# Patient Record
Sex: MALE | Race: WHITE | NOT HISPANIC OR LATINO | Employment: FULL TIME | URBAN - METROPOLITAN AREA
[De-identification: names, ages, dates, MRNs, and addresses within clinical notes are randomized per-mention and may not be internally consistent; named-entity substitution may affect disease eponyms.]

---

## 2017-02-16 ENCOUNTER — TELEPHONE (OUTPATIENT)
Dept: FAMILY MEDICINE | Facility: CLINIC | Age: 67
End: 2017-02-16

## 2017-02-16 NOTE — TELEPHONE ENCOUNTER
----- Message from Jannette Matos sent at 2/16/2017  8:25 AM CST -----  Contact: Pt   Pt would like the nurse to give him a call back in regards to getting a urgent appointment regarding his medication .. Contact number [phone]..

## 2017-02-21 ENCOUNTER — DOCUMENTATION ONLY (OUTPATIENT)
Dept: FAMILY MEDICINE | Facility: CLINIC | Age: 67
End: 2017-02-21

## 2017-02-22 ENCOUNTER — OFFICE VISIT (OUTPATIENT)
Dept: FAMILY MEDICINE | Facility: CLINIC | Age: 67
End: 2017-02-22
Payer: MEDICARE

## 2017-02-22 VITALS
BODY MASS INDEX: 37.11 KG/M2 | TEMPERATURE: 98 F | SYSTOLIC BLOOD PRESSURE: 109 MMHG | DIASTOLIC BLOOD PRESSURE: 67 MMHG | HEART RATE: 67 BPM | HEIGHT: 70 IN | WEIGHT: 259.25 LBS

## 2017-02-22 DIAGNOSIS — E11.65 TYPE 2 DIABETES MELLITUS WITH HYPERGLYCEMIA, WITHOUT LONG-TERM CURRENT USE OF INSULIN: ICD-10-CM

## 2017-02-22 DIAGNOSIS — E11.59 HYPERTENSION ASSOCIATED WITH DIABETES: ICD-10-CM

## 2017-02-22 DIAGNOSIS — N20.0 RECURRENT KIDNEY STONES: Primary | ICD-10-CM

## 2017-02-22 DIAGNOSIS — I15.2 HYPERTENSION ASSOCIATED WITH DIABETES: ICD-10-CM

## 2017-02-22 DIAGNOSIS — Z86.010 HISTORY OF COLON POLYPS: ICD-10-CM

## 2017-02-22 DIAGNOSIS — E66.9 OBESITY (BMI 35.0-39.9 WITHOUT COMORBIDITY): ICD-10-CM

## 2017-02-22 PROCEDURE — 99214 OFFICE O/P EST MOD 30 MIN: CPT | Mod: S$PBB,,, | Performed by: NURSE PRACTITIONER

## 2017-02-22 PROCEDURE — 99215 OFFICE O/P EST HI 40 MIN: CPT | Mod: PBBFAC,PO | Performed by: NURSE PRACTITIONER

## 2017-02-22 PROCEDURE — 99999 PR PBB SHADOW E&M-EST. PATIENT-LVL V: CPT | Mod: PBBFAC,,, | Performed by: NURSE PRACTITIONER

## 2017-02-22 RX ORDER — AMOXICILLIN 500 MG
2 CAPSULE ORAL 3 TIMES DAILY
COMMUNITY
End: 2023-04-24 | Stop reason: SDUPTHER

## 2017-02-22 RX ORDER — CHOLECALCIFEROL (VITAMIN D3) 25 MCG
185 TABLET ORAL DAILY
COMMUNITY
End: 2023-02-10

## 2017-02-22 RX ORDER — ALLOPURINOL 300 MG/1
300 TABLET ORAL DAILY
Qty: 30 TABLET | Refills: 11 | Status: SHIPPED | OUTPATIENT
Start: 2017-02-22 | End: 2018-01-26 | Stop reason: SDUPTHER

## 2017-02-22 NOTE — MR AVS SNAPSHOT
[facility]  [address] [place] [address]  Phone: [phone]  Fax: [phone]                  Carlos Tenorio   2017 8:40 AM   Office Visit    Description:  Male : 1950   Provider:  GERRY Mccurdy   Department:  [facility]           Reason for Visit     Annual Exam           Diagnoses this Visit        Comments    Recurrent kidney stones    -  Primary     History of colon polyps                To Do List           Future Appointments        Provider Department Dept Phone    3/3/2017 8:00 AM MD Tim [facility] - Gastroenterology [phone]    3/20/2017 9:00 AM MD Rene Kingll [facility] - Urology [phone]    2017 1:00 PM Roney Bradshaw MD [facility] [phone]      Goals (5 Years of Data)     None      Follow-Up and Disposition     Return in about 6 months (around 2017).       These Medications        Disp Refills Start End    allopurinol (ZYLOPRIM) 300 MG tablet 30 tablet 11 2017     Take 1 tablet (300 mg total) by mouth once daily. - Oral    Pharmacy: [id number]  [place], [place] - [address]  AT [facility] & [place] [address] Ph #: [phone]         [facility] On Call     [facility] On Call Nurse Care Line -  Assistance  Registered nurses in the [facility] On Call Center provide clinical advisement, health education, appointment booking, and other advisory services.  Call for this free service at [phone].             Medications           Message regarding Medications     Verify the changes and/or additions to your medication regime listed below are the same as discussed with your clinician today.  If any of these changes or additions are incorrect, please notify your healthcare provider.        CHANGE how you are taking these medications     Start Taking Instead of    allopurinol (ZYLOPRIM) 300 MG tablet allopurinol (ZYLOPRIM) 300 MG tablet    Dosage:  Take  "1 tablet (300 mg total) by mouth once daily. Dosage:  TAKE 1 TABLET BY MOUTH EVERY DAY    Reason for Change:  Reorder       STOP taking these medications     aspirin (BUFFERIN) 325 MG Tab Take 325 mg by mouth once daily.    bromfenac (XIBROM) 0.09 % ophthalmic solution     BENZOYL PEROXIDE (BENZEFOAM TOP) Apply topically.           Verify that the below list of medications is an accurate representation of the medications you are currently taking.  If none reported, the list may be blank. If incorrect, please contact your healthcare provider. Carry this list with you in case of emergency.           Current Medications     ACETAMINOPHEN (TYLENOL ARTHRITIS ORAL) Take by mouth.    allopurinol (ZYLOPRIM) 300 MG tablet Take 1 tablet (300 mg total) by mouth once daily.    aspirin (ECOTRIN) 81 MG EC tablet Take 81 mg by mouth once daily.    atorvastatin (LIPITOR) 20 MG tablet TAKE 1 TABLET BY MOUTH EVERY DAY    fish oil-omega-3 fatty acids 300-1,000 mg capsule Take 2 g by mouth 3 (three) times daily.    furosemide (LASIX) 40 MG tablet TAKE 1 TABLET BY MOUTH DAILY AS NEEDED    hydrochlorothiazide (HYDRODIURIL) 25 MG tablet TAKE 1 TABLET BY MOUTH EVERY DAY    losartan (COZAAR) 50 MG tablet TAKE 1 TABLET(50 MG) BY MOUTH EVERY DAY    metformin (GLUCOPHAGE-XR) 500 MG 24 hr tablet TAKE 1 TABLET(500 MG) BY MOUTH EVERY DAY    vitamin D 1000 units Tab Take 185 mg by mouth once daily.           Clinical Reference Information           Your Vitals Were     BP Pulse Temp    109/67 (BP Location: Right arm, Patient Position: Sitting, BP Method: Automatic) 67 98.2 °F (36.8 °C) (Oral)    Height Weight BMI    5' 10" (1.778 m) 117.6 kg (259 lb 4.2 oz) 37.2 kg/m2      Blood Pressure          Most Recent Value    BP  109/67      Allergies as of 2/22/2017     No Known Allergies      Immunizations Administered on Date of Encounter - 2/22/2017     None      Orders Placed During Today's Visit      Normal Orders This Visit    Ambulatory referral to " Gastroenterology     Ambulatory referral to Urology       Instructions      Weight Management: Getting Started  Healthy bodies come in all shapes and sizes. Not all bodies are made to be thin. For some people, a healthy weight is higher than the average weight listed on weight charts. Your healthcare provider can help you decide on a healthy weight for you.    Reasons to lose weight  Losing weight can help with some health problems, such as high blood pressure, heart disease, diabetes, sleep apnea, and arthritis. You may also feel more energy.  Set your long-term goal  Your goal doesn't even have to be a specific weight. You may decide on a fitness goal (such as being able to walk 10 miles a week), or a health goal (such as lowering your blood pressure). Choose a goal that is measurable and reasonable, so you know when you've reached it. A goal of reaching a BMI of less than 25 is not always reasonable (or possible).   Make an action plan  Habits dont change overnight. Setting your goals too high can leave you feeling discouraged if you cant reach them. Be realistic. Choose one or two small changes you can make now. Set an action plan for how you are going to make these changes. When you can stick to this plan, keep making a few more small changes. Taking small steps will help you stay on the path to success.  Track your progress  Write down your goals. Then, keep a daily record of your progress. Write down what you eat and how active you are. This record lets you look back on how much youve done. It may also help when youre feeling frustrated. Reward yourself for success. Even if you dont reach every goal, give yourself credit for what you do get done.  Get support  Encouragement from others can help make losing weight easier. Ask your family members and friends for support. They may even want to join you. Also look to your healthcare provider, registered dietitian, and  for help. Your  local hospital can give you more information about nutrition, exercise, and weight loss.  Date Last Reviewed: 1/31/2016 © 2000-2016 [employer]. [address] [place] [address], [place] [address]. All rights reserved. This information is not intended as a substitute for professional medical care. Always follow your healthcare professional's instructions.        Walking for Fitness  Fitness walking has something for everyone, even people who are already fit. Walking is one of the safest ways to condition your body aerobically. It can boost energy, help you lose weight, and reduce stress.    Physical benefits  · Walking strengthens your heart and lungs, and tones your muscles.  · When walking, your feet land with less impact than in other sports. This reduces chances of muscle, bone, and joint injury.  · Regular walking improves your cholesterol levels and lowers your risk of heart disease. And it helps you control your blood sugar if you have diabetes.  · Walking is a weight-bearing activity, which helps maintain bone density. This can help prevent osteoporosis.  Personal rewards  · Taking walks can help you relax and manage stress. And fitness walking may make you feel better about yourself.  · Walking can help you sleep better at night and make you less likely to be depressed.  · Regular walking may help maintain your memory as you get older.  · Walking is a great way to spend extra time with friends and family members. Be sure to invite your dog along!  Q&A about fitness walking  Q: Will walking keep me fit?  A: Yes. Regular walking at the right pace gives you all the benefits of other aerobic activities, such as jogging and swimming.  Q: Will walking help me lose weight and keep it off?  A: Yes. Per mile, walking can burn as many calories as jogging. Your health care provider can help work walking into your weight-loss plan.  Q: Is walking safe for my health?  A: Yes. Walking is safe if you have high blood  pressure, diabetes, heart disease, or other conditions. Talk to your health care provider before you start.  Date Last Reviewed: 5/9/2015  © 6402-4320 [employer], [employer]. [address] [place] [address], [place] [address]. All rights reserved. This information is not intended as a substitute for professional medical care. Always follow your healthcare professional's instructions.             Language Assistance Services     ATTENTION: Language assistance services are available, free of charge. Please call [phone].      ATENCIÓN: Si rivka julian, tiene a paul disposición servicios gratuitos de asistencia lingüística. Llame al [phone].     CHÚ Ý: N?u b?n nói Ti?ng Vi?t, có các d?ch v? h? tr? ngôn ng? mi?n phí dành cho b?n. G?i s? [phone].         [facility] complies with applicable Federal civil rights laws and does not discriminate on the basis of race, color, national origin, age, disability, or sex.

## 2017-02-22 NOTE — PATIENT INSTRUCTIONS
Weight Management: Getting Started  Healthy bodies come in all shapes and sizes. Not all bodies are made to be thin. For some people, a healthy weight is higher than the average weight listed on weight charts. Your healthcare provider can help you decide on a healthy weight for you.    Reasons to lose weight  Losing weight can help with some health problems, such as high blood pressure, heart disease, diabetes, sleep apnea, and arthritis. You may also feel more energy.  Set your long-term goal  Your goal doesn't even have to be a specific weight. You may decide on a fitness goal (such as being able to walk 10 miles a week), or a health goal (such as lowering your blood pressure). Choose a goal that is measurable and reasonable, so you know when you've reached it. A goal of reaching a BMI of less than 25 is not always reasonable (or possible).   Make an action plan  Habits dont change overnight. Setting your goals too high can leave you feeling discouraged if you cant reach them. Be realistic. Choose one or two small changes you can make now. Set an action plan for how you are going to make these changes. When you can stick to this plan, keep making a few more small changes. Taking small steps will help you stay on the path to success.  Track your progress  Write down your goals. Then, keep a daily record of your progress. Write down what you eat and how active you are. This record lets you look back on how much youve done. It may also help when youre feeling frustrated. Reward yourself for success. Even if you dont reach every goal, give yourself credit for what you do get done.  Get support  Encouragement from others can help make losing weight easier. Ask your family members and friends for support. They may even want to join you. Also look to your healthcare provider, registered dietitian, and  for help. Your local hospital can give you more information about nutrition, exercise, and  weight loss.  Date Last Reviewed: 1/31/2016 © 2000-2016 [employer]. [address] [place] [address], [place] [address]. All rights reserved. This information is not intended as a substitute for professional medical care. Always follow your healthcare professional's instructions.        Walking for Fitness  Fitness walking has something for everyone, even people who are already fit. Walking is one of the safest ways to condition your body aerobically. It can boost energy, help you lose weight, and reduce stress.    Physical benefits  · Walking strengthens your heart and lungs, and tones your muscles.  · When walking, your feet land with less impact than in other sports. This reduces chances of muscle, bone, and joint injury.  · Regular walking improves your cholesterol levels and lowers your risk of heart disease. And it helps you control your blood sugar if you have diabetes.  · Walking is a weight-bearing activity, which helps maintain bone density. This can help prevent osteoporosis.  Personal rewards  · Taking walks can help you relax and manage stress. And fitness walking may make you feel better about yourself.  · Walking can help you sleep better at night and make you less likely to be depressed.  · Regular walking may help maintain your memory as you get older.  · Walking is a great way to spend extra time with friends and family members. Be sure to invite your dog along!  Q&A about fitness walking  Q: Will walking keep me fit?  A: Yes. Regular walking at the right pace gives you all the benefits of other aerobic activities, such as jogging and swimming.  Q: Will walking help me lose weight and keep it off?  A: Yes. Per mile, walking can burn as many calories as jogging. Your health care provider can help work walking into your weight-loss plan.  Q: Is walking safe for my health?  A: Yes. Walking is safe if you have high blood pressure, diabetes, heart disease, or other conditions. Talk to your health  care provider before you start.  Date Last Reviewed: 5/9/2015  © 6041-4400 [employer], [employer]. [address] [place] [address], [place] [address]. All rights reserved. This information is not intended as a substitute for professional medical care. Always follow your healthcare professional's instructions.

## 2017-02-22 NOTE — PROGRESS NOTES
Subjective:       Patient ID: Carlos Tenorio is a 67 y.o. male.    Chief Complaint: Annual Exam    HPI Comments: Mr. Tenorio presents to the clinic today for refill of allopurinol.  He takes allopurinol for kidney stone prevention.  He has had kidney stones and lithotripsies multiple times.  He was a patient of Dr. Fu and needs a new Urologist.  He has not had any kidney stones since starting the allopurinol three years ago.  [facility].  Triglycerides are elevated at 568 and he was prescribed fish oil supplements.  Results will be scanned into Epic.  He has no new complaints.  He is diabetic and states blood sugars have been good.  He did not bring glucose log.  Hemoglobin A1c  6.4.  [facility].    Review of Systems   Constitutional: Negative for activity change and unexpected weight change.   HENT: Positive for rhinorrhea. Negative for hearing loss and trouble swallowing.    Eyes: Negative for discharge and visual disturbance.   Respiratory: Negative for chest tightness and wheezing.    Cardiovascular: Negative for chest pain and palpitations.   Gastrointestinal: Negative for blood in stool, constipation, diarrhea and vomiting.   Endocrine: Negative for polydipsia and polyuria.   Genitourinary: Negative for difficulty urinating, hematuria and urgency.   Musculoskeletal: Positive for arthralgias and joint swelling.   Neurological: Negative for weakness and headaches.   Psychiatric/Behavioral: Negative for confusion and dysphoric mood.       Objective:      Physical Exam   Constitutional: He is oriented to person, place, and time. He appears well-developed and well-nourished. No distress.   HENT:   Head: Normocephalic and atraumatic.   Right Ear: External ear normal.   Left Ear: External ear normal.   Mouth/Throat: Oropharynx is clear and moist. No oropharyngeal exudate.   Eyes: Pupils are equal, round, and reactive to light. Right eye  exhibits no discharge. Left eye exhibits no discharge.   Neck: Neck supple. No thyromegaly present.   Cardiovascular: Normal rate and regular rhythm.  Exam reveals no gallop and no friction rub.    No murmur heard.  Pulmonary/Chest: Effort normal and breath sounds normal. No respiratory distress. He has no wheezes. He has no rales.   Abdominal: Soft. He exhibits no distension. There is no tenderness.   Lymphadenopathy:     He has no cervical adenopathy.   Neurological: He is alert and oriented to person, place, and time. Coordination normal.   Skin: Skin is warm and dry.   Psychiatric: He has a normal mood and affect. His behavior is normal. Thought content normal.   Vitals reviewed.          Current Outpatient Prescriptions:     ACETAMINOPHEN (TYLENOL ARTHRITIS ORAL), Take by mouth., Disp: , Rfl:     allopurinol (ZYLOPRIM) 300 MG tablet, Take 1 tablet (300 mg total) by mouth once daily., Disp: 30 tablet, Rfl: 11    aspirin (ECOTRIN) 81 MG EC tablet, Take 81 mg by mouth once daily., Disp: , Rfl:     atorvastatin (LIPITOR) 20 MG tablet, TAKE 1 TABLET BY MOUTH EVERY DAY, Disp: 90 tablet, Rfl: 4    fish oil-omega-3 fatty acids 300-1,000 mg capsule, Take 2 g by mouth 3 (three) times daily., Disp: , Rfl:     furosemide (LASIX) 40 MG tablet, TAKE 1 TABLET BY MOUTH DAILY AS NEEDED, Disp: 90 tablet, Rfl: 11    hydrochlorothiazide (HYDRODIURIL) 25 MG tablet, TAKE 1 TABLET BY MOUTH EVERY DAY, Disp: 90 tablet, Rfl: 4    losartan (COZAAR) 50 MG tablet, TAKE 1 TABLET(50 MG) BY MOUTH EVERY DAY, Disp: 90 tablet, Rfl: 11    metformin (GLUCOPHAGE-XR) 500 MG 24 hr tablet, TAKE 1 TABLET(500 MG) BY MOUTH EVERY DAY, Disp: 90 tablet, Rfl: 0    vitamin D 1000 units Tab, Take 185 mg by mouth once daily., Disp: , Rfl:     [DISCONTINUED] testosterone cypionate (DEPOTESTOTERONE CYPIONATE) 200 mg/mL injection, INJECT 1 ML 'S INTO THE MUSCLE EVERY 14 DAYS AS DIRECTED, Disp: 10 mL, Rfl: 0  No current facility-administered medications  for this visit.     Facility-Administered Medications Ordered in Other Visits:     triamcinolone acetonide injection 40 mg, 40 mg, Intra-articular, , Mason Macedo MD, 40 mg at 03/13/15 0929    triamcinolone acetonide injection 40 mg, 40 mg, Intra-articular, , Mason Macedo MD, 40 mg at 03/13/15 0929  Assessment:       1. Recurrent kidney stones    2. History of colon polyps    3. Type 2 diabetes mellitus with hyperglycemia, without long-term current use of insulin    4. Obesity (BMI 35.0-39.9 without comorbidity)    5. Hypertension associated with diabetes        Plan:      Carlos was seen today for annual exam.    Diagnoses and all orders for this visit:    Recurrent kidney stones  Stable on current medication.  -     allopurinol (ZYLOPRIM) 300 MG tablet; Take 1 tablet (300 mg total) by mouth once daily.  -     Ambulatory referral to Urology    History of colon polyps  -     Ambulatory referral to Gastroenterology    Type 2 diabetes mellitus with hyperglycemia, without long-term current use of insulin  Stable on current medication.    Obesity (BMI 35.0-39.9 without comorbidity)    Hypertension associated with diabetes  Patient readiness: acceptance and barriers:readiness    During the course of the visit the patient was educated and counseled about the following:     Diabetes:  Discussed general issues about diabetes pathophysiology and management.  Encouraged aerobic exercise.  Discussed foot care.  Reminded to get yearly retinal exam.  Hypertension:   Medication: no change.  Obesity:   General weight loss/lifestyle modification strategies discussed (elicit support from others; identify saboteurs; non-food rewards, etc).    Goals: Diabetes: Maintain Hemoglobin A1C below 7, Hypertension: Reduce Blood Pressure and Obesity: Reduce calorie intake and BMI    Did patient meet goals/outcomes: Yes    The following self management tools provided: declined    Patient Instructions (the written plan) was given  to the patient/family.     Time spent with patient: 30 minutes

## 2017-03-02 RX ORDER — METFORMIN HYDROCHLORIDE 500 MG/1
TABLET, EXTENDED RELEASE ORAL
Qty: 90 TABLET | Refills: 3 | Status: SHIPPED | OUTPATIENT
Start: 2017-03-02 | End: 2018-01-26 | Stop reason: SDUPTHER

## 2017-03-03 ENCOUNTER — INITIAL CONSULT (OUTPATIENT)
Dept: GASTROENTEROLOGY | Facility: CLINIC | Age: 67
End: 2017-03-03
Payer: MEDICARE

## 2017-03-03 VITALS
SYSTOLIC BLOOD PRESSURE: 133 MMHG | WEIGHT: 259.06 LBS | DIASTOLIC BLOOD PRESSURE: 81 MMHG | HEART RATE: 58 BPM | RESPIRATION RATE: 20 BRPM | HEIGHT: 71 IN | BODY MASS INDEX: 36.27 KG/M2

## 2017-03-03 DIAGNOSIS — K63.5 POLYP OF COLON, UNSPECIFIED PART OF COLON, UNSPECIFIED TYPE: Primary | ICD-10-CM

## 2017-03-03 DIAGNOSIS — Z86.010 HX OF COLONIC POLYPS: Primary | ICD-10-CM

## 2017-03-03 PROCEDURE — 99213 OFFICE O/P EST LOW 20 MIN: CPT | Mod: PBBFAC,PO | Performed by: INTERNAL MEDICINE

## 2017-03-03 PROCEDURE — 99499 UNLISTED E&M SERVICE: CPT | Mod: S$PBB,,, | Performed by: INTERNAL MEDICINE

## 2017-03-03 PROCEDURE — 99999 PR PBB SHADOW E&M-EST. PATIENT-LVL III: CPT | Mod: PBBFAC,,, | Performed by: INTERNAL MEDICINE

## 2017-03-03 NOTE — LETTER
March 3, 2017      [facility]  [address]  [place] [address]           [facility] [facility]  [address]  [place] [address]  Phone: [phone]          Patient: Carlos Tenorio   MR Number: [id number]   YOB: 1950   Date of Visit: 3/3/2017       Dear [facility]:    Thank you for referring Carlos Tenorio to me for evaluation. Attached you will find relevant portions of my assessment and plan of care.    If you have questions, please do not hesitate to call me. I look forward to following Carlos Tenorio along with you.    Sincerely,    Dmitry Sampson MD    Enclosure  CC:  No Recipients    If you would like to receive this communication electronically, please contact [email or web address] or [phone] to request more information on [employer] Link access.    For providers and/or their staff who would like to refer a patient to [facility], please contact us through our one-stop-shop provider referral line, [facility] [place], at [phone].    If you feel you have received this communication in error or would no longer like to receive these types of communications, please e-mail [email or web address]

## 2017-03-03 NOTE — PROGRESS NOTES
"[facility] Note    CC: colon polyps    HPI 67 y.o. male presents for surveillance colonoscopy due to a personal history of colon polyps.  He had 7 colon polyps removed on his first colonoscopy.    Last colonoscopy was in 2008.    Past Medical History:   Diagnosis Date    Arthritis     knees, back    CAD (coronary artery disease) 1995    no chest pain since CABG, sees Dr Larry Black    Diabetes mellitus     borderline    Hypertension     Kidney stones     uric acid stones    VASYL (obstructive sleep apnea) 2007    is compliant with CPAP    Primary gonadal failure          Review of Systems  General ROS: negative for - chills, fever or weight loss    Physical Examination  /81 (BP Location: Right arm, Patient Position: Sitting, BP Method: Automatic)  Pulse (!) 58  Resp 20  Ht 5' 11" (1.803 m)  Wt 117.5 kg (259 lb 0.7 oz)  BMI 36.13 kg/m2  General appearance: alert, cooperative, no distress  HENT: Normocephalic, atraumatic, neck symmetrical, no nasal discharge   Abdomen: soft, non tender, non distended BS present  Extremities: extremities symmetric; no clubbing, cyanosis, or edema    Assessment:   67 y.o. male presents for surveillance colonoscopy due to a personal history of colon polyps..    Plan:  Schedule surveillance colonoscopy.    Dmitry Sampson MD  [facility]  [address] [facility], [address]  [place] [address]  Office: [phone]  Fax: [phone]      "

## 2017-03-20 ENCOUNTER — OFFICE VISIT (OUTPATIENT)
Dept: UROLOGY | Facility: CLINIC | Age: 67
End: 2017-03-20
Payer: MEDICARE

## 2017-03-20 VITALS
BODY MASS INDEX: 35.14 KG/M2 | DIASTOLIC BLOOD PRESSURE: 74 MMHG | TEMPERATURE: 98 F | HEART RATE: 59 BPM | WEIGHT: 251 LBS | HEIGHT: 71 IN | SYSTOLIC BLOOD PRESSURE: 121 MMHG

## 2017-03-20 DIAGNOSIS — N50.3 EPIDIDYMAL CYST: ICD-10-CM

## 2017-03-20 DIAGNOSIS — Z87.442 HISTORY OF KIDNEY STONES: Primary | ICD-10-CM

## 2017-03-20 DIAGNOSIS — N52.03 COMBINED ARTERIAL INSUFFICIENCY AND CORPORO-VENOUS OCCLUSIVE ERECTILE DYSFUNCTION: ICD-10-CM

## 2017-03-20 DIAGNOSIS — Z12.5 SCREENING FOR PROSTATE CANCER: ICD-10-CM

## 2017-03-20 LAB
BILIRUB SERPL-MCNC: ABNORMAL MG/DL
BLOOD URINE, POC: ABNORMAL
COLOR, POC UA: ABNORMAL
GLUCOSE UR QL STRIP: ABNORMAL
KETONES UR QL STRIP: ABNORMAL
LEUKOCYTE ESTERASE URINE, POC: ABNORMAL
NITRITE, POC UA: ABNORMAL
PH, POC UA: 5
PROTEIN, POC: ABNORMAL
SPECIFIC GRAVITY, POC UA: 1.02
UROBILINOGEN, POC UA: ABNORMAL

## 2017-03-20 PROCEDURE — 99213 OFFICE O/P EST LOW 20 MIN: CPT | Mod: PBBFAC,PO | Performed by: UROLOGY

## 2017-03-20 PROCEDURE — 81002 URINALYSIS NONAUTO W/O SCOPE: CPT | Mod: PBBFAC,PO | Performed by: UROLOGY

## 2017-03-20 PROCEDURE — 99999 PR PBB SHADOW E&M-EST. PATIENT-LVL III: CPT | Mod: PBBFAC,,, | Performed by: UROLOGY

## 2017-03-20 PROCEDURE — 99215 OFFICE O/P EST HI 40 MIN: CPT | Mod: S$PBB,,, | Performed by: UROLOGY

## 2017-03-20 NOTE — MR AVS SNAPSHOT
[facility]  [address]  [facility] [address]  Phone: [phone]                  Carlos Tenorio   3/20/2017 9:00 AM   Office Visit    Description:  Male : 1950   Provider:  Yovany Heart MD   Department:  [facility]           Reason for Visit     Establish Care     Nephrolithiasis           Diagnoses this Visit        Comments    History of kidney stones    -  Primary     Epididymal cyst     right    Combined arterial insufficiency and corporo-venous occlusive erectile dysfunction         Screening for prostate cancer                To Do List           Future Appointments        Provider Department Dept Phone    2017 9:00 AM LAB, [facility] [phone]    2017 9:15 AM [facility] XRFL1 [facility] [phone]    2017 9:30 AM [facility] US2 [facility] [phone]    2017 9:00 AM Yovany Heart MD [facility] [phone]    2017 1:00 PM Roney Bradshaw MD [facility] [phone]      Your Future Surgeries/Procedures     2017   Surgery with Dmitry Sampson MD   [facility] ([facility])    [address]  [facility] [address] [phone]              Goals (5 Years of Data)     None      Follow-Up and Disposition     Return in about 6 weeks (around 2017).      [facility]     [facility] - 24/7 Assistance  Registered nurses in the [facility] provide clinical advisement, health education, appointment booking, and other advisory services.  Call for this free service at [phone].             Medications           Message regarding Medications     Verify the changes and/or additions to your medication regime listed below are the same as discussed with your clinician today.  If any of these changes or additions are incorrect, please notify your healthcare  "provider.             Verify that the below list of medications is an accurate representation of the medications you are currently taking.  If none reported, the list may be blank. If incorrect, please contact your healthcare provider. Carry this list with you in case of emergency.           Current Medications     ACETAMINOPHEN (TYLENOL ARTHRITIS ORAL) Take by mouth.    allopurinol (ZYLOPRIM) 300 MG tablet Take 1 tablet (300 mg total) by mouth once daily.    aspirin (ECOTRIN) 81 MG EC tablet Take 81 mg by mouth once daily.    atorvastatin (LIPITOR) 20 MG tablet TAKE 1 TABLET BY MOUTH EVERY DAY    fish oil-omega-3 fatty acids 300-1,000 mg capsule Take 2 g by mouth 3 (three) times daily.    furosemide (LASIX) 40 MG tablet TAKE 1 TABLET BY MOUTH DAILY AS NEEDED    hydrochlorothiazide (HYDRODIURIL) 25 MG tablet TAKE 1 TABLET BY MOUTH EVERY DAY    losartan (COZAAR) 50 MG tablet TAKE 1 TABLET(50 MG) BY MOUTH EVERY DAY    metformin (GLUCOPHAGE-XR) 500 MG 24 hr tablet TAKE 1 TABLET(500 MG) BY MOUTH EVERY DAY    vitamin D 1000 units Tab Take 185 mg by mouth once daily.           Clinical Reference Information           Your Vitals Were     BP Pulse Temp Height Weight BMI    121/74 59 98 °F (36.7 °C) (Oral) 5' 11" (1.803 m) 113.9 kg (251 lb) 35.01 kg/m2      Blood Pressure          Most Recent Value    BP  121/74      Allergies as of 3/20/2017     No Known Allergies      Immunizations Administered on Date of Encounter - 3/20/2017     None      Orders Placed During Today's Visit      Normal Orders This Visit    POCT URINE DIPSTICK WITHOUT MICROSCOPE     Future Labs/Procedures Expected by Expires    PSA, Screening  3/20/2017 5/19/2018    US Scrotum And Testicles  3/20/2017 3/20/2018    X-Ray Abdomen AP 1 View  3/20/2017 3/20/2018         3/20/2017 10:09 AM - Sheela Ledbetter MA      Component Results     Component    Color    Yellow/Clear    Spec Grav    1.020    pH, UA    5    WBC, UA    Trace    Nitrite    N    Protein    " N    Glucose, UA    N    Ketones, UA    N    Urobilinogen    N    Bilirubin    N    Blood, UA    N            Language Assistance Services     ATTENTION: Language assistance services are available, free of charge. Please call [phone].      ATENCIÓN: Si rivka julian, tiene a paul disposición servicios gratuitos de asistencia lingüística. Llame al [phone].     CHÚ Ý: N?u b?n nói Ti?ng Vi?t, có các d?ch v? h? tr? ngôn ng? mi?n phí dành cho b?n. G?i s? [phone].         [facility] complies with applicable Federal civil rights laws and does not discriminate on the basis of race, color, national origin, age, disability, or sex.

## 2017-03-20 NOTE — PROGRESS NOTES
[facility]:    Carlos Tenorio is a 67 y.o. male Referred by Aura Lunsford NP for evaluation of recurrent kidney stones and right spermatocele.    He is a prior patient of Dr Fu, whom he last saw in Aug 2015 for evaluation of R spermatocele. He did have R ESWL of 1.2cm stone with Dr Fu Oct 2014 and stone fragments passed demonstrated 95% uric acid, 5% CaOx mono. KUB after 10/29/14 noted clearance of right renal calculus, but concern for subtle left renal calcification. This R stone had been followed on imaging since 2012 at which time he was seeing urology PA who notes prior R and L ESWLs in or before 2004, and that he was told he had both CaOx stones and uric acids in the past. By report a 24 hour urine in Oct 2012 had high oxalate and low urine pH. He recently saw LENNOX Lunsford on 2/22/17 asking for refill of his allopurinol which he did restart years ago, noting he had not had kidney stones since restarting it. He presents today to establish care since Dr Fernandez [facility].     He presents today to establish care, and notes he has not had any issues with stones or stone passage since his last ESWL.  Review of prior imaging does note clearance of his right renal stone burden after his last ESWL, however there is concern for subtle contralateral calcification in the left KUB October 29, 2014.  He denies hematuria, dysuria, stone passage.  He does however note that his right scrotal cyst has been enlarging. No pain or discomfort.  Also has ED. Does have spontaneous nocturnal erections. Was on testosterone replacement at some point 5 years ago with prior pcp.  Has a good libido. Feels like normal energy - was outdoors yesterday working outside and hanging [employer] and playing with grandchildren.  Had tried viagra about 4 years ago without positive effect, but reports being told later he is not using it properly in regards to empty stomach, timing, etc.  Can achieve erection but has trouble maintaining  erections.  Wife is undergoing multiple surgeries at this time and therefore erections are not an immediate concern  Notes he also has bad knees and thinks his leg pain may distract him during intercourse as well.  + CAD history with prior CABG      Past Medical History:   Diagnosis Date    Arthritis     knees, back    CAD (coronary artery disease) 1995    no chest pain since CABG, sees Dr Larry Black    Diabetes mellitus     borderline    Hypertension     Kidney stones     uric acid stones    VASYL (obstructive sleep apnea) 2007    is compliant with CPAP    Primary gonadal failure        Past Surgical History:   Procedure Laterality Date    BACK SURGERY  2010    L5-6 fusion, with pins,bone graft    COLONOSCOPY  2008    repeat 2013    CORONARY ARTERY BYPASS GRAFT  1995, 2007    cabgx3, cabgx5    EXTRACORPOREAL SHOCK WAVE LITHOTRIPSY      LITHOTRIPSY         Family History   Problem Relation Age of Onset    Heart disease Mother     Hypertension Mother     Diabetes Mother     Heart disease Father      premature    Hypertension Father     Diabetes Sister     Urolithiasis Sister     Heart disease Paternal Uncle     Cancer Neg Hx     Prostate cancer Neg Hx     Kidney cancer Neg Hx        Social History     Social History    Marital status:      Spouse name: N/A    Number of children: N/A    Years of education: N/A     Occupational History    Not on file.     Social History Main Topics    Smoking status: Former Smoker     Quit date: 2/7/1978    Smokeless tobacco: Never Used    Alcohol use Yes      Comment: not daily    Drug use: No    Sexual activity: Not on file     Other Topics Concern    Not on file     Social History Narrative       Review of patient's allergies indicates:  No Known Allergies    Medications Reviewed: see MAR    ROS:    Respiratory: negative for cough, shortness of breath, wheezing, dyspnea.  Cardiovascular: + for high blood pressure, negative for chest pain,  varicose veins, ankle swelling, palpitations, syncope.  GI: negative for abdominal pain, heartburn, indigestion, nausea, vomiting, constipation, diarrhea, blood in stool.   Urology: as noted above in HPI, negative for hematuria, dysuria, retention  Endocrinology: negative for cold intolerance, excessive thirst, not feeling tired/sluggish, no heat intolerance.   Hematology/Lymph: negative for easy bleeding, easy bruising, swollen glands.  Musculoskeletal: negative for back pain, joint pain, joint swelling, neck pain.  Allergy-Immunology: negative for seasonal allergies, negative for HIV risk factors or unusual infections.   Skin: negative for boils, breast lumps, hives, itching, rash.   Neurology: negative for, dizziness, headache, tingling/numbness, tremors.   Psych: satisfied with life; negative for, anxiety, depression, suicidal thoughts.     PHYSICAL EXAM:    Vitals:    03/20/17 0925   BP: 121/74   Pulse: (!) 59   Temp: 98 °F (36.7 °C)     Body mass index is 35.01 kg/(m^2).    General: Alert, cooperative, no distress, appears stated age  Head: Normocephalic, without obvious abnormality, atraumatic  Neck: no masses, no thyromegaly, no lymphadenopathy  Eyes: PERRL, conjunctiva/corneas clear  Lungs: Respirations unlabored, normal effort, no accessory muscle use  CV: Warm and well perfused extremities  Abdomen: Soft, non-tender, no CVA tenderness, no hepatosplenomegaly, no hernia  Penis: phallus normal, circumcised, well cared for, no plaques or lesions.   Scrotum: no cysts, no lesions, no rash, no hydrocele.   Epididymes: normal, nontender, approximately 4 cm right epididymal cyst NTTP.   Testes: normal, both descended, no masses.   Urethra: palpably normal with orthotopic meatus of normal size    TAHIRA: normal sphincter tone, no masses, no hemmorrhoids   PROSTATE: 20g, no nodules, non-tender, symmetrical.   Extremities: Extremities normal, atraumatic, no cyanosis or edema  Skin: Normal color, texture, and turgor, no  rashes or lesions  Psych: Appropriate, well oriented, normal affect, normal mood  Neuro: Non-focal    Lab Results   Component Value Date    PSA 2.9 01/17/2014    PSA 2.85 07/16/2013    PSA 1.87 11/28/2012       LABS:    No results found for this or any previous visit (from the past 336 hour(s)).      Assessment/Diagnosis:    1. History of kidney stones  POCT URINE DIPSTICK WITHOUT MICROSCOPE    X-Ray Abdomen AP 1 View   2. Epididymal cyst  US Scrotum And Testicles    right   3. Combined arterial insufficiency and corporo-venous occlusive erectile dysfunction  Testosterone Panel   4. Screening for prostate cancer  PSA, Screening       Plans:   Currently he is stable from the standpoint of his kidney stones.  There was concern of possible calcification in the left kidney on most recent imaging in 2014, though he is asymptomatic and has had no issues with stone passage since then.  His urine dip in clinic today is completely negative.  We did however discussed prior to follow-up, 1 flat plate KUB as a screening measure for any  calcification given his history.  He is on allopurinol and will continue to take this daily given his history of uric acid stones.    In regards to his right epididymal cyst versus spermatocele, this is a clinical diagnosis and no prior imaging has been performed.  On physical exam my findings are consistent with a large epididymal cyst versus spermatocele, and we'll have the patient get a scrotal ultrasound prior to returning to clinic.  We did have a long discussion about risks and benefits of spermatocelectomy/removal of epididymal cyst.  Despite the subjective growth in size over time, he is otherwise asymptomatic from it without any testicular scrotal complaints, and denies any pain or discomfort.  We not only discussed that postoperative recovery and scarring in the area could induce pain he did not have before, we also discussed risks including not limited to testicular atrophy and loss  of testicle, compromised blood flow to the testicle, recurrence.  I did not advise surgery at this time given his minimal complaints.    In regards to his erectile dysfunction, given his history of coronary artery disease, discussed it is most likely a vascular origin.  He does have regular cardiology follow-up given his history.  With normal libido, good energy, an active lifestyle he does not exhibit signs of hypogonadism, though has low total testosterone on record as recently as 2015.  As he is due for a screening PSA, I will order a testosterone panel to be drawn at the time of his screening PSA, which I have advised should be an early morning lab draw before 9 AM for the testosterone component.  We briefly discussed the treatment modality options for treating erectile dysfunction including oral PDE 5 inhibitors, urethral suppositories such as Muse, intracavernosal injection therapy, vacuum erection device, and if they will penile prosthesis.  At this time he does not desire to pursue any of the above treatment options, though will consider them for future discussion.    I advised he can return in approximately 4-6 weeks to review the results of imaging and labs.  [facility]  He does not believe he had one recently though notes it is possible.  If he is up-to-date on his PSA screening, will discontinue that order     45 minutes spent in encounter, over half in [facility]

## 2017-03-22 ENCOUNTER — PATIENT MESSAGE (OUTPATIENT)
Dept: UROLOGY | Facility: CLINIC | Age: 67
End: 2017-03-22

## 2017-03-23 ENCOUNTER — TELEPHONE (OUTPATIENT)
Dept: UROLOGY | Facility: CLINIC | Age: 67
End: 2017-03-23

## 2017-03-23 NOTE — TELEPHONE ENCOUNTER
----- Message from Yovany Heart MD sent at 3/21/2017  8:48 PM CDT -----  Please request all ps[facility]

## 2017-03-23 NOTE — TELEPHONE ENCOUNTER
Spoke with Pt. Asked pt which [facility] and he said the one in [place]. Pt stated he printed out his records, [facility] and will [facility] them off tomorrow morning.

## 2017-04-04 ENCOUNTER — ANESTHESIA (OUTPATIENT)
Dept: ENDOSCOPY | Facility: HOSPITAL | Age: 67
End: 2017-04-04
Payer: MEDICARE

## 2017-04-04 ENCOUNTER — ANESTHESIA EVENT (OUTPATIENT)
Dept: ENDOSCOPY | Facility: HOSPITAL | Age: 67
End: 2017-04-04
Payer: MEDICARE

## 2017-04-04 ENCOUNTER — HOSPITAL ENCOUNTER (OUTPATIENT)
Facility: HOSPITAL | Age: 67
Discharge: HOME | End: 2017-04-04
Attending: INTERNAL MEDICINE | Admitting: INTERNAL MEDICINE
Payer: MEDICARE

## 2017-04-04 VITALS — RESPIRATION RATE: 7 BRPM

## 2017-04-04 DIAGNOSIS — Z86.010 HISTORY OF COLON POLYPS: ICD-10-CM

## 2017-04-04 PROBLEM — Z86.0100 HISTORY OF COLON POLYPS: Status: ACTIVE | Noted: 2017-04-04

## 2017-04-04 PROCEDURE — D9220A PRA ANESTHESIA: Mod: ANES,,, | Performed by: ANESTHESIOLOGY

## 2017-04-04 PROCEDURE — G0105 COLORECTAL SCRN; HI RISK IND: HCPCS | Performed by: INTERNAL MEDICINE

## 2017-04-04 PROCEDURE — 37000008 HC ANESTHESIA 1ST 15 MINUTES: Performed by: INTERNAL MEDICINE

## 2017-04-04 PROCEDURE — 25000003 PHARM REV CODE 250: Performed by: NURSE ANESTHETIST, CERTIFIED REGISTERED

## 2017-04-04 PROCEDURE — 63600175 PHARM REV CODE 636 W HCPCS

## 2017-04-04 PROCEDURE — D9220A PRA ANESTHESIA: Mod: CRNA,,, | Performed by: NURSE ANESTHETIST, CERTIFIED REGISTERED

## 2017-04-04 PROCEDURE — G0105 COLORECTAL SCRN; HI RISK IND: HCPCS | Mod: ,,, | Performed by: INTERNAL MEDICINE

## 2017-04-04 PROCEDURE — 25000003 PHARM REV CODE 250: Performed by: INTERNAL MEDICINE

## 2017-04-04 PROCEDURE — 37000009 HC ANESTHESIA EA ADD 15 MINS: Performed by: INTERNAL MEDICINE

## 2017-04-04 RX ORDER — LIDOCAINE HCL/PF 100 MG/5ML
SYRINGE (ML) INTRAVENOUS
Status: DISCONTINUED | OUTPATIENT
Start: 2017-04-04 | End: 2017-04-04

## 2017-04-04 RX ORDER — DEXTROMETHORPHAN/PSEUDOEPHED 2.5-7.5/.8
DROPS ORAL
Status: DISCONTINUED
Start: 2017-04-04 | End: 2017-04-04 | Stop reason: HOSPADM

## 2017-04-04 RX ORDER — SODIUM CHLORIDE 9 MG/ML
INJECTION, SOLUTION INTRAVENOUS CONTINUOUS
Status: DISCONTINUED | OUTPATIENT
Start: 2017-04-04 | End: 2017-04-04 | Stop reason: HOSPADM

## 2017-04-04 RX ORDER — PROPOFOL 10 MG/ML
INJECTION, EMULSION INTRAVENOUS
Status: COMPLETED
Start: 2017-04-04 | End: 2017-04-04

## 2017-04-04 RX ORDER — LIDOCAINE HYDROCHLORIDE 20 MG/ML
INJECTION, SOLUTION EPIDURAL; INFILTRATION; INTRACAUDAL; PERINEURAL
Status: DISCONTINUED
Start: 2017-04-04 | End: 2017-04-04 | Stop reason: HOSPADM

## 2017-04-04 RX ADMIN — LIDOCAINE HYDROCHLORIDE 75 MG: 20 INJECTION, SOLUTION INTRAVENOUS at 08:04

## 2017-04-04 RX ADMIN — SODIUM CHLORIDE 1000 ML: 0.9 INJECTION, SOLUTION INTRAVENOUS at 08:04

## 2017-04-04 RX ADMIN — PROPOFOL 30 MG: 10 INJECTION, EMULSION INTRAVENOUS at 08:04

## 2017-04-04 RX ADMIN — PROPOFOL 100 MG: 10 INJECTION, EMULSION INTRAVENOUS at 08:04

## 2017-04-04 NOTE — TRANSFER OF CARE
"Anesthesia Transfer of Care Note    Patient: Carlos Tenorio    Procedure(s) Performed: Procedure(s) (LRB):  COLONOSCOPY (N/A)    Patient location: PACU    Anesthesia Type: general    Transport from OR: Transported from OR on room air with adequate spontaneous ventilation    Post pain: adequate analgesia    Post assessment: no apparent anesthetic complications    Post vital signs: stable    Level of consciousness: awake    Nausea/Vomiting: no nausea/vomiting    Complications: none          Last vitals:   Visit Vitals    BP (!) 172/81    Pulse (!) 52    Temp 36.5 °C (97.7 °F)    Resp (!) 21    Ht 5' 11" (1.803 m)    Wt 115.7 kg (255 lb)    SpO2 99%    BMI 35.57 kg/m2     "

## 2017-04-04 NOTE — IP AVS SNAPSHOT
[address] [place] [address]  Phone: [phone]           Patient Discharge Instructions   Our goal is to set you up for success. This packet includes information on your condition, medications, and your home care.  It will help you care for yourself to prevent having to return to the hospital.     Please ask your nurse if you have any questions.      There are many details to remember when preparing to leave the hospital. Here is what you will need to do:    1. Take your medicine. If you are prescribed medications, review your Medication List on the following pages. You may have new medications to  at the pharmacy and others that you'll need to stop taking. Review the instructions for how and when to take your medications. Talk with your doctor or nurses if you are unsure of what to do.     2. Go to your follow-up appointments. Specific follow-up information is listed in the following pages. Your may be contacted by a nurse or clinical provider about future appointments. Be sure we have all of the phone numbers to reach you. Please contact your provider's office if you are unable to make an appointment.     3. Watch for warning signs. Your doctor or nurse will give you detailed warning signs to watch for and when to call for assistance. These instructions may also include educational information about your condition. If you experience any of warning signs to your health, call your doctor.           [facility]  Unless otherwise directed by your provider, please   contact [facility], our nurse care line   that is available for 24/7 assistance.     [phone] (toll-free)     Registered nurses in the [facility]   provide: appointment scheduling, clinical advisement, health education, and other advisory services.                  ** Verify the list of medication(s) below is accurate and up to date. Carry this with you in case of  emergency. If your medications have changed, please notify your healthcare provider.             Medication List      ASK your doctor about these medications        Additional Info                      allopurinol 300 MG tablet   Commonly known as:  ZYLOPRIM   Quantity:  30 tablet   Refills:  11   Dose:  300 mg    Instructions:  Take 1 tablet (300 mg total) by mouth once daily.     Begin Date    AM    Noon    PM    Bedtime       aspirin 81 MG EC tablet   Commonly known as:  ECOTRIN   Refills:  0   Dose:  81 mg    Instructions:  Take 81 mg by mouth once daily.     Begin Date    AM    Noon    PM    Bedtime       atorvastatin 20 MG tablet   Commonly known as:  LIPITOR   Quantity:  90 tablet   Refills:  4   Comments:  **Patient requests 90 days supply**    Instructions:  TAKE 1 TABLET BY MOUTH EVERY DAY     Begin Date    AM    Noon    PM    Bedtime       fish oil-omega-3 fatty acids 300-1,000 mg capsule   Refills:  0   Dose:  2 g    Instructions:  Take 2 g by mouth 3 (three) times daily.     Begin Date    AM    Noon    PM    Bedtime       furosemide 40 MG tablet   Commonly known as:  LASIX   Quantity:  90 tablet   Refills:  11   Comments:  **Patient requests 90 days supply**    Instructions:  TAKE 1 TABLET BY MOUTH DAILY AS NEEDED     Begin Date    AM    Noon    PM    Bedtime       hydrochlorothiazide 25 MG tablet   Commonly known as:  HYDRODIURIL   Quantity:  90 tablet   Refills:  4   Comments:  **Patient requests 90 days supply**    Instructions:  TAKE 1 TABLET BY MOUTH EVERY DAY     Begin Date    AM    Noon    PM    Bedtime       losartan 50 MG tablet   Commonly known as:  COZAAR   Quantity:  90 tablet   Refills:  11   Comments:  **Patient requests 90 days supply**    Instructions:  TAKE 1 TABLET(50 MG) BY MOUTH EVERY DAY     Begin Date    AM    Noon    PM    Bedtime       metformin 500 MG 24 hr tablet   Commonly known as:  GLUCOPHAGE-XR   Quantity:  90 tablet   Refills:  3    Instructions:  TAKE 1 TABLET(500 MG) BY  MOUTH EVERY DAY     Begin Date    AM    Noon    PM    Bedtime       testosterone cypionate 200 mg/mL injection   Commonly known as:  DEPOTESTOTERONE CYPIONATE   Quantity:  10 mL   Refills:  0    Instructions:  INJECT 1 ML 'S INTO THE MUSCLE EVERY 14 DAYS AS DIRECTED     Begin Date    AM    Noon    PM    Bedtime       TYLENOL ARTHRITIS ORAL   Refills:  0    Instructions:  Take by mouth.     Begin Date    AM    Noon    PM    Bedtime       vitamin D 1000 units Tab   Refills:  0   Dose:  185 mg    Instructions:  Take 185 mg by mouth once daily.     Begin Date    AM    Noon    PM    Bedtime                  Please bring to all follow up appointments:    1. A copy of your discharge instructions.  2. All medicines you are currently taking in their original bottles.  3. Identification and insurance card.    Please arrive 15 minutes ahead of scheduled appointment time.    Please call 24 hours in advance if you must reschedule your appointment and/or time.        Your Scheduled Appointments     Apr 20, 2017  9:00 AM CDT   Non-Fasting Lab with [facility] ([facility] [place] [facility])    [address] [place] [address]   [phone]            Apr 20, 2017  9:15 AM CDT   Diagnostic Xray with [facility] XR2   [facility] ([facility] [place] [facility]) [address] [place] [address]   [phone]            Apr 20, 2017  9:30 AM CDT   Us Scrotum with [facility] US2   [facility] ([facility] [place] [facility]) [address] [place] [address]   [phone]            May 09, 2017  9:00 AM CDT   Established Patient Visit with MD [facility] - Urology ([facility] [place] [facility])    [address]  [facility] [address]   [phone]            Jul 14, 2017  1:00 PM CDT   Established Patient Visit with MD [facility] - Family Medicine ([facility])    [address]  [place] [address]   [phone]                  Discharge Instructions         Discharge Instructions: Eating a High-Fiber Diet  Your health care provider has prescribed a high-fiber diet for you. Fiber is what gives strength and structure to plants. Most grains, beans, vegetables, and fruits contain fiber. Foods rich in fiber are often low in calories and fat, but they fill you up more. These foods may also reduce the risk of certain health problems.  There are two types of fiber:  · Insoluble fiber. This is found in whole grains, cereals, and certain fruits and vegetables (such as apple skins, corn, and beans). Insoluble fiber is made up mainly of plant cell walls. It may prevent constipation and reduce the risk of certain types of cancer.  · Soluble fiber. This type of fiber is found in oats, beans, nuts, and certain fruits and vegetables (such as strawberries and peas). Soluble fiber turns to gel in the digestive system, slowing the movement of the digestive tract. It helps control blood sugar levels and can reduce cholesterol, which may help lower the risk of heart disease. Soluble fiber can also help control appetite.     Home care  · Know how much fiber you need a day. The recommended daily amount of fiber is 25 grams for women and 38 grams for men. After age 50, daily fiber needs drop to 21 grams for women and 30 grams for men.  · Ask your doctor about a fiber supplement. (Always take fiber supplements with a large glass of water.)  · Keep track of how much fiber you eat.  · Eat a variety of foods high in fiber.  · Learn to read and understand food labels.  · Ask your healthcare provider how much water you should be drinking.  · Look for these high-fiber foods:  ¨ Whole-grain breads and cereals  § 6 ounces a day give you about 18 grams of fiber (1 ounce is equal to 1 slice of bread, 1 cup of dry cereal, or 1/2 cup of cooked rice).  § Include wheat and oat bran cereals, whole-wheat muffins or toast, and corn  tortillas in your meals.  ¨ Fruits   § 2 cups a day give you about 8 grams of fiber.  § Apples, oranges, strawberries, pears, and bananas are good sources.  § Fruit juice does not contain as much fiber as the fruit it was made from.  ¨ Vegetables  § 2½ cups a day give you about 11 grams of fiber. Add asparagus, carrots, broccoli, peas, and corn to your meals.  ¨ Legumes  § 1/4 cup a day (in place of meat) gives you about 4 grams of fiber. Try navy beans, lentils, chickpeas, and soybeans.  ¨ Seeds   § A small handful of seeds gives you about 3 grams of fiber. Try sunflower seeds.  Follow-up  Make a follow-up appointment with a nutritionist as directed by our staff.  Date Last Reviewed: 6/1/2015  © 7896-4636 [employer]. [address] [place] [address]. All rights reserved. This information is not intended as a substitute for professional medical care. Always follow your healthcare professional's instructions.        Diverticulosis    Diverticulosis means that small pouches have formed in the wall of your large intestine (colon). Most often, this problem causes no symptoms and is common as people age. But the pouches in the colon are at risk of becoming infected. When this happens, the condition is called diverticulitis. Although most people with diverticulosis never develop diverticulitis, it is still not uncommon. Rectal bleeding can also occur and in less common situations, a type of colon inflammation called colitis.  While most people do not have symptoms, some people with diverticulosis may have:  · Abdominal cramps and pain  · Bloating  · Constipation  · Change in bowel habits  Causes  The exact cause of diverticulosis (and diverticulitis) has not been proved, but a few things are associated with the condition:  · Low-fiber diet  · Constipation  · Lack of exercise  Your healthcare provider will talk with you about how to manage your condition. Diet changes may be all that are needed to  help control diverticulosis and prevent progression to diverticulitis. If you develop diverticulitis, you will likely need other treatments.  Home care  You may be told to take fiber supplements daily. Fiber adds bulk to the stool so that it passes through the colon more easily. Stool softeners may be recommended. You may also be given medications for pain relief. Be sure to take all medications as directed.  In the past, people were told to avoid corn, nuts, and seeds. This is no longer necessary.  Follow these guidelines when caring for yourself at home:  · Eat unprocessed foods that are high in fiber. Whole grains, fruits, and vegetables are good choices.  · Drink 6 to 8 glasses of water every day unless your healthcare provider has you limit how much fluid you should have.  · Watch for changes in your bowel movements. Tell your provider if you notice any changes.  · Begin an exercise program. Ask your provider how to get started. Generally, walking is the best.  · Get plenty of rest and sleep.  Follow-up care  Follow up with your healthcare provider, or as advised. Regular visits may be needed to check on your health. Sometimes special procedures such as colonoscopy, are needed after an episode of diverticulitis or blooding. Be sure to keep all your appointments.  If a stool sample was taken, or cultures were done, you should be told if they are positive, or if your treatment needs to be changed. You can call as directed for the results.  If X-rays were done, a radiologist will look at them. You will be told if there is a change in your treatment.  If antibiotics were prescribed, be sure to finish them all.  When to seek medical advice  Call your healthcare provider right away if any of these occur:  · Fever of 100.4°F (38°C) or higher, or as directed by your healthcare provider  · Severe cramps in the lower left side of the abdomen or pain that is getting worse  · Tenderness in the lower left side of the  abdomen or worsening pain throughout the abdomen  · Diarrhea or constipation that doesn't get better within 24 hours  · Nausea and vomiting  · Bleeding from the rectum  Call 911  Call emergency services if any of the following occur:  · Trouble breathing  · Confusion  · Very drowsy or trouble awakening  · Fainting or loss of consciousness  · Rapid heart rate  · Chest pain  Date Last Reviewed: 12/30/2015  © 4126-9578 [employer]. [address] [place] [address], [place] [address]. All rights reserved. This information is not intended as a substitute for professional medical care. Always follow your healthcare professional's instructions.        Colonoscopy     A camera attached to a flexible tube with a viewing lens is used to take video pictures.     Colonoscopy is a test to view the inside of your lower digestive tract (colon and rectum). Sometimes it can show the last part of the small intestine (ileum). During the test, small pieces of tissue may be removed for testing. This is called a biopsy. Small growths, such as polyps, may also be removed.   Why is colonoscopy done?  The test is done to help look for colon cancer. And it can help find the source of abdominal pain, bleeding, and changes in bowel habits. It may be needed once a year, depending on factors such as your:  · Age  · Health history  · Family health history  · Symptoms  · Results from any prior colonoscopy  Risks and possible complications  These include:  · Bleeding               · A puncture or tear in the colon   · Risks of anesthesia  · A cancer lesion not being seen  Getting ready   To prepare for the test:  · Talk with your healthcare provider about the risks of the test (see below). Also ask your healthcare provider about alternatives to the test.  · Tell your healthcare provider about any medicines you take. Also tell him or her about any health conditions you may have.  · Make sure your rectum and colon are empty for the test. Follow  the diet and bowel prep instructions exactly. If you dont, the test may need to be rescheduled.  · Plan for a friend or family member to drive you home after the test.     Colonoscopy provides an inside view of the entire colon.     You may discuss the results with your doctor right away or at a future visit.  During the test   The test is usually done in the hospital on an outpatient basis. This means you go home the same day. The procedure takes about 30 minutes. During that time:  · You are given relaxing (sedating) medicine through an IV line. You may be drowsy, or fully asleep.  · The healthcare provider will first give you a physical exam to check for anal and rectal problems.  · Then the anus is lubricated and the scope inserted.  · If you are awake, you may have a feeling similar to needing to have a bowel movement. You may also feel pressure as air is pumped into the colon. Its OK to pass gas during the procedure.  · Biopsy, polyp removal, or other treatments may be done during the test.  After the test   You may have gas right after the test. It can help to try to pass it to help prevent later bloating. Your healthcare provider may discuss the results with you right away. Or you may need to schedule a follow-up visit to talk about the results. After the test, you can go back to your normal eating and other activities. You may be tired from the sedation and need to rest for a few hours.  Date Last Reviewed: 11/1/2016  © 1715-0666 [employer]. [address] [place] [address]. All rights reserved. This information is not intended as a substitute for professional medical care. Always follow your healthcare professional's instructions.            Admission Information     Date & Time Provider Department CSN    4/4/2017  6:20 AM Dmitry Sampson MD [facility] [id number]      Care Providers     Provider Role Specialty Primary office phone    Dmitry Sampson MD  "Attending Provider Gastroenterology [phone]    Dmitry Sampson MD Surgeon  Gastroenterology [phone]      Your Vitals Were     BP Pulse Temp Resp Height Weight    117/62 60 97.3 °F (36.3 °C) (Oral) 18 5' 11" (1.803 m) 115.7 kg (255 lb)    SpO2 BMI             93% 35.57 kg/m2         Recent Lab Values        7/20/2012 11/16/2012 7/16/2013 1/17/2014 11/13/2014 5/6/2015 10/30/2015 5/5/2016     11:21 AM  7:56 AM 10:14 AM  8:18 AM  9:42 AM  8:16 AM  8:27 AM 10:06 AM    A1C 6.4 (H) 6.5 (H) 6.4 (H) 6.2 6.2 6.3 (H) 6.2 6.4 (H)      Allergies as of 4/4/2017     No Known Allergies      Advance Directives     An advance directive is a document which, in the event you are no longer able to make decisions for yourself, tells your healthcare team what kind of treatment you do or do not want to receive, or who you would like to make those decisions for you.  If you do not currently have an advance directive, [facility] encourages you to create one.  For more information call:  [phone]), [phone] ([phone]),  or log on to [email or web address].        Smoking Cessation     If you would like to quit smoking:   You may be eligible for free services if you are a [place] resident and started smoking cigarettes before September 1, 1988.  Call the Smoking Cessation Trust (SCT) toll free at (123) 053-6756 or (561) 486-0804.   Call 3-862-QUIT-NOW if you do not meet the above criteria.   Contact us via email: [email or web address]   View our website for more information: [email or web address]        Language Assistance Services     ATTENTION: Language assistance services are available, free of charge. Please call [phone].      ATENCIÓN: Si habla español, tiene a paul disposición servicios gratuitos de asistencia lingüística. Llame al [phone].     NATHAN Ý: N?u b?n nói Ti?ng Vi?t, có các d?ch v? h? tr? ngôn ng? mi?n phí dành cho b?n. G?i s? [phone].        Diabetes Discharge " Instructions                                    [facility] complies with applicable Federal civil rights laws and does not discriminate on the basis of race, color, national origin, age, disability, or sex.

## 2017-04-04 NOTE — H&P
"[facility]    CC: colon polyps    HPI 67 y.o. male presents for surveillance colonoscopy due to a personal history of colon polyps.      Past Medical History:   Diagnosis Date    Arthritis     knees, back    CAD (coronary artery disease) 1995    no chest pain since CABG, sees Dr Larry Black    Diabetes mellitus     borderline    Hypertension     Kidney stones     uric acid stones    VASYL (obstructive sleep apnea) 2007    is compliant with CPAP    Primary gonadal failure          Review of Systems  General ROS: negative for - chills, fever or weight loss    Physical Examination  BP (!) 172/81  Pulse (!) 52  Temp 97.7 °F (36.5 °C)  Resp (!) 21  Ht 5' 11" (1.803 m)  Wt 115.7 kg (255 lb)  SpO2 99%  BMI 35.57 kg/m2  General appearance: alert, cooperative, no distress  HENT: Normocephalic, atraumatic, neck symmetrical, no nasal discharge   Abdomen: soft, non tender, non distended BS present  Extremities: extremities symmetric; no clubbing, cyanosis, or edema    Assessment:   67 y.o. male presents for surveillance colonoscopy due to a personal history of colon polyps..    Plan:  Surveillance colonoscopy today    Dmitry Sampson MD  [facility]  [address] [place], [address]  [place] [address]  Office: [phone]  Fax: [phone]      "

## 2017-04-04 NOTE — DISCHARGE INSTRUCTIONS
Discharge Instructions: Eating a High-Fiber Diet  Your health care provider has prescribed a high-fiber diet for you. Fiber is what gives strength and structure to plants. Most grains, beans, vegetables, and fruits contain fiber. Foods rich in fiber are often low in calories and fat, but they fill you up more. These foods may also reduce the risk of certain health problems.  There are two types of fiber:  · Insoluble fiber. This is found in whole grains, cereals, and certain fruits and vegetables (such as apple skins, corn, and beans). Insoluble fiber is made up mainly of plant cell walls. It may prevent constipation and reduce the risk of certain types of cancer.  · Soluble fiber. This type of fiber is found in oats, beans, nuts, and certain fruits and vegetables (such as strawberries and peas). Soluble fiber turns to gel in the digestive system, slowing the movement of the digestive tract. It helps control blood sugar levels and can reduce cholesterol, which may help lower the risk of heart disease. Soluble fiber can also help control appetite.     Home care  · Know how much fiber you need a day. The recommended daily amount of fiber is 25 grams for women and 38 grams for men. After age 50, daily fiber needs drop to 21 grams for women and 30 grams for men.  · Ask your doctor about a fiber supplement. (Always take fiber supplements with a large glass of water.)  · Keep track of how much fiber you eat.  · Eat a variety of foods high in fiber.  · Learn to read and understand food labels.  · Ask your healthcare provider how much water you should be drinking.  · Look for these high-fiber foods:  ¨ Whole-grain breads and cereals  § 6 ounces a day give you about 18 grams of fiber (1 ounce is equal to 1 slice of bread, 1 cup of dry cereal, or 1/2 cup of cooked rice).  § Include wheat and oat bran cereals, whole-wheat muffins or toast, and corn tortillas in your meals.  ¨ Fruits   § 2 cups a day give you about 8 grams of  fiber.  § Apples, oranges, strawberries, pears, and bananas are good sources.  § Fruit juice does not contain as much fiber as the fruit it was made from.  ¨ Vegetables  § 2½ cups a day give you about 11 grams of fiber. Add asparagus, carrots, broccoli, peas, and corn to your meals.  ¨ Legumes  § 1/4 cup a day (in place of meat) gives you about 4 grams of fiber. Try navy beans, lentils, chickpeas, and soybeans.  ¨ Seeds   § A small handful of seeds gives you about 3 grams of fiber. Try sunflower seeds.  Follow-up  Make a follow-up appointment with a nutritionist as directed by our staff.  Date Last Reviewed: 6/1/2015 © 2000-2016 [employer]. [address] [place] [id number]. All rights reserved. This information is not intended as a substitute for professional medical care. Always follow your healthcare professional's instructions.        Diverticulosis    Diverticulosis means that small pouches have formed in the wall of your large intestine (colon). Most often, this problem causes no symptoms and is common as people age. But the pouches in the colon are at risk of becoming infected. When this happens, the condition is called diverticulitis. Although most people with diverticulosis never develop diverticulitis, it is still not uncommon. Rectal bleeding can also occur and in less common situations, a type of colon inflammation called colitis.  While most people do not have symptoms, some people with diverticulosis may have:  · Abdominal cramps and pain  · Bloating  · Constipation  · Change in bowel habits  Causes  The exact cause of diverticulosis (and diverticulitis) has not been proved, but a few things are associated with the condition:  · Low-fiber diet  · Constipation  · Lack of exercise  Your healthcare provider will talk with you about how to manage your condition. Diet changes may be all that are needed to help control diverticulosis and prevent progression to diverticulitis. If you  develop diverticulitis, you will likely need other treatments.  Home care  You may be told to take fiber supplements daily. Fiber adds bulk to the stool so that it passes through the colon more easily. Stool softeners may be recommended. You may also be given medications for pain relief. Be sure to take all medications as directed.  In the past, people were told to avoid corn, nuts, and seeds. This is no longer necessary.  Follow these guidelines when caring for yourself at home:  · Eat unprocessed foods that are high in fiber. Whole grains, fruits, and vegetables are good choices.  · Drink 6 to 8 glasses of water every day unless your healthcare provider has you limit how much fluid you should have.  · Watch for changes in your bowel movements. Tell your provider if you notice any changes.  · Begin an exercise program. Ask your provider how to get started. Generally, walking is the best.  · Get plenty of rest and sleep.  Follow-up care  Follow up with your healthcare provider, or as advised. Regular visits may be needed to check on your health. Sometimes special procedures such as colonoscopy, are needed after an episode of diverticulitis or blooding. Be sure to keep all your appointments.  If a stool sample was taken, or cultures were done, you should be told if they are positive, or if your treatment needs to be changed. You can call as directed for the results.  If X-rays were done, a radiologist will look at them. You will be told if there is a change in your treatment.  If antibiotics were prescribed, be sure to finish them all.  When to seek medical advice  Call your healthcare provider right away if any of these occur:  · Fever of 100.4°F (38°C) or higher, or as directed by your healthcare provider  · Severe cramps in the lower left side of the abdomen or pain that is getting worse  · Tenderness in the lower left side of the abdomen or worsening pain throughout the abdomen  · Diarrhea or constipation that  doesn't get better within 24 hours  · Nausea and vomiting  · Bleeding from the rectum  Call 911  Call emergency services if any of the following occur:  · Trouble breathing  · Confusion  · Very drowsy or trouble awakening  · Fainting or loss of consciousness  · Rapid heart rate  · Chest pain  Date Last Reviewed: 12/30/2015 © 2000-2016 [employer]. [address] [place] [address], [place] [address]. All rights reserved. This information is not intended as a substitute for professional medical care. Always follow your healthcare professional's instructions.        Colonoscopy     A camera attached to a flexible tube with a viewing lens is used to take video pictures.     Colonoscopy is a test to view the inside of your lower digestive tract (colon and rectum). Sometimes it can show the last part of the small intestine (ileum). During the test, small pieces of tissue may be removed for testing. This is called a biopsy. Small growths, such as polyps, may also be removed.   Why is colonoscopy done?  The test is done to help look for colon cancer. And it can help find the source of abdominal pain, bleeding, and changes in bowel habits. It may be needed once a year, depending on factors such as your:  · Age  · Health history  · Family health history  · Symptoms  · Results from any prior colonoscopy  Risks and possible complications  These include:  · Bleeding               · A puncture or tear in the colon   · Risks of anesthesia  · A cancer lesion not being seen  Getting ready   To prepare for the test:  · Talk with your healthcare provider about the risks of the test (see below). Also ask your healthcare provider about alternatives to the test.  · Tell your healthcare provider about any medicines you take. Also tell him or her about any health conditions you may have.  · Make sure your rectum and colon are empty for the test. Follow the diet and bowel prep instructions exactly. If you dont, the test may need to be  rescheduled.  · Plan for a friend or family member to drive you home after the test.     Colonoscopy provides an inside view of the entire colon.     You may discuss the results with your doctor right away or at a future visit.  During the test   The test is usually done in the hospital on an outpatient basis. This means you go home the same day. The procedure takes about 30 minutes. During that time:  · You are given relaxing (sedating) medicine through an IV line. You may be drowsy, or fully asleep.  · The healthcare provider will first give you a physical exam to check for anal and rectal problems.  · Then the anus is lubricated and the scope inserted.  · If you are awake, you may have a feeling similar to needing to have a bowel movement. You may also feel pressure as air is pumped into the colon. Its OK to pass gas during the procedure.  · Biopsy, polyp removal, or other treatments may be done during the test.  After the test   You may have gas right after the test. It can help to try to pass it to help prevent later bloating. Your healthcare provider may discuss the results with you right away. Or you may need to schedule a follow-up visit to talk about the results. After the test, you can go back to your normal eating and other activities. You may be tired from the sedation and need to rest for a few hours.  Date Last Reviewed: 11/1/2016  © 2376-6416 [employer]. [address] [place] [address], [place] [address]. All rights reserved. This information is not intended as a substitute for professional medical care. Always follow your healthcare professional's instructions.

## 2017-04-04 NOTE — ANESTHESIA POSTPROCEDURE EVALUATION
"Anesthesia Post Evaluation    Patient: Carlos Tenoiro    Procedure(s) Performed: Procedure(s) (LRB):  COLONOSCOPY (N/A)    Final Anesthesia Type: general  Patient location during evaluation: PACU  Patient participation: Yes- Able to Participate  Level of consciousness: awake and alert and oriented  Post-procedure vital signs: reviewed and stable  Pain management: adequate  Airway patency: patent  PONV status at discharge: No PONV  Anesthetic complications: no      Cardiovascular status: blood pressure returned to baseline  Respiratory status: unassisted, spontaneous ventilation and room air  Hydration status: euvolemic  Follow-up not needed.        Visit Vitals    /71    Pulse (!) 52    Temp 36.4 °C (97.6 °F)    Resp 18    Ht 5' 11" (1.803 m)    Wt 115.7 kg (255 lb)    SpO2 (!) 94%    BMI 35.57 kg/m2       Pain/Surinder Score: Pain Assessment Performed: Yes (4/4/2017  9:06 AM)  Presence of Pain: denies (4/4/2017  9:06 AM)  Pain Rating Prior to Med Admin: 1 (4/4/2017  9:06 AM)  Pain Rating Post Med Admin: 1 (4/4/2017  9:06 AM)  Surinder Score: 9 (4/4/2017  9:06 AM)      "

## 2017-04-04 NOTE — ANESTHESIA PREPROCEDURE EVALUATION
04/04/2017  Carlos Tenorio is a 67 y.o., male.    [facility] Anesthesia Evaluation    I have reviewed the Patient Summary Reports.    I have reviewed the Nursing Notes.   I have reviewed the Medications.     Review of Systems  Anesthesia Hx:  No problems with previous Anesthesia    Social:  Non-Smoker    Cardiovascular:   Hypertension, well controlled    Endocrine:   Diabetes, well controlled, type 2        Physical Exam  General:  Obesity    Airway/Jaw/Neck:  Airway Findings: Mouth Opening: Normal Tongue: Normal  General Airway Assessment: Adult, Good  Mallampati: II  TM Distance: Normal, at least 6 cm       Chest/Lungs:  Chest/Lungs Findings: Clear to auscultation, Normal Respiratory Rate     Heart/Vascular:  Heart Findings: Rate: Normal  Rhythm: Regular Rhythm  Sounds: Normal        Mental Status:  Mental Status Findings:  Alert and Oriented, Cooperative         Anesthesia Plan  Type of Anesthesia, risks & benefits discussed:  Anesthesia Type:  general  Patient's Preference:   Intra-op Monitoring Plan:   Intra-op Monitoring Plan Comments:   Post Op Pain Control Plan:   Post Op Pain Control Plan Comments:   Induction:    Beta Blocker:  Patient is not currently on a Beta-Blocker (No further documentation required).       Informed Consent: Patient understands risks and agrees with Anesthesia plan.  Questions answered. Anesthesia consent signed with patient.  ASA Score: 2     Day of Surgery Review of History & Physical: I have interviewed and examined the patient. I have reviewed the patient's H&P dated:  There are no significant changes.          Ready For Surgery From Anesthesia Perspective.

## 2017-04-05 VITALS
SYSTOLIC BLOOD PRESSURE: 124 MMHG | BODY MASS INDEX: 35.7 KG/M2 | HEART RATE: 52 BPM | WEIGHT: 255 LBS | HEIGHT: 71 IN | OXYGEN SATURATION: 94 % | DIASTOLIC BLOOD PRESSURE: 71 MMHG | RESPIRATION RATE: 18 BRPM | TEMPERATURE: 98 F

## 2017-04-20 ENCOUNTER — HOSPITAL ENCOUNTER (OUTPATIENT)
Dept: RADIOLOGY | Facility: HOSPITAL | Age: 67
Discharge: HOME | End: 2017-04-20
Attending: UROLOGY
Payer: MEDICARE

## 2017-04-20 DIAGNOSIS — N50.3 EPIDIDYMAL CYST: ICD-10-CM

## 2017-04-20 DIAGNOSIS — Z87.442 HISTORY OF KIDNEY STONES: ICD-10-CM

## 2017-04-20 PROCEDURE — 74000 XR ABDOMEN AP 1 VIEW: CPT | Mod: 26,,, | Performed by: RADIOLOGY

## 2017-04-20 PROCEDURE — 76870 US EXAM SCROTUM: CPT | Mod: TC

## 2017-04-20 PROCEDURE — 74000 XR ABDOMEN AP 1 VIEW: CPT | Mod: TC

## 2017-04-20 PROCEDURE — 76870 US EXAM SCROTUM: CPT | Mod: 26,,, | Performed by: RADIOLOGY

## 2017-05-01 ENCOUNTER — HOSPITAL ENCOUNTER (OUTPATIENT)
Dept: RADIOLOGY | Facility: CLINIC | Age: 67
Discharge: HOME | End: 2017-05-01
Attending: FAMILY MEDICINE
Payer: MEDICARE

## 2017-05-01 ENCOUNTER — OFFICE VISIT (OUTPATIENT)
Dept: FAMILY MEDICINE | Facility: CLINIC | Age: 67
End: 2017-05-01
Payer: MEDICARE

## 2017-05-01 ENCOUNTER — DOCUMENTATION ONLY (OUTPATIENT)
Dept: FAMILY MEDICINE | Facility: CLINIC | Age: 67
End: 2017-05-01

## 2017-05-01 VITALS
WEIGHT: 242.06 LBS | TEMPERATURE: 98 F | BODY MASS INDEX: 33.89 KG/M2 | DIASTOLIC BLOOD PRESSURE: 63 MMHG | OXYGEN SATURATION: 97 % | HEIGHT: 71 IN | SYSTOLIC BLOOD PRESSURE: 94 MMHG | HEART RATE: 93 BPM

## 2017-05-01 DIAGNOSIS — E86.0 DEHYDRATION: ICD-10-CM

## 2017-05-01 DIAGNOSIS — R63.4 WEIGHT LOSS: ICD-10-CM

## 2017-05-01 DIAGNOSIS — I15.2 HYPERTENSION ASSOCIATED WITH DIABETES: ICD-10-CM

## 2017-05-01 DIAGNOSIS — R19.7 DIARRHEA, UNSPECIFIED TYPE: ICD-10-CM

## 2017-05-01 DIAGNOSIS — R19.7 DIARRHEA, UNSPECIFIED TYPE: Primary | ICD-10-CM

## 2017-05-01 DIAGNOSIS — E66.9 OBESITY (BMI 30.0-34.9): ICD-10-CM

## 2017-05-01 DIAGNOSIS — E11.59 HYPERTENSION ASSOCIATED WITH DIABETES: ICD-10-CM

## 2017-05-01 DIAGNOSIS — K52.9 ENTERITIS: Primary | ICD-10-CM

## 2017-05-01 DIAGNOSIS — E11.65 TYPE 2 DIABETES MELLITUS WITH HYPERGLYCEMIA, WITHOUT LONG-TERM CURRENT USE OF INSULIN: ICD-10-CM

## 2017-05-01 PROCEDURE — 74020 XR ABDOMEN FLAT AND ERECT: CPT | Mod: 26,,, | Performed by: RADIOLOGY

## 2017-05-01 PROCEDURE — 93010 ELECTROCARDIOGRAM REPORT: CPT | Mod: ,,, | Performed by: INTERNAL MEDICINE

## 2017-05-01 PROCEDURE — 99999 PR PBB SHADOW E&M-EST. PATIENT-LVL IV: CPT | Mod: PBBFAC,,, | Performed by: PHYSICIAN ASSISTANT

## 2017-05-01 PROCEDURE — 74020 XR ABDOMEN FLAT AND ERECT: CPT | Mod: TC,PO

## 2017-05-01 PROCEDURE — 99214 OFFICE O/P EST MOD 30 MIN: CPT | Mod: S$PBB,,, | Performed by: PHYSICIAN ASSISTANT

## 2017-05-01 RX ORDER — CIPROFLOXACIN 500 MG/1
500 TABLET ORAL EVERY 12 HOURS
Qty: 14 TABLET | Refills: 0 | Status: SHIPPED | OUTPATIENT
Start: 2017-05-01 | End: 2017-05-09 | Stop reason: ALTCHOICE

## 2017-05-01 NOTE — PROGRESS NOTES
Pre-Visit Chart Review  For Appointment Scheduled on 5/1/17    Health Maintenance Due   Topic Date Due    Eye Exam  01/21/1960    Foot Exam  06/19/2016    Hemoglobin A1c  11/05/2016

## 2017-05-01 NOTE — PROGRESS NOTES
Subjective:       Patient ID: Carlos Tenorio is a 67 y.o. male.    Chief Complaint: Diarrhea, ear problem    HPI   Patient is a 67 year old  male presenting to the clinic presenting to the clinic for recent concern of diarrhea with approximately 15-20 bowel movements since last night. He reports watery, loose stools without blood & mucous in stool. He reports some abdominal pain intially, but this has improved. He has taken (5) immondium tablets since onset with some improvement. He has also had notably 13lb weight loss in the last month & 24 lbs since the beginning of this year. He does state that since starting allopurinol, he has been experiencing dry mouth & cotton mouth causing him to not taste foods well. He also reports his wife has been undergoing oral surgery & only allowed to eat jello & soft substances for which he has been following a similar diet due to feeling guilty eating in front of her. He denies any chest pain, palpitations. He did admit to having some shortness of breath today when going to the mailbox & endorses cold/sweats when getting done.  Review of Systems   Constitutional: Positive for activity change, appetite change, chills, diaphoresis, fatigue and unexpected weight change. Negative for fever.   HENT: Negative for congestion, ear pain, postnasal drip, rhinorrhea and sinus pressure.         Dry mouth   Respiratory: Negative for cough, shortness of breath and wheezing.    Cardiovascular: Negative for chest pain and palpitations.   Gastrointestinal: Positive for diarrhea and nausea. Negative for abdominal distention, abdominal pain, anal bleeding, blood in stool, constipation, rectal pain and vomiting.   Genitourinary: Negative for frequency, hematuria and urgency.   Musculoskeletal: Negative for back pain and gait problem.   Skin: Negative for rash.   Neurological: Positive for weakness. Negative for syncope and headaches.   Psychiatric/Behavioral: Negative for agitation and  confusion.       Objective:      Physical Exam   Constitutional: Vital signs are normal. He appears well-developed and well-nourished. No distress.   Cardiovascular: Normal rate, regular rhythm, S1 normal, S2 normal and normal heart sounds.  Exam reveals no gallop.    No murmur heard.  Pulses:       Radial pulses are 2+ on the right side, and 2+ on the left side.   <2sec cap refill fingers bilat     Pulmonary/Chest: Effort normal and breath sounds normal. No respiratory distress. He has no wheezes. He has no rhonchi.   Abdominal: Soft. Normal appearance. Bowel sounds are increased. There is tenderness (very mild) in the right lower quadrant. There is no rigidity, no guarding, no tenderness at McBurney's point and negative Squires's sign.   Musculoskeletal:        Right lower leg: He exhibits no tenderness and no swelling.        Left lower leg: He exhibits no swelling.   Skin: Skin is warm, dry and intact. No rash noted. He is not diaphoretic. No pallor.   No lower leg swelling   Psychiatric: He has a normal mood and affect. His speech is normal and behavior is normal. Judgment and thought content normal. Cognition and memory are normal.       Assessment:       1. Diarrhea, unspecified type    2. Dehydration    3. Weight loss    4. Hypertension associated with diabetes    5. Type 2 diabetes mellitus with hyperglycemia, without long-term current use of insulin    6. Obesity (BMI 30.0-34.9)        Plan:       Carlos was seen today for diarrhea, ear problem.    Diagnoses and all orders for this visit:    Diarrhea, unspecified type  -     CBC auto differential; Future  -     Amylase; Future  -     Lipase; Future  -     TSH; Future  -     X-Ray Abdomen Flat And Erect; Future    Dehydration  -     Comprehensive metabolic panel; Future  -     CBC auto differential; Future  -     Brain natriuretic peptide; Future  -     X-Ray Abdomen Flat And Erect; Future    Weight loss  -     TSH; Future  -     X-Ray Abdomen Flat And  Erect; Future    Hypertension associated with diabetes  On the lower side today; continue current BP medications    Type 2 diabetes mellitus with hyperglycemia, without long-term current use of insulin  Good control; continue current diabetic medicatinos  Labs today      Obesity (BMI 30.0-34.9)  Patient readiness: acceptance and barriers:none    During the course of the visit the patient was educated and counseled about the following:     Diabetes:  Discussed general issues about diabetes pathophysiology and management.  Hypertension:   Medication: no change.  Obesity:   Regular aerobic exercise program discussed.    Goals: Diabetes: Maintain Hemoglobin A1C below 7, Hypertension: Reduce Blood Pressure and Obesity: Reduce calorie intake and BMI    Did patient meet goals/outcomes: No    The following self management tools provided: declined    Patient Instructions (the written plan) was given to the patient/family.     Time spent with patient: 30 minutes

## 2017-05-01 NOTE — MR AVS SNAPSHOT
[facility]  [address]  [place] [address]  Phone: [phone]  Fax: [phone]                  Carlos Tenorio   2017 2:20 PM   Office Visit    Description:  Male : 1950   Provider:  ERNESTO Russ   Department:  [place] [facility]           Reason for Visit     Diarrhea, ear problem           Diagnoses this Visit        Comments    Diarrhea, unspecified type    -  Primary     Dehydration         Weight loss         Hypertension associated with diabetes         Type 2 diabetes mellitus with hyperglycemia, without long-term current use of insulin         Obesity (BMI 30.0-34.9)                To Do List           Future Appointments        Provider Department Dept Phone    2017 3:15 PM [facility] XR1 [facility]- X-Ray [phone]    2017 4:00 PM LAB, [facility] [facility] - Lab [phone]    2017 9:00 AM Yovany Heart MD [facility] - Urology [phone]    2017 1:00 PM Roney Bradshaw MD [facility] [phone]      Goals (5 Years of Data)     None      [facility]     [facility] Nurse Care Line -  Assistance  Unless otherwise directed by your provider, please contact [facility], our nurse care line that is available for  assistance.     Registered nurses in the [facility] provide: appointment scheduling, clinical advisement, health education, and other advisory services.  Call: [phone] (toll free)               Medications           Message regarding Medications     Verify the changes and/or additions to your medication regime listed below are the same as discussed with your clinician today.  If any of these changes or additions are incorrect, please notify your healthcare provider.             Verify that the below list of medications is an accurate representation of the medications you are currently taking.  If none reported, the list may be blank. If incorrect, please  "contact your healthcare provider. Carry this list with you in case of emergency.           Current Medications     ACETAMINOPHEN (TYLENOL ARTHRITIS ORAL) Take by mouth.    allopurinol (ZYLOPRIM) 300 MG tablet Take 1 tablet (300 mg total) by mouth once daily.    aspirin (ECOTRIN) 81 MG EC tablet Take 81 mg by mouth once daily.    atorvastatin (LIPITOR) 20 MG tablet TAKE 1 TABLET BY MOUTH EVERY DAY    fish oil-omega-3 fatty acids 300-1,000 mg capsule Take 2 g by mouth 3 (three) times daily.    furosemide (LASIX) 40 MG tablet TAKE 1 TABLET BY MOUTH DAILY AS NEEDED    hydrochlorothiazide (HYDRODIURIL) 25 MG tablet TAKE 1 TABLET BY MOUTH EVERY DAY    losartan (COZAAR) 50 MG tablet TAKE 1 TABLET(50 MG) BY MOUTH EVERY DAY    metformin (GLUCOPHAGE-XR) 500 MG 24 hr tablet TAKE 1 TABLET(500 MG) BY MOUTH EVERY DAY    vitamin D 1000 units Tab Take 185 mg by mouth once daily.           Clinical Reference Information           Your Vitals Were     BP Pulse Temp Height Weight SpO2    94/63 (BP Location: Right arm, Patient Position: Sitting, BP Method: Automatic) 93 97.8 °F (36.6 °C) (Oral) 5' 11" (1.803 m) 109.8 kg (242 lb 1 oz) 97%    BMI                33.76 kg/m2          Blood Pressure          Most Recent Value    BP  94/63      Allergies as of 5/1/2017     No Known Allergies      Immunizations Administered on Date of Encounter - 5/1/2017     None      Orders Placed During Today's Visit     Future Labs/Procedures Expected by Expires    Amylase  5/1/2017 6/30/2018    Brain natriuretic peptide  5/1/2017 6/30/2018    CBC auto differential  5/1/2017 6/30/2018    Comprehensive metabolic panel  5/1/2017 6/30/2018    Lipase  5/1/2017 6/30/2018    TSH  5/1/2017 6/30/2018    X-Ray Abdomen Flat And Erect  5/1/2017 5/1/2018      Language Assistance Services     ATTENTION: Language assistance services are available, free of charge. Please call [phone].      ATENCIÓN: Si habla español, tiene a paul disposición servicios gratuitos de " asistencia lingüística. Rosalinda liu [phone].     NATHAN Ý: N?u b?n nói Ti?ng Vi?t, có các d?ch v? h? tr? ngôn ng? mi?n phí dành cho b?n. G?i s? [phone].         [facility] complies with applicable Federal civil rights laws and does not discriminate on the basis of race, color, national origin, age, disability, or sex.

## 2017-05-02 ENCOUNTER — TELEPHONE (OUTPATIENT)
Dept: FAMILY MEDICINE | Facility: CLINIC | Age: 67
End: 2017-05-02

## 2017-05-02 NOTE — TELEPHONE ENCOUNTER
Patient called and states he is going to [facility] call er [phone] and notified abbey that patient is on the way there for dehydration provided this nurse with fax number [phone] labs faxed

## 2017-05-02 NOTE — TELEPHONE ENCOUNTER
----- Message from Smiley Arboleda sent at 5/2/2017  9:18 AM CDT -----  Contact: patient  Patient returning a missed call. Please advise. Call to pod. No answer.  Call back .  Thanks!

## 2017-05-09 ENCOUNTER — LAB VISIT (OUTPATIENT)
Dept: LAB | Facility: HOSPITAL | Age: 67
End: 2017-05-09
Attending: UROLOGY
Payer: MEDICARE

## 2017-05-09 ENCOUNTER — TELEPHONE (OUTPATIENT)
Dept: UROLOGY | Facility: CLINIC | Age: 67
End: 2017-05-09

## 2017-05-09 ENCOUNTER — OFFICE VISIT (OUTPATIENT)
Dept: UROLOGY | Facility: CLINIC | Age: 67
End: 2017-05-09
Payer: MEDICARE

## 2017-05-09 VITALS
HEIGHT: 71 IN | BODY MASS INDEX: 34.75 KG/M2 | WEIGHT: 248.25 LBS | SYSTOLIC BLOOD PRESSURE: 143 MMHG | TEMPERATURE: 98 F | DIASTOLIC BLOOD PRESSURE: 77 MMHG | HEART RATE: 52 BPM

## 2017-05-09 DIAGNOSIS — E29.1 HYPOGONADISM IN MALE: Primary | ICD-10-CM

## 2017-05-09 DIAGNOSIS — N43.40 SPERMATOCELE: ICD-10-CM

## 2017-05-09 DIAGNOSIS — N20.0 KIDNEY STONES: ICD-10-CM

## 2017-05-09 DIAGNOSIS — E29.1 HYPOGONADISM IN MALE: ICD-10-CM

## 2017-05-09 DIAGNOSIS — N52.03 COMBINED ARTERIAL INSUFFICIENCY AND CORPORO-VENOUS OCCLUSIVE ERECTILE DYSFUNCTION: ICD-10-CM

## 2017-05-09 DIAGNOSIS — R97.20 INCREASED PROSTATE SPECIFIC ANTIGEN (PSA) VELOCITY: ICD-10-CM

## 2017-05-09 DIAGNOSIS — R97.20 ELEVATED PSA: Primary | ICD-10-CM

## 2017-05-09 LAB
ANION GAP SERPL CALC-SCNC: 12 MMOL/L
BASOPHILS # BLD AUTO: 0.1 K/UL
BASOPHILS # BLD AUTO: 0.1 K/UL
BASOPHILS NFR BLD: 0.6 %
BASOPHILS NFR BLD: 0.6 %
BILIRUB SERPL-MCNC: ABNORMAL MG/DL
BLOOD URINE, POC: ABNORMAL
BUN SERPL-MCNC: 31 MG/DL
CALCIUM SERPL-MCNC: 10.3 MG/DL
CHLORIDE SERPL-SCNC: 103 MMOL/L
CO2 SERPL-SCNC: 26 MMOL/L
COLOR, POC UA: ABNORMAL
COMPLEXED PSA SERPL-MCNC: 7.5 NG/ML
CREAT SERPL-MCNC: 1.2 MG/DL
DIFFERENTIAL METHOD: ABNORMAL
DIFFERENTIAL METHOD: ABNORMAL
EOSINOPHIL # BLD AUTO: 0.2 K/UL
EOSINOPHIL # BLD AUTO: 0.2 K/UL
EOSINOPHIL NFR BLD: 1.7 %
EOSINOPHIL NFR BLD: 1.7 %
ERYTHROCYTE [DISTWIDTH] IN BLOOD BY AUTOMATED COUNT: 14.9 %
ERYTHROCYTE [DISTWIDTH] IN BLOOD BY AUTOMATED COUNT: 14.9 %
EST. GFR  (AFRICAN AMERICAN): >60 ML/MIN/1.73 M^2
EST. GFR  (NON AFRICAN AMERICAN): >60 ML/MIN/1.73 M^2
GLUCOSE SERPL-MCNC: 101 MG/DL
GLUCOSE UR QL STRIP: ABNORMAL
HCT VFR BLD AUTO: 40.5 %
HCT VFR BLD AUTO: 40.5 %
HGB BLD-MCNC: 13 G/DL
HGB BLD-MCNC: 13 G/DL
KETONES UR QL STRIP: ABNORMAL
LEUKOCYTE ESTERASE URINE, POC: ABNORMAL
LYMPHOCYTES # BLD AUTO: 3 K/UL
LYMPHOCYTES # BLD AUTO: 3 K/UL
LYMPHOCYTES NFR BLD: 32.9 %
LYMPHOCYTES NFR BLD: 32.9 %
MCH RBC QN AUTO: 29.8 PG
MCH RBC QN AUTO: 29.8 PG
MCHC RBC AUTO-ENTMCNC: 32.1 %
MCHC RBC AUTO-ENTMCNC: 32.1 %
MCV RBC AUTO: 93 FL
MCV RBC AUTO: 93 FL
MONOCYTES # BLD AUTO: 0.8 K/UL
MONOCYTES # BLD AUTO: 0.8 K/UL
MONOCYTES NFR BLD: 8.6 %
MONOCYTES NFR BLD: 8.6 %
NEUTROPHILS # BLD AUTO: 5.2 K/UL
NEUTROPHILS # BLD AUTO: 5.2 K/UL
NEUTROPHILS NFR BLD: 56.2 %
NEUTROPHILS NFR BLD: 56.2 %
NITRITE, POC UA: ABNORMAL
PH, POC UA: 5
PLATELET # BLD AUTO: 376 K/UL
PLATELET # BLD AUTO: 376 K/UL
PMV BLD AUTO: 9.7 FL
PMV BLD AUTO: 9.7 FL
POTASSIUM SERPL-SCNC: 4.3 MMOL/L
PROTEIN, POC: ABNORMAL
RBC # BLD AUTO: 4.37 M/UL
RBC # BLD AUTO: 4.37 M/UL
SODIUM SERPL-SCNC: 141 MMOL/L
SPECIFIC GRAVITY, POC UA: 1.01
TESTOST SERPL-MCNC: 276 NG/DL
TESTOST SERPL-MCNC: 276 NG/DL
UROBILINOGEN, POC UA: ABNORMAL
WBC # BLD AUTO: 9.2 K/UL
WBC # BLD AUTO: 9.2 K/UL

## 2017-05-09 PROCEDURE — 85025 COMPLETE CBC W/AUTO DIFF WBC: CPT

## 2017-05-09 PROCEDURE — 99999 PR PBB SHADOW E&M-EST. PATIENT-LVL III: CPT | Mod: PBBFAC,,, | Performed by: UROLOGY

## 2017-05-09 PROCEDURE — 80048 BASIC METABOLIC PNL TOTAL CA: CPT

## 2017-05-09 PROCEDURE — 99215 OFFICE O/P EST HI 40 MIN: CPT | Mod: S$PBB,,, | Performed by: UROLOGY

## 2017-05-09 PROCEDURE — 84403 ASSAY OF TOTAL TESTOSTERONE: CPT | Mod: 91

## 2017-05-09 PROCEDURE — 99213 OFFICE O/P EST LOW 20 MIN: CPT | Mod: PBBFAC,PO | Performed by: UROLOGY

## 2017-05-09 PROCEDURE — 84270 ASSAY OF SEX HORMONE GLOBUL: CPT

## 2017-05-09 PROCEDURE — 81002 URINALYSIS NONAUTO W/O SCOPE: CPT | Mod: PBBFAC,PO | Performed by: UROLOGY

## 2017-05-09 PROCEDURE — 36415 COLL VENOUS BLD VENIPUNCTURE: CPT

## 2017-05-09 PROCEDURE — 84153 ASSAY OF PSA TOTAL: CPT

## 2017-05-09 RX ORDER — CIPROFLOXACIN 250 MG/1
TABLET, FILM COATED ORAL
Refills: 0 | COMMUNITY
Start: 2017-05-02 | End: 2017-06-05 | Stop reason: ALTCHOICE

## 2017-05-09 RX ORDER — TESTOSTERONE CYPIONATE 200 MG/ML
200 INJECTION, SOLUTION INTRAMUSCULAR
Qty: 10 ML | Refills: 1 | Status: ON HOLD | OUTPATIENT
Start: 2017-05-09 | End: 2017-08-04 | Stop reason: HOSPADM

## 2017-05-09 NOTE — MR AVS SNAPSHOT
[facility]  [address]  [place] [address]  Phone: [phone]                  Carlos Tenorio   2017 9:00 AM   Office Visit    Description:  Male : 1950   Provider:  Yovany Heart MD   Department:  [facility]           Reason for Visit     Follow-up           Diagnoses this Visit        Comments    Hypogonadism in male    -  Primary     Combined arterial insufficiency and corporo-venous occlusive erectile dysfunction         Spermatocele         Kidney stones         Increased prostate specific antigen (PSA) velocity                To Do List           Future Appointments        Provider Department Dept Phone    2017 8:00 AM [facility] CT2 LIMIT [address] [facility] [phone]    2017 1:00 PM Roney Bradshaw MD [facility] [phone]    2017 8:45 AM [facility] [phone]      Goals (5 Years of Data)     None      Follow-Up and Disposition     Return in about 3 months (around 2017).       These Medications        Disp Refills Start End    testosterone cypionate (DEPOTESTOTERONE CYPIONATE) 200 mg/mL injection 10 mL 1 2017    Inject 1 mL (200 mg total) into the muscle every 14 (fourteen) days. Provide needles/syringes - Intramuscular    Pharmacy: [facility] [id number]  [place], [place] - [address] AT [facility] [address] Ph #: [phone]         [facility]     [facility] Care [facility] -  Assistance  Unless otherwise directed by your provider, please contact [facility], our nurse care line that is available for  assistance.     Registered nurses in the [facility] provide: appointment scheduling, clinical advisement, health education, and other advisory services.  Call: [phone] (toll free)               Medications           Message regarding Medications     Verify the changes and/or  additions to your medication regime listed below are the same as discussed with your clinician today.  If any of these changes or additions are incorrect, please notify your healthcare provider.        START taking these NEW medications        Refills    testosterone cypionate (DEPOTESTOTERONE CYPIONATE) 200 mg/mL injection 1    Sig: Inject 1 mL (200 mg total) into the muscle every 14 (fourteen) days. Provide needles/syringes    Class: Print    Route: Intramuscular           Verify that the below list of medications is an accurate representation of the medications you are currently taking.  If none reported, the list may be blank. If incorrect, please contact your healthcare provider. Carry this list with you in case of emergency.           Current Medications     ACETAMINOPHEN (TYLENOL ARTHRITIS ORAL) Take by mouth.    allopurinol (ZYLOPRIM) 300 MG tablet Take 1 tablet (300 mg total) by mouth once daily.    aspirin (ECOTRIN) 81 MG EC tablet Take 81 mg by mouth once daily.    atorvastatin (LIPITOR) 20 MG tablet TAKE 1 TABLET BY MOUTH EVERY DAY    ciprofloxacin HCl (CIPRO) 250 MG tablet TK 1 T PO  BID    fish oil-omega-3 fatty acids 300-1,000 mg capsule Take 2 g by mouth 3 (three) times daily.    furosemide (LASIX) 40 MG tablet TAKE 1 TABLET BY MOUTH DAILY AS NEEDED    hydrochlorothiazide (HYDRODIURIL) 25 MG tablet TAKE 1 TABLET BY MOUTH EVERY DAY    losartan (COZAAR) 50 MG tablet TAKE 1 TABLET(50 MG) BY MOUTH EVERY DAY    metformin (GLUCOPHAGE-XR) 500 MG 24 hr tablet TAKE 1 TABLET(500 MG) BY MOUTH EVERY DAY    vitamin D 1000 units Tab Take 185 mg by mouth once daily.    testosterone cypionate (DEPOTESTOTERONE CYPIONATE) 200 mg/mL injection Inject 1 mL (200 mg total) into the muscle every 14 (fourteen) days. Provide needles/syringes           Clinical Reference Information           Your Vitals Were     BP Pulse Temp    143/77 (BP Location: Left arm, Patient Position: Sitting, BP Method: Automatic) 52 98.1 °F (36.7  "°C) (Oral)    Height Weight BMI    5' 11" (1.803 m) 112.6 kg (248 lb 3.8 oz) 34.62 kg/m2      Blood Pressure          Most Recent Value    BP  (!)  143/77      Allergies as of 5/9/2017     No Known Allergies      Immunizations Administered on Date of Encounter - 5/9/2017     None      Orders Placed During Today's Visit      Normal Orders This Visit    POCT URINE DIPSTICK WITHOUT MICROSCOPE     Future Labs/Procedures Expected by Expires    Basic metabolic panel  5/9/2017 7/8/2018    CBC auto differential  5/9/2017 7/8/2018    CBC auto differential  5/9/2017 7/8/2018    CT Renal Stone Study ABD Pelvis WO  5/9/2017 5/9/2018    Prostate Specific Antigen, Diagnostic  5/9/2017 7/8/2018    Testosterone  5/9/2017 7/8/2018    Testosterone  5/9/2017 7/8/2018      Language Assistance Services     ATTENTION: Language assistance services are available, free of charge. Please call [phone].      ATENCIÓN: Si habla español, tiene a paul disposición servicios gratuitos de asistencia lingüística. Llame al [phone].     CHÚ Ý: N?u b?n nói Ti?ng Vi?t, có các d?ch v? h? tr? ngôn ng? mi?n phí dành cho b?n. G?i s? [phone].         [facility] complies with applicable Federal civil rights laws and does not discriminate on the basis of race, color, national origin, age, disability, or sex.        "

## 2017-05-09 NOTE — Clinical Note
Bisi, You saw Mr Jona last week regarding his diarrhea/marlon. He has no fu scheduled and still isnt feeling 100 percent. I got a bmp today to make sure his marlon resolved - but hed like to get back in to see you soon or bump up his July fu with dr morgan.  Thanks, Yovany

## 2017-05-09 NOTE — PROGRESS NOTES
[facility] Progress Note    Carlos Tenorio is a 67 y.o. male who presents for follow up of kidney stones, right spermatocele, and hypogonadism.     I last saw him on 3/20/17 at the referral of LENNOX Lunsford to establish care since Dr Fu's jail who was following him for R spermatocele and kidney stones. He felt as if his right spermatocele had been enlarging. No stone episodes since eswl 2014.    Stone history:  - R ESWL of 1.2cm stone 10/2014 (after being observed since 2012) - stone fragments passed demonstrated 95% uric acid, 5% CaOx mono.   - prior R and L ESWLs in or before 2004 - told he had both CaOx stones and uric acids in the past.   - 24 hour urine in Oct 2012 by report -  high oxalate and low urine pH. Takes allopurinol    Also noted history of hypogonadism and previous TRT with IM injections >5 years ago as well as ED. + spontaneous nocturnal erections. Trouble maintaining erections.  + CAD history with prior CABG    At last visit he did not elect to proceed with any ED therapies. His last psa on record was from 2014, and [facility] but [facility] and no PSAs were on file. PSA screening ordered. TAHIRA 20g benign. As most recent KUB post-ESWL in 2014 had concern for L renal calcification, I ordered a screening KUB. We also had a long discussion about risks and benefits of spermatocelectomy/removal of epididymal cyst. Despite the subjective growth in size over time, he was otherwise asymptomatic. Scrotal US ordered.    He is here today to discuss all results and next steps.  In the interim, he did have an acute diarrheal illness and saw Bisi OROZCO on 5/1/17 who found him to be dehydrated and ordered labs which demonstrated LINSEY with Cr 2.2 from baseline 1.1-1.3.  He was in [place] the next day when he was called about his labs and went to the ER at [facility] where he reports his Cr was up to 3.5 and he received 2 liters IV fluids. He  has been on a course of cipro since which he finishes tomorrow.  Only complaint today is extreme dry mouth.    In discussion of his multiple urologic problems he is mostly concerned with his testosterone today, and relates it to his ED. Reports spontaneous nocturnal erections and notes that when on TRT in the past he was able to achieve full erections.  On review his testosterone was 175 in 2015 and 170 in 2012. He expressed a desire to resume testosterone replacement therapy.    We did review scrotal US which confirmed right spermatocele - 5cm on imaging.  US images reviewed and notes largely cystic structure posterior to right testis with some internal echoes consistent with spermatocele. No internal vascularity. He also has 3mm left epididymal cyst. Still no discomfort or pain, just notes continued subjective enlargement. No prior scrotal imaging as point of comparison.    His KUB did have concern for small right renal calculi and distal calculus vs. phlebolith.  Last CT was 2012 which was reviewed and has left upper pole renal cyst and right renal stones (later managed with ESWL)    PSA 4/20/17 3.3  PSA history as follows  Results for ELIZABETH MCGRATH (MRN [id number]) as of 5/9/2017 20:40   Ref. Range 6/11/2004 10:13 3/12/2008 10:15 9/2/2009 09:48 4/18/2011 08:15 4/4/2012 08:20 11/28/2012 11:00 7/16/2013 10:14 1/17/2014 08:18 4/20/2017 08:26   PSA, SCREEN Latest Ref Range: 0.00 - 4.00 ng/mL 1.0 1.0 1.4 1.5 1.49 1.87 2.85 2.9 3.3     Udip today trc prot otherwise negative. PVR 0cc      ROS: A comprehensive 10 system review was performed and is negative except as noted above in HPI    PHYSICAL EXAM:    Vitals:    05/09/17 0856   BP: (!) 143/77   Pulse: (!) 52   Temp: 98.1 °F (36.7 °C)     Body mass index is 34.62 kg/(m^2).    General: Alert, cooperative, no distress, appears stated age   Head: Normocephalic, without obvious abnormality, atraumatic   Eyes: PERRL, conjunctiva/corneas clear   Lungs: Respirations  unlabored   Heart: Warm and well perfused   Abdomen: soft NT ND   Extremities: Extremities normal, atraumatic, no cyanosis or edema   Skin: Skin color, texture, turgor normal, no rashes or lesions   Psych: Appropriate   Neurologic: Non-focal       POCT URINE DIPSTICK WITHOUT MICROSCOPE    Collection Time: 05/09/17 10:38 AM   Result Value Ref Range    Color, UA yellow clear     Spec Grav UA 1.015     pH, UA 5     WBC, UA n     Nitrite, UA n     Protein trace     Glucose, UA n     Ketones, UA n     Urobilinogen, UA n     Bilirubin n     Blood, UA n      ASSESSMENT   1. Hypogonadism in male  Testosterone    CBC auto differential    Testosterone    CBC auto differential    Basic metabolic panel   2. Combined arterial insufficiency and corporo-venous occlusive erectile dysfunction     3. Spermatocele     4. Kidney stones  POCT URINE DIPSTICK WITHOUT MICROSCOPE    CT Renal Stone Study ABD Pelvis WO    Basic metabolic panel     Plan    Hypogonadism - he would like to resume TRT. He notes his wife has been trained to give his injections. Discussed resuming testosterone 200mg IM Q2 weeks to start and checking labs at 1 and 3 months. Discussed risks and benefits of TRT including polycthemia, thrombotic events, elevated LFTs, worsening BPH symptoms, etc.  Discussed close psa monitoring as TRT does not cause prostate cancer, but may exacerbate any prostate cancer that develops.  Discussed concern for increased cardiovascular risk, which up to date research actually has evidence on the contrary, and hypogonadal men are believed to be at more cardiac risk.    ED - again discussed treatment options, but he would like to hold off and see effects from TRT.    Spermatocele - reviewed imaging with him and reassured him there is no concern unless he is symptomatic from it, which he is not - just notes it is enlarging. Advised on scrotal support. Will continue to observe.    Kidney stones - given concern for stones on recent KUB and no  cross sectional imaging since 2012, we discussed repeating a CT stone protocol.  He did not have any imaging during recent illness and ER visit.  LINSEY likely due completely to his diarrhea and dehydration, but would also want to ensure no obstructive uropathy from renal or ureteral calculi.    He does not have any primary care follow up scheduled until July, and given recent illness and ER visit I offered to order BMP today to check renal function and electrolytes and alert ERNESTO Lockwood and Dr Bradshaw of results, and ask that he have close follow up.    45 mins spent in encounter, over half in counseling.  RTC 3 mos with labs, and have CT scan done prior

## 2017-05-10 ENCOUNTER — TELEPHONE (OUTPATIENT)
Dept: FAMILY MEDICINE | Facility: CLINIC | Age: 67
End: 2017-05-10

## 2017-05-10 NOTE — TELEPHONE ENCOUNTER
----- Message from Vero Rodriguez sent at 5/10/2017  9:29 AM CDT -----  Contact: self  Patient is returning call.  Please call patient at [phone].  Thanks!

## 2017-05-10 NOTE — TELEPHONE ENCOUNTER
psa drawn early accidentally but demonstrated psa of 7.5 (up from 3.3 4/20/17). Did note given his acute diarrheal illness/inflammatory colorectal process this may be a false elevation, and advised he repeat psa with free and total psa in 1 month. Did advise that I will hold any form of testosterone replacement therapy until psa worked up further. If psa has sustained elevation or low free psa percent would recommend biopsy. He verbalized understanding.

## 2017-05-13 LAB
ALBUMIN SERPL-MCNC: 4.2 G/DL (ref 3.6–5.1)
SHBG SERPL-SCNC: 20 NMOL/L (ref 22–77)
TESTOST FREE SERPL-MCNC: 51.4 PG/ML (ref 46–224)
TESTOST SERPL-MCNC: 274 NG/DL (ref 250–1100)
TESTOSTERONE.FREE+WB SERPL-MCNC: 99.1 NG/DL (ref 110–575)

## 2017-05-24 ENCOUNTER — HOSPITAL ENCOUNTER (OUTPATIENT)
Dept: RADIOLOGY | Facility: HOSPITAL | Age: 67
Discharge: HOME | End: 2017-05-24
Attending: UROLOGY
Payer: MEDICARE

## 2017-05-24 DIAGNOSIS — N20.0 KIDNEY STONES: ICD-10-CM

## 2017-05-24 PROCEDURE — 74176 CT ABD & PELVIS W/O CONTRAST: CPT | Mod: 26,,, | Performed by: RADIOLOGY

## 2017-05-24 PROCEDURE — 74176 CT ABD & PELVIS W/O CONTRAST: CPT | Mod: TC

## 2017-05-25 ENCOUNTER — TELEPHONE (OUTPATIENT)
Dept: UROLOGY | Facility: CLINIC | Age: 67
End: 2017-05-25

## 2017-05-25 NOTE — TELEPHONE ENCOUNTER
Spoke with Pt and reminded pt that on 6/9/17 pt to have labs done. Pt to repeat PSA done before 0900. Pt will receive a call with results after Dr Heart reviews. Pt verbalized understanding.

## 2017-05-25 NOTE — TELEPHONE ENCOUNTER
----- Message from Yovany Heart MD sent at 5/9/2017  5:43 PM CDT -----  Spoke to patient and informed of elevated psa. Only bmp was supposed to be drawn today - remainder of labs were his follow up after testosterone replacement for 1 and 3 months.  Informed patient will actually hold on starting trt in light of psa findings. May be false elevation given recent inflammatory colorectal process/diarrheal illness. Will recheck in 1 month with free and total psa.  If back to baseline can proceed with TRT, though if sustained elevation or low free psa percent would recommend prostate biopsy first. Pt understood. Please arrange free/total psa in 1 month (orders placed in orders only encounter) and have todays labs besides his BMP and PSA credited. Also advised renal function returned to normal.

## 2017-05-26 ENCOUNTER — DOCUMENTATION ONLY (OUTPATIENT)
Dept: FAMILY MEDICINE | Facility: CLINIC | Age: 67
End: 2017-05-26

## 2017-05-26 NOTE — PROGRESS NOTES
Pre-Visit Chart Review  For Appointment Scheduled on 5/29/17    Health Maintenance Due   Topic Date Due    Eye Exam  01/21/1960    Foot Exam  06/19/2016    Hemoglobin A1c  11/05/2016

## 2017-05-29 ENCOUNTER — OFFICE VISIT (OUTPATIENT)
Dept: FAMILY MEDICINE | Facility: CLINIC | Age: 67
End: 2017-05-29
Payer: MEDICARE

## 2017-05-29 ENCOUNTER — LAB VISIT (OUTPATIENT)
Dept: LAB | Facility: HOSPITAL | Age: 67
End: 2017-05-29
Attending: NURSE PRACTITIONER
Payer: MEDICARE

## 2017-05-29 VITALS
SYSTOLIC BLOOD PRESSURE: 127 MMHG | DIASTOLIC BLOOD PRESSURE: 62 MMHG | TEMPERATURE: 98 F | WEIGHT: 242.5 LBS | HEIGHT: 71 IN | BODY MASS INDEX: 33.95 KG/M2 | HEART RATE: 51 BPM

## 2017-05-29 DIAGNOSIS — E11.9 CONTROLLED TYPE 2 DIABETES MELLITUS WITHOUT COMPLICATION, WITHOUT LONG-TERM CURRENT USE OF INSULIN: ICD-10-CM

## 2017-05-29 DIAGNOSIS — E11.59 HYPERTENSION ASSOCIATED WITH DIABETES: ICD-10-CM

## 2017-05-29 DIAGNOSIS — K52.9 COLITIS: Primary | ICD-10-CM

## 2017-05-29 DIAGNOSIS — I15.2 HYPERTENSION ASSOCIATED WITH DIABETES: ICD-10-CM

## 2017-05-29 DIAGNOSIS — N28.89 RENAL MASS: ICD-10-CM

## 2017-05-29 DIAGNOSIS — E66.9 OBESITY (BMI 30.0-34.9): ICD-10-CM

## 2017-05-29 PROCEDURE — 36415 COLL VENOUS BLD VENIPUNCTURE: CPT | Mod: PO

## 2017-05-29 PROCEDURE — 83036 HEMOGLOBIN GLYCOSYLATED A1C: CPT

## 2017-05-29 PROCEDURE — 99213 OFFICE O/P EST LOW 20 MIN: CPT | Mod: S$PBB,,, | Performed by: NURSE PRACTITIONER

## 2017-05-29 PROCEDURE — 99999 PR PBB SHADOW E&M-EST. PATIENT-LVL IV: CPT | Mod: PBBFAC,,, | Performed by: NURSE PRACTITIONER

## 2017-05-29 NOTE — PROGRESS NOTES
Subjective:       Patient ID: Carlos Tenorio is a 67 y.o. male.    Chief Complaint: Diarrhea    Mr. Tenorio presents to the clinic today for follow up for diarrhea and dehydration.  He was treated for colitis and dehydration at [facility] ED with ciprofloxacin and IVF.  He completed these antibiotics and renal function improved with antibiotics.  Diarrhea is resolved.  He asks today about a CT renal stone study ordered by Dr. Heart which shows a renal mass.  MRI was suggested based on CT findings.        Review of Systems   Constitutional: Negative for chills and fever.   Respiratory: Negative for cough, shortness of breath and wheezing.    Cardiovascular: Negative for chest pain, palpitations and leg swelling.   Gastrointestinal: Negative for abdominal pain, blood in stool, constipation, diarrhea, nausea and vomiting.   Genitourinary: Negative for difficulty urinating, dysuria and flank pain.       Objective:      Physical Exam   Constitutional: He is oriented to person, place, and time. He appears well-nourished. No distress.   HENT:   Head: Normocephalic and atraumatic.   Right Ear: External ear normal.   Left Ear: External ear normal.   Mouth/Throat: Oropharynx is clear and moist. No oropharyngeal exudate.   Eyes: Pupils are equal, round, and reactive to light. Right eye exhibits no discharge. Left eye exhibits no discharge.   Neck: Neck supple. No thyromegaly present.   Cardiovascular: Normal rate and regular rhythm.  Exam reveals no gallop and no friction rub.    No murmur heard.  Pulmonary/Chest: Effort normal and breath sounds normal. No respiratory distress. He has no wheezes. He has no rales.   Abdominal: Soft. He exhibits no distension. There is no tenderness. There is no rebound and no guarding.   Lymphadenopathy:     He has no cervical adenopathy.   Neurological: He is alert and oriented to person, place, and time. Coordination normal.   Skin: Skin is warm and dry.   Psychiatric: He has a  normal mood and affect. His behavior is normal. Thought content normal.   Vitals reviewed.          Current Outpatient Prescriptions:     ACETAMINOPHEN (TYLENOL ARTHRITIS ORAL), Take by mouth., Disp: , Rfl:     allopurinol (ZYLOPRIM) 300 MG tablet, Take 1 tablet (300 mg total) by mouth once daily., Disp: 30 tablet, Rfl: 11    aspirin (ECOTRIN) 81 MG EC tablet, Take 81 mg by mouth once daily., Disp: , Rfl:     atorvastatin (LIPITOR) 20 MG tablet, TAKE 1 TABLET BY MOUTH EVERY DAY, Disp: 90 tablet, Rfl: 4    ciprofloxacin HCl (CIPRO) 250 MG tablet, TK 1 T PO  BID, Disp: , Rfl: 0    fish oil-omega-3 fatty acids 300-1,000 mg capsule, Take 2 g by mouth 3 (three) times daily., Disp: , Rfl:     furosemide (LASIX) 40 MG tablet, TAKE 1 TABLET BY MOUTH DAILY AS NEEDED, Disp: 90 tablet, Rfl: 11    hydrochlorothiazide (HYDRODIURIL) 25 MG tablet, TAKE 1 TABLET BY MOUTH EVERY DAY, Disp: 90 tablet, Rfl: 4    losartan (COZAAR) 50 MG tablet, TAKE 1 TABLET(50 MG) BY MOUTH EVERY DAY, Disp: 90 tablet, Rfl: 11    metformin (GLUCOPHAGE-XR) 500 MG 24 hr tablet, TAKE 1 TABLET(500 MG) BY MOUTH EVERY DAY, Disp: 90 tablet, Rfl: 3    testosterone cypionate (DEPOTESTOTERONE CYPIONATE) 200 mg/mL injection, Inject 1 mL (200 mg total) into the muscle every 14 (fourteen) days. Provide needles/syringes, Disp: 10 mL, Rfl: 1    vitamin D 1000 units Tab, Take 185 mg by mouth once daily., Disp: , Rfl:   No current facility-administered medications for this visit.     Facility-Administered Medications Ordered in Other Visits:     triamcinolone acetonide injection 40 mg, 40 mg, Intra-articular, , Mason Macedo MD, 40 mg at 03/13/15 0929    triamcinolone acetonide injection 40 mg, 40 mg, Intra-articular, , Mason Macedo MD, 40 mg at 03/13/15 0929  Assessment:       1. Colitis    2. Renal mass    3. Controlled type 2 diabetes mellitus without complication, without long-term current use of insulin    4. Hypertension associated with  diabetes    5. Obesity (BMI 30.0-34.9)        Plan:     Colitis  Resolved with antibiotics and IVF.    Renal mass  -     MRI Abdomen W WO Contrast; Future; Expected date: 05/29/2017    Controlled type 2 diabetes mellitus without complication, without long-term current use of insulin  [facility]  -     Hemoglobin A1c; Future; Expected date: 05/29/2017    Hypertension associated with diabetes  Stable on current medication.    Obesity (BMI 30.0-34.9)  Patient readiness: acceptance and barriers:readiness    During the course of the visit the patient was educated and counseled about the following:     Diabetes:  Discussed general issues about diabetes pathophysiology and management.  Hypertension:   Medication: no change.  Obesity:   Diet interventions: qualitative changes (increase low-fat,  high-fiber foods).    Goals: Diabetes: Maintain Hemoglobin A1C below 7, Hypertension: Reduce Blood Pressure and Obesity: Reduce calorie intake and BMI    Did patient meet goals/outcomes: Yes    The following self management tools provided: declined    Patient Instructions (the written plan) was given to the patient/family.     Time spent with patient: 15 minutes

## 2017-05-30 ENCOUNTER — PATIENT MESSAGE (OUTPATIENT)
Dept: FAMILY MEDICINE | Facility: CLINIC | Age: 67
End: 2017-05-30

## 2017-05-30 LAB
ESTIMATED AVG GLUCOSE: 126 MG/DL
HBA1C MFR BLD HPLC: 6 %

## 2017-05-31 RX ORDER — SALICYLIC ACID 6 G/100G
1 AEROSOL, FOAM TOPICAL NIGHTLY
Qty: 70 G | Refills: 2 | Status: SHIPPED | OUTPATIENT
Start: 2017-05-31 | End: 2017-10-02

## 2017-06-01 DIAGNOSIS — N28.1 BILATERAL RENAL CYSTS: Primary | ICD-10-CM

## 2017-06-02 ENCOUNTER — TELEPHONE (OUTPATIENT)
Dept: FAMILY MEDICINE | Facility: CLINIC | Age: 67
End: 2017-06-02

## 2017-06-02 ENCOUNTER — HOSPITAL ENCOUNTER (OUTPATIENT)
Dept: RADIOLOGY | Facility: HOSPITAL | Age: 67
Discharge: HOME | End: 2017-06-02
Attending: NURSE PRACTITIONER
Payer: MEDICARE

## 2017-06-02 ENCOUNTER — TELEPHONE (OUTPATIENT)
Dept: UROLOGY | Facility: CLINIC | Age: 67
End: 2017-06-02

## 2017-06-02 DIAGNOSIS — N28.89 RENAL MASS: ICD-10-CM

## 2017-06-02 PROCEDURE — 25500020 PHARM REV CODE 255: Performed by: NURSE PRACTITIONER

## 2017-06-02 PROCEDURE — A9585 GADOBUTROL INJECTION: HCPCS | Performed by: NURSE PRACTITIONER

## 2017-06-02 PROCEDURE — 74183 MRI ABD W/O CNTR FLWD CNTR: CPT | Mod: 26,,, | Performed by: RADIOLOGY

## 2017-06-02 PROCEDURE — 74183 MRI ABD W/O CNTR FLWD CNTR: CPT | Mod: TC

## 2017-06-02 RX ORDER — GADOBUTROL 604.72 MG/ML
10 INJECTION INTRAVENOUS
Status: COMPLETED | OUTPATIENT
Start: 2017-06-02 | End: 2017-06-02

## 2017-06-02 RX ORDER — GADOBUTROL 604.72 MG/ML
INJECTION INTRAVENOUS
Status: DISPENSED
Start: 2017-06-02 | End: 2017-06-02

## 2017-06-02 RX ADMIN — GADOBUTROL 10 ML: 604.72 INJECTION INTRAVENOUS at 07:06

## 2017-06-02 NOTE — TELEPHONE ENCOUNTER
Dr. Heart and staff--patient had MRI abdomen this morning which shows likely left kidney neoplasm.  I notified him.  Can we schedule him with Dr. Heart ASAP?

## 2017-06-02 NOTE — TELEPHONE ENCOUNTER
Spoke with pt. Pt had MRI done this morning at 0800 ONS. Dr Bradshaw office had order and scanned the area in question. Pt will have PSA done on 6/9/17 as ordered. Dr Heart to review.

## 2017-06-02 NOTE — TELEPHONE ENCOUNTER
----- Message from Yovany Heart MD sent at 6/1/2017  3:49 PM CDT -----  Mr Tenorio is due to have his repeat psa approx 6/9/17 - but given CT finding with new questionable lesion on CT should also have renal ultrasound to see if solid lesion found. If so will image further, but if ultrasound negative will observe. RBUS ordered. Have him do this on day he does his labs. Order placed in orders only encounter

## 2017-06-02 NOTE — TELEPHONE ENCOUNTER
Notified patient of MRI result showing most likely carcinoma of the kidney.  Will send Dr. Perales and his staff this MRI result and request appointment ASAP.

## 2017-06-05 ENCOUNTER — OFFICE VISIT (OUTPATIENT)
Dept: UROLOGY | Facility: CLINIC | Age: 67
End: 2017-06-05
Payer: MEDICARE

## 2017-06-05 ENCOUNTER — LAB VISIT (OUTPATIENT)
Dept: LAB | Facility: HOSPITAL | Age: 67
End: 2017-06-05
Attending: UROLOGY
Payer: MEDICARE

## 2017-06-05 VITALS
WEIGHT: 244.5 LBS | TEMPERATURE: 98 F | BODY MASS INDEX: 34.23 KG/M2 | RESPIRATION RATE: 18 BRPM | HEIGHT: 71 IN | DIASTOLIC BLOOD PRESSURE: 72 MMHG | HEART RATE: 61 BPM | SYSTOLIC BLOOD PRESSURE: 134 MMHG

## 2017-06-05 DIAGNOSIS — N28.89 LEFT RENAL MASS: ICD-10-CM

## 2017-06-05 DIAGNOSIS — R97.20 ELEVATED PSA: ICD-10-CM

## 2017-06-05 DIAGNOSIS — N43.40 SPERMATOCELE: ICD-10-CM

## 2017-06-05 DIAGNOSIS — N28.89 LEFT RENAL MASS: Primary | ICD-10-CM

## 2017-06-05 DIAGNOSIS — E29.1 HYPOGONADISM IN MALE: ICD-10-CM

## 2017-06-05 LAB
ANION GAP SERPL CALC-SCNC: 9 MMOL/L
BASOPHILS # BLD AUTO: 0 K/UL
BASOPHILS NFR BLD: 0.1 %
BILIRUB SERPL-MCNC: NORMAL MG/DL
BLOOD URINE, POC: NORMAL
BUN SERPL-MCNC: 23 MG/DL
CALCIUM SERPL-MCNC: 9.9 MG/DL
CHLORIDE SERPL-SCNC: 106 MMOL/L
CO2 SERPL-SCNC: 26 MMOL/L
COLOR, POC UA: YELLOW
CREAT SERPL-MCNC: 1 MG/DL
DIFFERENTIAL METHOD: ABNORMAL
EOSINOPHIL # BLD AUTO: 0.2 K/UL
EOSINOPHIL NFR BLD: 1.8 %
ERYTHROCYTE [DISTWIDTH] IN BLOOD BY AUTOMATED COUNT: 14.6 %
EST. GFR  (AFRICAN AMERICAN): >60 ML/MIN/1.73 M^2
EST. GFR  (NON AFRICAN AMERICAN): >60 ML/MIN/1.73 M^2
GLUCOSE SERPL-MCNC: 105 MG/DL
GLUCOSE UR QL STRIP: NORMAL
HCT VFR BLD AUTO: 40.3 %
HGB BLD-MCNC: 13.4 G/DL
INR PPP: 1
KETONES UR QL STRIP: NORMAL
LEUKOCYTE ESTERASE URINE, POC: NORMAL
LYMPHOCYTES # BLD AUTO: 2.8 K/UL
LYMPHOCYTES NFR BLD: 30.1 %
MCH RBC QN AUTO: 30.5 PG
MCHC RBC AUTO-ENTMCNC: 33.3 %
MCV RBC AUTO: 92 FL
MONOCYTES # BLD AUTO: 0.8 K/UL
MONOCYTES NFR BLD: 8.6 %
NEUTROPHILS # BLD AUTO: 5.5 K/UL
NEUTROPHILS NFR BLD: 59.4 %
NITRITE, POC UA: NORMAL
PH, POC UA: 5
PLATELET # BLD AUTO: 279 K/UL
PMV BLD AUTO: 10.3 FL
POTASSIUM SERPL-SCNC: 4.2 MMOL/L
PROSTATE SPECIFIC ANTIGEN, TOTAL: 2.2 NG/ML
PROTEIN, POC: NORMAL
PROTHROMBIN TIME: 10.5 SEC
PSA FREE MFR SERPL: 19.09 %
PSA FREE SERPL-MCNC: 0.42 NG/ML
RBC # BLD AUTO: 4.4 M/UL
SODIUM SERPL-SCNC: 141 MMOL/L
SPECIFIC GRAVITY, POC UA: 1.02
UROBILINOGEN, POC UA: NORMAL
WBC # BLD AUTO: 9.3 K/UL

## 2017-06-05 PROCEDURE — 81002 URINALYSIS NONAUTO W/O SCOPE: CPT | Mod: PBBFAC,PO | Performed by: UROLOGY

## 2017-06-05 PROCEDURE — 85610 PROTHROMBIN TIME: CPT

## 2017-06-05 PROCEDURE — 85025 COMPLETE CBC W/AUTO DIFF WBC: CPT

## 2017-06-05 PROCEDURE — 84153 ASSAY OF PSA TOTAL: CPT

## 2017-06-05 PROCEDURE — 36415 COLL VENOUS BLD VENIPUNCTURE: CPT

## 2017-06-05 PROCEDURE — 80048 BASIC METABOLIC PNL TOTAL CA: CPT

## 2017-06-05 PROCEDURE — 99214 OFFICE O/P EST MOD 30 MIN: CPT | Mod: PBBFAC,PO | Performed by: UROLOGY

## 2017-06-05 PROCEDURE — 99999 PR PBB SHADOW E&M-EST. PATIENT-LVL IV: CPT | Mod: PBBFAC,,, | Performed by: UROLOGY

## 2017-06-05 PROCEDURE — 99215 OFFICE O/P EST HI 40 MIN: CPT | Mod: S$PBB,,, | Performed by: UROLOGY

## 2017-06-05 RX ORDER — SODIUM CHLORIDE 9 MG/ML
INJECTION, SOLUTION INTRAVENOUS CONTINUOUS
Status: CANCELLED | OUTPATIENT
Start: 2017-06-05

## 2017-06-05 NOTE — LETTER
June 6, 2017        Aura Lunsford, FNP-C  [address]  [facility] [address]             [facility]  [address]  [facility] [address]  Phone: [phone]   Patient: Carlos Tenorio   MR Number: [id number]   YOB: 1950   Date of Visit: 6/5/2017       Dear Dr. Lunsford:    Thank you for referring Carlos Tenorio to me for evaluation. Below are the relevant portions of my assessment and plan of care.           I reviewed imaging with the patient and his wife. We did discuss that approximately 80% of enhancing renal masses represent a renal cell carcinoma, and surgical excision of these masses is the preferred treatment strategy, with nephron-sparing approach/partial nephrectomy preferred for small renal masses < 3-4 cm. We did discuss alternative options such as active surveillance (which he is not interested in), and ablative techniques such as percutaneous or laparoscopic cryoablation. In general, these alternative therapies are reserved for patients with increased comorbidities or at higher surgical risk. We also discussed high recurrence rates with ablative vs. extirpative therapy, and that resection of T1a masses has excellent oncologic outcomes and is 90%+ curative with no further adjuvant therapies necessary. In discussing that the goal of partial nephrectomies (vs. Radical nephrectomies) is to maximize long-term renal function, they are associated with a slightly increased risk of tacos-procedural complications, which we discussed in detail. After thorough discussion of his options and answering all questions he and his wife mulligan, he elected to proceed with right robotic partial nephrectomy.     We did discuss risks and benefits of the procedure including but not limited to pain, infection, bleeding, scarring, injury to surrounding intra-abdominal structures, vascular injury, need for removal of entire kidney, fistula or pseudoaneurysm, declining renal function, need for  blood transfusion, need to convert to open procedure, urine leak or urinary fistula. We discussed the need for clamping of the renal artery for warm ischemia time, noting that the warm ischemia time will be kept to a minimum, but the longer the warm ischemia time the more potential for renal damage. We discussed the postoperative course in detail including hospital stay, Petty catheter, FABRICIO drain. Also discussed potential need for drainage or decortication of adjacent cyst to facilitate renorraphy.     I have tentatively scheduled L Robotic Partial Nephrectomy on 7/19/17. I did advise that given his cardiac history he will need full cardiac clearance/preop evaluation and will ask Dr Black to work him up.  40 mins spent in encounter, over half in counseling            If you have questions, please do not hesitate to call me. I look forward to following Carlos along with you.    Sincerely,      Yovany Heart MD           CC  Roney Bradshaw MD

## 2017-06-05 NOTE — PROGRESS NOTES
[facility] Urology Progress Note    Carlos Tenorio is a 67 y.o. male who presents for evaluation of left renal mass. Here today with his wife Esthela    Originally referred by NP Aura Lunsford to establish care since Dr Fu's USP, I have also been following him for kidney stones, right spermatocele, and hypogonadism.    Left renal mass:  Given his stone history and concern for  calcifications on KUB, a follow up noncon CT was ordered. CT 5/24/17:  The right kidney demonstrates approximately 3 stones the largest of which is present within the lower pole and measures 3 mm.  A previously present 12 mm right renal stone is no longer present.  Renovascular calcifications are also observed.  A 1.8 cm cyst of the lower pole of the right kidney is present.  On the left, a few punctate stones are present along with mild medullary nephrocalcinosis.  A 5.6 cm cyst arises 1st exophytic right from the lower pole of the left kidney.  A few additional smaller cysts in the range of a centimeter are also noted.  There is however a nodular contour abnormality of the lateral margin of the interpolar region exhibiting soft tissue attenuation and measuring 1.8 cm diameter (axial series  image 34), new since previous examination.  There are no ureteral stones.  No hydroureteronephrosis.  - on personal review does have punctate R upper and lower pole nonobstrcting stones, and R renal vascular calcifications, as well as large mostly exophytic left renal cyst which may have punctate calcification at periphery but otherwise appears as simple cyst, small upper pole cyst, and just above large lower pole cyst is concern for solid exophytic nodule along lateral border of left kidney as noted above.    After viewing CT, had ordered renal US to differentiate solid vs cystic, though MRI already ordered by primary care which was completed on 6/2/17.  MRI abdomen c/s contrast 6/2:  There is a solid, enhancing mass within the interpolar  right kidney, laterally measuring approximately 2.5 cm. This extends outside of the confines of the kidney but is confined to the perirenal space. Multiple, additional, nonenhancing, T2 hyperintense cysts are identified in each kidney measuring up to 2 cm in the lower pole of the right kidney and 5 cm in the lower pole of the left kidney. No additional abnormal enhancing lesion is identified within either kidney. There is no hydronephrosis. The nephrograms are symmetric. No renal vein invasion is identified. No retroperitoneal lymphadenopathy is identified. No abdominal lymphadenopathy is seen  - on personal review there is a 2.2x2.5cm left solid heterogenous renal mass exophytic at midpole just superior to his large lower pole cyst, within 7mm of cyst, as well as small cystic upper pole lesion.    He is here today to discuss results. He did not repeat his psa as below.  No known family history of genitourinary malignancies.  No flank pain, no hematuria.     Last CT from 2012 was also reviewed and has left mid-lower pole renal cyst and right renal stones (later managed with ESWL). No other lateral mid left renal lesion seen at this time.      Stone history:  - R ESWL of 1.2cm stone 10/2014 (after being observed since 2012) - stone fragments passed demonstrated 95% uric acid, 5% CaOx mono.   - prior R and L ESWLs in or before 2004 - told he had both CaOx stones and uric acids in the past.   - 24 hour urine in Oct 2012 by report -  high oxalate and low urine pH. Takes allopurinol  As most recent KUB post-ESWL in 2014 had concern for L renal calcification, I ordered a screening KUB - concern for small right renal calculi and distal calculus vs. phlebolith  CT 5/24/17: punctate R upper and lower pole nonobstrcting stones, and R renal vascular calcifications - also left renal mass concern as above followed up with mri as above    Hypogonadism:  Previous TRT with IM injections >5 years ago as well as ED. + spontaneous  "nocturnal erections. Trouble maintaining erections.  testosterone was 175 in 2015 and 170 in 2012  ED. Reports spontaneous nocturnal erections and notes that when on TRT in the past he was able to achieve full erections.  5/9/17: Elected to resume TRT. He notes his wife has been trained to give his injections. Discussed resuming testosterone 200mg IM Q2 weeks to start and checking labs at 1 and 3 months.  However, psa found to be 7.3 so held off on TRT. Previously 3.3 in 4/2017, and likely related to an acute diarrheal illness, so TRT held pending T recheck    R spermatocele:  4/20/17: US which confirmed right spermatocele - 5cm on imaging.  US images reviewed and notes largely cystic structure posterior to right testis with some internal echoes consistent with spermatocele. No internal vascularity. He also has 3mm left epididymal cyst.   no discomfort or pain, just notes continued subjective enlargement. No prior scrotal imaging as point of comparison        + CAD history with prior CABG 1995 x2 and then 2007 x5. ASA 81mg.  Follows up with Dr Black. Last seen approximately 1 year ago. Believes he had a stress test at this time.  DM well controlled - A1c 6.0    ROS: A comprehensive 10 system review was performed and is negative except as noted above in HPI    PHYSICAL EXAM:    Vitals:    06/05/17 0836   BP: 134/72   Pulse: 61   Resp: 18   Temp: 98.2 °F (36.8 °C)     Body mass index is 34.1 kg/m². Weight: 110.9 kg (244 lb 7.8 oz) Height: 5' 11" (180.3 cm)       General: Alert, cooperative, no distress, appears stated age   Head: Normocephalic, without obvious abnormality, atraumatic   Eyes: PERRL, conjunctiva/corneas clear   Lungs: Respirations unlabored   Heart: Warm and well perfused   Abdomen: soft NT ND 1+ pannus  Extremities: Extremities normal, atraumatic, no cyanosis or edema   Skin: Skin color, texture, turgor normal, no rashes or lesions   Psych: Appropriate   Neurologic: Non-focal       POCT URINE " DIPSTICK WITHOUT MICROSCOPE    Collection Time: 06/05/17 11:05 AM   Result Value Ref Range    Color, UA YELLOW     Spec Grav UA 1.020     pH, UA 5.0     WBC, UA NEG     Nitrite, UA NEG     Protein NEG     Glucose, UA NEG     Ketones, UA NEG     Urobilinogen, UA NEG     Bilirubin NEG     Blood, UA NEG        ASSESSMENT   1. Left renal mass  POCT URINE DIPSTICK WITHOUT MICROSCOPE    Diet NPO    Case Request Operating Room: ROBOT-ASSISTED PARTIAL NEPHRECTOMY    Place in Outpatient    Vital Signs     Activity as tolerated    Insert peripheral IV    Place sequential compression device    Reason for no Mechanical VTE Prophylaxis    Basic metabolic panel    CBC auto differential    Protime-INR    Type & Screen    Prepare RBC 2 Units; hold for surgery - partial nephrectomy    EKG 12-lead    X-Ray Chest PA And Lateral    Diet NPO   2. Hypogonadism in male     3. Elevated PSA     4. Spermatocele      right       Plan    I reviewed imaging with the patient and his wife. We did discuss that approximately 80% of enhancing renal masses represent a renal cell carcinoma, and surgical excision of these masses is the preferred treatment strategy, with nephron-sparing approach/partial nephrectomy preferred for small renal masses < 3-4 cm. We did discuss alternative options such as active surveillance (which he is not interested in), and ablative techniques such as percutaneous or laparoscopic cryoablation. In general, these alternative therapies are reserved for patients with increased comorbidities or at higher surgical risk. We also discussed high recurrence rates with ablative vs. extirpative therapy, and that resection of T1a masses has excellent oncologic outcomes and is 90%+ curative with no further adjuvant therapies necessary. In discussing that the goal of partial nephrectomies (vs. Radical nephrectomies) is to maximize long-term renal function, they are associated with a slightly increased risk of tacos-procedural  complications, which we discussed in detail. After thorough discussion of his options and answering all questions he and his wife had, he elected to proceed with left robotic partial nephrectomy.     We did discuss risks and benefits of the procedure including but not limited to pain, infection, bleeding, scarring, injury to surrounding intra-abdominal structures, vascular injury, need for removal of entire kidney, fistula or pseudoaneurysm, declining renal function, need for blood transfusion, need to convert to open procedure, urine leak or urinary fistula. We discussed the need for clamping of the renal artery for warm ischemia time, noting that the warm ischemia time will be kept to a minimum, but the longer the warm ischemia time the more potential for renal damage. We discussed the postoperative course in detail including hospital stay, Petty catheter, FABRICIO drain. Also discussed potential need for drainage or decortication of adjacent cyst to facilitate renorrhaphy.  I have tentatively scheduled L Robotic Partial Nephrectomy on 7/19/17. I did advise that given his cardiac history he will need full cardiac clearance/preop evaluation and will ask Dr Black to work him up. He will RTC approx 7/7/17 to review clearance and preop.    In regards to his hypogonadism, TRT still on hold pending reevaluation of his PSA. Again, I suspect his psa of 7.3 is a false elevation given proximity to acute diarrheal illness and is likely to be normal on recheck. However, if psa has sustained elevation, briefly discussed prostate biopsy. Will have him get psa today as well as basic labs, which I will forward to Dr Black for his review. He is asymptomatic from his spermatocele and will continue to observe.    45 mins spent in encounter, over half in counseling.

## 2017-06-05 NOTE — Clinical Note
Tentative L partial 7/19/17. Case booked. Need to request clearance with visit and workup (stress, echo, etc) from Dr Black. He will rtc 7/7/17 to review clearance and preop.

## 2017-06-07 ENCOUNTER — TELEPHONE (OUTPATIENT)
Dept: UROLOGY | Facility: CLINIC | Age: 67
End: 2017-06-07

## 2017-06-07 NOTE — TELEPHONE ENCOUNTER
Spoke with Pt. Pt scheduled with Dr Black today 6/7/17 at 1:40 for clearance. Pt has f/u appt with Dr Heart on 7/7/17.

## 2017-06-12 ENCOUNTER — TELEPHONE (OUTPATIENT)
Dept: UROLOGY | Facility: CLINIC | Age: 67
End: 2017-06-12

## 2017-06-30 ENCOUNTER — OFFICE VISIT (OUTPATIENT)
Dept: UROLOGY | Facility: CLINIC | Age: 67
End: 2017-06-30
Payer: MEDICARE

## 2017-06-30 ENCOUNTER — HOSPITAL ENCOUNTER (OUTPATIENT)
Dept: RADIOLOGY | Facility: HOSPITAL | Age: 67
Discharge: HOME | End: 2017-06-30
Attending: UROLOGY
Payer: MEDICARE

## 2017-06-30 ENCOUNTER — HOSPITAL ENCOUNTER (OUTPATIENT)
Dept: PREADMISSION TESTING | Facility: HOSPITAL | Age: 67
Discharge: HOME | End: 2017-06-30
Attending: UROLOGY
Payer: MEDICARE

## 2017-06-30 VITALS
HEIGHT: 71 IN | BODY MASS INDEX: 33.98 KG/M2 | TEMPERATURE: 98 F | DIASTOLIC BLOOD PRESSURE: 70 MMHG | SYSTOLIC BLOOD PRESSURE: 118 MMHG | HEART RATE: 58 BPM | RESPIRATION RATE: 18 BRPM | WEIGHT: 242.75 LBS

## 2017-06-30 DIAGNOSIS — N28.89 LEFT RENAL MASS: Primary | ICD-10-CM

## 2017-06-30 DIAGNOSIS — N28.89 LEFT RENAL MASS: ICD-10-CM

## 2017-06-30 PROCEDURE — 71020 XR CHEST PA AND LATERAL: CPT | Mod: TC

## 2017-06-30 PROCEDURE — 99900104 DSU ONLY-NO CHARGE-EA ADD'L HR (STAT)

## 2017-06-30 PROCEDURE — 99900103 DSU ONLY-NO CHARGE-INITIAL HR (STAT)

## 2017-06-30 PROCEDURE — 87086 URINE CULTURE/COLONY COUNT: CPT

## 2017-06-30 PROCEDURE — 71020 XR CHEST PA AND LATERAL: CPT | Mod: 26,,, | Performed by: RADIOLOGY

## 2017-06-30 PROCEDURE — 99999 PR PBB SHADOW E&M-EST. PATIENT-LVL III: CPT | Mod: PBBFAC,,, | Performed by: UROLOGY

## 2017-06-30 PROCEDURE — 99214 OFFICE O/P EST MOD 30 MIN: CPT | Mod: S$PBB,,, | Performed by: UROLOGY

## 2017-06-30 PROCEDURE — 1159F MED LIST DOCD IN RCRD: CPT | Mod: ,,, | Performed by: UROLOGY

## 2017-06-30 PROCEDURE — 99213 OFFICE O/P EST LOW 20 MIN: CPT | Mod: PBBFAC,25,PO | Performed by: UROLOGY

## 2017-06-30 PROCEDURE — 1126F AMNT PAIN NOTED NONE PRSNT: CPT | Mod: ,,, | Performed by: UROLOGY

## 2017-06-30 NOTE — Clinical Note
Pt noted he has upcoming appt with Dr Bradshaw 7/14/17. Has been seeing NP Jonn. Pt will be discharged from hospital that day after his partial nephrectomy. Pt expressed concern of difficult to get appointment and asked I submit r/s request. Would yall please r/s his appt with Dr Bradshaw to approx 2-3 weeks later, as is good to see PCP post hospital discharge regardless, and he will be due! Thanks, Yovany

## 2017-07-01 LAB — BACTERIA UR CULT: NO GROWTH

## 2017-07-04 NOTE — PROGRESS NOTES
[facility] Urology Progress Note    Carlos Tenorio is a 67 y.o. male who presents to preop for L robotic partial nephrectomy on 7/12/17 for L renal mass    He was originally referred by NP Aura Lunsford to establish care since Dr Fu's [facility], I have also been following him for kidney stones, right spermatocele, and hypogonadism.  Given his stone history and concern for  calcifications on KUB, a follow up noncon CT was ordered.   CT 5/24/17:  The right kidney demonstrates approximately 3 stones the largest of which is present within the lower pole and measures 3 mm.  A previously present 12 mm right renal stone is no longer present.  Renovascular calcifications are also observed.  A 1.8 cm cyst of the lower pole of the right kidney is present.  On the left, a few punctate stones are present along with mild medullary nephrocalcinosis.  A 5.6 cm cyst arises 1st exophytic right from the lower pole of the left kidney.  A few additional smaller cysts in the range of a centimeter are also noted.  There is however a nodular contour abnormality of the lateral margin of the interpolar region exhibiting soft tissue attenuation and measuring 1.8 cm diameter (axial series  image 34), new since previous examination.  There are no ureteral stones.  No hydroureteronephrosis.  - on personal review does have punctate R upper and lower pole nonobstrcting stones, and R renal vascular calcifications, as well as large mostly exophytic left renal cyst which may have punctate calcification at periphery but otherwise appears as simple cyst, small upper pole cyst, and just above large lower pole cyst is concern for solid exophytic nodule along lateral border of left kidney as noted above.     After viewing CT, had ordered renal US to differentiate solid vs cystic, though MRI already ordered by primary care which was completed on 6/2/17.  MRI abdomen c/s contrast 6/2:  There is a solid, enhancing mass within the interpolar right  "kidney, laterally measuring approximately 2.5 cm. This extends outside of the confines of the kidney but is confined to the perirenal space. Multiple, additional, nonenhancing, T2 hyperintense cysts are identified in each kidney measuring up to 2 cm in the lower pole of the right kidney and 5 cm in the lower pole of the left kidney. No additional abnormal enhancing lesion is identified within either kidney. There is no hydronephrosis. The nephrograms are symmetric. No renal vein invasion is identified. No retroperitoneal lymphadenopathy is identified. No abdominal lymphadenopathy is seen  - on personal review there is a 2.2x2.5cm left solid heterogenous renal mass exophytic at midpole just superior to his large lower pole cyst, within 7mm of cyst, as well as small cystic upper pole lesion.     Spermatocele stable, TRT currently on hold, and PSA transient elevation most likely due to timing of acute diarrheal illness as has returned to baseline.   + CAD history with prior CABG 1995 x2 and then 2007 x5. ASA 81mg. Well controlled DM, A1c 6.0 Received cardiac clearance.       ROS: A comprehensive 10 system review was performed and is negative except as noted above in HPI    PHYSICAL EXAM:    Vitals:    06/30/17 1021   BP: 118/70   Pulse: (!) 58   Resp: 18   Temp: 97.9 °F (36.6 °C)     Body mass index is 33.85 kg/m². Weight: 110.1 kg (242 lb 11.6 oz) Height: 5' 11" (180.3 cm)     General: Alert, cooperative, no distress, appears stated age   Head: Normocephalic, without obvious abnormality, atraumatic   Eyes: PERRL, conjunctiva/corneas clear   Lungs: Respirations unlabored   Heart: Warm and well perfused   Abdomen: soft NT ND 1+ pannus  Extremities: Extremities normal, atraumatic, no cyanosis or edema   Skin: Skin color, texture, turgor normal, no rashes or lesions   Psych: Appropriate   Neurologic: Non-focal       ASSESSMENT   1. Left renal mass  POCT URINE DIPSTICK WITHOUT MICROSCOPE    CULTURE, URINE       Plan  "   Went over options in detail at last visit and he has elected to proceed with L robotic partial nephrectomy.  Reviewed that the goal of partial nephrectomies (vs. Radical nephrectomies) is to maximize long-term renal function, they are associated with a slightly increased risk of tacos-procedural complications, which we discussed in detail.    We did discuss risks and benefits of the procedure including but not limited to pain, infection, bleeding, scarring, injury to surrounding intra-abdominal structures, vascular injury, need for removal of entire kidney, fistula or pseudoaneurysm, declining renal function, need for blood transfusion, need to convert to open procedure, urine leak or urinary fistula. We discussed the need for clamping of the renal artery for warm ischemia time, noting that the warm ischemia time will be kept to a minimum, but the longer the warm ischemia time the more potential for renal damage. We discussed the postoperative course in detail including hospital stay, Petty catheter, FABRICIO drain. Also discussed potential need for drainage or decortication of adjacent cyst to facilitate renorrhaphy. All questions answered and appropriate informed consent obtained. He will preop today.  L Robotic Partial Nephrectomy on 7/12/17.

## 2017-07-10 ENCOUNTER — TELEPHONE (OUTPATIENT)
Dept: UROLOGY | Facility: CLINIC | Age: 67
End: 2017-07-10

## 2017-07-11 ENCOUNTER — TELEPHONE (OUTPATIENT)
Dept: UROLOGY | Facility: CLINIC | Age: 67
End: 2017-07-11

## 2017-07-11 NOTE — TELEPHONE ENCOUNTER
Spoke with pt. Pt needs a Jury Duty letter for Monday July 31. Pt needs to receive letter by July 20 to send off and will come by to p/u letter. Dr Heart to be notified.

## 2017-07-14 ENCOUNTER — TELEPHONE (OUTPATIENT)
Dept: UROLOGY | Facility: CLINIC | Age: 67
End: 2017-07-14

## 2017-07-14 NOTE — TELEPHONE ENCOUNTER
Spoke with Pt. Pt was asking about surgery date, If  Possible to move up to 7/26/17. Schedule already booked with 2 surgeries on 7/26/17. Pt also having a robotic procedure. Jury Duty letter will be typed for Pt to  after Dr Heart signs. Pt scheduled for 7/31/17 Jury Duty.

## 2017-07-19 ENCOUNTER — TELEPHONE (OUTPATIENT)
Dept: UROLOGY | Facility: CLINIC | Age: 67
End: 2017-07-19

## 2017-07-19 NOTE — LETTER
[facility] [place] [facility]  [address]  [place] [address]  Phone: [phone] July 19, 2017    Carlos Tenorio  [address] [place] [id number]      To Whom It May Concern:    Carlos Tenorio is unable to participate in jury duty due to patient having surgery on 8/2/17.    If you have any questions or concerns, please feel free to call my office.    Sincerely,        Yovany Heart MD/robe

## 2017-07-19 NOTE — TELEPHONE ENCOUNTER
Spoke with pt. Pt notified that his letter is on Dr Heart's desk and as soon as Dr Heart signs it he will get a call to . Pt verbalized understanding.

## 2017-07-19 NOTE — LETTER
[facility] [place] [facility] [place]  [address]  [place] [address]  Phone: [phone] July 19, 2017    Carlos Tenorio  [address] [place] [id number]      To Whom It May Concern:    Carlos Tenorio is unable to participate in jury duty due to patient having surgery on 8/2/17.    If you have any questions or concerns, please feel free to call my office.    Sincerely,        Yovany Heart MD/robe

## 2017-07-19 NOTE — ADDENDUM NOTE
Encounter addended by: Giuseppe Jackman LPN on: 7/19/2017  9:58 AM<BR>    Actions taken: Letter status changed

## 2017-07-25 ENCOUNTER — DOCUMENTATION ONLY (OUTPATIENT)
Dept: FAMILY MEDICINE | Facility: CLINIC | Age: 67
End: 2017-07-25

## 2017-07-25 NOTE — PROGRESS NOTES
Pre-Visit Chart Review  For Appointment Scheduled on 07/27/2017    Health Maintenance Due   Topic Date Due    Foot Exam  06/19/2016

## 2017-07-27 ENCOUNTER — OFFICE VISIT (OUTPATIENT)
Dept: FAMILY MEDICINE | Facility: CLINIC | Age: 67
End: 2017-07-27
Payer: MEDICARE

## 2017-07-27 VITALS
WEIGHT: 246.94 LBS | BODY MASS INDEX: 34.57 KG/M2 | DIASTOLIC BLOOD PRESSURE: 68 MMHG | HEIGHT: 71 IN | TEMPERATURE: 98 F | SYSTOLIC BLOOD PRESSURE: 115 MMHG | HEART RATE: 66 BPM

## 2017-07-27 DIAGNOSIS — E11.65 TYPE 2 DIABETES MELLITUS WITH HYPERGLYCEMIA, WITHOUT LONG-TERM CURRENT USE OF INSULIN: ICD-10-CM

## 2017-07-27 PROCEDURE — 99214 OFFICE O/P EST MOD 30 MIN: CPT | Mod: S$PBB,,, | Performed by: FAMILY MEDICINE

## 2017-07-27 PROCEDURE — 3044F HG A1C LEVEL LT 7.0%: CPT | Mod: ,,, | Performed by: FAMILY MEDICINE

## 2017-07-27 PROCEDURE — 99213 OFFICE O/P EST LOW 20 MIN: CPT | Mod: PBBFAC,PO | Performed by: FAMILY MEDICINE

## 2017-07-27 PROCEDURE — 1126F AMNT PAIN NOTED NONE PRSNT: CPT | Mod: ,,, | Performed by: FAMILY MEDICINE

## 2017-07-27 PROCEDURE — 1159F MED LIST DOCD IN RCRD: CPT | Mod: ,,, | Performed by: FAMILY MEDICINE

## 2017-07-27 PROCEDURE — 99999 PR PBB SHADOW E&M-EST. PATIENT-LVL III: CPT | Mod: PBBFAC,,, | Performed by: FAMILY MEDICINE

## 2017-07-27 PROCEDURE — 4010F ACE/ARB THERAPY RXD/TAKEN: CPT | Mod: ,,, | Performed by: FAMILY MEDICINE

## 2017-07-27 RX ORDER — ATORVASTATIN CALCIUM 20 MG/1
20 TABLET, FILM COATED ORAL DAILY
Qty: 90 TABLET | Refills: 4 | Status: SHIPPED | OUTPATIENT
Start: 2017-07-27 | End: 2018-09-03 | Stop reason: SDUPTHER

## 2017-07-27 RX ORDER — LOSARTAN POTASSIUM AND HYDROCHLOROTHIAZIDE 12.5; 5 MG/1; MG/1
1 TABLET ORAL DAILY
Qty: 90 TABLET | Refills: 3 | Status: SHIPPED | OUTPATIENT
Start: 2017-07-27 | End: 2018-05-29 | Stop reason: SDUPTHER

## 2017-07-27 NOTE — PATIENT INSTRUCTIONS
Diabetes (General Information)  Diabetes is a long-term health problem. It means your body does not make enough insulin. Or it may mean that your body cannot use the insulin it makes. Insulin is a hormone in your body. It lets blood sugar (glucose) reach the cells in your body. All of your cells need glucose for fuel.  When you have diabetes, the glucose in your blood builds up because it cannot get into the cells. This buildup is called high blood sugar (hyperglycemia).  Your blood sugar level depends on several things. It depends on what kind of food you eat and how much of it you eat. It also depends on how much exercise you get, and how much insulin you have in your body. Eating too much of the wrong kinds of food or not taking diabetes medicine on time can cause high blood sugar. Infections can cause high blood sugar even if you are taking medicines correctly.  These things can also cause low blood sugar:  · Missing meals  · Not eating enough food  · Taking too much diabetes medicine  Diabetes can cause serious problems over time if you do not get treated. These problems include heart disease, stroke, kidney failure, and blindness. They also include nerve pain or loss of feeling in your legs and feet, and gangrene of the feet. By keeping your blood sugar under control you can prevent or delay these problems.  Normal blood sugar levels are 80 to 100 before a meal and less than 180 in the 1 to 2 hours after a meal.  Home care  Follow these guidelines when caring for yourself at home:  · Follow the diet your healthcare provider gives you. Take insulin or other diabetes medicine exactly as told to.  · Watch your blood sugar as you are told to. Keep a log of your results. This will help your provider change your medicines to keep your blood sugar under control.  · Try to reach your ideal weight. You may be able to cut back on or not have to take diabetes medicine if you eat the right foods and get exercise.  · Do  not smoke. Smoking worsens the effects of diabetes on your circulation. You are much more likely to have a heart attack if you have diabetes and you smoke.  · Take good care of your feet. If you have lost feeling in your feet, you may not see an injury or infection. Check your feet and between your toes at least once a week.  · Wear a medical alert bracelet or necklace, or carry a card in your wallet that says you have diabetes. This will help healthcare providers give you the right care if you get very ill and cannot tell them that you have diabetes.  Sick day plan  If you get a cold, the flu, or a bacterial or viral infection, take these steps:  · Look at your diabetes sick plan and call your healthcare provider as you were told to. You may need to call your provider right away if:  ¨ Your blood sugar is above 240 while taking your diabetes medicine  ¨ Your urine ketone levels are above normal or high  ¨ You have been vomiting more than 6 hours  ¨ You have trouble breathing or your breath ha s a fruity smell  ¨ You have a high fever  ¨ You have a fever for several days and you are not getting better  ¨ You get light-headed and are sleepier than usual  · Keep taking your diabetes pills (oral medicine) even if you have been vomiting and are feeling sick. Call your provider right away because you may need insulin to lower your blood sugar until you recover from your illness.  · Keep taking your insulin even if you have been vomiting and are feeling sick. Call your provider right away to ask if you need to change your insulin dose. This will depend on your blood sugar results.  · Check your blood sugar every 2 to 4 hours, or at least 4 times a day.  · Check your ketones often. If you are vomiting and having diarrhea, watch them more often.  · Do not skip meals. Try to eat small meals on a regular schedule. Do this even if you do not feel like eating.  · Drink water or other liquids that do not have caffeine or  calories. This will keep you from getting dehydrated. If you are nauseated or vomiting, takes small sips every 5 minutes. To prevent dehydration try to drink a cup (8 ounces) of fluids every hour while you are awake.  General care  Always bring a source of fast-acting sugar with you in case you have symptoms of low blood sugar (below 70). At the first sign of low blood sugar, eat or drink 15 to 20 grams of fast-acting sugar to raise your blood sugar. Examples are:  · 3 to 4 glucose tablets. You can buy these at most [employer].  · 4 ounces (1/2 cup) of regular (not diet) soft drinks  · 4 ounces (1/2 cup) of any fruit juice  · 8 ounces (1 cup) of milk  · 5 to 6 pieces of hard candy  · 1 tablespoon of honey  Check your blood sugar 15 minutes after treating yourself. If it is still below 70, take 15 to 20 more grams of fast-acting sugar. Test again in 15 minutes. If it returns to normal (70 or above), eat a snack or meal to keep your blood sugar in a safe range. If it stays low, call your doctor or go to an emergency room.  Follow-up care  Follow-up with your healthcare provider, or as advised. For more information about diabetes, visit the American Diabetes Association website at www.diabetes.org or call 346-465-5866.  When to seek medical advice  Call your healthcare provider right away if you have any of these symptoms of high blood sugar:  · Frequent urination  · Dizziness  · Drowsiness  · Thirst  · Headache  · Nausea or vomiting  · Abdominal pain  · Eyesight changes  · Fast breathing  · Confusion or loss of consciousness  Also call your provider right away if you have any of these signs of low blood sugar:  · Fatigue  · Headache  · Shakes  · Excess sweating  · Hunger  · Feeling anxious or restless  · Eyesight changes  · Drowsiness  · Weakness  · Confusion or loss of consciousness  Call 911  Call for emergency help right away if any of these occur:  · Chest pain or shortness of breath  · Dizziness or  fainting  · Weakness of an arm or leg or one side of the face  · Trouble speaking or seeing   Date Last Reviewed: 6/1/2016  © 9411-7453 [employer]. [address] [place] [address], [place] [address]. All rights reserved. This information is not intended as a substitute for professional medical care. Always follow your healthcare professional's instructions.        Established High Blood Pressure    High blood pressure (hypertension) is a chronic disease. Often health care providers dont know what causes it. But it can be caused by certain health conditions and medicines.  If you have high blood pressure, you may not have any symptoms. If you do have symptoms, they may include headache, dizziness, changes in your vision, chest pain, and shortness of breath. But even without symptoms, high blood pressure thats not treated raises your risk for heart attack and stroke. High blood pressure is a serious health risk and shouldnt be ignored.  A blood pressure reading is made up of two numbers: a higher number over a lower number. The top number is the systolic pressure. The bottom number is the diastolic pressure. A normal blood pressure is less than 120 over less than 80.  High blood pressure is when either the top number is 140 or higher, or the bottom number is 90 or higher. This must be the result when taking your blood pressure a number of times. The blood pressures between normal and high are called prehypertension.  Home care  If you have high blood pressure, you should do what is listed below to lower your blood pressure. If you are taking medicines for high blood pressure, these methods may reduce or end your need for medicines in the future.  · Begin a weight-loss program if you are overweight.  · Cut back on how much salt you get in your diet. Heres how to do this:  ¨ Dont eat foods that have a lot of salt. These include olives, pickles, smoked meats, and salted potato chips.  ¨ Dont add salt to your  food at the table.  ¨ Use only small amounts of salt when cooking.  · Begin an exercise program. Talk with your health care provider about the type of exercise program that would be best for you. It doesn't have to be hard. Even brisk walking for 20 minutes 3 times a week is a good form of exercise.  · Dont take medicines that have heart stimulants. This includes many cold and sinus decongestant pills and sprays, as well as diet pills. Check the warnings about hypertension on the label. Stimulants such as amphetamine or cocaine could be lethal for someone with high blood pressure. Never take these.  · Limit how much caffeine you get in your diet. Switch to caffeine-free products.  · Stop smoking. If you are a long-time smoker, this can be hard. Enroll in a stop-smoking program to make it more likely that you will quit for good.  · Learn how to handle stress. This is an important part of any program to lower blood pressure. Learn about relaxation methods like meditation, yoga, or biofeedback.  · If your provider prescribed medicines, take them exactly as directed. Missing doses may cause your blood pressure get out of control.  · Consider buying an automatic blood pressure machine. You can get one of these at most pharmacies. Use this to watch your blood pressure at home. Give the results to your provider.  Follow-up care  You will need to make regular visits to your health care provider. This is to check your blood pressure and to make changes to your medicines. Make a follow-up appointment as directed.  When to seek medical advice  Call your health care provider right away if any of these occur:  · Chest pain or shortness of breath  · Severe headache  · Throbbing or rushing sound in the ears  · Nosebleed  · Sudden severe pain in your belly (abdomen)  · Extreme drowsiness, confusion, or fainting  · Dizziness or dizziness with a spinning sensation (vertigo)  · Weakness of an arm or leg or one side of the face  · You  have problems speaking or seeing   Date Last Reviewed: 11/25/2014  © 2206-3257 [employer]. [address] [place] [address], [place] [address]. All rights reserved. This information is not intended as a substitute for professional medical care. Always follow your healthcare professional's instructions.

## 2017-07-27 NOTE — PROGRESS NOTES
The 10-year ASCVD risk score (Nathan ZHENG Jr., et al., 2013) is: 26.2%    Values used to calculate the score:      Age: 67 years      Sex: Male      Is Non- : No      Diabetic: Yes      Tobacco smoker: No      Systolic Blood Pressure: 115 mmHg      Is BP treated: Yes      HDL Cholesterol: 33 mg/dL      Total Cholesterol: 145 mg/dL

## 2017-07-29 NOTE — PROGRESS NOTES
Subjective:       Patient ID: Carlos Tenorio is a 67 y.o. male.    Chief Complaint: Diabetes and Hypertension    Patient Active Problem List:     Kidney stones     CAD (coronary artery disease)     Hypertension associated with diabetes     VASYL (obstructive sleep apnea)     Primary gonadal failure     Diabetes mellitus, type 2     Male hypogonadism     Bilateral renal cysts     Obesity (BMI 30.0-34.9)     OA (osteoarthritis) of knee     Skin tag     Cataracts, bilateral     History of colon polyps          Diabetes   He presents for his follow-up diabetic visit. His disease course has been improving. Pertinent negatives for hypoglycemia include no confusion, dizziness, headaches, pallor, speech difficulty or tremors. Pertinent negatives for diabetes include no blurred vision, no chest pain, no fatigue, no foot paresthesias, no foot ulcerations, no polydipsia, no polyphagia and no polyuria. Pertinent negatives for hypoglycemia complications include no blackouts. Risk factors for coronary artery disease include diabetes mellitus, hypertension, male sex and sedentary lifestyle. Current diabetic treatment includes oral agent (dual therapy). He is compliant with treatment all of the time. His weight is decreasing steadily. He is following a diabetic diet. He has had a previous visit with a dietitian. He participates in exercise three times a week. His breakfast blood glucose is taken between 8-9 am. His breakfast blood glucose range is generally 110-130 mg/dl. An ACE inhibitor/angiotensin II receptor blocker is not being taken.   Hypertension   Pertinent negatives include no blurred vision, chest pain, headaches, neck pain, palpitations or shortness of breath.     Review of Systems   Constitutional: Negative.  Negative for activity change, appetite change, chills, diaphoresis, fatigue, fever and unexpected weight change.   HENT: Negative.  Negative for congestion, drooling, ear discharge, ear pain, hearing loss,  mouth sores, nosebleeds, postnasal drip, rhinorrhea, sinus pressure, sore throat, tinnitus, trouble swallowing and voice change.    Eyes: Negative.  Negative for blurred vision, pain, discharge, redness, itching and visual disturbance.   Respiratory: Negative.  Negative for apnea, cough, choking, chest tightness and shortness of breath.    Cardiovascular: Negative.  Negative for chest pain, palpitations and leg swelling.   Gastrointestinal: Negative.  Negative for abdominal distention, abdominal pain and anal bleeding.   Endocrine: Negative.  Negative for cold intolerance, heat intolerance, polydipsia, polyphagia and polyuria.   Genitourinary: Negative.  Negative for difficulty urinating, dysuria, enuresis, flank pain, frequency, hematuria, scrotal swelling, testicular pain and urgency.   Musculoskeletal: Negative.  Negative for arthralgias, back pain, gait problem, neck pain and neck stiffness.   Skin: Negative.  Negative for color change, pallor and rash.   Allergic/Immunologic: Negative.  Negative for environmental allergies and food allergies.   Neurological: Negative.  Negative for dizziness, tremors, syncope, facial asymmetry, speech difficulty, light-headedness, numbness and headaches.   Hematological: Negative for adenopathy. Does not bruise/bleed easily.   Psychiatric/Behavioral: Negative.  Negative for agitation, behavioral problems, confusion, decreased concentration, dysphoric mood and hallucinations. The patient is not hyperactive.        Objective:      Physical Exam   Constitutional: He is oriented to person, place, and time. He appears well-developed and well-nourished. No distress.   HENT:   Head: Normocephalic and atraumatic.   Right Ear: External ear normal.   Left Ear: External ear normal.   Nose: Nose normal.   Mouth/Throat: Oropharynx is clear and moist. No oropharyngeal exudate.   Eyes: Conjunctivae and EOM are normal. Pupils are equal, round, and reactive to light. Right eye exhibits no  discharge. Left eye exhibits no discharge. No scleral icterus.   Neck: Normal range of motion. Neck supple. No JVD present. No tracheal deviation present. No thyromegaly present.   Cardiovascular: Normal rate, normal heart sounds and intact distal pulses.    No murmur heard.  Pulses:       Dorsalis pedis pulses are 3+ on the right side, and 3+ on the left side.        Posterior tibial pulses are 3+ on the right side, and 3+ on the left side.   Pulmonary/Chest: Effort normal and breath sounds normal. No respiratory distress. He has no wheezes. He has no rales.   Abdominal: Soft. Bowel sounds are normal. He exhibits no distension and no mass. There is no tenderness. There is no rebound and no guarding.   Musculoskeletal: He exhibits no edema or tenderness.        Right foot: There is normal range of motion and no deformity.          Feet:   Right Foot:   Protective Sensation: 6 sites tested. 6 sites sensed.   Skin Integrity: Negative for ulcer.   Left Foot:   Protective Sensation: 6 sites tested. 6 sites sensed.   Skin Integrity: Negative for ulcer or blister.   Lymphadenopathy:     He has no cervical adenopathy.   Neurological: He is alert and oriented to person, place, and time. No cranial nerve deficit. Coordination normal.   Skin: Skin is warm and dry. No rash noted. He is not diaphoretic. No erythema. No pallor.   Psychiatric: He has a normal mood and affect. His behavior is normal. Judgment and thought content normal.   Vitals reviewed.      Assessment:       1. Type 2 diabetes mellitus with hyperglycemia, without long-term current use of insulin        Plan:       Type 2 diabetes mellitus with hyperglycemia, without long-term current use of insulin  -     atorvastatin (LIPITOR) 20 MG tablet; Take 1 tablet (20 mg total) by mouth once daily.  Dispense: 90 tablet; Refill: 4  -     Basic metabolic panel; Future; Expected date: 07/27/2017  -     CBC auto differential; Future; Expected date: 07/27/2017  -      Hemoglobin A1c; Future; Expected date: 07/27/2017    Other orders  -     losartan-hydrochlorothiazide 50-12.5 mg (HYZAAR) 50-12.5 mg per tablet; Take 1 tablet by mouth once daily.  Dispense: 90 tablet; Refill: 3  -     zoster vaccine live, PF, (ZOSTAVAX, PF,) 19,400 unit/0.65 mL injection; Inject 19,400 Units into the skin once.  Dispense: 1 vial; Refill: 0      Patient readiness: acceptance and barriers:readiness    During the course of the visit the patient was educated and counseled about the following:     Diabetes:  Discussed general issues about diabetes pathophysiology and management.  Educational material distributed.  Addressed ADA diet.  Suggested low cholesterol diet.  Encouraged aerobic exercise.  Discussed foot care.  Reminded to get yearly retinal exam.  Discussed ways to avoid symptomatic hypoglycemia.  Discussed sick day management.  Hypertension:   Dietary sodium restriction.  Regular aerobic exercise.  Check blood pressures daily and record.  Obesity:   General weight loss/lifestyle modification strategies discussed (elicit support from others; identify saboteurs; non-food rewards, etc).  Diet interventions: diet diary indefinitely and ovo-lactarian diet.  Regular aerobic exercise program discussed.    Goals: Diabetes: Maintain Hemoglobin A1C below 7, Hypertension: Reduce Blood Pressure and Obesity: Reduce calorie intake and BMI    Did patient meet goals/outcomes: Yes    The following self management tools provided: blood pressure log  blood glucose log  excercise log    Patient Instructions (the written plan) was given to the patient/family.     Time spent with patient: 30 minutes

## 2017-08-01 ENCOUNTER — HOSPITAL ENCOUNTER (OUTPATIENT)
Dept: PREADMISSION TESTING | Facility: HOSPITAL | Age: 67
Discharge: HOME | DRG: 658 | End: 2017-08-01
Attending: UROLOGY
Payer: MEDICARE

## 2017-08-01 ENCOUNTER — ANESTHESIA EVENT (OUTPATIENT)
Dept: SURGERY | Facility: HOSPITAL | Age: 67
DRG: 658 | End: 2017-08-01
Payer: MEDICARE

## 2017-08-01 DIAGNOSIS — N28.89 LEFT RENAL MASS: ICD-10-CM

## 2017-08-01 LAB
ABO + RH BLD: NORMAL
BLD GP AB SCN CELLS X3 SERPL QL: NORMAL

## 2017-08-02 ENCOUNTER — ANESTHESIA (OUTPATIENT)
Dept: SURGERY | Facility: HOSPITAL | Age: 67
DRG: 658 | End: 2017-08-02
Payer: MEDICARE

## 2017-08-02 ENCOUNTER — HOSPITAL ENCOUNTER (INPATIENT)
Facility: HOSPITAL | Age: 67
LOS: 2 days | Discharge: HOME | DRG: 658 | End: 2017-08-04
Attending: UROLOGY | Admitting: UROLOGY
Payer: MEDICARE

## 2017-08-02 ENCOUNTER — SURGERY (OUTPATIENT)
Age: 67
End: 2017-08-02

## 2017-08-02 DIAGNOSIS — N28.89 LEFT RENAL MASS: ICD-10-CM

## 2017-08-02 LAB
ANION GAP SERPL CALC-SCNC: 15 MMOL/L
BASOPHILS NFR BLD: 0 %
BLD PROD TYP BPU: NORMAL
BLD PROD TYP BPU: NORMAL
BLOOD UNIT EXPIRATION DATE: NORMAL
BLOOD UNIT EXPIRATION DATE: NORMAL
BLOOD UNIT TYPE CODE: 6200
BLOOD UNIT TYPE CODE: 6200
BLOOD UNIT TYPE: NORMAL
BLOOD UNIT TYPE: NORMAL
BUN SERPL-MCNC: 30 MG/DL
CALCIUM SERPL-MCNC: 9 MG/DL
CHLORIDE SERPL-SCNC: 106 MMOL/L
CO2 SERPL-SCNC: 17 MMOL/L
CODING SYSTEM: NORMAL
CODING SYSTEM: NORMAL
CREAT SERPL-MCNC: 1.5 MG/DL
DIFFERENTIAL METHOD: ABNORMAL
DISPENSE STATUS: NORMAL
DISPENSE STATUS: NORMAL
EOSINOPHIL NFR BLD: 0 %
ERYTHROCYTE [DISTWIDTH] IN BLOOD BY AUTOMATED COUNT: 15.5 %
EST. GFR  (AFRICAN AMERICAN): 55 ML/MIN/1.73 M^2
EST. GFR  (NON AFRICAN AMERICAN): 47 ML/MIN/1.73 M^2
GLUCOSE SERPL-MCNC: 172 MG/DL
HCT VFR BLD AUTO: 36.1 %
HGB BLD-MCNC: 12.1 G/DL
LYMPHOCYTES NFR BLD: 5 %
MCH RBC QN AUTO: 30.6 PG
MCHC RBC AUTO-ENTMCNC: 33.7 G/DL
MCV RBC AUTO: 91 FL
MONOCYTES NFR BLD: 2 %
NEUTROPHILS NFR BLD: 93 %
NUM UNITS TRANS PACKED RBC: NORMAL
NUM UNITS TRANS PACKED RBC: NORMAL
PLATELET # BLD AUTO: 249 K/UL
PMV BLD AUTO: 9.6 FL
POCT GLUCOSE: 129 MG/DL (ref 70–110)
POTASSIUM SERPL-SCNC: 4.4 MMOL/L
RBC # BLD AUTO: 3.97 M/UL
SODIUM SERPL-SCNC: 138 MMOL/L
WBC # BLD AUTO: 24 K/UL

## 2017-08-02 PROCEDURE — 88304 TISSUE EXAM BY PATHOLOGIST: CPT | Mod: 26,,, | Performed by: PATHOLOGY

## 2017-08-02 PROCEDURE — 99900103 DSU ONLY-NO CHARGE-INITIAL HR (STAT): Performed by: UROLOGY

## 2017-08-02 PROCEDURE — 85027 COMPLETE CBC AUTOMATED: CPT

## 2017-08-02 PROCEDURE — 94799 UNLISTED PULMONARY SVC/PX: CPT

## 2017-08-02 PROCEDURE — 37000008 HC ANESTHESIA 1ST 15 MINUTES: Performed by: UROLOGY

## 2017-08-02 PROCEDURE — 80048 BASIC METABOLIC PNL TOTAL CA: CPT

## 2017-08-02 PROCEDURE — 25000003 PHARM REV CODE 250: Performed by: ANESTHESIOLOGY

## 2017-08-02 PROCEDURE — 37000009 HC ANESTHESIA EA ADD 15 MINS: Performed by: UROLOGY

## 2017-08-02 PROCEDURE — 86920 COMPATIBILITY TEST SPIN: CPT

## 2017-08-02 PROCEDURE — 27201423 OPTIME MED/SURG SUP & DEVICES STERILE SUPPLY: Performed by: UROLOGY

## 2017-08-02 PROCEDURE — 76998 US GUIDE INTRAOP: CPT | Mod: 26,,, | Performed by: UROLOGY

## 2017-08-02 PROCEDURE — 63600175 PHARM REV CODE 636 W HCPCS: Performed by: UROLOGY

## 2017-08-02 PROCEDURE — 25000003 PHARM REV CODE 250: Performed by: UROLOGY

## 2017-08-02 PROCEDURE — 71000033 HC RECOVERY, INTIAL HOUR: Performed by: UROLOGY

## 2017-08-02 PROCEDURE — 88305 TISSUE EXAM BY PATHOLOGIST: CPT | Performed by: PATHOLOGY

## 2017-08-02 PROCEDURE — 94761 N-INVAS EAR/PLS OXIMETRY MLT: CPT

## 2017-08-02 PROCEDURE — 83036 HEMOGLOBIN GLYCOSYLATED A1C: CPT

## 2017-08-02 PROCEDURE — 36000713 HC OR TIME LEV V EA ADD 15 MIN: Performed by: UROLOGY

## 2017-08-02 PROCEDURE — 85007 BL SMEAR W/DIFF WBC COUNT: CPT

## 2017-08-02 PROCEDURE — C1729 CATH, DRAINAGE: HCPCS | Performed by: UROLOGY

## 2017-08-02 PROCEDURE — 36415 COLL VENOUS BLD VENIPUNCTURE: CPT

## 2017-08-02 PROCEDURE — 0TB14ZZ EXCISION OF LEFT KIDNEY, PERCUTANEOUS ENDOSCOPIC APPROACH: ICD-10-PCS | Performed by: UROLOGY

## 2017-08-02 PROCEDURE — 50549 UNLISTED LAPS PX RENAL: CPT | Mod: ,,, | Performed by: UROLOGY

## 2017-08-02 PROCEDURE — D9220A PRA ANESTHESIA: Mod: ANES,,, | Performed by: ANESTHESIOLOGY

## 2017-08-02 PROCEDURE — 36000712 HC OR TIME LEV V 1ST 15 MIN: Performed by: UROLOGY

## 2017-08-02 PROCEDURE — 50543 LAPARO PARTIAL NEPHRECTOMY: CPT | Mod: LT,,, | Performed by: UROLOGY

## 2017-08-02 PROCEDURE — 63600175 PHARM REV CODE 636 W HCPCS: Performed by: NURSE ANESTHETIST, CERTIFIED REGISTERED

## 2017-08-02 PROCEDURE — 88307 TISSUE EXAM BY PATHOLOGIST: CPT | Mod: 26,,, | Performed by: PATHOLOGY

## 2017-08-02 PROCEDURE — 0TD14ZZ EXTRACTION OF LEFT KIDNEY, PERCUTANEOUS ENDOSCOPIC APPROACH: ICD-10-PCS | Performed by: UROLOGY

## 2017-08-02 PROCEDURE — 25000003 PHARM REV CODE 250: Performed by: NURSE ANESTHETIST, CERTIFIED REGISTERED

## 2017-08-02 PROCEDURE — 71000039 HC RECOVERY, EACH ADD'L HOUR: Performed by: UROLOGY

## 2017-08-02 PROCEDURE — 99900104 DSU ONLY-NO CHARGE-EA ADD'L HR (STAT): Performed by: UROLOGY

## 2017-08-02 PROCEDURE — 8E0W4CZ ROBOTIC ASSISTED PROCEDURE OF TRUNK REGION, PERCUTANEOUS ENDOSCOPIC APPROACH: ICD-10-PCS | Performed by: UROLOGY

## 2017-08-02 PROCEDURE — 12000002 HC ACUTE/MED SURGE SEMI-PRIVATE ROOM

## 2017-08-02 PROCEDURE — 27000221 HC OXYGEN, UP TO 24 HOURS

## 2017-08-02 PROCEDURE — D9220A PRA ANESTHESIA: Mod: CRNA,,, | Performed by: NURSE ANESTHETIST, CERTIFIED REGISTERED

## 2017-08-02 RX ORDER — LORAZEPAM 2 MG/ML
0.25 INJECTION INTRAMUSCULAR
Status: DISCONTINUED | OUTPATIENT
Start: 2017-08-02 | End: 2017-08-02 | Stop reason: HOSPADM

## 2017-08-02 RX ORDER — KETAMINE HYDROCHLORIDE 100 MG/ML
INJECTION, SOLUTION INTRAMUSCULAR; INTRAVENOUS
Status: DISCONTINUED | OUTPATIENT
Start: 2017-08-02 | End: 2017-08-02

## 2017-08-02 RX ORDER — HYDROCODONE BITARTRATE AND ACETAMINOPHEN 500; 5 MG/1; MG/1
TABLET ORAL
Status: DISCONTINUED | OUTPATIENT
Start: 2017-08-02 | End: 2017-08-02

## 2017-08-02 RX ORDER — INSULIN ASPART 100 [IU]/ML
1-10 INJECTION, SOLUTION INTRAVENOUS; SUBCUTANEOUS
Status: DISCONTINUED | OUTPATIENT
Start: 2017-08-02 | End: 2017-08-04 | Stop reason: HOSPADM

## 2017-08-02 RX ORDER — ETOMIDATE 2 MG/ML
INJECTION INTRAVENOUS
Status: DISCONTINUED | OUTPATIENT
Start: 2017-08-02 | End: 2017-08-02

## 2017-08-02 RX ORDER — FAMOTIDINE 20 MG/1
20 TABLET, FILM COATED ORAL 2 TIMES DAILY
Status: DISCONTINUED | OUTPATIENT
Start: 2017-08-02 | End: 2017-08-04 | Stop reason: HOSPADM

## 2017-08-02 RX ORDER — SIMETHICONE 80 MG
1 TABLET,CHEWABLE ORAL 3 TIMES DAILY
Status: DISCONTINUED | OUTPATIENT
Start: 2017-08-02 | End: 2017-08-02

## 2017-08-02 RX ORDER — GLYCOPYRROLATE 0.2 MG/ML
INJECTION INTRAMUSCULAR; INTRAVENOUS
Status: DISCONTINUED | OUTPATIENT
Start: 2017-08-02 | End: 2017-08-02

## 2017-08-02 RX ORDER — LIDOCAINE HYDROCHLORIDE 10 MG/ML
1 INJECTION, SOLUTION EPIDURAL; INFILTRATION; INTRACAUDAL; PERINEURAL ONCE
Status: COMPLETED | OUTPATIENT
Start: 2017-08-02 | End: 2017-08-02

## 2017-08-02 RX ORDER — PROPOFOL 10 MG/ML
VIAL (ML) INTRAVENOUS
Status: DISCONTINUED | OUTPATIENT
Start: 2017-08-02 | End: 2017-08-02

## 2017-08-02 RX ORDER — IBUPROFEN 200 MG
16 TABLET ORAL
Status: DISCONTINUED | OUTPATIENT
Start: 2017-08-02 | End: 2017-08-04 | Stop reason: HOSPADM

## 2017-08-02 RX ORDER — SODIUM CHLORIDE, SODIUM LACTATE, POTASSIUM CHLORIDE, CALCIUM CHLORIDE 600; 310; 30; 20 MG/100ML; MG/100ML; MG/100ML; MG/100ML
INJECTION, SOLUTION INTRAVENOUS CONTINUOUS PRN
Status: DISCONTINUED | OUTPATIENT
Start: 2017-08-02 | End: 2017-08-02

## 2017-08-02 RX ORDER — ATORVASTATIN CALCIUM 20 MG/1
20 TABLET, FILM COATED ORAL DAILY
Status: DISCONTINUED | OUTPATIENT
Start: 2017-08-02 | End: 2017-08-04 | Stop reason: HOSPADM

## 2017-08-02 RX ORDER — SODIUM CHLORIDE, SODIUM LACTATE, POTASSIUM CHLORIDE, CALCIUM CHLORIDE 600; 310; 30; 20 MG/100ML; MG/100ML; MG/100ML; MG/100ML
INJECTION, SOLUTION INTRAVENOUS CONTINUOUS
Status: DISCONTINUED | OUTPATIENT
Start: 2017-08-02 | End: 2017-08-02

## 2017-08-02 RX ORDER — ALLOPURINOL 100 MG/1
300 TABLET ORAL DAILY
Status: DISCONTINUED | OUTPATIENT
Start: 2017-08-02 | End: 2017-08-04 | Stop reason: HOSPADM

## 2017-08-02 RX ORDER — MORPHINE SULFATE 2 MG/ML
2 INJECTION, SOLUTION INTRAMUSCULAR; INTRAVENOUS
Status: DISCONTINUED | OUTPATIENT
Start: 2017-08-02 | End: 2017-08-04 | Stop reason: HOSPADM

## 2017-08-02 RX ORDER — ROCURONIUM BROMIDE 10 MG/ML
INJECTION, SOLUTION INTRAVENOUS
Status: DISCONTINUED | OUTPATIENT
Start: 2017-08-02 | End: 2017-08-02

## 2017-08-02 RX ORDER — IBUPROFEN 200 MG
24 TABLET ORAL
Status: DISCONTINUED | OUTPATIENT
Start: 2017-08-02 | End: 2017-08-04 | Stop reason: HOSPADM

## 2017-08-02 RX ORDER — PHENYLEPHRINE HYDROCHLORIDE 10 MG/ML
INJECTION INTRAVENOUS
Status: DISCONTINUED | OUTPATIENT
Start: 2017-08-02 | End: 2017-08-02

## 2017-08-02 RX ORDER — DOCUSATE SODIUM 100 MG/1
100 CAPSULE, LIQUID FILLED ORAL 2 TIMES DAILY
Status: DISCONTINUED | OUTPATIENT
Start: 2017-08-02 | End: 2017-08-04 | Stop reason: HOSPADM

## 2017-08-02 RX ORDER — MEPERIDINE HYDROCHLORIDE 25 MG/ML
12.5 INJECTION INTRAMUSCULAR; INTRAVENOUS; SUBCUTANEOUS ONCE AS NEEDED
Status: DISCONTINUED | OUTPATIENT
Start: 2017-08-02 | End: 2017-08-02 | Stop reason: HOSPADM

## 2017-08-02 RX ORDER — SODIUM CHLORIDE, SODIUM LACTATE, POTASSIUM CHLORIDE, CALCIUM CHLORIDE 600; 310; 30; 20 MG/100ML; MG/100ML; MG/100ML; MG/100ML
INJECTION, SOLUTION INTRAVENOUS CONTINUOUS
Status: DISCONTINUED | OUTPATIENT
Start: 2017-08-02 | End: 2017-08-04

## 2017-08-02 RX ORDER — SODIUM CHLORIDE 9 MG/ML
INJECTION, SOLUTION INTRAVENOUS CONTINUOUS
Status: DISCONTINUED | OUTPATIENT
Start: 2017-08-02 | End: 2017-08-02

## 2017-08-02 RX ORDER — CEFAZOLIN SODIUM 2 G/50ML
2 SOLUTION INTRAVENOUS
Status: COMPLETED | OUTPATIENT
Start: 2017-08-02 | End: 2017-08-03

## 2017-08-02 RX ORDER — SODIUM CHLORIDE 0.9 % (FLUSH) 0.9 %
3 SYRINGE (ML) INJECTION
Status: DISCONTINUED | OUTPATIENT
Start: 2017-08-02 | End: 2017-08-02

## 2017-08-02 RX ORDER — VECURONIUM BROMIDE FOR INJECTION 1 MG/ML
INJECTION, POWDER, LYOPHILIZED, FOR SOLUTION INTRAVENOUS
Status: DISCONTINUED | OUTPATIENT
Start: 2017-08-02 | End: 2017-08-02

## 2017-08-02 RX ORDER — ACETAMINOPHEN 325 MG/1
650 TABLET ORAL EVERY 4 HOURS PRN
Status: DISCONTINUED | OUTPATIENT
Start: 2017-08-02 | End: 2017-08-04 | Stop reason: HOSPADM

## 2017-08-02 RX ORDER — NEOSTIGMINE METHYLSULFATE 1 MG/ML
INJECTION, SOLUTION INTRAVENOUS
Status: DISCONTINUED | OUTPATIENT
Start: 2017-08-02 | End: 2017-08-02

## 2017-08-02 RX ORDER — DEXAMETHASONE SODIUM PHOSPHATE 4 MG/ML
INJECTION, SOLUTION INTRA-ARTICULAR; INTRALESIONAL; INTRAMUSCULAR; INTRAVENOUS; SOFT TISSUE
Status: DISCONTINUED | OUTPATIENT
Start: 2017-08-02 | End: 2017-08-02

## 2017-08-02 RX ORDER — FENTANYL CITRATE 50 UG/ML
INJECTION, SOLUTION INTRAMUSCULAR; INTRAVENOUS
Status: DISCONTINUED | OUTPATIENT
Start: 2017-08-02 | End: 2017-08-02

## 2017-08-02 RX ORDER — ONDANSETRON 2 MG/ML
4 INJECTION INTRAMUSCULAR; INTRAVENOUS DAILY PRN
Status: DISCONTINUED | OUTPATIENT
Start: 2017-08-02 | End: 2017-08-02 | Stop reason: HOSPADM

## 2017-08-02 RX ORDER — DIPHENHYDRAMINE HYDROCHLORIDE 50 MG/ML
12.5 INJECTION INTRAMUSCULAR; INTRAVENOUS EVERY 8 HOURS PRN
Status: DISCONTINUED | OUTPATIENT
Start: 2017-08-02 | End: 2017-08-04 | Stop reason: HOSPADM

## 2017-08-02 RX ORDER — TRAMADOL HYDROCHLORIDE 50 MG/1
50 TABLET ORAL EVERY 6 HOURS PRN
Status: DISCONTINUED | OUTPATIENT
Start: 2017-08-02 | End: 2017-08-04 | Stop reason: HOSPADM

## 2017-08-02 RX ORDER — LIDOCAINE HCL/PF 100 MG/5ML
SYRINGE (ML) INTRAVENOUS
Status: DISCONTINUED | OUTPATIENT
Start: 2017-08-02 | End: 2017-08-02

## 2017-08-02 RX ORDER — MANNITOL 250 MG/ML
12.5 INJECTION, SOLUTION INTRAVENOUS ONCE
Status: COMPLETED | OUTPATIENT
Start: 2017-08-02 | End: 2017-08-02

## 2017-08-02 RX ORDER — ONDANSETRON 2 MG/ML
4 INJECTION INTRAMUSCULAR; INTRAVENOUS EVERY 4 HOURS PRN
Status: DISCONTINUED | OUTPATIENT
Start: 2017-08-02 | End: 2017-08-04 | Stop reason: HOSPADM

## 2017-08-02 RX ORDER — ACETAMINOPHEN 10 MG/ML
1000 INJECTION, SOLUTION INTRAVENOUS ONCE
Status: COMPLETED | OUTPATIENT
Start: 2017-08-02 | End: 2017-08-02

## 2017-08-02 RX ORDER — MIDAZOLAM HYDROCHLORIDE 1 MG/ML
INJECTION, SOLUTION INTRAMUSCULAR; INTRAVENOUS
Status: DISCONTINUED | OUTPATIENT
Start: 2017-08-02 | End: 2017-08-02

## 2017-08-02 RX ORDER — SIMETHICONE 80 MG
1 TABLET,CHEWABLE ORAL 3 TIMES DAILY
Status: DISCONTINUED | OUTPATIENT
Start: 2017-08-02 | End: 2017-08-04 | Stop reason: HOSPADM

## 2017-08-02 RX ORDER — CEFAZOLIN SODIUM 2 G/50ML
2 SOLUTION INTRAVENOUS
Status: COMPLETED | OUTPATIENT
Start: 2017-08-02 | End: 2017-08-02

## 2017-08-02 RX ORDER — ACETAMINOPHEN 10 MG/ML
INJECTION, SOLUTION INTRAVENOUS
Status: DISCONTINUED | OUTPATIENT
Start: 2017-08-02 | End: 2017-08-02

## 2017-08-02 RX ORDER — HYDROMORPHONE HCL 2 MG/ML
VIAL (ML) INJECTION
Status: DISCONTINUED | OUTPATIENT
Start: 2017-08-02 | End: 2017-08-02

## 2017-08-02 RX ORDER — ONDANSETRON HYDROCHLORIDE 2 MG/ML
INJECTION, SOLUTION INTRAMUSCULAR; INTRAVENOUS
Status: DISCONTINUED | OUTPATIENT
Start: 2017-08-02 | End: 2017-08-02

## 2017-08-02 RX ORDER — GLUCAGON 1 MG
1 KIT INJECTION
Status: DISCONTINUED | OUTPATIENT
Start: 2017-08-02 | End: 2017-08-04 | Stop reason: HOSPADM

## 2017-08-02 RX ORDER — HYDROMORPHONE HYDROCHLORIDE 2 MG/ML
0.2 INJECTION, SOLUTION INTRAMUSCULAR; INTRAVENOUS; SUBCUTANEOUS EVERY 5 MIN PRN
Status: DISCONTINUED | OUTPATIENT
Start: 2017-08-02 | End: 2017-08-02 | Stop reason: HOSPADM

## 2017-08-02 RX ADMIN — SIMETHICONE CHEW TAB 80 MG 80 MG: 80 TABLET ORAL at 05:08

## 2017-08-02 RX ADMIN — NEOSTIGMINE METHYLSULFATE 5 MG: 1 INJECTION INTRAVENOUS at 12:08

## 2017-08-02 RX ADMIN — FENTANYL CITRATE 50 MCG: 50 INJECTION INTRAMUSCULAR; INTRAVENOUS at 11:08

## 2017-08-02 RX ADMIN — VECURONIUM BROMIDE FOR INJECTION 2 MG: 1 INJECTION, POWDER, LYOPHILIZED, FOR SOLUTION INTRAVENOUS at 09:08

## 2017-08-02 RX ADMIN — ACETAMINOPHEN 1000 MG: 10 INJECTION, SOLUTION INTRAVENOUS at 02:08

## 2017-08-02 RX ADMIN — DEXAMETHASONE SODIUM PHOSPHATE 8 MG: 4 INJECTION, SOLUTION INTRAMUSCULAR; INTRAVENOUS at 07:08

## 2017-08-02 RX ADMIN — HEPARIN SODIUM 10000 UNITS: 1000 INJECTION, SOLUTION INTRAVENOUS; SUBCUTANEOUS at 10:08

## 2017-08-02 RX ADMIN — Medication 10 MG: at 11:08

## 2017-08-02 RX ADMIN — VECURONIUM BROMIDE FOR INJECTION 2 MG: 1 INJECTION, POWDER, LYOPHILIZED, FOR SOLUTION INTRAVENOUS at 08:08

## 2017-08-02 RX ADMIN — PHENYLEPHRINE HYDROCHLORIDE 100 MCG: 10 INJECTION INTRAVENOUS at 11:08

## 2017-08-02 RX ADMIN — ACETAMINOPHEN 650 MG: 325 TABLET, FILM COATED ORAL at 06:08

## 2017-08-02 RX ADMIN — KETAMINE HYDROCHLORIDE 30 MG: 100 INJECTION, SOLUTION, CONCENTRATE INTRAMUSCULAR; INTRAVENOUS at 07:08

## 2017-08-02 RX ADMIN — CEFAZOLIN SODIUM 2 G: 2 SOLUTION INTRAVENOUS at 08:08

## 2017-08-02 RX ADMIN — MIDAZOLAM 3 MG: 1 INJECTION INTRAMUSCULAR; INTRAVENOUS at 07:08

## 2017-08-02 RX ADMIN — MANNITOL 12.5 G: 12.5 INJECTION, SOLUTION INTRAVENOUS at 11:08

## 2017-08-02 RX ADMIN — HYDROMORPHONE HYDROCHLORIDE 0.5 MG: 2 INJECTION INTRAMUSCULAR; INTRAVENOUS; SUBCUTANEOUS at 01:08

## 2017-08-02 RX ADMIN — ROCURONIUM BROMIDE 50 MG: 10 INJECTION, SOLUTION INTRAVENOUS at 07:08

## 2017-08-02 RX ADMIN — ONDANSETRON 4 MG: 2 INJECTION, SOLUTION INTRAMUSCULAR; INTRAVENOUS at 12:08

## 2017-08-02 RX ADMIN — SODIUM CHLORIDE, SODIUM LACTATE, POTASSIUM CHLORIDE, AND CALCIUM CHLORIDE: .6; .31; .03; .02 INJECTION, SOLUTION INTRAVENOUS at 10:08

## 2017-08-02 RX ADMIN — SODIUM CHLORIDE, SODIUM LACTATE, POTASSIUM CHLORIDE, AND CALCIUM CHLORIDE: .6; .31; .03; .02 INJECTION, SOLUTION INTRAVENOUS at 07:08

## 2017-08-02 RX ADMIN — LIDOCAINE HYDROCHLORIDE 50 MG: 20 INJECTION, SOLUTION INTRAVENOUS at 07:08

## 2017-08-02 RX ADMIN — ETOMIDATE 14 MG: 2 INJECTION, SOLUTION INTRAVENOUS at 07:08

## 2017-08-02 RX ADMIN — GLYCOPYRROLATE 0.2 MG: 0.2 INJECTION, SOLUTION INTRAMUSCULAR; INTRAVENOUS at 07:08

## 2017-08-02 RX ADMIN — FENTANYL CITRATE 100 MCG: 50 INJECTION INTRAMUSCULAR; INTRAVENOUS at 07:08

## 2017-08-02 RX ADMIN — LIDOCAINE HYDROCHLORIDE 10 MG: 10 INJECTION, SOLUTION EPIDURAL; INFILTRATION; INTRACAUDAL; PERINEURAL at 07:08

## 2017-08-02 RX ADMIN — SODIUM CHLORIDE, SODIUM LACTATE, POTASSIUM CHLORIDE, AND CALCIUM CHLORIDE: .6; .31; .03; .02 INJECTION, SOLUTION INTRAVENOUS at 02:08

## 2017-08-02 RX ADMIN — VECURONIUM BROMIDE FOR INJECTION 2 MG: 1 INJECTION, POWDER, LYOPHILIZED, FOR SOLUTION INTRAVENOUS at 11:08

## 2017-08-02 RX ADMIN — GLYCOPYRROLATE 0.6 MG: 0.2 INJECTION, SOLUTION INTRAMUSCULAR; INTRAVENOUS at 12:08

## 2017-08-02 RX ADMIN — CEFAZOLIN SODIUM 2 G: 2 SOLUTION INTRAVENOUS at 05:08

## 2017-08-02 RX ADMIN — FENTANYL CITRATE 50 MCG: 50 INJECTION INTRAMUSCULAR; INTRAVENOUS at 09:08

## 2017-08-02 RX ADMIN — DOCUSATE SODIUM 100 MG: 100 CAPSULE, LIQUID FILLED ORAL at 09:08

## 2017-08-02 RX ADMIN — VECURONIUM BROMIDE FOR INJECTION 3 MG: 1 INJECTION, POWDER, LYOPHILIZED, FOR SOLUTION INTRAVENOUS at 08:08

## 2017-08-02 RX ADMIN — ACETAMINOPHEN 1000 MG: 10 INJECTION, SOLUTION INTRAVENOUS at 08:08

## 2017-08-02 RX ADMIN — FAMOTIDINE 20 MG: 20 TABLET ORAL at 09:08

## 2017-08-02 RX ADMIN — SIMETHICONE CHEW TAB 80 MG 80 MG: 80 TABLET ORAL at 10:08

## 2017-08-02 RX ADMIN — VECURONIUM BROMIDE FOR INJECTION 1 MG: 1 INJECTION, POWDER, LYOPHILIZED, FOR SOLUTION INTRAVENOUS at 10:08

## 2017-08-02 RX ADMIN — PROPOFOL 50 MG: 10 INJECTION, EMULSION INTRAVENOUS at 07:08

## 2017-08-02 NOTE — ANESTHESIA POSTPROCEDURE EVALUATION
"Anesthesia Post Evaluation    Patient: Carlos Tenorio    Procedure(s) Performed: Procedure(s) (LRB):  ROBOT-ASSISTED PARTIAL NEPHRECTOMY (Left)    Final Anesthesia Type: general  Patient location during evaluation: PACU  Patient participation: Yes- Able to Participate  Level of consciousness: awake and alert  Post-procedure vital signs: reviewed and stable  Pain management: adequate  Airway patency: patent  PONV status at discharge: No PONV  Anesthetic complications: no      Cardiovascular status: blood pressure returned to baseline and hemodynamically stable  Respiratory status: unassisted  Hydration status: euvolemic  Follow-up not needed.        Visit Vitals  /64   Pulse 72   Temp 36.4 °C (97.5 °F) (Temporal)   Resp 15   Ht 5' 11" (1.803 m)   Wt 110.2 kg (243 lb)   SpO2 100%   BMI 33.89 kg/m²       Pain/Surinder Score: Pain Assessment Performed: Yes (8/2/2017  1:10 PM)  Presence of Pain: non-verbal indicators absent (pt sedated, vss, no indicators of pain noted) (8/2/2017  1:10 PM)  Pain Rating Prior to Med Admin: 1 (8/2/2017  6:07 AM)  Surinder Score: 4 (8/2/2017  1:25 PM)      "

## 2017-08-02 NOTE — H&P
[facility] Urology Progress Note     Carlos Tenorio is a 67 y.o. male who presents to preop for L robotic partial nephrectomy on 7/12/17 for L renal mass     He was originally referred by NP Aura Lunsford to establish care since Dr Fu's skilled nursing, I have also been following him for kidney stones, right spermatocele, and hypogonadism.  Given his stone history and concern for  calcifications on KUB, a follow up noncon CT was ordered.   CT 5/24/17:  The right kidney demonstrates approximately 3 stones the largest of which is present within the lower pole and measures 3 mm.  A previously present 12 mm right renal stone is no longer present.  Renovascular calcifications are also observed.  A 1.8 cm cyst of the lower pole of the right kidney is present.  On the left, a few punctate stones are present along with mild medullary nephrocalcinosis.  A 5.6 cm cyst arises 1st exophytic right from the lower pole of the left kidney.  A few additional smaller cysts in the range of a centimeter are also noted.  There is however a nodular contour abnormality of the lateral margin of the interpolar region exhibiting soft tissue attenuation and measuring 1.8 cm diameter (axial series  image 34), new since previous examination.  There are no ureteral stones.  No hydroureteronephrosis.  - on personal review does have punctate R upper and lower pole nonobstrcting stones, and R renal vascular calcifications, as well as large mostly exophytic left renal cyst which may have punctate calcification at periphery but otherwise appears as simple cyst, small upper pole cyst, and just above large lower pole cyst is concern for solid exophytic nodule along lateral border of left kidney as noted above.     After viewing CT, had ordered renal US to differentiate solid vs cystic, though MRI already ordered by primary care which was completed on 6/2/17.  MRI abdomen c/s contrast 6/2:  There is a solid, enhancing mass within the interpolar right  "kidney, laterally measuring approximately 2.5 cm. This extends outside of the confines of the kidney but is confined to the perirenal space. Multiple, additional, nonenhancing, T2 hyperintense cysts are identified in each kidney measuring up to 2 cm in the lower pole of the right kidney and 5 cm in the lower pole of the left kidney. No additional abnormal enhancing lesion is identified within either kidney. There is no hydronephrosis. The nephrograms are symmetric. No renal vein invasion is identified. No retroperitoneal lymphadenopathy is identified. No abdominal lymphadenopathy is seen  - on personal review there is a 2.2x2.5cm left solid heterogenous renal mass exophytic at midpole just superior to his large lower pole cyst, within 7mm of cyst, as well as small cystic upper pole lesion.     Spermatocele stable, TRT currently on hold, and PSA transient elevation most likely due to timing of acute diarrheal illness as has returned to baseline.   + CAD history with prior CABG 1995 x2 and then 2007 x5. ASA 81mg. Well controlled DM, A1c 6.0 Received cardiac clearance.        ROS: A comprehensive 10 system review was performed and is negative except as noted above in HPI     PHYSICAL EXAM:         Vitals:     06/30/17 1021   BP: 118/70   Pulse: (!) 58   Resp: 18   Temp: 97.9 °F (36.6 °C)      Body mass index is 33.85 kg/m². Weight: 110.1 kg (242 lb 11.6 oz) Height: 5' 11" (180.3 cm)      General: Alert, cooperative, no distress, appears stated age   Head: Normocephalic, without obvious abnormality, atraumatic   Eyes: PERRL, conjunctiva/corneas clear   Lungs: Respirations unlabored   Heart: Warm and well perfused   Abdomen: soft NT ND 1+ pannus  Extremities: Extremities normal, atraumatic, no cyanosis or edema   Skin: Skin color, texture, turgor normal, no rashes or lesions   Psych: Appropriate   Neurologic: Non-focal         ASSESSMENT   1. Left renal mass  POCT URINE DIPSTICK WITHOUT MICROSCOPE     CULTURE, URINE "         Plan    Went over options in detail at last visit and he has elected to proceed with L robotic partial nephrectomy.  Reviewed that the goal of partial nephrectomies (vs. Radical nephrectomies) is to maximize long-term renal function, they are associated with a slightly increased risk of tacos-procedural complications, which we discussed in detail.    We did discuss risks and benefits of the procedure including but not limited to pain, infection, bleeding, scarring, injury to surrounding intra-abdominal structures, vascular injury, need for removal of entire kidney, fistula or pseudoaneurysm, declining renal function, need for blood transfusion, need to convert to open procedure, urine leak or urinary fistula. We discussed the need for clamping of the renal artery for warm ischemia time, noting that the warm ischemia time will be kept to a minimum, but the longer the warm ischemia time the more potential for renal damage. We discussed the postoperative course in detail including hospital stay, Petty catheter, FABRICIO drain. Also discussed potential need for drainage or decortication of adjacent cyst to facilitate renorrhaphy. All questions answered and appropriate informed consent obtained. He will preop today.  L Robotic Partial Nephrectomy on 8/2/17.

## 2017-08-02 NOTE — PLAN OF CARE
Problem: Patient Care Overview  Goal: Plan of Care Review  Outcome: Ongoing (interventions implemented as appropriate)  02 at 2lpm nc.  Hr 60 and 02 saturation 97%.    Patient averaging 2000ml on IS.

## 2017-08-02 NOTE — BRIEF OP NOTE
[facility] Urology Brief Operative Note    Date: 08/02/2017    Staff Surgeon: Yovany Heart MD    Pre-Op Diagnosis:   1. Left renal mass  2. Left renal cysts    Post-Op Diagnosis: same    Procedure(s) Performed:   1. Left robot-assisted laparoscopic partial nephrectomy  2. Left robot-assisted laparoscopic renal cyst decortication  3. Intraoperative laparoscopic ultrasound localization of renal mass    Specimen(s):   1. Left renal mass  2. Renetta-tumor fat  3. Left renal cyst    Anesthesia: General endotracheal anesthesia    Findings: approx 2cm upper pole lateral exophytic renal mass, adjacent to large renal cyst (decorticated, fat interposed), small lateral border of renal vein oversewed with 5-0 prolene    Estimated Blood Loss: 100cc    Drains: 7mm flat FABRICIO, 18fr leo    Complications: none    Disposition: PACU to floor

## 2017-08-02 NOTE — PLAN OF CARE
Pt awake alert and oriented, pt pleasant and has a good sense of humor, vs stable, no complaints of abdominal pain but some soreness to buttocks and R shoulder from laying down, lap sites x 3 to abdomen open to air, drain to L side of abdomen intact, drain output 40 ml, leo catheter in place and flowing freely, IV fluids infused, labs drawn and reported to Dr. Heart. Ok per Dr. Norwood to transfer pt to the floor. Pt transported from PACU to room 316. Pt transferred from stretcher to bed independently with some assistance. Call light within reach. Pt's wife and family members at the bedside. Report given to ABBY Gallo.

## 2017-08-02 NOTE — ANESTHESIA PREPROCEDURE EVALUATION
08/02/2017  Carlos Tenorio is a 67 y.o., male.    Anesthesia Evaluation    I have reviewed the Patient Summary Reports.    I have reviewed the Nursing Notes.      Review of Systems  Anesthesia Hx:  No problems with previous Anesthesia    Cardiovascular:   Hypertension, well controlled CAD asymptomatic CABG/stent  CAD - CABG X 2 - last in 2007 - stable   Renal/:   Chronic Renal Disease Renal mass   Endocrine:   Diabetes, well controlled, type 2        Physical Exam  General:  Obesity                 Anesthesia Plan  Type of Anesthesia, risks & benefits discussed:  Anesthesia Type:  general  Patient's Preference:   Intra-op Monitoring Plan:   Intra-op Monitoring Plan Comments:   Post Op Pain Control Plan:   Post Op Pain Control Plan Comments:   Induction:   IV  Beta Blocker:  Patient is not currently on a Beta-Blocker (No further documentation required).       Informed Consent: Patient understands risks and agrees with Anesthesia plan.  Questions answered. Anesthesia consent signed with patient.  ASA Score: 3     Day of Surgery Review of History & Physical:    H&P update referred to the surgeon.         Ready For Surgery From Anesthesia Perspective.

## 2017-08-02 NOTE — TRANSFER OF CARE
"Anesthesia Transfer of Care Note    Patient: Carlos Tenorio    Procedure(s) Performed: Procedure(s) (LRB):  ROBOT-ASSISTED PARTIAL NEPHRECTOMY (Left)    Patient location: PACU    Anesthesia Type: general    Transport from OR: Transported from OR on 2-3 L/min O2 by NC with adequate spontaneous ventilation    Post pain: adequate analgesia    Post assessment: no apparent anesthetic complications and tolerated procedure well    Post vital signs: stable    Level of consciousness: responds to stimulation and sedated    Nausea/Vomiting: no nausea/vomiting    Complications: none    Transfer of care protocol was followed      Last vitals:   Visit Vitals  BP (!) 165/81 (BP Location: Left arm, Patient Position: Lying, BP Method: Automatic)   Pulse 61   Temp 36.9 °C (98.4 °F) (Skin)   Resp 20   Ht 5' 11" (1.803 m)   Wt 110.2 kg (243 lb)   SpO2 98%   BMI 33.89 kg/m²     "

## 2017-08-03 LAB
ANION GAP SERPL CALC-SCNC: 9 MMOL/L
BASOPHILS # BLD AUTO: 0 K/UL
BASOPHILS NFR BLD: 0.3 %
BUN SERPL-MCNC: 29 MG/DL
CALCIUM SERPL-MCNC: 9.2 MG/DL
CHLORIDE SERPL-SCNC: 105 MMOL/L
CO2 SERPL-SCNC: 24 MMOL/L
CREAT SERPL-MCNC: 1.2 MG/DL
DIFFERENTIAL METHOD: ABNORMAL
EOSINOPHIL # BLD AUTO: 0 K/UL
EOSINOPHIL NFR BLD: 0 %
ERYTHROCYTE [DISTWIDTH] IN BLOOD BY AUTOMATED COUNT: 15.2 %
EST. GFR  (AFRICAN AMERICAN): >60 ML/MIN/1.73 M^2
EST. GFR  (NON AFRICAN AMERICAN): >60 ML/MIN/1.73 M^2
ESTIMATED AVG GLUCOSE: 117 MG/DL
GLUCOSE SERPL-MCNC: 131 MG/DL
HBA1C MFR BLD HPLC: 5.7 %
HCT VFR BLD AUTO: 33.4 %
HGB BLD-MCNC: 11 G/DL
LYMPHOCYTES # BLD AUTO: 2.2 K/UL
LYMPHOCYTES NFR BLD: 12.7 %
MAGNESIUM SERPL-MCNC: 1.8 MG/DL
MCH RBC QN AUTO: 30.1 PG
MCHC RBC AUTO-ENTMCNC: 33 G/DL
MCV RBC AUTO: 91 FL
MONOCYTES # BLD AUTO: 1.7 K/UL
MONOCYTES NFR BLD: 9.6 %
NEUTROPHILS # BLD AUTO: 13.7 K/UL
NEUTROPHILS NFR BLD: 77.4 %
PHOSPHATE SERPL-MCNC: 3.7 MG/DL
PLATELET # BLD AUTO: 234 K/UL
PMV BLD AUTO: 9.7 FL
POCT GLUCOSE: 121 MG/DL (ref 70–110)
POCT GLUCOSE: 130 MG/DL (ref 70–110)
POCT GLUCOSE: 139 MG/DL (ref 70–110)
POTASSIUM SERPL-SCNC: 4.6 MMOL/L
RBC # BLD AUTO: 3.65 M/UL
SODIUM SERPL-SCNC: 138 MMOL/L
WBC # BLD AUTO: 17.7 K/UL

## 2017-08-03 PROCEDURE — 63600175 PHARM REV CODE 636 W HCPCS: Performed by: UROLOGY

## 2017-08-03 PROCEDURE — 84100 ASSAY OF PHOSPHORUS: CPT

## 2017-08-03 PROCEDURE — 25000003 PHARM REV CODE 250: Performed by: UROLOGY

## 2017-08-03 PROCEDURE — 94799 UNLISTED PULMONARY SVC/PX: CPT

## 2017-08-03 PROCEDURE — 85025 COMPLETE CBC W/AUTO DIFF WBC: CPT

## 2017-08-03 PROCEDURE — 36415 COLL VENOUS BLD VENIPUNCTURE: CPT

## 2017-08-03 PROCEDURE — 80048 BASIC METABOLIC PNL TOTAL CA: CPT

## 2017-08-03 PROCEDURE — 94761 N-INVAS EAR/PLS OXIMETRY MLT: CPT

## 2017-08-03 PROCEDURE — 83735 ASSAY OF MAGNESIUM: CPT

## 2017-08-03 PROCEDURE — 12000002 HC ACUTE/MED SURGE SEMI-PRIVATE ROOM

## 2017-08-03 RX ORDER — KETOROLAC TROMETHAMINE 30 MG/ML
15 INJECTION, SOLUTION INTRAMUSCULAR; INTRAVENOUS EVERY 6 HOURS
Status: DISCONTINUED | OUTPATIENT
Start: 2017-08-03 | End: 2017-08-04

## 2017-08-03 RX ORDER — ENOXAPARIN SODIUM 100 MG/ML
40 INJECTION SUBCUTANEOUS EVERY 24 HOURS
Status: DISCONTINUED | OUTPATIENT
Start: 2017-08-03 | End: 2017-08-04 | Stop reason: HOSPADM

## 2017-08-03 RX ORDER — LOSARTAN POTASSIUM AND HYDROCHLOROTHIAZIDE 12.5; 5 MG/1; MG/1
1 TABLET ORAL DAILY
Status: DISCONTINUED | OUTPATIENT
Start: 2017-08-03 | End: 2017-08-04 | Stop reason: HOSPADM

## 2017-08-03 RX ADMIN — DOCUSATE SODIUM 100 MG: 100 CAPSULE, LIQUID FILLED ORAL at 09:08

## 2017-08-03 RX ADMIN — ALLOPURINOL 300 MG: 100 TABLET ORAL at 09:08

## 2017-08-03 RX ADMIN — SIMETHICONE CHEW TAB 80 MG 80 MG: 80 TABLET ORAL at 09:08

## 2017-08-03 RX ADMIN — KETOROLAC TROMETHAMINE 15 MG: 30 INJECTION, SOLUTION INTRAMUSCULAR at 09:08

## 2017-08-03 RX ADMIN — FAMOTIDINE 20 MG: 20 TABLET ORAL at 09:08

## 2017-08-03 RX ADMIN — CEFAZOLIN SODIUM 2 G: 2 SOLUTION INTRAVENOUS at 08:08

## 2017-08-03 RX ADMIN — CEFAZOLIN SODIUM 2 G: 2 SOLUTION INTRAVENOUS at 12:08

## 2017-08-03 RX ADMIN — SIMETHICONE CHEW TAB 80 MG 80 MG: 80 TABLET ORAL at 05:08

## 2017-08-03 RX ADMIN — LOSARTAN POTASSIUM AND HYDROCHLOROTHIAZIDE 1 TABLET: 12.5; 5 TABLET ORAL at 09:08

## 2017-08-03 RX ADMIN — ACETAMINOPHEN 650 MG: 325 TABLET, FILM COATED ORAL at 05:08

## 2017-08-03 RX ADMIN — SODIUM CHLORIDE, SODIUM LACTATE, POTASSIUM CHLORIDE, AND CALCIUM CHLORIDE: .6; .31; .03; .02 INJECTION, SOLUTION INTRAVENOUS at 05:08

## 2017-08-03 RX ADMIN — SIMETHICONE CHEW TAB 80 MG 80 MG: 80 TABLET ORAL at 02:08

## 2017-08-03 RX ADMIN — ATORVASTATIN CALCIUM 20 MG: 20 TABLET, FILM COATED ORAL at 09:08

## 2017-08-03 RX ADMIN — KETOROLAC TROMETHAMINE 15 MG: 30 INJECTION, SOLUTION INTRAMUSCULAR at 04:08

## 2017-08-03 RX ADMIN — ENOXAPARIN SODIUM 40 MG: 100 INJECTION SUBCUTANEOUS at 04:08

## 2017-08-03 NOTE — OP NOTE
[facility] Urology Operative Report     Date: 08/02/2017     Staff Surgeon: Yovany Heart MD    Bedside Assistant: PRANEETH Cardenas     Pre-Op Diagnosis:   1. Left renal mass  2. Left renal cysts     Post-Op Diagnosis: same     Procedure(s) Performed:   1. Left robot-assisted laparoscopic partial nephrectomy  2. Left robot-assisted laparoscopic renal cyst decortication  3. Intraoperative laparoscopic ultrasound localization of renal mass     Specimen(s):   1. Left renal mass  2. Renetta-tumor fat  3. Left renal cyst     Anesthesia: General endotracheal anesthesia     Findings: approx 2cm upper pole lateral exophytic renal mass, adjacent to large renal cyst (decorticated, fat interposed), small lateral border of renal vein oversewed with 5-0 prolene  Warm Ischemia (clamp) time 16.28 minutes     Estimated Blood Loss: 100cc     Drains: 7mm flat FABRICIO, 18fr leo     Complications: none     Indications for procedure:  Mr. Tenorio is a 66 yo M I had been following for hypogonadism, spermatocele and nephrolithiasis for which he had CT scan which found solid small renal mass adjacent to large left renal cyst   MRI confirmed enhancing solid mass: 2.2x2.5cm left solid heterogenous mid to upper pole exophytic just superior to his large lower pole cyst, within 7mm of cyst. He elected to proceed with robotic assisted laparoscopic partial nephrectomy. Prior to surgery, he understood the risks and benefits of the surgery including all the risks of anesthesia, as well as bleeding, infection, scarring, damage to adjacent structures including the spleen, the ureter, the diaphragm, the large and small bowels, and all adjacent neurovascular structures, postop ileus. He was also made aware of the risks of declining renal function, possible loss of kidney, need for open procedure, and of postoperative bleeding requiring intervention with blood transfusions and possible formation of arteriovenous fistula and pseudoaneurysms requiring  further treatment. Specific to his case also discussed possibility and/or necessity of renal cyst decortication to complete renorrhaphy given proximity of mass to cyst. He was made aware of these risks and decided to proceed with the surgery, and informed consent obtained.    Procedure in detail:  After obtaining fully informed consent, the patient was prepped and draped in the left modified flank position after placing a 18fr leo catheter under sterile technique. He was then secured to the bed and all appropriate pressure points were padded. A test roll was performed prior to the procedure. Preoperative antibiotics, ancef, were given and a WHO timeout was performed.      Using the Veress needle, we obtained pneumoperitoneum to 15 mmHg, after a saline drop test was performed to confirm placement. A 12-mm incision was made cephalad and lateral to the patient's umbilicus on his left abdomen to place the 12-mm camera trocar, which we then placed the robotic camera through. We triangulated and placed 2 additional 8-mm robotic trocars. A superior assistant 12-mm port was placed in the line with the hilum of the kidney and our inferior 5-mm assistant port was placed in the left lower quadrant. These were all placed under direct vision. We then placed the patient in full airplane position and docked the robot over the patient's left shoulder.     Once the robot was docked, we then incised the line of Toldt, dropping the left colon from the field. We then incised the colorenal ligaments in standard fashion further mobilizing the kidney in a stepwise manner. Caution was taken to always keep the spleen in view when taking down the splenorenals and working on freeing up the upper pole. He did have mild bowel adhesions to the lateral abdominal wall which were also taken down during colon mobilization. Lower pole was elevated laterally, identifying gonadal vein and ureter. Ureter retracted laterally with lower pole by  assistant suction. The gonadal vein was identified in its course into the renal vein, and did cross over area of necessary dissection, so was hemostatically transected between hemolock clips     We then systematically marched up from the lower pole of the kidney to the dissection of the hilum. Small pockets of tissue were cauterized using the bipolar to assist in gaining hemostatic exposure to the renal vasculature. . The renal vein was identified with the renal artery just posterolateral to it. There was also a small arterial branch running anterolateral and parallel to renal vein. Careful dissection was performed to free up the main arterial trunk, as well as the smaller parallel upper pole branch. After we had carefully dissected the vessels and gained circumferential access around the renal artery so that bulldog clamps could be placed around it, we felt as though we could continue forth with the procedure. During hilar dissection a small venous branch from edge of renal vein was avulsed with small bleeding edge of renal vein. 5-0 prolene used to oversew this area on lateral edge of vein which then appeared and remained hemostatic.     Gerota fascia was then incised and unroofed over the left upper pole of the kidney, and the upper pole was cleared and renal parenchyma followed posteriorly to safely identify  upper pole exophytic renal mass to isolate it. He did have very sticky fat, which ultimately unroofed, and was sent to pathology as peritumor fat. A small anteromedial segment of upper pole had an area of subcapsular dissection during defatting but capsule maintaned adjacent to this small area to use in later renorrhaphy. As seen on imaging, mass was in very close proximity to large 7-8mm renal cyst. Drop in ultrasound probe used to ultrasound cyst which did appear simple fluid, and adjacent/superior to it the solid mass was seen distinctly separate though in close proximity. To obtain negative margin,  would need to go to edge of cyst wall, and as well when unroofing tumor inferior border brought dissection to cyst wall.    It was necessary to perform decortication of this large renal cyst both to access renal mass during dissection and to resect and perform renorrhaphy.  Upon identification of cyst it was dissected free from surrounding tissue layers bluntly. Monopolar scissors then used to make small incision in anterior cyst wall, and suction  used to drain cyst. Approximately 150-200cc straw colored fluid aspirated into suction cannister.  Traction was then placed on the elastic pliable anterior cyst wall with fenestrated graspers, and bluntly dissected completely free from surrounding tissue with monopolar lana until all cyst wall seen freely mobile, after which time it was sharply excised with monopolar cautery lana after inspecting inside of drained cyst cavity, of which no features concerning for malignancy were noted. Excised renal cyst wall was removed at that time through 12mm assistant port and sent to pathology. Margins were fulgurated and portion of base on side of cyst cavity contralateral to abutting renal mass, but not near the cyst so as to not compromise parenchymal bridge between mass and cyst wall for purposes of renorrhapy.     The laparoscopic ultrasound probe was then brought into the patient's abdomen. This was then used to help us localize the margins of this largely exophytic renal mass, which were scored using the monopolar scissors until we had gained circumferential margins on the tumor. Needle drivers had already been tested and working appropriately. The bulldog clamps were then brought into the patient's abdomen, a total of 3, 2 for main renal arter and one for parallel branching upper pole artery. Once the mass was fully marked out using ultrasound probe, we then used the ProGrasp forceps to place the 2 straight bulldog clamps on the main renal artery under direct  vision, and 1 curved bulldog on smaller upper pole arterial branch.      We then began our resection using the monopolar lana to systematically resect the mass. No significant active bleeding or segmental arteries were encountered and resection was largely bloodless. The tumor was carefully excised and then once circumferentially resected, was placed over the spleen to be removed at the later point in the procedure. Resection did extend to cyst cavity's edge.     A 2-0 Vicryl on SH with a Lapra-Ty was used to close the base of the resection site as our deep renorrhaphy suture, after which resection appeared hemostatic. 0-Vicryl double-arm suture with a Hem-o-Mark and Lapra-Ty was the used for the superficial renorrhaphy to achieve capsular apposition, being sure to incorporate capsule at the small segment mentioned above where subcapsular dissection took place. Hem-o-Mark clips were placed and the renorrhaphy was closed using the sliding clip technique. After all the sutures have been appropriately placed, Lapra-Tys were placed on either end of the double-arm suture.     With the renorrhaphy largely complete, ProGrasp forceps were used to remove the bulldog clamps from the renal arteris under direct vision. The warm ischemic time was noticed to be 16.28 minutes. We then turned our attention back to the resection site. Largely hemostatic at this time, though a double armed 0-vicryl used at this time to further reapproximate the edge of resection at lateral cyst wall margin to ensure parenchymal and capsular apposition. The needles were then cut off of the above renorrhaphy sutures and removed from the abdomen.     After renorrhaphy from renal mass complete, vascularized perinephric fat pedicle was mobilized and interposed into the base of the cyst cavity, and 2-0 vicryl used to run it circumferentially to cyst cavity wall with occasional tacking suture to base to secure the fat pedicle to periphery of cyst cavity so  it could not collapse and reaccumulate.    FloSeal was then placed generously onto the resection site as well as over the cyst cavity with interposed fat. A second view of the hilum was taken at this point and no bleeding or trauma noted. At this point, with our resection bed appearing hemostatic, the kidney was retroperitonealized using a 2-0 vicryl suture as a box stitch to reapproximate Gerotas.    At this time an EndoCatch bag was passed into the abdomen thru the 12-mm port and ensnared our left renal mass and the peritumor fat. The inferior robotic arm instrument was removed and a 7mm-flat FABRICIO drain was passed into the abdomen under direct vision using grasper to assist in placement posterior to kidney. All instruments were then removed and the robot was undocked.  The drain was sutured in place using a 2-0 silk suture.     The specimen was extracted from the 12mm trocar site. The fascia of the extraction site trocar as well as camera port 12 mm trocar were closed lap was then closed under direct lap vision with Todd Curtis closing devise using an 0-PDS. The skin edges were then closed using 4-0 Monocryl in a subcuticular fashion. Dermabond was then placed over all the incision sites and a 4 x 4 gauze and Tegaderm placed around the drain. Petty catheter was secured to leg with stat-lock and placed to gravity drainage with urimeter bag.     The patient tolerated the procedure well and was transferred to PACU in stable condition. The instrument, sponge, and needle counts were correct x2.    Disposition: Patient will be admitted to monitor drain outputs, vitals, blood counts. Petty will be removed POD 1 and discharge planned for POD 2 if ambulatory, tolerating diet, pain controlled. FABRICIO likely to be removed prior to discharge.

## 2017-08-03 NOTE — PROGRESS NOTES
[facility] Urology Progress Note    Carlos Tenorio is a 67 y.o. male POD 1 s/p L robotic partial nephrectomy    Did well overnight  Some right shoulder pain and R foot numbness from positioning - resolving  Tato clears no N/V  Minimal pain - tylenol only  Denies f/c/n/cv  No flatus yet  Good uop - urine cleared  AFVSS      ROS: A comprehensive 10 system review was performed and is negative except as noted above in HPI    PHYSICAL EXAM:    Vitals:    08/03/17 0500   BP: 131/70   Pulse: 67   Resp: 17   Temp: 98.4 °F (36.9 °C)     Body mass index is 33.89 kg/m².       General: Alert, cooperative, no distress, appears stated age   Head: Normocephalic, without obvious abnormality, atraumatic   Eyes: PERRL, conjunctiva/corneas clear   Lungs: Respirations unlabored, IS volumes 1500+  Heart: Warm and well perfused   Abdomen: soft NT ND, FABRICIO serosang, lap incisions CDI  : leo copious clear urine  Extremities: Extremities normal, atraumatic, no cyanosis or edema   Skin: Skin color, texture, turgor normal, no rashes or lesions   Psych: Appropriate   Neurologic: Non-focal   CBC auto differential    Collection Time: 08/03/17  4:59 AM   Result Value Ref Range    WBC 17.70 (H) 3.90 - 12.70 K/uL    RBC 3.65 (L) 4.60 - 6.20 M/uL    Hemoglobin 11.0 (L) 14.0 - 18.0 g/dL    Hematocrit 33.4 (L) 40.0 - 54.0 %    MCV 91 82 - 98 fL    MCH 30.1 27.0 - 31.0 pg    MCHC 33.0 32.0 - 36.0 g/dL    RDW 15.2 (H) 11.5 - 14.5 %    Platelets 234 150 - 350 K/uL    MPV 9.7 9.2 - 12.9 fL    Gran # 13.7 (H) 1.8 - 7.7 K/uL    Lymph # 2.2 1.0 - 4.8 K/uL    Mono # 1.7 (H) 0.3 - 1.0 K/uL    Eos # 0.0 0.0 - 0.5 K/uL    Baso # 0.00 0.00 - 0.20 K/uL    Gran% 77.4 (H) 38.0 - 73.0 %    Lymph% 12.7 (L) 18.0 - 48.0 %    Mono% 9.6 4.0 - 15.0 %    Eosinophil% 0.0 0.0 - 8.0 %    Basophil% 0.3 0.0 - 1.9 %    Differential Method Automated    Basic metabolic panel    Collection Time: 08/03/17  4:59 AM   Result Value Ref Range    Sodium 138 136 - 145 mmol/L    Potassium  4.6 3.5 - 5.1 mmol/L    Chloride 105 95 - 110 mmol/L    CO2 24 23 - 29 mmol/L    Glucose 131 (H) 70 - 110 mg/dL    BUN, Bld 29 (H) 8 - 23 mg/dL    Creatinine 1.2 0.5 - 1.4 mg/dL    Calcium 9.2 8.7 - 10.5 mg/dL    Anion Gap 9 8 - 16 mmol/L    eGFR if African American >60 >60 mL/min/1.73 m^2    eGFR if non African American >60 >60 mL/min/1.73 m^2   Magnesium    Collection Time: 08/03/17  4:59 AM   Result Value Ref Range    Magnesium 1.8 1.6 - 2.6 mg/dL   Phosphorus    Collection Time: 08/03/17  4:59 AM   Result Value Ref Range    Phosphorus 3.7 2.7 - 4.5 mg/dL     ASSESSMENT   Left renal mass   POD 1 s/p L robotic partial nephrectomy    Plan    Doing very well. NORM leo, advance diet, encourage ambulation and IS use  Restarted home BP meds.  Cr back to baseline - add toradol.  Dispo anticipated tomorrow AM.

## 2017-08-03 NOTE — PLAN OF CARE
Cm completed the assessment with patient without difficulties.  Pt came from home, he lives with his wife-Esthela.  Pt is self care, drives and performs activities of daily living without complications.  PCP is DR. Bradshaw.  Pt said, he's borderline diabetic, he does not use a glucometer to check his blood sugar. Pt also verbalized, that he takes medication to keep his HBG A1C low. Pt denies dialysis and Coumadin.  Disposition:  Pt will discharge to home with family.  No needs identified at this time.       08/03/17 1005   Discharge Assessment   Assessment Type Discharge Planning Assessment   Confirmed/corrected address and phone number on facesheet? Yes   Assessment information obtained from? Patient   Type of Healthcare Directive Received (No POA/LW. Wife is next of kin)   Prior to hospitilization cognitive status: Alert/Oriented   Prior to hospitalization functional status: Independent   Current cognitive status: Alert/Oriented   Current Functional Status: Independent   Arrived From admitted as an inpatient   Lives With spouse   Able to Return to Prior Arrangements yes   Is patient able to care for self after discharge? Yes   How many people do you have in your home that can help with your care after discharge? 1   Who are your caregiver(s) and their phone number(s)? Esthela Tenorio - [phone] and son Toro    Patient's perception of discharge disposition home or selfcare   Readmission Within The Last 30 Days no previous admission in last 30 days   Patient currently being followed by outpatient case management? No   Patient currently receives home health services? No   Does the patient currently use HME? Yes   Patient currently receives private duty nursing? N/A   Equipment Currently Used at Home CPAP   Do you have any problems affording any of your prescribed medications? No   Is the patient taking medications as prescribed? yes  ([facility])   Do you have any financial concerns preventing you from  receiving the healthcare you need? No   Does the patient have transportation to healthcare appointments? Yes   Transportation Available car;family or friend will provide   On Dialysis? No   Does the patient receive services at the Coumadin Clinic? No   Are there any open cases? No   Discharge Plan A Home with family   Discharge Plan B Home with family   Patient/Family In Agreement With Plan yes

## 2017-08-03 NOTE — UM SECONDARY REVIEW
Physician Advisor External    Level of Care Issue    Approved Inpatient for 8/2/2017 per Dr. Ayoub at EHR

## 2017-08-03 NOTE — PLAN OF CARE
Problem: Patient Care Overview  Goal: Plan of Care Review  Patient remains free from fall or injury and pain controlled with oral pain medications.  Patient assisted to turn in bed.  Patient on post op antibiotics as ordered and continuous IVF.  Patient tolerating diet.  FABRICIO drain has bloody drainage noted and emptied as needed.  Petty catheter in place and urine is red-tinged with adequate output.  Plan of care reviewed with patient and spouse.

## 2017-08-03 NOTE — UM SECONDARY REVIEW
Physician Advisor External    Level of Care Issue    Approved Inpatient     IP approved for review of 8/2/17 per Dr Ayoub with EHR

## 2017-08-03 NOTE — PROGRESS NOTES
Patient arrived to unit  FABRICIO and Jovanny noted.  VSS.  Bedside report received from ABBY Castro.  Patient denies complaint of pain or discomfort at this time.  Belongings placed within reach.  Call light and bedside table within reach.  Family at bedside assisting patient with ice chips.  Patient and family  instructed on call light and instructed to call for assistance.

## 2017-08-03 NOTE — PLAN OF CARE
Problem: Patient Care Overview  Goal: Plan of Care Review  Outcome: Ongoing (interventions implemented as appropriate)  Patient averaging 1750ml on IS.  Hr 61 and 02 saturation 96% on ROOM AIR.  02 on standby.

## 2017-08-03 NOTE — PLAN OF CARE
Problem: Patient Care Overview  Goal: Plan of Care Review  Patient sats 98% on 2lpm/post checking place patient on RA, patient has CPAP@ bedside, doesn't wear any O2. I S done 2275ml x10bpm

## 2017-08-03 NOTE — PLAN OF CARE
Problem: Patient Care Overview  Goal: Plan of Care Review  Outcome: Ongoing (interventions implemented as appropriate)  Pt remains free from injury or falls. Vital signs stable throughout night on room air/ CPAP at night.  Positions self independently. Petty to gravity. No complaints of pain or nausea. Incisions x 3 clean and intact, FABRICIO drain x 1.  Bed in low locked position and call light within reach.  Will continue to monitor.

## 2017-08-04 ENCOUNTER — TELEPHONE (OUTPATIENT)
Dept: UROLOGY | Facility: CLINIC | Age: 67
End: 2017-08-04

## 2017-08-04 VITALS
SYSTOLIC BLOOD PRESSURE: 152 MMHG | HEART RATE: 62 BPM | WEIGHT: 243 LBS | BODY MASS INDEX: 34.02 KG/M2 | OXYGEN SATURATION: 97 % | DIASTOLIC BLOOD PRESSURE: 70 MMHG | RESPIRATION RATE: 16 BRPM | HEIGHT: 71 IN | TEMPERATURE: 98 F

## 2017-08-04 LAB
ANION GAP SERPL CALC-SCNC: 9 MMOL/L
BASOPHILS # BLD AUTO: 0.1 K/UL
BASOPHILS NFR BLD: 0.4 %
BODY FLUID SOURCE, CREATININE: NORMAL
BUN SERPL-MCNC: 36 MG/DL
CALCIUM SERPL-MCNC: 9.1 MG/DL
CHLORIDE SERPL-SCNC: 101 MMOL/L
CO2 SERPL-SCNC: 25 MMOL/L
CREAT FLD-MCNC: 1.1 MG/DL
CREAT SERPL-MCNC: 1.2 MG/DL
DIFFERENTIAL METHOD: ABNORMAL
EOSINOPHIL # BLD AUTO: 0.2 K/UL
EOSINOPHIL NFR BLD: 1.1 %
ERYTHROCYTE [DISTWIDTH] IN BLOOD BY AUTOMATED COUNT: 15 %
EST. GFR  (AFRICAN AMERICAN): >60 ML/MIN/1.73 M^2
EST. GFR  (NON AFRICAN AMERICAN): >60 ML/MIN/1.73 M^2
GLUCOSE SERPL-MCNC: 116 MG/DL
HCT VFR BLD AUTO: 33.6 %
HGB BLD-MCNC: 11.1 G/DL
LYMPHOCYTES # BLD AUTO: 3 K/UL
LYMPHOCYTES NFR BLD: 19.1 %
MAGNESIUM SERPL-MCNC: 1.7 MG/DL
MCH RBC QN AUTO: 30.1 PG
MCHC RBC AUTO-ENTMCNC: 32.9 G/DL
MCV RBC AUTO: 91 FL
MONOCYTES # BLD AUTO: 1.1 K/UL
MONOCYTES NFR BLD: 7.2 %
NEUTROPHILS # BLD AUTO: 11.3 K/UL
NEUTROPHILS NFR BLD: 72.2 %
PHOSPHATE SERPL-MCNC: 2.4 MG/DL
PLATELET # BLD AUTO: 237 K/UL
PMV BLD AUTO: 9.7 FL
POCT GLUCOSE: 132 MG/DL (ref 70–110)
POTASSIUM SERPL-SCNC: 4.1 MMOL/L
RBC # BLD AUTO: 3.68 M/UL
SODIUM SERPL-SCNC: 135 MMOL/L
WBC # BLD AUTO: 15.6 K/UL

## 2017-08-04 PROCEDURE — 82570 ASSAY OF URINE CREATININE: CPT

## 2017-08-04 PROCEDURE — 80048 BASIC METABOLIC PNL TOTAL CA: CPT

## 2017-08-04 PROCEDURE — 84100 ASSAY OF PHOSPHORUS: CPT

## 2017-08-04 PROCEDURE — 25000003 PHARM REV CODE 250: Performed by: UROLOGY

## 2017-08-04 PROCEDURE — 83735 ASSAY OF MAGNESIUM: CPT

## 2017-08-04 PROCEDURE — 63600175 PHARM REV CODE 636 W HCPCS: Performed by: UROLOGY

## 2017-08-04 PROCEDURE — 36415 COLL VENOUS BLD VENIPUNCTURE: CPT

## 2017-08-04 PROCEDURE — 85025 COMPLETE CBC W/AUTO DIFF WBC: CPT

## 2017-08-04 RX ORDER — SIMETHICONE 80 MG
80 TABLET,CHEWABLE ORAL 3 TIMES DAILY
Refills: 0 | COMMUNITY
Start: 2017-08-04 | End: 2017-10-02

## 2017-08-04 RX ORDER — DOCUSATE SODIUM 100 MG/1
100 CAPSULE, LIQUID FILLED ORAL 2 TIMES DAILY
Refills: 0 | COMMUNITY
Start: 2017-08-04 | End: 2018-06-19

## 2017-08-04 RX ADMIN — LOSARTAN POTASSIUM AND HYDROCHLOROTHIAZIDE 1 TABLET: 12.5; 5 TABLET ORAL at 08:08

## 2017-08-04 RX ADMIN — SIMETHICONE CHEW TAB 80 MG 80 MG: 80 TABLET ORAL at 05:08

## 2017-08-04 RX ADMIN — SODIUM CHLORIDE, SODIUM LACTATE, POTASSIUM CHLORIDE, AND CALCIUM CHLORIDE: .6; .31; .03; .02 INJECTION, SOLUTION INTRAVENOUS at 03:08

## 2017-08-04 RX ADMIN — ALLOPURINOL 300 MG: 100 TABLET ORAL at 08:08

## 2017-08-04 RX ADMIN — KETOROLAC TROMETHAMINE 15 MG: 30 INJECTION, SOLUTION INTRAMUSCULAR at 03:08

## 2017-08-04 RX ADMIN — FAMOTIDINE 20 MG: 20 TABLET ORAL at 08:08

## 2017-08-04 RX ADMIN — ATORVASTATIN CALCIUM 20 MG: 20 TABLET, FILM COATED ORAL at 08:08

## 2017-08-04 NOTE — DISCHARGE SUMMARY
[facility]  Urology  Discharge Summary      Patient Name: Carlos Tenorio  MRN: [id number]  Admission Date: 8/2/2017  Hospital Length of Stay: 2 days  Discharge Date and Time:  08/04/2017 8:21 AM  Attending Physician: Yovany Heart MD   Discharging Provider: Yovany Heart MD  Primary Care Physician: Roney Bradshaw MD    HPI:   Mr. Tenorio is a 68 yo M I had been following for hypogonadism, spermatocele and nephrolithiasis for which he had CT scan which found solid small renal mass adjacent to large left renal cyst   MRI confirmed enhancing solid mass: 2.2x2.5cm left solid heterogenous mid to upper pole exophytic just superior to his large lower pole cyst, within 7mm of cyst. He elected to proceed with robotic assisted laparoscopic partial nephrectomy.    Procedure(s) (LRB):  ROBOTIC ASSISTED LAPAROSCOPIC NEPHRECTOMY PARTIAL (Left)     Indwelling Lines/Drains at time of discharge:   None    Hospital Course   Uncomplicated surgery  Convalesced well on med surg floor  By POD 1 ambulatory voiding tolerating diet and minimal pain control  By POD 2 shellie reg diet, having bowel movement, ambulatory, doing well  FABRICIO drain removed. DCed home    Consults: none      Pending Diagnostic Studies:     None          Final Active Diagnoses:    Diagnosis Date Noted POA    PRINCIPAL PROBLEM:  Left renal mass [N28.89] 08/02/2017 Yes      Problems Resolved During this Admission:    Diagnosis Date Noted Date Resolved POA         Discharged Condition: good    Disposition: Home or Self Care    Follow Up:  Follow-up Information     Yovany Heart MD On 8/15/2017.    Specialty:  Urology  Why:  at 245. Already scheduled.  Contact information:  [address] [place] [address]  [address] [place] [id number] [phone]                 Patient Instructions:     Diet general     Activity as tolerated   Order Comments: Minimize strenuous activity x1-2 weeks. No limit to walking. OK to drive.     Lifting restrictions   Order Comments:  No lifting/pushing/pulling >10 lbs x 4 weeks     Other restrictions (specify):   Order Comments: Ok to resume baby aspirin. Hold fish oil x3d.     Call MD for:   Order Comments: Gross blood in urine, T>101     No dressing needed   Order Comments: On sutured incision sites.  Drain site does not need dressing once drainage stops     Change dressing (specify)   Order Comments: To drain site as needed for drainage. May initially use gauze/tape, then likely amenable to bandaid, and once drainage stops (average 3-4days) ok to leave open to air     Shower on day dressing removed (No bath)   Order Comments: Ok to shower. Pat incisions dry. No baths/soaking until incisions heal       Medications:  Reconciled Home Medications:   Current Discharge Medication List      START taking these medications    Details   docusate sodium (COLACE) 100 MG capsule Take 1 capsule (100 mg total) by mouth 2 (two) times daily.  Refills: 0      simethicone (MYLICON) 80 MG chewable tablet Take 1 tablet (80 mg total) by mouth 3 (three) times daily.  Refills: 0         CONTINUE these medications which have NOT CHANGED    Details   ACETAMINOPHEN (TYLENOL ARTHRITIS ORAL) Take 2 tablets by mouth continuous prn.       allopurinol (ZYLOPRIM) 300 MG tablet Take 1 tablet (300 mg total) by mouth once daily.  Qty: 30 tablet, Refills: 11    Associated Diagnoses: Recurrent kidney stones      atorvastatin (LIPITOR) 20 MG tablet Take 1 tablet (20 mg total) by mouth once daily.  Qty: 90 tablet, Refills: 4    Comments: **Patient requests 90 days supply**  Associated Diagnoses: Type 2 diabetes mellitus with hyperglycemia, without long-term current use of insulin      furosemide (LASIX) 40 MG tablet TAKE 1 TABLET BY MOUTH DAILY AS NEEDED  Qty: 90 tablet, Refills: 11    Comments: **Patient requests 90 days supply**      losartan-hydrochlorothiazide 50-12.5 mg (HYZAAR) 50-12.5 mg per tablet Take 1 tablet by mouth once daily.  Qty: 90 tablet, Refills: 3      metformin  (GLUCOPHAGE-XR) 500 MG 24 hr tablet TAKE 1 TABLET(500 MG) BY MOUTH EVERY DAY  Qty: 90 tablet, Refills: 3      vitamin D 1000 units Tab Take 185 mg by mouth once daily.      aspirin (ECOTRIN) 81 MG EC tablet Take 81 mg by mouth once daily.      fish oil-omega-3 fatty acids 300-1,000 mg capsule Take 2 g by mouth 3 (three) times daily.      salicylic acid 6 % Foam Apply 1 application topically every evening. To back  Qty: 70 g, Refills: 2         STOP taking these medications       testosterone cypionate (DEPOTESTOTERONE CYPIONATE) 200 mg/mL injection Comments:   Reason for Stopping:               Time spent on the discharge of patient: 30 minutes    Yovany Heart MD  Urology  [facility]

## 2017-08-04 NOTE — PROGRESS NOTES
Afternoon rounds made. Patient sitting up in chair. No pain complaints. Only requiring tylenol prn.  Voiding without leo. Tolerating regular diet. Passed some flatus. Foot numbness improving. Has been ambulatory most of the day. FABRICIO remains serosanguinous.  Anticipated discharge in the morning pending labs, drain output.

## 2017-08-04 NOTE — NURSING
PIV removed x2. FABRICIO drain removed, dressing placed. Discharge instructions given, rx's explained, pt and wife verbalized understanding. Pt safely escorted to vehicle via wheelchair.

## 2017-08-04 NOTE — PLAN OF CARE
Problem: Patient Care Overview  Goal: Plan of Care Review  Outcome: Ongoing (interventions implemented as appropriate)  Pt alert and oriented. Minimal pain reported. Safety precautions maintained. Saline locked, iv clean and dry. Petty out this Am. Tolerated well. No injuries and safety precautions maintained. Will continue to monitor.

## 2017-08-04 NOTE — PROGRESS NOTES
[facility] Urology Progress Note    Carlos Tenorio is a 67 y.o. male POD 2 s/p L robotic partial nephrectomy     Did well overnight  Ambulatory and passing flatus yesterday. + BM this morning  Tato reg diet no N/V  Minimal pain - tylenol only  Denies f/c/n/cv  Good uop >2L  FABRICIO output 90cc/24 hours (decd from 120/24h)  AFVSS        ROS: A comprehensive 10 system review was performed and is negative except as noted above in HPI    PHYSICAL EXAM:    Vitals:    08/04/17 0400   BP: (!) 141/72   Pulse: (!) 56   Resp: 18   Temp: 97.8 °F (36.6 °C)     Body mass index is 33.89 kg/m². @FLOWAMB(14)@ @FLOWAMB(11)@       General: Alert, cooperative, no distress, appears stated age   Head: Normocephalic, without obvious abnormality, atraumatic   Eyes: PERRL, conjunctiva/corneas clear   Lungs: Respirations unlabored   Heart: Warm and well perfused   Abdomen: soft NT ND lap incisions CDI, FABRICIO serosang  Extremities: Extremities normal, atraumatic, no cyanosis or edema   Skin: Skin color, texture, turgor normal, no rashes or lesions   Psych: Appropriate   Neurologic: Non-focal     CBC auto differential    Collection Time: 08/04/17  6:51 AM   Result Value Ref Range    WBC 15.60 (H) 3.90 - 12.70 K/uL    RBC 3.68 (L) 4.60 - 6.20 M/uL    Hemoglobin 11.1 (L) 14.0 - 18.0 g/dL    Hematocrit 33.6 (L) 40.0 - 54.0 %    MCV 91 82 - 98 fL    MCH 30.1 27.0 - 31.0 pg    MCHC 32.9 32.0 - 36.0 g/dL    RDW 15.0 (H) 11.5 - 14.5 %    Platelets 237 150 - 350 K/uL    MPV 9.7 9.2 - 12.9 fL    Gran # 11.3 (H) 1.8 - 7.7 K/uL    Lymph # 3.0 1.0 - 4.8 K/uL    Mono # 1.1 (H) 0.3 - 1.0 K/uL    Eos # 0.2 0.0 - 0.5 K/uL    Baso # 0.10 0.00 - 0.20 K/uL    Gran% 72.2 38.0 - 73.0 %    Lymph% 19.1 18.0 - 48.0 %    Mono% 7.2 4.0 - 15.0 %    Eosinophil% 1.1 0.0 - 8.0 %    Basophil% 0.4 0.0 - 1.9 %    Differential Method Automated        ASSESSMENT   POD 2 s/p L robotic partial nephrectomy for L renal mass    Plan    Doing very well. Discharge anticipated late morning.  H/H stable. Chemistry and FABRICIO cr pending.  If FABRICIO Cr equivalent to serum, DC FABRICIO and DC home.

## 2017-08-04 NOTE — PLAN OF CARE
08/04/17 0844   Final Note   Assessment Type Final Discharge Note   Discharge Disposition Home   Discharge planning education complete? Yes   Hospital Follow Up  Appt(s) scheduled? Yes

## 2017-08-04 NOTE — TELEPHONE ENCOUNTER
Spoke with pt's wife. Wife stated she went to pharmacy and no meds were called in. After checking pt's discharge - medications list, pt to get OTC meds, Colace and Mylicon. Wife checked d/c papers and instructions for those meds on the paperwork. Wife verbalized understanding.

## 2017-08-04 NOTE — PLAN OF CARE
Problem: Patient Care Overview  Goal: Plan of Care Review  Outcome: Ongoing (interventions implemented as appropriate)  Pt remains free from injury or falls. Vital signs stable throughout night on room air/ CPAP at night.  Positions self independently. Petty to gravity. No complaints of pain or nausea. Incisions x  4 clean and intact, FABRICIO drain x 1.  Bed in low locked position and call light within reach.  Will continue to monitor

## 2017-08-06 LAB
BLD PROD TYP BPU: NORMAL
BLD PROD TYP BPU: NORMAL
BLOOD UNIT EXPIRATION DATE: NORMAL
BLOOD UNIT EXPIRATION DATE: NORMAL
BLOOD UNIT TYPE CODE: 6200
BLOOD UNIT TYPE CODE: 6200
BLOOD UNIT TYPE: NORMAL
BLOOD UNIT TYPE: NORMAL
CODING SYSTEM: NORMAL
CODING SYSTEM: NORMAL
DISPENSE STATUS: NORMAL
DISPENSE STATUS: NORMAL
NUM UNITS TRANS PACKED RBC: NORMAL
NUM UNITS TRANS PACKED RBC: NORMAL

## 2017-08-08 ENCOUNTER — PATIENT OUTREACH (OUTPATIENT)
Dept: ADMINISTRATIVE | Facility: CLINIC | Age: 67
End: 2017-08-08

## 2017-08-08 ENCOUNTER — TELEPHONE (OUTPATIENT)
Dept: FAMILY MEDICINE | Facility: CLINIC | Age: 67
End: 2017-08-08

## 2017-08-08 NOTE — TELEPHONE ENCOUNTER
Received message stating patient was discharged from hospital on 8-4-17 post partial nephrectomy, hospital follow up appointment needed. Call placed to patient who states he has a hospital follow up with Dr. Alcazar on 8-15-17 and does not wish to schedule follow up with PCP at this time. States he will have Dr. Alcazar e-mail his findings to Dr. Bradshaw instead. Writer encouraged patient to contact office is he changes his mind and wishes to schedule hospital follow up. Patient verbalized understanding.

## 2017-08-08 NOTE — PATIENT INSTRUCTIONS
Laparoscopic Nephroureterectomy  Laparoscopic nephroureterectomy is surgery to remove a kidney and ureter. This surgery may be needed for cancer in a kidney or ureter. Your two kidneys are organs that filter your blood. They remove waste chemicals and extra water to make urine. Ureters are tubes that carry urine from your kidneys to your bladder.  For the surgery, a tool called a laparoscope (scope) is used. This is a thin, lighted tube with a camera on the end. The scope lets the provider work through a few small cuts (incisions). In most cases, after surgery your body can still work well with only one healthy kidney and ureter.    Getting ready for surgery  Follow any instructions from your healthcare provider.  Tell your provider about any medicines you are taking. You may need to stop taking all or some of these before the test. This includes:  · All prescription medicines  · Blood-thinning medicines (anticoagulants)  · Over-the-counter medicines such as aspirin or ibuprofen  · Street drugs  · Herbs, vitamins, and other supplements  In addition:  · Do not eat or drink during the 8 hours before your surgery. This includes coffee, water, gum, and mints. (If you have been instructed to take medicines, take them with a small sip of water.)  · Prepare your bowel for surgery 1 to 2 days before the surgery, if you have been told to. You may need to limit your diet to only clear liquids. You may also be asked to take laxatives or to give yourself an enema. Follow your healthcare providers instructions.  The day of surgery  The surgery takes 3 to 5 hours. After surgery, you will stay in the hospital for 1 to 3 nights.  Before the surgery begins:  · An IV (intravenous) line is put into a vein in your arm or hand. This line supplies fluids and medicines (such as antibiotics).  · You may be given medicine to prevent blood clots.  · To keep you free of pain during the surgery, youre given general anesthesia. This  medicine puts you into a state like a deep sleep through the surgery. A tube may be put into your throat to help you breathe.  During the surgery:  · The provider makes a few small incisions and one slightly larger incision in your belly (abdomen).  · The scope is placed through an incision. It sends live pictures of the inside of your abdomen to a video screen.  · Your abdomen is filled with gas. This makes space for the provider to see and work.  · Using tools placed through the incisions, the kidney and ureter are prepared for removal. The small piece of tissue where the ureter connects to the bladder (bladder cuff) is detached.  · The kidney, ureter, and bladder cuff are removed through the larger incision. Nearby lymph nodes may be removed as well.  · When the surgery is complete, all tools are removed. The incisions are closed with stitches or staples.  · A thin tube (Petty catheter) is placed in your bladder. This will drain your urine while your bladder heals.   Note: The provider will begin with laparoscopy. But he or she may need to change to open surgery for safety reasons. Open surgery is done using an incision in your abdomen or side. Youll be told more about this possibility before surgery.  Recovering in the hospital  After the surgery, you will be taken to a recovery room. Here, youll wake up from the anesthesia. You may feel sleepy. You may have an upset stomach (nausea). If a breathing tube was used, your throat may be sore at first. When you are ready, you will be taken to your hospital room. While in the hospital:  · You will be given medicines to manage your pain. Let your provider know if your pain is not going away.  · Youll first receive IV fluids. In a day or so, youll start on a liquid diet. Youll then slowly return to a normal diet.  · Youll be taught ways to cough and breathe that can keep your lungs clear and prevent pneumonia.  · The Petty catheter may be removed while youre in  the hospital. If not, youll be taught how to care for it at home.  Recovering at home  After your hospital stay, you will be released to an adult family member or friend. Have someone stay with you for the next few days, to help care for you. Recovery time varies for each person. Your provider will tell you when you can return to your normal routine. Until then, follow the instructions you have been given. Make sure to:  · Take all medicines as directed  · Care for your incisions and catheter as instructed  · Follow your providers guidelines for showering. Avoid swimming, bathing, using a hot tub, and other activities that cover the incision with water until your provider says its OK.  · Avoid heavy lifting and strenuous activities as directed  · Avoid driving until your provider says its OK. Do not drive if youre taking medicines that make you drowsy or sleepy.  · Walk at least a few times a day. Increase your pace and distance, as you feel able.  · Avoid straining to pass stool. If needed, take stool softeners as directed by your provider.  · Drink plenty of water. This helps prevent urine odor and dehydration. Follow any special diet instructions from your provider.     Call your provider if you have any of the following:  · Chest pain or trouble breathing (call 911 or your local emergency number)  · Fever of 100.4°F or higher  · Symptoms of infection at incision site. These include increased redness or swelling, warmth, more pain, or bad-smelling drainage  · Bloody urine or drainage from the catheter that is dark red or has clots (a small amount of blood is normal)  · No drainage from the catheter for more than 4 hours  · The catheter comes out of your bladder  · Pain that can't be controlled with medicines  · Pain or swelling in your legs   Follow-up  Youll have follow-up visits so your provider can check how well youre healing. If your stitches, staples, or catheter need to be removed, this will likely  be done in 7 days. During follow-up visits, tests may be done to make sure the cancer has not returned.  Risks and possible complications  All procedures have some risk. The risks of laparoscopic nephroureterectomy include:  · Bleeding (may require a blood transfusion)  · Infection  · Blood clots  · Hernia at the incision sites  · Damage to nearby nerves, blood vessels, soft tissues, or organs  · Cancer returning or tumor seeding (spillage of tumor cells that can grow into new tumors)  · Problems with the remaining kidney, which could lead to kidney failure  · Heart attack or stroke  · Risks of anesthesia (the anesthesiologist will discuss these with you)   Date Last Reviewed: 5/31/2015  © 3045-2004 [employer]. [address] [place] [address], [place] [address]. All rights reserved. This information is not intended as a substitute for professional medical care. Always follow your healthcare professional's instructions.

## 2017-08-15 ENCOUNTER — OFFICE VISIT (OUTPATIENT)
Dept: UROLOGY | Facility: CLINIC | Age: 67
End: 2017-08-15
Payer: MEDICARE

## 2017-08-15 VITALS
HEART RATE: 62 BPM | HEIGHT: 71 IN | TEMPERATURE: 98 F | RESPIRATION RATE: 18 BRPM | SYSTOLIC BLOOD PRESSURE: 128 MMHG | BODY MASS INDEX: 34.57 KG/M2 | DIASTOLIC BLOOD PRESSURE: 75 MMHG | WEIGHT: 246.94 LBS

## 2017-08-15 DIAGNOSIS — C64.2 RENAL CELL CARCINOMA, LEFT: Primary | ICD-10-CM

## 2017-08-15 DIAGNOSIS — E29.1 HYPOGONADISM IN MALE: ICD-10-CM

## 2017-08-15 DIAGNOSIS — R97.20 ELEVATED PSA: ICD-10-CM

## 2017-08-15 PROCEDURE — 99999 PR PBB SHADOW E&M-EST. PATIENT-LVL IV: CPT | Mod: PBBFAC,,, | Performed by: UROLOGY

## 2017-08-15 PROCEDURE — 99024 POSTOP FOLLOW-UP VISIT: CPT | Mod: ,,, | Performed by: UROLOGY

## 2017-08-15 PROCEDURE — 99214 OFFICE O/P EST MOD 30 MIN: CPT | Mod: PBBFAC,PO | Performed by: UROLOGY

## 2017-08-15 RX ORDER — TESTOSTERONE CYPIONATE 200 MG/ML
200 INJECTION, SOLUTION INTRAMUSCULAR
Qty: 13 ML | Refills: 0 | Status: SHIPPED | OUTPATIENT
Start: 2017-08-15 | End: 2018-03-12 | Stop reason: SDUPTHER

## 2017-08-15 NOTE — PATIENT INSTRUCTIONS
1. Sept 1st - ok to lift/push/pul >10 lbs  2. Sept 1st - ok to resume testosterone (200mg every 2 weeks)  3. After 2nd injection, approx 10-12 days after, get bloodwork and I will call with the results

## 2017-08-15 NOTE — PROGRESS NOTES
"[facility] Urology Progress Note    Carlos Tenorio is a 67 y.o. male who presents for post op follow up s/p L robotic partial nephrectomy on 8/2/17.    He had an upper pole 2-3cm exophytic enhancing renal mass, adjacent to larger simple renal cyst, which required concurrent L renal cyst decocrtication at time of partial nephrectomy.  He did well postoperatively and was DCed home POD 2. Clamp time 16 minutes, Cr 1.2 at time of discharge.  Pathology: [id number] clear cell RCC. 2.5cm. Negative margins. Grade 2. No sarcomatoid or rhabdoid features. Benign renal cyst  Has done well since surgery - normal appetite and bowel habits.  Energy level still poor, as we were in the process of resuming his TRT when renal mass was discovered, which was found on workup for previous LINSEY from episode of severe colitis and dehydration. His psa was also checked at that time of his colon infection and found to be elevated at 7.5, which further delayed TRT. Recheck in June was 2.2 with 19% free, though TRT deferred until recovery from partial nephrectomy.      ROS: A comprehensive 10 system review was performed and is negative except as noted above in HPI    PHYSICAL EXAM:    Vitals:    08/15/17 1450   BP: 128/75   Pulse: 62   Resp: 18   Temp: 98.1 °F (36.7 °C)     Body mass index is 34.44 kg/m². Weight: 112 kg (246 lb 14.6 oz) Height: 5' 11" (180.3 cm)       General: Alert, cooperative, no distress, appears stated age   Head: Normocephalic, without obvious abnormality, atraumatic   Eyes: PERRL, conjunctiva/corneas clear   Lungs: Respirations unlabored   Heart: Warm and well perfused   Abdomen: soft NT ND, lap incisions CDI and healing well, FABRICIO drain site granulating in well  Extremities: Extremities normal, atraumatic, no cyanosis or edema   Skin: Skin color, texture, turgor normal, no rashes or lesions   Psych: Appropriate   Neurologic: Non-focal       ASSESSMENT   1. Renal cell carcinoma, left     2. Hypogonadism in male  Testosterone "    CBC auto differential   3. History of elevated psa Prostate Specific Antigen, Diagnostic       Plan    He is doing well s/p L robotic partial nephrectomy (with renal cyst decortication). Advised in about another 2 weeks, for 4 weeks postop, his lifting restrictions would be released and he could resume his TRT at that time at 200mg Q2 weeks. Advised about 10d after second injection I would like him to get labs and I would chart check results and review them with him for any potential dosing adjustments or further workup. For his small T1a RCC, advised a baseline CT in 6-12 months, and then can likely follow with labs/CXR and scan as indicated, though is reasonable to surveil for 3 years. At time of CXR would also get KUB as screening measure for his kidney stones.  RTC 6 months, though in interim will review TRT labs

## 2017-08-16 NOTE — PHYSICIAN QUERY
PT Name: Carlos Tenorio  MR #: [id number]    Physician Query Form - Pathology Findings Clarification     CDS/: Kaylee Shelton               Contact information: [email or web address]    This form is a permanent document in the medical record.     Query Date: August 16, 2017      By submitting this query, we are merely seeking further clarification of documentation.  Please utilize your independent clinical judgment when addressing the question(s) below.      The medical record contains the following:     Findings Supporting Clinical Information Location in Medical Record   FINAL PATHOLOGIC DIAGNOSIS  1. Mass, left renal, partial nephrectomy:  Clear cell renal cell carcinoma, 25 mm (pathologic staging: pT1a NX).  Negative surgical margins.  Path report      Please document the clinical significance of the Pathologists findings of __renal cell carcinoma___.          [ x ] I agree with the Pathology Findings        [  ] I do not agree with the Pathology Findings        [  ] Clinically Insignificant        [  ] Clinically Undetermined        [  ] Other/Clarification of Findings: ______________________________________________    Please document in your progress notes daily for the duration of treatment until resolved and include in your discharge summary.

## 2017-08-17 RX ORDER — HYDROCHLOROTHIAZIDE 25 MG/1
TABLET ORAL
Qty: 90 TABLET | Refills: 4 | Status: SHIPPED | OUTPATIENT
Start: 2017-08-17 | End: 2017-10-02

## 2017-08-23 ENCOUNTER — TELEPHONE (OUTPATIENT)
Dept: UROLOGY | Facility: CLINIC | Age: 67
End: 2017-08-23

## 2017-08-23 NOTE — TELEPHONE ENCOUNTER
Spoke with pt. Pt notified that the PA for his medication has been filled out and will be faxed to pharmacy as soon as Dr Heart signs the form. Pt verbalized understanding.

## 2017-09-12 ENCOUNTER — LAB VISIT (OUTPATIENT)
Dept: LAB | Facility: HOSPITAL | Age: 67
End: 2017-09-12
Attending: UROLOGY
Payer: MEDICARE

## 2017-09-12 DIAGNOSIS — E29.1 HYPOGONADISM IN MALE: ICD-10-CM

## 2017-09-12 DIAGNOSIS — R97.20 ELEVATED PSA: ICD-10-CM

## 2017-09-12 LAB
BASOPHILS # BLD AUTO: 0 K/UL
BASOPHILS NFR BLD: 0.4 %
COMPLEXED PSA SERPL-MCNC: 4.5 NG/ML
DIFFERENTIAL METHOD: ABNORMAL
EOSINOPHIL # BLD AUTO: 0.3 K/UL
EOSINOPHIL NFR BLD: 2.6 %
ERYTHROCYTE [DISTWIDTH] IN BLOOD BY AUTOMATED COUNT: 15.7 %
HCT VFR BLD AUTO: 38.7 %
HGB BLD-MCNC: 12.8 G/DL
LYMPHOCYTES # BLD AUTO: 3 K/UL
LYMPHOCYTES NFR BLD: 29.8 %
MCH RBC QN AUTO: 30.8 PG
MCHC RBC AUTO-ENTMCNC: 32.9 G/DL
MCV RBC AUTO: 94 FL
MONOCYTES # BLD AUTO: 0.8 K/UL
MONOCYTES NFR BLD: 8 %
NEUTROPHILS # BLD AUTO: 5.9 K/UL
NEUTROPHILS NFR BLD: 59.2 %
PLATELET # BLD AUTO: 292 K/UL
PMV BLD AUTO: 10.3 FL
RBC # BLD AUTO: 4.13 M/UL
TESTOST SERPL-MCNC: 545 NG/DL
WBC # BLD AUTO: 10 K/UL

## 2017-09-12 PROCEDURE — 84153 ASSAY OF PSA TOTAL: CPT

## 2017-09-12 PROCEDURE — 36415 COLL VENOUS BLD VENIPUNCTURE: CPT

## 2017-09-12 PROCEDURE — 84403 ASSAY OF TOTAL TESTOSTERONE: CPT

## 2017-09-12 PROCEDURE — 85025 COMPLETE CBC W/AUTO DIFF WBC: CPT

## 2017-10-02 ENCOUNTER — INITIAL CONSULT (OUTPATIENT)
Dept: DERMATOLOGY | Facility: CLINIC | Age: 67
End: 2017-10-02
Payer: MEDICARE

## 2017-10-02 VITALS — BODY MASS INDEX: 34.44 KG/M2 | WEIGHT: 246 LBS | HEIGHT: 71 IN

## 2017-10-02 DIAGNOSIS — L57.0 ACTINIC KERATOSES: ICD-10-CM

## 2017-10-02 DIAGNOSIS — L70.0 ACNE VULGARIS: ICD-10-CM

## 2017-10-02 DIAGNOSIS — L21.9 SEBORRHEIC DERMATITIS: Primary | ICD-10-CM

## 2017-10-02 PROCEDURE — 1159F MED LIST DOCD IN RCRD: CPT | Mod: ,,, | Performed by: DERMATOLOGY

## 2017-10-02 PROCEDURE — 99202 OFFICE O/P NEW SF 15 MIN: CPT | Mod: 25,S$PBB,, | Performed by: DERMATOLOGY

## 2017-10-02 PROCEDURE — 99212 OFFICE O/P EST SF 10 MIN: CPT | Mod: PBBFAC,PO,25 | Performed by: DERMATOLOGY

## 2017-10-02 PROCEDURE — 17003 DESTRUCT PREMALG LES 2-14: CPT | Mod: S$PBB,,, | Performed by: DERMATOLOGY

## 2017-10-02 PROCEDURE — 17000 DESTRUCT PREMALG LESION: CPT | Mod: PBBFAC,PO | Performed by: DERMATOLOGY

## 2017-10-02 PROCEDURE — 1126F AMNT PAIN NOTED NONE PRSNT: CPT | Mod: ,,, | Performed by: DERMATOLOGY

## 2017-10-02 PROCEDURE — 17003 DESTRUCT PREMALG LES 2-14: CPT | Mod: PBBFAC,PO | Performed by: DERMATOLOGY

## 2017-10-02 PROCEDURE — 99999 PR PBB SHADOW E&M-EST. PATIENT-LVL II: CPT | Mod: PBBFAC,,, | Performed by: DERMATOLOGY

## 2017-10-02 PROCEDURE — 17000 DESTRUCT PREMALG LESION: CPT | Mod: S$PBB,,, | Performed by: DERMATOLOGY

## 2017-10-02 RX ORDER — CICLOPIROX OLAMINE 7.7 MG/G
CREAM TOPICAL
Qty: 30 G | Refills: 2 | Status: SHIPPED | OUTPATIENT
Start: 2017-10-02 | End: 2018-12-17 | Stop reason: ALTCHOICE

## 2017-10-02 RX ORDER — HYDROCORTISONE 25 MG/G
CREAM TOPICAL
Qty: 28 G | Refills: 1 | Status: ON HOLD | OUTPATIENT
Start: 2017-10-02 | End: 2020-11-19 | Stop reason: CLARIF

## 2017-10-02 RX ORDER — CLINDAMYCIN PHOSPHATE 11.9 MG/ML
SOLUTION TOPICAL
Qty: 60 ML | Refills: 2 | Status: SHIPPED | OUTPATIENT
Start: 2017-10-02 | End: 2020-07-23

## 2017-10-02 NOTE — PATIENT INSTRUCTIONS

## 2017-10-02 NOTE — LETTER
October 2, 2017      Roney Bradshaw MD  [address]  [place] [address]           [place] [facility]  [address]  [place] [address]  Phone: [phone]          Patient: Carlos Tenorio   MR Number: [id number]   YOB: 1950   Date of Visit: 10/2/2017       Dear Dr. Roney Bradshaw:    Thank you for referring Carlos Tenorio to me for evaluation. Attached you will find relevant portions of my assessment and plan of care.    If you have questions, please do not hesitate to call me. I look forward to following Carlos Tenorio along with you.    Sincerely,    Emily Wells MD    Enclosure  CC:  No Recipients    If you would like to receive this communication electronically, please contact [email or web address] or [phone] to request more information on [employer] access.    For providers and/or their staff who would like to refer a patient to [facility], please contact us through our one-stop-shop provider referral line, [facility] , at [phone].    If you feel you have received this communication in error or would no longer like to receive these types of communications, please e-mail [email or web address]

## 2017-10-02 NOTE — PROGRESS NOTES
Subjective:       Patient ID:  Carlos Tenorio is a 67 y.o. male who presents for   Chief Complaint   Patient presents with    Spot     back    Lesion     arms, scalp     Initial visit  C/o lifelong cystic acne, back, worse in summer when hot and sweating  H/o HTN, dyslipidemia, DM2   Recently started testosterone injections  Previously under care Dr Aguiar, no antibiotherapy  Treated with sal acid foam, panoxyl wash  Foam no longer covered  Didn't help much          Spot  - Initial  Affected locations: back  Duration: 50 years  Signs / symptoms: tender (flares during the summer)  Severity: mild  Timing: constant  Aggravated by: nothing  Treatments tried: Sacylic acid foam.    Lesion  - Initial  Affected locations: scalp, right arm and left arm  Duration: years.  Signs / symptoms: redness and dryness  Severity: mild  Timing: constant  Aggravated by: nothing  Relieving factors/Treatments tried: nothing        Review of Systems   Constitutional: Negative for fever, chills, weight loss, weight gain and night sweats.   Skin: Positive for activity-related sunscreen use and wears hat. Negative for daily sunscreen use.   Hematologic/Lymphatic: Does not bruise/bleed easily.        Objective:    Physical Exam   Constitutional: He appears well-developed and well-nourished. No distress.   Neurological: He is alert and oriented to person, place, and time. He is not disoriented.   Psychiatric: He has a normal mood and affect.   Skin:   Areas Examined (abnormalities noted in diagram):   Scalp / Hair Palpated and Inspected  Head / Face Inspection Performed  Neck Inspection Performed  Chest / Axilla Inspection Performed  Abdomen Inspection Performed  Back Inspection Performed  RUE Inspected  LUE Inspection Performed  Nails and Digits Inspection Performed                   Diagram Legend     Erythematous scaling macule/papule c/w actinic keratosis       Vascular papule c/w angioma      Pigmented verrucoid papule/plaque c/w  seborrheic keratosis      Yellow umbilicated papule c/w sebaceous hyperplasia      Irregularly shaped tan macule c/w lentigo     1-2 mm smooth white papules consistent with Milia      Movable subcutaneous cyst with punctum c/w epidermal inclusion cyst      Subcutaneous movable cyst c/w pilar cyst      Firm pink to brown papule c/w dermatofibroma      Pedunculated fleshy papule(s) c/w skin tag(s)      Evenly pigmented macule c/w junctional nevus     Mildly variegated pigmented, slightly irregular-bordered macule c/w mildly atypical nevus      Flesh colored to evenly pigmented papule c/w intradermal nevus       Pink pearly papule/plaque c/w basal cell carcinoma      Erythematous hyperkeratotic cursted plaque c/w SCC      Surgical scar with no sign of skin cancer recurrence      Open and closed comedones      Inflammatory papules and pustules      Verrucoid papule consistent consistent with wart     Erythematous eczematous patches and plaques     Dystrophic onycholytic nail with subungual debris c/w onychomycosis     Umbilicated papule    Erythematous-base heme-crusted tan verrucoid plaque consistent with inflamed seborrheic keratosis     Erythematous Silvery Scaling Plaque c/w Psoriasis     See annotation      Assessment / Plan:        Seborrheic dermatitis  -     sulfacetamide sod-sulfur-urea 10-4-10 % Clsr; Use to wash face daily  Dispense: 473 mL; Refill: 2  -     ciclopirox (LOPROX) 0.77 % Crea; Thin film to AA onface daily PRN flare  Dispense: 30 g; Refill: 2  -     hydrocortisone 2.5 % cream; Thin film to AA onface daily PRN flare; use short term only  Dispense: 28 g; Refill: 1    Acne vulgaris  Continue BP wash  Consider doxy x 14 days PRN flare  Currently mild, trial topicals  Recent start testosterone injections, may flare issue  -     clindamycin (CLEOCIN T) 1 % external solution; AAA back BID PRN flare  Dispense: 60 mL; Refill: 2    Actinic keratoses  Cryosurgery Procedure Note    Verbal consent from the  patient is obtained and the patient is aware of the precancerous quality and need for treatment of these lesions. Liquid nitrogen cryosurgery is applied to the 10 actinic keratoses, as detailed in the physical exam, to produce a freeze injury. The patient is aware that blisters may form and is instructed on wound care with gentle cleansing and use of vaseline ointment to keep moist until healed. The patient is supplied a handout on cryosurgery and is instructed to call if lesions do not completely resolve.    Patient instructed in importance in daily sun protection of at least spf 30. Sun avoidance and topical protection discussed.   Recommend Elta MD (physician office) COTZ sensitive (available online) for daily use on face and neck.  Patient encouraged to wear hat for all outdoor exposure.   Also discussed sun protective clothing.         Return in about 3 months (around 1/2/2018).

## 2017-11-22 ENCOUNTER — PATIENT MESSAGE (OUTPATIENT)
Dept: FAMILY MEDICINE | Facility: CLINIC | Age: 67
End: 2017-11-22

## 2017-12-20 ENCOUNTER — TELEPHONE (OUTPATIENT)
Dept: UROLOGY | Facility: CLINIC | Age: 67
End: 2017-12-20

## 2017-12-20 DIAGNOSIS — R97.20 ELEVATED PSA: ICD-10-CM

## 2017-12-20 DIAGNOSIS — E29.1 HYPOGONADISM IN MALE: Primary | ICD-10-CM

## 2017-12-20 NOTE — TELEPHONE ENCOUNTER
Spoke with patient, patient appt r/s to Feb 2,2018. Patient stated that you wanted a testosterone on him last Testosterone lab was 09/12/17, which per your ordered was abt 10days after an injection. Patient wanting to know if he needs testosterone done before and if he was able to get this done on Jan 19,2018 with other labs .

## 2018-01-18 ENCOUNTER — LAB VISIT (OUTPATIENT)
Dept: LAB | Facility: HOSPITAL | Age: 68
End: 2018-01-18
Attending: FAMILY MEDICINE
Payer: MEDICARE

## 2018-01-18 DIAGNOSIS — R97.20 ELEVATED PSA: ICD-10-CM

## 2018-01-18 DIAGNOSIS — E11.65 TYPE 2 DIABETES MELLITUS WITH HYPERGLYCEMIA, WITHOUT LONG-TERM CURRENT USE OF INSULIN: ICD-10-CM

## 2018-01-18 DIAGNOSIS — E29.1 HYPOGONADISM IN MALE: ICD-10-CM

## 2018-01-18 LAB
ANION GAP SERPL CALC-SCNC: 10 MMOL/L
BASOPHILS # BLD AUTO: 0.1 K/UL
BASOPHILS # BLD AUTO: 0.1 K/UL
BASOPHILS NFR BLD: 0.7 %
BASOPHILS NFR BLD: 0.7 %
BUN SERPL-MCNC: 24 MG/DL
CALCIUM SERPL-MCNC: 9.8 MG/DL
CHLORIDE SERPL-SCNC: 104 MMOL/L
CO2 SERPL-SCNC: 27 MMOL/L
COMPLEXED PSA SERPL-MCNC: 4.9 NG/ML
CREAT SERPL-MCNC: 1.4 MG/DL
DIFFERENTIAL METHOD: ABNORMAL
DIFFERENTIAL METHOD: ABNORMAL
EOSINOPHIL # BLD AUTO: 0.2 K/UL
EOSINOPHIL # BLD AUTO: 0.2 K/UL
EOSINOPHIL NFR BLD: 1.7 %
EOSINOPHIL NFR BLD: 1.7 %
ERYTHROCYTE [DISTWIDTH] IN BLOOD BY AUTOMATED COUNT: 16 %
ERYTHROCYTE [DISTWIDTH] IN BLOOD BY AUTOMATED COUNT: 16 %
EST. GFR  (AFRICAN AMERICAN): 60 ML/MIN/1.73 M^2
EST. GFR  (NON AFRICAN AMERICAN): 52 ML/MIN/1.73 M^2
GLUCOSE SERPL-MCNC: 91 MG/DL
HCT VFR BLD AUTO: 43.8 %
HCT VFR BLD AUTO: 43.8 %
HGB BLD-MCNC: 14.3 G/DL
HGB BLD-MCNC: 14.3 G/DL
LYMPHOCYTES # BLD AUTO: 4.7 K/UL
LYMPHOCYTES # BLD AUTO: 4.7 K/UL
LYMPHOCYTES NFR BLD: 35.6 %
LYMPHOCYTES NFR BLD: 35.6 %
MCH RBC QN AUTO: 29.1 PG
MCH RBC QN AUTO: 29.1 PG
MCHC RBC AUTO-ENTMCNC: 32.6 G/DL
MCHC RBC AUTO-ENTMCNC: 32.6 G/DL
MCV RBC AUTO: 89 FL
MCV RBC AUTO: 89 FL
MONOCYTES # BLD AUTO: 1.1 K/UL
MONOCYTES # BLD AUTO: 1.1 K/UL
MONOCYTES NFR BLD: 8.5 %
MONOCYTES NFR BLD: 8.5 %
NEUTROPHILS # BLD AUTO: 7 K/UL
NEUTROPHILS # BLD AUTO: 7 K/UL
NEUTROPHILS NFR BLD: 53.5 %
NEUTROPHILS NFR BLD: 53.5 %
PLATELET # BLD AUTO: 275 K/UL
PLATELET # BLD AUTO: 275 K/UL
PMV BLD AUTO: 10.6 FL
PMV BLD AUTO: 10.6 FL
POTASSIUM SERPL-SCNC: 4 MMOL/L
RBC # BLD AUTO: 4.91 M/UL
RBC # BLD AUTO: 4.91 M/UL
SODIUM SERPL-SCNC: 141 MMOL/L
WBC # BLD AUTO: 13.1 K/UL
WBC # BLD AUTO: 13.1 K/UL

## 2018-01-18 PROCEDURE — 85025 COMPLETE CBC W/AUTO DIFF WBC: CPT

## 2018-01-18 PROCEDURE — 80048 BASIC METABOLIC PNL TOTAL CA: CPT

## 2018-01-18 PROCEDURE — 84403 ASSAY OF TOTAL TESTOSTERONE: CPT

## 2018-01-18 PROCEDURE — 83036 HEMOGLOBIN GLYCOSYLATED A1C: CPT

## 2018-01-18 PROCEDURE — 36415 COLL VENOUS BLD VENIPUNCTURE: CPT

## 2018-01-18 PROCEDURE — 84153 ASSAY OF PSA TOTAL: CPT

## 2018-01-19 LAB
ESTIMATED AVG GLUCOSE: 120 MG/DL
HBA1C MFR BLD HPLC: 5.8 %
TESTOST SERPL-MCNC: 980 NG/DL

## 2018-01-25 ENCOUNTER — DOCUMENTATION ONLY (OUTPATIENT)
Dept: FAMILY MEDICINE | Facility: CLINIC | Age: 68
End: 2018-01-25

## 2018-01-25 NOTE — PROGRESS NOTES
Pre-Visit Chart Review  For Appointment Scheduled on 01/26/2018    Health Maintenance Due   Topic Date Due    Influenza Vaccine  08/01/2017

## 2018-01-26 ENCOUNTER — OFFICE VISIT (OUTPATIENT)
Dept: FAMILY MEDICINE | Facility: CLINIC | Age: 68
End: 2018-01-26
Payer: MEDICARE

## 2018-01-26 VITALS
HEART RATE: 58 BPM | SYSTOLIC BLOOD PRESSURE: 132 MMHG | HEIGHT: 71 IN | BODY MASS INDEX: 35.81 KG/M2 | DIASTOLIC BLOOD PRESSURE: 62 MMHG | WEIGHT: 255.75 LBS | TEMPERATURE: 99 F

## 2018-01-26 DIAGNOSIS — I15.2 HYPERTENSION ASSOCIATED WITH DIABETES: ICD-10-CM

## 2018-01-26 DIAGNOSIS — M19.049 ARTHRITIS PAIN OF HAND: Chronic | ICD-10-CM

## 2018-01-26 DIAGNOSIS — E78.2 MIXED HYPERLIPIDEMIA: ICD-10-CM

## 2018-01-26 DIAGNOSIS — E11.65 TYPE 2 DIABETES MELLITUS WITH HYPERGLYCEMIA, WITHOUT LONG-TERM CURRENT USE OF INSULIN: ICD-10-CM

## 2018-01-26 DIAGNOSIS — E11.59 HYPERTENSION ASSOCIATED WITH DIABETES: ICD-10-CM

## 2018-01-26 DIAGNOSIS — Z23 NEED FOR HEPATITIS B VACCINATION: ICD-10-CM

## 2018-01-26 DIAGNOSIS — N28.89 LEFT RENAL MASS: Primary | ICD-10-CM

## 2018-01-26 DIAGNOSIS — N20.0 RECURRENT KIDNEY STONES: ICD-10-CM

## 2018-01-26 DIAGNOSIS — N18.30 CKD (CHRONIC KIDNEY DISEASE) STAGE 3, GFR 30-59 ML/MIN: Chronic | ICD-10-CM

## 2018-01-26 PROCEDURE — 99999 PR PBB SHADOW E&M-EST. PATIENT-LVL III: CPT | Mod: PBBFAC,,, | Performed by: FAMILY MEDICINE

## 2018-01-26 PROCEDURE — 99214 OFFICE O/P EST MOD 30 MIN: CPT | Mod: S$PBB,,, | Performed by: FAMILY MEDICINE

## 2018-01-26 PROCEDURE — 99213 OFFICE O/P EST LOW 20 MIN: CPT | Mod: PBBFAC,PO | Performed by: FAMILY MEDICINE

## 2018-01-26 PROCEDURE — 90662 IIV NO PRSV INCREASED AG IM: CPT | Mod: PBBFAC,PO

## 2018-01-26 RX ORDER — ALLOPURINOL 300 MG/1
300 TABLET ORAL DAILY
Qty: 30 TABLET | Refills: 11 | Status: SHIPPED | OUTPATIENT
Start: 2018-01-26 | End: 2019-02-06 | Stop reason: SDUPTHER

## 2018-01-26 RX ORDER — NAPROXEN SODIUM 220 MG
220 TABLET ORAL 2 TIMES DAILY WITH MEALS
COMMUNITY
End: 2018-01-26 | Stop reason: ALTCHOICE

## 2018-01-26 RX ORDER — METFORMIN HYDROCHLORIDE 500 MG/1
TABLET, EXTENDED RELEASE ORAL
Qty: 90 TABLET | Refills: 3 | Status: SHIPPED | OUTPATIENT
Start: 2018-01-26 | End: 2019-02-06 | Stop reason: SDUPTHER

## 2018-01-26 NOTE — PATIENT INSTRUCTIONS
"  Exercise to Manage Your Blood Sugar    Being physically active every day can help you manage your blood sugar. Thats because an active lifestyle can improve your bodys ability to use insulin. Daily activity can also help delay or prevent complications of diabetes. And its a great way to relieve stress. If you arent normally active, be sure to consult your healthcare provider before getting started.  How much activity do you need?  If daily activity is new to you, start slow and steady. Begin with 10 minutes of activity each day. Then work up to at least 150 minutes a week of physical activity. Don't let more than 2 days go by without being active. When sitting for long periods of time, get up for short sessions of light activity every 30 minutes  Just move!  You dont have to join a gym or own pricey sports equipment. Just get out and walk. Walking is an aerobic exercise that makes your heart and lungs work hard. It helps your heart and blood vessels. Walking needs only a sturdy pair of sneakers and your own two feet. The more you walk, the easier it gets:  · Schedule time every day to move your feet.  · Make it part of your daily routine.  · Walk with a friend or a group to keep it interesting and fun.  · Try taking several short walks during the day to meet your daily activity goal.  A pedometer makes every step count  A pedometer is a small device that keeps track of how many steps you take. You can clip it to your belt (or a strap on your arm or leg) and go about your daily routine. "Smartphones" now also have apps to record your walking. At the end of the day, the pedometer shows the total number of steps you took. Use a pedometer to set daily goals for yourself. For instance, if you walk 4,000 steps a day, try adding 200 more steps each day. Aim for a goal of 7,500. With every step, youre doing a little more to help your body use insulin.   Adding resistance exercise  Resistance exercise (also called " strength training), makes muscles stronger. It also helps muscles use insulin better. Ask your healthcare provider whether this type of exercise is right for you. If it is, your healthcare provider can help you work it in to your activity plan.  Staying safe  Being active may cause blood sugar to drop faster than usual. This is especially true if you take medicine to manage your blood sugar. But there are things you can do to help reduce the risk of accidental lows. Keep these tips in mind:  · Always carry identification when you exercise outside your home. Carry a cell phone to use in case of emergency.  · If you can, include friends and family in your activities.  · Wear a medical ID bracelet that says you have diabetes.  · Use the right safety equipment for the activity you do (such as a bicycle helmet when you ride a bicycle outdoors). Wear closed-toed shoes that fit your feet well.  · Drink plenty of water before and during activity.  · Keep a fast-acting sugar (such as glucose tablets) on hand in case of low blood sugar.  · Dress properly for the weather. Wear a hat if its nichol, or wait until evening if its too hot.  · Avoid being active for long periods in very hot or very cold weather.  · Skip activity if youre sick.     Notice how activity affects blood sugar  Physical activity is important when you have diabetes. But you need to keep an eye on your blood sugar level. Check often if you have been active for longer than usual, or if the activity was unplanned. Make it a habit to check your blood sugar before being active. And check again a few hours later. Use your log book to write down how activity affects your numbers. If you take insulin, you may be able to adjust your dose before a planned activity. This can help prevent lows. You may also need to take a small carbohydrate snack before the exercise. Talk to your healthcare provider to learn more.    Date Last Reviewed: 6/1/2016  © 7413-3616 [employer]. [address] [place] [address], [place] [address]. All rights reserved. This information is not intended as a substitute for professional medical care. Always follow your healthcare professional's instructions.        Diabetes and Heart Disease     Take your medicines as directed each day, even if you feel fine.   If you have diabetes, you are two to four times more likely to have heart disease than someone without diabetes. This higher risk is due to diabetes, but it is also due to other risk factors for heart disease that happen in people with diabetes. But theres good news. You can help control your health risks by making some changes in your life. You can take steps to reduce your risk of heart disease by half--similar to the risk in people who don't have diabetes.  Your main risk factors  Three major risk factors for heart disease are high blood sugar, high blood pressure, and high levels of lipids. By keeping risk factors under control, you can help keep your heart and arteries healthy. This may reduce your chances of a heart attack.  · Blood sugar. High blood sugar can make artery walls tough and rough. Plaque (waxy material in the blood) can then build up along the artery walls, making it harder for blood to flow through the arteries. Having high blood sugar increases the chances of having high blood pressure and high cholesterol.  · Blood pressure. When blood pressure is high all the time it causes your heart to work harder to pump blood. Artery walls become damaged. This increases the risk for plaque build up.  · Lipids. The body needs some lipids in the blood to stay healthy. But lipid levels that are too high can damage the artery walls. Lipids include cholesterol and triglycerides. There are two kinds of cholesterol. LDL (bad) cholesterol can damage the arteries. But HDL (good) cholesterol helps clear LDL cholesterol from the blood vessels. This helps keep the arteries healthy. When  blood sugar is high, the level of triglycerides in the blood may also be high. High blood triglyceride levels can cause plaque to form.   Other risk factors  Certain lifestyle factors can increase levels of your blood sugar, blood pressure, and lipids. Such increases raise your risk of heart disease:  · Smoking damages the lining of your arteries. This allows plaque to build up in the artery walls. Smoking also constricts (narrows) the arteries. This can raise blood pressure and cause chest pain or angina. Smoking also increases your risk of getting type 2 diabetes.  · Not being active makes it harder for your heart to do its work. Inactivity is linked to many other risk factors, such as high blood pressure and poor cholesterol levels. Inactivity also increases your risk of getting type 2 diabetes.  · Being overweight makes it harder for your body to use insulin. It also makes your heart work too hard. Being overweight is also the main contributor to the development of type 2 diabetes,   Changes you can make  Following a few simple steps can help keep your risk factors under control. Work with your healthcare team to reach your goals.  · Quitting smoking could save your life. Smoking damages the lining of the blood vessels and raises blood pressure. Smoking also affects how your body uses insulin. This makes it harder to keep blood sugar under control. If you smoke and need help quitting, talk to your healthcare team.   · Testing your blood sugar is the only way to know whether it is under control. Be sure to test your blood sugar yourself. Also get your blood tested in the lab, as directed.  · Monitoring your blood pressure and lipid levels can help you achieve safe levels. Visit your healthcare team as scheduled.  · Taking medicines as directed can help control blood sugar, blood pressure, blood clotting, and/or cholesterol levels.  · Eating right can reduce your risk factors and help you lose weight. Try to limit  the amount of processed or refined carbohydrates you eat at one time. Cut back on your total calorie intake. Eat foods low in saturated fat and cholesterol. Eat fiber, including vegetables and whole grains, and cut down on salt. A dietitian or diabetes educator can help form a meal plan that works for you--even if you are on a low budget.   · Being active can help reduce your weight, strengthen your heart, and lower your lipid levels and blood pressure. Exercise and activity are good for your whole body. Talk to your healthcare team about increasing your activity safely over time.  · Keeping your appointments with your healthcare provider helps you stay healthy. Go in for checkups and lab tests as scheduled.  Date Last Reviewed: 5/19/2016 © 2000-2017 [employer]. [address] [place] [address], [place] [address]. All rights reserved. This information is not intended as a substitute for professional medical care. Always follow your healthcare professional's instructions.        Chronic Kidney Disease (CKD)     The role of the kidneys is to remove waste products and extra water from the blood.  When the kidneys do not work as they should, waste products begin to build up in the blood. This is called chronic kidney disease (CKD). CKD means that you have kidney damage or a decrease in kidney function lasting at least 3 months. CKD allows extra water, waste, and toxins to build up in the body. This can eventually become life-threatening. You might need dialysis or a kidney transplant to stay alive. This most severe form is called end stage renal disease.  Diabetes is the leading causes of chronic renal failure. Other causes include high blood pressure, hardening of the arteries (atherosclerosis), lupus, inflammation of the blood vessels (vasculitis), and past viral or bacterial infections. Certain over-the-counter pain medicines can cause renal failure when taken often over a long period of time. These include  aspirin, ibuprofen, and related anti-inflammatory medicines called NSAIDs (nonsteroidal anti-inflammatory drugs).  Home care  The following guidelines will help you care for yourself at home:  · If you have diabetes, talk with your healthcare provider about keeping your blood sugar under control. Ask if you need to make and changes to your diet, lifestyle, or medicines.  · If you have high blood pressure:  ¨ Take prescribed medicine to lower your blood pressure to the recommended goal of less than 130/80.  ¨ Start a regular exercise program that you enjoy. Check with your healthcare provider to be sure your planned exercise program is right for you.  ¨ Eat less salt (sodium). Your healthcare provider can tell you how much salt per day is safe for you.  · If you are overweight, talk with your healthcare provider about a weight loss plan.  · If you smoke, you must quit. Smoking makes kidney disease worse. Talk with your healthcare provider about ways to help you quit.  For more information, visit the following links:  ¨ www.[employer].gov/sites/default/files/pdf/clearing-the-air-accessible.pdf  ¨ www.smokefree.gov  ¨ www.cancer.org/healthy/stayawayfromtobacco/guidetoquittingsmoking/  · Most people with CKD need to follow a special diet.  Be sure you understand yours. In general, you will need to limit protein, salt, potassium, and phosphorus. You also need to limit how much fluid you drink.   · CKD is a risk factor for heart disease. Talk with your healthcare provider about any other risk factors you might have and what you can do to lessen them.  · Talk with your healthcare provider about any medicines you are taking to find out if they need to be reduced or stopped.  · Don't use the following over-the-counter medicines, or consult your healthcare provider before using:  ¨ Aspirin and NSAIDs such as ibuprofen or naproxen. Using acetaminophen for fever or pain is OK.  ¨ Laxatives and antacids containing magnesium or  aluminum  ¨ Fleet or phospho soda enemas containing phosphorus  ¨ Certain stomach acid-blocking medicine such as cimetidine or ranitidine   ¨ Decongestants containing pseudoephedrine   ¨ Herbal supplements  Follow-up care  Follow up with your healthcare provider, or as advised. Contact one of the following for more information:  · American Association of Kidney Patients 944-287-8720 www.aakp.org  · National Kidney Foundation 002-123-9857 www.kidney.org  · American Kidney Fund 875-358-5866 www.kidneyfund.org  · National Kidney Disease Education Program 866-4KIDNEY www.nkdep.nih.gov  If an X-ray, ECG (cardiogram), or other diagnostic test was taken, you will be told of any new findings that may affect your care.  Call 911  Call 911 if you have any of the following:  · Severe weakness, dizziness, fainting, drowsiness, or confusion  · Chest pain or shortness of breath  · Heart beating fast, slow, or irregularly  When to seek medical advice  Call your healthcare provider right away if any of these occur:  · Nausea or vomiting  · Fever of 100.4°F (38°C) or higher, or as directed by your healthcare provider  · Unexpected weight gain or swelling in the legs, ankles, or around the eyes  · Decrease or absent urine output  Date Last Reviewed: 9/1/2016 © 2000-2017 [employer]. [address] [place] [address], [place] [address]. All rights reserved. This information is not intended as a substitute for professional medical care. Always follow your healthcare professional's instructions.

## 2018-01-29 NOTE — PROGRESS NOTES
Subjective:       Patient ID: Carlos Tenorio is a 68 y.o. male.    Chief Complaint: Diabetes; thumb pain; and Hypertension    Diabetes   He presents for his follow-up diabetic visit. He has type 2 diabetes mellitus. His disease course has been improving. Pertinent negatives for hypoglycemia include no confusion, dizziness, headaches, mood changes or nervousness/anxiousness. There are no diabetic associated symptoms. Pertinent negatives for diabetes include no blurred vision, no chest pain, no fatigue and no foot paresthesias.   Hypertension   Pertinent negatives include no blurred vision, chest pain, headaches, palpitations or shortness of breath.     Review of Systems   Constitutional: Negative for fatigue and unexpected weight change.   Eyes: Negative for blurred vision.   Respiratory: Negative for chest tightness and shortness of breath.    Cardiovascular: Negative for chest pain, palpitations and leg swelling.   Gastrointestinal: Negative for abdominal pain.   Musculoskeletal: Negative for arthralgias.   Neurological: Negative for dizziness, syncope, light-headedness and headaches.   Psychiatric/Behavioral: Negative for confusion. The patient is not nervous/anxious.        Patient Active Problem List   Diagnosis    Kidney stones    CAD (coronary artery disease)    Hypertension associated with diabetes    VASYL (obstructive sleep apnea)    Primary gonadal failure    Diabetes mellitus, type 2    Male hypogonadism    Bilateral renal cysts    Obesity (BMI 30.0-34.9)    OA (osteoarthritis) of knee    Skin tag    History of colon polyps    Left renal mass    CKD (chronic kidney disease) stage 3, GFR 30-59 ml/min    Arthritis pain of hand       Objective:      Physical Exam   Constitutional: He is oriented to person, place, and time. He appears well-developed and well-nourished.   Cardiovascular: Normal rate, regular rhythm and normal heart sounds.    Pulmonary/Chest: Effort normal and breath sounds  normal.   Musculoskeletal: He exhibits no edema.        Right wrist: He exhibits decreased range of motion, tenderness, swelling and deformity.        Left wrist: He exhibits decreased range of motion, tenderness and bony tenderness.   Neurological: He is alert and oriented to person, place, and time.   Skin: Skin is warm and dry.   Psychiatric: He has a normal mood and affect.   Nursing note and vitals reviewed.      Lab Results   Component Value Date    WBC 13.10 (H) 01/18/2018    WBC 13.10 (H) 01/18/2018    HGB 14.3 01/18/2018    HGB 14.3 01/18/2018    HCT 43.8 01/18/2018    HCT 43.8 01/18/2018     01/18/2018     01/18/2018    CHOL 145 05/05/2016    TRIG 268 (H) 05/05/2016    HDL 33 (L) 05/05/2016    ALT 22 05/01/2017    AST 20 05/01/2017     01/18/2018    K 4.0 01/18/2018     01/18/2018    CREATININE 1.4 01/18/2018    BUN 24 (H) 01/18/2018    CO2 27 01/18/2018    TSH 2.808 05/01/2017    PSA 3.3 04/20/2017    INR 1.0 06/05/2017    GLUF 109 11/07/2008    HGBA1C 5.8 (H) 01/18/2018     The 10-year ASCVD risk score (Nathan ZHENG Jr., et al., 2013) is: 34.3%    Values used to calculate the score:      Age: 68 years      Sex: Male      Is Non- : No      Diabetic: Yes      Tobacco smoker: No      Systolic Blood Pressure: 132 mmHg      Is BP treated: Yes      HDL Cholesterol: 33 mg/dL      Total Cholesterol: 145 mg/dL    Assessment:       1. Left renal mass    2. CKD (chronic kidney disease) stage 3, GFR 30-59 ml/min    3. Type 2 diabetes mellitus with hyperglycemia, without long-term current use of insulin    4. Hypertension associated with diabetes    5. Mixed hyperlipidemia    6. Need for hepatitis B vaccination    7. Arthritis pain of hand    8. Recurrent kidney stones        Plan:       Left renal mass    CKD (chronic kidney disease) stage 3, GFR 30-59 ml/min  -     metFORMIN (GLUCOPHAGE-XR) 500 MG 24 hr tablet; TAKE 1 TABLET(500 MG) BY MOUTH EVERY DAY  Dispense: 90  tablet; Refill: 3  -     Basic metabolic panel; Future; Expected date: 01/26/2018  -     PHOSPHORUS; Future; Expected date: 01/26/2018  -     PTH, intact; Future; Expected date: 01/26/2018  -     Lipid panel; Future; Expected date: 01/26/2018  -     CBC auto differential; Future; Expected date: 01/26/2018    Type 2 diabetes mellitus with hyperglycemia, without long-term current use of insulin  -     metFORMIN (GLUCOPHAGE-XR) 500 MG 24 hr tablet; TAKE 1 TABLET(500 MG) BY MOUTH EVERY DAY  Dispense: 90 tablet; Refill: 3  -     Basic metabolic panel; Future; Expected date: 01/26/2018  -     PHOSPHORUS; Future; Expected date: 01/26/2018  -     PTH, intact; Future; Expected date: 01/26/2018  -     Lipid panel; Future; Expected date: 01/26/2018    Hypertension associated with diabetes    Mixed hyperlipidemia    Need for hepatitis B vaccination  -     Hepatitis B surface antibody; Future; Expected date: 01/26/2018    Arthritis pain of hand    Recurrent kidney stones  -     allopurinol (ZYLOPRIM) 300 MG tablet; Take 1 tablet (300 mg total) by mouth once daily.  Dispense: 30 tablet; Refill: 11    Other orders  -     Influenza - High Dose (65+) (PF) (IM)      Patient readiness: acceptance and barriers:readiness    During the course of the visit the patient was educated and counseled about the following:     Diabetes:  Discussed general issues about diabetes pathophysiology and management.  Hypertension:   Dietary sodium restriction.  Regular aerobic exercise.  Check blood pressures daily and record.  Obesity:   General weight loss/lifestyle modification strategies discussed (elicit support from others; identify saboteurs; non-food rewards, etc).    Goals: Diabetes: Maintain Hemoglobin A1C below 7, Hypertension: Reduce Blood Pressure and Obesity: Reduce calorie intake and BMI    Did patient meet goals/outcomes: Yes    The following self management tools provided: blood pressure log  blood glucose log  excercise log    Patient  Instructions (the written plan) was given to the patient/family.     Time spent with patient: 30 minutes    Barriers to medications present (no )    Adverse reactions to current medications (no)    Over the counter medications reviewed (Yes)        30 minutes spent counseling patient on diet, exercise, and weight loss.  This has been fully explained to the patient, who indicates understanding.

## 2018-01-30 NOTE — PROGRESS NOTES
[facility] Urology Progress Note    Carlos Tenorio is a 68 y.o. male who presents for follow up of RCC, elevated psa, and hypogonadism. Also has history of kidney stones and right spermatocele.     He underwent left robotic partial nephrectomy, with concurrent left renal cyst decortication on 8/2/17 for an upper pole 2-3cm exophytic enhancing renal mass, adjacent to larger simple renal cyst. He did well postoperatively and was DCed home POD 2. Clamp time 16 minutes, Cr 1.2 at time of discharge 8/4/17. Pathology: [id number] clear cell RCC. 2.5cm. Negative margins. Grade 2. No sarcomatoid or rhabdoid features. Benign renal cyst  His renal mass was found on workup from previous LINSEY after episode of severe colitis and dehydration.  A PSA checked at the time of his colon infection was found to be elevated at 7.5, which delayed resuming his testosterone therapy for his hypogonadism  Recheck of PSA in June 2017 was 2.2 with 19% free, though TRT deferred until recovery from partial nephrectomy.  Had been off of his him injections of testosterone for about 5 years, and experiencing fatigue and ED.  Also has history of CABG  He also has a history of kidney stones, and had ESWL of 1.2 cm right renal stone in October 2014 of which stone fragments demonstrated 95% uric acid, 5% calcium oxalate monohydrate.  He has had both calcium oxalate stones and uric acid stones in the past and has had prior bilateral ESWL 10 years prior.  By report, a 24-hour urine in October 2012 had high oxalate and low urine pH. Takes allopurinol.    Last seen 8/15/17, doing well s/p Robotic px nx. Resumed his TRT at 1 month postop at 200mg Q2 weeks.   PSA history: 6/17 2.2 (19.3%); 4/17 3.3; 1/14 2.9; 7/13 2.85; 11/12 1.87; 4/11 1.5; 9/09 1.4; 3/08 1.0  Labs since on TRT since 8/17:  9/12/17 (10d): H/H: 12.8/38.7, PSA 4.4, T 580  1/18/18 (7d): H/H: 14.3/43.8, PSA 4.9, T 980, Cr 1.4 (GFR 54), HbA1c 5.8  Most recent stone imaging: CT RSS 5/24/17: Right -  "3 stones the largest of which is present within the lower pole and measures 3 mm.  A previously present 12 mm right renal stone is no longer present.  Renovascular calcifications are also observed.  Left, a few punctate stones are present along with mild medullary nephrocalcinosis.  4/20/17 scrotal US: There is an approximately 5.1 cm anechoic mass with multiple small internal echoes consistent with a spermatocele in the right epididymal body    He returns today noting that after resuming testosterone replacement therapy, his spermatocele resolved.  He has not had any interim passage of kidney stones, flank pain, hematuria, dysuria.  He feels better on his testosterone replacement therapy and was not having any peaks or valleys.  However, since resuming TRT he has not had benefit on his erections as he had in the past.  He is able to achieve an erection, though has difficulty maintaining erections and feels like it is going on with any mild distraction.  He did go back to work in November at [employer], and his workload is the same as pre-long term.  He has tried Viagra in the past though feels like he missed used it as he was taking it on a full stomach and only tried it a few times.  Denies bothersome lower urinary tract symptoms  Urinalysis dipstick is negative.  AUA symptom score 2/0      ROS: A comprehensive 10 system review was performed and is negative except as noted above in HPI    PHYSICAL EXAM:    Vitals:    02/02/18 0838   BP: (!) 144/80   Pulse: (!) 57   Resp: 18     Body mass index is 35.67 kg/m². Weight: 116 kg (255 lb 11.7 oz) Height: 5' 11" (180.3 cm)       General: Alert, cooperative, no distress, appears stated age   Head: Normocephalic, without obvious abnormality, atraumatic   Eyes: PERRL, conjunctiva/corneas clear   Lungs: Respirations unlabored   Heart: Warm and well perfused   Abdomen: soft NT ND, lap incisions well healed. No hernia  : normal phallus, bilat desc normal testes, no sig " epididymal cyst or spermatocele, TAHIRA 25g benign  Extremities: Extremities normal, atraumatic, no cyanosis or edema   Skin: Skin color, texture, turgor normal, no rashes or lesions   Psych: Appropriate   Neurologic: Non-focal       Recent Results (from the past 336 hour(s))   POCT URINE DIPSTICK WITHOUT MICROSCOPE    Collection Time: 02/02/18  8:35 AM   Result Value Ref Range    Color, UA clear     Spec Grav UA 1.010     pH, UA 5.0     WBC, UA neg     Nitrite, UA neg     Protein neg     Glucose, UA neg     Ketones, UA neg     Urobilinogen, UA neg     Bilirubin neg     Blood, UA neg        ASSESSMENT   1. Elevated PSA  Case Request Operating Room: TRANSRECTAL ULTRASOUND GUIDED PROSTATE BIOPSY    Place in Outpatient    Vital Signs     Activity as tolerated   2. Hypogonadism in male     3. History of renal cell cancer  CT Abdomen Pelvis With Contrast   4. Erectile disorder, acquired, generalized, moderate  POCT URINE DIPSTICK WITHOUT MICROSCOPE       Plan    For his small T1a RCC, advised a baseline CT in 6-12 months, and then can likely follow with labs/CXR and scan again only as indicated, though is reasonable to surveil for 3 years.  CT abdomen pelvis with contrast ordered  He has been doing well clinically on his testosterone replacement therapy.  Prior to initiating testosterone replacement therapy, he did have elevated PSA attributed to a colitis at that time which did return to normal at 2.2.  He then underwent partial nephrectomy as above, and upon healing from that elected to proceed with resuming TRT for his hypogonadism.  In the interim his PSA did double to 4.4 after first 3 months, and is now rising again to 4.8 at latest recheck.  We did review the risk of testosterone replacement therapy if there is any underlying prostate cancer, and discussed the need to evaluate for any underlying malignancy with a prostate biopsy.    We discussed biopsy in detail, including 1% risk of infectious complications  including sepsis but that it is an otherwise safe diagnostic procedure with expected hematuria hematospermia after.   I went over the details of a transrectal ultrasound-guided biopsy of the prostate, and described the technique in detail.   The patient will be given local injection anesthetic to block the prostate so as to minimize any pain. 12-14 biopsy specimens will be taken. These will be sent for histopathology analysis.   Complications include bleeding, fever and chills. He was also instructed to watch for any signs of fever. If he does have any fever or chills after, he was advised to come to the emergency room right away for intravenous antibiotics and possible admission to the hospital. He is to refrain from any strenuous activity including sexual activity for the next 72 hours after biopsy. He was also advised that he may have blood while urinating, during bowel movements as well as during ejaculations. He was given a prebiopsy/postbiopsy instruction sheet was reminding him to avoid aspirin and blood thinners for 7 days prior, take the Rxed antibiotics the day before, day of, day after biopsy, and perform a fleet enema at home morning of biopsy. All questions he had were answered in detail.      TRUS/bx scheduled at [facility] on 2/27/18. *place him last  Surveillance CT to be done prior to review at time of biopsy    In regards to his erectile function, we did discuss all treatment options to treat ED, and he elected to try PDE 5 inhibitors again and would like to try Viagra again with discussion of proper use which included in advance of sexual encounter, with stimulation, and on an empty stomach, not within 30-60 minutes of meal on either end.  We discussed the multifactorial contributions to his erectile function, namely his hypogonadism, though this should be well controlled as his testosterone levels of been well maintained, as well as any psychosocial stressors, as he did restart his job coming out of  [employer].

## 2018-02-02 ENCOUNTER — OFFICE VISIT (OUTPATIENT)
Dept: UROLOGY | Facility: CLINIC | Age: 68
End: 2018-02-02
Payer: COMMERCIAL

## 2018-02-02 VITALS
BODY MASS INDEX: 35.81 KG/M2 | HEART RATE: 57 BPM | DIASTOLIC BLOOD PRESSURE: 80 MMHG | SYSTOLIC BLOOD PRESSURE: 144 MMHG | RESPIRATION RATE: 18 BRPM | WEIGHT: 255.75 LBS | HEIGHT: 71 IN

## 2018-02-02 DIAGNOSIS — E29.1 HYPOGONADISM IN MALE: ICD-10-CM

## 2018-02-02 DIAGNOSIS — R97.20 ELEVATED PSA: Primary | ICD-10-CM

## 2018-02-02 DIAGNOSIS — F52.21 ERECTILE DISORDER, ACQUIRED, GENERALIZED, MODERATE: ICD-10-CM

## 2018-02-02 DIAGNOSIS — Z85.528 HISTORY OF RENAL CELL CANCER: ICD-10-CM

## 2018-02-02 LAB
BILIRUB SERPL-MCNC: NORMAL MG/DL
BLOOD URINE, POC: NORMAL
COLOR, POC UA: CLEAR
GLUCOSE UR QL STRIP: NORMAL
KETONES UR QL STRIP: NORMAL
LEUKOCYTE ESTERASE URINE, POC: NORMAL
NITRITE, POC UA: NORMAL
PH, POC UA: 5
PROTEIN, POC: NORMAL
SPECIFIC GRAVITY, POC UA: 1.01
UROBILINOGEN, POC UA: NORMAL

## 2018-02-02 PROCEDURE — 99999 PR PBB SHADOW E&M-EST. PATIENT-LVL IV: CPT | Mod: PBBFAC,,, | Performed by: UROLOGY

## 2018-02-02 PROCEDURE — 1126F AMNT PAIN NOTED NONE PRSNT: CPT | Mod: ,,, | Performed by: UROLOGY

## 2018-02-02 PROCEDURE — 99214 OFFICE O/P EST MOD 30 MIN: CPT | Mod: PBBFAC,PN | Performed by: UROLOGY

## 2018-02-02 PROCEDURE — 1159F MED LIST DOCD IN RCRD: CPT | Mod: ,,, | Performed by: UROLOGY

## 2018-02-02 PROCEDURE — 81002 URINALYSIS NONAUTO W/O SCOPE: CPT | Mod: PBBFAC,PN | Performed by: UROLOGY

## 2018-02-02 PROCEDURE — 99214 OFFICE O/P EST MOD 30 MIN: CPT | Mod: S$PBB,,, | Performed by: UROLOGY

## 2018-02-02 RX ORDER — CIPROFLOXACIN 500 MG/1
500 TABLET ORAL 2 TIMES DAILY
Qty: 6 TABLET | Refills: 0 | Status: SHIPPED | OUTPATIENT
Start: 2018-02-02 | End: 2018-06-01 | Stop reason: ALTCHOICE

## 2018-02-02 RX ORDER — SILDENAFIL 100 MG/1
100 TABLET, FILM COATED ORAL DAILY PRN
Qty: 6 TABLET | Refills: 6 | Status: SHIPPED | OUTPATIENT
Start: 2018-02-02 | End: 2019-02-20

## 2018-02-02 NOTE — Clinical Note
3. Make 2 week post bx fu appt (bx is 2/27) - not done - please arrange in advance of his biopsy as schedule getting full

## 2018-02-03 RX ORDER — GENTAMICIN SULFATE 40 MG/ML
80 INJECTION, SOLUTION INTRAMUSCULAR; INTRAVENOUS ONCE
Status: CANCELLED | OUTPATIENT
Start: 2018-02-27

## 2018-02-03 RX ORDER — LIDOCAINE HYDROCHLORIDE 10 MG/ML
1 INJECTION INFILTRATION; PERINEURAL ONCE
Status: CANCELLED | OUTPATIENT
Start: 2018-02-03 | End: 2018-02-03

## 2018-02-05 ENCOUNTER — HOSPITAL ENCOUNTER (OUTPATIENT)
Dept: RADIOLOGY | Facility: HOSPITAL | Age: 68
Discharge: HOME | End: 2018-02-05
Attending: UROLOGY
Payer: COMMERCIAL

## 2018-02-05 DIAGNOSIS — Z85.528 HISTORY OF RENAL CELL CANCER: ICD-10-CM

## 2018-02-05 PROCEDURE — 25500020 PHARM REV CODE 255

## 2018-02-05 PROCEDURE — 74177 CT ABD & PELVIS W/CONTRAST: CPT | Mod: 26,,, | Performed by: RADIOLOGY

## 2018-02-05 PROCEDURE — 74177 CT ABD & PELVIS W/CONTRAST: CPT | Mod: TC

## 2018-02-05 RX ORDER — SODIUM CHLORIDE 9 MG/ML
INJECTION, SOLUTION INTRAVENOUS
Status: DISCONTINUED
Start: 2018-02-05 | End: 2018-02-06 | Stop reason: HOSPADM

## 2018-02-05 RX ADMIN — IOHEXOL 100 ML: 350 INJECTION, SOLUTION INTRAVENOUS at 04:02

## 2018-02-27 ENCOUNTER — SURGERY (OUTPATIENT)
Age: 68
End: 2018-02-27

## 2018-02-27 ENCOUNTER — HOSPITAL ENCOUNTER (OUTPATIENT)
Facility: AMBULARY SURGERY CENTER | Age: 68
Discharge: HOME | End: 2018-02-27
Attending: UROLOGY | Admitting: UROLOGY
Payer: COMMERCIAL

## 2018-02-27 DIAGNOSIS — R97.20 ELEVATED PSA: ICD-10-CM

## 2018-02-27 PROCEDURE — 76872 US TRANSRECTAL: CPT | Mod: 26,,, | Performed by: UROLOGY

## 2018-02-27 PROCEDURE — 76872 US TRANSRECTAL: CPT | Performed by: UROLOGY

## 2018-02-27 PROCEDURE — G8907 PT DOC NO EVENTS ON DISCHARG: HCPCS | Performed by: UROLOGY

## 2018-02-27 PROCEDURE — 55700 PR BIOPSY OF PROSTATE,NEEDLE/PUNCH: CPT | Mod: ,,, | Performed by: UROLOGY

## 2018-02-27 PROCEDURE — 88341 IMHCHEM/IMCYTCHM EA ADD ANTB: CPT | Mod: 26,,, | Performed by: PATHOLOGY

## 2018-02-27 PROCEDURE — 88342 IMHCHEM/IMCYTCHM 1ST ANTB: CPT | Mod: 26,,, | Performed by: PATHOLOGY

## 2018-02-27 PROCEDURE — 76942 ECHO GUIDE FOR BIOPSY: CPT | Mod: 26,59,, | Performed by: UROLOGY

## 2018-02-27 PROCEDURE — 55700 HC PROSTATE NEEDLE BIOPSY: CPT | Performed by: UROLOGY

## 2018-02-27 PROCEDURE — 88305 TISSUE EXAM BY PATHOLOGIST: CPT | Performed by: PATHOLOGY

## 2018-02-27 RX ORDER — NAPROXEN SODIUM 220 MG
220 TABLET ORAL 2 TIMES DAILY WITH MEALS
COMMUNITY
End: 2019-11-27 | Stop reason: ALTCHOICE

## 2018-02-27 RX ORDER — LIDOCAINE HYDROCHLORIDE 10 MG/ML
1 INJECTION INFILTRATION; PERINEURAL ONCE
Status: COMPLETED | OUTPATIENT
Start: 2018-02-27 | End: 2018-02-27

## 2018-02-27 RX ORDER — GENTAMICIN SULFATE 40 MG/ML
80 INJECTION, SOLUTION INTRAMUSCULAR; INTRAVENOUS ONCE
Status: COMPLETED | OUTPATIENT
Start: 2018-02-27 | End: 2018-02-27

## 2018-02-27 RX ORDER — GENTAMICIN SULFATE 40 MG/ML
INJECTION, SOLUTION INTRAMUSCULAR; INTRAVENOUS
Status: DISCONTINUED
Start: 2018-02-27 | End: 2018-02-27 | Stop reason: HOSPADM

## 2018-02-27 RX ADMIN — LIDOCAINE HYDROCHLORIDE 1 ML: 10 INJECTION INFILTRATION; PERINEURAL at 04:02

## 2018-02-27 RX ADMIN — GENTAMICIN SULFATE 80 MG: 40 INJECTION, SOLUTION INTRAMUSCULAR; INTRAVENOUS at 03:02

## 2018-02-27 NOTE — H&P (VIEW-ONLY)
[facility] Urology Progress Note    Carlos Tenorio is a 68 y.o. male who presents for follow up of RCC, elevated psa, and hypogonadism. Also has history of kidney stones and right spermatocele.     He underwent left robotic partial nephrectomy, with concurrent left renal cyst decortication on 8/2/17 for an upper pole 2-3cm exophytic enhancing renal mass, adjacent to larger simple renal cyst. He did well postoperatively and was DCed home POD 2. Clamp time 16 minutes, Cr 1.2 at time of discharge 8/4/17. Pathology: [id number] clear cell RCC. 2.5cm. Negative margins. Grade 2. No sarcomatoid or rhabdoid features. Benign renal cyst  His renal mass was found on workup from previous LINSEY after episode of severe colitis and dehydration.  A PSA checked at the time of his colon infection was found to be elevated at 7.5, which delayed resuming his testosterone therapy for his hypogonadism  Recheck of PSA in June 2017 was 2.2 with 19% free, though TRT deferred until recovery from partial nephrectomy.  Had been off of his him injections of testosterone for about 5 years, and experiencing fatigue and ED.  Also has history of CABG  He also has a history of kidney stones, and had ESWL of 1.2 cm right renal stone in October 2014 of which stone fragments demonstrated 95% uric acid, 5% calcium oxalate monohydrate.  He has had both calcium oxalate stones and uric acid stones in the past and has had prior bilateral ESWL 10 years prior.  By report, a 24-hour urine in October 2012 had high oxalate and low urine pH. Takes allopurinol.    Last seen 8/15/17, doing well s/p Robotic px nx. Resumed his TRT at 1 month postop at 200mg Q2 weeks.   PSA history: 6/17 2.2 (19.3%); 4/17 3.3; 1/14 2.9; 7/13 2.85; 11/12 1.87; 4/11 1.5; 9/09 1.4; 3/08 1.0  Labs since on TRT since 8/17:  9/12/17 (10d): H/H: 12.8/38.7, PSA 4.4, T 580  1/18/18 (7d): H/H: 14.3/43.8, PSA 4.9, T 980, Cr 1.4 (GFR 54), HbA1c 5.8  Most recent stone imaging: CT RSS 5/24/17: Right -  "3 stones the largest of which is present within the lower pole and measures 3 mm.  A previously present 12 mm right renal stone is no longer present.  Renovascular calcifications are also observed.  Left, a few punctate stones are present along with mild medullary nephrocalcinosis.  4/20/17 scrotal US: There is an approximately 5.1 cm anechoic mass with multiple small internal echoes consistent with a spermatocele in the right epididymal body    He returns today noting that after resuming testosterone replacement therapy, his spermatocele resolved.  He has not had any interim passage of kidney stones, flank pain, hematuria, dysuria.  He feels better on his testosterone replacement therapy and was not having any peaks or valleys.  However, since resuming TRT he has not had benefit on his erections as he had in the past.  He is able to achieve an erection, though has difficulty maintaining erections and feels like it is going on with any mild distraction.  He did go back to work in November at [employer], and his workload is the same as pre-[employer].  He has tried Viagra in the past though feels like he missed used it as he was taking it on a full stomach and only tried it a few times.  Denies bothersome lower urinary tract symptoms  Urinalysis dipstick is negative.  AUA symptom score 2/0      ROS: A comprehensive 10 system review was performed and is negative except as noted above in HPI    PHYSICAL EXAM:    Vitals:    02/02/18 0838   BP: (!) 144/80   Pulse: (!) 57   Resp: 18     Body mass index is 35.67 kg/m². Weight: 116 kg (255 lb 11.7 oz) Height: 5' 11" (180.3 cm)       General: Alert, cooperative, no distress, appears stated age   Head: Normocephalic, without obvious abnormality, atraumatic   Eyes: PERRL, conjunctiva/corneas clear   Lungs: Respirations unlabored   Heart: Warm and well perfused   Abdomen: soft NT ND, lap incisions well healed. No hernia  : normal phallus, bilat desc normal testes, no sig " epididymal cyst or spermatocele, TAHIRA 25g benign  Extremities: Extremities normal, atraumatic, no cyanosis or edema   Skin: Skin color, texture, turgor normal, no rashes or lesions   Psych: Appropriate   Neurologic: Non-focal       Recent Results (from the past 336 hour(s))   POCT URINE DIPSTICK WITHOUT MICROSCOPE    Collection Time: 02/02/18  8:35 AM   Result Value Ref Range    Color, UA clear     Spec Grav UA 1.010     pH, UA 5.0     WBC, UA neg     Nitrite, UA neg     Protein neg     Glucose, UA neg     Ketones, UA neg     Urobilinogen, UA neg     Bilirubin neg     Blood, UA neg        ASSESSMENT   1. Elevated PSA  Case Request Operating Room: TRANSRECTAL ULTRASOUND GUIDED PROSTATE BIOPSY    Place in Outpatient    Vital Signs     Activity as tolerated   2. Hypogonadism in male     3. History of renal cell cancer  CT Abdomen Pelvis With Contrast   4. Erectile disorder, acquired, generalized, moderate  POCT URINE DIPSTICK WITHOUT MICROSCOPE       Plan    For his small T1a RCC, advised a baseline CT in 6-12 months, and then can likely follow with labs/CXR and scan again only as indicated, though is reasonable to surveil for 3 years.  CT abdomen pelvis with contrast ordered  He has been doing well clinically on his testosterone replacement therapy.  Prior to initiating testosterone replacement therapy, he did have elevated PSA attributed to a colitis at that time which did return to normal at 2.2.  He then underwent partial nephrectomy as above, and upon healing from that elected to proceed with resuming TRT for his hypogonadism.  In the interim his PSA did double to 4.4 after first 3 months, and is now rising again to 4.8 at latest recheck.  We did review the risk of testosterone replacement therapy if there is any underlying prostate cancer, and discussed the need to evaluate for any underlying malignancy with a prostate biopsy.    We discussed biopsy in detail, including 1% risk of infectious complications  including sepsis but that it is an otherwise safe diagnostic procedure with expected hematuria hematospermia after.   I went over the details of a transrectal ultrasound-guided biopsy of the prostate, and described the technique in detail.   The patient will be given local injection anesthetic to block the prostate so as to minimize any pain. 12-14 biopsy specimens will be taken. These will be sent for histopathology analysis.   Complications include bleeding, fever and chills. He was also instructed to watch for any signs of fever. If he does have any fever or chills after, he was advised to come to the emergency room right away for intravenous antibiotics and possible admission to the hospital. He is to refrain from any strenuous activity including sexual activity for the next 72 hours after biopsy. He was also advised that he may have blood while urinating, during bowel movements as well as during ejaculations. He was given a prebiopsy/postbiopsy instruction sheet was reminding him to avoid aspirin and blood thinners for 7 days prior, take the Rxed antibiotics the day before, day of, day after biopsy, and perform a fleet enema at home morning of biopsy. All questions he had were answered in detail.      TRUS/bx scheduled at [facility] on 2/27/18. *place him last  Surveillance CT to be done prior to review at time of biopsy    In regards to his erectile function, we did discuss all treatment options to treat ED, and he elected to try PDE 5 inhibitors again and would like to try Viagra again with discussion of proper use which included in advance of sexual encounter, with stimulation, and on an empty stomach, not within 30-60 minutes of meal on either end.  We discussed the multifactorial contributions to his erectile function, namely his hypogonadism, though this should be well controlled as his testosterone levels of been well maintained, as well as any psychosocial stressors, as he did restart his job coming out of  [employer].

## 2018-02-27 NOTE — INTERVAL H&P NOTE
The patient has been examined and the H&P has been reviewed:    No change   Proceed with prostate biopsy  Pt is acceptable candidate for procedure at [facility]    Anesthesia/Surgery risks, benefits and alternative options discussed and understood by patient/family.          Active Hospital Problems    Diagnosis  POA    Elevated PSA [R97.20]  Yes      Resolved Hospital Problems    Diagnosis Date Resolved POA   No resolved problems to display.

## 2018-02-27 NOTE — DISCHARGE INSTRUCTIONS
After your prostate biopsy    Avoid sexual activity,lifting, strenuous physical activity or exertion for 3 days     No riding mowers, tractors, bicycles, motorcycles for 2-3 weeks    You may experience blood in your urine or stool for up to 2 weeks and in your semen for up to 6 weeks.  This is a normal side effect of the procedure and will resolve.    Drink plenty of water    Take antibiotics as prescribed    If you experience any of the following conditions, please return immediately to the clinic (during office hrs) or the Emergency Room if after hours:       Fever       Inabiltiy to urinate       Severe bleeding    You may resume aspirin, anti inflammatory and blood thinners in 3 days    Results take at minimum 10 business days.  A 2 week follow up will be scheduled to review pathology reports in the clinic    During office hours, please call  and ask to speak with the nurse if you have any questions.  If after hours, call the [facility] On [facility] # to be connectied t o the doctor on call

## 2018-02-28 VITALS
SYSTOLIC BLOOD PRESSURE: 134 MMHG | OXYGEN SATURATION: 100 % | HEIGHT: 71 IN | TEMPERATURE: 98 F | WEIGHT: 255 LBS | DIASTOLIC BLOOD PRESSURE: 78 MMHG | BODY MASS INDEX: 35.7 KG/M2 | RESPIRATION RATE: 18 BRPM | HEART RATE: 63 BPM

## 2018-02-28 NOTE — OP NOTE
Prostate Biopsy Procedure / Discharge Note:     DATE: 2/27/18     PRE-PROCEDURE DIAGNOSIS: Elevated psa     POST-PROCEDURE DIAGNOSIS: Same     INDICATION FOR PROCEDURE: elevated psa  Rising PSA on TRT    ANESTHESIA: Local periprostatic block; 10 cc 1% lidocaine     PSA: 4.9     TRUS VOLUME: 56.6 cc  (W 58.7, H 36, L 51.1)     STAFF: Yovany Heart M.D.     EBL: Minimal     SPECIMEN: 14 Prostate Biopsy Cores: right and left base, apex, and mid - medial and lateral of each, and right and left transition zone     ULTRASOUND FINDINGS: normal SVs, many diffuse intraprostatic calcifications, left apical hypoechoic areas     CONFIRMED PATIENT TOOK ANTIBIOTICS: Yes     CONFIRMED PATIENT NOT TAKING ASPIRIN OR ANTICOAGULANTS: Yes     CONFIRMED PATIENT USED ENEMA: Yes     PROCEDURE IN DETAIL:     Risk, Benefits, and alternatives explained to patient. All questions answered  to patients satisfaction and consented appropriately. Patient has completed the  prescribed bowel prep and antibiotic. 80mg IM gentamicin administered. TRUS probe inserted into rectum. Ultrasound measurements taken as above. Transrectal ultrasound findings as above. Approximately 10cc of   1% lidocaine injected bilaterally in tacos-prostatic block fashion, as well as at the apex. 14 core  biopies taken in a sextant fashion including the transition zones  Patient tolerated well without complication.     CONDITION: Stable     DISPOSITION: Patient tolerated procedure well, and ambulated to recovery area assisted by nurse. He was instructed to watch for any signs of fever. If he does have any fever or chills of 101 degrees or more, he was advised to come to the emergency room right away for intravenous antibiotics and possible admission to the hospital. He is to refrain from any strenuous activity including sexual activity for the next 72 hours after biopsy. He was also advised that he may have blood while urinating, during bowel movements as well as during  ejaculations for up to 2 weeks. All his questions were answered.  He will return to the office in 2 weeks for results     DISHARGE:  Status post uncomplicated outpatient procedure as above.   Disposition: Home  No new meds - continue antibiotics as prescribed, hold bloodthinners 3d  Resume regular diet  FU 2 weeks for biopsy results  Return to ER if temp >101, uncontrollable urethral or rectal bleeding, or inability to urinate/urinary retention  No sex/ejaculation x3 days, no riding mowers/tractors/bikes x2-4 weeks

## 2018-03-12 ENCOUNTER — OFFICE VISIT (OUTPATIENT)
Dept: UROLOGY | Facility: CLINIC | Age: 68
End: 2018-03-12
Payer: COMMERCIAL

## 2018-03-12 VITALS
TEMPERATURE: 98 F | HEART RATE: 60 BPM | DIASTOLIC BLOOD PRESSURE: 69 MMHG | BODY MASS INDEX: 35.83 KG/M2 | WEIGHT: 255.94 LBS | HEIGHT: 71 IN | SYSTOLIC BLOOD PRESSURE: 143 MMHG

## 2018-03-12 DIAGNOSIS — N20.0 KIDNEY STONES: ICD-10-CM

## 2018-03-12 DIAGNOSIS — E29.1 HYPOGONADISM IN MALE: ICD-10-CM

## 2018-03-12 DIAGNOSIS — R97.20 ELEVATED PSA: Primary | ICD-10-CM

## 2018-03-12 DIAGNOSIS — Z85.528 HISTORY OF RENAL CELL CANCER: ICD-10-CM

## 2018-03-12 PROCEDURE — 3077F SYST BP >= 140 MM HG: CPT | Mod: CPTII,S$GLB,, | Performed by: UROLOGY

## 2018-03-12 PROCEDURE — 3078F DIAST BP <80 MM HG: CPT | Mod: CPTII,S$GLB,, | Performed by: UROLOGY

## 2018-03-12 PROCEDURE — 99214 OFFICE O/P EST MOD 30 MIN: CPT | Mod: S$GLB,,, | Performed by: UROLOGY

## 2018-03-12 PROCEDURE — 99999 PR PBB SHADOW E&M-EST. PATIENT-LVL III: CPT | Mod: PBBFAC,,, | Performed by: UROLOGY

## 2018-03-12 RX ORDER — TESTOSTERONE CYPIONATE 200 MG/ML
200 INJECTION, SOLUTION INTRAMUSCULAR
Qty: 13 ML | Refills: 0 | Status: SHIPPED | OUTPATIENT
Start: 2018-03-12 | End: 2018-06-19

## 2018-03-12 NOTE — PROGRESS NOTES
[facility] Urology Progress Note    Carlos Tenorio is a 68 y.o. male who presents for follow-up of rising PSA while on testosterone replacement therapy, status post prostate biopsy 2/27/18    He had been on testosterone replacement therapy previously and was referred to me initially for finding of renal mass found on workup for her AK I after episode of severe colitis and dehydration.   A PSA checked at the time of his colon infection was found to be elevated at 7.5, which delayed resuming his testosterone therapy for his hypogonadism  Recheck of PSA in June 2017 was 2.2 with 19% free, though TRT deferred until recovery from partial nephrectomy for the renal mass found as above.  Prior to my evaluation, had been off TRT for about 5 years was experiencing fatigue and ED  He underwent left robotic partial nephrectomy, with concurrent left renal cyst decortication on 8/2/17 for an upper pole 2-3cm exophytic enhancing renal mass, adjacent to larger simple renal cyst.   He did well postoperatively and was DCed home POD 2. Clamp time 16 minutes, Cr 1.2 at time of discharge 8/4/17. Pathology: [id number] clear cell RCC. 2.5cm. Negative margins. Grade 2. No sarcomatoid or rhabdoid features. Benign renal cyst  Also has a history of calcium oxalate stones and uric acid stones in the past and has had prior bilateral ESWL 10 years prior.  By report, a 24-hour urine in October 2012 had high oxalate and low urine pH. Takes allopurinol.  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   PSA history: 6/17 2.2 (19.3%); 4/17 3.3; 1/14 2.9; 7/13 2.85; 11/12 1.87; 4/11 1.5; 9/09 1.4; 3/08 1.0  Labs since on TRT since 8/17:  9/12/17 (10d): H/H: 12.8/38.7, PSA 4.4, T 580  1/18/18 (7d): H/H:  14.3/43.8, PSA 4.9, T 980, Cr 1.4 (GFR 54), HbA1c 5.8    On TRT he felt better and was not having any peaks or valleys with dosing, though did not have positive benefit on erections and still has difficulty maintaining erections.  He did go back to work in November 2017 at [employer], and workload is the same as pre-intermediate. AUA symptom score 2/0. Resolution of epidid cyst/spermatocele, TAHIRA 25g benign.  Surveillance CT for his T1a resected RCC performed (as well as screening for nephrolithiasis). CXR June 2017 negative.  CT 2/5/18: There is nephrolithiasis in the right kidney. A punctate stone in the upper pole of the right kidney and a 4 mm stone in the lower pole of the right kidney are noted. There are small, low-density cysts involving the upper and lower poles of the right kidney. No right hydroureteronephrosis or ureteral stone is identified. There has been an interval right partial nephrectomy involving the mid and lower right kidney. Previously noted a solid right renal mass has been resected as has a simple renal cyst in the lower pole of the right kidney. Additional small cysts are noted in the left kidney as noted on prior MRI. No recurrent or residual solid mass is demonstrated on this examination. No left hydroureteronephrosis is seen. No renal vein filling defect is identified.  - previous CT stone protocol 5/24/17: 3 stones the largest of which is present within the lower pole and measures 3 mm.  For interim doubling of his PSA from 2.2 to 4.4 after first 3 months of TRT with interval rise to 4.8 at latest recheck, recommended prostate biopsy.  Elected to try Viagra again for his ED, as had not been using it properly/on empty stomach previously.    TRUS/bx at [facility] on 2/27/18.   TRUS VOLUME: 56.6 cc. ULTRASOUND FINDINGS: normal SVs, many diffuse intraprostatic calcifications, left apical hypoechoic areas  PATHOLOGY: 14 cores benign.    Did well after biopsy.  No significant hematuria.  No fevers or  "chills.  He denies urinary hesitancy, frequency, intermittency.  Only has nocturia ×1 with a diuretic as needed  Urinalysis dipstick has 1+ leukocytes and 50 of blood      ROS: A comprehensive 10 system review was performed and is negative except as noted above in HPI    PHYSICAL EXAM:    Vitals:    03/12/18 1601   BP: (!) 143/69   Pulse: 60   Temp: 98.3 °F (36.8 °C)     Body mass index is 35.7 kg/m². Weight: 116.1 kg (255 lb 15.3 oz) Height: 5' 11" (180.3 cm)       General: Alert, cooperative, no distress, appears stated age   Head: Normocephalic, without obvious abnormality, atraumatic   Eyes: PERRL, conjunctiva/corneas clear   Lungs: Respirations unlabored   Heart: Warm and well perfused   Abdomen: soft NT ND  Extremities: Extremities normal, atraumatic, no cyanosis or edema   Skin: Skin color, texture, turgor normal, no rashes or lesions   Psych: Appropriate   Neurologic: Non-focal       ASSESSMENT   1. Elevated PSA     2. Hypogonadism in male  POCT URINE DIPSTICK WITHOUT MICROSCOPE    PSA, Screening    Testosterone    CBC auto differential   3. History of renal cell cancer         Plan    His prostate biopsy is negative, and therefore will resume TRT.  He'll resume previous dose of 200 mg every 2 weeks, self injection therapy, and return in 3 months with repeat PSA, testosterone, CBC drawn approximate 10 days after injection.  We'll continue to follow his PSA closely.  At this time the spermatocele has resolved, and we'll monitor for recurrence in the future  As his baseline CT scan for surveillance of completely resected T1a RCC was negative, will follow renal function and chest x-ray, and discussed annual CT scan for 3 years, though noted can can plan annual ultrasound surveillance, with repeat CT scan only as indicated for clinical suspicion.  Kidney stones are small and nonobstructing.  Discussed increase hydration and general stone prevention recommendations  RTC 3 months with labs           "

## 2018-03-16 RX ORDER — FUROSEMIDE 40 MG/1
TABLET ORAL
Qty: 90 TABLET | Refills: 1 | Status: SHIPPED | OUTPATIENT
Start: 2018-03-16 | End: 2019-04-02 | Stop reason: ALTCHOICE

## 2018-04-26 ENCOUNTER — OFFICE VISIT (OUTPATIENT)
Dept: ORTHOPEDICS | Facility: CLINIC | Age: 68
End: 2018-04-26
Payer: COMMERCIAL

## 2018-04-26 ENCOUNTER — HOSPITAL ENCOUNTER (OUTPATIENT)
Dept: RADIOLOGY | Facility: HOSPITAL | Age: 68
Discharge: HOME | End: 2018-04-26
Attending: ORTHOPAEDIC SURGERY
Payer: COMMERCIAL

## 2018-04-26 VITALS
HEIGHT: 71 IN | SYSTOLIC BLOOD PRESSURE: 122 MMHG | WEIGHT: 255 LBS | DIASTOLIC BLOOD PRESSURE: 62 MMHG | BODY MASS INDEX: 35.7 KG/M2 | HEART RATE: 71 BPM

## 2018-04-26 DIAGNOSIS — M25.562 PAIN IN BOTH KNEES, UNSPECIFIED CHRONICITY: Primary | ICD-10-CM

## 2018-04-26 DIAGNOSIS — M25.561 PAIN IN BOTH KNEES, UNSPECIFIED CHRONICITY: Primary | ICD-10-CM

## 2018-04-26 DIAGNOSIS — M25.561 PAIN IN BOTH KNEES, UNSPECIFIED CHRONICITY: ICD-10-CM

## 2018-04-26 DIAGNOSIS — M17.0 PRIMARY OSTEOARTHRITIS OF BOTH KNEES: Primary | ICD-10-CM

## 2018-04-26 DIAGNOSIS — M25.562 PAIN IN BOTH KNEES, UNSPECIFIED CHRONICITY: ICD-10-CM

## 2018-04-26 PROCEDURE — 3078F DIAST BP <80 MM HG: CPT | Mod: CPTII,S$GLB,, | Performed by: ORTHOPAEDIC SURGERY

## 2018-04-26 PROCEDURE — 73564 X-RAY EXAM KNEE 4 OR MORE: CPT | Mod: TC,50,PN

## 2018-04-26 PROCEDURE — 73564 X-RAY EXAM KNEE 4 OR MORE: CPT | Mod: 26,50,, | Performed by: RADIOLOGY

## 2018-04-26 PROCEDURE — 3074F SYST BP LT 130 MM HG: CPT | Mod: CPTII,S$GLB,, | Performed by: ORTHOPAEDIC SURGERY

## 2018-04-26 PROCEDURE — 99999 PR PBB SHADOW E&M-EST. PATIENT-LVL III: CPT | Mod: PBBFAC,,, | Performed by: ORTHOPAEDIC SURGERY

## 2018-04-26 PROCEDURE — 99213 OFFICE O/P EST LOW 20 MIN: CPT | Mod: S$GLB,,, | Performed by: ORTHOPAEDIC SURGERY

## 2018-04-26 NOTE — PROGRESS NOTES
Past Medical History:   Diagnosis Date    Arthritis     knees, back    CAD (coronary artery disease) 1995    no chest pain since CABG, sees Dr Larry Black    Diabetes mellitus     borderline    Hypertension     Kidney stones     uric acid stones    VASYL (obstructive sleep apnea) 2007    is compliant with CPAP    Primary gonadal failure        Past Surgical History:   Procedure Laterality Date    BACK SURGERY  2010    L5-6 fusion, with pins,bone graft    CARDIAC SURGERY  1995, 2007    total 7 vessel bypass    COLONOSCOPY  2008    repeat 2013    COLONOSCOPY N/A 4/4/2017    Procedure: COLONOSCOPY;  Surgeon: Dmitry Sampson MD;  Location: [facility] ENDO;  Service: Endoscopy;  Laterality: N/A;    CORONARY ARTERY BYPASS GRAFT  1995, 2007    cabgx3, cabgx5    EXTRACORPOREAL SHOCK WAVE LITHOTRIPSY      EYE SURGERY      cataracts bilateral    LITHOTRIPSY      LUMBAR LAMINECTOMY      Partial Nephrectomy Laproscopic         Current Outpatient Prescriptions   Medication Sig    allopurinol (ZYLOPRIM) 300 MG tablet Take 1 tablet (300 mg total) by mouth once daily.    aspirin (ECOTRIN) 81 MG EC tablet Take 81 mg by mouth once daily.    atorvastatin (LIPITOR) 20 MG tablet Take 1 tablet (20 mg total) by mouth once daily.    bisacodyl (FLEET BISACODYL) 10 mg/30 mL Enem Place 10 mg rectally once.    ciclopirox (LOPROX) 0.77 % Crea Thin film to AA onface daily PRN flare    ciprofloxacin HCl (CIPRO) 500 MG tablet Take 1 tablet (500 mg total) by mouth 2 (two) times daily. Day before of and after biopsy    clindamycin (CLEOCIN T) 1 % external solution AAA back BID PRN flare    docusate sodium (COLACE) 100 MG capsule Take 1 capsule (100 mg total) by mouth 2 (two) times daily.    fish oil-omega-3 fatty acids 300-1,000 mg capsule Take 2 g by mouth 3 (three) times daily.    hydrocortisone 2.5 % cream Thin film to AA onface daily PRN flare; use short term only    losartan-hydrochlorothiazide 50-12.5 mg (HYZAAR)  50-12.5 mg per tablet Take 1 tablet by mouth once daily.    metFORMIN (GLUCOPHAGE-XR) 500 MG 24 hr tablet TAKE 1 TABLET(500 MG) BY MOUTH EVERY DAY    naproxen sodium (ANAPROX) 220 MG tablet Take 220 mg by mouth 2 (two) times daily with meals.    sildenafil (VIAGRA) 100 MG tablet Take 1 tablet (100 mg total) by mouth daily as needed for Erectile Dysfunction.    testosterone cypionate (DEPOTESTOTERONE CYPIONATE) 200 mg/mL injection Inject 1 mL (200 mg total) into the muscle every 14 (fourteen) days. Provide needles/syringes    vitamin D 1000 units Tab Take 185 mg by mouth once daily.    furosemide (LASIX) 40 MG tablet TAKE 1 TABLET BY MOUTH DAILY AS NEEDED     No current facility-administered medications for this visit.      Facility-Administered Medications Ordered in Other Visits   Medication    triamcinolone acetonide injection 40 mg    triamcinolone acetonide injection 40 mg       Review of patient's allergies indicates:   Allergen Reactions    Adhesive Blisters       Family History   Problem Relation Age of Onset    Heart disease Mother     Hypertension Mother     Diabetes Mother     Heart disease Father      premature    Hypertension Father     Diabetes Sister     Urolithiasis Sister     Heart disease Paternal Uncle     Cancer Neg Hx     Prostate cancer Neg Hx     Kidney cancer Neg Hx     Melanoma Neg Hx     Lupus Neg Hx     Eczema Neg Hx     Psoriasis Neg Hx        Social History     Social History    Marital status:      Spouse name: N/A    Number of children: N/A    Years of education: N/A     Occupational History    Not on file.     Social History Main Topics    Smoking status: Former Smoker     Quit date: 2/7/1976    Smokeless tobacco: Never Used    Alcohol use Yes      Comment: Socially    Drug use: No    Sexual activity: Not on file     Other Topics Concern    Not on file     Social History Narrative    No narrative on file       Chief Complaint:   Chief Complaint    Patient presents with    Left Knee - Pain    Right Knee - Pain       History of present illness: Is a 68-year-old male seen for recurrent bilateral knee pain.  Right is greater than the left.  Injected him back in 2016 with Synvisc 1 and he got about a year and 5 months out of it.  Works well.  The patient is back at work and so a 7 into a more walking and stair climbing.  Pain as a 2 out of 10.      Review of Systems:    Constitution: Negative for chills, fever, and sweats.  Negative for unexplained weight loss.    HENT:  Negative for headaches and blurry vision.    Cardiovascular:Negative for chest pain or irregular heart beat. Negative for hypertension.    Respiratory:  Negative for cough and shortness of breath.    Gastrointestinal: Negative for abdominal pain, heartburn, melena, nausea, and vomitting.    Genitourinary:  Negative bladder incontinence and dysuria.    Musculoskeletal:  See HPI    Neurological: Negative for numbness.    Psychiatric/Behavioral: Negative for depression.  The patient is not nervous/anxious.      Endocrine: Negative for polyuria    Hematologic/Lymphatic: Negative for bleeding problem.  Does not bruise/bleed easily.    Skin: Negative for poor would healing and rash      Physical Examination:    Vital Signs:    Vitals:    04/26/18 1527   BP: 122/62   Pulse: 71       Body mass index is 35.57 kg/m².    This a well-developed, well nourished patient in no acute distress.  They are alert and oriented and cooperative to examination.  Pt. walks without an antalgic gait.      Examination of bilateral knees shows no rashes or erythema. There are no masses ecchymosis or effusion. Patient has full range of motion from 0-130°. Patient is nontender to palpation over lateral joint line and moderately tender to palpation over the medial joint line. Patient has a - Lachman exam, - anterior drawer exam, and - posterior drawer exam. - Luc's exam. Knee is stable to varus and valgus stress. 5 out  of 5 motor strength. Palpable distal pulses. Intact light touch sensation. Negative Patellofemoral crepitus      X-rays: X-rays of both knees are ordered and review which show severe medial joint space narrowing of both knees     Assessment:: Severe varus knee arthritis    Plan:  I reviewed the findings with her today.  Patient is back at work so cannot do anything more definitive.  We'll get authorization for another round of Synvisc 1 in both knees.    This note was created using Dragon voice recognition software that occasionally misinterpreted phrases or words.    Consult note is delivered via Epic messaging service.

## 2018-04-27 DIAGNOSIS — M17.0 PRIMARY OSTEOARTHRITIS OF KNEES, BILATERAL: Primary | ICD-10-CM

## 2018-05-10 ENCOUNTER — OFFICE VISIT (OUTPATIENT)
Dept: ORTHOPEDICS | Facility: CLINIC | Age: 68
End: 2018-05-10
Payer: COMMERCIAL

## 2018-05-10 DIAGNOSIS — M17.0 PRIMARY OSTEOARTHRITIS OF BOTH KNEES: Primary | ICD-10-CM

## 2018-05-10 PROCEDURE — 99999 PR PBB SHADOW E&M-EST. PATIENT-LVL II: CPT | Mod: PBBFAC,,, | Performed by: ORTHOPAEDIC SURGERY

## 2018-05-10 PROCEDURE — 20610 DRAIN/INJ JOINT/BURSA W/O US: CPT | Mod: 50,S$GLB,, | Performed by: ORTHOPAEDIC SURGERY

## 2018-05-10 PROCEDURE — 99499 UNLISTED E&M SERVICE: CPT | Mod: S$GLB,,, | Performed by: ORTHOPAEDIC SURGERY

## 2018-05-11 NOTE — PROCEDURES
Large Joint Aspiration/Injection  Date/Time: 5/10/2018 9:03 AM  Performed by: KELIN BOSWELL  Authorized by: KELIN BOSWELL     Consent Done?:  Yes (Verbal)  Indications:  Pain  Procedure site marked: Yes    Timeout: Prior to procedure the correct patient, procedure, and site was verified      Location:  Knee  Site:  R knee and L knee  Prep: Patient was prepped and draped in usual sterile fashion    Needle size:  20 G  Approach:  Anterolateral  Medications:  48 mg hylan g-f 20 48 mg/6 mL; 48 mg hylan g-f 20 48 mg/6 mL  Patient tolerance:  Patient tolerated the procedure well with no immediate complications

## 2018-05-11 NOTE — PROGRESS NOTES
Past Medical History:   Diagnosis Date    Arthritis     knees, back    CAD (coronary artery disease) 1995    no chest pain since CABG, sees Dr Larry Black    Diabetes mellitus     borderline    Hypertension     Kidney stones     uric acid stones    VASYL (obstructive sleep apnea) 2007    is compliant with CPAP    Primary gonadal failure        Past Surgical History:   Procedure Laterality Date    BACK SURGERY  2010    L5-6 fusion, with pins,bone graft    CARDIAC SURGERY  1995, 2007    total 7 vessel bypass    COLONOSCOPY  2008    repeat 2013    COLONOSCOPY N/A 4/4/2017    Procedure: COLONOSCOPY;  Surgeon: Dmitry Sampson MD;  [facility] ENDO;  Service: Endoscopy;  Laterality: N/A;    CORONARY ARTERY BYPASS GRAFT  1995, 2007    cabgx3, cabgx5    EXTRACORPOREAL SHOCK WAVE LITHOTRIPSY      EYE SURGERY      cataracts bilateral    LITHOTRIPSY      LUMBAR LAMINECTOMY      Partial Nephrectomy Laproscopic         Current Outpatient Prescriptions   Medication Sig    allopurinol (ZYLOPRIM) 300 MG tablet Take 1 tablet (300 mg total) by mouth once daily.    aspirin (ECOTRIN) 81 MG EC tablet Take 81 mg by mouth once daily.    atorvastatin (LIPITOR) 20 MG tablet Take 1 tablet (20 mg total) by mouth once daily.    bisacodyl (FLEET BISACODYL) 10 mg/30 mL Enem Place 10 mg rectally once.    ciclopirox (LOPROX) 0.77 % Crea Thin film to AA onface daily PRN flare    ciprofloxacin HCl (CIPRO) 500 MG tablet Take 1 tablet (500 mg total) by mouth 2 (two) times daily. Day before of and after biopsy    clindamycin (CLEOCIN T) 1 % external solution AAA back BID PRN flare    docusate sodium (COLACE) 100 MG capsule Take 1 capsule (100 mg total) by mouth 2 (two) times daily.    fish oil-omega-3 fatty acids 300-1,000 mg capsule Take 2 g by mouth 3 (three) times daily.    hydrocortisone 2.5 % cream Thin film to AA onface daily PRN flare; use short term only    losartan-hydrochlorothiazide 50-12.5 mg (HYZAAR)  50-12.5 mg per tablet Take 1 tablet by mouth once daily.    metFORMIN (GLUCOPHAGE-XR) 500 MG 24 hr tablet TAKE 1 TABLET(500 MG) BY MOUTH EVERY DAY    naproxen sodium (ANAPROX) 220 MG tablet Take 220 mg by mouth 2 (two) times daily with meals.    sildenafil (VIAGRA) 100 MG tablet Take 1 tablet (100 mg total) by mouth daily as needed for Erectile Dysfunction.    testosterone cypionate (DEPOTESTOTERONE CYPIONATE) 200 mg/mL injection Inject 1 mL (200 mg total) into the muscle every 14 (fourteen) days. Provide needles/syringes    vitamin D 1000 units Tab Take 185 mg by mouth once daily.    furosemide (LASIX) 40 MG tablet TAKE 1 TABLET BY MOUTH DAILY AS NEEDED     No current facility-administered medications for this visit.      Facility-Administered Medications Ordered in Other Visits   Medication    triamcinolone acetonide injection 40 mg    triamcinolone acetonide injection 40 mg       Review of patient's allergies indicates:   Allergen Reactions    Adhesive Blisters       Family History   Problem Relation Age of Onset    Heart disease Mother     Hypertension Mother     Diabetes Mother     Heart disease Father         premature    Hypertension Father     Diabetes Sister     Urolithiasis Sister     Heart disease Paternal Uncle     Cancer Neg Hx     Prostate cancer Neg Hx     Kidney cancer Neg Hx     Melanoma Neg Hx     Lupus Neg Hx     Eczema Neg Hx     Psoriasis Neg Hx        Social History     Social History    Marital status:      Spouse name: N/A    Number of children: N/A    Years of education: N/A     Occupational History    Not on file.     Social History Main Topics    Smoking status: Former Smoker     Quit date: 2/7/1976    Smokeless tobacco: Never Used    Alcohol use Yes      Comment: Socially    Drug use: No    Sexual activity: Not on file     Other Topics Concern    Not on file     Social History Narrative    No narrative on file       Chief Complaint:   Chief  Complaint   Patient presents with    Knee Pain     B knee synvisc one       History of present illness: Is a 68-year-old male seen for recurrent bilateral knee pain.  Right is greater than the left.  Injected him back in 2016 with Synvisc 1 and he got about a year and 5 months out of it.  Works well.  The patient is back at work and so he has to do more walking and stair climbing.  Pain as a 1 out of 10.      Review of Systems:    Musculoskeletal:  See HPI        Physical Examination:    Vital Signs:    There were no vitals filed for this visit.    There is no height or weight on file to calculate BMI.    This a well-developed, well nourished patient in no acute distress.  They are alert and oriented and cooperative to examination.  Pt. walks without an antalgic gait.      Examination of bilateral knees shows no rashes or erythema. There are no masses ecchymosis or effusion. Patient has full range of motion from 0-130°. Patient is nontender to palpation over lateral joint line and moderately tender to palpation over the medial joint line. Knee is stable to varus and valgus stress. 5 out of 5 motor strength. Palpable distal pulses. Intact light touch sensation. Negative Patellofemoral crepitus      X-rays: X-rays of both knees are review which show severe medial joint space narrowing of both knees     Assessment:: Severe varus knee arthritis    Plan:  I injected both knees with Synvisc One.  Follow up in about 6 weeks.    This note was created using Dragon voice recognition software that occasionally misinterpreted phrases or words.    Consult note is delivered via Epic messaging service.

## 2018-05-29 RX ORDER — LOSARTAN POTASSIUM AND HYDROCHLOROTHIAZIDE 12.5; 5 MG/1; MG/1
TABLET ORAL
Qty: 90 TABLET | Refills: 0 | Status: SHIPPED | OUTPATIENT
Start: 2018-05-29 | End: 2018-08-29 | Stop reason: SDUPTHER

## 2018-05-30 DIAGNOSIS — E11.8 TYPE 2 DIABETES MELLITUS WITH COMPLICATION, WITHOUT LONG-TERM CURRENT USE OF INSULIN: Primary | ICD-10-CM

## 2018-06-01 ENCOUNTER — OFFICE VISIT (OUTPATIENT)
Dept: FAMILY MEDICINE | Facility: CLINIC | Age: 68
End: 2018-06-01
Payer: COMMERCIAL

## 2018-06-01 VITALS
BODY MASS INDEX: 35 KG/M2 | WEIGHT: 250 LBS | DIASTOLIC BLOOD PRESSURE: 75 MMHG | RESPIRATION RATE: 16 BRPM | SYSTOLIC BLOOD PRESSURE: 131 MMHG | OXYGEN SATURATION: 97 % | HEART RATE: 62 BPM | HEIGHT: 71 IN

## 2018-06-01 DIAGNOSIS — R97.20 ELEVATED PSA: ICD-10-CM

## 2018-06-01 DIAGNOSIS — N18.30 CKD (CHRONIC KIDNEY DISEASE) STAGE 3, GFR 30-59 ML/MIN: Chronic | ICD-10-CM

## 2018-06-01 DIAGNOSIS — E29.1 MALE HYPOGONADISM: ICD-10-CM

## 2018-06-01 DIAGNOSIS — E66.9 OBESITY (BMI 30.0-34.9): ICD-10-CM

## 2018-06-01 DIAGNOSIS — E11.65 TYPE 2 DIABETES MELLITUS WITH HYPERGLYCEMIA, WITHOUT LONG-TERM CURRENT USE OF INSULIN: Primary | ICD-10-CM

## 2018-06-01 DIAGNOSIS — E11.59 HYPERTENSION ASSOCIATED WITH DIABETES: ICD-10-CM

## 2018-06-01 DIAGNOSIS — I15.2 HYPERTENSION ASSOCIATED WITH DIABETES: ICD-10-CM

## 2018-06-01 DIAGNOSIS — Z23 NEED FOR PNEUMOCOCCAL VACCINATION: ICD-10-CM

## 2018-06-01 PROCEDURE — 99999 PR PBB SHADOW E&M-EST. PATIENT-LVL IV: CPT | Mod: PBBFAC,,, | Performed by: PHYSICIAN ASSISTANT

## 2018-06-01 PROCEDURE — 3075F SYST BP GE 130 - 139MM HG: CPT | Mod: CPTII,S$GLB,, | Performed by: PHYSICIAN ASSISTANT

## 2018-06-01 PROCEDURE — 90471 IMMUNIZATION ADMIN: CPT | Mod: S$GLB,,, | Performed by: PHYSICIAN ASSISTANT

## 2018-06-01 PROCEDURE — 3078F DIAST BP <80 MM HG: CPT | Mod: CPTII,S$GLB,, | Performed by: PHYSICIAN ASSISTANT

## 2018-06-01 PROCEDURE — 99214 OFFICE O/P EST MOD 30 MIN: CPT | Mod: 25,S$GLB,, | Performed by: PHYSICIAN ASSISTANT

## 2018-06-01 PROCEDURE — 90670 PCV13 VACCINE IM: CPT | Mod: S$GLB,,, | Performed by: PHYSICIAN ASSISTANT

## 2018-06-01 PROCEDURE — 3044F HG A1C LEVEL LT 7.0%: CPT | Mod: CPTII,S$GLB,, | Performed by: PHYSICIAN ASSISTANT

## 2018-06-01 NOTE — PROGRESS NOTES
Subjective:       Patient ID: Carlos Tenorio is a 68 y.o. male.    Chief Complaint: Follow-up (4mth f/u)    Mr. Tenorio comes to clinic today for follow up. He was to have labs completed before the appointment today but these were not completed. The patient reports he is scheduled for labs next month with Dr. Perales. He would like to have labs at that time. The patient is due for pneumonia shot and foot exam today. He is up to date on his eye exam with Dr. Allen. The patient has no complaints today and reports he has been feeling well.       Review of Systems   Constitutional: Negative for activity change and unexpected weight change.   HENT: Positive for hearing loss. Negative for rhinorrhea and trouble swallowing.    Eyes: Negative for discharge and visual disturbance.   Respiratory: Negative for chest tightness and wheezing.    Cardiovascular: Negative for chest pain and palpitations.   Gastrointestinal: Negative for blood in stool, constipation, diarrhea and vomiting.   Endocrine: Negative for polydipsia and polyuria.   Genitourinary: Negative for difficulty urinating, hematuria and urgency.   Musculoskeletal: Negative for arthralgias, joint swelling and neck pain.   Neurological: Negative for weakness and headaches.   Psychiatric/Behavioral: Negative for confusion and dysphoric mood.       Objective:      Physical Exam   Constitutional: He is oriented to person, place, and time.   HENT:   Mouth/Throat: Oropharynx is clear and moist. No oropharyngeal exudate.   Hearing aids in place   Eyes: Conjunctivae are normal. Pupils are equal, round, and reactive to light.   Cardiovascular: Normal rate and regular rhythm.    Pulses:       Dorsalis pedis pulses are 2+ on the right side, and 2+ on the left side.        Posterior tibial pulses are 2+ on the right side, and 2+ on the left side.   Pulmonary/Chest: Effort normal and breath sounds normal. He has no wheezes.   Abdominal: Soft. Bowel sounds are normal. There  is no tenderness.   Musculoskeletal: He exhibits no edema.   Feet:   Right Foot:   Protective Sensation: 10 sites tested. 10 sites sensed.   Skin Integrity: Negative for ulcer, blister, callus or dry skin.   Left Foot:   Protective Sensation: 10 sites tested. 10 sites sensed.   Skin Integrity: Negative for ulcer, blister, callus or dry skin.   Lymphadenopathy:     He has no cervical adenopathy.   Neurological: He is alert and oriented to person, place, and time.   Skin: No erythema.   Psychiatric: His behavior is normal.       Assessment:       1. Type 2 diabetes mellitus with hyperglycemia, without long-term current use of insulin    2. CKD (chronic kidney disease) stage 3, GFR 30-59 ml/min    3. Hypertension associated with diabetes    4. Elevated PSA    5. Male hypogonadism    6. Need for pneumococcal vaccination    7. Obesity (BMI 30.0-34.9)        Plan:   Carlos was seen today for follow-up.    Diagnoses and all orders for this visit:    Type 2 diabetes mellitus with hyperglycemia, without long-term current use of insulin  Labs to be completed this month  Diabetic diet  Continue current medication  CKD (chronic kidney disease) stage 3, GFR 30-59 ml/min  Labs to be completed this month  Hypertension associated with diabetes  Labs to be completed this month  Controlled  Low salt diet  Continue current medication  Elevated PSA  Follow up with Dr. Perales as scheduled.   Male hypogonadism  Follow up with Dr. Perales as scheduled.   Need for pneumococcal vaccination  -     (In Office Administered) Pneumococcal Conjugate Vaccine (13 Valent) (IM)    Obesity (BMI 30.0-34.9)  Low carbohydrate, high fiber diet  Increase exercise as able    Patient readiness: acceptance and barriers:none    During the course of the visit the patient was educated and counseled about the following:     Diabetes:  Discussed general issues about diabetes pathophysiology and management.  Educational material distributed.  Addressed ADA  diet.  Suggested low cholesterol diet.  Encouraged aerobic exercise.  Discussed foot care.  Reminded to get yearly retinal exam.  Hypertension:   Medication: no change.  Dietary sodium restriction.  Regular aerobic exercise.  Obesity:   General weight loss/lifestyle modification strategies discussed (elicit support from others; identify saboteurs; non-food rewards, etc).    Goals: Diabetes: Maintain Hemoglobin A1C below 7, Hypertension: Reduce Blood Pressure and Obesity: Reduce calorie intake and BMI    Did patient meet goals/outcomes: Yes    The following self management tools provided: declined    Patient Instructions (the written plan) was given to the patient/family.     Time spent with patient: 30 minutes    Barriers to medications present (no )    Adverse reactions to current medications (no)    Over the counter medications reviewed (Yes)

## 2018-06-18 ENCOUNTER — LAB VISIT (OUTPATIENT)
Dept: LAB | Facility: HOSPITAL | Age: 68
End: 2018-06-18
Attending: UROLOGY
Payer: COMMERCIAL

## 2018-06-18 DIAGNOSIS — N18.30 CKD (CHRONIC KIDNEY DISEASE) STAGE 3, GFR 30-59 ML/MIN: Chronic | ICD-10-CM

## 2018-06-18 DIAGNOSIS — E11.65 TYPE 2 DIABETES MELLITUS WITH HYPERGLYCEMIA, WITHOUT LONG-TERM CURRENT USE OF INSULIN: ICD-10-CM

## 2018-06-18 DIAGNOSIS — Z23 NEED FOR HEPATITIS B VACCINATION: ICD-10-CM

## 2018-06-18 DIAGNOSIS — E29.1 HYPOGONADISM IN MALE: ICD-10-CM

## 2018-06-18 LAB
ANION GAP SERPL CALC-SCNC: 10 MMOL/L
BASOPHILS # BLD AUTO: 0 K/UL
BASOPHILS NFR BLD: 0.4 %
BUN SERPL-MCNC: 26 MG/DL
CALCIUM SERPL-MCNC: 9.5 MG/DL
CHLORIDE SERPL-SCNC: 110 MMOL/L
CHOLEST SERPL-MCNC: 126 MG/DL
CHOLEST/HDLC SERPL: 5 {RATIO}
CO2 SERPL-SCNC: 20 MMOL/L
COMPLEXED PSA SERPL-MCNC: 3.9 NG/ML
CREAT SERPL-MCNC: 1.2 MG/DL
DIFFERENTIAL METHOD: ABNORMAL
EOSINOPHIL # BLD AUTO: 0.4 K/UL
EOSINOPHIL NFR BLD: 4.4 %
ERYTHROCYTE [DISTWIDTH] IN BLOOD BY AUTOMATED COUNT: 15.6 %
EST. GFR  (AFRICAN AMERICAN): >60 ML/MIN/1.73 M^2
EST. GFR  (NON AFRICAN AMERICAN): >60 ML/MIN/1.73 M^2
GLUCOSE SERPL-MCNC: 124 MG/DL
HCT VFR BLD AUTO: 42.3 %
HDLC SERPL-MCNC: 25 MG/DL
HDLC SERPL: 19.8 %
HGB BLD-MCNC: 14 G/DL
LDLC SERPL CALC-MCNC: 63.2 MG/DL
LYMPHOCYTES # BLD AUTO: 2.9 K/UL
LYMPHOCYTES NFR BLD: 29.9 %
MCH RBC QN AUTO: 29.9 PG
MCHC RBC AUTO-ENTMCNC: 33 G/DL
MCV RBC AUTO: 91 FL
MONOCYTES # BLD AUTO: 0.6 K/UL
MONOCYTES NFR BLD: 6.4 %
NEUTROPHILS # BLD AUTO: 5.7 K/UL
NEUTROPHILS NFR BLD: 58.9 %
NONHDLC SERPL-MCNC: 101 MG/DL
PHOSPHATE SERPL-MCNC: 3 MG/DL
PLATELET # BLD AUTO: 267 K/UL
PMV BLD AUTO: 9.8 FL
POTASSIUM SERPL-SCNC: 4 MMOL/L
PTH-INTACT SERPL-MCNC: 59.5 PG/ML
RBC # BLD AUTO: 4.67 M/UL
SODIUM SERPL-SCNC: 140 MMOL/L
TESTOST SERPL-MCNC: 132 NG/DL
TRIGL SERPL-MCNC: 189 MG/DL
WBC # BLD AUTO: 9.6 K/UL

## 2018-06-18 PROCEDURE — 83970 ASSAY OF PARATHORMONE: CPT

## 2018-06-18 PROCEDURE — 86706 HEP B SURFACE ANTIBODY: CPT

## 2018-06-18 PROCEDURE — 36415 COLL VENOUS BLD VENIPUNCTURE: CPT

## 2018-06-18 PROCEDURE — 80048 BASIC METABOLIC PNL TOTAL CA: CPT

## 2018-06-18 PROCEDURE — 84100 ASSAY OF PHOSPHORUS: CPT

## 2018-06-18 PROCEDURE — 85025 COMPLETE CBC W/AUTO DIFF WBC: CPT

## 2018-06-18 PROCEDURE — 84403 ASSAY OF TOTAL TESTOSTERONE: CPT

## 2018-06-18 PROCEDURE — 84153 ASSAY OF PSA TOTAL: CPT

## 2018-06-18 PROCEDURE — 80061 LIPID PANEL: CPT

## 2018-06-18 NOTE — PROGRESS NOTES
[facility] Urology Progress Note    Carlos Tenorio is a 68 y.o. male who presents for follow-up of hypogonadism, as well as history elevated psa, RCC (s/p L robotic partial 8/2017)    He had been on testosterone replacement therapy previously (off x 5 yrs at time of initial eval) and was referred to me initially for finding of renal mass found on workup for LINSEY after episode of severe colitis and dehydration. A PSA checked at the time of his colon infection was found to be elevated at 7.5, which delayed resuming his testosterone therapy for his hypogonadism  Recheck of PSA in June 2017 was 2.2 with 19% free, though TRT deferred until recovery from partial nephrectomy for the renal mass found as above.    He underwent left robotic partial nephrectomy, with concurrent left renal cyst decortication on 8/2/17 for an upper pole 2-3cm exophytic enhancing renal mass, adjacent to larger simple renal cyst.   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 Negative margins. Grade 2. No sarcomatoid or rhabdoid features. Benign renal cyst  Also has a history of calcium oxalate stones and uric acid stones in the past and has had prior bilateral ESWL 10 years prior.    By report, a 24-hour urine in October 2012 had high oxalate and low urine pH. Takes allopurinol.  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   He did go back to work in November 2017 at [email or web address], and workload is the same as pre-prison. AUA symptom score 2/0. Resolution of epidid cyst/spermatocele, TAHIRA 25g benign.  Surveillance CT 2/5/18 for his T1a resected RCC performed (as well as screening for nephrolithiasis) noting punctate R upper pole and 4mm R lower pole stone, no evidence recurrence. CXR June 2017  "negative. (previous CT stone protocol 5/24/17: 3 stones the largest of which is present within the lower pole and measures 3 mm.)  For interim doubling of his PSA from 2.2 to 4.4 after first 3 months of TRT with interval rise to 4.8 at latest recheck, recommended prostate biopsy.  TRUS bx 2/27/18. Vol 56.6. Path 14 cores benign  Elected to try Viagra again for his ED, as had not been using it properly/on empty stomach previously.  Resumed TRT 200mg IM Q2 wks     PSA history: 6/17 2.2 (19.3%); 4/17 3.3; 1/14 2.9; 7/13 2.85; 11/12 1.87; 4/11 1.5; 9/09 1.4; 3/08 1.0  Labs since on TRT since 8/17:  9/12/17 (10d): H/H: 12.8/38.7, PSA 4.4, T 580  1/18/18 (7d): H/H: 14.3/43.8, PSA 4.9, T 980, Cr 1.4 (GFR 54), HbA1c 5.8  6/18/18 (3 weeks after last shot): H/H 14/42.3, Cr 1.2, T 132, psa 3.9    Has been remodeling kitchen 3 weeks all night after 8 hour work days so hasnt noticed too much additional fatigue.  Has great result after the shots with good energy and libido and good erections without viagra  Udip negative  No worsening LUTS.  No stone passage, no flank pain, no hematuria, no dysurisa  udip neg      ROS: A comprehensive 10 system review was performed and is negative except as noted above in HPI    PHYSICAL EXAM:    Vitals:    06/19/18 0826   BP: (!) 152/76   Pulse: (!) 56   Resp: 18     Body mass index is 34.9 kg/m². Weight: 113.5 kg (250 lb 3.6 oz) Height: 5' 11" (180.3 cm)       General: Alert, cooperative, no distress, appears stated age   Head: Normocephalic, without obvious abnormality, atraumatic   Eyes: PERRL, conjunctiva/corneas clear   Lungs: Respirations unlabored   Heart: Warm and well perfused   Abdomen: soft NT ND  Extremities: Extremities normal, atraumatic, no cyanosis or edema   Skin: Skin color, texture, turgor normal, no rashes or lesions   Psych: Appropriate   Neurologic: Non-focal       Recent Results (from the past 336 hour(s))   Basic metabolic panel    Collection Time: 06/18/18  6:22 AM "   Result Value Ref Range    Sodium 140 136 - 145 mmol/L    Potassium 4.0 3.5 - 5.1 mmol/L    Chloride 110 95 - 110 mmol/L    CO2 20 (L) 23 - 29 mmol/L    Glucose 124 (H) 70 - 110 mg/dL    BUN, Bld 26 (H) 8 - 23 mg/dL    Creatinine 1.2 0.5 - 1.4 mg/dL    Calcium 9.5 8.7 - 10.5 mg/dL    Anion Gap 10 8 - 16 mmol/L    eGFR if African American >60 >60 mL/min/1.73 m^2    eGFR if non African American >60 >60 mL/min/1.73 m^2   PHOSPHORUS    Collection Time: 06/18/18  6:22 AM   Result Value Ref Range    Phosphorus 3.0 2.7 - 4.5 mg/dL   PTH, intact    Collection Time: 06/18/18  6:22 AM   Result Value Ref Range    PTH, Intact 59.5 9.0 - 77.0 pg/mL   Lipid panel    Collection Time: 06/18/18  6:22 AM   Result Value Ref Range    Cholesterol 126 120 - 199 mg/dL    Triglycerides 189 (H) 30 - 150 mg/dL    HDL 25 (L) 40 - 75 mg/dL    LDL Cholesterol 63.2 63.0 - 159.0 mg/dL    HDL/Chol Ratio 19.8 (L) 20.0 - 50.0 %    Total Cholesterol/HDL Ratio 5.0 2.0 - 5.0    Non-HDL Cholesterol 101 mg/dL   Hepatitis B surface antibody    Collection Time: 06/18/18  6:22 AM   Result Value Ref Range    Hep B S Ab Negative    PSA, Screening    Collection Time: 06/18/18  6:22 AM   Result Value Ref Range    PSA, SCREEN 3.9 0.00 - 4.00 ng/mL   Testosterone    Collection Time: 06/18/18  6:22 AM   Result Value Ref Range    Testosterone, Total 132 (L) 195.0 - 1138.0 ng/dL   CBC auto differential    Collection Time: 06/18/18  6:22 AM   Result Value Ref Range    WBC 9.60 3.90 - 12.70 K/uL    RBC 4.67 4.60 - 6.20 M/uL    Hemoglobin 14.0 14.0 - 18.0 g/dL    Hematocrit 42.3 40.0 - 54.0 %    MCV 91 82 - 98 fL    MCH 29.9 27.0 - 31.0 pg    MCHC 33.0 32.0 - 36.0 g/dL    RDW 15.6 (H) 11.5 - 14.5 %    Platelets 267 150 - 350 K/uL    MPV 9.8 9.2 - 12.9 fL    Gran # (ANC) 5.7 1.8 - 7.7 K/uL    Lymph # 2.9 1.0 - 4.8 K/uL    Mono # 0.6 0.3 - 1.0 K/uL    Eos # 0.4 0.0 - 0.5 K/uL    Baso # 0.00 0.00 - 0.20 K/uL    Gran% 58.9 38.0 - 73.0 %    Lymph% 29.9 18.0 - 48.0 %     Mono% 6.4 4.0 - 15.0 %    Eosinophil% 4.4 0.0 - 8.0 %    Basophil% 0.4 0.0 - 1.9 %    Differential Method Automated        ASSESSMENT   1. Hypogonadism in male  Testosterone    CBC auto differential    PSA, Screening    testosterone cypionate (DEPOTESTOTERONE CYPIONATE) 200 mg/mL injection   2. History of renal cell cancer  X-Ray Chest PA And Lateral    US Retroperitoneal Complete (Kidney and    Basic metabolic panel   3. Kidney stones  POCT URINE DIPSTICK WITHOUT MICROSCOPE    X-Ray Abdomen AP 1 View    US Retroperitoneal Complete (Kidney and    Basic metabolic panel       Plan    Before his most recent labs he was deficient on injection for 3 weeks as he got busy and his wife, who provides injections, was out of town.  He will resume his q2week injection cycle of 200 mg today.  As all labs have remained stable, he can return in 6 months with repeat labs prior, approximately 10 days after injection.  Will recheck CBC, testosterone, PSA.  We will also get BMP at that time as appear adequate has been of renal function given his history of RCC and prior partial nephrectomy.  He did have a baseline CT scan within the 1st year of his partial nephrectomy for completely resected T1 a negative margin renal cell carcinoma, and we did again discuss that is reasonable to perform a CT scan annually for 3 years though can also perform annual ultrasound and repeat cross-sectional imaging as indicated, which he elects to proceed with.  Prior to return to clinic in 6 months he will have a renal ultrasound, which will screen for any abnormality in the kidney which may need further assessment, as well as perform a screening measure for his known nonobstructing nephrolithiasis.  As an adjunct imaging measure for his kidney stones, will also get a KUB.  Will be due for TAHIRA at next clinic visit at which time we will review all labs and imaging studies for his multiple urologic issues which are all currently stable at this time

## 2018-06-19 ENCOUNTER — OFFICE VISIT (OUTPATIENT)
Dept: UROLOGY | Facility: CLINIC | Age: 68
End: 2018-06-19
Payer: COMMERCIAL

## 2018-06-19 VITALS
HEART RATE: 56 BPM | DIASTOLIC BLOOD PRESSURE: 76 MMHG | RESPIRATION RATE: 18 BRPM | BODY MASS INDEX: 35.03 KG/M2 | SYSTOLIC BLOOD PRESSURE: 152 MMHG | WEIGHT: 250.25 LBS | HEIGHT: 71 IN

## 2018-06-19 DIAGNOSIS — Z85.528 HISTORY OF RENAL CELL CANCER: ICD-10-CM

## 2018-06-19 DIAGNOSIS — N20.0 KIDNEY STONES: ICD-10-CM

## 2018-06-19 DIAGNOSIS — E29.1 HYPOGONADISM IN MALE: Primary | ICD-10-CM

## 2018-06-19 LAB — HBV SURFACE AB SER-ACNC: NEGATIVE M[IU]/ML

## 2018-06-19 PROCEDURE — 99999 PR PBB SHADOW E&M-EST. PATIENT-LVL IV: CPT | Mod: PBBFAC,,, | Performed by: UROLOGY

## 2018-06-19 PROCEDURE — 3077F SYST BP >= 140 MM HG: CPT | Mod: CPTII,S$GLB,, | Performed by: UROLOGY

## 2018-06-19 PROCEDURE — 3078F DIAST BP <80 MM HG: CPT | Mod: CPTII,S$GLB,, | Performed by: UROLOGY

## 2018-06-19 PROCEDURE — 99213 OFFICE O/P EST LOW 20 MIN: CPT | Mod: S$GLB,,, | Performed by: UROLOGY

## 2018-06-19 RX ORDER — TESTOSTERONE CYPIONATE 200 MG/ML
200 INJECTION, SOLUTION INTRAMUSCULAR
Qty: 10 ML | Refills: 3 | Status: SHIPPED | OUTPATIENT
Start: 2018-06-19 | End: 2018-12-18 | Stop reason: SDUPTHER

## 2018-08-02 DIAGNOSIS — E11.9 TYPE 2 DIABETES MELLITUS WITHOUT COMPLICATION: ICD-10-CM

## 2018-08-22 ENCOUNTER — PATIENT MESSAGE (OUTPATIENT)
Dept: ADMINISTRATIVE | Facility: OTHER | Age: 68
End: 2018-08-22

## 2018-08-29 ENCOUNTER — PATIENT MESSAGE (OUTPATIENT)
Dept: ADMINISTRATIVE | Facility: OTHER | Age: 68
End: 2018-08-29

## 2018-08-29 RX ORDER — LOSARTAN POTASSIUM AND HYDROCHLOROTHIAZIDE 12.5; 5 MG/1; MG/1
1 TABLET ORAL DAILY
Qty: 90 TABLET | Refills: 0 | Status: SHIPPED | OUTPATIENT
Start: 2018-08-29 | End: 2018-11-24 | Stop reason: SDUPTHER

## 2018-08-29 NOTE — TELEPHONE ENCOUNTER
Patient requesting a refill of Losartan-Hydrochlorothiazide.  LR--5-29-18  LOV--6-1-18  FOV--None Noted

## 2018-09-03 DIAGNOSIS — E11.65 TYPE 2 DIABETES MELLITUS WITH HYPERGLYCEMIA, WITHOUT LONG-TERM CURRENT USE OF INSULIN: ICD-10-CM

## 2018-09-04 RX ORDER — ATORVASTATIN CALCIUM 20 MG/1
TABLET, FILM COATED ORAL
Qty: 90 TABLET | Refills: 0 | Status: SHIPPED | OUTPATIENT
Start: 2018-09-04 | End: 2018-12-25 | Stop reason: SDUPTHER

## 2018-11-26 RX ORDER — LOSARTAN POTASSIUM AND HYDROCHLOROTHIAZIDE 12.5; 5 MG/1; MG/1
TABLET ORAL
Qty: 90 TABLET | Refills: 0 | Status: SHIPPED | OUTPATIENT
Start: 2018-11-26 | End: 2019-07-14 | Stop reason: SDUPTHER

## 2018-12-14 ENCOUNTER — HOSPITAL ENCOUNTER (OUTPATIENT)
Dept: RADIOLOGY | Facility: HOSPITAL | Age: 68
Discharge: HOME | End: 2018-12-14
Attending: UROLOGY
Payer: COMMERCIAL

## 2018-12-14 DIAGNOSIS — Z85.528 HISTORY OF RENAL CELL CANCER: ICD-10-CM

## 2018-12-14 DIAGNOSIS — N20.0 KIDNEY STONES: ICD-10-CM

## 2018-12-14 PROCEDURE — 74018 RADEX ABDOMEN 1 VIEW: CPT | Mod: 26,,, | Performed by: RADIOLOGY

## 2018-12-14 PROCEDURE — 71046 X-RAY EXAM CHEST 2 VIEWS: CPT | Mod: 26,,, | Performed by: RADIOLOGY

## 2018-12-14 PROCEDURE — 76770 US EXAM ABDO BACK WALL COMP: CPT | Mod: 26,,, | Performed by: RADIOLOGY

## 2018-12-14 PROCEDURE — 74018 RADEX ABDOMEN 1 VIEW: CPT | Mod: TC,FY

## 2018-12-14 PROCEDURE — 76770 US EXAM ABDO BACK WALL COMP: CPT | Mod: TC

## 2018-12-14 PROCEDURE — 71046 X-RAY EXAM CHEST 2 VIEWS: CPT | Mod: TC,FY

## 2018-12-17 ENCOUNTER — OFFICE VISIT (OUTPATIENT)
Dept: UROLOGY | Facility: CLINIC | Age: 68
End: 2018-12-17
Payer: COMMERCIAL

## 2018-12-17 VITALS
HEART RATE: 81 BPM | HEIGHT: 71 IN | RESPIRATION RATE: 18 BRPM | BODY MASS INDEX: 34.88 KG/M2 | WEIGHT: 249.13 LBS | DIASTOLIC BLOOD PRESSURE: 81 MMHG | SYSTOLIC BLOOD PRESSURE: 144 MMHG

## 2018-12-17 DIAGNOSIS — R97.20 ELEVATED PSA: ICD-10-CM

## 2018-12-17 DIAGNOSIS — E29.1 HYPOGONADISM IN MALE: Primary | ICD-10-CM

## 2018-12-17 DIAGNOSIS — Z85.528 HISTORY OF RENAL CELL CANCER: ICD-10-CM

## 2018-12-17 LAB
BILIRUB SERPL-MCNC: ABNORMAL MG/DL
BLOOD URINE, POC: ABNORMAL
COLOR, POC UA: YELLOW
GLUCOSE UR QL STRIP: ABNORMAL
KETONES UR QL STRIP: ABNORMAL
LEUKOCYTE ESTERASE URINE, POC: ABNORMAL
NITRITE, POC UA: ABNORMAL
PH, POC UA: 6
PROTEIN, POC: ABNORMAL
SPECIFIC GRAVITY, POC UA: 1.01
UROBILINOGEN, POC UA: ABNORMAL

## 2018-12-17 PROCEDURE — 99999 PR PBB SHADOW E&M-EST. PATIENT-LVL III: CPT | Mod: PBBFAC,,, | Performed by: UROLOGY

## 2018-12-17 PROCEDURE — 3077F SYST BP >= 140 MM HG: CPT | Mod: CPTII,S$GLB,, | Performed by: UROLOGY

## 2018-12-17 PROCEDURE — 99214 OFFICE O/P EST MOD 30 MIN: CPT | Mod: 25,S$GLB,, | Performed by: UROLOGY

## 2018-12-17 PROCEDURE — 81002 URINALYSIS NONAUTO W/O SCOPE: CPT | Mod: S$GLB,,, | Performed by: UROLOGY

## 2018-12-17 PROCEDURE — 3079F DIAST BP 80-89 MM HG: CPT | Mod: CPTII,S$GLB,, | Performed by: UROLOGY

## 2018-12-17 PROCEDURE — 1101F PT FALLS ASSESS-DOCD LE1/YR: CPT | Mod: CPTII,S$GLB,, | Performed by: UROLOGY

## 2018-12-17 NOTE — PROGRESS NOTES
[facility] Urology Progress Note    Carlos Tenorio is a 68 y.o. male who presents for follow-up of hypogonadism, as well as history elevated psa, RCC (s/p L robotic partial 8/2017)     He had been on testosterone replacement therapy previously (off x 5 yrs at time of initial eval) and was referred to me initially for finding of renal mass found on workup for LINSEY after episode of severe colitis and dehydration. A PSA checked at the time of his colon infection was found to be elevated at 7.5, which delayed resuming his testosterone therapy for his hypogonadism  Recheck of PSA in June 2017 was 2.2 with 19% free, though TRT deferred until recovery from partial nephrectomy for the renal mass found as above.    He underwent left robotic partial nephrectomy, with concurrent left renal cyst decortication on 8/2/17 for an upper pole 2-3cm exophytic enhancing renal mass, adjacent to larger simple renal cyst.   Pathology: [id number] clear cell RCC. 2.5cm. Negative margins. Grade 2. No sarcomatoid or rhabdoid features. Benign renal cyst  Also has a history of calcium oxalate stones and uric acid stones in the past and has had prior bilateral ESWL 10 years prior.    By report, a 24-hour urine in October 2012 had high oxalate and low urine pH. Takes allopurinol.  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   He did go back to work in November 2017 at [employer], and workload is the same as pre-correction. AUA symptom score 2/0. Resolution of epidid cyst/spermatocele, TAHIRA 25g benign.  Surveillance CT 2/5/18 for his T1a resected RCC performed (as well as screening for nephrolithiasis) noting punctate R upper pole and 4mm R lower pole stone, no evidence recurrence. CXR June 2017  "negative. (previous CT stone protocol 5/24/17: 3 stones the largest of which is present within the lower pole and measures 3 mm.)  For interim doubling of his PSA from 2.2 to 4.4 after first 3 months of TRT with interval rise to 4.8 at latest recheck, recommended prostate biopsy.  TRUS bx 2/27/18. Vol 56.6. Path 14 cores benign  Elected to try Viagra again for his ED, as had not been using it properly/on empty stomach previously.  Resumed TRT 200mg IM Q2 wks     PSA history: 6/17 2.2 (19.3%); 4/17 3.3; 1/14 2.9; 7/13 2.85; 11/12 1.87; 4/11 1.5; 9/09 1.4; 3/08 1.0  Labs since on TRT since 8/17:  9/12/17 (10d): H/H: 12.8/38.7, PSA 4.4, T 580  1/18/18 (7d): H/H: 14.3/43.8, PSA 4.9, T 980, Cr 1.4 (GFR 54), HbA1c 5.8  6/18/18 (3 weeks after last shot): H/H 14/42.3, Cr 1.2, T 132, psa 3.9     6/19/18: good energy and libido with TRT and great erections without viagra. 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 No worsening LUTS    He returns today with repeat labs as below  12/14/18: T 654, Cr 1.3, eGFR 56, H/H 15.1/46.5, psa 4.5  Labs were drawn 10d after shot  Denies bothersome LUTS with AUA SS: 1/0  Still feeling well on TRT with good energy and libido  No flank pain, hematuria, dysuria. No stones.      ROS: A comprehensive 10 system review was performed and is negative except as noted above in HPI    PHYSICAL EXAM:    Vitals:    12/17/18 1528   BP: (!) 144/81   Pulse: 81   Resp: 18     Body mass index is 34.75 kg/m². Weight: 113 kg (249 lb 1.9 oz) Height: 5' 11" (180.3 cm)       General: Alert, cooperative, no distress, appears stated age   Head: Normocephalic, without obvious abnormality, atraumatic   Eyes: PERRL, conjunctiva/corneas clear   Lungs: Respirations unlabored   Heart: Warm and well perfused   Abdomen: soft NT ND  Extremities: Extremities normal, atraumatic, no cyanosis or edema   Skin: Skin color, texture, turgor normal, no rashes or lesions   Psych: Appropriate   Neurologic: Non-focal       Recent Results (from " the past 336 hour(s))   Testosterone    Collection Time: 12/14/18  8:56 AM   Result Value Ref Range    Testosterone, Total 654 304 - 1227 ng/dL   Basic metabolic panel    Collection Time: 12/14/18  8:56 AM   Result Value Ref Range    Sodium 140 136 - 145 mmol/L    Potassium 4.1 3.5 - 5.1 mmol/L    Chloride 103 95 - 110 mmol/L    CO2 28 23 - 29 mmol/L    Glucose 111 (H) 70 - 110 mg/dL    BUN, Bld 23 8 - 23 mg/dL    Creatinine 1.3 0.5 - 1.4 mg/dL    Calcium 9.7 8.7 - 10.5 mg/dL    Anion Gap 9 8 - 16 mmol/L    eGFR if African American >60 >60 mL/min/1.73 m^2    eGFR if non African American 56 (A) >60 mL/min/1.73 m^2   CBC auto differential    Collection Time: 12/14/18  8:56 AM   Result Value Ref Range    WBC 10.40 3.90 - 12.70 K/uL    RBC 5.13 4.60 - 6.20 M/uL    Hemoglobin 15.1 14.0 - 18.0 g/dL    Hematocrit 46.5 40.0 - 54.0 %    MCV 91 82 - 98 fL    MCH 29.4 27.0 - 31.0 pg    MCHC 32.5 32.0 - 36.0 g/dL    RDW 15.9 (H) 11.5 - 14.5 %    Platelets 292 150 - 350 K/uL    MPV 9.8 9.2 - 12.9 fL    Gran # (ANC) 6.2 1.8 - 7.7 K/uL    Lymph # 3.0 1.0 - 4.8 K/uL    Mono # 0.9 0.3 - 1.0 K/uL    Eos # 0.2 0.0 - 0.5 K/uL    Baso # 0.10 0.00 - 0.20 K/uL    Gran% 60.0 38.0 - 73.0 %    Lymph% 28.7 18.0 - 48.0 %    Mono% 8.7 4.0 - 15.0 %    Eosinophil% 2.1 0.0 - 8.0 %    Basophil% 0.5 0.0 - 1.9 %    Differential Method Automated    PSA, Screening    Collection Time: 12/14/18  8:56 AM   Result Value Ref Range    PSA, SCREEN 4.5 (H) 0.00 - 4.00 ng/mL   POCT URINE DIPSTICK WITHOUT MICROSCOPE    Collection Time: 12/17/18  3:37 PM   Result Value Ref Range    Color, UA yellow     Spec Grav UA 1.015     pH, UA 6     WBC, UA small     Nitrite, UA neg     Protein neg     Glucose, UA neg     Ketones, UA neg     Urobilinogen, UA neg     Bilirubin neg     Blood, UA neg        ASSESSMENT   1. Hypogonadism in male  POCT URINE DIPSTICK WITHOUT MICROSCOPE    Prostate Specific Antigen, Diagnostic    CBC auto differential    Testosterone   2. Elevated  PSA  Miscellaneous Sendout Test confirm mdx   3. History of renal cell cancer         Plan    Doing well on TRT with good symptomatic result.  Can continue 200mg Q2 weeks self injection therapy    Has had rising psa velocity and elevated psa once started TRT, and has had negative prostate biopsy.  PSA however continues to remain elevated, though stable.  Given recent negative prostate biopsy advised further evaluation.   Will send confirm MDx genetic analysis to look for methylation markers consistent with CaP from last biopsy and determine risk of finding cancer on future biopsy, if any.  Will also get 3T MRI prostate to evaluate for any imaging defined index lesions.  If either confirm MDx or MRI is positive, would consider rebiopsy targeting these lesions    Will chart check results and amend f/u if needed. Otherwise RTC in 6 mos after repeating labs about 10d after an injection

## 2018-12-18 DIAGNOSIS — E29.1 HYPOGONADISM IN MALE: ICD-10-CM

## 2018-12-23 DIAGNOSIS — E29.1 HYPOGONADISM IN MALE: ICD-10-CM

## 2018-12-25 DIAGNOSIS — E11.65 TYPE 2 DIABETES MELLITUS WITH HYPERGLYCEMIA, WITHOUT LONG-TERM CURRENT USE OF INSULIN: ICD-10-CM

## 2018-12-26 RX ORDER — ATORVASTATIN CALCIUM 20 MG/1
TABLET, FILM COATED ORAL
Qty: 90 TABLET | Refills: 0 | Status: SHIPPED | OUTPATIENT
Start: 2018-12-26 | End: 2019-03-29 | Stop reason: SDUPTHER

## 2018-12-29 ENCOUNTER — TELEPHONE (OUTPATIENT)
Dept: UROLOGY | Facility: CLINIC | Age: 68
End: 2018-12-29

## 2018-12-29 NOTE — TELEPHONE ENCOUNTER
3T MRI at [facility] 12/26/18    58mL prostate with 3mm intravesical extension    PIRAD-3 index lesion: 8x6mm posterior lateral peripheral zone of mid gland on right. 14mm from midline and 2mm from capsule. No evidence of EPE.  - heterogenous on T2 with indistinct margins with no masslike lesion but focal moderately hypointense on ADC, mildly hyperintense on DWI, and evaluation for masslike hyperemia confounded by hyperemic prostatitis bands    TZ PIRAD-2 only with bilateral avid nodular hyperemia of BPH    Intermed probability of clinically significant cancer within the posterolateral peripheral zone of midgland on R

## 2018-12-31 RX ORDER — TESTOSTERONE CYPIONATE 200 MG/ML
INJECTION, SOLUTION INTRAMUSCULAR
Qty: 10 ML | Refills: 0 | Status: SHIPPED | OUTPATIENT
Start: 2018-12-31 | End: 2019-04-02

## 2018-12-31 RX ORDER — TESTOSTERONE CYPIONATE 200 MG/ML
INJECTION, SOLUTION INTRAMUSCULAR
Qty: 10 ML | Refills: 0 | OUTPATIENT
Start: 2018-12-31

## 2019-02-06 DIAGNOSIS — E11.65 TYPE 2 DIABETES MELLITUS WITH HYPERGLYCEMIA, WITHOUT LONG-TERM CURRENT USE OF INSULIN: ICD-10-CM

## 2019-02-06 DIAGNOSIS — N18.30 CKD (CHRONIC KIDNEY DISEASE) STAGE 3, GFR 30-59 ML/MIN: Chronic | ICD-10-CM

## 2019-02-06 DIAGNOSIS — N20.0 RECURRENT KIDNEY STONES: ICD-10-CM

## 2019-02-07 DIAGNOSIS — N20.0 RECURRENT KIDNEY STONES: ICD-10-CM

## 2019-02-07 DIAGNOSIS — E11.65 TYPE 2 DIABETES MELLITUS WITH HYPERGLYCEMIA, WITHOUT LONG-TERM CURRENT USE OF INSULIN: Primary | ICD-10-CM

## 2019-02-07 RX ORDER — ALLOPURINOL 300 MG/1
TABLET ORAL
Qty: 30 TABLET | Refills: 0 | Status: SHIPPED | OUTPATIENT
Start: 2019-02-07 | End: 2019-03-10 | Stop reason: SDUPTHER

## 2019-02-07 RX ORDER — ALLOPURINOL 300 MG/1
TABLET ORAL
Qty: 90 TABLET | Refills: 0 | Status: SHIPPED | OUTPATIENT
Start: 2019-02-07 | End: 2019-07-15 | Stop reason: SDUPTHER

## 2019-02-07 RX ORDER — METFORMIN HYDROCHLORIDE 500 MG/1
TABLET, EXTENDED RELEASE ORAL
Qty: 90 TABLET | Refills: 0 | Status: SHIPPED | OUTPATIENT
Start: 2019-02-07 | End: 2019-05-03 | Stop reason: SDUPTHER

## 2019-02-07 NOTE — TELEPHONE ENCOUNTER
Refill e-scribed for Metformin and Allopurinol. Last office visit noted on 6-1-18. Per Dr. Bradshaw's instructions patient needs to schedule follow up appointment. Call placed to patient. No answer received. Left message requesting return call to office.

## 2019-02-18 ENCOUNTER — TELEPHONE (OUTPATIENT)
Dept: UROLOGY | Facility: CLINIC | Age: 69
End: 2019-02-18

## 2019-02-18 NOTE — TELEPHONE ENCOUNTER
Please let patient know that his confirm MDX genetic analysis was positive indicating 29% chance of finding prostate cancer on repeat biopsy, and his MRI had a lesion in right prostate with intermediate probability (3 of 5) of prostate cancer.    As discussed at [facility] OV if either were positive would proceed with repeat biopsy, and both are positive.    Options:  1. Pick asc Tuesday 3/19 or after and schedule prostate biopsy, review and mail instructions, I will place orders when date confirmed and send abx  2. Due for labs prior to  June clinic visit - can review and plan at that time    Please let me know what he chooses      ------------------------------------------------------------------------      Confirm MdX:  + at [facility]  Indicates 29% chance of finding prostate cancer on repeat prostate biopsy.  19% chance of low-grade Diomedes 6 or less prostate cancer, 10% risk of Diomedes 7 or greater prostate cancer.

## 2019-02-19 DIAGNOSIS — R97.20 ELEVATED PSA: Primary | ICD-10-CM

## 2019-02-19 RX ORDER — GENTAMICIN SULFATE 40 MG/ML
80 INJECTION, SOLUTION INTRAMUSCULAR; INTRAVENOUS ONCE
Status: CANCELLED | OUTPATIENT
Start: 2019-03-19

## 2019-02-19 RX ORDER — CIPROFLOXACIN 500 MG/1
500 TABLET ORAL 2 TIMES DAILY
Qty: 6 TABLET | Refills: 0 | Status: SHIPPED | OUTPATIENT
Start: 2019-02-19 | End: 2019-04-02 | Stop reason: ALTCHOICE

## 2019-02-19 RX ORDER — LIDOCAINE HYDROCHLORIDE 10 MG/ML
10 INJECTION, SOLUTION EPIDURAL; INFILTRATION; INTRACAUDAL; PERINEURAL ONCE
Status: CANCELLED | OUTPATIENT
Start: 2019-02-19 | End: 2019-02-19

## 2019-02-19 NOTE — TELEPHONE ENCOUNTER
Patient agreeable to 3/19.  Please place orders and send Cipro.    2 week f/u booked.  Appointment slip and biopsy info sheet mailed to patient.

## 2019-02-20 ENCOUNTER — OFFICE VISIT (OUTPATIENT)
Dept: FAMILY MEDICINE | Facility: CLINIC | Age: 69
End: 2019-02-20
Payer: COMMERCIAL

## 2019-02-20 VITALS
HEIGHT: 71 IN | WEIGHT: 257.94 LBS | DIASTOLIC BLOOD PRESSURE: 69 MMHG | TEMPERATURE: 99 F | BODY MASS INDEX: 36.11 KG/M2 | OXYGEN SATURATION: 98 % | SYSTOLIC BLOOD PRESSURE: 115 MMHG | HEART RATE: 64 BPM

## 2019-02-20 DIAGNOSIS — E11.59 HYPERTENSION ASSOCIATED WITH DIABETES: Primary | ICD-10-CM

## 2019-02-20 DIAGNOSIS — I15.2 HYPERTENSION ASSOCIATED WITH DIABETES: Primary | ICD-10-CM

## 2019-02-20 DIAGNOSIS — E66.9 OBESITY (BMI 30.0-34.9): ICD-10-CM

## 2019-02-20 DIAGNOSIS — N18.30 CKD (CHRONIC KIDNEY DISEASE) STAGE 3, GFR 30-59 ML/MIN: Chronic | ICD-10-CM

## 2019-02-20 DIAGNOSIS — E11.65 TYPE 2 DIABETES MELLITUS WITH HYPERGLYCEMIA, WITHOUT LONG-TERM CURRENT USE OF INSULIN: ICD-10-CM

## 2019-02-20 PROCEDURE — 99999 PR PBB SHADOW E&M-EST. PATIENT-LVL V: CPT | Mod: PBBFAC,,, | Performed by: NURSE PRACTITIONER

## 2019-02-20 PROCEDURE — 90662 FLU VACCINE - HIGH DOSE (65+) PRESERVATIVE FREE IM: ICD-10-PCS | Mod: S$GLB,,, | Performed by: NURSE PRACTITIONER

## 2019-02-20 PROCEDURE — 90471 FLU VACCINE - HIGH DOSE (65+) PRESERVATIVE FREE IM: ICD-10-PCS | Mod: 59,S$GLB,, | Performed by: NURSE PRACTITIONER

## 2019-02-20 PROCEDURE — 90471 IMMUNIZATION ADMIN: CPT | Mod: 59,S$GLB,, | Performed by: NURSE PRACTITIONER

## 2019-02-20 PROCEDURE — 1101F PR PT FALLS ASSESS DOC 0-1 FALLS W/OUT INJ PAST YR: ICD-10-PCS | Mod: CPTII,S$GLB,, | Performed by: NURSE PRACTITIONER

## 2019-02-20 PROCEDURE — 3074F SYST BP LT 130 MM HG: CPT | Mod: CPTII,S$GLB,, | Performed by: NURSE PRACTITIONER

## 2019-02-20 PROCEDURE — 3078F PR MOST RECENT DIASTOLIC BLOOD PRESSURE < 80 MM HG: ICD-10-PCS | Mod: CPTII,S$GLB,, | Performed by: NURSE PRACTITIONER

## 2019-02-20 PROCEDURE — 90662 IIV NO PRSV INCREASED AG IM: CPT | Mod: S$GLB,,, | Performed by: NURSE PRACTITIONER

## 2019-02-20 PROCEDURE — 3078F DIAST BP <80 MM HG: CPT | Mod: CPTII,S$GLB,, | Performed by: NURSE PRACTITIONER

## 2019-02-20 PROCEDURE — 99214 OFFICE O/P EST MOD 30 MIN: CPT | Mod: 25,S$GLB,, | Performed by: NURSE PRACTITIONER

## 2019-02-20 PROCEDURE — 99214 PR OFFICE/OUTPT VISIT, EST, LEVL IV, 30-39 MIN: ICD-10-PCS | Mod: 25,S$GLB,, | Performed by: NURSE PRACTITIONER

## 2019-02-20 PROCEDURE — 3074F PR MOST RECENT SYSTOLIC BLOOD PRESSURE < 130 MM HG: ICD-10-PCS | Mod: CPTII,S$GLB,, | Performed by: NURSE PRACTITIONER

## 2019-02-20 PROCEDURE — 1101F PT FALLS ASSESS-DOCD LE1/YR: CPT | Mod: CPTII,S$GLB,, | Performed by: NURSE PRACTITIONER

## 2019-02-20 PROCEDURE — 99999 PR PBB SHADOW E&M-EST. PATIENT-LVL V: ICD-10-PCS | Mod: PBBFAC,,, | Performed by: NURSE PRACTITIONER

## 2019-02-20 NOTE — PATIENT INSTRUCTIONS
Understanding Advance Care Planning  Advance care planning is the process of deciding ones own future medical care. It helps to make sure that if you cant speak for yourself, your wishes can still be carried out. The plan is a series of legal documents that note a persons wishes. The documents vary by state. Advance care planning should be discussed at a regular office visit with your primary care provider before an acute illness. Advance care planning is encouraged when a person has a serious illness that is expected to get worse. It may also be done before major surgery. And it can help you and your family be prepared in case of a major illness or injury. Advance care planning helps with making decisions at these times.    A healthcare proxy is a person who acts as the voice of a patient when the patient cant speak for himself or herself. The name of this role varies by state. It may be called a Durable Medical Power of  or Durable Power of  for Healthcare. It may be called an agent, surrogate, or advocate. Or it may be called a representative or decision maker. It is an official duty that is identified by a legal document. The document also varies by state.   Why is advance care planning important?  If a person communicates his or her healthcare wishes:  · He or she will be given medical care that matches his or her values and goals.  · Family members will not be forced to make decisions in a crisis with no guidance.  Creating a plan  Making an advance care plan is often done in 3 steps:  · Thinking about ones wishes. To create an advance care plan, you should think about what kind of medical treatment you would want if you lose the ability to communicate. Are there any situations in which you would refuse or stop treatment? Are there therapies you would want or not want? And whom do you want to make decisions for you? There are many places to learn more about how to plan for your care. Ask  your healthcare provider or  for resources.  · Picking a healthcare proxy. This means choosing a trusted person to speak for you only when you cant speak for yourself. When you cannot make medical decisions, your proxy makes sure the instructions in your advance care plan are followed. A proxy does not make decisions based on his or her own opinions. They must put aside those opinions and values if needed, and carry out your wishes.  · Filling out the legal documents. There are several kinds of legal documents for advance care planning. Each one tells healthcare providers your wishes. The documents may vary by state. They must be signed and may need to be witnessed or notarized. You can cancel or change them whenever you wish. Depending on your state, the documents may include a Healthcare Proxy form, Living Will, Durable Medical Power of , Advance Directive, or others.  The familys role  The best help a family can give is to support their loved ones wishes. Open and honest communication is vital. Family should express any concerns they have about the patients choices while the patient can still make decisions in the event that his or her illness prevents him or her from communicating those wishes at a later time.   Date Last Reviewed: 4/1/2017  © 2335-8692 [employer]. [address] [place] [address], [place] [address]. All rights reserved. This information is not intended as a substitute for professional medical care. Always follow your healthcare professional's instructions.        Advance Medical Directive    An advance medical directive is a form that lets you plan ahead for the care youd want if you could no longer express your wishes. This statement outlines the medical treatment youd want or names the person youd wish to make healthcare decisions for you. Be aware that laws vary from state to state, and it may be worthwhile to talk with an .  Writing down your  wishes  · An advance directive is important whether youre young or old. Injury or illness can strike at any age.  · Decide what is important to you and the kind of treatment youd want, or not want to have.  · Some states allow only one kind of advance directive. Some let you do both a Durable Power of  for Health Care and a Living Will. Some states put both kinds on the same form.  A Durable Power of  for Health Care  · This form lets you name someone else to be your agent.  · This person can decide on treatment for you only when you cant speak for yourself.  · You do not need to be at the end of your life. He or she could speak for you if you were in a coma but were likely to recover.  A Living Will  · This form lets you list the care you want at the end of your life.  · A living will applies only if you wont live without medical treatment. It would apply if you had advanced cancer, a massive stroke, or other serious illness from which you will not recover.  · It takes effect only when you can no longer express your wishes yourself.  Date Last Reviewed: 2/13/2016  © 6705-9546 [employer]. [address] [place] [address]. All rights reserved. This information is not intended as a substitute for professional medical care. Always follow your healthcare professional's instructions.        Choosing an Agent    A durable power of  for health care is only as good as the person you name to be your agent. If this person knows your treatment wishes and is willing to carry them out, youll probably be well-represented. Be sure to tell your agent whats important to you.  Who to choose  Here are suggestions for choosing an agent:  · You can name a family member, close friend, , , or rabbi.  · You should name one person as your agent. Then name one or two alternates. You need a backup person in case your first choice cant be reached when needed.  · Talk to each  person you are thinking of naming as your agent or alternate. Do this before you decide who should carry out your wishes.  Your agent should be...  · A competent adult, 18 years or older.  · Someone you trust and can talk to about the care you want and what is important to you.  · Someone who supports your treatment choices.  In many states, your agent cant be...  · Your healthcare provider.  · An employee of your healthcare provider or of a hospital, nursing home, or hospice program where you receive care.  Some states list other restrictions about who can be named as an agent for an advance directive.  Tip: It's a good idea to write down your wishes and give a copy to your agent.   Date Last Reviewed: 2/14/2016 © 2000-2017 [employer]. [address] [place] [address], [place] [address]. All rights reserved. This information is not intended as a substitute for professional medical care. Always follow your healthcare professional's instructions.

## 2019-02-20 NOTE — PROGRESS NOTES
Subjective:       Patient ID: Carlos Tenorio is a 69 y.o. male.    Chief Complaint: follow up diabetes, hypertension    Diabetes   He presents for his follow-up diabetic visit. He has type 2 diabetes mellitus. No MedicAlert identification noted. His disease course has been stable. Pertinent negatives for hypoglycemia include no confusion or headaches. Pertinent negatives for diabetes include no chest pain, no polydipsia, no polyuria and no weakness.   Hypertension   This is a chronic problem. The current episode started more than 1 year ago. The problem has been gradually improving since onset. The problem is controlled. Associated symptoms include peripheral edema. Pertinent negatives include no anxiety, chest pain, headaches, neck pain, palpitations or shortness of breath. There are no associated agents to hypertension. Risk factors for coronary artery disease include diabetes mellitus and family history.       Past Medical History:   Diagnosis Date    Arthritis     knees, back    CAD (coronary artery disease) 1995    no chest pain since CABG, sees Dr Larry Black    Diabetes mellitus     borderline    Hypertension     Kidney stones     uric acid stones    VASYL (obstructive sleep apnea) 2007    is compliant with CPAP    Primary gonadal failure        Review of patient's allergies indicates:   Allergen Reactions    Adhesive Blisters         Current Outpatient Medications:     allopurinol (ZYLOPRIM) 300 MG tablet, TAKE 1 TABLET(300 MG) BY MOUTH EVERY DAY, Disp: 90 tablet, Rfl: 0    aspirin (ECOTRIN) 81 MG EC tablet, Take 81 mg by mouth once daily., Disp: , Rfl:     atorvastatin (LIPITOR) 20 MG tablet, TAKE 1 TABLET BY MOUTH EVERY DAY, Disp: 90 tablet, Rfl: 0    ciprofloxacin HCl (CIPRO) 500 MG tablet, Take 1 tablet (500 mg total) by mouth 2 (two) times daily. Day before, of, after biopsy, Disp: 6 tablet, Rfl: 0    clindamycin (CLEOCIN T) 1 % external solution, AAA back BID PRN flare, Disp: 60 mL,  Rfl: 2    fish oil-omega-3 fatty acids 300-1,000 mg capsule, Take 2 g by mouth 3 (three) times daily., Disp: , Rfl:     hydrocortisone 2.5 % cream, Thin film to AA onface daily PRN flare; use short term only, Disp: 28 g, Rfl: 1    losartan-hydrochlorothiazide 50-12.5 mg (HYZAAR) 50-12.5 mg per tablet, TAKE 1 TABLET BY MOUTH EVERY DAY, Disp: 90 tablet, Rfl: 0    metFORMIN (GLUCOPHAGE-XR) 500 MG 24 hr tablet, TAKE 1 TABLET(500 MG) BY MOUTH EVERY DAY, Disp: 90 tablet, Rfl: 0    naproxen sodium (ANAPROX) 220 MG tablet, Take 220 mg by mouth 2 (two) times daily with meals., Disp: , Rfl:     testosterone cypionate (DEPOTESTOTERONE CYPIONATE) 200 mg/mL injection, INJECT 1 ML IN THE MUSCLE EVERY 14 DAYS, Disp: 10 mL, Rfl: 0    vitamin D 1000 units Tab, Take 185 mg by mouth once daily., Disp: , Rfl:     furosemide (LASIX) 40 MG tablet, TAKE 1 TABLET BY MOUTH DAILY AS NEEDED, Disp: 90 tablet, Rfl: 1  No current facility-administered medications for this visit.     Facility-Administered Medications Ordered in Other Visits:     triamcinolone acetonide injection 40 mg, 40 mg, Intra-articular, , Mason Macedo MD, 40 mg at 03/13/15 0929    triamcinolone acetonide injection 40 mg, 40 mg, Intra-articular, , Mason Macedo MD, 40 mg at 03/13/15 0929    Review of Systems   Constitutional: Negative for activity change and unexpected weight change.   HENT: Negative for hearing loss, rhinorrhea and trouble swallowing.    Eyes: Negative for discharge and visual disturbance.   Respiratory: Negative for chest tightness, shortness of breath and wheezing.    Cardiovascular: Negative for chest pain and palpitations.   Gastrointestinal: Negative for blood in stool, constipation, diarrhea and vomiting.   Endocrine: Negative for polydipsia and polyuria.   Genitourinary: Negative for difficulty urinating, hematuria and urgency.   Musculoskeletal: Positive for arthralgias (chronic knees). Negative for joint swelling and neck pain.  "  Skin: Negative for rash.   Neurological: Negative for weakness and headaches.   Psychiatric/Behavioral: Negative for confusion and dysphoric mood.       Objective:      /69 (BP Location: Right arm, Patient Position: Sitting, BP Method: Large (Automatic))   Pulse 64   Temp 98.8 °F (37.1 °C) (Oral)   Ht 5' 11" (1.803 m)   Wt 117 kg (257 lb 15 oz)   SpO2 98%   BMI 35.98 kg/m²   Physical Exam   Constitutional: He is oriented to person, place, and time. He appears well-developed and well-nourished.   Eyes: Conjunctivae and EOM are normal. Pupils are equal, round, and reactive to light.   Neck: Normal range of motion. Neck supple.   Cardiovascular: Normal rate, regular rhythm, normal heart sounds and intact distal pulses.   Pulmonary/Chest: Effort normal and breath sounds normal.   Abdominal: Soft. Bowel sounds are normal.   Musculoskeletal: Normal range of motion.   Neurological: He is alert and oriented to person, place, and time.   Skin: Skin is warm and dry.   Psychiatric: He has a normal mood and affect. His behavior is normal. Judgment and thought content normal.       Assessment:       1. Hypertension associated with diabetes    2. Type 2 diabetes mellitus with hyperglycemia, without long-term current use of insulin    3. CKD (chronic kidney disease) stage 3, GFR 30-59 ml/min    4. Obesity (BMI 30.0-34.9)        Plan:       Hypertension associated with diabetes   Low sodium diet, continue medication  BP Readings from Last 3 Encounters:   02/20/19 115/69   12/17/18 (!) 144/81   06/19/18 (!) 152/76     Type 2 diabetes mellitus with hyperglycemia, without long-term current use of insulin   Controlled, continue medication  Hemoglobin A1C   Date Value Ref Range Status   01/18/2018 5.8 (H) 4.0 - 5.6 % Final             08/02/2017 5.7 (H) 4.0 - 5.6 % Final             05/29/2017 6.0 4.5 - 6.2 % Final               CKD (chronic kidney disease) stage 3, GFR 30-59 ml/min   Significance improvement  Obesity (BMI " 30.0-34.9)  Low fat, low carbohydrate diet  Regular ecercise  Other orders  -     Influenza - High Dose (65+) (PF) (IM)          Patient readiness: acceptance and barriers:none    During the course of the visit the patient was educated and counseled about the following:     Diabetes:  Discussed general issues about diabetes pathophysiology and management.  Counseling at today's visit: focused on the need for regular aerobic exercise.  Suggested low cholesterol diet.  Hypertension:   Dietary sodium restriction.  Regular aerobic exercise.  Obesity:   General weight loss/lifestyle modification strategies discussed (elicit support from others; identify saboteurs; non-food rewards, etc).  Regular aerobic exercise program discussed.    Goals: Diabetes: Maintain Hemoglobin A1C below 7, Hypertension: Reduce Blood Pressure and Obesity: Reduce calorie intake and BMI    Did patient meet goals/outcomes: No    The following self management tools provided: declined    Patient Instructions (the written plan) was given to the patient/family.     Time spent with patient: 30 minutes    Barriers to medications present (no )    Adverse reactions to current medications (no)    Over the counter medications reviewed (Yes)    Time spent with patient: 30 minutes    Patient with be reevaluated in 6 weeks or sooner prn    Greater than 50% of this visit was spent counseling as described in above documentation:Yes

## 2019-02-25 RX ORDER — LOSARTAN POTASSIUM AND HYDROCHLOROTHIAZIDE 12.5; 5 MG/1; MG/1
TABLET ORAL
Qty: 90 TABLET | Refills: 3 | Status: SHIPPED | OUTPATIENT
Start: 2019-02-25 | End: 2019-04-02 | Stop reason: ALTCHOICE

## 2019-03-01 ENCOUNTER — TELEPHONE (OUTPATIENT)
Dept: ADMINISTRATIVE | Facility: HOSPITAL | Age: 69
End: 2019-03-01

## 2019-03-08 DIAGNOSIS — N20.0 RECURRENT KIDNEY STONES: ICD-10-CM

## 2019-03-10 RX ORDER — ALLOPURINOL 300 MG/1
TABLET ORAL
Qty: 30 TABLET | Refills: 0 | Status: SHIPPED | OUTPATIENT
Start: 2019-03-10 | End: 2019-04-02 | Stop reason: ALTCHOICE

## 2019-03-19 ENCOUNTER — HOSPITAL ENCOUNTER (OUTPATIENT)
Facility: AMBULARY SURGERY CENTER | Age: 69
Discharge: HOME | End: 2019-03-19
Attending: UROLOGY | Admitting: UROLOGY
Payer: COMMERCIAL

## 2019-03-19 DIAGNOSIS — M17.0 PRIMARY OSTEOARTHRITIS OF BOTH KNEES: Primary | ICD-10-CM

## 2019-03-19 DIAGNOSIS — R97.20 ELEVATED PSA: ICD-10-CM

## 2019-03-19 PROCEDURE — 88342 IMHCHEM/IMCYTCHM 1ST ANTB: CPT | Mod: 26,,, | Performed by: PATHOLOGY

## 2019-03-19 PROCEDURE — 88341 TISSUE SPECIMEN TO PATHOLOGY - SURGERY: ICD-10-PCS | Mod: 26,,, | Performed by: PATHOLOGY

## 2019-03-19 PROCEDURE — 88341 IMHCHEM/IMCYTCHM EA ADD ANTB: CPT | Mod: 26,,, | Performed by: PATHOLOGY

## 2019-03-19 PROCEDURE — 76942 PR U/S GUIDANCE FOR NEEDLE GUIDANCE: ICD-10-PCS | Mod: 26,59,, | Performed by: UROLOGY

## 2019-03-19 PROCEDURE — 55700 HC PROSTATE NEEDLE BIOPSY: CPT | Performed by: UROLOGY

## 2019-03-19 PROCEDURE — 55700 PR BIOPSY OF PROSTATE,NEEDLE/PUNCH: CPT | Mod: ,,, | Performed by: UROLOGY

## 2019-03-19 PROCEDURE — 76872 US TRANSRECTAL: CPT | Performed by: UROLOGY

## 2019-03-19 PROCEDURE — 76942 ECHO GUIDE FOR BIOPSY: CPT | Mod: 26,59,, | Performed by: UROLOGY

## 2019-03-19 PROCEDURE — 88305 TISSUE EXAM BY PATHOLOGIST: CPT | Mod: 26,,, | Performed by: PATHOLOGY

## 2019-03-19 PROCEDURE — 76872 US TRANSRECTAL: CPT | Mod: 26,,, | Performed by: UROLOGY

## 2019-03-19 PROCEDURE — 88305 TISSUE SPECIMEN TO PATHOLOGY - SURGERY: ICD-10-PCS | Mod: 26,,, | Performed by: PATHOLOGY

## 2019-03-19 PROCEDURE — 88305 TISSUE EXAM BY PATHOLOGIST: CPT | Performed by: PATHOLOGY

## 2019-03-19 PROCEDURE — 88342 TISSUE SPECIMEN TO PATHOLOGY - SURGERY: ICD-10-PCS | Mod: 26,,, | Performed by: PATHOLOGY

## 2019-03-19 PROCEDURE — 76872 PR US TRANSRECTAL: ICD-10-PCS | Mod: 26,,, | Performed by: UROLOGY

## 2019-03-19 PROCEDURE — 55700 PR BIOPSY OF PROSTATE,NEEDLE/PUNCH: ICD-10-PCS | Mod: ,,, | Performed by: UROLOGY

## 2019-03-19 RX ORDER — LIDOCAINE HYDROCHLORIDE 10 MG/ML
10 INJECTION, SOLUTION EPIDURAL; INFILTRATION; INTRACAUDAL; PERINEURAL ONCE
Status: DISCONTINUED | OUTPATIENT
Start: 2019-03-19 | End: 2019-03-19 | Stop reason: HOSPADM

## 2019-03-19 RX ORDER — LIDOCAINE HYDROCHLORIDE 10 MG/ML
INJECTION INFILTRATION; PERINEURAL
Status: DISCONTINUED | OUTPATIENT
Start: 2019-03-19 | End: 2019-03-19 | Stop reason: HOSPADM

## 2019-03-19 RX ORDER — LIDOCAINE HYDROCHLORIDE 10 MG/ML
INJECTION, SOLUTION EPIDURAL; INFILTRATION; INTRACAUDAL; PERINEURAL
Status: DISCONTINUED
Start: 2019-03-19 | End: 2019-03-19 | Stop reason: WASHOUT

## 2019-03-19 RX ORDER — GENTAMICIN SULFATE 40 MG/ML
INJECTION, SOLUTION INTRAMUSCULAR; INTRAVENOUS
Status: COMPLETED
Start: 2019-03-19 | End: 2019-03-19

## 2019-03-19 RX ORDER — GENTAMICIN SULFATE 40 MG/ML
80 INJECTION, SOLUTION INTRAMUSCULAR; INTRAVENOUS ONCE
Status: COMPLETED | OUTPATIENT
Start: 2019-03-19 | End: 2019-03-19

## 2019-03-19 RX ADMIN — GENTAMICIN SULFATE 80 MG: 40 INJECTION, SOLUTION INTRAMUSCULAR; INTRAVENOUS at 02:03

## 2019-03-19 NOTE — H&P
[facility] Urology Progress Note     Carlos Tenorio is a 68 y.o. male who presents for follow-up of hypogonadism, as well as history elevated psa, RCC (s/p L robotic partial 8/2017)     He had been on testosterone replacement therapy previously (off x 5 yrs at time of initial eval) and was referred to me initially for finding of renal mass found on workup for LINSEY after episode of severe colitis and dehydration. A PSA checked at the time of his colon infection was found to be elevated at 7.5, which delayed resuming his testosterone therapy for his hypogonadism  Recheck of PSA in June 2017 was 2.2 with 19% free, though TRT deferred until recovery from partial nephrectomy for the renal mass found as above.    He underwent left robotic partial nephrectomy, with concurrent left renal cyst decortication on 8/2/17 for an upper pole 2-3cm exophytic enhancing renal mass, adjacent to larger simple renal cyst.   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 Negative margins. Grade 2. No sarcomatoid or rhabdoid features. Benign renal cyst  Also has a history of calcium oxalate stones and uric acid stones in the past and has had prior bilateral ESWL 10 years prior.    By report, a 24-hour urine in October 2012 had high oxalate and low urine pH. Takes allopurinol.  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   He did go back to work in November 2017 at [employer], and workload is the same as pre-care home. AUA symptom score 2/0. Resolution of epidid cyst/spermatocele, TAHIRA 25g benign.  Surveillance CT 2/5/18 for his T1a resected RCC performed (as well as screening for nephrolithiasis) noting punctate R upper pole and 4mm R lower pole stone, no evidence recurrence. CXR June 2017  "negative. (previous CT stone protocol 5/24/17: 3 stones the largest of which is present within the lower pole and measures 3 mm.)  For interim doubling of his PSA from 2.2 to 4.4 after first 3 months of TRT with interval rise to 4.8 at latest recheck, recommended prostate biopsy.  TRUS bx 2/27/18. Vol 56.6. Path 14 cores benign  Elected to try Viagra again for his ED, as had not been using it properly/on empty stomach previously.  Resumed TRT 200mg IM Q2 wks     PSA history: 6/17 2.2 (19.3%); 4/17 3.3; 1/14 2.9; 7/13 2.85; 11/12 1.87; 4/11 1.5; 9/09 1.4; 3/08 1.0  Labs since on TRT since 8/17:  9/12/17 (10d): H/H: 12.8/38.7, PSA 4.4, T 580  1/18/18 (7d): H/H: 14.3/43.8, PSA 4.9, T 980, Cr 1.4 (GFR 54), HbA1c 5.8  6/18/18 (3 weeks after last shot): H/H 14/42.3, Cr 1.2, T 132, psa 3.9     6/19/18: good energy and libido with TRT and great erections without viagra. 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 No worsening LUTS     He returns today with repeat labs as below  12/14/18: T 654, Cr 1.3, eGFR 56, H/H 15.1/46.5, psa 4.5  Labs were drawn 10d after shot  Denies bothersome LUTS with AUA SS: 1/0  Still feeling well on TRT with good energy and libido  No flank pain, hematuria, dysuria. No stones.        ROS: A comprehensive 10 system review was performed and is negative except as noted above in HPI     PHYSICAL EXAM:         Vitals:     12/17/18 1528   BP: (!) 144/81   Pulse: 81   Resp: 18      Body mass index is 34.75 kg/m². Weight: 113 kg (249 lb 1.9 oz) Height: 5' 11" (180.3 cm)         General: Alert, cooperative, no distress, appears stated age   Head: Normocephalic, without obvious abnormality, atraumatic   Eyes: PERRL, conjunctiva/corneas clear   Lungs: Respirations unlabored   Heart: Warm and well perfused   Abdomen: soft NT ND  Extremities: Extremities normal, atraumatic, no cyanosis or edema   Skin: Skin color, texture, turgor normal, no rashes or lesions   Psych: Appropriate   Neurologic: Non-focal "         Recent Results         Recent Results (from the past 336 hour(s))   Testosterone     Collection Time: 12/14/18  8:56 AM   Result Value Ref Range     Testosterone, Total 654 304 - 1227 ng/dL   Basic metabolic panel     Collection Time: 12/14/18  8:56 AM   Result Value Ref Range     Sodium 140 136 - 145 mmol/L     Potassium 4.1 3.5 - 5.1 mmol/L     Chloride 103 95 - 110 mmol/L     CO2 28 23 - 29 mmol/L     Glucose 111 (H) 70 - 110 mg/dL     BUN, Bld 23 8 - 23 mg/dL     Creatinine 1.3 0.5 - 1.4 mg/dL     Calcium 9.7 8.7 - 10.5 mg/dL     Anion Gap 9 8 - 16 mmol/L     eGFR if African American >60 >60 mL/min/1.73 m^2     eGFR if non African American 56 (A) >60 mL/min/1.73 m^2   CBC auto differential     Collection Time: 12/14/18  8:56 AM   Result Value Ref Range     WBC 10.40 3.90 - 12.70 K/uL     RBC 5.13 4.60 - 6.20 M/uL     Hemoglobin 15.1 14.0 - 18.0 g/dL     Hematocrit 46.5 40.0 - 54.0 %     MCV 91 82 - 98 fL     MCH 29.4 27.0 - 31.0 pg     MCHC 32.5 32.0 - 36.0 g/dL     RDW 15.9 (H) 11.5 - 14.5 %     Platelets 292 150 - 350 K/uL     MPV 9.8 9.2 - 12.9 fL     Gran # (ANC) 6.2 1.8 - 7.7 K/uL     Lymph # 3.0 1.0 - 4.8 K/uL     Mono # 0.9 0.3 - 1.0 K/uL     Eos # 0.2 0.0 - 0.5 K/uL     Baso # 0.10 0.00 - 0.20 K/uL     Gran% 60.0 38.0 - 73.0 %     Lymph% 28.7 18.0 - 48.0 %     Mono% 8.7 4.0 - 15.0 %     Eosinophil% 2.1 0.0 - 8.0 %     Basophil% 0.5 0.0 - 1.9 %     Differential Method Automated     PSA, Screening     Collection Time: 12/14/18  8:56 AM   Result Value Ref Range     PSA, SCREEN 4.5 (H) 0.00 - 4.00 ng/mL   POCT URINE DIPSTICK WITHOUT MICROSCOPE     Collection Time: 12/17/18  3:37 PM   Result Value Ref Range     Color, UA yellow       Spec Grav UA 1.015       pH, UA 6       WBC, UA small       Nitrite, UA neg       Protein neg       Glucose, UA neg       Ketones, UA neg       Urobilinogen, UA neg       Bilirubin neg       Blood, UA neg              ASSESSMENT   1. Hypogonadism in male  POCT URINE  DIPSTICK WITHOUT MICROSCOPE     Prostate Specific Antigen, Diagnostic     CBC auto differential     Testosterone   2. Elevated PSA  Miscellaneous Sendout Test confirm mdx   3. History of renal cell cancer            Plan    Doing well on TRT with good symptomatic result.  Can continue 200mg Q2 weeks self injection therapy     Has had rising psa velocity and elevated psa once started TRT, and has had negative prostate biopsy.  PSA however continues to remain elevated, though stable.  Given recent negative prostate biopsy advised further evaluation.   Will send confirm MDx genetic analysis to look for methylation markers consistent with CaP from last biopsy and determine risk of finding cancer on future biopsy, if any.  Will also get 3T MRI prostate to evaluate for any imaging defined index lesions.  If either confirm MDx or MRI is positive, would consider rebiopsy targeting these lesions     Will chart check results and amend f/u if needed. Otherwise RTC in 6 mos after repeating labs about 10d after an injection      Confirm mdx LTZ  MRI R periph mid lat to med    Biopsy of prostate   Pt is acceptable candidate for procedure at [facility]

## 2019-03-19 NOTE — PLAN OF CARE
Patient is going home with his wife. His upcoming appointments were reviewed.Dr Heart spoke with them post procedure.

## 2019-03-20 VITALS
DIASTOLIC BLOOD PRESSURE: 75 MMHG | TEMPERATURE: 98 F | HEIGHT: 71 IN | HEART RATE: 54 BPM | OXYGEN SATURATION: 99 % | SYSTOLIC BLOOD PRESSURE: 145 MMHG | WEIGHT: 257 LBS | BODY MASS INDEX: 35.98 KG/M2 | RESPIRATION RATE: 19 BRPM

## 2019-03-20 NOTE — OP NOTE
Prostate Biopsy Procedure / Discharge Note:     DATE: 3/19/19     PRE-PROCEDURE DIAGNOSIS: Elevated psa     POST-PROCEDURE DIAGNOSIS: Same     INDICATION FOR PROCEDURE: elevated psa  Rising PSA on TRT, previous negative biopsy 2/27/18 when psa 4.9.  psa dropped and lisa again and confirm MDx +L TZ and 3T MRI with RM index lesions     ANESTHESIA: Local periprostatic block; 10 cc 1% lidocaine     PSA: 4.5     TRUS VOLUME: 66.6 cm3 (from 56.6cm3)  (W 56.3mm, H 39.3mm, L 57.5mm)     STAFF: Yovany Heart M.D.     EBL: Minimal     SPECIMEN: 17 Prostate Biopsy Cores: right and left base, apex, and mid - medial and lateral of each, and right and left transition zone - extra core at LTZ and RM med/lat     ULTRASOUND FINDINGS: normal SVs, many diffuse intraprostatic calcifications, left transition zone hypoechoic area     CONFIRMED PATIENT TOOK ANTIBIOTICS: Yes     CONFIRMED PATIENT NOT TAKING ASPIRIN OR ANTICOAGULANTS: Yes     CONFIRMED PATIENT USED ENEMA: Yes     PROCEDURE IN DETAIL:     Risk, Benefits, and alternatives explained to patient. All questions answered  to patients satisfaction and consented appropriately. Patient has completed the  prescribed bowel prep and antibiotic. 80mg IM gentamicin administered. TRUS probe inserted into rectum. Ultrasound measurements taken as above. Transrectal ultrasound findings as above. Approximately 10cc of   1% lidocaine injected bilaterally in tacos-prostatic block fashion, as well as at the apex. 14 core  biopies taken in a sextant fashion including the transition zones, with extra core at LTZ, as well as each at RM med/lat  Patient tolerated well without complication.     CONDITION: Stable     DISHARGE:  Status post uncomplicated outpatient procedure as above.   Disposition: Home  No new meds - continue antibiotics as prescribed, hold bloodthinners 3d  Resume regular diet  FU 2 weeks for biopsy results  Return to ER if temp >101, uncontrollable urethral or rectal bleeding,  or inability to urinate/urinary retention  No sex/ejaculation x3 days, no riding mowers/tractors/bikes x2-4 weeks

## 2019-03-29 DIAGNOSIS — E11.65 TYPE 2 DIABETES MELLITUS WITH HYPERGLYCEMIA, WITHOUT LONG-TERM CURRENT USE OF INSULIN: ICD-10-CM

## 2019-03-31 RX ORDER — ATORVASTATIN CALCIUM 20 MG/1
TABLET, FILM COATED ORAL
Qty: 90 TABLET | Refills: 0 | Status: SHIPPED | OUTPATIENT
Start: 2019-03-31 | End: 2019-04-25 | Stop reason: SDUPTHER

## 2019-04-02 ENCOUNTER — OFFICE VISIT (OUTPATIENT)
Dept: UROLOGY | Facility: CLINIC | Age: 69
End: 2019-04-02
Payer: COMMERCIAL

## 2019-04-02 VITALS
DIASTOLIC BLOOD PRESSURE: 75 MMHG | WEIGHT: 253.5 LBS | BODY MASS INDEX: 35.49 KG/M2 | HEIGHT: 71 IN | RESPIRATION RATE: 18 BRPM | HEART RATE: 51 BPM | SYSTOLIC BLOOD PRESSURE: 164 MMHG

## 2019-04-02 DIAGNOSIS — C61 PROSTATE CANCER: Primary | ICD-10-CM

## 2019-04-02 DIAGNOSIS — Z85.528 HISTORY OF RENAL CELL CANCER: ICD-10-CM

## 2019-04-02 PROCEDURE — 3077F SYST BP >= 140 MM HG: CPT | Mod: CPTII,S$GLB,, | Performed by: UROLOGY

## 2019-04-02 PROCEDURE — 3077F PR MOST RECENT SYSTOLIC BLOOD PRESSURE >= 140 MM HG: ICD-10-PCS | Mod: CPTII,S$GLB,, | Performed by: UROLOGY

## 2019-04-02 PROCEDURE — 3078F DIAST BP <80 MM HG: CPT | Mod: CPTII,S$GLB,, | Performed by: UROLOGY

## 2019-04-02 PROCEDURE — 99214 PR OFFICE/OUTPT VISIT, EST, LEVL IV, 30-39 MIN: ICD-10-PCS | Mod: S$GLB,,, | Performed by: UROLOGY

## 2019-04-02 PROCEDURE — 99214 OFFICE O/P EST MOD 30 MIN: CPT | Mod: S$GLB,,, | Performed by: UROLOGY

## 2019-04-02 PROCEDURE — 99999 PR PBB SHADOW E&M-EST. PATIENT-LVL IV: CPT | Mod: PBBFAC,,, | Performed by: UROLOGY

## 2019-04-02 PROCEDURE — 3078F PR MOST RECENT DIASTOLIC BLOOD PRESSURE < 80 MM HG: ICD-10-PCS | Mod: CPTII,S$GLB,, | Performed by: UROLOGY

## 2019-04-02 PROCEDURE — 99999 PR PBB SHADOW E&M-EST. PATIENT-LVL IV: ICD-10-PCS | Mod: PBBFAC,,, | Performed by: UROLOGY

## 2019-04-02 PROCEDURE — 1101F PT FALLS ASSESS-DOCD LE1/YR: CPT | Mod: CPTII,S$GLB,, | Performed by: UROLOGY

## 2019-04-02 PROCEDURE — 1101F PR PT FALLS ASSESS DOC 0-1 FALLS W/OUT INJ PAST YR: ICD-10-PCS | Mod: CPTII,S$GLB,, | Performed by: UROLOGY

## 2019-04-02 RX ORDER — SILDENAFIL 100 MG/1
100 TABLET, FILM COATED ORAL
Qty: 10 TABLET | Refills: 10 | Status: SHIPPED | OUTPATIENT
Start: 2019-04-02 | End: 2019-08-22 | Stop reason: SDUPTHER

## 2019-04-02 RX ORDER — SILDENAFIL 100 MG/1
100 TABLET, FILM COATED ORAL
Qty: 10 TABLET | Refills: 10 | Status: SHIPPED | OUTPATIENT
Start: 2019-04-02 | End: 2022-06-20 | Stop reason: ALTCHOICE

## 2019-04-02 NOTE — PROGRESS NOTES
[facility] Progress Note    Carlos Tenorio is a 69 y.o. male who presents for prostate biopsy f/u    He had been on testosterone replacement therapy previously (off x 5 yrs at time of initial eval) and was referred to me initially for finding of renal mass found on workup for LINSEY after episode of severe colitis and dehydration. A PSA checked at the time of his colon infection was found to be elevated at 7.5, which delayed resuming his testosterone therapy for his hypogonadism  Recheck of PSA in June 2017 was 2.2 with 19% free, though TRT deferred until recovery from partial nephrectomy for the renal mass found as above.    He underwent left robotic partial nephrectomy, with concurrent left renal cyst decortication on 8/2/17 for an upper pole 2-3cm exophytic enhancing renal mass, adjacent to larger simple renal cyst.   Pathology: [id number] clear cell RCC. 2.5cm. Negative margins. Grade 2. No sarcomatoid or rhabdoid features. Benign renal cyst  Also has a history of calcium oxalate stones and uric acid stones in the past and has had prior bilateral ESWL 10 years prior.    By report, a 24-hour urine in October 2012 had high oxalate and low urine pH. Takes allopurinol.  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   He did go back to work in November 2017 at [employer], and workload is the same as pre-[facility]. AUA symptom score 2/0. Resolution of epidid cyst/spermatocele, TAHIRA 25g benign.  Surveillance CT 2/5/18 for his T1a resected RCC performed (as well as screening for nephrolithiasis) noting punctate R upper pole and 4mm R lower pole stone, no evidence recurrence. CXR June 2017 negative. (previous CT stone protocol 5/24/17: 3 stones the largest of which is  present within the lower pole and measures 3 mm.)  For interim doubling of his PSA from 2.2 to 4.4 after first 3 months of TRT with interval rise to 4.8 at latest recheck, recommended prostate biopsy.  TRUS bx 18. Vol 56.6. Path 14 cores benign  Elected to try Viagra again for his ED, as had not been using it properly/on empty stomach previously.  Resumed TRT 200mg IM Q2 wks     PSA history:  2.2 (19.3%);  3.3;  2.9;  2.85;  1.87;  1.5;  1.4; 3/08 1.0  Labs since on TRT since :  17 (10d): H/H: 12.8/38.7, PSA 4.4, T 580  18 (7d): H/H: 14.3/43.8, PSA 4.9, T 980, Cr 1.4 (GFR 54), HbA1c 5.8  18 (3 weeks after last shot): H/H 14/42.3, Cr 1.2, T 132, psa 3.9  18: good energy and libido with TRT and great erections without viagra. 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 No worsening LUTS  18 (10d after shot): T 654, Cr 1.3, eGFR 56, H/H 15.1/46.5, psa 4.5    Given PSA rising again on testosterone replacement therapy and clinical suspicion, did send confirm MD RAO analysis of his last prostate biopsy from 2018 which was positive for methylation marker at the left transition zone indicatin% chance of finding prostate cancer on repeat prostate biopsy.  19% chance of low-grade Diomedes 6 or less prostate cancer, 10% risk of Diomedes 7 or greater prostate cancer.  I did also send him for a 3T MRI at [facility] 18:   58mL prostate with 3mm intravesical extension   PIRAD-3 index lesion: 8x6mm posterior lateral peripheral zone of mid gland on right. 14mm from midline and 2mm from capsule. No evidence of EPE.  - heterogenous on T2 with indistinct margins with no masslike lesion but focal moderately hypointense on ADC, mildly hyperintense on DWI, and evaluation for masslike hyperemia confounded by hyperemic prostatitis bands  TZ PIRAD-2 only with bilateral avid nodular hyperemia of BPH  Intermed probability of clinically significant cancer within the  "posterolateral peripheral zone of midgland on R    Given the rising PSA on testosterone replacement therapy, now at 4.5, positive genetic analysis, and MRI with concerning index lesion, he presented for repeat prostate biopsy on 3/19/19  TRUS VOLUME: 66.6 cm3 (from 56.6cm3). SPECIMEN: 17 Prostate Biopsy Cores: right and left base, apex, and mid - medial and lateral of each, and right and left transition zone - extra core at LTZ and RM med/lat  ULTRASOUND FINDINGS: normal SVs, many diffuse intraprostatic calcifications, left transition zone hypoechoic area  PATHOLOGY: 2/17 cores yarelis 6  3+3=6 in 2 cores: 5% LM medial, 10% LA lateral    He returns today noting that he did well after prostate biopsy with minimal bleeding which has resolved, no pain, no fever, no chills  He again endorses an absence of obstructive lower urinary tract symptoms and reports AUA symptom score 1/0  Denies hematuria dysuria.    ROS: A comprehensive 10 system review was performed and is negative except as noted above in HPI    PHYSICAL EXAM:    Vitals:    04/02/19 0812   BP: (!) 164/75   Pulse: (!) 51   Resp: 18     Body mass index is 35.36 kg/m². Weight: 115 kg (253 lb 8.5 oz) Height: 5' 11" (180.3 cm)       General: Alert, cooperative, no distress, appears stated age   Head: Normocephalic, without obvious abnormality, atraumatic   Eyes: PERRL, conjunctiva/corneas clear   Lungs: Respirations unlabored   Heart: Warm and well perfused   Abdomen: soft NT ND  Extremities: Extremities normal, atraumatic, no cyanosis or edema   Skin: Skin color, texture, turgor normal, no rashes or lesions   Psych: Appropriate   Neurologic: Non-focal       ASSESSMENT   1. Prostate cancer  Prostate Specific Antigen, Diagnostic    Prostate Specific Antigen, Diagnostic   2. History of renal cell cancer  US Retroperitoneal Complete (Kidney and    X-Ray Chest PA And Lateral    Basic metabolic panel       Plan    I sat with the patient and his wife and explained in " detail his diagnosis of prostate cancer, including explanation of yarelis grading system, and went over all the treatment options for management of his prostate cancer. The treatment options include detailed discussions of surveillance, radiation treatment including external beam as well as brachytherapy, and surgical extirpative therapy namely robotic prostatectomy. Given his low volume of 2 cores of yarelis 6, we did discuss that he is a candidate for active surveillance given his low risk disease. Discussed that he actually has very low risk disease (PSAD <0.15, less than 1/3 cores positive, less than 50% of involved cores, psa <10, G6).      We discussed radical prostatectomy. The procedure in general including hospital stay and postoperative process were discussed in detail. Risks of surgery were discussed including but not limited to up-front urinary incontinence and erectile dysfunction which we will work to overcome with kegel excercises and any number of ED therapies, versus side effects of radiation which are often minimal and irritative upfront with more troubling complications such as stricture and radiation cystitis occurring late. We also briefly discussed psa monitoring post-treatment and had brief discussion about psa recurrence, noting radiation could be used as salvage therapy after surgery but not often vice versa.      We also discussed the details of active surveillance protocol.  We discussed that given the low risk, I can continue to follow his PSA on a q.6 months basis.  I explained that we could use combination of MRI and biopsy to re-evaluate, often alternating annually, to follow as well as monitoring psa, re-biopsying at minimum every 2 years or as indicated by psa or MRI lesion, in which case cognitive fusion would be used for his biopsy.  Initially, would plan a 1 year confirmatory biopsy regardless of PSA.  However if his PSA is continuing to rise in the 1st 6 months, could further  investigate with MRI prior to re-biopsy for cognitive fusion of any MRI identified lesions.  Should confirmatory biopsy at 1 year be relatively stable, can then increase time interval of further evaluation, alternating modalities investigation as noted above.     After extensive discussion of all options, including side effects of radiation and of prostatectomy, he elected to proceed with active surveillance at this time, and will plan to recheck his PSA in 6 months.   I also provided a copy of the [facility] patient guide to Localized Prostate Cancer and encouraged him to read through the materials provided and make notes with any questions.  As well, with the active diagnosis of prostate cancer, have discontinued all testosterone replacement therapy.  We did discuss that exogenous testosterone can exacerbate underlying prostate cancer in his contraindicated with an active diagnosis.  We did discuss that upon treatment for prostate cancer, such as prostatectomy, if PSA remains undetectable after would be reasonable to resume testosterone replacement therapy.  At this time he would like to defer treatment in favor of active surveillance which is most reasonable by his pathology, and discontinued testosterone replacement therapy.     Is he has no lower urinary tract symptoms, will get a PSA diagnostic in 6 months and chart check the results, and plan to repeat his PSA 6 months later with a return visit at that time.  Prior to his return visit at 1 year's time we will also get BMP, chest x-ray, renal ultrasound for annual surveillance with his history of renal cell carcinoma treated with partial nephrectomy in the past.    As well, Viagra 100 mg on demand dosing prescription was sent to [facility] for his use for ED.    All questions the patient and his wife had were answered in detail, they are agreeable to treatment plan.  CC Dr Black and Dr Bradshaw

## 2019-04-15 ENCOUNTER — OFFICE VISIT (OUTPATIENT)
Dept: ORTHOPEDICS | Facility: CLINIC | Age: 69
End: 2019-04-15
Payer: COMMERCIAL

## 2019-04-15 VITALS — WEIGHT: 253.5 LBS | BODY MASS INDEX: 35.49 KG/M2 | HEIGHT: 71 IN

## 2019-04-15 DIAGNOSIS — M17.0 PRIMARY OSTEOARTHRITIS OF BOTH KNEES: Primary | ICD-10-CM

## 2019-04-15 PROCEDURE — 99499 NO LOS: ICD-10-PCS | Mod: S$GLB,,, | Performed by: ORTHOPAEDIC SURGERY

## 2019-04-15 PROCEDURE — 20610 DRAIN/INJ JOINT/BURSA W/O US: CPT | Mod: 50,S$GLB,, | Performed by: ORTHOPAEDIC SURGERY

## 2019-04-15 PROCEDURE — 99499 UNLISTED E&M SERVICE: CPT | Mod: S$GLB,,, | Performed by: ORTHOPAEDIC SURGERY

## 2019-04-15 PROCEDURE — 20610 LARGE JOINT ASPIRATION/INJECTION: R KNEE, L KNEE: ICD-10-PCS | Mod: 50,S$GLB,, | Performed by: ORTHOPAEDIC SURGERY

## 2019-04-15 PROCEDURE — 99999 PR PBB SHADOW E&M-EST. PATIENT-LVL II: ICD-10-PCS | Mod: PBBFAC,,, | Performed by: ORTHOPAEDIC SURGERY

## 2019-04-15 PROCEDURE — 99999 PR PBB SHADOW E&M-EST. PATIENT-LVL II: CPT | Mod: PBBFAC,,, | Performed by: ORTHOPAEDIC SURGERY

## 2019-04-15 NOTE — PROGRESS NOTES
Past Medical History:   Diagnosis Date    Arthritis     knees, back    CAD (coronary artery disease) 1995    no chest pain since CABG, sees Dr Larry Black    Diabetes mellitus     borderline    Hypertension     Kidney stones     uric acid stones    VASYL (obstructive sleep apnea) 2007    is compliant with CPAP    Primary gonadal failure        Past Surgical History:   Procedure Laterality Date    BACK SURGERY  2010    L5-6 fusion, with pins,bone graft    BIOPSY, PROSTATE, RECTAL APPROACH, WITH US GUIDANCE N/A 3/19/2019    [facility]    CARDIAC SURGERY  1995, 2007    total 7 vessel bypass    COLONOSCOPY  2008    repeat 2013    COLONOSCOPY N/A 4/4/2017    [facility]    CORONARY ARTERY BYPASS GRAFT  1995, 2007    cabgx3, cabgx5    EXTRACORPOREAL SHOCK WAVE LITHOTRIPSY      EYE SURGERY      cataracts bilateral    LITHOTRIPSY      LITHOTRIPSY-EXTRACORPOREAL SHOCK WAVE Right 10/14/2014    [facility]    LUMBAR LAMINECTOMY      Partial Nephrectomy Laproscopic      ROBOTIC ASSISTED LAPAROSCOPIC NEPHRECTOMY PARTIAL Left 8/2/2017    [facility]    TRANSRECTAL ULTRASOUND GUIDED PROSTATE BIOPSY N/A 2/27/2018    [facility]       Current Outpatient Medications   Medication Sig    allopurinol (ZYLOPRIM) 300 MG tablet TAKE 1 TABLET(300 MG) BY MOUTH EVERY DAY    aspirin (ECOTRIN) 81 MG EC tablet Take 81 mg by mouth once daily.    atorvastatin (LIPITOR) 20 MG tablet TAKE 1 TABLET BY MOUTH EVERY DAY    clindamycin (CLEOCIN T) 1 % external solution AAA back BID PRN flare    fish oil-omega-3 fatty acids 300-1,000 mg capsule Take 2 g by mouth 3 (three) times daily.    hydrocortisone 2.5 % cream Thin film to AA onface daily PRN flare; use short term only    losartan-hydrochlorothiazide 50-12.5 mg (HYZAAR) 50-12.5 mg per tablet TAKE 1 TABLET BY  MOUTH EVERY DAY    metFORMIN (GLUCOPHAGE-XR) 500 MG 24 hr tablet TAKE 1 TABLET(500 MG) BY MOUTH EVERY DAY    naproxen sodium (ANAPROX) 220 MG tablet Take 220 mg by mouth 2 (two) times daily with meals.    sildenafil (VIAGRA) 100 MG tablet Take 1 tablet (100 mg total) by mouth as needed for Erectile Dysfunction.    sildenafil (VIAGRA) 100 MG tablet Take 1 tablet (100 mg total) by mouth as needed for Erectile Dysfunction.    vitamin D 1000 units Tab Take 185 mg by mouth once daily.     No current facility-administered medications for this visit.      Facility-Administered Medications Ordered in Other Visits   Medication    triamcinolone acetonide injection 40 mg    triamcinolone acetonide injection 40 mg       Review of patient's allergies indicates:   Allergen Reactions    Adhesive Blisters       Family History   Problem Relation Age of Onset    Heart disease Mother     Hypertension Mother     Diabetes Mother     Heart disease Father         premature    Hypertension Father     Diabetes Sister     Urolithiasis Sister     Heart disease Paternal Uncle     Cancer Neg Hx     Prostate cancer Neg Hx     Kidney cancer Neg Hx     Melanoma Neg Hx     Lupus Neg Hx     Eczema Neg Hx     Psoriasis Neg Hx        Social History     Socioeconomic History    Marital status:      Spouse name: Not on file    Number of children: Not on file    Years of education: Not on file    Highest education level: Not on file   Occupational History    Not on file   Social Needs    Financial resource strain: Not on file    Food insecurity:     Worry: Not on file     Inability: Not on file    Transportation needs:     Medical: Not on file     Non-medical: Not on file   Tobacco Use    Smoking status: Former Smoker     Last attempt to quit: 1976     Years since quittin.2    Smokeless tobacco: Never Used   Substance and Sexual Activity    Alcohol use: Yes     Comment: Socially    Drug use: No     Sexual activity: Not on file   Lifestyle    Physical activity:     Days per week: Not on file     Minutes per session: Not on file    Stress: Not on file   Relationships    Social connections:     Talks on phone: Not on file     Gets together: Not on file     Attends Synagogue service: Not on file     Active member of club or organization: Not on file     Attends meetings of clubs or organizations: Not on file     Relationship status: Not on file   Other Topics Concern    Not on file   Social History Narrative    Not on file       Chief Complaint:   No chief complaint on file.      History of present illness: Is a 69-year-old male seen for recurrent bilateral knee pain.  Right is greater than the left.  Injected him back in 2018 with Synvisc 1 and he got about a year and 5 months out of it.  Works well.  The patient is back at work and so he has to do more walking and stair climbing.  Pain as a 1 out of 10.      Review of Systems:    Musculoskeletal:  See HPI        Physical Examination:    Vital Signs:    There were no vitals filed for this visit.    Body mass index is 35.36 kg/m².    This a well-developed, well nourished patient in no acute distress.  They are alert and oriented and cooperative to examination.  Pt. walks without an antalgic gait.      Examination of bilateral knees shows no rashes or erythema. There are no masses ecchymosis or effusion. Patient has full range of motion from 0-130°. Patient is nontender to palpation over lateral joint line and moderately tender to palpation over the medial joint line. Knee is stable to varus and valgus stress. 5 out of 5 motor strength. Palpable distal pulses. Intact light touch sensation. Negative Patellofemoral crepitus      X-rays: X-rays of both knees are review which show severe medial joint space narrowing of both knees     Assessment:: Severe varus knee arthritis    Plan:  I injected both knees with Synvisc One.  Follow up in about 6 weeks.    This note  was created using Dragon voice recognition software that occasionally misinterpreted phrases or words.    Consult note is delivered via Epic messaging service.

## 2019-04-15 NOTE — PROCEDURES
Large Joint Aspiration/Injection: R knee, L knee  Date/Time: 4/15/2019 4:11 PM  Performed by: Pablo Dutton MD  Authorized by: Pablo Dutton MD     Consent Done?:  Yes (Verbal)  Indications:  Pain  Procedure site marked: Yes    Timeout: Prior to procedure the correct patient, procedure, and site was verified      Location:  Knee  Site:  R knee and L knee  Prep: Patient was prepped and draped in usual sterile fashion    Needle size:  20 G  Approach:  Anterolateral  Medications:  48 mg hylan g-f 20 48 mg/6 mL; 48 mg hylan g-f 20 48 mg/6 mL  Patient tolerance:  Patient tolerated the procedure well with no immediate complications

## 2019-04-25 DIAGNOSIS — E11.65 TYPE 2 DIABETES MELLITUS WITH HYPERGLYCEMIA, WITHOUT LONG-TERM CURRENT USE OF INSULIN: ICD-10-CM

## 2019-04-25 RX ORDER — ATORVASTATIN CALCIUM 20 MG/1
20 TABLET, FILM COATED ORAL DAILY
Qty: 90 TABLET | Refills: 2 | Status: SHIPPED | OUTPATIENT
Start: 2019-04-25 | End: 2020-08-12 | Stop reason: SDUPTHER

## 2019-05-03 DIAGNOSIS — E11.65 TYPE 2 DIABETES MELLITUS WITH HYPERGLYCEMIA, WITHOUT LONG-TERM CURRENT USE OF INSULIN: ICD-10-CM

## 2019-05-03 DIAGNOSIS — N18.30 CKD (CHRONIC KIDNEY DISEASE) STAGE 3, GFR 30-59 ML/MIN: Chronic | ICD-10-CM

## 2019-05-03 RX ORDER — METFORMIN HYDROCHLORIDE 500 MG/1
TABLET, EXTENDED RELEASE ORAL
Qty: 90 TABLET | Refills: 0 | Status: SHIPPED | OUTPATIENT
Start: 2019-05-03 | End: 2019-07-31 | Stop reason: SDUPTHER

## 2019-05-13 ENCOUNTER — OFFICE VISIT (OUTPATIENT)
Dept: DERMATOLOGY | Facility: CLINIC | Age: 69
End: 2019-05-13
Payer: COMMERCIAL

## 2019-05-13 VITALS — WEIGHT: 253 LBS | HEIGHT: 71 IN | BODY MASS INDEX: 35.42 KG/M2

## 2019-05-13 DIAGNOSIS — L81.4 SOLAR LENTIGO: ICD-10-CM

## 2019-05-13 DIAGNOSIS — L70.0 ACNE VULGARIS: ICD-10-CM

## 2019-05-13 DIAGNOSIS — L82.1 SEBORRHEIC KERATOSES: ICD-10-CM

## 2019-05-13 DIAGNOSIS — L57.0 ACTINIC KERATOSES: Primary | ICD-10-CM

## 2019-05-13 PROCEDURE — 17004 DESTROY PREMAL LESIONS 15/>: CPT | Mod: PBBFAC,PO | Performed by: DERMATOLOGY

## 2019-05-13 PROCEDURE — 99999 PR PBB SHADOW E&M-EST. PATIENT-LVL III: ICD-10-PCS | Mod: PBBFAC,,, | Performed by: DERMATOLOGY

## 2019-05-13 PROCEDURE — 17004 DESTROY PREMAL LESIONS 15/>: CPT | Mod: S$PBB,,, | Performed by: DERMATOLOGY

## 2019-05-13 PROCEDURE — 99213 OFFICE O/P EST LOW 20 MIN: CPT | Mod: PBBFAC,PO,25 | Performed by: DERMATOLOGY

## 2019-05-13 PROCEDURE — 99999 PR PBB SHADOW E&M-EST. PATIENT-LVL III: CPT | Mod: PBBFAC,,, | Performed by: DERMATOLOGY

## 2019-05-13 PROCEDURE — 99213 PR OFFICE/OUTPT VISIT, EST, LEVL III, 20-29 MIN: ICD-10-PCS | Mod: 25,S$PBB,, | Performed by: DERMATOLOGY

## 2019-05-13 PROCEDURE — 17004 PR DESTRUCTION, PREMALIGNANT LESIONS; 15 OR MORE LESIONS: ICD-10-PCS | Mod: S$PBB,,, | Performed by: DERMATOLOGY

## 2019-05-13 PROCEDURE — 99213 OFFICE O/P EST LOW 20 MIN: CPT | Mod: 25,S$PBB,, | Performed by: DERMATOLOGY

## 2019-05-13 NOTE — PROGRESS NOTES
Subjective:       Patient ID:  Carlos Tenorio is a 69 y.o. male who presents for   Chief Complaint   Patient presents with    Spot     arms and scalp, x 2 yrs, scaly and itchy, no tx    Skin Check     TBSE     Last seen 10/2017  Requests TBSE for cancer screening    H/o lifelong cystic acne, back, worse in summer when hot and sweating  H/o HTN, dyslipidemia, DM2   Testosterone injections in the past, held secondary to dx prostate cancer (Dr Heart managing)  Previously under care Dr Aguiar,  Treated with sal acid foam, panoxyl wash        Spot  - Initial  Affected locations: scalp, right arm and left arm  Duration: 2 years  Signs / symptoms: itching and scaling  Severity: mild to moderate  Timing: constant  Aggravated by: nothing  Relieving factors/Treatments tried: nothing        Review of Systems   Constitutional: Negative for fever, chills, weight loss, weight gain and night sweats.   Skin: Positive for activity-related sunscreen use and wears hat. Negative for daily sunscreen use.   Hematologic/Lymphatic: Does not bruise/bleed easily.        Objective:    Physical Exam   Constitutional: He appears well-developed and well-nourished. No distress.   Neurological: He is alert and oriented to person, place, and time. He is not disoriented.   Psychiatric: He has a normal mood and affect.   Skin:   Areas Examined (abnormalities noted in diagram):   Scalp / Hair Palpated and Inspected  Head / Face Inspection Performed  Neck Inspection Performed  Chest / Axilla Inspection Performed  Abdomen Inspection Performed  Back Inspection Performed  RUE Inspected  LUE Inspection Performed  Nails and Digits Inspection Performed                   Diagram Legend     Erythematous scaling macule/papule c/w actinic keratosis       Vascular papule c/w angioma      Pigmented verrucoid papule/plaque c/w seborrheic keratosis      Yellow umbilicated papule c/w sebaceous hyperplasia      Irregularly shaped tan macule c/w lentigo      1-2 mm smooth white papules consistent with Milia      Movable subcutaneous cyst with punctum c/w epidermal inclusion cyst      Subcutaneous movable cyst c/w pilar cyst      Firm pink to brown papule c/w dermatofibroma      Pedunculated fleshy papule(s) c/w skin tag(s)      Evenly pigmented macule c/w junctional nevus     Mildly variegated pigmented, slightly irregular-bordered macule c/w mildly atypical nevus      Flesh colored to evenly pigmented papule c/w intradermal nevus       Pink pearly papule/plaque c/w basal cell carcinoma      Erythematous hyperkeratotic cursted plaque c/w SCC      Surgical scar with no sign of skin cancer recurrence      Open and closed comedones      Inflammatory papules and pustules      Verrucoid papule consistent consistent with wart     Erythematous eczematous patches and plaques     Dystrophic onycholytic nail with subungual debris c/w onychomycosis     Umbilicated papule    Erythematous-base heme-crusted tan verrucoid plaque consistent with inflamed seborrheic keratosis     Erythematous Silvery Scaling Plaque c/w Psoriasis     See annotation      Assessment / Plan:        Actinic keratoses  Cryosurgery Procedure Note    Verbal consent from the patient is obtained and the patient is aware of the precancerous quality and need for treatment of these lesions. Liquid nitrogen cryosurgery is applied to the 33 actinic keratoses, as detailed in the physical exam, to produce a freeze injury. The patient is aware that blisters may form and is instructed on wound care with gentle cleansing and use of vaseline ointment to keep moist until healed. The patient is supplied a handout on cryosurgery and is instructed to call if lesions do not completely resolve.    Plan field tx to B forearms with efudex in the fall    Acne vulgaris  Improved with testosterone discontinuation  BP wash in shower daily  May try 2.5% BP leave on gel    Seborrheic keratoses  These are benign inherited growths without  a malignant potential. Reassurance given to patient. No treatment is necessary.     Solar lentigo  This is a benign hyperpigmented sun induced lesion. Daily sun protection will reduce the number of new lesions. Treatment of these benign lesions are considered cosmetic.    Patient instructed in importance in daily sun protection of at least spf 30. Mineral sunscreen ingredients preferred (Zinc +/- Titanium).   Recommend Elta MD for daily use on face and neck.  Patient encouraged to wear hat for all outdoor exposure.   Also discussed sun avoidance and use of protective clothing.             Follow up in about 4 months (around 9/13/2019).

## 2019-05-13 NOTE — PATIENT INSTRUCTIONS

## 2019-07-14 DIAGNOSIS — I10 ESSENTIAL HYPERTENSION: Primary | ICD-10-CM

## 2019-07-14 RX ORDER — LOSARTAN POTASSIUM AND HYDROCHLOROTHIAZIDE 12.5; 5 MG/1; MG/1
1 TABLET ORAL DAILY
Qty: 90 TABLET | Refills: 0 | Status: SHIPPED | OUTPATIENT
Start: 2019-07-14 | End: 2019-08-22 | Stop reason: SDUPTHER

## 2019-07-14 NOTE — TELEPHONE ENCOUNTER
Patient requesting refill of Hyzaar 50-12.5 mg every day. Last order 11/26/18. LOV 2/20/19; NOV 8/22/19.

## 2019-07-15 DIAGNOSIS — N20.0 RECURRENT KIDNEY STONES: ICD-10-CM

## 2019-07-15 RX ORDER — ALLOPURINOL 300 MG/1
TABLET ORAL
Qty: 90 TABLET | Refills: 0 | Status: SHIPPED | OUTPATIENT
Start: 2019-07-15 | End: 2019-10-01 | Stop reason: SDUPTHER

## 2019-07-31 DIAGNOSIS — E11.65 TYPE 2 DIABETES MELLITUS WITH HYPERGLYCEMIA, WITHOUT LONG-TERM CURRENT USE OF INSULIN: ICD-10-CM

## 2019-07-31 DIAGNOSIS — N18.30 CKD (CHRONIC KIDNEY DISEASE) STAGE 3, GFR 30-59 ML/MIN: Chronic | ICD-10-CM

## 2019-07-31 RX ORDER — METFORMIN HYDROCHLORIDE 500 MG/1
TABLET, EXTENDED RELEASE ORAL
Qty: 90 TABLET | Refills: 0 | Status: SHIPPED | OUTPATIENT
Start: 2019-07-31 | End: 2019-08-22 | Stop reason: SDUPTHER

## 2019-07-31 NOTE — TELEPHONE ENCOUNTER
Lab orders for Lipid Panel placed by Dr. Bradshaw. Existing lab appointment noted for the date of 8/15/19.  Order for Lipid Panel linked to existing lab appointment.

## 2019-08-08 ENCOUNTER — PATIENT OUTREACH (OUTPATIENT)
Dept: ADMINISTRATIVE | Facility: HOSPITAL | Age: 69
End: 2019-08-08

## 2019-08-15 ENCOUNTER — LAB VISIT (OUTPATIENT)
Dept: LAB | Facility: HOSPITAL | Age: 69
End: 2019-08-15
Attending: FAMILY MEDICINE
Payer: COMMERCIAL

## 2019-08-15 DIAGNOSIS — E11.65 TYPE 2 DIABETES MELLITUS WITH HYPERGLYCEMIA, WITHOUT LONG-TERM CURRENT USE OF INSULIN: ICD-10-CM

## 2019-08-15 LAB
ANION GAP SERPL CALC-SCNC: 8 MMOL/L (ref 8–16)
BUN SERPL-MCNC: 29 MG/DL (ref 8–23)
CALCIUM SERPL-MCNC: 9.9 MG/DL (ref 8.7–10.5)
CHLORIDE SERPL-SCNC: 109 MMOL/L (ref 95–110)
CHOLEST SERPL-MCNC: 138 MG/DL (ref 120–199)
CHOLEST/HDLC SERPL: 4.6 {RATIO} (ref 2–5)
CO2 SERPL-SCNC: 24 MMOL/L (ref 23–29)
CREAT SERPL-MCNC: 1.2 MG/DL (ref 0.5–1.4)
EST. GFR  (AFRICAN AMERICAN): >60 ML/MIN/1.73 M^2
EST. GFR  (NON AFRICAN AMERICAN): >60 ML/MIN/1.73 M^2
ESTIMATED AVG GLUCOSE: 120 MG/DL (ref 68–131)
GLUCOSE SERPL-MCNC: 114 MG/DL (ref 70–110)
HBA1C MFR BLD HPLC: 5.8 % (ref 4–5.6)
HDLC SERPL-MCNC: 30 MG/DL (ref 40–75)
HDLC SERPL: 21.7 % (ref 20–50)
LDLC SERPL CALC-MCNC: 65.6 MG/DL (ref 63–159)
NONHDLC SERPL-MCNC: 108 MG/DL
POTASSIUM SERPL-SCNC: 4.4 MMOL/L (ref 3.5–5.1)
SODIUM SERPL-SCNC: 141 MMOL/L (ref 136–145)
TRIGL SERPL-MCNC: 212 MG/DL (ref 30–150)

## 2019-08-15 PROCEDURE — 36415 COLL VENOUS BLD VENIPUNCTURE: CPT

## 2019-08-15 PROCEDURE — 80048 BASIC METABOLIC PNL TOTAL CA: CPT

## 2019-08-15 PROCEDURE — 83036 HEMOGLOBIN GLYCOSYLATED A1C: CPT

## 2019-08-15 PROCEDURE — 80061 LIPID PANEL: CPT

## 2019-08-22 ENCOUNTER — OFFICE VISIT (OUTPATIENT)
Dept: FAMILY MEDICINE | Facility: CLINIC | Age: 69
End: 2019-08-22
Payer: COMMERCIAL

## 2019-08-22 VITALS
OXYGEN SATURATION: 97 % | HEART RATE: 52 BPM | WEIGHT: 253.5 LBS | SYSTOLIC BLOOD PRESSURE: 120 MMHG | HEIGHT: 68 IN | BODY MASS INDEX: 38.42 KG/M2 | DIASTOLIC BLOOD PRESSURE: 78 MMHG | TEMPERATURE: 98 F

## 2019-08-22 DIAGNOSIS — I15.2 HYPERTENSION ASSOCIATED WITH DIABETES: Primary | ICD-10-CM

## 2019-08-22 DIAGNOSIS — E11.65 TYPE 2 DIABETES MELLITUS WITH HYPERGLYCEMIA, WITHOUT LONG-TERM CURRENT USE OF INSULIN: ICD-10-CM

## 2019-08-22 DIAGNOSIS — I25.10 CORONARY ARTERY DISEASE INVOLVING NATIVE CORONARY ARTERY WITHOUT ANGINA PECTORIS, UNSPECIFIED WHETHER NATIVE OR TRANSPLANTED HEART: ICD-10-CM

## 2019-08-22 DIAGNOSIS — N18.30 CKD (CHRONIC KIDNEY DISEASE) STAGE 3, GFR 30-59 ML/MIN: Chronic | ICD-10-CM

## 2019-08-22 DIAGNOSIS — I10 ESSENTIAL HYPERTENSION: ICD-10-CM

## 2019-08-22 DIAGNOSIS — E11.59 HYPERTENSION ASSOCIATED WITH DIABETES: Primary | ICD-10-CM

## 2019-08-22 PROCEDURE — 3078F DIAST BP <80 MM HG: CPT | Mod: CPTII,S$GLB,, | Performed by: FAMILY MEDICINE

## 2019-08-22 PROCEDURE — 3044F PR MOST RECENT HEMOGLOBIN A1C LEVEL <7.0%: ICD-10-PCS | Mod: CPTII,S$GLB,, | Performed by: FAMILY MEDICINE

## 2019-08-22 PROCEDURE — 3074F PR MOST RECENT SYSTOLIC BLOOD PRESSURE < 130 MM HG: ICD-10-PCS | Mod: CPTII,S$GLB,, | Performed by: FAMILY MEDICINE

## 2019-08-22 PROCEDURE — 1101F PR PT FALLS ASSESS DOC 0-1 FALLS W/OUT INJ PAST YR: ICD-10-PCS | Mod: CPTII,S$GLB,, | Performed by: FAMILY MEDICINE

## 2019-08-22 PROCEDURE — 3078F PR MOST RECENT DIASTOLIC BLOOD PRESSURE < 80 MM HG: ICD-10-PCS | Mod: CPTII,S$GLB,, | Performed by: FAMILY MEDICINE

## 2019-08-22 PROCEDURE — 99999 PR PBB SHADOW E&M-EST. PATIENT-LVL IV: CPT | Mod: PBBFAC,,, | Performed by: FAMILY MEDICINE

## 2019-08-22 PROCEDURE — 3044F HG A1C LEVEL LT 7.0%: CPT | Mod: CPTII,S$GLB,, | Performed by: FAMILY MEDICINE

## 2019-08-22 PROCEDURE — 99214 PR OFFICE/OUTPT VISIT, EST, LEVL IV, 30-39 MIN: ICD-10-PCS | Mod: S$GLB,,, | Performed by: FAMILY MEDICINE

## 2019-08-22 PROCEDURE — 99214 OFFICE O/P EST MOD 30 MIN: CPT | Mod: S$GLB,,, | Performed by: FAMILY MEDICINE

## 2019-08-22 PROCEDURE — 3074F SYST BP LT 130 MM HG: CPT | Mod: CPTII,S$GLB,, | Performed by: FAMILY MEDICINE

## 2019-08-22 PROCEDURE — 99999 PR PBB SHADOW E&M-EST. PATIENT-LVL IV: ICD-10-PCS | Mod: PBBFAC,,, | Performed by: FAMILY MEDICINE

## 2019-08-22 PROCEDURE — 1101F PT FALLS ASSESS-DOCD LE1/YR: CPT | Mod: CPTII,S$GLB,, | Performed by: FAMILY MEDICINE

## 2019-08-22 RX ORDER — METFORMIN HYDROCHLORIDE 500 MG/1
500 TABLET, EXTENDED RELEASE ORAL DAILY
Qty: 90 TABLET | Refills: 4
Start: 2019-08-22 | End: 2019-11-27

## 2019-08-22 RX ORDER — LOSARTAN POTASSIUM AND HYDROCHLOROTHIAZIDE 12.5; 5 MG/1; MG/1
1 TABLET ORAL DAILY
Qty: 90 TABLET | Refills: 4 | Status: SHIPPED | OUTPATIENT
Start: 2019-08-22 | End: 2019-11-27

## 2019-08-22 NOTE — PATIENT INSTRUCTIONS

## 2019-08-26 NOTE — PROGRESS NOTES
Subjective:       Patient ID: Carlos Tenorio is a 69 y.o. male.    Chief Complaint: Annual Exam; Diabetes; and Prostate Cancer    Diabetes   He presents for his follow-up diabetic visit. He has type 2 diabetes mellitus. His disease course has been improving. Hypoglycemia symptoms include sleepiness. Pertinent negatives for hypoglycemia include no confusion, dizziness, headaches, mood changes, nervousness/anxiousness, pallor or speech difficulty. Pertinent negatives for diabetes include no blurred vision, no chest pain, no fatigue, no foot ulcerations, no polydipsia, no polyphagia, no polyuria, no visual change and no weakness. Symptoms are improving.     Review of Systems   Constitutional: Negative for activity change, fatigue and unexpected weight change.   HENT: Negative for hearing loss, rhinorrhea and trouble swallowing.    Eyes: Negative for blurred vision, discharge and visual disturbance.   Respiratory: Negative for chest tightness and wheezing.    Cardiovascular: Negative for chest pain and palpitations.   Gastrointestinal: Negative for blood in stool, constipation, diarrhea and vomiting.   Endocrine: Negative for polydipsia, polyphagia and polyuria.   Genitourinary: Negative for difficulty urinating, hematuria and urgency.   Musculoskeletal: Negative for arthralgias, joint swelling and neck pain.   Skin: Negative for pallor.   Neurological: Negative for dizziness, speech difficulty, weakness and headaches.   Psychiatric/Behavioral: Negative for confusion and dysphoric mood. The patient is not nervous/anxious.        Patient Active Problem List   Diagnosis    Kidney stones    CAD (coronary artery disease)    Hypertension associated with diabetes    VASYL (obstructive sleep apnea)    Primary gonadal failure    Diabetes mellitus, type 2    Male hypogonadism    Bilateral renal cysts    Obesity (BMI 30.0-34.9)    OA (osteoarthritis) of knee    Skin tag    History of colon polyps    Left renal  mass    CKD (chronic kidney disease) stage 3, GFR 30-59 ml/min    Arthritis pain of hand    Elevated PSA       Objective:      Physical Exam   Constitutional: He is oriented to person, place, and time. He appears well-developed and well-nourished. No distress.   HENT:   Head: Normocephalic and atraumatic.   Right Ear: External ear normal.   Left Ear: External ear normal.   Nose: Nose normal.   Mouth/Throat: Oropharynx is clear and moist. No oropharyngeal exudate.   Eyes: Pupils are equal, round, and reactive to light. Conjunctivae and EOM are normal. Right eye exhibits no discharge. Left eye exhibits no discharge. No scleral icterus.   Neck: Normal range of motion. Neck supple. No JVD present. No tracheal deviation present. No thyromegaly present.   Cardiovascular: Normal rate, normal heart sounds and intact distal pulses.   No murmur heard.  Pulmonary/Chest: Effort normal and breath sounds normal. No respiratory distress. He has no wheezes. He has no rales.   Abdominal: Soft. Bowel sounds are normal. He exhibits no distension and no mass. There is no tenderness. There is no rebound and no guarding.   Musculoskeletal: He exhibits no edema or tenderness.          Lymphadenopathy:     He has no cervical adenopathy.   Neurological: He is alert and oriented to person, place, and time. No cranial nerve deficit. Coordination normal.   Skin: Skin is warm and dry. No rash noted. He is not diaphoretic. No erythema. No pallor.   Psychiatric: He has a normal mood and affect. His behavior is normal. Judgment and thought content normal.   Vitals reviewed.      Lab Results   Component Value Date    WBC 10.40 12/14/2018    HGB 15.1 12/14/2018    HCT 46.5 12/14/2018     12/14/2018    CHOL 138 08/15/2019    TRIG 212 (H) 08/15/2019    HDL 30 (L) 08/15/2019    ALT 22 05/01/2017    AST 20 05/01/2017     08/15/2019    K 4.4 08/15/2019     08/15/2019    CREATININE 1.2 08/15/2019    BUN 29 (H) 08/15/2019    CO2 24  08/15/2019    TSH 2.808 05/01/2017    PSA 4.5 (H) 12/14/2018    INR 1.0 06/05/2017    GLUF 109 11/07/2008    HGBA1C 5.8 (H) 08/15/2019     The 10-year ASCVD risk score (Natahn ZHENG Jr., et al., 2013) is: 32.3%    Values used to calculate the score:      Age: 69 years      Sex: Male      Is Non- : No      Diabetic: Yes      Tobacco smoker: No      Systolic Blood Pressure: 120 mmHg      Is BP treated: Yes      HDL Cholesterol: 30 mg/dL      Total Cholesterol: 138 mg/dL    Assessment:       1. Hypertension associated with diabetes    2. Coronary artery disease involving native coronary artery without angina pectoris, unspecified whether native or transplanted heart    3. Type 2 diabetes mellitus with hyperglycemia, without long-term current use of insulin    4. CKD (chronic kidney disease) stage 3, GFR 30-59 ml/min    5. Essential hypertension        Plan:       Hypertension associated with diabetes  -     losartan-hydrochlorothiazide 50-12.5 mg (HYZAAR) 50-12.5 mg per tablet; Take 1 tablet by mouth once daily.  Dispense: 90 tablet; Refill: 4    Coronary artery disease involving native coronary artery without angina pectoris, unspecified whether native or transplanted heart    Type 2 diabetes mellitus with hyperglycemia, without long-term current use of insulin  -     metFORMIN (GLUCOPHAGE-XR) 500 MG 24 hr tablet; Take 1 tablet (500 mg total) by mouth once daily.  Dispense: 90 tablet; Refill: 4  -     losartan-hydrochlorothiazide 50-12.5 mg (HYZAAR) 50-12.5 mg per tablet; Take 1 tablet by mouth once daily.  Dispense: 90 tablet; Refill: 4  -     Comprehensive metabolic panel; Future; Expected date: 08/22/2019  -     Hemoglobin A1c; Future; Expected date: 08/22/2019  -     CBC auto differential; Future; Expected date: 08/22/2019  -     Lipid panel; Future; Expected date: 08/22/2019  -     Uric acid; Future; Expected date: 08/22/2019  -     Magnesium; Future; Expected date: 08/22/2019    CKD (chronic  kidney disease) stage 3, GFR 30-59 ml/min  -     metFORMIN (GLUCOPHAGE-XR) 500 MG 24 hr tablet; Take 1 tablet (500 mg total) by mouth once daily.  Dispense: 90 tablet; Refill: 4  -     Comprehensive metabolic panel; Future; Expected date: 08/22/2019    Essential hypertension  -     losartan-hydrochlorothiazide 50-12.5 mg (HYZAAR) 50-12.5 mg per tablet; Take 1 tablet by mouth once daily.  Dispense: 90 tablet; Refill: 4      Patient readiness: acceptance and barriers:readiness    During the course of the visit the patient was educated and counseled about the following:     Hypertension:   Screening for causes of secondary hypertension: none indicated.  Dietary sodium restriction.  Regular aerobic exercise.  Obesity:   General weight loss/lifestyle modification strategies discussed (elicit support from others; identify saboteurs; non-food rewards, etc).    Goals: Diabetes: Maintain Hemoglobin A1C below 7 and Hypertension: Reduce Blood Pressure    Did patient meet goals/outcomes: Yes    The following self management tools provided: blood pressure log  excercise log    Patient Instructions (the written plan) was given to the patient/family.     Time spent with patient: 30 minutes    Barriers to medications present (yes )    Adverse reactions to current medications (yes)    Over the counter medications reviewed (Yes)      30-25 minutes spent counseling patient on diet, exercise, and weight loss.  This has been fully explained to the patient, who indicates understanding.

## 2019-09-25 ENCOUNTER — PATIENT MESSAGE (OUTPATIENT)
Dept: FAMILY MEDICINE | Facility: CLINIC | Age: 69
End: 2019-09-25

## 2019-09-25 RX ORDER — FUROSEMIDE 40 MG/1
40 TABLET ORAL 2 TIMES DAILY
COMMUNITY
End: 2019-09-25 | Stop reason: SDUPTHER

## 2019-09-25 RX ORDER — FUROSEMIDE 40 MG/1
40 TABLET ORAL 2 TIMES DAILY
Qty: 90 TABLET | Refills: 1 | Status: SHIPPED | OUTPATIENT
Start: 2019-09-25 | End: 2019-11-27

## 2019-09-30 ENCOUNTER — TELEPHONE (OUTPATIENT)
Dept: FAMILY MEDICINE | Facility: CLINIC | Age: 69
End: 2019-09-30

## 2019-09-30 NOTE — TELEPHONE ENCOUNTER
----- Message from Christy David sent at 9/30/2019  2:25 PM CDT -----  Contact: jamal[facility]  Type: Needs Medical Advice    Who Called:  jamal  Symptoms (please be specific):   losartan-hydrochlorothiazide 50-12.5 mg (HYZAAR) 50-12.5 mg per tablet combination is on manufacture back order but if can change rx to two separate prescriptions can fill for patient  Pharmacy name and phone #:    [id number] - [place] - [address] AT [facility] [address] [facility] [address]  [address] [address]  Phone: [phone] Fax: [phone]    Best Call Back Number: [phone]  Additional Information: n/a

## 2019-10-01 DIAGNOSIS — N20.0 RECURRENT KIDNEY STONES: ICD-10-CM

## 2019-10-02 RX ORDER — ALLOPURINOL 300 MG/1
300 TABLET ORAL DAILY
Qty: 90 TABLET | Refills: 0 | Status: SHIPPED | OUTPATIENT
Start: 2019-10-02 | End: 2019-11-27

## 2019-10-07 ENCOUNTER — LAB VISIT (OUTPATIENT)
Dept: LAB | Facility: HOSPITAL | Age: 69
End: 2019-10-07
Attending: UROLOGY
Payer: COMMERCIAL

## 2019-10-07 DIAGNOSIS — E29.1 HYPOGONADISM IN MALE: ICD-10-CM

## 2019-10-07 DIAGNOSIS — N18.30 CKD (CHRONIC KIDNEY DISEASE) STAGE 3, GFR 30-59 ML/MIN: Chronic | ICD-10-CM

## 2019-10-07 DIAGNOSIS — E11.65 TYPE 2 DIABETES MELLITUS WITH HYPERGLYCEMIA, WITHOUT LONG-TERM CURRENT USE OF INSULIN: ICD-10-CM

## 2019-10-07 LAB
ALBUMIN SERPL BCP-MCNC: 3.9 G/DL (ref 3.5–5.2)
ALP SERPL-CCNC: 59 U/L (ref 55–135)
ALT SERPL W/O P-5'-P-CCNC: 21 U/L (ref 10–44)
ANION GAP SERPL CALC-SCNC: 12 MMOL/L (ref 8–16)
AST SERPL-CCNC: 17 U/L (ref 10–40)
BASOPHILS # BLD AUTO: 0.08 K/UL (ref 0–0.2)
BASOPHILS NFR BLD: 0.8 % (ref 0–1.9)
BILIRUB SERPL-MCNC: 0.4 MG/DL (ref 0.1–1)
BUN SERPL-MCNC: 27 MG/DL (ref 8–23)
CALCIUM SERPL-MCNC: 9.6 MG/DL (ref 8.7–10.5)
CHLORIDE SERPL-SCNC: 105 MMOL/L (ref 95–110)
CHOLEST SERPL-MCNC: 148 MG/DL (ref 120–199)
CHOLEST/HDLC SERPL: 5.7 {RATIO} (ref 2–5)
CO2 SERPL-SCNC: 24 MMOL/L (ref 23–29)
COMPLEXED PSA SERPL-MCNC: 3.3 NG/ML (ref 0–4)
CREAT SERPL-MCNC: 1.4 MG/DL (ref 0.5–1.4)
DIFFERENTIAL METHOD: ABNORMAL
EOSINOPHIL # BLD AUTO: 0.4 K/UL (ref 0–0.5)
EOSINOPHIL NFR BLD: 4.3 % (ref 0–8)
ERYTHROCYTE [DISTWIDTH] IN BLOOD BY AUTOMATED COUNT: 12.9 % (ref 11.5–14.5)
EST. GFR  (AFRICAN AMERICAN): 59 ML/MIN/1.73 M^2
EST. GFR  (NON AFRICAN AMERICAN): 51 ML/MIN/1.73 M^2
ESTIMATED AVG GLUCOSE: 117 MG/DL (ref 68–131)
GLUCOSE SERPL-MCNC: 115 MG/DL (ref 70–110)
HBA1C MFR BLD HPLC: 5.7 % (ref 4–5.6)
HCT VFR BLD AUTO: 39 % (ref 40–54)
HDLC SERPL-MCNC: 26 MG/DL (ref 40–75)
HDLC SERPL: 17.6 % (ref 20–50)
HGB BLD-MCNC: 12.7 G/DL (ref 14–18)
IMM GRANULOCYTES # BLD AUTO: 0.02 K/UL (ref 0–0.04)
LDLC SERPL CALC-MCNC: 71 MG/DL (ref 63–159)
LYMPHOCYTES # BLD AUTO: 3 K/UL (ref 1–4.8)
LYMPHOCYTES NFR BLD: 30.6 % (ref 18–48)
MAGNESIUM SERPL-MCNC: 1.9 MG/DL (ref 1.6–2.6)
MCH RBC QN AUTO: 31.1 PG (ref 27–31)
MCHC RBC AUTO-ENTMCNC: 32.6 G/DL (ref 32–36)
MCV RBC AUTO: 96 FL (ref 82–98)
MONOCYTES # BLD AUTO: 0.9 K/UL (ref 0.3–1)
MONOCYTES NFR BLD: 8.7 % (ref 4–15)
NEUTROPHILS # BLD AUTO: 5.5 K/UL (ref 1.8–7.7)
NEUTROPHILS NFR BLD: 55.4 % (ref 38–73)
NONHDLC SERPL-MCNC: 122 MG/DL
NRBC BLD-RTO: 0 /100 WBC
PLATELET # BLD AUTO: 261 K/UL (ref 150–350)
PMV BLD AUTO: 11.8 FL (ref 9.2–12.9)
POTASSIUM SERPL-SCNC: 3.9 MMOL/L (ref 3.5–5.1)
PROT SERPL-MCNC: 6.9 G/DL (ref 6–8.4)
RBC # BLD AUTO: 4.08 M/UL (ref 4.6–6.2)
SODIUM SERPL-SCNC: 141 MMOL/L (ref 136–145)
TRIGL SERPL-MCNC: 255 MG/DL (ref 30–150)
URATE SERPL-MCNC: 8.1 MG/DL (ref 3.4–7)
WBC # BLD AUTO: 9.87 K/UL (ref 3.9–12.7)

## 2019-10-07 PROCEDURE — 80061 LIPID PANEL: CPT

## 2019-10-07 PROCEDURE — 83036 HEMOGLOBIN GLYCOSYLATED A1C: CPT

## 2019-10-07 PROCEDURE — 80053 COMPREHEN METABOLIC PANEL: CPT

## 2019-10-07 PROCEDURE — 85025 COMPLETE CBC W/AUTO DIFF WBC: CPT

## 2019-10-07 PROCEDURE — 36415 COLL VENOUS BLD VENIPUNCTURE: CPT

## 2019-10-07 PROCEDURE — 84153 ASSAY OF PSA TOTAL: CPT

## 2019-10-07 PROCEDURE — 84550 ASSAY OF BLOOD/URIC ACID: CPT

## 2019-10-07 PROCEDURE — 83735 ASSAY OF MAGNESIUM: CPT

## 2019-10-08 ENCOUNTER — TELEPHONE (OUTPATIENT)
Dept: FAMILY MEDICINE | Facility: CLINIC | Age: 69
End: 2019-10-08

## 2019-10-08 DIAGNOSIS — E11.59 HYPERTENSION ASSOCIATED WITH DIABETES: Primary | ICD-10-CM

## 2019-10-08 DIAGNOSIS — I15.2 HYPERTENSION ASSOCIATED WITH DIABETES: Primary | ICD-10-CM

## 2019-10-08 NOTE — TELEPHONE ENCOUNTER
Pharmacy requesting prescription for Losartan and Hydrochlorothiazide separately. Please e-scribe to [facility].

## 2019-10-08 NOTE — TELEPHONE ENCOUNTER
----- Message from Christy David sent at 10/8/2019  1:57 PM CDT -----  Contact: [facility]  Type: Needs Medical Advice    Who Called:  jamal  Symptoms (please be specific):  Patient rx is recalled for losartan-hydrochlorothiazide 50-12.5 mg (HYZAAR) 50-12.5 mg per tablet COMBINATION, however if a new script for each medication can be sent in separate that will be approved   Pharmacy name and phone #:    [id number] - [place] - [address] AT [facility] [address] [facility] [address]  [address] [address]  Phone: [phone] Fax: [phone]  Additional Information: n/a

## 2019-10-10 ENCOUNTER — TELEPHONE (OUTPATIENT)
Dept: UROLOGY | Facility: CLINIC | Age: 69
End: 2019-10-10

## 2019-10-10 RX ORDER — LOSARTAN POTASSIUM 50 MG/1
50 TABLET ORAL DAILY
Qty: 90 TABLET | Refills: 3 | Status: SHIPPED | OUTPATIENT
Start: 2019-10-10 | End: 2019-11-27

## 2019-10-10 RX ORDER — HYDROCHLOROTHIAZIDE 12.5 MG/1
12.5 TABLET ORAL DAILY
Qty: 90 TABLET | Refills: 11 | Status: SHIPPED | OUTPATIENT
Start: 2019-10-10 | End: 2019-11-27 | Stop reason: ALTCHOICE

## 2019-10-10 NOTE — TELEPHONE ENCOUNTER
----- Message from Edward Rubio sent at 10/10/2019 12:15 PM CDT -----  Type:  Patient Returning Call    Who Called:  Patient  Who Left Message for Patient:  Alcira  Does the patient know what this is regarding?:  Monday appointment  Best Call Back Number:  [phone]  Additional Information:

## 2019-10-10 NOTE — TELEPHONE ENCOUNTER
Spoke w pt regarding Monday appt informed will call back once answered is received from dr Heart pt voiced ok and understanding.

## 2019-10-11 ENCOUNTER — TELEPHONE (OUTPATIENT)
Dept: UROLOGY | Facility: CLINIC | Age: 69
End: 2019-10-11

## 2019-10-11 NOTE — TELEPHONE ENCOUNTER
----- Message from Sang Hernandez sent at 10/11/2019  3:05 PM CDT -----  Contact: patient   Pt called to confirm nurse Alcira that he received the message. Thanks

## 2019-11-27 ENCOUNTER — OFFICE VISIT (OUTPATIENT)
Dept: FAMILY MEDICINE | Facility: CLINIC | Age: 69
End: 2019-11-27
Payer: COMMERCIAL

## 2019-11-27 VITALS
DIASTOLIC BLOOD PRESSURE: 62 MMHG | OXYGEN SATURATION: 97 % | WEIGHT: 257.94 LBS | SYSTOLIC BLOOD PRESSURE: 130 MMHG | HEART RATE: 74 BPM | HEIGHT: 67 IN | TEMPERATURE: 99 F | BODY MASS INDEX: 40.48 KG/M2

## 2019-11-27 DIAGNOSIS — E11.59 HYPERTENSION ASSOCIATED WITH DIABETES: ICD-10-CM

## 2019-11-27 DIAGNOSIS — N20.0 RECURRENT KIDNEY STONES: ICD-10-CM

## 2019-11-27 DIAGNOSIS — E29.1 HYPOGONADISM IN MALE: ICD-10-CM

## 2019-11-27 DIAGNOSIS — N28.89 LEFT RENAL MASS: Primary | ICD-10-CM

## 2019-11-27 DIAGNOSIS — M25.561 PAIN IN BOTH KNEES, UNSPECIFIED CHRONICITY: Primary | ICD-10-CM

## 2019-11-27 DIAGNOSIS — I15.2 HYPERTENSION ASSOCIATED WITH DIABETES: ICD-10-CM

## 2019-11-27 DIAGNOSIS — M25.562 PAIN IN BOTH KNEES, UNSPECIFIED CHRONICITY: Primary | ICD-10-CM

## 2019-11-27 DIAGNOSIS — N18.30 CKD (CHRONIC KIDNEY DISEASE) STAGE 3, GFR 30-59 ML/MIN: Chronic | ICD-10-CM

## 2019-11-27 DIAGNOSIS — E11.65 TYPE 2 DIABETES MELLITUS WITH HYPERGLYCEMIA, WITHOUT LONG-TERM CURRENT USE OF INSULIN: ICD-10-CM

## 2019-11-27 DIAGNOSIS — E66.9 OBESITY (BMI 30.0-34.9): ICD-10-CM

## 2019-11-27 PROCEDURE — 90662 FLU VACCINE - HIGH DOSE (65+) PRESERVATIVE FREE IM: ICD-10-PCS | Mod: S$GLB,,, | Performed by: FAMILY MEDICINE

## 2019-11-27 PROCEDURE — 99999 PR PBB SHADOW E&M-EST. PATIENT-LVL III: CPT | Mod: PBBFAC,,, | Performed by: FAMILY MEDICINE

## 2019-11-27 PROCEDURE — 90471 IMMUNIZATION ADMIN: CPT | Mod: S$GLB,,, | Performed by: FAMILY MEDICINE

## 2019-11-27 PROCEDURE — 1126F AMNT PAIN NOTED NONE PRSNT: CPT | Mod: S$GLB,,, | Performed by: FAMILY MEDICINE

## 2019-11-27 PROCEDURE — 90471 FLU VACCINE - HIGH DOSE (65+) PRESERVATIVE FREE IM: ICD-10-PCS | Mod: S$GLB,,, | Performed by: FAMILY MEDICINE

## 2019-11-27 PROCEDURE — 1101F PR PT FALLS ASSESS DOC 0-1 FALLS W/OUT INJ PAST YR: ICD-10-PCS | Mod: CPTII,S$GLB,, | Performed by: FAMILY MEDICINE

## 2019-11-27 PROCEDURE — 99214 PR OFFICE/OUTPT VISIT, EST, LEVL IV, 30-39 MIN: ICD-10-PCS | Mod: 25,S$GLB,, | Performed by: FAMILY MEDICINE

## 2019-11-27 PROCEDURE — 99214 OFFICE O/P EST MOD 30 MIN: CPT | Mod: 25,S$GLB,, | Performed by: FAMILY MEDICINE

## 2019-11-27 PROCEDURE — 1159F MED LIST DOCD IN RCRD: CPT | Mod: S$GLB,,, | Performed by: FAMILY MEDICINE

## 2019-11-27 PROCEDURE — 1101F PT FALLS ASSESS-DOCD LE1/YR: CPT | Mod: CPTII,S$GLB,, | Performed by: FAMILY MEDICINE

## 2019-11-27 PROCEDURE — 1159F PR MEDICATION LIST DOCUMENTED IN MEDICAL RECORD: ICD-10-PCS | Mod: S$GLB,,, | Performed by: FAMILY MEDICINE

## 2019-11-27 PROCEDURE — 3075F PR MOST RECENT SYSTOLIC BLOOD PRESS GE 130-139MM HG: ICD-10-PCS | Mod: CPTII,S$GLB,, | Performed by: FAMILY MEDICINE

## 2019-11-27 PROCEDURE — 99999 PR PBB SHADOW E&M-EST. PATIENT-LVL III: ICD-10-PCS | Mod: PBBFAC,,, | Performed by: FAMILY MEDICINE

## 2019-11-27 PROCEDURE — 90662 IIV NO PRSV INCREASED AG IM: CPT | Mod: S$GLB,,, | Performed by: FAMILY MEDICINE

## 2019-11-27 PROCEDURE — 3044F HG A1C LEVEL LT 7.0%: CPT | Mod: CPTII,S$GLB,, | Performed by: FAMILY MEDICINE

## 2019-11-27 PROCEDURE — 3078F PR MOST RECENT DIASTOLIC BLOOD PRESSURE < 80 MM HG: ICD-10-PCS | Mod: CPTII,S$GLB,, | Performed by: FAMILY MEDICINE

## 2019-11-27 PROCEDURE — 1126F PR PAIN SEVERITY QUANTIFIED, NO PAIN PRESENT: ICD-10-PCS | Mod: S$GLB,,, | Performed by: FAMILY MEDICINE

## 2019-11-27 PROCEDURE — 3044F PR MOST RECENT HEMOGLOBIN A1C LEVEL <7.0%: ICD-10-PCS | Mod: CPTII,S$GLB,, | Performed by: FAMILY MEDICINE

## 2019-11-27 PROCEDURE — 3075F SYST BP GE 130 - 139MM HG: CPT | Mod: CPTII,S$GLB,, | Performed by: FAMILY MEDICINE

## 2019-11-27 PROCEDURE — 3078F DIAST BP <80 MM HG: CPT | Mod: CPTII,S$GLB,, | Performed by: FAMILY MEDICINE

## 2019-11-27 RX ORDER — METFORMIN HYDROCHLORIDE 500 MG/1
500 TABLET, EXTENDED RELEASE ORAL 2 TIMES DAILY WITH MEALS
Qty: 180 TABLET | Refills: 4 | Status: SHIPPED | OUTPATIENT
Start: 2019-11-27 | End: 2023-07-06 | Stop reason: SDUPTHER

## 2019-11-27 RX ORDER — LOSARTAN POTASSIUM 100 MG/1
100 TABLET ORAL DAILY
Qty: 90 TABLET | Refills: 3 | Status: SHIPPED | OUTPATIENT
Start: 2019-11-27 | End: 2020-12-04

## 2019-11-27 RX ORDER — FUROSEMIDE 20 MG/1
20 TABLET ORAL DAILY
Qty: 90 TABLET | Refills: 3 | Status: SHIPPED | OUTPATIENT
Start: 2019-11-27 | End: 2020-08-06

## 2019-11-27 RX ORDER — ALLOPURINOL 300 MG/1
300 TABLET ORAL DAILY
Qty: 90 TABLET | Refills: 4 | Status: ON HOLD | OUTPATIENT
Start: 2019-11-27 | End: 2020-11-19 | Stop reason: CLARIF

## 2019-11-27 RX ORDER — POTASSIUM CHLORIDE 20 MEQ/1
20 TABLET, EXTENDED RELEASE ORAL DAILY
Qty: 90 TABLET | Refills: 3 | Status: SHIPPED | OUTPATIENT
Start: 2019-11-27 | End: 2021-02-01

## 2019-11-27 RX ORDER — ALLOPURINOL 100 MG/1
100 TABLET ORAL DAILY
Qty: 90 TABLET | Refills: 3 | Status: SHIPPED | OUTPATIENT
Start: 2019-11-27 | End: 2021-01-08 | Stop reason: SDUPTHER

## 2019-11-27 NOTE — PATIENT INSTRUCTIONS

## 2019-12-02 ENCOUNTER — OFFICE VISIT (OUTPATIENT)
Dept: ORTHOPEDICS | Facility: CLINIC | Age: 69
End: 2019-12-02
Payer: COMMERCIAL

## 2019-12-02 ENCOUNTER — HOSPITAL ENCOUNTER (OUTPATIENT)
Dept: RADIOLOGY | Facility: HOSPITAL | Age: 69
Discharge: HOME | End: 2019-12-02
Attending: ORTHOPAEDIC SURGERY
Payer: COMMERCIAL

## 2019-12-02 VITALS
RESPIRATION RATE: 13 BRPM | BODY MASS INDEX: 40.34 KG/M2 | WEIGHT: 257 LBS | HEIGHT: 67 IN | SYSTOLIC BLOOD PRESSURE: 153 MMHG | HEART RATE: 58 BPM | DIASTOLIC BLOOD PRESSURE: 73 MMHG

## 2019-12-02 DIAGNOSIS — M17.0 PRIMARY OSTEOARTHRITIS OF BOTH KNEES: Primary | ICD-10-CM

## 2019-12-02 DIAGNOSIS — M25.561 PAIN IN BOTH KNEES, UNSPECIFIED CHRONICITY: ICD-10-CM

## 2019-12-02 DIAGNOSIS — M25.562 PAIN IN BOTH KNEES, UNSPECIFIED CHRONICITY: ICD-10-CM

## 2019-12-02 PROCEDURE — 1101F PR PT FALLS ASSESS DOC 0-1 FALLS W/OUT INJ PAST YR: ICD-10-PCS | Mod: CPTII,S$GLB,, | Performed by: ORTHOPAEDIC SURGERY

## 2019-12-02 PROCEDURE — 73564 X-RAY EXAM KNEE 4 OR MORE: CPT | Mod: 26,,, | Performed by: RADIOLOGY

## 2019-12-02 PROCEDURE — 1159F MED LIST DOCD IN RCRD: CPT | Mod: S$GLB,,, | Performed by: ORTHOPAEDIC SURGERY

## 2019-12-02 PROCEDURE — 73564 X-RAY EXAM KNEE 4 OR MORE: CPT | Mod: TC,50,PN

## 2019-12-02 PROCEDURE — 1125F PR PAIN SEVERITY QUANTIFIED, PAIN PRESENT: ICD-10-PCS | Mod: S$GLB,,, | Performed by: ORTHOPAEDIC SURGERY

## 2019-12-02 PROCEDURE — 1159F PR MEDICATION LIST DOCUMENTED IN MEDICAL RECORD: ICD-10-PCS | Mod: S$GLB,,, | Performed by: ORTHOPAEDIC SURGERY

## 2019-12-02 PROCEDURE — 20610 LARGE JOINT ASPIRATION/INJECTION: L KNEE, R KNEE: ICD-10-PCS | Mod: 50,S$GLB,, | Performed by: ORTHOPAEDIC SURGERY

## 2019-12-02 PROCEDURE — 3077F PR MOST RECENT SYSTOLIC BLOOD PRESSURE >= 140 MM HG: ICD-10-PCS | Mod: CPTII,S$GLB,, | Performed by: ORTHOPAEDIC SURGERY

## 2019-12-02 PROCEDURE — 99999 PR PBB SHADOW E&M-EST. PATIENT-LVL III: CPT | Mod: PBBFAC,,, | Performed by: ORTHOPAEDIC SURGERY

## 2019-12-02 PROCEDURE — 99213 OFFICE O/P EST LOW 20 MIN: CPT | Mod: 25,S$GLB,, | Performed by: ORTHOPAEDIC SURGERY

## 2019-12-02 PROCEDURE — 3077F SYST BP >= 140 MM HG: CPT | Mod: CPTII,S$GLB,, | Performed by: ORTHOPAEDIC SURGERY

## 2019-12-02 PROCEDURE — 73564 XR KNEE ORTHO BILAT WITH FLEXION: ICD-10-PCS | Mod: 26,,, | Performed by: RADIOLOGY

## 2019-12-02 PROCEDURE — 20610 DRAIN/INJ JOINT/BURSA W/O US: CPT | Mod: 50,S$GLB,, | Performed by: ORTHOPAEDIC SURGERY

## 2019-12-02 PROCEDURE — 3078F PR MOST RECENT DIASTOLIC BLOOD PRESSURE < 80 MM HG: ICD-10-PCS | Mod: CPTII,S$GLB,, | Performed by: ORTHOPAEDIC SURGERY

## 2019-12-02 PROCEDURE — 1125F AMNT PAIN NOTED PAIN PRSNT: CPT | Mod: S$GLB,,, | Performed by: ORTHOPAEDIC SURGERY

## 2019-12-02 PROCEDURE — 1101F PT FALLS ASSESS-DOCD LE1/YR: CPT | Mod: CPTII,S$GLB,, | Performed by: ORTHOPAEDIC SURGERY

## 2019-12-02 PROCEDURE — 99213 PR OFFICE/OUTPT VISIT, EST, LEVL III, 20-29 MIN: ICD-10-PCS | Mod: 25,S$GLB,, | Performed by: ORTHOPAEDIC SURGERY

## 2019-12-02 PROCEDURE — 99999 PR PBB SHADOW E&M-EST. PATIENT-LVL III: ICD-10-PCS | Mod: PBBFAC,,, | Performed by: ORTHOPAEDIC SURGERY

## 2019-12-02 PROCEDURE — 3078F DIAST BP <80 MM HG: CPT | Mod: CPTII,S$GLB,, | Performed by: ORTHOPAEDIC SURGERY

## 2019-12-02 RX ORDER — TRIAMCINOLONE ACETONIDE 40 MG/ML
40 INJECTION, SUSPENSION INTRA-ARTICULAR; INTRAMUSCULAR
Status: DISCONTINUED | OUTPATIENT
Start: 2019-12-02 | End: 2019-12-02 | Stop reason: HOSPADM

## 2019-12-02 RX ADMIN — TRIAMCINOLONE ACETONIDE 40 MG: 40 INJECTION, SUSPENSION INTRA-ARTICULAR; INTRAMUSCULAR at 10:12

## 2019-12-02 NOTE — PROGRESS NOTES
Past Medical History:   Diagnosis Date    Arthritis     knees, back    CAD (coronary artery disease) 1995    no chest pain since CABG, sees Dr Larry Black    Diabetes mellitus     borderline    Hypertension     Kidney stones     uric acid stones    VASYL (obstructive sleep apnea) 2007    is compliant with CPAP    Primary gonadal failure        Past Surgical History:   Procedure Laterality Date    BACK SURGERY  2010    L5-6 fusion, with pins,bone graft    CARDIAC SURGERY  1995, 2007    total 7 vessel bypass    COLONOSCOPY  2008    repeat 2013    COLONOSCOPY N/A 4/4/2017    Procedure: COLONOSCOPY;  Surgeon: Dmitry Sampson MD;  Location: [facility];  Service: Endoscopy;  Laterality: N/A;    CORONARY ARTERY BYPASS GRAFT  1995, 2007    cabgx3, cabgx5    EXTRACORPOREAL SHOCK WAVE LITHOTRIPSY      EYE SURGERY      cataracts bilateral    LITHOTRIPSY      LUMBAR LAMINECTOMY      Partial Nephrectomy Laproscopic      TRANSRECTAL BIOPSY OF PROSTATE WITH ULTRASOUND GUIDANCE N/A 3/19/2019    Procedure: BIOPSY, PROSTATE, RECTAL APPROACH, WITH US GUIDANCE;  Surgeon: Yovany Heart MD;  Location: [facility];  Service: Urology;  Laterality: N/A;       Current Outpatient Medications   Medication Sig    allopurinol (ZYLOPRIM) 100 MG tablet Take 1 tablet (100 mg total) by mouth once daily.    allopurinol (ZYLOPRIM) 300 MG tablet Take 1 tablet (300 mg total) by mouth once daily. Add a 100 mg tab .Total 400 mg a day.    aspirin (ECOTRIN) 81 MG EC tablet Take 81 mg by mouth once daily.    atorvastatin (LIPITOR) 20 MG tablet Take 1 tablet (20 mg total) by mouth once daily.    clindamycin (CLEOCIN T) 1 % external solution AAA back BID PRN flare    cyanocobalamin, vitamin B-12, (VITAMIN B-12 ORAL) Take by mouth.    ferrous sulfate (IRON ORAL) Take by mouth.    fish oil-omega-3 fatty acids 300-1,000 mg capsule Take 2 g by mouth 3 (three) times daily.    furosemide (LASIX) 20 MG tablet Take 1 tablet (20 mg total)  by mouth once daily.    hydrocortisone 2.5 % cream Thin film to AA onface daily PRN flare; use short term only    losartan (COZAAR) 100 MG tablet Take 1 tablet (100 mg total) by mouth once daily.    metFORMIN (GLUCOPHAGE-XR) 500 MG 24 hr tablet Take 1 tablet (500 mg total) by mouth 2 (two) times daily with meals.    potassium chloride SA (K-DUR,KLOR-CON) 20 MEQ tablet Take 1 tablet (20 mEq total) by mouth once daily.    sildenafil (VIAGRA) 100 MG tablet Take 1 tablet (100 mg total) by mouth as needed for Erectile Dysfunction.    vitamin D 1000 units Tab Take 185 mg by mouth once daily.     No current facility-administered medications for this visit.      Facility-Administered Medications Ordered in Other Visits   Medication    triamcinolone acetonide injection 40 mg    triamcinolone acetonide injection 40 mg       Review of patient's allergies indicates:   Allergen Reactions    Adhesive Blisters       Family History   Problem Relation Age of Onset    Heart disease Mother     Hypertension Mother     Diabetes Mother     Heart disease Father         premature    Hypertension Father     Diabetes Sister     Urolithiasis Sister     Heart disease Paternal Uncle     Cancer Neg Hx     Prostate cancer Neg Hx     Kidney cancer Neg Hx     Melanoma Neg Hx     Lupus Neg Hx     Eczema Neg Hx     Psoriasis Neg Hx        Social History     Socioeconomic History    Marital status:      Spouse name: Not on file    Number of children: Not on file    Years of education: Not on file    Highest education level: Not on file   Occupational History    Not on file   Social Needs    Financial resource strain: Not on file    Food insecurity:     Worry: Not on file     Inability: Not on file    Transportation needs:     Medical: Not on file     Non-medical: Not on file   Tobacco Use    Smoking status: Former Smoker     Last attempt to quit: 1976     Years since quittin.8    Smokeless tobacco:  Never Used   Substance and Sexual Activity    Alcohol use: Yes     Frequency: Monthly or less     Drinks per session: Patient refused     Binge frequency: Never     Comment: Socially    Drug use: No    Sexual activity: Not on file   Lifestyle    Physical activity:     Days per week: 7 days     Minutes per session: 60 min    Stress: Not at all   Relationships    Social connections:     Talks on phone: Once a week     Gets together: Patient refused     Attends L[employer] service: Not on file     Active member of club or organization: No     Attends meetings of clubs or organizations: Never     Relationship status:    Other Topics Concern    Not on file   Social History Narrative    Not on file       Chief Complaint:   Chief Complaint   Patient presents with    Left Knee - Pain    Right Knee - Pain       History of present illness: Is a 69-year-old male seen for recurrent bilateral knee pain.  Left hurts more than the right today. Injected him back about 7 months ago with Synvisc 1 and he got good relief.  Works well.  The patient is back at work and so he has to do more walking and stair climbing.  Pain as a 1 out of 10.      Answers for HPI/ROS submitted by the patient on 11/29/2019   Leg pain  unexpected weight change: No  appetite change : No  sleep disturbance: No  IMMUNOCOMPROMISED: No  nervous/ anxious: No  dysphoric mood: No  rash: No  visual disturbance: No  eye redness: No  eye pain: No  ear pain: No  tinnitus: Yes  hearing loss: Yes  sinus pressure : No  nosebleeds: No  enviro allergies: No  food allergies: No  cough: No  shortness of breath: No  sweating: No  frequency: No  difficulty urinating: No  hematuria: No  chest pain: No  palpitations: No  nausea: No  vomiting: No  diarrhea: No  blood in stool: No  constipation: No  headaches: No  dizziness: No  numbness: No  seizures: No  joint swelling: No  myalgia: No  weakness: No  back pain: No  Pain Chronicity: recurrent  History of trauma:  No  Onset: more than 1 month ago  Frequency: daily  Progression since onset: unchanged  injury location: at work  pain- numeric: 3/10  pain location: left knee, right knee  pain quality: aching, sharp  Radiating Pain: No  Aggravating factors: walking  fever: No  inability to bear weight: No  itching: No  joint locking: No  limited range of motion: Yes  stiffness: Yes  tingling: No  Treatments tried: nothing  physical therapy: not tried  Improvement on treatment: significant        Physical Examination:    Vital Signs:    Vitals:    12/02/19 1016   BP: (!) 153/73   Pulse: (!) 58   Resp: 13       Body mass index is 40.25 kg/m².    This a well-developed, well nourished patient in no acute distress.  They are alert and oriented and cooperative to examination.  Pt. walks without an antalgic gait.      Examination of bilateral knees shows no rashes or erythema. There are no masses ecchymosis or effusion. Patient has full range of motion from 0-130°. Patient is nontender to palpation over lateral joint line and moderately tender to palpation over the medial joint line. Knee is stable to varus and valgus stress. 5 out of 5 motor strength. Palpable distal pulses. Intact light touch sensation. Negative Patellofemoral crepitus      X-rays: X-rays of both knees are ordered and review which show severe medial joint space narrowing of both knees.  No significant progression     Assessment:: Severe varus knee arthritis    Plan:  We discussed treatment options. He wanted to have another round of Synvisc-One in both knees.  We will get authorization for this.  We did inject him with steroid today.    This note was created using  voice recognition software that occasionally misinterpreted phrases or words.    Consult note is delivered via Epic messaging service.

## 2019-12-02 NOTE — PROCEDURES
Large Joint Aspiration/Injection: L knee, R knee  Date/Time: 12/2/2019 10:30 AM  Performed by: Pablo Dutton MD  Authorized by: Pablo Dutton MD     Consent Done?:  Yes (Verbal)  Indications:  Pain  Procedure site marked: Yes    Timeout: Prior to procedure the correct patient, procedure, and site was verified    Anesthesia    Anesthetic: lidocaine 1% without epinephrine and bupivacaine 0.25% without epinephrine  Anesthetic total: 6mL    Location:  Knee  Site:  L knee and R knee  Prep: Patient was prepped and draped in usual sterile fashion    Needle size:  20 G  Approach:  Anterolateral  Medications:  40 mg triamcinolone acetonide 40 mg/mL; 40 mg triamcinolone acetonide 40 mg/mL  Patient tolerance:  Patient tolerated the procedure well with no immediate complications

## 2019-12-02 NOTE — PROGRESS NOTES
Subjective:       Patient ID: Carlos Tenorio is a 69 y.o. male.    Chief Complaint: Hypertension; Diabetes; and LEG CRAMPS    Hypertension   This is a chronic problem. The current episode started more than 1 year ago. The problem has been resolved since onset. The problem is controlled. Pertinent negatives include no chest pain, headaches, neck pain or palpitations. There are no associated agents to hypertension. Risk factors for coronary artery disease include sedentary lifestyle and stress. Past treatments include angiotensin blockers and diuretics. The current treatment provides moderate improvement. Compliance problems include exercise.    Diabetes   Pertinent negatives for hypoglycemia include no confusion or headaches. Pertinent negatives for diabetes include no chest pain, no polydipsia, no polyuria and no weakness.     Review of Systems   Constitutional: Negative for activity change and unexpected weight change.   HENT: Negative for hearing loss, rhinorrhea and trouble swallowing.    Eyes: Negative for discharge and visual disturbance.   Respiratory: Negative for chest tightness and wheezing.    Cardiovascular: Negative for chest pain and palpitations.   Gastrointestinal: Negative for blood in stool, constipation, diarrhea and vomiting.   Endocrine: Negative for polydipsia and polyuria.   Genitourinary: Negative for difficulty urinating, hematuria and urgency.   Musculoskeletal: Positive for arthralgias and joint swelling. Negative for neck pain.   Neurological: Negative for weakness and headaches.   Psychiatric/Behavioral: Negative for confusion and dysphoric mood.       Patient Active Problem List   Diagnosis    Kidney stones    CAD (coronary artery disease)    Hypertension associated with diabetes    VASYL (obstructive sleep apnea)    Primary gonadal failure    Diabetes mellitus, type 2    Male hypogonadism    Bilateral renal cysts    Obesity (BMI 30.0-34.9)    OA (osteoarthritis) of knee     Skin tag    History of colon polyps    Left renal mass    CKD (chronic kidney disease) stage 3, GFR 30-59 ml/min    Arthritis pain of hand    Elevated PSA       Objective:      Physical Exam   Constitutional: He is oriented to person, place, and time. He appears well-developed. No distress.   HENT:   Head: Normocephalic and atraumatic.   Right Ear: External ear normal.   Left Ear: External ear normal.   Nose: Nose normal.   Mouth/Throat: Oropharynx is clear and moist. No oropharyngeal exudate.   Eyes: Pupils are equal, round, and reactive to light. Conjunctivae and EOM are normal. Right eye exhibits no discharge. Left eye exhibits no discharge. No scleral icterus.   Neck: Normal range of motion. Neck supple. No JVD present. No tracheal deviation present. No thyromegaly present.   Cardiovascular: Normal rate, normal heart sounds and intact distal pulses.   No murmur heard.  Pulmonary/Chest: Effort normal and breath sounds normal. No respiratory distress. He has no wheezes. He has no rales.   Abdominal: Soft. Bowel sounds are normal. He exhibits no distension and no mass. There is no tenderness. There is no rebound and no guarding.   Musculoskeletal: He exhibits no edema or tenderness.          Lymphadenopathy:     He has no cervical adenopathy.   Neurological: He is alert and oriented to person, place, and time. No cranial nerve deficit. Coordination normal.   Skin: Skin is warm and dry. No rash noted. He is not diaphoretic. No erythema. No pallor.   Psychiatric: He has a normal mood and affect. His behavior is normal. Judgment and thought content normal.   Vitals reviewed.      Lab Results   Component Value Date    WBC 9.87 10/07/2019    HGB 12.7 (L) 10/07/2019    HCT 39.0 (L) 10/07/2019     10/07/2019    CHOL 148 10/07/2019    TRIG 255 (H) 10/07/2019    HDL 26 (L) 10/07/2019    ALT 21 10/07/2019    AST 17 10/07/2019     10/07/2019    K 3.9 10/07/2019     10/07/2019    CREATININE 1.4  10/07/2019    BUN 27 (H) 10/07/2019    CO2 24 10/07/2019    TSH 2.808 05/01/2017    PSA 4.5 (H) 12/14/2018    INR 1.0 06/05/2017    GLUF 109 11/07/2008    HGBA1C 5.7 (H) 10/07/2019     The 10-year ASCVD risk score (Nathan ZHENG Jr., et al., 2013) is: 39.6%    Values used to calculate the score:      Age: 69 years      Sex: Male      Is Non- : No      Diabetic: Yes      Tobacco smoker: No      Systolic Blood Pressure: 130 mmHg      Is BP treated: Yes      HDL Cholesterol: 26 mg/dL      Total Cholesterol: 148 mg/dL    Assessment:       1. CKD (chronic kidney disease) stage 3, GFR 30-59 ml/min    2. Type 2 diabetes mellitus with hyperglycemia, without long-term current use of insulin    3. Hypertension associated with diabetes    4. Recurrent kidney stones        Plan:       CKD (chronic kidney disease) stage 3, GFR 30-59 ml/min  -     metFORMIN (GLUCOPHAGE-XR) 500 MG 24 hr tablet; Take 1 tablet (500 mg total) by mouth 2 (two) times daily with meals.  Dispense: 180 tablet; Refill: 4  -     Iron and TIBC; Future; Expected date: 11/27/2019  -     Ferritin; Future; Expected date: 11/27/2019    Type 2 diabetes mellitus with hyperglycemia, without long-term current use of insulin  -     metFORMIN (GLUCOPHAGE-XR) 500 MG 24 hr tablet; Take 1 tablet (500 mg total) by mouth 2 (two) times daily with meals.  Dispense: 180 tablet; Refill: 4  -     Comprehensive metabolic panel; Future; Expected date: 11/27/2019  -     CBC auto differential; Future; Expected date: 11/27/2019  -     Lipid panel; Future; Expected date: 11/27/2019  -     Magnesium; Future; Expected date: 11/27/2019  -     Hemoglobin A1c; Future; Expected date: 11/27/2019    Hypertension associated with diabetes  -     losartan (COZAAR) 100 MG tablet; Take 1 tablet (100 mg total) by mouth once daily.  Dispense: 90 tablet; Refill: 3  -     furosemide (LASIX) 20 MG tablet; Take 1 tablet (20 mg total) by mouth once daily.  Dispense: 90 tablet; Refill:  3  -     potassium chloride SA (K-DUR,KLOR-CON) 20 MEQ tablet; Take 1 tablet (20 mEq total) by mouth once daily.  Dispense: 90 tablet; Refill: 3    Recurrent kidney stones  -     allopurinol (ZYLOPRIM) 300 MG tablet; Take 1 tablet (300 mg total) by mouth once daily. Add a 100 mg tab .Total 400 mg a day.  Dispense: 90 tablet; Refill: 4  -     allopurinol (ZYLOPRIM) 100 MG tablet; Take 1 tablet (100 mg total) by mouth once daily.  Dispense: 90 tablet; Refill: 3    Other orders  -     Influenza - High Dose (65+) (PF) (IM)    Stable and controlled. Continue current treatment plan as previously prescribed with your PCP.       Patient readiness: acceptance and barriers:readiness and social stressors    During the course of the visit the patient was educated and counseled about the following:     Hypertension:   Dietary sodium restriction.  Regular aerobic exercise.  Check blood pressures daily and record.  Obesity:   General weight loss/lifestyle modification strategies discussed (elicit support from others; identify saboteurs; non-food rewards, etc).  Informal exercise measures discussed, e.g. taking stairs instead of elevator.  Regular aerobic exercise program discussed.    Goals: Diabetes: Maintain Hemoglobin A1C below 7, Hypertension: Reduce Blood Pressure and Obesity: Reduce calorie intake and BMI    Did patient meet goals/outcomes: Yes    The following self management tools provided: excercise log    Patient Instructions (the written plan) was given to the patient/family.     Time spent with patient: 30 minutes    Barriers to medications present (no )    Adverse reactions to current medications (no)    Over the counter medications reviewed (Yes)        30-minute visit. 20 minutes spent counseling patient on diet, exercise, and weight loss.  This has been fully explained to the patient, who indicates understanding.

## 2019-12-16 ENCOUNTER — OFFICE VISIT (OUTPATIENT)
Dept: ORTHOPEDICS | Facility: CLINIC | Age: 69
End: 2019-12-16
Payer: COMMERCIAL

## 2019-12-16 VITALS — BODY MASS INDEX: 40.34 KG/M2 | WEIGHT: 257 LBS | HEIGHT: 67 IN

## 2019-12-16 DIAGNOSIS — M17.0 PRIMARY OSTEOARTHRITIS OF BOTH KNEES: Primary | ICD-10-CM

## 2019-12-16 PROCEDURE — 20610 LARGE JOINT ASPIRATION/INJECTION: R KNEE, L KNEE: ICD-10-PCS | Mod: 50,S$GLB,, | Performed by: ORTHOPAEDIC SURGERY

## 2019-12-16 PROCEDURE — 99999 PR PBB SHADOW E&M-EST. PATIENT-LVL III: CPT | Mod: PBBFAC,,, | Performed by: ORTHOPAEDIC SURGERY

## 2019-12-16 PROCEDURE — 20610 DRAIN/INJ JOINT/BURSA W/O US: CPT | Mod: 50,S$GLB,, | Performed by: ORTHOPAEDIC SURGERY

## 2019-12-16 PROCEDURE — 99499 NO LOS: ICD-10-PCS | Mod: S$GLB,,, | Performed by: ORTHOPAEDIC SURGERY

## 2019-12-16 PROCEDURE — 99499 UNLISTED E&M SERVICE: CPT | Mod: S$GLB,,, | Performed by: ORTHOPAEDIC SURGERY

## 2019-12-16 PROCEDURE — 99999 PR PBB SHADOW E&M-EST. PATIENT-LVL III: ICD-10-PCS | Mod: PBBFAC,,, | Performed by: ORTHOPAEDIC SURGERY

## 2019-12-16 NOTE — PROGRESS NOTES
Past Medical History:   Diagnosis Date    Arthritis     knees, back    CAD (coronary artery disease) 1995    no chest pain since CABG, sees Dr Larry Black    Diabetes mellitus     borderline    Hypertension     Kidney stones     uric acid stones    VASYL (obstructive sleep apnea) 2007    is compliant with CPAP    Primary gonadal failure        Past Surgical History:   Procedure Laterality Date    BACK SURGERY  2010    L5-6 fusion, with pins,bone graft    CARDIAC SURGERY  1995, 2007    total 7 vessel bypass    COLONOSCOPY  2008    repeat 2013    COLONOSCOPY N/A 4/4/2017    Procedure: COLONOSCOPY;  Surgeon: Dmitry Sampson MD;  Location: [facility] ENDO;  Service: Endoscopy;  Laterality: N/A;    CORONARY ARTERY BYPASS GRAFT  1995, 2007    cabgx3, cabgx5    EXTRACORPOREAL SHOCK WAVE LITHOTRIPSY      EYE SURGERY      cataracts bilateral    LITHOTRIPSY      LUMBAR LAMINECTOMY      Partial Nephrectomy Laproscopic      TRANSRECTAL BIOPSY OF PROSTATE WITH ULTRASOUND GUIDANCE N/A 3/19/2019    Procedure: BIOPSY, PROSTATE, RECTAL APPROACH, WITH US GUIDANCE;  Surgeon: Yovany Heart MD;  Location: [facility];  Service: Urology;  Laterality: N/A;       Current Outpatient Medications   Medication Sig    allopurinol (ZYLOPRIM) 100 MG tablet Take 1 tablet (100 mg total) by mouth once daily.    allopurinol (ZYLOPRIM) 300 MG tablet Take 1 tablet (300 mg total) by mouth once daily. Add a 100 mg tab .Total 400 mg a day.    aspirin (ECOTRIN) 81 MG EC tablet Take 81 mg by mouth once daily.    atorvastatin (LIPITOR) 20 MG tablet Take 1 tablet (20 mg total) by mouth once daily.    clindamycin (CLEOCIN T) 1 % external solution AAA back BID PRN flare    cyanocobalamin, vitamin B-12, (VITAMIN B-12 ORAL) Take by mouth.    ferrous sulfate (IRON ORAL) Take by mouth.    fish oil-omega-3 fatty acids 300-1,000 mg capsule Take 2 g by mouth 3 (three) times daily.    furosemide (LASIX) 20 MG tablet Take 1 tablet (20 mg total)  by mouth once daily.    hydrocortisone 2.5 % cream Thin film to AA onface daily PRN flare; use short term only    losartan (COZAAR) 100 MG tablet Take 1 tablet (100 mg total) by mouth once daily.    metFORMIN (GLUCOPHAGE-XR) 500 MG 24 hr tablet Take 1 tablet (500 mg total) by mouth 2 (two) times daily with meals.    potassium chloride SA (K-DUR,KLOR-CON) 20 MEQ tablet Take 1 tablet (20 mEq total) by mouth once daily.    sildenafil (VIAGRA) 100 MG tablet Take 1 tablet (100 mg total) by mouth as needed for Erectile Dysfunction.    vitamin D 1000 units Tab Take 185 mg by mouth once daily.     No current facility-administered medications for this visit.      Facility-Administered Medications Ordered in Other Visits   Medication    triamcinolone acetonide injection 40 mg    triamcinolone acetonide injection 40 mg       Review of patient's allergies indicates:   Allergen Reactions    Adhesive Blisters       Family History   Problem Relation Age of Onset    Heart disease Mother     Hypertension Mother     Diabetes Mother     Heart disease Father         premature    Hypertension Father     Diabetes Sister     Urolithiasis Sister     Heart disease Paternal Uncle     Cancer Neg Hx     Prostate cancer Neg Hx     Kidney cancer Neg Hx     Melanoma Neg Hx     Lupus Neg Hx     Eczema Neg Hx     Psoriasis Neg Hx        Social History     Socioeconomic History    Marital status:      Spouse name: Not on file    Number of children: Not on file    Years of education: Not on file    Highest education level: Not on file   Occupational History    Not on file   Social Needs    Financial resource strain: Not on file    Food insecurity:     Worry: Not on file     Inability: Not on file    Transportation needs:     Medical: Not on file     Non-medical: Not on file   Tobacco Use    Smoking status: Former Smoker     Last attempt to quit: 1976     Years since quittin.8    Smokeless tobacco:  Never Used   Substance and Sexual Activity    Alcohol use: Yes     Frequency: Monthly or less     Drinks per session: Patient refused     Binge frequency: Never     Comment: Socially    Drug use: No    Sexual activity: Not on file   Lifestyle    Physical activity:     Days per week: 7 days     Minutes per session: 60 min    Stress: Not at all   Relationships    Social connections:     Talks on phone: Once a week     Gets together: Patient refused     Attends Muslim service: Not on file     Active member of club or organization: No     Attends meetings of clubs or organizations: Never     Relationship status:    Other Topics Concern    Not on file   Social History Narrative    Not on file       Chief Complaint:   Chief Complaint   Patient presents with    Knee Pain     bilateral knee-Synvisc One       History of present illness: Is a 69-year-old male seen for recurrent bilateral knee pain.  Left hurts more than the right today. Injected him back about 7 months ago with Synvisc 1 and he got good relief.  Works well.  The patient is back at work and so he has to do more walking and stair climbing.  Pain as a 1 out of 10.      Answers for HPI/ROS submitted by the patient on 11/29/2019   Leg pain  unexpected weight change: No  appetite change : No  sleep disturbance: No  IMMUNOCOMPROMISED: No  nervous/ anxious: No  dysphoric mood: No  rash: No  visual disturbance: No  eye redness: No  eye pain: No  ear pain: No  tinnitus: Yes  hearing loss: Yes  sinus pressure : No  nosebleeds: No  enviro allergies: No  food allergies: No  cough: No  shortness of breath: No  sweating: No  frequency: No  difficulty urinating: No  hematuria: No  chest pain: No  palpitations: No  nausea: No  vomiting: No  diarrhea: No  blood in stool: No  constipation: No  headaches: No  dizziness: No  numbness: No  seizures: No  joint swelling: No  myalgia: No  weakness: No  back pain: No  Pain Chronicity: recurrent  History of trauma:  No  Onset: more than 1 month ago  Frequency: daily  Progression since onset: unchanged  injury location: at work  pain- numeric: 3/10  pain location: left knee, right knee  pain quality: aching, sharp  Radiating Pain: No  Aggravating factors: walking  fever: No  inability to bear weight: No  itching: No  joint locking: No  limited range of motion: Yes  stiffness: Yes  tingling: No  Treatments tried: nothing  physical therapy: not tried  Improvement on treatment: significant        Physical Examination:    Vital Signs:    There were no vitals filed for this visit.    Body mass index is 40.25 kg/m².    This a well-developed, well nourished patient in no acute distress.  They are alert and oriented and cooperative to examination.  Pt. walks without an antalgic gait.      Examination of bilateral knees shows no rashes or erythema. There are no masses ecchymosis or effusion. Patient has full range of motion from 0-130°. Patient is nontender to palpation over lateral joint line and moderately tender to palpation over the medial joint line. Knee is stable to varus and valgus stress. 5 out of 5 motor strength. Palpable distal pulses. Intact light touch sensation. Negative Patellofemoral crepitus      X-rays: X-rays of both knees are review which show severe medial joint space narrowing of both knees.  No significant progression     Assessment:: Severe varus knee arthritis    Plan:  Injected both knees with Synvisc-One.  Follow-up in 6 weeks to 6 months.    This note was created using  voice recognition software that occasionally misinterpreted phrases or words.    Consult note is delivered via Epic messaging service.

## 2019-12-16 NOTE — PROCEDURES
Large Joint Aspiration/Injection: R knee, L knee  Date/Time: 12/16/2019 9:45 AM  Performed by: Pablo Dutton MD  Authorized by: Pablo Dutton MD     Consent Done?:  Yes (Verbal)  Indications:  Pain  Procedure site marked: Yes    Timeout: Prior to procedure the correct patient, procedure, and site was verified      Location:  Knee  Site:  R knee and L knee  Prep: Patient was prepped and draped in usual sterile fashion    Needle size:  20 G  Approach:  Anterolateral  Medications:  48 mg hylan g-f 20 48 mg/6 mL; 48 mg hylan g-f 20 48 mg/6 mL  Patient tolerance:  Patient tolerated the procedure well with no immediate complications

## 2020-01-02 ENCOUNTER — TELEPHONE (OUTPATIENT)
Dept: FAMILY MEDICINE | Facility: CLINIC | Age: 70
End: 2020-01-02

## 2020-01-02 NOTE — TELEPHONE ENCOUNTER
----- Message from Edward Rubio sent at 1/2/2020  2:33 PM CST -----  Type:  Sooner Apoointment Request    Caller is requesting a sooner appointment.  Caller declined first available appointment listed below.  Caller will not accept being placed on the waitlist and is requesting a message be sent to doctor.    Name of Caller:  Esthela Tenorio (Spouse)  When is the first available appointment?  Patient is scheduled on 03/30 and would like to be seen around the 24-25 of March  Symptoms:  4 month FU  Best Call Back Number:  [phone]  Additional Information:

## 2020-01-06 ENCOUNTER — PATIENT MESSAGE (OUTPATIENT)
Dept: FAMILY MEDICINE | Facility: CLINIC | Age: 70
End: 2020-01-06

## 2020-02-01 ENCOUNTER — PATIENT MESSAGE (OUTPATIENT)
Dept: FAMILY MEDICINE | Facility: CLINIC | Age: 70
End: 2020-02-01

## 2020-02-01 ENCOUNTER — PATIENT MESSAGE (OUTPATIENT)
Dept: ORTHOPEDICS | Facility: CLINIC | Age: 70
End: 2020-02-01

## 2020-02-13 ENCOUNTER — PATIENT MESSAGE (OUTPATIENT)
Dept: FAMILY MEDICINE | Facility: CLINIC | Age: 70
End: 2020-02-13

## 2020-03-02 ENCOUNTER — OFFICE VISIT (OUTPATIENT)
Dept: INFECTIOUS DISEASES | Facility: CLINIC | Age: 70
End: 2020-03-02
Payer: COMMERCIAL

## 2020-03-02 ENCOUNTER — CLINICAL SUPPORT (OUTPATIENT)
Dept: INFECTIOUS DISEASES | Facility: CLINIC | Age: 70
End: 2020-03-02
Payer: COMMERCIAL

## 2020-03-02 VITALS
SYSTOLIC BLOOD PRESSURE: 144 MMHG | TEMPERATURE: 98 F | HEIGHT: 67 IN | DIASTOLIC BLOOD PRESSURE: 79 MMHG | WEIGHT: 256.38 LBS | BODY MASS INDEX: 40.24 KG/M2 | HEART RATE: 64 BPM

## 2020-03-02 DIAGNOSIS — Z23 IMMUNIZATION DUE: ICD-10-CM

## 2020-03-02 DIAGNOSIS — Z71.84 TRAVEL ADVICE ENCOUNTER: Primary | ICD-10-CM

## 2020-03-02 PROCEDURE — 90691 TYPHOID VACCINE IM: CPT | Mod: S$GLB,,, | Performed by: INTERNAL MEDICINE

## 2020-03-02 PROCEDURE — 99999 PR PBB SHADOW E&M-EST. PATIENT-LVL III: CPT | Mod: PBBFAC,,, | Performed by: INTERNAL MEDICINE

## 2020-03-02 PROCEDURE — 90691 TYPHOID VICPS VACCINE IM: ICD-10-PCS | Mod: S$GLB,,, | Performed by: INTERNAL MEDICINE

## 2020-03-02 PROCEDURE — 99401 PR PREVENT COUNSEL,INDIV,15 MIN: ICD-10-PCS | Mod: 25,S$GLB,, | Performed by: INTERNAL MEDICINE

## 2020-03-02 PROCEDURE — 99999 PR PBB SHADOW E&M-EST. PATIENT-LVL III: ICD-10-PCS | Mod: PBBFAC,,, | Performed by: INTERNAL MEDICINE

## 2020-03-02 PROCEDURE — 90471 IMMUNIZATION ADMIN: CPT | Mod: S$GLB,,, | Performed by: INTERNAL MEDICINE

## 2020-03-02 PROCEDURE — 90471 TYPHOID VICPS VACCINE IM: ICD-10-PCS | Mod: S$GLB,,, | Performed by: INTERNAL MEDICINE

## 2020-03-02 PROCEDURE — 99401 PREV MED CNSL INDIV APPRX 15: CPT | Mod: 25,S$GLB,, | Performed by: INTERNAL MEDICINE

## 2020-03-02 RX ORDER — AZITHROMYCIN 500 MG/1
500 TABLET, FILM COATED ORAL DAILY
Qty: 3 TABLET | Refills: 0 | Status: SHIPPED | OUTPATIENT
Start: 2020-03-02 | End: 2020-03-05

## 2020-03-02 NOTE — PROGRESS NOTES
Subjective:      Chief Complaint:   Chief Complaint   Patient presents with    Travel Consult     [place], [place]     History of Present Illness    Patient  70 y.o. male who presents today for routine pretravel consultation.  The patient reports a past medical history of HTN, DM, osteoarthritis in knees, prostate cancer (monitoring), partial nephrectomy of left kidney for kidney cancer.  The patient reports the following medication allergies; none.  The patient reports the following food allergies; none .  The patient will be traveling to [facility] and [place] on March 30.  The patient will be at this destination for 15 days.  He will travel to [place] (2-3 days) then few days on a cruise ship and then to [place], [place] and then back to [place].  The patient will be lodging at hotels and on a cruise ship.  The patient has travelled to the following other countries in the past; [place], [place], [place].  The patient reports that they received all their childhood vaccinations.  He remembers getting measles as a child.  The patient reports receipt of the following travel related vaccinations; hepatitis A X2, typhoid IM in 2014, Tdap in 2012, hepatitis b X2.  The purpose of this trip is for vacation.    Review of Systems   All other systems reviewed and are negative.      Objective:   Physical Exam   Assessment:     Pre-Travel clinic assessment    Plan:   Patient specific risks:      Patient is of advanced age.    Destination specific risks:      -Infectious Disease risks:      Food Borne pathogens:  Reviewed basic hand, food and water sanitation precautions.  Patient instructed to take hand  on their trip.  Typhoid IM vaccine today.  Azithromycin as needed for severe diarrhea.  He is up to date on hepatitis A vaccination.     Blood borne pathogens:  He is up to date on hepatitis b vaccination.     Routine:  He is up to date on influenza and tetanus vaccination.

## 2020-03-23 ENCOUNTER — TELEPHONE (OUTPATIENT)
Dept: RADIOLOGY | Facility: HOSPITAL | Age: 70
End: 2020-03-23

## 2020-03-23 NOTE — TELEPHONE ENCOUNTER
----- Message from Myriam Cox sent at 3/23/2020  1:59 PM CDT -----  Due to concerns associated with Covid19 the radiology team is seeking to reschedule outpatient diagnostic exams between 3/21  4/21 that have been ordered prior to 3/19.  Pt above is scheduled for Op Testing on 04/01/20 at [facility].  Please respond to this message if you believe it is safe to postpone this exam and the radiology team will reschedule and update you on the patients response.  If you have concerns that postponement is not safe (urgent or time sensitive diagnostic study), then reply and it will be performed as ordered.   If further discussion needed, please provide a contact number and a Radiologist will reach out to you shortly.

## 2020-03-24 ENCOUNTER — TELEPHONE (OUTPATIENT)
Dept: UROLOGY | Facility: CLINIC | Age: 70
End: 2020-03-24

## 2020-03-24 DIAGNOSIS — Z85.528 HISTORY OF RENAL CELL CANCER: Primary | ICD-10-CM

## 2020-03-24 NOTE — TELEPHONE ENCOUNTER
Spoke w pt he was informed of rescheduled of imaign due to Covid-19 pt voiced ok will reschedule and mail appt change.

## 2020-03-24 NOTE — TELEPHONE ENCOUNTER
----- Message from Yovany Heart MD sent at 3/24/2020  1:30 PM CDT -----  Routine surveillance, ok to postpone/reschedule  ----- Message -----  From: Angelica Maldonado LPN  Sent: 3/23/2020   2:32 PM CDT  To: Yovany Heart MD        ----- Message -----  From: Myriam Cox  Sent: 3/23/2020   1:59 PM CDT  To: Clif Crawford, RT, Lindsay Ramirez, RN, #    Due to concerns associated with Covid19 the radiology team is seeking to reschedule outpatient diagnostic exams between 3/21  4/21 that have been ordered prior to 3/19.  Pt above is scheduled for Op Testing on 04/01/20 at [facility].  Please respond to this message if you believe it is safe to postpone this exam and the radiology team will reschedule and update you on the patients response.  If you have concerns that postponement is not safe (urgent or time sensitive diagnostic study), then reply and it will be performed as ordered.   If further discussion needed, please provide a contact number and a Radiologist will reach out to you shortly.

## 2020-03-25 ENCOUNTER — TELEPHONE (OUTPATIENT)
Dept: RADIOLOGY | Facility: HOSPITAL | Age: 70
End: 2020-03-25

## 2020-03-25 NOTE — TELEPHONE ENCOUNTER
----- Message from Myriam Cox sent at 3/23/2020  1:59 PM CDT -----  Due to concerns associated with Covid19 the radiology team is seeking to reschedule outpatient diagnostic exams between 3/21  4/21 that have been ordered prior to 3/19.  Pt above is scheduled for Op Testing on 04/01/20 at [facility].  Please respond to this message if you believe it is safe to postpone this exam and the radiology team will reschedule and update you on the patients response.  If you have concerns that postponement is not safe (urgent or time sensitive diagnostic study), then reply and it will be performed as ordered.   If further discussion needed, please provide a contact number and a Radiologist will reach out to you shortly.

## 2020-04-07 ENCOUNTER — TELEPHONE (OUTPATIENT)
Dept: UROLOGY | Facility: CLINIC | Age: 70
End: 2020-04-07

## 2020-04-07 NOTE — TELEPHONE ENCOUNTER
----- Message from Yovany Heart MD sent at 4/7/2020  1:42 PM CDT -----  Already responded to r/s as surveillance only  May have been another imaging (xray?) so can r/s this one with it  ----- Message -----  From: Angelica Maldonado LPN  Sent: 4/7/2020  12:39 PM CDT  To: Yovany Heart MD        ----- Message -----  From: Myriam Cox  Sent: 4/7/2020  12:31 PM CDT  To: Lindsay Ramirez, RN, Sly Guardado    Pt is scheduled for US RETROPERITONEAL COMPLETE Due to concerns associated with Covid19, [facility]s Radiology Team is required to reschedule outpatient diagnostic exams/procedures that are not deemed EMERGENT. If the ordering provider feels the attached patients exam/ procedure is EMERGENT please document accordingly. If further discussion is needed, please provide a contact number for a Radiologist to contact you directly. Thank you for your consideration and cooperation during this time.

## 2020-05-05 ENCOUNTER — PATIENT MESSAGE (OUTPATIENT)
Dept: ADMINISTRATIVE | Facility: HOSPITAL | Age: 70
End: 2020-05-05

## 2020-05-29 ENCOUNTER — LAB VISIT (OUTPATIENT)
Dept: LAB | Facility: HOSPITAL | Age: 70
End: 2020-05-29
Attending: UROLOGY
Payer: COMMERCIAL

## 2020-05-29 DIAGNOSIS — C61 PROSTATE CANCER: ICD-10-CM

## 2020-05-29 DIAGNOSIS — Z85.528 HISTORY OF RENAL CELL CANCER: ICD-10-CM

## 2020-05-29 LAB
ANION GAP SERPL CALC-SCNC: 8 MMOL/L (ref 8–16)
BUN SERPL-MCNC: 18 MG/DL (ref 8–23)
CALCIUM SERPL-MCNC: 9.4 MG/DL (ref 8.7–10.5)
CHLORIDE SERPL-SCNC: 109 MMOL/L (ref 95–110)
CO2 SERPL-SCNC: 21 MMOL/L (ref 23–29)
COMPLEXED PSA SERPL-MCNC: 3.2 NG/ML (ref 0–4)
CREAT SERPL-MCNC: 1 MG/DL (ref 0.5–1.4)
EST. GFR  (AFRICAN AMERICAN): >60 ML/MIN/1.73 M^2
EST. GFR  (NON AFRICAN AMERICAN): >60 ML/MIN/1.73 M^2
GLUCOSE SERPL-MCNC: 110 MG/DL (ref 70–110)
POTASSIUM SERPL-SCNC: 4.3 MMOL/L (ref 3.5–5.1)
SODIUM SERPL-SCNC: 138 MMOL/L (ref 136–145)

## 2020-05-29 PROCEDURE — 36415 COLL VENOUS BLD VENIPUNCTURE: CPT

## 2020-05-29 PROCEDURE — 84153 ASSAY OF PSA TOTAL: CPT

## 2020-05-29 PROCEDURE — 80048 BASIC METABOLIC PNL TOTAL CA: CPT

## 2020-06-01 ENCOUNTER — PES CALL (OUTPATIENT)
Dept: ADMINISTRATIVE | Facility: CLINIC | Age: 70
End: 2020-06-01

## 2020-06-09 ENCOUNTER — PATIENT OUTREACH (OUTPATIENT)
Dept: ADMINISTRATIVE | Facility: OTHER | Age: 70
End: 2020-06-09

## 2020-06-11 ENCOUNTER — HOSPITAL ENCOUNTER (OUTPATIENT)
Dept: RADIOLOGY | Facility: HOSPITAL | Age: 70
Discharge: HOME | End: 2020-06-11
Attending: UROLOGY
Payer: COMMERCIAL

## 2020-06-11 ENCOUNTER — OFFICE VISIT (OUTPATIENT)
Dept: ORTHOPEDICS | Facility: CLINIC | Age: 70
End: 2020-06-11
Payer: COMMERCIAL

## 2020-06-11 VITALS — WEIGHT: 256 LBS | HEIGHT: 67 IN | TEMPERATURE: 98 F | BODY MASS INDEX: 40.18 KG/M2

## 2020-06-11 DIAGNOSIS — Z85.528 HISTORY OF RENAL CELL CANCER: ICD-10-CM

## 2020-06-11 DIAGNOSIS — M17.0 PRIMARY OSTEOARTHRITIS OF BOTH KNEES: Primary | ICD-10-CM

## 2020-06-11 PROCEDURE — 71046 XR CHEST PA AND LATERAL: ICD-10-PCS | Mod: 26,,, | Performed by: RADIOLOGY

## 2020-06-11 PROCEDURE — 99999 PR PBB SHADOW E&M-EST. PATIENT-LVL III: CPT | Mod: PBBFAC,,, | Performed by: ORTHOPAEDIC SURGERY

## 2020-06-11 PROCEDURE — 1101F PT FALLS ASSESS-DOCD LE1/YR: CPT | Mod: CPTII,S$GLB,, | Performed by: ORTHOPAEDIC SURGERY

## 2020-06-11 PROCEDURE — 20610 LARGE JOINT ASPIRATION/INJECTION: BILATERAL KNEE: ICD-10-PCS | Mod: 50,S$GLB,, | Performed by: ORTHOPAEDIC SURGERY

## 2020-06-11 PROCEDURE — 99999 PR PBB SHADOW E&M-EST. PATIENT-LVL III: ICD-10-PCS | Mod: PBBFAC,,, | Performed by: ORTHOPAEDIC SURGERY

## 2020-06-11 PROCEDURE — 1125F PR PAIN SEVERITY QUANTIFIED, PAIN PRESENT: ICD-10-PCS | Mod: S$GLB,,, | Performed by: ORTHOPAEDIC SURGERY

## 2020-06-11 PROCEDURE — 71046 X-RAY EXAM CHEST 2 VIEWS: CPT | Mod: 26,,, | Performed by: RADIOLOGY

## 2020-06-11 PROCEDURE — 20610 DRAIN/INJ JOINT/BURSA W/O US: CPT | Mod: 50,S$GLB,, | Performed by: ORTHOPAEDIC SURGERY

## 2020-06-11 PROCEDURE — 71046 X-RAY EXAM CHEST 2 VIEWS: CPT | Mod: TC,PO

## 2020-06-11 PROCEDURE — 99213 PR OFFICE/OUTPT VISIT, EST, LEVL III, 20-29 MIN: ICD-10-PCS | Mod: 25,S$GLB,, | Performed by: ORTHOPAEDIC SURGERY

## 2020-06-11 PROCEDURE — 1159F PR MEDICATION LIST DOCUMENTED IN MEDICAL RECORD: ICD-10-PCS | Mod: S$GLB,,, | Performed by: ORTHOPAEDIC SURGERY

## 2020-06-11 PROCEDURE — 1101F PR PT FALLS ASSESS DOC 0-1 FALLS W/OUT INJ PAST YR: ICD-10-PCS | Mod: CPTII,S$GLB,, | Performed by: ORTHOPAEDIC SURGERY

## 2020-06-11 PROCEDURE — 76770 US RETROPERITONEAL COMPLETE: ICD-10-PCS | Mod: 26,,, | Performed by: RADIOLOGY

## 2020-06-11 PROCEDURE — 1125F AMNT PAIN NOTED PAIN PRSNT: CPT | Mod: S$GLB,,, | Performed by: ORTHOPAEDIC SURGERY

## 2020-06-11 PROCEDURE — 1159F MED LIST DOCD IN RCRD: CPT | Mod: S$GLB,,, | Performed by: ORTHOPAEDIC SURGERY

## 2020-06-11 PROCEDURE — 76770 US EXAM ABDO BACK WALL COMP: CPT | Mod: 26,,, | Performed by: RADIOLOGY

## 2020-06-11 PROCEDURE — 99213 OFFICE O/P EST LOW 20 MIN: CPT | Mod: 25,S$GLB,, | Performed by: ORTHOPAEDIC SURGERY

## 2020-06-11 PROCEDURE — 76770 US EXAM ABDO BACK WALL COMP: CPT | Mod: TC,PO

## 2020-06-11 RX ORDER — TRIAMCINOLONE ACETONIDE 40 MG/ML
40 INJECTION, SUSPENSION INTRA-ARTICULAR; INTRAMUSCULAR
Status: DISCONTINUED | OUTPATIENT
Start: 2020-06-11 | End: 2020-06-11 | Stop reason: HOSPADM

## 2020-06-11 RX ADMIN — TRIAMCINOLONE ACETONIDE 40 MG: 40 INJECTION, SUSPENSION INTRA-ARTICULAR; INTRAMUSCULAR at 08:06

## 2020-06-11 NOTE — PROCEDURES
Large Joint Aspiration/Injection: bilateral knee  Date/Time: 6/11/2020 8:00 AM  Performed by: Pablo Dutton MD  Authorized by: Pablo Dutton MD     Consent Done?:  Yes (Verbal)  Indications:  Pain  Site marked: the procedure site was marked    Timeout: prior to procedure the correct patient, procedure, and site was verified    Prep: patient was prepped and draped in usual sterile fashion      Local anesthesia used?: Yes    Anesthesia:  Local infiltration  Local anesthetic:  Bupivacaine 0.25% without epinephrine and lidocaine 2% without epinephrine  Anesthetic total (ml):  6      Details:  Needle Size:  22 G  Ultrasonic Guidance for needle placement?: No    Approach:  Anterolateral  Location:  Knee  Laterality:  Bilateral  Site:  Bilateral knee  Medications (Right):  40 mg triamcinolone acetonide 40 mg/mL  Medications (Left):  40 mg triamcinolone acetonide 40 mg/mL  Patient tolerance:  Patient tolerated the procedure well with no immediate complications

## 2020-06-11 NOTE — PROGRESS NOTES
Past Medical History:   Diagnosis Date    Arthritis     knees, back    CAD (coronary artery disease) 1995    no chest pain since CABG, sees Dr Larry Black    Diabetes mellitus     borderline    Hypertension     Kidney stones     uric acid stones    VASYL (obstructive sleep apnea) 2007    is compliant with CPAP    Primary gonadal failure        Past Surgical History:   Procedure Laterality Date    BACK SURGERY  2010    L5-6 fusion, with pins,bone graft    CARDIAC SURGERY  1995, 2007    total 7 vessel bypass    COLONOSCOPY  2008    repeat 2013    COLONOSCOPY N/A 4/4/2017    Procedure: COLONOSCOPY;  Surgeon: Dmitry Sampson MD;  Location: [facility];  Service: Endoscopy;  Laterality: N/A;    CORONARY ARTERY BYPASS GRAFT  1995, 2007    cabgx3, cabgx5    EXTRACORPOREAL SHOCK WAVE LITHOTRIPSY      EYE SURGERY      cataracts bilateral    LITHOTRIPSY      LUMBAR LAMINECTOMY      Partial Nephrectomy Laproscopic      TRANSRECTAL BIOPSY OF PROSTATE WITH ULTRASOUND GUIDANCE N/A 3/19/2019    Procedure: BIOPSY, PROSTATE, RECTAL APPROACH, WITH US GUIDANCE;  Surgeon: Yovany Heart MD;  Location: [facility];  Service: Urology;  Laterality: N/A;       Current Outpatient Medications   Medication Sig    allopurinol (ZYLOPRIM) 100 MG tablet Take 1 tablet (100 mg total) by mouth once daily.    allopurinol (ZYLOPRIM) 300 MG tablet Take 1 tablet (300 mg total) by mouth once daily. Add a 100 mg tab .Total 400 mg a day. (Patient not taking: Reported on 3/2/2020)    aspirin (ECOTRIN) 81 MG EC tablet Take 81 mg by mouth once daily.    atorvastatin (LIPITOR) 20 MG tablet Take 1 tablet (20 mg total) by mouth once daily.    clindamycin (CLEOCIN T) 1 % external solution AAA back BID PRN flare (Patient not taking: Reported on 3/2/2020)    cyanocobalamin, vitamin B-12, (VITAMIN B-12 ORAL) Take by mouth.    ferrous sulfate (IRON ORAL) Take by mouth.    fish oil-omega-3 fatty acids 300-1,000 mg capsule Take 2 g by mouth 3  (three) times daily.    furosemide (LASIX) 20 MG tablet Take 1 tablet (20 mg total) by mouth once daily.    hydrocortisone 2.5 % cream Thin film to AA onface daily PRN flare; use short term only (Patient not taking: Reported on 3/2/2020)    losartan (COZAAR) 100 MG tablet Take 1 tablet (100 mg total) by mouth once daily.    metFORMIN (GLUCOPHAGE-XR) 500 MG 24 hr tablet Take 1 tablet (500 mg total) by mouth 2 (two) times daily with meals.    potassium chloride SA (K-DUR,KLOR-CON) 20 MEQ tablet Take 1 tablet (20 mEq total) by mouth once daily.    sildenafil (VIAGRA) 100 MG tablet Take 1 tablet (100 mg total) by mouth as needed for Erectile Dysfunction. (Patient not taking: Reported on 3/2/2020)    vitamin D 1000 units Tab Take 185 mg by mouth once daily.     No current facility-administered medications for this visit.      Facility-Administered Medications Ordered in Other Visits   Medication    triamcinolone acetonide injection 40 mg    triamcinolone acetonide injection 40 mg       Review of patient's allergies indicates:   Allergen Reactions    Adhesive Blisters       Family History   Problem Relation Age of Onset    Heart disease Mother     Hypertension Mother     Diabetes Mother     Heart disease Father         premature    Hypertension Father     Diabetes Sister     Urolithiasis Sister     Heart disease Paternal Uncle     Cancer Neg Hx     Prostate cancer Neg Hx     Kidney cancer Neg Hx     Melanoma Neg Hx     Lupus Neg Hx     Eczema Neg Hx     Psoriasis Neg Hx        Social History     Socioeconomic History    Marital status:      Spouse name: Not on file    Number of children: Not on file    Years of education: Not on file    Highest education level: Not on file   Occupational History    Not on file   Social Needs    Financial resource strain: Not on file    Food insecurity:     Worry: Not on file     Inability: Not on file    Transportation needs:     Medical: Not on  file     Non-medical: Not on file   Tobacco Use    Smoking status: Former Smoker     Last attempt to quit: 1976     Years since quittin.3    Smokeless tobacco: Never Used   Substance and Sexual Activity    Alcohol use: Yes     Frequency: Monthly or less     Drinks per session: Patient refused     Binge frequency: Never     Comment: Socially    Drug use: No    Sexual activity: Not on file   Lifestyle    Physical activity:     Days per week: 7 days     Minutes per session: 60 min    Stress: Not at all   Relationships    Social connections:     Talks on phone: Once a week     Gets together: Patient refused     Attends Yarsanism service: Not on file     Active member of club or organization: No     Attends meetings of clubs or organizations: Never     Relationship status:    Other Topics Concern    Not on file   Social History Narrative    Not on file       Chief Complaint:   Chief Complaint   Patient presents with    Knee Pain     bilateral knee pain       History of present illness: Is a 70-year-old male seen for recurrent bilateral knee pain.  Left hurts more than the right today. Injected him back about 6 months ago with Synvisc 1 and he got good relief.  Works well.  The patient is back at work and so he has to do more walking and stair climbing.  Pain as a 1 out of 10.      Answers for HPI/ROS submitted by the patient on 2019   Leg pain  unexpected weight change: No  appetite change : No  sleep disturbance: No  IMMUNOCOMPROMISED: No  nervous/ anxious: No  dysphoric mood: No  rash: No  visual disturbance: No  eye redness: No  eye pain: No  ear pain: No  tinnitus: Yes  hearing loss: Yes  sinus pressure : No  nosebleeds: No  enviro allergies: No  food allergies: No  cough: No  shortness of breath: No  sweating: No  frequency: No  difficulty urinating: No  hematuria: No  chest pain: No  palpitations: No  nausea: No  vomiting: No  diarrhea: No  blood in stool: No  constipation:  No  headaches: No  dizziness: No  numbness: No  seizures: No  joint swelling: No  myalgia: No  weakness: No  back pain: No  Pain Chronicity: recurrent  History of trauma: No  Onset: more than 1 month ago  Frequency: daily  Progression since onset: unchanged  injury location: at work  pain- numeric: 3/10  pain location: left knee, right knee  pain quality: aching, sharp  Radiating Pain: No  Aggravating factors: walking  fever: No  inability to bear weight: No  itching: No  joint locking: No  limited range of motion: Yes  stiffness: Yes  tingling: No  Treatments tried: nothing  physical therapy: not tried  Improvement on treatment: significant        Physical Examination:    Vital Signs:    There were no vitals filed for this visit.    There is no height or weight on file to calculate BMI.    This a well-developed, well nourished patient in no acute distress.  They are alert and oriented and cooperative to examination.  Pt. walks without an antalgic gait.      Examination of bilateral knees shows no rashes or erythema. There are no masses ecchymosis or effusion. Patient has full range of motion from 0-130°. Patient is nontender to palpation over lateral joint line and moderately tender to palpation over the medial joint line. Knee is stable to varus and valgus stress. 5 out of 5 motor strength. Palpable distal pulses. Intact light touch sensation. Negative Patellofemoral crepitus      X-rays: X-rays of both knees are review which show severe medial joint space narrowing of both knees.  No significant progression     Assessment:: Severe varus knee arthritis    Plan:  Injected both knees with cortisone today.  We will follow this up with Synvis-One again in a couple weeks.    This note was created using  voice recognition software that occasionally misinterpreted phrases or words.    Consult note is delivered via Epic messaging service.

## 2020-06-12 ENCOUNTER — OFFICE VISIT (OUTPATIENT)
Dept: HOME HEALTH SERVICES | Facility: CLINIC | Age: 70
End: 2020-06-12
Payer: COMMERCIAL

## 2020-06-12 VITALS
SYSTOLIC BLOOD PRESSURE: 160 MMHG | HEIGHT: 67 IN | HEART RATE: 61 BPM | DIASTOLIC BLOOD PRESSURE: 76 MMHG | WEIGHT: 245 LBS | BODY MASS INDEX: 38.45 KG/M2 | OXYGEN SATURATION: 99 %

## 2020-06-12 DIAGNOSIS — E11.59 HYPERTENSION ASSOCIATED WITH DIABETES: ICD-10-CM

## 2020-06-12 DIAGNOSIS — G47.33 OSA (OBSTRUCTIVE SLEEP APNEA): ICD-10-CM

## 2020-06-12 DIAGNOSIS — M10.9 GOUT, UNSPECIFIED CAUSE, UNSPECIFIED CHRONICITY, UNSPECIFIED SITE: ICD-10-CM

## 2020-06-12 DIAGNOSIS — N18.30 TYPE 2 DIABETES MELLITUS WITH STAGE 3 CHRONIC KIDNEY DISEASE, WITHOUT LONG-TERM CURRENT USE OF INSULIN: ICD-10-CM

## 2020-06-12 DIAGNOSIS — E11.22 TYPE 2 DIABETES MELLITUS WITH STAGE 3 CHRONIC KIDNEY DISEASE, WITHOUT LONG-TERM CURRENT USE OF INSULIN: ICD-10-CM

## 2020-06-12 DIAGNOSIS — Z00.00 ENCOUNTER FOR PREVENTIVE HEALTH EXAMINATION: Primary | ICD-10-CM

## 2020-06-12 DIAGNOSIS — N18.30 CKD (CHRONIC KIDNEY DISEASE) STAGE 3, GFR 30-59 ML/MIN: Chronic | ICD-10-CM

## 2020-06-12 DIAGNOSIS — I15.2 HYPERTENSION ASSOCIATED WITH DIABETES: ICD-10-CM

## 2020-06-12 DIAGNOSIS — E66.01 CLASS 2 SEVERE OBESITY DUE TO EXCESS CALORIES WITH SERIOUS COMORBIDITY AND BODY MASS INDEX (BMI) OF 38.0 TO 38.9 IN ADULT: ICD-10-CM

## 2020-06-12 DIAGNOSIS — E78.5 HYPERLIPIDEMIA ASSOCIATED WITH TYPE 2 DIABETES MELLITUS: ICD-10-CM

## 2020-06-12 DIAGNOSIS — E11.69 HYPERLIPIDEMIA ASSOCIATED WITH TYPE 2 DIABETES MELLITUS: ICD-10-CM

## 2020-06-12 PROCEDURE — 3044F PR MOST RECENT HEMOGLOBIN A1C LEVEL <7.0%: ICD-10-PCS | Mod: CPTII,S$GLB,, | Performed by: NURSE PRACTITIONER

## 2020-06-12 PROCEDURE — 3078F PR MOST RECENT DIASTOLIC BLOOD PRESSURE < 80 MM HG: ICD-10-PCS | Mod: CPTII,S$GLB,, | Performed by: NURSE PRACTITIONER

## 2020-06-12 PROCEDURE — 3077F PR MOST RECENT SYSTOLIC BLOOD PRESSURE >= 140 MM HG: ICD-10-PCS | Mod: CPTII,S$GLB,, | Performed by: NURSE PRACTITIONER

## 2020-06-12 PROCEDURE — 3044F HG A1C LEVEL LT 7.0%: CPT | Mod: CPTII,S$GLB,, | Performed by: NURSE PRACTITIONER

## 2020-06-12 PROCEDURE — 3078F DIAST BP <80 MM HG: CPT | Mod: CPTII,S$GLB,, | Performed by: NURSE PRACTITIONER

## 2020-06-12 PROCEDURE — G0439 PR MEDICARE ANNUAL WELLNESS SUBSEQUENT VISIT: ICD-10-PCS | Mod: S$GLB,,, | Performed by: NURSE PRACTITIONER

## 2020-06-12 PROCEDURE — 3077F SYST BP >= 140 MM HG: CPT | Mod: CPTII,S$GLB,, | Performed by: NURSE PRACTITIONER

## 2020-06-12 PROCEDURE — G0439 PPPS, SUBSEQ VISIT: HCPCS | Mod: S$GLB,,, | Performed by: NURSE PRACTITIONER

## 2020-06-15 PROBLEM — E78.5 HYPERLIPIDEMIA ASSOCIATED WITH TYPE 2 DIABETES MELLITUS: Status: ACTIVE | Noted: 2020-06-15

## 2020-06-15 PROBLEM — E11.69 HYPERLIPIDEMIA ASSOCIATED WITH TYPE 2 DIABETES MELLITUS: Status: ACTIVE | Noted: 2020-06-15

## 2020-06-15 PROBLEM — M10.9 GOUT: Status: ACTIVE | Noted: 2020-06-15

## 2020-06-15 NOTE — PATIENT INSTRUCTIONS
Counseling and Referral of Other Preventative  (Italic type indicates deductible and co-insurance are waived)    Patient Name: Carlos Tenorio  Today's Date: 6/15/2020    Health Maintenance       Date Due Completion Date    Hemoglobin A1c 04/07/2020 10/7/2019    Foot Exam 10/09/2020 (Originally 6/1/2019) 6/1/2018    Lipid Panel 10/07/2020 10/7/2019    Override on 2/9/2017: Done ([facility])    Eye Exam 10/17/2020 10/17/2019    Override on 5/14/2019: Done (Dr Allen)    High Dose Statin 06/12/2021 6/12/2020    Aspirin/Antiplatelet Therapy 06/12/2021 6/12/2020    TETANUS VACCINE 04/23/2022 4/23/2012    Colorectal Cancer Screening 04/04/2027 4/4/2017        No orders of the defined types were placed in this encounter.    The following information is provided to all patients.  This information is to help you find resources for any of the problems found today that may be affecting your health:                Living healthy guide: www.[facility].gov      Understanding Diabetes: www.diabetes.org      Eating healthy: www.cdc.gov/healthyweight      CDC home safety checklist: www.cdc.gov/steadi/patient.html      Agency on Aging: www.goea.[place].gov      Alcoholics anonymous (AA): www.aa.org      Physical Activity: www.marina.nih.gov/se2qkky      Tobacco use: www.quitwithusla.org

## 2020-06-15 NOTE — PROGRESS NOTES
"Carlos Tenorio presented for a  Medicare AWV and comprehensive Health Risk Assessment today. The following components were reviewed and updated:    · Medical history  · Family History  · Social history  · Allergies and Current Medications  · Health Risk Assessment  · Health Maintenance  · Care Team     ** See Completed Assessments for Annual Wellness Visit within the encounter summary.**       The following assessments were completed:  · Living Situation  · CAGE  · Depression Screening  · Timed Get Up and Go  · Whisper Test  · Cognitive Function Screening  ·   · Nutrition Screening  · ADL Screening  · PAQ Screening    Vitals:    06/12/20 0928   BP: (!) 160/76   Pulse: 61   SpO2: 99%   Weight: 111.1 kg (245 lb)   Height: 5' 7" (1.702 m)     Body mass index is 38.37 kg/m².  Physical Exam  Vitals signs reviewed.   Constitutional:       Appearance: Normal appearance. He is well-developed. He is obese.   HENT:      Head: Normocephalic.      Nose: Nose normal.   Eyes:      Pupils: Pupils are equal, round, and reactive to light.   Neck:      Musculoskeletal: Normal range of motion.   Cardiovascular:      Rate and Rhythm: Normal rate and regular rhythm.      Heart sounds: Normal heart sounds.   Pulmonary:      Effort: Pulmonary effort is normal.      Breath sounds: Normal breath sounds.   Abdominal:      General: Bowel sounds are normal. There is no distension.      Palpations: Abdomen is soft.      Tenderness: There is no abdominal tenderness.   Musculoskeletal: Normal range of motion.      Right lower leg: No edema.      Left lower leg: No edema.   Skin:     General: Skin is warm and dry.   Neurological:      Mental Status: He is alert and oriented to person, place, and time.   Psychiatric:         Behavior: Behavior normal.           Diagnoses and health risks identified today and associated recommendations/orders:    1. Encounter for preventive health examination  AWV Completed and preventative maintenance " discussed.    2. Hypertension associated with diabetes  Stable, followed by PCP.    3. CKD (chronic kidney disease) stage 3, GFR 30-59 ml/min  Stable, followed by PCP.  Discussed hydration and avoidance of NSAIDs.    4. VASYL (obstructive sleep apnea)  Stable, followed by PCP.    5. Gout, unspecified cause, unspecified chronicity, unspecified site  Stable, on allopurinol, followed by PCP.    6. Hyperlipidemia associated with type 2 diabetes mellitus  Stable, on statin, followed by PCP>    7. Class 2 severe obesity due to excess calories with serious comorbidity and body mass index (BMI) of 38.0 to 38.9 in adult  Uncontrolled, discussed following ADA dietary guidelines and increasing physical activity to reduce weight.    8. Type 2 diabetes mellitus with stage 3 chronic kidney disease without long-term current use of insulin  Stable, A1C 5.7 on 10/2019, followed by PCP.      Provided Carlos with a 5-10 year written screening schedule and personal prevention plan. Recommendations were developed using the USPSTF age appropriate recommendations. Education, counseling, and referrals were provided as needed. After Visit Summary printed and given to patient which includes a list of additional screenings\tests needed.    Follow up in about 1 year (around 6/12/2021) for your next annual wellness visit.    Valentina Doyle NP

## 2020-06-24 ENCOUNTER — PATIENT OUTREACH (OUTPATIENT)
Dept: ADMINISTRATIVE | Facility: OTHER | Age: 70
End: 2020-06-24

## 2020-06-25 ENCOUNTER — OFFICE VISIT (OUTPATIENT)
Dept: ORTHOPEDICS | Facility: CLINIC | Age: 70
End: 2020-06-25
Payer: COMMERCIAL

## 2020-06-25 VITALS — WEIGHT: 245 LBS | HEIGHT: 67 IN | BODY MASS INDEX: 38.45 KG/M2 | TEMPERATURE: 98 F | RESPIRATION RATE: 18 BRPM

## 2020-06-25 DIAGNOSIS — M17.0 PRIMARY OSTEOARTHRITIS OF BOTH KNEES: Primary | ICD-10-CM

## 2020-06-25 PROCEDURE — 20610 DRAIN/INJ JOINT/BURSA W/O US: CPT | Mod: 50,S$GLB,, | Performed by: ORTHOPAEDIC SURGERY

## 2020-06-25 PROCEDURE — 99999 PR PBB SHADOW E&M-EST. PATIENT-LVL IV: ICD-10-PCS | Mod: PBBFAC,,, | Performed by: ORTHOPAEDIC SURGERY

## 2020-06-25 PROCEDURE — 20610 LARGE JOINT ASPIRATION/INJECTION: BILATERAL KNEE: ICD-10-PCS | Mod: 50,S$GLB,, | Performed by: ORTHOPAEDIC SURGERY

## 2020-06-25 PROCEDURE — 99499 NO LOS: ICD-10-PCS | Mod: S$GLB,,, | Performed by: ORTHOPAEDIC SURGERY

## 2020-06-25 PROCEDURE — 99999 PR PBB SHADOW E&M-EST. PATIENT-LVL IV: CPT | Mod: PBBFAC,,, | Performed by: ORTHOPAEDIC SURGERY

## 2020-06-25 PROCEDURE — 99499 UNLISTED E&M SERVICE: CPT | Mod: S$GLB,,, | Performed by: ORTHOPAEDIC SURGERY

## 2020-06-25 NOTE — PROGRESS NOTES
Past Medical History:   Diagnosis Date    Arthritis     knees, back    Bilateral renal cysts 7/30/2012    CAD (coronary artery disease) 1995    no chest pain since CABG, sees Dr Larry Black    Diabetes mellitus     borderline    Hypertension     Kidney stones     uric acid stones    VASYL (obstructive sleep apnea) 2007    is compliant with CPAP    Primary gonadal failure        Past Surgical History:   Procedure Laterality Date    BACK SURGERY  2010    L5-6 fusion, with pins,bone graft    CARDIAC SURGERY  1995, 2007    total 7 vessel bypass    COLONOSCOPY  2008    repeat 2013    COLONOSCOPY N/A 4/4/2017    Procedure: COLONOSCOPY;  Surgeon: Dmitry Sampson MD;  Location: [facility];  Service: Endoscopy;  Laterality: N/A;    CORONARY ARTERY BYPASS GRAFT  1995, 2007    cabgx3, cabgx5    EXTRACORPOREAL SHOCK WAVE LITHOTRIPSY      EYE SURGERY      cataracts bilateral    LITHOTRIPSY      LUMBAR LAMINECTOMY      Partial Nephrectomy Laproscopic      TRANSRECTAL BIOPSY OF PROSTATE WITH ULTRASOUND GUIDANCE N/A 3/19/2019    Procedure: BIOPSY, PROSTATE, RECTAL APPROACH, WITH US GUIDANCE;  Surgeon: Yovany Heart MD;  [facility]  Service: Urology;  Laterality: N/A;       Current Outpatient Medications   Medication Sig    allopurinol (ZYLOPRIM) 100 MG tablet Take 1 tablet (100 mg total) by mouth once daily.    allopurinol (ZYLOPRIM) 300 MG tablet Take 1 tablet (300 mg total) by mouth once daily. Add a 100 mg tab .Total 400 mg a day.    aspirin (ECOTRIN) 81 MG EC tablet Take 81 mg by mouth once daily.    atorvastatin (LIPITOR) 20 MG tablet Take 1 tablet (20 mg total) by mouth once daily.    clindamycin (CLEOCIN T) 1 % external solution AAA back BID PRN flare    cyanocobalamin, vitamin B-12, (VITAMIN B-12 ORAL) Take by mouth.    ferrous sulfate (IRON ORAL) Take by mouth.    fish oil-omega-3 fatty acids 300-1,000 mg capsule Take 2 g by mouth 3 (three) times daily.    furosemide (LASIX) 20 MG  tablet Take 1 tablet (20 mg total) by mouth once daily.    hydrocortisone 2.5 % cream Thin film to AA onface daily PRN flare; use short term only    losartan (COZAAR) 100 MG tablet Take 1 tablet (100 mg total) by mouth once daily.    metFORMIN (GLUCOPHAGE-XR) 500 MG 24 hr tablet Take 1 tablet (500 mg total) by mouth 2 (two) times daily with meals.    potassium chloride SA (K-DUR,KLOR-CON) 20 MEQ tablet Take 1 tablet (20 mEq total) by mouth once daily.    vitamin D 1000 units Tab Take 185 mg by mouth once daily.    sildenafil (VIAGRA) 100 MG tablet Take 1 tablet (100 mg total) by mouth as needed for Erectile Dysfunction.     No current facility-administered medications for this visit.      Facility-Administered Medications Ordered in Other Visits   Medication    triamcinolone acetonide injection 40 mg    triamcinolone acetonide injection 40 mg       Review of patient's allergies indicates:   Allergen Reactions    Adhesive Blisters       Family History   Problem Relation Age of Onset    Heart disease Mother     Hypertension Mother     Diabetes Mother     Heart disease Father         premature    Hypertension Father     Diabetes Sister     Urolithiasis Sister     Heart disease Paternal Uncle     Cancer Neg Hx     Prostate cancer Neg Hx     Kidney cancer Neg Hx     Melanoma Neg Hx     Lupus Neg Hx     Eczema Neg Hx     Psoriasis Neg Hx        Social History     Socioeconomic History    Marital status:      Spouse name: Not on file    Number of children: Not on file    Years of education: Not on file    Highest education level: Not on file   Occupational History    Not on file   Social Needs    Financial resource strain: Not on file    Food insecurity     Worry: Not on file     Inability: Not on file    Transportation needs     Medical: Not on file     Non-medical: Not on file   Tobacco Use    Smoking status: Former Smoker     Quit date: 1976     Years since quittin.4     Smokeless tobacco: Never Used   Substance and Sexual Activity    Alcohol use: Yes     Frequency: Monthly or less     Drinks per session: Patient refused     Binge frequency: Never     Comment: Socially    Drug use: No    Sexual activity: Yes   Lifestyle    Physical activity     Days per week: 7 days     Minutes per session: 60 min    Stress: Not at all   Relationships    Social connections     Talks on phone: Once a week     Gets together: Patient refused     Attends Anabaptist service: Not on file     Active member of club or organization: No     Attends meetings of clubs or organizations: Never     Relationship status:    Other Topics Concern    Not on file   Social History Narrative    Not on file       Chief Complaint:   Chief Complaint   Patient presents with    Injections     bilateral synvisc one        History of present illness: Is a 70-year-old male seen for recurrent bilateral knee pain.  Left hurts more than the right today. Injected him back about 6 months ago with Synvisc 1 and he got good relief.  Works well.  The patient is back at work and so he has to do more walking and stair climbing.  Pain as a 1 out of 10.      Answers for HPI/ROS submitted by the patient on 11/29/2019   Leg pain  unexpected weight change: No  appetite change : No  sleep disturbance: No  IMMUNOCOMPROMISED: No  nervous/ anxious: No  dysphoric mood: No  rash: No  visual disturbance: No  eye redness: No  eye pain: No  ear pain: No  tinnitus: Yes  hearing loss: Yes  sinus pressure : No  nosebleeds: No  enviro allergies: No  food allergies: No  cough: No  shortness of breath: No  sweating: No  frequency: No  difficulty urinating: No  hematuria: No  chest pain: No  palpitations: No  nausea: No  vomiting: No  diarrhea: No  blood in stool: No  constipation: No  headaches: No  dizziness: No  numbness: No  seizures: No  joint swelling: No  myalgia: No  weakness: No  back pain: No  Pain Chronicity: recurrent  History of  trauma: No  Onset: more than 1 month ago  Frequency: daily  Progression since onset: unchanged  injury location: at work  pain- numeric: 3/10  pain location: left knee, right knee  pain quality: aching, sharp  Radiating Pain: No  Aggravating factors: walking  fever: No  inability to bear weight: No  itching: No  joint locking: No  limited range of motion: Yes  stiffness: Yes  tingling: No  Treatments tried: nothing  physical therapy: not tried  Improvement on treatment: significant        Physical Examination:    Vital Signs:    Vitals:    06/25/20 0755   Resp: 18   Temp: 97.7 °F (36.5 °C)       Body mass index is 38.37 kg/m².    This a well-developed, well nourished patient in no acute distress.  They are alert and oriented and cooperative to examination.  Pt. walks without an antalgic gait.      Examination of bilateral knees shows no rashes or erythema. There are no masses ecchymosis or effusion. Patient has full range of motion from 0-130°. Patient is nontender to palpation over lateral joint line and moderately tender to palpation over the medial joint line. Knee is stable to varus and valgus stress. 5 out of 5 motor strength. Palpable distal pulses. Intact light touch sensation. Negative Patellofemoral crepitus      X-rays: X-rays of both knees are review which show severe medial joint space narrowing of both knees.  No significant progression     Assessment:: Severe varus knee arthritis    Plan:  Injected both knees with  Synvisc-One .  Follow-up in 6 months or so.    This note was created using  voice recognition software that occasionally misinterpreted phrases or words.    Consult note is delivered via Epic messaging service.

## 2020-06-25 NOTE — PROCEDURES
Large Joint Aspiration/Injection: bilateral knee    Date/Time: 6/25/2020 8:15 AM  Performed by: Pablo Dutton MD  Authorized by: Pablo Dutton MD     Consent Done?:  Yes (Verbal)  Indications:  Pain  Site marked: the procedure site was marked    Timeout: prior to procedure the correct patient, procedure, and site was verified      Details:  Needle Size:  20 G  Approach:  Anterolateral  Location:  Knee  Laterality:  Bilateral  Site:  Bilateral knee  Medications (Right):  48 mg hylan g-f 20 48 mg/6 mL  Medications (Left):  48 mg hylan g-f 20 48 mg/6 mL  Patient tolerance:  Patient tolerated the procedure well with no immediate complications

## 2020-07-16 ENCOUNTER — TELEPHONE (OUTPATIENT)
Dept: UROLOGY | Facility: CLINIC | Age: 70
End: 2020-07-16

## 2020-07-16 NOTE — TELEPHONE ENCOUNTER
Spoke w pt would like to cancel virtual scheduled on 7/20 due to has a cyst to scrotum he would like you to view?    Please advise- appt canceled

## 2020-07-16 NOTE — TELEPHONE ENCOUNTER
----- Message from Magdaleno Carranza sent at 7/16/2020 12:22 PM CDT -----  Type: Needs Medical Advice    Who Called:  Jessica (Spouse)  Best Call Back Number: [phone]  Additional Information: Caller would like to discuss upcoming appointment as the patient cannot do a virtual appointment and needs an in-person visit. Please call to advise. Thanks!

## 2020-07-20 ENCOUNTER — PATIENT MESSAGE (OUTPATIENT)
Dept: UROLOGY | Facility: CLINIC | Age: 70
End: 2020-07-20

## 2020-07-23 ENCOUNTER — OFFICE VISIT (OUTPATIENT)
Dept: FAMILY MEDICINE | Facility: CLINIC | Age: 70
End: 2020-07-23
Payer: COMMERCIAL

## 2020-07-23 VITALS
DIASTOLIC BLOOD PRESSURE: 70 MMHG | HEIGHT: 67 IN | BODY MASS INDEX: 37.92 KG/M2 | TEMPERATURE: 97 F | HEART RATE: 54 BPM | OXYGEN SATURATION: 98 % | SYSTOLIC BLOOD PRESSURE: 130 MMHG | WEIGHT: 241.63 LBS

## 2020-07-23 DIAGNOSIS — M54.31 SCIATICA OF RIGHT SIDE: ICD-10-CM

## 2020-07-23 DIAGNOSIS — I15.2 HYPERTENSION ASSOCIATED WITH DIABETES: Primary | ICD-10-CM

## 2020-07-23 DIAGNOSIS — E11.59 HYPERTENSION ASSOCIATED WITH DIABETES: Primary | ICD-10-CM

## 2020-07-23 DIAGNOSIS — E11.65 TYPE 2 DIABETES MELLITUS WITH HYPERGLYCEMIA, WITHOUT LONG-TERM CURRENT USE OF INSULIN: ICD-10-CM

## 2020-07-23 PROCEDURE — 3075F SYST BP GE 130 - 139MM HG: CPT | Mod: CPTII,S$GLB,, | Performed by: NURSE PRACTITIONER

## 2020-07-23 PROCEDURE — 3008F BODY MASS INDEX DOCD: CPT | Mod: CPTII,S$GLB,, | Performed by: NURSE PRACTITIONER

## 2020-07-23 PROCEDURE — 3044F PR MOST RECENT HEMOGLOBIN A1C LEVEL <7.0%: ICD-10-PCS | Mod: CPTII,S$GLB,, | Performed by: NURSE PRACTITIONER

## 2020-07-23 PROCEDURE — 99999 PR PBB SHADOW E&M-EST. PATIENT-LVL V: CPT | Mod: PBBFAC,,, | Performed by: NURSE PRACTITIONER

## 2020-07-23 PROCEDURE — 3078F DIAST BP <80 MM HG: CPT | Mod: CPTII,S$GLB,, | Performed by: NURSE PRACTITIONER

## 2020-07-23 PROCEDURE — 1159F MED LIST DOCD IN RCRD: CPT | Mod: S$GLB,,, | Performed by: NURSE PRACTITIONER

## 2020-07-23 PROCEDURE — 3008F PR BODY MASS INDEX (BMI) DOCUMENTED: ICD-10-PCS | Mod: CPTII,S$GLB,, | Performed by: NURSE PRACTITIONER

## 2020-07-23 PROCEDURE — 1125F PR PAIN SEVERITY QUANTIFIED, PAIN PRESENT: ICD-10-PCS | Mod: S$GLB,,, | Performed by: NURSE PRACTITIONER

## 2020-07-23 PROCEDURE — 1125F AMNT PAIN NOTED PAIN PRSNT: CPT | Mod: S$GLB,,, | Performed by: NURSE PRACTITIONER

## 2020-07-23 PROCEDURE — 99214 PR OFFICE/OUTPT VISIT, EST, LEVL IV, 30-39 MIN: ICD-10-PCS | Mod: S$GLB,,, | Performed by: NURSE PRACTITIONER

## 2020-07-23 PROCEDURE — 3044F HG A1C LEVEL LT 7.0%: CPT | Mod: CPTII,S$GLB,, | Performed by: NURSE PRACTITIONER

## 2020-07-23 PROCEDURE — 3075F PR MOST RECENT SYSTOLIC BLOOD PRESS GE 130-139MM HG: ICD-10-PCS | Mod: CPTII,S$GLB,, | Performed by: NURSE PRACTITIONER

## 2020-07-23 PROCEDURE — 3078F PR MOST RECENT DIASTOLIC BLOOD PRESSURE < 80 MM HG: ICD-10-PCS | Mod: CPTII,S$GLB,, | Performed by: NURSE PRACTITIONER

## 2020-07-23 PROCEDURE — 1101F PR PT FALLS ASSESS DOC 0-1 FALLS W/OUT INJ PAST YR: ICD-10-PCS | Mod: CPTII,S$GLB,, | Performed by: NURSE PRACTITIONER

## 2020-07-23 PROCEDURE — 99999 PR PBB SHADOW E&M-EST. PATIENT-LVL V: ICD-10-PCS | Mod: PBBFAC,,, | Performed by: NURSE PRACTITIONER

## 2020-07-23 PROCEDURE — 1159F PR MEDICATION LIST DOCUMENTED IN MEDICAL RECORD: ICD-10-PCS | Mod: S$GLB,,, | Performed by: NURSE PRACTITIONER

## 2020-07-23 PROCEDURE — 99214 OFFICE O/P EST MOD 30 MIN: CPT | Mod: S$GLB,,, | Performed by: NURSE PRACTITIONER

## 2020-07-23 PROCEDURE — 1101F PT FALLS ASSESS-DOCD LE1/YR: CPT | Mod: CPTII,S$GLB,, | Performed by: NURSE PRACTITIONER

## 2020-07-23 RX ORDER — CYCLOBENZAPRINE HCL 5 MG
5 TABLET ORAL 3 TIMES DAILY PRN
Qty: 30 TABLET | Refills: 0 | Status: SHIPPED | OUTPATIENT
Start: 2020-07-23 | End: 2020-08-02

## 2020-07-23 RX ORDER — METHYLPREDNISOLONE 4 MG/1
TABLET ORAL
Qty: 1 PACKAGE | Refills: 0 | Status: SHIPPED | OUTPATIENT
Start: 2020-07-23 | End: 2020-08-13

## 2020-07-23 NOTE — PROGRESS NOTES
Subjective:       Patient ID: Carlos Tenorio is a 70 y.o. male presents to the clinic for hypertension and diabetes follow-up.  This patient is new to me.  He reports experiencing lower back pain.     Chief Complaint: Follow-up and Back Pain    Back Pain  This is a chronic problem. The current episode started more than 1 month ago. The problem occurs constantly. The problem is unchanged. The pain is present in the lumbar spine. The quality of the pain is described as aching. The pain radiates to the right knee, right thigh and right foot. The pain is at a severity of 7/10. The pain is moderate. The pain is the same all the time. The symptoms are aggravated by bending. Stiffness is present all day. Pertinent negatives include no abdominal pain, bladder incontinence, bowel incontinence, chest pain, dysuria, fever, headaches, leg pain, numbness, paresis, paresthesias, pelvic pain, perianal numbness, tingling, weakness or weight loss. Risk factors include obesity. He has tried nothing for the symptoms.   Hypertension  This is a chronic problem. The current episode started more than 1 year ago. The problem is unchanged. The problem is controlled. Pertinent negatives include no anxiety, blurred vision, chest pain, headaches, malaise/fatigue, neck pain, orthopnea, palpitations, peripheral edema, PND, shortness of breath or sweats. There are no associated agents to hypertension. Risk factors for coronary artery disease include diabetes mellitus, family history, obesity and male gender. Past treatments include lifestyle changes and angiotensin blockers. The current treatment provides significant improvement. There are no compliance problems.  Hypertensive end-organ damage includes kidney disease and CAD/MI. There is no history of angina, CVA, heart failure, left ventricular hypertrophy, PVD or retinopathy. There is no history of chronic renal disease, coarctation of the aorta, hyperaldosteronism, hypercortisolism,  hyperparathyroidism, a hypertension causing med, pheochromocytoma, renovascular disease, sleep apnea or a thyroid problem.   Diabetes  He presents for his follow-up diabetic visit. He has type 2 diabetes mellitus. His disease course has been improving. There are no hypoglycemic associated symptoms. Pertinent negatives for hypoglycemia include no confusion, dizziness, headaches, hunger, mood changes, nervousness/anxiousness, pallor, seizures, sleepiness, speech difficulty, sweats or tremors. There are no diabetic associated symptoms. Pertinent negatives for diabetes include no blurred vision, no chest pain, no fatigue, no foot paresthesias, no foot ulcerations, no polydipsia, no polyphagia, no polyuria, no visual change, no weakness and no weight loss. There are no hypoglycemic complications. Pertinent negatives for hypoglycemia complications include no blackouts, no hospitalization, no nocturnal hypoglycemia, no required assistance and no required glucagon injection. Symptoms are improving. Diabetic complications include heart disease. Pertinent negatives for diabetic complications include no autonomic neuropathy, CVA, impotence, nephropathy, peripheral neuropathy, PVD or retinopathy. Risk factors for coronary artery disease include diabetes mellitus, family history, obesity, male sex and hypertension. Current diabetic treatment includes oral agent (monotherapy). He is compliant with treatment all of the time. His weight is stable. He is following a diabetic, generally healthy, low salt and low fat/cholesterol diet. When asked about meal planning, he reported none. He has not had a previous visit with a dietitian. He participates in exercise daily. An ACE inhibitor/angiotensin II receptor blocker is being taken. He sees a podiatrist.Eye exam is current.       Vitals:    07/23/20 1306 07/23/20 1329   BP: 130/70 130/70   Pulse: (!) 46 (!) 54   Temp: 97.3 °F (36.3 °C)    TempSrc: Skin    SpO2: 98%    Weight: 109.6 kg  "(241 lb 10 oz)    Height: 5' 7" (1.702 m)    PainSc:   5    PainLoc: Back      Body mass index is 37.84 kg/m².    Past Medical History:   Diagnosis Date    Arthritis     knees, back    Bilateral renal cysts 2012    CAD (coronary artery disease)     no chest pain since CABG, sees Dr Larry Black    Diabetes mellitus     borderline    Hypertension     Kidney stones     uric acid stones    VASYL (obstructive sleep apnea)     is compliant with CPAP    Primary gonadal failure      Past Surgical History:   Procedure Laterality Date    BACK SURGERY      L5-6 fusion, with pins,bone graft    CARDIAC SURGERY  ,     total 7 vessel bypass    COLONOSCOPY      repeat     COLONOSCOPY N/A 2017    Procedure: COLONOSCOPY;  Surgeon: Dmitry Sampson MD;  Location: [facility] ENDO;  Service: Endoscopy;  Laterality: N/A;    CORONARY ARTERY BYPASS GRAFT  ,     cabgx3, cabgx5    EXTRACORPOREAL SHOCK WAVE LITHOTRIPSY      EYE SURGERY      cataracts bilateral    LITHOTRIPSY      LUMBAR LAMINECTOMY      Partial Nephrectomy Laproscopic      TRANSRECTAL BIOPSY OF PROSTATE WITH ULTRASOUND GUIDANCE N/A 3/19/2019    Procedure: BIOPSY, PROSTATE, RECTAL APPROACH, WITH US GUIDANCE;  Surgeon: Yovany Heart MD;  Location: [facility] OR;  Service: Urology;  Laterality: N/A;     Social History     Socioeconomic History    Marital status:      Spouse name: Not on file    Number of children: Not on file    Years of education: Not on file    Highest education level: Not on file   Occupational History    Not on file   Social Needs    Financial resource strain: Not on file    Food insecurity     Worry: Not on file     Inability: Not on file    Transportation needs     Medical: Not on file     Non-medical: Not on file   Tobacco Use    Smoking status: Former Smoker     Quit date: 1976     Years since quittin.4    Smokeless tobacco: Never Used   Substance and Sexual Activity    " Alcohol use: Yes     Frequency: Monthly or less     Drinks per session: Patient refused     Binge frequency: Never     Comment: Socially    Drug use: No    Sexual activity: Yes   Lifestyle    Physical activity     Days per week: 7 days     Minutes per session: 60 min    Stress: Not at all   Relationships    Social connections     Talks on phone: Once a week     Gets together: Patient refused     Attends [employer] service: Not on file     Active member of club or organization: No     Attends meetings of clubs or organizations: Never     Relationship status:    Other Topics Concern    Not on file   Social History Narrative    Not on file       Review of patient's allergies indicates:   Allergen Reactions    Adhesive Blisters       Current Outpatient Medications:     allopurinol (ZYLOPRIM) 100 MG tablet, Take 1 tablet (100 mg total) by mouth once daily., Disp: 90 tablet, Rfl: 3    allopurinol (ZYLOPRIM) 300 MG tablet, Take 1 tablet (300 mg total) by mouth once daily. Add a 100 mg tab .Total 400 mg a day., Disp: 90 tablet, Rfl: 4    aspirin (ECOTRIN) 81 MG EC tablet, Take 81 mg by mouth once daily., Disp: , Rfl:     atorvastatin (LIPITOR) 20 MG tablet, Take 1 tablet (20 mg total) by mouth once daily., Disp: 90 tablet, Rfl: 2    cyanocobalamin, vitamin B-12, (VITAMIN B-12 ORAL), Take by mouth., Disp: , Rfl:     ferrous sulfate (IRON ORAL), Take by mouth., Disp: , Rfl:     fish oil-omega-3 fatty acids 300-1,000 mg capsule, Take 2 g by mouth 3 (three) times daily., Disp: , Rfl:     furosemide (LASIX) 20 MG tablet, Take 1 tablet (20 mg total) by mouth once daily., Disp: 90 tablet, Rfl: 3    hydrocortisone 2.5 % cream, Thin film to AA onface daily PRN flare; use short term only, Disp: 28 g, Rfl: 1    losartan (COZAAR) 100 MG tablet, Take 1 tablet (100 mg total) by mouth once daily., Disp: 90 tablet, Rfl: 3    metFORMIN (GLUCOPHAGE-XR) 500 MG 24 hr tablet, Take 1 tablet (500 mg total) by mouth 2  (two) times daily with meals., Disp: 180 tablet, Rfl: 4    potassium chloride SA (K-DUR,KLOR-CON) 20 MEQ tablet, Take 1 tablet (20 mEq total) by mouth once daily., Disp: 90 tablet, Rfl: 3    sildenafil (VIAGRA) 100 MG tablet, Take 1 tablet (100 mg total) by mouth as needed for Erectile Dysfunction., Disp: 10 tablet, Rfl: 10    vitamin D 1000 units Tab, Take 185 mg by mouth once daily., Disp: , Rfl:     cyclobenzaprine (FLEXERIL) 5 MG tablet, Take 1 tablet (5 mg total) by mouth 3 (three) times daily as needed for Muscle spasms., Disp: 30 tablet, Rfl: 0    methylPREDNISolone (MEDROL DOSEPACK) 4 mg tablet, use as directed, Disp: 1 Package, Rfl: 0  No current facility-administered medications for this visit.     Facility-Administered Medications Ordered in Other Visits:     triamcinolone acetonide injection 40 mg, 40 mg, Intra-articular, , Mason Macedo MD, 40 mg at 03/13/15 0929    triamcinolone acetonide injection 40 mg, 40 mg, Intra-articular, , Mason Macedo MD, 40 mg at 03/13/15 0929    Review of Systems   Constitutional: Negative for activity change, appetite change, chills, diaphoresis, fatigue, fever, malaise/fatigue, unexpected weight change and weight loss.   HENT: Negative for congestion, ear discharge, ear pain, hearing loss, postnasal drip, rhinorrhea, sinus pressure, sinus pain, sore throat, trouble swallowing and voice change.    Eyes: Negative for blurred vision, photophobia, pain, discharge and visual disturbance.   Respiratory: Negative for apnea, cough, chest tightness, shortness of breath and wheezing.    Cardiovascular: Negative for chest pain, palpitations, orthopnea, leg swelling and PND.   Gastrointestinal: Negative for abdominal distention, abdominal pain, bowel incontinence, constipation, diarrhea, nausea and vomiting.   Endocrine: Negative for polydipsia, polyphagia and polyuria.   Genitourinary: Negative for bladder incontinence, decreased urine volume, difficulty urinating,  dysuria, flank pain, frequency, hematuria, impotence, pelvic pain, penile pain, testicular pain and urgency.   Musculoskeletal: Positive for back pain. Negative for arthralgias, gait problem, joint swelling, myalgias, neck pain and neck stiffness.   Skin: Negative for color change, pallor, rash and wound.   Neurological: Negative for dizziness, tingling, tremors, seizures, facial asymmetry, speech difficulty, weakness, light-headedness, numbness, headaches and paresthesias.   Hematological: Negative for adenopathy.   Psychiatric/Behavioral: Negative for agitation, behavioral problems, confusion, decreased concentration, hallucinations and sleep disturbance. The patient is not nervous/anxious.        Objective:      Physical Exam  Vitals signs and nursing note reviewed.   Constitutional:       General: He is not in acute distress.     Appearance: Normal appearance. He is well-developed. He is not ill-appearing, toxic-appearing or diaphoretic.   HENT:      Head: Normocephalic and atraumatic.      Right Ear: Tympanic membrane, ear canal and external ear normal. There is no impacted cerumen.      Left Ear: Tympanic membrane, ear canal and external ear normal. There is no impacted cerumen.      Nose: Nose normal. No congestion or rhinorrhea.      Mouth/Throat:      Mouth: Mucous membranes are moist.      Pharynx: Oropharynx is clear. No oropharyngeal exudate or posterior oropharyngeal erythema.   Eyes:      General: No scleral icterus.        Right eye: No discharge.         Left eye: No discharge.      Extraocular Movements: Extraocular movements intact.      Conjunctiva/sclera: Conjunctivae normal.      Pupils: Pupils are equal, round, and reactive to light.   Neck:      Musculoskeletal: Normal range of motion and neck supple. No neck rigidity or muscular tenderness.      Thyroid: No thyromegaly.      Vascular: No carotid bruit or JVD.      Trachea: No tracheal deviation.   Cardiovascular:      Rate and Rhythm: Normal  rate and regular rhythm.      Pulses: Normal pulses.           Carotid pulses are 2+ on the right side and 2+ on the left side.       Radial pulses are 2+ on the right side and 2+ on the left side.        Dorsalis pedis pulses are 2+ on the right side and 2+ on the left side.        Posterior tibial pulses are 2+ on the right side and 2+ on the left side.      Heart sounds: Normal heart sounds. No murmur. No friction rub. No gallop.       Comments: Manual BP: 130/70  Pulmonary:      Effort: Pulmonary effort is normal. No respiratory distress.      Breath sounds: Normal breath sounds. No stridor. No wheezing, rhonchi or rales.   Chest:      Chest wall: No tenderness.   Abdominal:      General: Abdomen is flat. Bowel sounds are normal. There is no distension.      Palpations: Abdomen is soft. There is no mass.      Tenderness: There is no abdominal tenderness. There is no right CVA tenderness, left CVA tenderness, guarding or rebound.      Hernia: No hernia is present.   Musculoskeletal: Normal range of motion.         General: No swelling, tenderness, deformity or signs of injury.      Right lower leg: No edema.      Left lower leg: No edema.      Comments: Supine Straight leg test positive, Sitting Straight leg test-positive, Femoral stretch positive,  Gaenslen test-positive      Lymphadenopathy:      Cervical: No cervical adenopathy.   Skin:     General: Skin is warm and dry.      Capillary Refill: Capillary refill takes less than 2 seconds.      Coloration: Skin is not jaundiced or pale.      Findings: No bruising, erythema, lesion or rash.   Neurological:      General: No focal deficit present.      Mental Status: He is alert and oriented to person, place, and time.      Cranial Nerves: No cranial nerve deficit.      Sensory: No sensory deficit.      Motor: No weakness or abnormal muscle tone.      Coordination: Coordination normal.      Gait: Gait normal.      Deep Tendon Reflexes: Reflexes normal.    Psychiatric:         Mood and Affect: Mood normal.         Behavior: Behavior normal.         Thought Content: Thought content normal.         Judgment: Judgment normal.         Assessment:       1. Hypertension associated with diabetes    2. Type 2 diabetes mellitus with hyperglycemia, without long-term current use of insulin    3. Sciatica of right side        Plan:    Carlos was seen today for follow-up and back pain.    Diagnoses and all orders for this visit:    Hypertension associated with diabetes       - Controlled       - Continue current medications  I counseled the patient on HTN education, management and recommendations. Please monitor your blood pressure twice a day. Make sure you have not had any caffeine or tobacco with in 45 minutes of checking your blood pressure. Keep a log to bring to your next office visit.The goal is < 140/90 unless otherwise specified. I recommended weight loss toward a BMI < 25, smoking cessation. Physical activity moderate-to-vigorous activity 3-4 days a week averaging 40 min per session. Eat healthy diet (i.e., DASH diet), avoidance of salt. Reduce sodium intake to <100 mmol/day. Printed materials were given.    Type 2 diabetes mellitus with hyperglycemia, without long-term current use of insulin       - Uncontrolled           - Continue current medications       - Perform lifestyle changes  If you have diabetes, you should focus on eating lean protein, high-fiber, less processed carbs, fruits, and vegetables, low-fat dairy, and healthy vegetable-based fats such as avocado, nuts, canola oil, or olive oil. You should also manage your carbohydrate intake.    Sciatica of right side  -     methylPREDNISolone (MEDROL DOSEPACK) 4 mg tablet; use as directed  -     cyclobenzaprine (FLEXERIL) 5 MG tablet; Take 1 tablet (5 mg total) by mouth 3 (three) times daily as needed for Muscle spasms.  - Apply ice or heat on affected area for 20 minutes every two hours. Place barrier between  skin and heat/ice        - Exercise therapy including walking, yoga, pilates   Patient education provided.  All questions and concerns addressed  Patient verbalizes understanding    Follow up in about 6 months (around 1/23/2021).

## 2020-07-23 NOTE — PATIENT INSTRUCTIONS

## 2020-07-31 ENCOUNTER — LAB VISIT (OUTPATIENT)
Dept: LAB | Facility: HOSPITAL | Age: 70
End: 2020-07-31
Attending: FAMILY MEDICINE
Payer: COMMERCIAL

## 2020-07-31 DIAGNOSIS — N18.30 CKD (CHRONIC KIDNEY DISEASE) STAGE 3, GFR 30-59 ML/MIN: Chronic | ICD-10-CM

## 2020-07-31 DIAGNOSIS — E11.65 TYPE 2 DIABETES MELLITUS WITH HYPERGLYCEMIA, WITHOUT LONG-TERM CURRENT USE OF INSULIN: ICD-10-CM

## 2020-07-31 LAB
ALBUMIN SERPL BCP-MCNC: 3.5 G/DL (ref 3.5–5.2)
ALP SERPL-CCNC: 56 U/L (ref 55–135)
ALT SERPL W/O P-5'-P-CCNC: 16 U/L (ref 10–44)
ANION GAP SERPL CALC-SCNC: 7 MMOL/L (ref 8–16)
AST SERPL-CCNC: 12 U/L (ref 10–40)
BASOPHILS # BLD AUTO: 0.1 K/UL (ref 0–0.2)
BASOPHILS NFR BLD: 1.1 % (ref 0–1.9)
BILIRUB SERPL-MCNC: 0.4 MG/DL (ref 0.1–1)
BUN SERPL-MCNC: 24 MG/DL (ref 8–23)
CALCIUM SERPL-MCNC: 9.7 MG/DL (ref 8.7–10.5)
CHLORIDE SERPL-SCNC: 109 MMOL/L (ref 95–110)
CHOLEST SERPL-MCNC: 208 MG/DL (ref 120–199)
CHOLEST/HDLC SERPL: 5.3 {RATIO} (ref 2–5)
CO2 SERPL-SCNC: 26 MMOL/L (ref 23–29)
CREAT SERPL-MCNC: 1.2 MG/DL (ref 0.5–1.4)
DIFFERENTIAL METHOD: ABNORMAL
EOSINOPHIL # BLD AUTO: 0.4 K/UL (ref 0–0.5)
EOSINOPHIL NFR BLD: 4.3 % (ref 0–8)
ERYTHROCYTE [DISTWIDTH] IN BLOOD BY AUTOMATED COUNT: 13.7 % (ref 11.5–14.5)
EST. GFR  (AFRICAN AMERICAN): >60 ML/MIN/1.73 M^2
EST. GFR  (NON AFRICAN AMERICAN): >60 ML/MIN/1.73 M^2
ESTIMATED AVG GLUCOSE: 120 MG/DL (ref 68–131)
FERRITIN SERPL-MCNC: 121 NG/ML (ref 20–300)
GLUCOSE SERPL-MCNC: 109 MG/DL (ref 70–110)
HBA1C MFR BLD HPLC: 5.8 % (ref 4–5.6)
HCT VFR BLD AUTO: 41.8 % (ref 40–54)
HDLC SERPL-MCNC: 39 MG/DL (ref 40–75)
HDLC SERPL: 18.8 % (ref 20–50)
HGB BLD-MCNC: 12.8 G/DL (ref 14–18)
IMM GRANULOCYTES # BLD AUTO: 0.02 K/UL (ref 0–0.04)
IMM GRANULOCYTES NFR BLD AUTO: 0.2 % (ref 0–0.5)
IRON SERPL-MCNC: 56 UG/DL (ref 45–160)
LDLC SERPL CALC-MCNC: 139 MG/DL (ref 63–159)
LYMPHOCYTES # BLD AUTO: 2.6 K/UL (ref 1–4.8)
LYMPHOCYTES NFR BLD: 27.6 % (ref 18–48)
MAGNESIUM SERPL-MCNC: 2.1 MG/DL (ref 1.6–2.6)
MCH RBC QN AUTO: 29.7 PG (ref 27–31)
MCHC RBC AUTO-ENTMCNC: 30.6 G/DL (ref 32–36)
MCV RBC AUTO: 97 FL (ref 82–98)
MONOCYTES # BLD AUTO: 1 K/UL (ref 0.3–1)
MONOCYTES NFR BLD: 10.4 % (ref 4–15)
NEUTROPHILS # BLD AUTO: 5.3 K/UL (ref 1.8–7.7)
NEUTROPHILS NFR BLD: 56.4 % (ref 38–73)
NONHDLC SERPL-MCNC: 169 MG/DL
NRBC BLD-RTO: 0 /100 WBC
PLATELET # BLD AUTO: 312 K/UL (ref 150–350)
PMV BLD AUTO: 11.8 FL (ref 9.2–12.9)
POTASSIUM SERPL-SCNC: 5 MMOL/L (ref 3.5–5.1)
PROT SERPL-MCNC: 6.6 G/DL (ref 6–8.4)
RBC # BLD AUTO: 4.31 M/UL (ref 4.6–6.2)
SATURATED IRON: 16 % (ref 20–50)
SODIUM SERPL-SCNC: 142 MMOL/L (ref 136–145)
TOTAL IRON BINDING CAPACITY: 342 UG/DL (ref 250–450)
TRANSFERRIN SERPL-MCNC: 231 MG/DL (ref 200–375)
TRIGL SERPL-MCNC: 150 MG/DL (ref 30–150)
WBC # BLD AUTO: 9.31 K/UL (ref 3.9–12.7)

## 2020-07-31 PROCEDURE — 83540 ASSAY OF IRON: CPT

## 2020-07-31 PROCEDURE — 83735 ASSAY OF MAGNESIUM: CPT

## 2020-07-31 PROCEDURE — 83036 HEMOGLOBIN GLYCOSYLATED A1C: CPT

## 2020-07-31 PROCEDURE — 80053 COMPREHEN METABOLIC PANEL: CPT

## 2020-07-31 PROCEDURE — 80061 LIPID PANEL: CPT

## 2020-07-31 PROCEDURE — 85025 COMPLETE CBC W/AUTO DIFF WBC: CPT

## 2020-07-31 PROCEDURE — 82728 ASSAY OF FERRITIN: CPT

## 2020-07-31 PROCEDURE — 36415 COLL VENOUS BLD VENIPUNCTURE: CPT | Mod: PO

## 2020-08-03 ENCOUNTER — TELEPHONE (OUTPATIENT)
Dept: FAMILY MEDICINE | Facility: CLINIC | Age: 70
End: 2020-08-03

## 2020-08-03 DIAGNOSIS — E11.59 HYPERTENSION ASSOCIATED WITH DIABETES: ICD-10-CM

## 2020-08-03 DIAGNOSIS — E78.5 HYPERLIPIDEMIA WITH TARGET LDL LESS THAN 70: Primary | ICD-10-CM

## 2020-08-03 DIAGNOSIS — I15.2 HYPERTENSION ASSOCIATED WITH DIABETES: ICD-10-CM

## 2020-08-03 NOTE — TELEPHONE ENCOUNTER
Patient has read report on [facility]. He is willing to go back on medication. Please send Rx to [facility].   Follow up labs can be done any day. Need orders. Apt can be mailed.

## 2020-08-03 NOTE — TELEPHONE ENCOUNTER
----- Message from Roney Bradshaw MD sent at 8/1/2020 11:02 PM CDT -----  Please call the patient regarding his stable normal result.Cholesterol levels are high, needs to go back on medications. Iron deficiency is improving.The current medical regimen is effective;  continue present plan and medications.

## 2020-08-06 RX ORDER — FUROSEMIDE 20 MG/1
20 TABLET ORAL DAILY
Qty: 90 TABLET | Refills: 3 | Status: SHIPPED | OUTPATIENT
Start: 2020-08-06 | End: 2021-03-02 | Stop reason: ALTCHOICE

## 2020-08-06 NOTE — TELEPHONE ENCOUNTER
Lipitor written. Please repeat labs in 4 months. Also ask him to reduce starches and add Coenzyme Q ten 200 mgs daily. High fiber diet with each meal.Add daily walks or bike rides. Blood flow!

## 2020-08-12 ENCOUNTER — PATIENT MESSAGE (OUTPATIENT)
Dept: FAMILY MEDICINE | Facility: CLINIC | Age: 70
End: 2020-08-12

## 2020-08-12 DIAGNOSIS — E11.65 TYPE 2 DIABETES MELLITUS WITH HYPERGLYCEMIA, WITHOUT LONG-TERM CURRENT USE OF INSULIN: ICD-10-CM

## 2020-08-12 RX ORDER — ATORVASTATIN CALCIUM 20 MG/1
20 TABLET, FILM COATED ORAL DAILY
Qty: 90 TABLET | Refills: 2 | Status: SHIPPED | OUTPATIENT
Start: 2020-08-12 | End: 2021-07-27 | Stop reason: SDUPTHER

## 2020-08-20 LAB
LEFT EYE DM RETINOPATHY: POSITIVE
RIGHT EYE DM RETINOPATHY: POSITIVE

## 2020-08-24 ENCOUNTER — PATIENT OUTREACH (OUTPATIENT)
Dept: ADMINISTRATIVE | Facility: HOSPITAL | Age: 70
End: 2020-08-24

## 2020-09-03 ENCOUNTER — PATIENT OUTREACH (OUTPATIENT)
Dept: ADMINISTRATIVE | Facility: OTHER | Age: 70
End: 2020-09-03

## 2020-09-07 NOTE — PROGRESS NOTES
[facility] Urology Progress Note     Carlos Tenorio is a 70 y.o. male with history hypogonadism, kidney stones, prostate cancer, RCC, spermatocele, and ED who presents for urologic follow up    He had been on testosterone replacement therapy previously (off x 5 yrs at time of initial eval) and was referred to me initially for finding of renal mass found on workup for LINSEY after episode of severe colitis and dehydration. A PSA checked at the time of his colon infection was found to be elevated at 7.5, which delayed resuming his testosterone therapy for his hypogonadism. Recheck of PSA in June 2017 was 2.2 with 19% free, though TRT deferred until recovery from partial nephrectomy for the renal mass found as above.      He underwent left robotic partial nephrectomy, with concurrent left renal cyst decortication on 8/2/17 for an upper pole 2-3cm exophytic enhancing renal mass, adjacent to larger simple renal cyst. Pathology: [id number] clear cell RCC. 2.5cm. Negative margins. Grade 2. No sarcomatoid or rhabdoid features. Benign renal cyst    Also has a history of calcium oxalate stones and uric acid stones in the past and has had prior bilateral ESWL 10 years prior.    By report, a 24-hour urine in October 2012 had high oxalate and low urine pH. Takes allopurinol.    He did also have large spermatocele on the right, noted to be 5.1 cm on 4/20/17 ultrasound, and previously decreased in size to almost nothing after resuming TRT, which he did at 200mg Q2 wks 1 month after his partial nephrectomy    Surveillance CT 2/5/18 for his T1a resected RCC performed (as well as screening for nephrolithiasis) noting punctate R upper pole and 4mm R lower pole stone, no evidence recurrence. CXR June 2017 negative. (previous CT stone protocol 5/24/17: 3 stones the largest of which is present within the lower pole and measures 3 mm.)  For interim doubling of his PSA from 2.2 to 4.4 after first 3 months of TRT with interval rise to 4.9 and  recommended prostate biopsy.  TRUS bx 2/27/18. Vol 56.6. Path 14 cores benign. Elected to try Viagra again for his ED, as had not been using it properly/on empty stomach previously. Resumed TRT  6/19/18: PSA 3.9, good energy and libido with TRT and great erections without viagra. 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 No worsening LUTS    PSA then lisa on TRT again to 4.5 in Dec 2018, so send confirmMDx of 2/18 biopsy which was +LTZ indicating 28% chance finding prostate ca (19% low grade, 10% G7+)   3T MRI at [facility] 12/26/18: 58mL prostate with 3mm intravesical extension   PIRAD-3 index lesion: 8x6mm posterior lateral peripheral zone of mid gland on right. 14mm from midline and 2mm from capsule. No evidence of EPE.  - heterogenous on T2 with indistinct margins with no masslike lesion but focal moderately hypointense on ADC, mildly hyperintense on DWI, and evaluation for masslike hyperemia --> Repeat prostate biopsy 3/19/19: volume 66.6g. PATHOLOGY: 3+3=6 in 2 out of 17 cores: 5% LM medial, 10% LA lateral  After biopsy, noted AUA SS 1/0. Elected AS for his very low risk CaP. 100mg viagra Rxed to [facility]. Held TRT. Planned annual CaP and RCC surveillance with interim psa    PSA 10/17/19 was 3.3, and 5/29/20 was stable at 3.2 with Cr 1.0, eGFR >60  6/11/20 ASHLEE negative though visualization of entire L kidney poor with known cysts. CXR negative.  He deferred VV follow up in June/July and presents today for follow up evaluation noting:  No trouble urinating.   Denies bothersome LUTS  AUA SS: 4/1 (1:  Emptying, frequency, urgency, sleeping)  No hematuria, dysuria.  Has had enlarging R spermatocele - problem in past  Has been getting bigger since discontinuing testosterone  Notices it but no pain or discomfort  Viagra still working for him   for ester/[place]. Mostly mobile equipment and safety often needed in person so challenged working from home/virtually      ROS: A comprehensive 10 system review was  "performed and is negative except as noted above in HPI    PHYSICAL EXAM:    Vitals:    09/08/20 1049   BP: (!) 174/81   Pulse: 64     Body mass index is 37.29 kg/m². Weight: 108 kg (238 lb 1.6 oz) Height: 5' 7" (170.2 cm)       General: Alert, cooperative, no distress, appears stated age   Head: Normocephalic, without obvious abnormality, atraumatic   Eyes: PERRL, conjunctiva/corneas clear   Lungs: Respirations unlabored   Heart: Warm and well perfused   Abdomen: soft NT ND, lap incisions well healed, no hernia  : circ phallus, no plaques lesions, bilat desc testes palpably normal though has moderate approximate 3 cm to 4 cm right epididymal cyst versus spermatocele, nontender to palpation  TAHIRA:  30-35 g smooth nonnodular with prominence of right apex and mild asymmetry  Extremities: Extremities normal, atraumatic, no cyanosis or edema   Skin: Skin color, texture, turgor normal, no rashes or lesions   Psych: Appropriate   Neurologic: Non-focal       Recent Results (from the past 336 hour(s))   POCT URINE DIPSTICK WITHOUT MICROSCOPE    Collection Time: 09/08/20 11:05 AM   Result Value Ref Range    Color, UA Yellow     Spec Grav UA 1.030     pH, UA 5     WBC, UA tr     Nitrite, UA neg     Protein neg     Glucose, UA neg     Ketones, UA neg     Urobilinogen, UA 0.2     Bilirubin neg     Blood, UA neg     Clarity, UA Clear        ASSESSMENT   1. Prostate cancer  POCT URINE DIPSTICK WITHOUT MICROSCOPE    Prostate Specific Antigen, Diagnostic    Case Request Operating Room: BIOPSY, PROSTATE, RECTAL APPROACH, WITH US GUIDANCE    Place in Outpatient   2. History of renal cell cancer  POCT URINE DIPSTICK WITHOUT MICROSCOPE   3. Spermatocele  POCT URINE DIPSTICK WITHOUT MICROSCOPE   4. History of kidney stones  POCT URINE DIPSTICK WITHOUT MICROSCOPE       Plan    He has been on active surveillance for prostate cancer, low-grade Diomedes 6, since diagnosis in March of 2019.  We did review active surveillance, with PSA " surveillance every 6 months, and confirmatory 1 year biopsy targeting the areas of previous positivity.  If pathology is stable at that time and he is a candidate to continue active surveillance, will continue PSA screening every 6 months and can space evaluations out every 2-3 years and alternate with MRI and biopsy, as long as PSA remains stable.  Interim evaluations in the case of acute PSA rise.  His PSA has been quite stable since time of diagnosis but was last checked in May, and would be due again around October or November.  We have gone ahead and scheduled his surveillance biopsy for 10/20/20 and will repeat PSA 1 week prior for updated value at time of restaging.  Procedure in detail of prostate biopsy including preparatory instructions were all reviewed with the patient and all questions answered.  Preparatory instruction handout was provided and antibiotics sent to pharmacy.    We did also review surveillance protocols for his left renal cell carcinoma completely resected via partial nephrectomy in August of 2017, about 3 years ago.  Pathology with negative margins and favorable with complete resection and therefore unlikely to have recurrence.  Initial staging CT was negative and have followed with ultrasound, though he does have multiple cysts on the left kidney and some motion artifact on recent ultrasound did not get a great picture of his left kidney, and as he is at the end of his 3 year annual surveillance window we did discuss updating with cross-sectional imaging in getting a CT of the abdomen with and without IV contrast to fully evaluate the kidneys for any signs of recurrence.  Will add on pelvis as well given his surveillance for prostate cancer to make sure there are no changes are clinically significant lymphadenopathy in the pelvis.  CT of the abdomen and pelvis with and without IV contrast will be planned 1 week prior to biopsy at the time of PSA, and repeat creatinine will be drawn on  arrival as well    As well, has had no interim kidney stones, and his updated CT scan, in the non contrasted phase, will help delineate if there is any current urinary stone burden  Reviewed recommendations for stone prevention such as adequate daily hydration to produce goal daily UOP > 2L, low salt low sodium moderate protein diet, avoiding tea and other oxalate rich foods, and use of fresh lemon as citrate is a natural stone inhibitor.    He does have recurrence of his right-sided spermatocele versus epididymal cyst.  It is enlarging in size slowly over the last 1-2 years since discontinuing testosterone and is currently not causing any pain or discomfort.  We did review surveillance with scrotal support and discussed it is benign in etiology.  Has no effect on sexual function, and reassured.  Briefly discussed spermatocelectomy and surgical procedure as well as all risks and benefits.  At this time would like to observe.  If continues to enlarge or causes significant discomfort may consider spermatocelectomy in the future.  Will continue to follow at our follow-up visit.    Doing well in the absence of testosterone replacement therapy.  Stable.  Viagra for ED.  Again discuss the contraindication of testosterone replacement therapy with active prostate cancer.    All questions the patient had were answered in detail and he is agreeable to the treatment plan. 40 mins spent in encounter over half in counseling  In summary:  Restaging prostate biopsy for active surveillance on 10/20/20, prior to which will update his PSA as well as update CT scan for surveillance of his renal cell carcinoma history and extend imaging into pelvis for any clinical update and assessment of lymphadenopathy or progression of prostate cancer (not suspected).

## 2020-09-08 ENCOUNTER — OFFICE VISIT (OUTPATIENT)
Dept: UROLOGY | Facility: CLINIC | Age: 70
End: 2020-09-08
Payer: COMMERCIAL

## 2020-09-08 VITALS
BODY MASS INDEX: 37.37 KG/M2 | HEART RATE: 64 BPM | DIASTOLIC BLOOD PRESSURE: 81 MMHG | SYSTOLIC BLOOD PRESSURE: 174 MMHG | WEIGHT: 238.13 LBS | HEIGHT: 67 IN

## 2020-09-08 DIAGNOSIS — Z01.818 PRE-OP TESTING: ICD-10-CM

## 2020-09-08 DIAGNOSIS — Z85.528 HISTORY OF RENAL CELL CANCER: ICD-10-CM

## 2020-09-08 DIAGNOSIS — C61 PROSTATE CANCER: Primary | ICD-10-CM

## 2020-09-08 DIAGNOSIS — Z87.442 HISTORY OF KIDNEY STONES: ICD-10-CM

## 2020-09-08 DIAGNOSIS — C68.9 MALIGNANT NEOPLASM OF URINARY ORGAN, UNSPECIFIED: ICD-10-CM

## 2020-09-08 DIAGNOSIS — N43.40 SPERMATOCELE: ICD-10-CM

## 2020-09-08 LAB
BILIRUB SERPL-MCNC: ABNORMAL MG/DL
BLOOD URINE, POC: ABNORMAL
CLARITY, POC UA: CLEAR
COLOR, POC UA: YELLOW
GLUCOSE UR QL STRIP: ABNORMAL
KETONES UR QL STRIP: ABNORMAL
LEUKOCYTE ESTERASE URINE, POC: ABNORMAL
NITRITE, POC UA: ABNORMAL
PH, POC UA: 5
PROTEIN, POC: ABNORMAL
SPECIFIC GRAVITY, POC UA: 1.03
UROBILINOGEN, POC UA: 0.2

## 2020-09-08 PROCEDURE — 3008F BODY MASS INDEX DOCD: CPT | Mod: CPTII,S$GLB,, | Performed by: UROLOGY

## 2020-09-08 PROCEDURE — 81002 URINALYSIS NONAUTO W/O SCOPE: CPT | Mod: S$GLB,,, | Performed by: UROLOGY

## 2020-09-08 PROCEDURE — 1101F PT FALLS ASSESS-DOCD LE1/YR: CPT | Mod: CPTII,S$GLB,, | Performed by: UROLOGY

## 2020-09-08 PROCEDURE — 3077F SYST BP >= 140 MM HG: CPT | Mod: CPTII,S$GLB,, | Performed by: UROLOGY

## 2020-09-08 PROCEDURE — 99999 PR PBB SHADOW E&M-EST. PATIENT-LVL V: ICD-10-PCS | Mod: PBBFAC,,, | Performed by: UROLOGY

## 2020-09-08 PROCEDURE — 99215 PR OFFICE/OUTPT VISIT, EST, LEVL V, 40-54 MIN: ICD-10-PCS | Mod: 25,S$GLB,, | Performed by: UROLOGY

## 2020-09-08 PROCEDURE — 81002 POCT URINE DIPSTICK WITHOUT MICROSCOPE: ICD-10-PCS | Mod: S$GLB,,, | Performed by: UROLOGY

## 2020-09-08 PROCEDURE — 1126F PR PAIN SEVERITY QUANTIFIED, NO PAIN PRESENT: ICD-10-PCS | Mod: S$GLB,,, | Performed by: UROLOGY

## 2020-09-08 PROCEDURE — 3079F PR MOST RECENT DIASTOLIC BLOOD PRESSURE 80-89 MM HG: ICD-10-PCS | Mod: CPTII,S$GLB,, | Performed by: UROLOGY

## 2020-09-08 PROCEDURE — 1101F PR PT FALLS ASSESS DOC 0-1 FALLS W/OUT INJ PAST YR: ICD-10-PCS | Mod: CPTII,S$GLB,, | Performed by: UROLOGY

## 2020-09-08 PROCEDURE — 99215 OFFICE O/P EST HI 40 MIN: CPT | Mod: 25,S$GLB,, | Performed by: UROLOGY

## 2020-09-08 PROCEDURE — 1159F PR MEDICATION LIST DOCUMENTED IN MEDICAL RECORD: ICD-10-PCS | Mod: S$GLB,,, | Performed by: UROLOGY

## 2020-09-08 PROCEDURE — 3079F DIAST BP 80-89 MM HG: CPT | Mod: CPTII,S$GLB,, | Performed by: UROLOGY

## 2020-09-08 PROCEDURE — 1159F MED LIST DOCD IN RCRD: CPT | Mod: S$GLB,,, | Performed by: UROLOGY

## 2020-09-08 PROCEDURE — 3008F PR BODY MASS INDEX (BMI) DOCUMENTED: ICD-10-PCS | Mod: CPTII,S$GLB,, | Performed by: UROLOGY

## 2020-09-08 PROCEDURE — 1126F AMNT PAIN NOTED NONE PRSNT: CPT | Mod: S$GLB,,, | Performed by: UROLOGY

## 2020-09-08 PROCEDURE — 3077F PR MOST RECENT SYSTOLIC BLOOD PRESSURE >= 140 MM HG: ICD-10-PCS | Mod: CPTII,S$GLB,, | Performed by: UROLOGY

## 2020-09-08 PROCEDURE — 99999 PR PBB SHADOW E&M-EST. PATIENT-LVL V: CPT | Mod: PBBFAC,,, | Performed by: UROLOGY

## 2020-09-08 RX ORDER — CIPROFLOXACIN 500 MG/1
500 TABLET ORAL 2 TIMES DAILY
Qty: 6 TABLET | Refills: 0 | Status: ON HOLD | OUTPATIENT
Start: 2020-09-08 | End: 2020-11-19 | Stop reason: CLARIF

## 2020-09-08 RX ORDER — LIDOCAINE HYDROCHLORIDE 10 MG/ML
10 INJECTION, SOLUTION EPIDURAL; INFILTRATION; INTRACAUDAL; PERINEURAL ONCE
Status: CANCELLED | OUTPATIENT
Start: 2020-09-08 | End: 2020-09-08

## 2020-09-21 DIAGNOSIS — E11.3293 MILD NONPROLIFERATIVE DIABETIC RETINOPATHY OF BOTH EYES WITHOUT MACULAR EDEMA ASSOCIATED WITH TYPE 2 DIABETES MELLITUS: Primary | ICD-10-CM

## 2020-09-22 ENCOUNTER — TELEPHONE (OUTPATIENT)
Dept: OPHTHALMOLOGY | Facility: CLINIC | Age: 70
End: 2020-09-22

## 2020-09-23 ENCOUNTER — PATIENT MESSAGE (OUTPATIENT)
Dept: NEUROLOGY | Facility: CLINIC | Age: 70
End: 2020-09-23

## 2020-09-25 ENCOUNTER — PATIENT MESSAGE (OUTPATIENT)
Dept: OTHER | Facility: OTHER | Age: 70
End: 2020-09-25

## 2020-09-29 ENCOUNTER — PATIENT MESSAGE (OUTPATIENT)
Dept: OTHER | Facility: OTHER | Age: 70
End: 2020-09-29

## 2020-10-01 ENCOUNTER — OFFICE VISIT (OUTPATIENT)
Dept: ORTHOPEDICS | Facility: CLINIC | Age: 70
End: 2020-10-01
Payer: COMMERCIAL

## 2020-10-01 VITALS — HEIGHT: 67 IN | WEIGHT: 238 LBS | RESPIRATION RATE: 16 BRPM | BODY MASS INDEX: 37.35 KG/M2

## 2020-10-01 DIAGNOSIS — M17.0 PRIMARY OSTEOARTHRITIS OF BOTH KNEES: ICD-10-CM

## 2020-10-01 DIAGNOSIS — M17.10 ARTHRITIS OF KNEE: Primary | ICD-10-CM

## 2020-10-01 PROCEDURE — 20610 DRAIN/INJ JOINT/BURSA W/O US: CPT | Mod: 50,S$GLB,, | Performed by: ORTHOPAEDIC SURGERY

## 2020-10-01 PROCEDURE — 3008F PR BODY MASS INDEX (BMI) DOCUMENTED: ICD-10-PCS | Mod: CPTII,S$GLB,, | Performed by: ORTHOPAEDIC SURGERY

## 2020-10-01 PROCEDURE — 99999 PR PBB SHADOW E&M-EST. PATIENT-LVL III: CPT | Mod: PBBFAC,,, | Performed by: ORTHOPAEDIC SURGERY

## 2020-10-01 PROCEDURE — 99999 PR PBB SHADOW E&M-EST. PATIENT-LVL III: ICD-10-PCS | Mod: PBBFAC,,, | Performed by: ORTHOPAEDIC SURGERY

## 2020-10-01 PROCEDURE — 1159F PR MEDICATION LIST DOCUMENTED IN MEDICAL RECORD: ICD-10-PCS | Mod: S$GLB,,, | Performed by: ORTHOPAEDIC SURGERY

## 2020-10-01 PROCEDURE — 1101F PT FALLS ASSESS-DOCD LE1/YR: CPT | Mod: CPTII,S$GLB,, | Performed by: ORTHOPAEDIC SURGERY

## 2020-10-01 PROCEDURE — 99213 OFFICE O/P EST LOW 20 MIN: CPT | Mod: 25,S$GLB,, | Performed by: ORTHOPAEDIC SURGERY

## 2020-10-01 PROCEDURE — 1101F PR PT FALLS ASSESS DOC 0-1 FALLS W/OUT INJ PAST YR: ICD-10-PCS | Mod: CPTII,S$GLB,, | Performed by: ORTHOPAEDIC SURGERY

## 2020-10-01 PROCEDURE — 99213 PR OFFICE/OUTPT VISIT, EST, LEVL III, 20-29 MIN: ICD-10-PCS | Mod: 25,S$GLB,, | Performed by: ORTHOPAEDIC SURGERY

## 2020-10-01 PROCEDURE — 3008F BODY MASS INDEX DOCD: CPT | Mod: CPTII,S$GLB,, | Performed by: ORTHOPAEDIC SURGERY

## 2020-10-01 PROCEDURE — 1159F MED LIST DOCD IN RCRD: CPT | Mod: S$GLB,,, | Performed by: ORTHOPAEDIC SURGERY

## 2020-10-01 PROCEDURE — 20610 LARGE JOINT ASPIRATION/INJECTION: BILATERAL KNEE: ICD-10-PCS | Mod: 50,S$GLB,, | Performed by: ORTHOPAEDIC SURGERY

## 2020-10-01 RX ORDER — TRIAMCINOLONE ACETONIDE 40 MG/ML
40 INJECTION, SUSPENSION INTRA-ARTICULAR; INTRAMUSCULAR
Status: DISCONTINUED | OUTPATIENT
Start: 2020-10-01 | End: 2020-10-01 | Stop reason: HOSPADM

## 2020-10-01 RX ADMIN — TRIAMCINOLONE ACETONIDE 40 MG: 40 INJECTION, SUSPENSION INTRA-ARTICULAR; INTRAMUSCULAR at 01:10

## 2020-10-01 NOTE — PROGRESS NOTES
Past Medical History:   Diagnosis Date    Arthritis     knees, back    Bilateral renal cysts 7/30/2012    CAD (coronary artery disease) 1995    no chest pain since CABG, sees Dr Larry Black    Diabetes mellitus     borderline    Hypertension     Kidney stones     uric acid stones    VASYL (obstructive sleep apnea) 2007    is compliant with CPAP    Primary gonadal failure        Past Surgical History:   Procedure Laterality Date    BACK SURGERY  2010    L5-6 fusion, with pins,bone graft    CARDIAC SURGERY  1995, 2007    total 7 vessel bypass    COLONOSCOPY  2008    repeat 2013    COLONOSCOPY N/A 4/4/2017    Procedure: COLONOSCOPY;  Surgeon: Dmitry Sampson MD;  Location: [facility];  Service: Endoscopy;  Laterality: N/A;    CORONARY ARTERY BYPASS GRAFT  1995, 2007    cabgx3, cabgx5    EXTRACORPOREAL SHOCK WAVE LITHOTRIPSY      EYE SURGERY      cataracts bilateral    LITHOTRIPSY      LUMBAR LAMINECTOMY      Partial Nephrectomy Laproscopic      TRANSRECTAL BIOPSY OF PROSTATE WITH ULTRASOUND GUIDANCE N/A 3/19/2019    Procedure: BIOPSY, PROSTATE, RECTAL APPROACH, WITH US GUIDANCE;  Surgeon: Yovany Heart MD;  Location: [facility];  Service: Urology;  Laterality: N/A;       Current Outpatient Medications   Medication Sig    allopurinol (ZYLOPRIM) 100 MG tablet Take 1 tablet (100 mg total) by mouth once daily.    allopurinol (ZYLOPRIM) 300 MG tablet Take 1 tablet (300 mg total) by mouth once daily. Add a 100 mg tab .Total 400 mg a day.    aspirin (ECOTRIN) 81 MG EC tablet Take 81 mg by mouth once daily.    atorvastatin (LIPITOR) 20 MG tablet Take 1 tablet (20 mg total) by mouth once daily.    ciprofloxacin HCl (CIPRO) 500 MG tablet Take 1 tablet (500 mg total) by mouth 2 (two) times daily.    cyanocobalamin, vitamin B-12, (VITAMIN B-12 ORAL) Take by mouth.    ferrous sulfate (IRON ORAL) Take by mouth.    fish oil-omega-3 fatty acids 300-1,000 mg capsule Take 2 g by mouth 3 (three) times daily.     furosemide (LASIX) 20 MG tablet Take 1 tablet (20 mg total) by mouth once daily.    hydrocortisone 2.5 % cream Thin film to AA onface daily PRN flare; use short term only    losartan (COZAAR) 100 MG tablet Take 1 tablet (100 mg total) by mouth once daily.    metFORMIN (GLUCOPHAGE-XR) 500 MG 24 hr tablet Take 1 tablet (500 mg total) by mouth 2 (two) times daily with meals.    potassium chloride SA (K-DUR,KLOR-CON) 20 MEQ tablet Take 1 tablet (20 mEq total) by mouth once daily.    vitamin D 1000 units Tab Take 185 mg by mouth once daily.    sildenafil (VIAGRA) 100 MG tablet Take 1 tablet (100 mg total) by mouth as needed for Erectile Dysfunction.     No current facility-administered medications for this visit.      Facility-Administered Medications Ordered in Other Visits   Medication    triamcinolone acetonide injection 40 mg    triamcinolone acetonide injection 40 mg       Review of patient's allergies indicates:   Allergen Reactions    Adhesive Blisters       Family History   Problem Relation Age of Onset    Heart disease Mother     Hypertension Mother     Diabetes Mother     Heart disease Father         premature    Hypertension Father     Diabetes Sister     Urolithiasis Sister     Heart disease Paternal Uncle     Cancer Neg Hx     Prostate cancer Neg Hx     Kidney cancer Neg Hx     Melanoma Neg Hx     Lupus Neg Hx     Eczema Neg Hx     Psoriasis Neg Hx        Social History     Socioeconomic History    Marital status:      Spouse name: Not on file    Number of children: Not on file    Years of education: Not on file    Highest education level: Not on file   Occupational History    Not on file   Social Needs    Financial resource strain: Not on file    Food insecurity     Worry: Not on file     Inability: Not on file    Transportation needs     Medical: Not on file     Non-medical: Not on file   Tobacco Use    Smoking status: Former Smoker     Quit date: 2/7/1976      Years since quittin.6    Smokeless tobacco: Never Used   Substance and Sexual Activity    Alcohol use: Yes     Frequency: Monthly or less     Drinks per session: Patient refused     Binge frequency: Never     Comment: Socially    Drug use: No    Sexual activity: Yes   Lifestyle    Physical activity     Days per week: 7 days     Minutes per session: 60 min    Stress: Not at all   Relationships    Social connections     Talks on phone: Once a week     Gets together: Patient refused     Attends Spiritism service: Not on file     Active member of club or organization: No     Attends meetings of clubs or organizations: Never     Relationship status:    Other Topics Concern    Not on file   Social History Narrative    Not on file       Chief Complaint:   Chief Complaint   Patient presents with    Knee Pain     BILATERAL KNEE PAIN       History of present illness: Is a 70-year-old male seen for recurrent bilateral knee pain. We did a Synvisc-One back in .  Left knee is the worst of the 2.  Pain started about a month ago.  Pain is up to 7/10.      Answers for HPI/ROS submitted by the patient on 2019   Leg pain  unexpected weight change: No  appetite change : No  sleep disturbance: No  IMMUNOCOMPROMISED: No  nervous/ anxious: No  dysphoric mood: No  rash: No  visual disturbance: No  eye redness: No  eye pain: No  ear pain: No  tinnitus: Yes  hearing loss: Yes  sinus pressure : No  nosebleeds: No  enviro allergies: No  food allergies: No  cough: No  shortness of breath: No  sweating: No  frequency: No  difficulty urinating: No  hematuria: No  chest pain: No  palpitations: No  nausea: No  vomiting: No  diarrhea: No  blood in stool: No  constipation: No  headaches: No  dizziness: No  numbness: No  seizures: No  joint swelling: No  myalgia: No  weakness: No  back pain: No  Pain Chronicity: recurrent  History of trauma: No  Onset: more than 1 month ago  Frequency: daily  Progression since onset:  unchanged  injury location: at work  pain- numeric: 3/10  pain location: left knee, right knee  pain quality: aching, sharp  Radiating Pain: No  Aggravating factors: walking  fever: No  inability to bear weight: No  itching: No  joint locking: No  limited range of motion: Yes  stiffness: Yes  tingling: No  Treatments tried: nothing  physical therapy: not tried  Improvement on treatment: significant        Physical Examination:    Vital Signs:    Vitals:    10/01/20 1251   Resp: 16       Body mass index is 37.28 kg/m².    This a well-developed, well nourished patient in no acute distress.  They are alert and oriented and cooperative to examination.  Pt. walks without an antalgic gait.      Examination of bilateral knees shows no rashes or erythema. There are no masses ecchymosis or effusion. Patient has full range of motion from 0-130°. Patient is nontender to palpation over lateral joint line and moderately tender to palpation over the medial joint line. Knee is stable to varus and valgus stress. 5 out of 5 motor strength. Palpable distal pulses. Intact light touch sensation. Negative Patellofemoral crepitus      X-rays: X-rays of both knees are review which show severe medial joint space narrowing of both knees.  No significant progression     Assessment:: Severe varus knee arthritis    Plan:  We agreed to do steroid injections again today.  We can repeat the viscosupplementation until December.  He is still working and very active and does not want to have to take off to do knee replacements until he retires.    This note was created using  voice recognition software that occasionally misinterpreted phrases or words.    Consult note is delivered via Epic messaging service.

## 2020-10-01 NOTE — PROCEDURES
Large Joint Aspiration/Injection: bilateral knee    Date/Time: 10/1/2020 1:00 PM  Performed by: Pablo Dutton MD  Authorized by: Pablo Dutton MD     Consent Done?:  Yes (Verbal)  Indications:  Pain  Site marked: the procedure site was marked    Timeout: prior to procedure the correct patient, procedure, and site was verified    Prep: patient was prepped and draped in usual sterile fashion      Local anesthesia used?: Yes    Anesthesia:  Local infiltration  Local anesthetic:  Bupivacaine 0.25% without epinephrine and lidocaine 2% without epinephrine  Anesthetic total (ml):  6      Details:  Needle Size:  22 G  Ultrasonic Guidance for needle placement?: No    Approach:  Anterolateral  Location:  Knee  Laterality:  Bilateral  Site:  Bilateral knee  Medications (Right):  40 mg triamcinolone acetonide 40 mg/mL  Medications (Left):  40 mg triamcinolone acetonide 40 mg/mL  Patient tolerance:  Patient tolerated the procedure well with no immediate complications

## 2020-10-10 ENCOUNTER — HOSPITAL ENCOUNTER (OUTPATIENT)
Dept: RADIOLOGY | Facility: HOSPITAL | Age: 70
Discharge: HOME | End: 2020-10-10
Attending: UROLOGY
Payer: COMMERCIAL

## 2020-10-10 DIAGNOSIS — C68.9 MALIGNANT NEOPLASM OF URINARY ORGAN, UNSPECIFIED: ICD-10-CM

## 2020-10-10 PROCEDURE — 74178 CT ABD&PLV WO CNTR FLWD CNTR: CPT | Mod: TC

## 2020-10-10 PROCEDURE — 74178 CT ABD&PLV WO CNTR FLWD CNTR: CPT | Mod: 26,,, | Performed by: RADIOLOGY

## 2020-10-10 PROCEDURE — 25500020 PHARM REV CODE 255

## 2020-10-10 PROCEDURE — 74178 CT ABDOMEN PELVIS W WO CONTRAST: ICD-10-PCS | Mod: 26,,, | Performed by: RADIOLOGY

## 2020-10-10 RX ADMIN — IOHEXOL 100 ML: 350 INJECTION, SOLUTION INTRAVENOUS at 09:10

## 2020-10-17 ENCOUNTER — LAB VISIT (OUTPATIENT)
Dept: PRIMARY CARE CLINIC | Facility: CLINIC | Age: 70
End: 2020-10-17
Payer: COMMERCIAL

## 2020-10-17 DIAGNOSIS — Z01.818 PRE-OP TESTING: ICD-10-CM

## 2020-10-17 PROCEDURE — U0003 INFECTIOUS AGENT DETECTION BY NUCLEIC ACID (DNA OR RNA); SEVERE ACUTE RESPIRATORY SYNDROME CORONAVIRUS 2 (SARS-COV-2) (CORONAVIRUS DISEASE [COVID-19]), AMPLIFIED PROBE TECHNIQUE, MAKING USE OF HIGH THROUGHPUT TECHNOLOGIES AS DESCRIBED BY CMS-2020-01-R: HCPCS

## 2020-10-18 ENCOUNTER — PATIENT MESSAGE (OUTPATIENT)
Dept: SURGERY | Facility: AMBULARY SURGERY CENTER | Age: 70
End: 2020-10-18

## 2020-10-18 LAB — SARS-COV-2 RNA RESP QL NAA+PROBE: NOT DETECTED

## 2020-10-19 NOTE — TELEPHONE ENCOUNTER
Spoke w pt vocied he found his instruction sheet that reads   -1 fleet enema at home morning of procedure before arriving 1-2 hours before leaving home  Pt voiced ok and no other questions regarding biopsy

## 2020-10-19 NOTE — DISCHARGE INSTRUCTIONS
Before leaving, please make sure you have all your personal belongings such as glasses, purses, wallets, keys, cell phones, jewelry, jackets etc     After your prostate biopsy    Avoid sexual activity,lifting, strenuous physical activity or exertion for 3 days     No riding mowers, tractors, bicycles, motorcycles for 2-3 weeks    You may experience blood in your urine or stool for up to 2 weeks and in your semen for up to 6 weeks.  This is a normal side effect of the procedure and will resolve.    Drink plenty of water    Take antibiotics as prescribed    If you experience any of the following conditions, please return immediately to the clinic (during office hrs) or the Emergency Room if after hours:       Fever       Inabiltiy to urinate       Severe bleeding    You may resume aspirin, anti inflammatory and blood thinners in 3 days    Results take at minimum 10 business days.  A 2 week follow up will be scheduled to review pathology reports in the clinic    During office hours, please call  and ask to speak with the nurse if you have any questions.  If after hours, call the [facility] # to be connectied to the doctor on call    After Surgery:  Always be aware that any surgery can cause these symptoms:    Pain- Medication can be prescribed for pain to decrease your pain but may not completely take your pain away.  Over the Counter pain medicine my be enough and you can always use Ice and rest to help ease pain.    Bleeding- a little bleeding after a surgery is usually within normal.  If there is a lot of blood you need to notify your MD.  Emergency treatments of bleeding are cold application, elevation of the bleeding site and compression.    Infection- Infection after surgery is NOT a normal occurrence.  Signs of infection are fever, swelling, hot to touch the incision.  If this occurs notify your MD immediately.    Nausea- this can be common after a surgery especially if you have had anesthesia  medicine or are taking pain medicine.  Staying on clear liquids, bland foods, gingerale, or over the counter anti nausea medicines can help.  If you vomit more than once, notify your MD.  Anti Nausea medicines can be prescribed.

## 2020-10-19 NOTE — OR NURSING
Talked to Dr Heart about patient still on fish oil and aspirin.  Ok to proceed but he must stay off them today and tomorrow.  Reported to patient.

## 2020-10-20 ENCOUNTER — HOSPITAL ENCOUNTER (OUTPATIENT)
Facility: AMBULARY SURGERY CENTER | Age: 70
Discharge: HOME | End: 2020-10-20
Attending: UROLOGY | Admitting: UROLOGY
Payer: COMMERCIAL

## 2020-10-20 DIAGNOSIS — C61 PROSTATE CANCER: ICD-10-CM

## 2020-10-20 PROCEDURE — 76872 PR US TRANSRECTAL: ICD-10-PCS | Mod: 26,,, | Performed by: UROLOGY

## 2020-10-20 PROCEDURE — 76872 US TRANSRECTAL: CPT | Mod: 26,,, | Performed by: UROLOGY

## 2020-10-20 PROCEDURE — 55700 PR BIOPSY OF PROSTATE,NEEDLE/PUNCH: ICD-10-PCS | Mod: ,,, | Performed by: UROLOGY

## 2020-10-20 PROCEDURE — 55700 HC PROSTATE NEEDLE BIOPSY: CPT | Performed by: UROLOGY

## 2020-10-20 PROCEDURE — 87086 URINE CULTURE/COLONY COUNT: CPT

## 2020-10-20 PROCEDURE — 76872 US TRANSRECTAL: CPT | Performed by: UROLOGY

## 2020-10-20 PROCEDURE — 88342 IMHCHEM/IMCYTCHM 1ST ANTB: CPT | Performed by: PATHOLOGY

## 2020-10-20 PROCEDURE — 55700 PR BIOPSY OF PROSTATE,NEEDLE/PUNCH: CPT | Mod: ,,, | Performed by: UROLOGY

## 2020-10-20 PROCEDURE — 88341 IMHCHEM/IMCYTCHM EA ADD ANTB: CPT | Mod: 59 | Performed by: PATHOLOGY

## 2020-10-20 PROCEDURE — 88305 TISSUE EXAM BY PATHOLOGIST: CPT | Mod: 26,,, | Performed by: PATHOLOGY

## 2020-10-20 PROCEDURE — 88305 TISSUE EXAM BY PATHOLOGIST: CPT | Performed by: PATHOLOGY

## 2020-10-20 PROCEDURE — 88305 TISSUE EXAM BY PATHOLOGIST: ICD-10-PCS | Mod: 26,,, | Performed by: PATHOLOGY

## 2020-10-20 RX ORDER — GENTAMICIN SULFATE 40 MG/ML
80 INJECTION, SOLUTION INTRAMUSCULAR; INTRAVENOUS ONCE
Status: COMPLETED | OUTPATIENT
Start: 2020-10-20 | End: 2020-10-20

## 2020-10-20 RX ORDER — LIDOCAINE HYDROCHLORIDE 10 MG/ML
INJECTION INFILTRATION; PERINEURAL
Status: DISCONTINUED | OUTPATIENT
Start: 2020-10-20 | End: 2020-10-20 | Stop reason: HOSPADM

## 2020-10-20 RX ADMIN — GENTAMICIN SULFATE 80 MG: 40 INJECTION, SOLUTION INTRAMUSCULAR; INTRAVENOUS at 02:10

## 2020-10-20 NOTE — OP NOTE
[facility] Urology Op/Brief DC Note     Date: 10/20/2020     Staff Surgeon: Yovany Heart MD     Pre-Op Diagnosis:   Prostate cancer     Post-Op Diagnosis: same     Procedure(s) Performed:   Transrectal ultrasound guided prostate needle biopsy     INDICATION FOR PROCEDURE:   69 yo M with 2 cores G6 CaP on prostate biopsy last year when psa was rising on TRT, and elected active surveillance. Biopsy positive at LM med, LA lat. Confirm MdX positive at LTZ. MRI with pirad3 lesion RM lateral.     ANESTHESIA: Local periprostatic block; 10 cc 1% lidocaine      PSA: 3.2      TRUS VOLUME:  59.4cm3 (W: 54.1mm, H 35.6mm, L 59mm)     EBL: Minimal     SPECIMEN:   18 Prostate Biopsy Cores: right and left base, apex, and mid - medial and lateral of each - and additional core at each transition zone  (however 2 total cores were taken from the left mid medial, and lateral, Left apex lateral, left transition zone, RM lateral, targeting areas of previous concern)     ULTRASOUND FINDINGS:  moderate scattered hypoechoities, with significant hypoechoic areas clustered at the bilateral apices, as well as a round discrete solid appearing lesion at the left mid medial, targeted with additional biopsy core.      CONFIRMED PATIENT TOOK ANTIBIOTICS: Yes     CONFIRMED PATIENT NOT TAKING ASPIRIN OR ANTICOAGULANTS: Yes     CONFIRMED PATIENT USED ENEMA: Yes     PROCEDURE IN DETAIL:  After informed consent, the patient was placed in the left lateral position and TRUS probe inserted into rectum. Ultrasound measurements taken as above and ultrasound of prostate performed with findings as above. Saggital image captured.     Approximately 10cc of 1% lidocaine injected bilaterally in tacos-prostatic block fashion, as well as at the apex. 14 core biopies taken in a sextant fashion with addition of transition zones, ultrasound guided. And extra cores as above.      CONDITION: Stable     DISHARGE:  Status post uncomplicated outpatient procedure as above.    Disposition: Home.  He will follow up in 2 weeks for biopsy results.    Resume regular diet  FU 2 weeks for biopsy results  Return to ER if temp >101, uncontrollable urethral or rectal bleeding, or inability to urinate/urinary retention  No sex/ejaculation x3 days, no riding mowers/tractors/bikes x2-4 weeks  Hold asa/bloodthinners x 3 days  Drink plenty of water may see blood in urine

## 2020-10-20 NOTE — PLAN OF CARE
Stable, states ready to go home, shellie po fluids, denies pain, ambulated to car with RN and wife to self care

## 2020-10-21 VITALS
TEMPERATURE: 99 F | HEIGHT: 67 IN | BODY MASS INDEX: 37.35 KG/M2 | SYSTOLIC BLOOD PRESSURE: 160 MMHG | HEART RATE: 64 BPM | RESPIRATION RATE: 20 BRPM | WEIGHT: 238 LBS | OXYGEN SATURATION: 100 % | DIASTOLIC BLOOD PRESSURE: 88 MMHG

## 2020-10-22 LAB — BACTERIA UR CULT: NO GROWTH

## 2020-10-30 LAB
COMMENT: NORMAL
FINAL PATHOLOGIC DIAGNOSIS: NORMAL
GROSS: NORMAL
Lab: NORMAL

## 2020-10-30 NOTE — PROGRESS NOTES
[facility] Urology Progress Note     Carlos Tenorio is a 70 y.o. male with history hypogonadism, kidney stones, RCC (pT1a clear cell, s/p R claudia px nx 8/2/17), R spermatocele and prostate cancer on active surveillance presents for follow up of confirmatory active surveillance prostate biopsy     He had been on testosterone replacement therapy previously (off x 5 yrs at time of initial eval) and was referred to me initially for finding of renal mass found on workup for LINSEY after episode of severe colitis and dehydration. A PSA checked at the time of his colon infection was found to be elevated at 7.5, which delayed resuming his testosterone therapy for his hypogonadism. Recheck of PSA in June 2017 was 2.2 with 19% free, though TRT deferred until recovery from partial nephrectomy for the renal mass found as above.    He underwent left robotic partial nephrectomy, with concurrent left renal cyst decortication on 8/2/17 for an upper pole 2-3cm exophytic enhancing renal mass, adjacent to larger simple renal cyst. Pathology: [id number] clear cell RCC. 2.5cm. Negative margins. Grade 2. No sarcomatoid or rhabdoid features. Benign renal cyst    Also has a history of calcium oxalate stones and uric acid stones in the past and has had prior bilateral ESWL 10 years prior.    By report, a 24-hour urine in October 2012 had high oxalate and low urine pH. Takes allopurinol.     He did also have large spermatocele on the right, noted to be 5.1 cm on 4/20/17 ultrasound, and previously decreased in size to almost nothing after resuming TRT, which he did at 200mg Q2 wks 1 month after his partial nephrectomy     Surveillance CT 2/5/18 for his T1a resected RCC performed (as well as screening for nephrolithiasis) noting punctate R upper pole and 4mm R lower pole stone, no evidence recurrence. CXR June 2017 negative. (previous CT stone protocol 5/24/17: 3 stones the largest of which is present within the lower pole and measures 3 mm.)  For  interim doubling of his PSA from 2.2 to 4.4 after first 3 months of TRT with interval rise to 4.9 and recommended prostate biopsy.  TRUS bx 2/27/18. Vol 56.6. Path 14 cores benign. Elected to try Viagra again for his ED, as had not been using it properly/on empty stomach previously. Resumed TRT  6/19/18: PSA 3.9, good energy and libido with TRT and great erections without viagra. 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 No worsening LUTS     PSA then lisa on TRT again to 4.5 in Dec 2018, so send confirmMDx of 2/18 biopsy which was +LTZ indicating 28% chance finding prostate ca (19% low grade, 10% G7+)   3T MRI at [facility] 12/26/18: 58mL prostate with 3mm intravesical extension   PIRAD-3 index lesion: 8x6mm posterior lateral peripheral zone of mid gland on right. 14mm from midline and 2mm from capsule. No evidence of EPE.  - heterogenous on T2 with indistinct margins with no masslike lesion but focal moderately hypointense on ADC, mildly hyperintense on DWI, and evaluation for masslike hyperemia --> Repeat prostate biopsy 3/19/19: volume 66.6g. PATHOLOGY: 3+3=6 in 2 out of 17 cores: 5% LM medial, 10% LA lateral  After biopsy, noted AUA SS 1/0. Elected AS for his very low risk CaP. 100mg viagra Rxed to [facility]. Held TRT. Planned annual CaP and RCC surveillance with interim psa     PSA 10/17/19 was 3.3, and 5/29/20 was stable at 3.2 with Cr 1.0, eGFR >60, 6/11/20 ASHLEE negative though visualization of entire L kidney poor with known cysts. CXR negative.  At most recent evaluation 9/8/20, AUA SS 4/1, R spermatocele enlarging but not painful, poor response to viagra.  10/10/20 psa 3.3, CTBiopsy positive at LM med, LA lat. Confirm MdX positive at LTZ. MRI with pirad3 lesion RM lateral. 10/10/20 negative (small umbilical hernia and right hydrocele)    1 yr active surveillance confirmatory prostate biopsy on 10/20/20   Previously positive at LM med, LA lat. Confirm MdX positive at LTZ. MRI with pirad3 lesion RM lateral.  TAHIRA 30-35 g  smooth nonnodular with prominence of right apex and mild asymmetry  VOLUME:  59.4cm3. US: moderate scattered hypoechoities, with significant hypoecho at the ic areas clustered at the bilateral apices, as well as a round discrete solid appearing lesion at the left mid medial, targeted with additional biopsy core.   PATH: 2/18 cores +  Left mid lateral: Hagerman score 3+4= 7; 80% (Pattern 4: 10%), 1/2 cores  Left mid medial: Hagerman score 3+3= 6; 20%, 1/2 cores    He returns today noting he did well after biopsy.  Did well after biopsy.  No significant bleeding, pain.  No fever, chills.  Has had some urinary frequency after biopsy which has improved  No dysuria, no hematuria  No baseline LUTS.    ROS: A comprehensive 10 system review was performed and is negative except as noted above in HPI    PHYSICAL EXAM:    Vitals:    11/02/20 1001   BP: (!) 150/72   Pulse: (!) 55     Body mass index is 36.6 kg/m². Weight: 106 kg (233 lb 11 oz)         General: Alert, cooperative, no distress, appears stated age   Head: Normocephalic, without obvious abnormality, atraumatic   Eyes: PERRL, conjunctiva/corneas clear   Lungs: Respirations unlabored   Heart: Warm and well perfused   Abdomen: Soft NT ND   Extremities: Extremities normal, atraumatic, no cyanosis or edema   Skin: Skin color, texture, turgor normal, no rashes or lesions   Psych: Appropriate   Neurologic: Non-focal       ASSESSMENT   1. Prostate cancer  Prostate Specific Antigen, Diagnostic    Prostate Specific Antigen, Diagnostic       Plan    Extensive discussion with patient and wife about pathology.  Despite minimal pathologic upgrading as he is technically gone from Diomedes 6 to Diomedes 7, there is a low volume component of Hagerman 4 in the Diomedes 7 specimen, only 10%.  We did review that his previous biopsy pathology of 2/17 cores Hagerman 6 and now 2/18 cores Hagerman 6/7 are very similar.  He has had a recent MRI noting no concern for any disease outside of the  prostate, and the ultrasound distinct lesion which was positive at the left mid was central.    We did use the [facility] risk calculator nomogram, over estimating at 2 cores of [place] 7, which still indicated an 85% likelihood of organ confined disease with only 1% chance of spread to lymph nodes were seminal vesicles.  We did discuss that given this low risk profile, he is a candidate to remain on active surveillance, though did have extensive risk benefit discussion about treatment and briefly discussed radical prostatectomy and external beam radiation.  Preference at this time is to continue active surveillance which is quite appropriate and we did review that an active surveillance protocol includes following PSA every 6 months, and alternating evaluations with MRI and biopsy, spacing these evaluations up to 2-3 years if the PSA remains stable now that his 1 year confirmatory biopsy indicates he is a candidate to remain on active surveillance.    At this time will plan to have a PSA lab visit in 6 months and chart check the results, then see him back at 1 years time with PSA prior.  He has otherwise had recent evaluations for all of his other urologic processes, with negative surveillance imaging and labs for his history of RCC of which he is now 3 years post, and is on observation for his spermatocele/hydrocele.  Continuing to hold testosterone replacement therapy given the active diagnosis of prostate cancer, and doing well despite this.    He has had a mild increase in lower urinary tract symptoms and frequency and nocturia since the biopsy and we did discuss the natural progression of BPH and obstructive symptoms with aging.  He has had minimal baseline obstructive lower urinary tract symptoms and we did discuss for nocturia limiting fluids 2 hr before bed, and if his urinary symptoms worsen in the interim can always re-evaluate, otherwise will monitor closely going forward at subsequent visits.    All  questions the patient and wife had were answered in detail at this time and they are agreeable to treatment plan.  40 mins spent in encounter, over half in counseling.

## 2020-11-01 ENCOUNTER — PATIENT OUTREACH (OUTPATIENT)
Dept: ADMINISTRATIVE | Facility: OTHER | Age: 70
End: 2020-11-01

## 2020-11-02 ENCOUNTER — OFFICE VISIT (OUTPATIENT)
Dept: UROLOGY | Facility: CLINIC | Age: 70
End: 2020-11-02
Payer: COMMERCIAL

## 2020-11-02 VITALS
BODY MASS INDEX: 36.6 KG/M2 | WEIGHT: 233.69 LBS | HEART RATE: 55 BPM | SYSTOLIC BLOOD PRESSURE: 150 MMHG | DIASTOLIC BLOOD PRESSURE: 72 MMHG

## 2020-11-02 DIAGNOSIS — C61 PROSTATE CANCER: Primary | ICD-10-CM

## 2020-11-02 PROCEDURE — 3008F PR BODY MASS INDEX (BMI) DOCUMENTED: ICD-10-PCS | Mod: CPTII,S$GLB,, | Performed by: UROLOGY

## 2020-11-02 PROCEDURE — 3078F DIAST BP <80 MM HG: CPT | Mod: CPTII,S$GLB,, | Performed by: UROLOGY

## 2020-11-02 PROCEDURE — 99215 PR OFFICE/OUTPT VISIT, EST, LEVL V, 40-54 MIN: ICD-10-PCS | Mod: S$GLB,,, | Performed by: UROLOGY

## 2020-11-02 PROCEDURE — 99999 PR PBB SHADOW E&M-EST. PATIENT-LVL III: CPT | Mod: PBBFAC,,, | Performed by: UROLOGY

## 2020-11-02 PROCEDURE — 3078F PR MOST RECENT DIASTOLIC BLOOD PRESSURE < 80 MM HG: ICD-10-PCS | Mod: CPTII,S$GLB,, | Performed by: UROLOGY

## 2020-11-02 PROCEDURE — 1101F PR PT FALLS ASSESS DOC 0-1 FALLS W/OUT INJ PAST YR: ICD-10-PCS | Mod: CPTII,S$GLB,, | Performed by: UROLOGY

## 2020-11-02 PROCEDURE — 3008F BODY MASS INDEX DOCD: CPT | Mod: CPTII,S$GLB,, | Performed by: UROLOGY

## 2020-11-02 PROCEDURE — 1159F PR MEDICATION LIST DOCUMENTED IN MEDICAL RECORD: ICD-10-PCS | Mod: S$GLB,,, | Performed by: UROLOGY

## 2020-11-02 PROCEDURE — 3077F SYST BP >= 140 MM HG: CPT | Mod: CPTII,S$GLB,, | Performed by: UROLOGY

## 2020-11-02 PROCEDURE — 1101F PT FALLS ASSESS-DOCD LE1/YR: CPT | Mod: CPTII,S$GLB,, | Performed by: UROLOGY

## 2020-11-02 PROCEDURE — 99215 OFFICE O/P EST HI 40 MIN: CPT | Mod: S$GLB,,, | Performed by: UROLOGY

## 2020-11-02 PROCEDURE — 1126F PR PAIN SEVERITY QUANTIFIED, NO PAIN PRESENT: ICD-10-PCS | Mod: S$GLB,,, | Performed by: UROLOGY

## 2020-11-02 PROCEDURE — 1126F AMNT PAIN NOTED NONE PRSNT: CPT | Mod: S$GLB,,, | Performed by: UROLOGY

## 2020-11-02 PROCEDURE — 3077F PR MOST RECENT SYSTOLIC BLOOD PRESSURE >= 140 MM HG: ICD-10-PCS | Mod: CPTII,S$GLB,, | Performed by: UROLOGY

## 2020-11-02 PROCEDURE — 99999 PR PBB SHADOW E&M-EST. PATIENT-LVL III: ICD-10-PCS | Mod: PBBFAC,,, | Performed by: UROLOGY

## 2020-11-02 PROCEDURE — 1159F MED LIST DOCD IN RCRD: CPT | Mod: S$GLB,,, | Performed by: UROLOGY

## 2020-11-02 NOTE — PROGRESS NOTES
Chart was reviewed for overdue [facility]active [facility] Encounters ([facility])  topics  Updates were requested from care everywhere  Health Maintenance has been updated  LINKS immunization registry triggered

## 2020-11-16 DIAGNOSIS — M17.0 PRIMARY OSTEOARTHRITIS OF BOTH KNEES: Primary | ICD-10-CM

## 2020-11-19 ENCOUNTER — HOSPITAL ENCOUNTER (OUTPATIENT)
Facility: HOSPITAL | Age: 70
Discharge: HOME | End: 2020-11-20
Attending: EMERGENCY MEDICINE | Admitting: HOSPITALIST
Payer: COMMERCIAL

## 2020-11-19 ENCOUNTER — NURSE TRIAGE (OUTPATIENT)
Dept: ADMINISTRATIVE | Facility: CLINIC | Age: 70
End: 2020-11-19

## 2020-11-19 DIAGNOSIS — G93.40 ENCEPHALOPATHY: Primary | ICD-10-CM

## 2020-11-19 DIAGNOSIS — R00.1 BRADYCARDIA: ICD-10-CM

## 2020-11-19 DIAGNOSIS — F05 ACUTE CONFUSIONAL STATE: ICD-10-CM

## 2020-11-19 LAB
ALBUMIN SERPL BCP-MCNC: 3.8 G/DL (ref 3.5–5.2)
ALP SERPL-CCNC: 64 U/L (ref 55–135)
ALT SERPL W/O P-5'-P-CCNC: 15 U/L (ref 10–44)
ANION GAP SERPL CALC-SCNC: 13 MMOL/L (ref 8–16)
AST SERPL-CCNC: 22 U/L (ref 10–40)
BASOPHILS # BLD AUTO: 0.09 K/UL (ref 0–0.2)
BASOPHILS NFR BLD: 1.2 % (ref 0–1.9)
BILIRUB SERPL-MCNC: 0.5 MG/DL (ref 0.1–1)
BILIRUB UR QL STRIP: NEGATIVE
BUN SERPL-MCNC: 21 MG/DL (ref 8–23)
CALCIUM SERPL-MCNC: 9.4 MG/DL (ref 8.7–10.5)
CHLORIDE SERPL-SCNC: 106 MMOL/L (ref 95–110)
CLARITY UR: CLEAR
CO2 SERPL-SCNC: 20 MMOL/L (ref 23–29)
COLOR UR: YELLOW
CREAT SERPL-MCNC: 1 MG/DL (ref 0.5–1.4)
DIFFERENTIAL METHOD: ABNORMAL
EOSINOPHIL # BLD AUTO: 0.1 K/UL (ref 0–0.5)
EOSINOPHIL NFR BLD: 0.8 % (ref 0–8)
ERYTHROCYTE [DISTWIDTH] IN BLOOD BY AUTOMATED COUNT: 12.6 % (ref 11.5–14.5)
EST. GFR  (AFRICAN AMERICAN): >60 ML/MIN/1.73 M^2
EST. GFR  (NON AFRICAN AMERICAN): >60 ML/MIN/1.73 M^2
GLUCOSE SERPL-MCNC: 118 MG/DL (ref 70–110)
GLUCOSE UR QL STRIP: NEGATIVE
HCT VFR BLD AUTO: 40.6 % (ref 40–54)
HGB BLD-MCNC: 13.2 G/DL (ref 14–18)
HGB UR QL STRIP: NEGATIVE
IMM GRANULOCYTES # BLD AUTO: 0.01 K/UL (ref 0–0.04)
IMM GRANULOCYTES NFR BLD AUTO: 0.1 % (ref 0–0.5)
KETONES UR QL STRIP: NEGATIVE
LEUKOCYTE ESTERASE UR QL STRIP: NEGATIVE
LIPASE SERPL-CCNC: 42 U/L (ref 4–60)
LYMPHOCYTES # BLD AUTO: 1.4 K/UL (ref 1–4.8)
LYMPHOCYTES NFR BLD: 19.3 % (ref 18–48)
MCH RBC QN AUTO: 30.4 PG (ref 27–31)
MCHC RBC AUTO-ENTMCNC: 32.5 G/DL (ref 32–36)
MCV RBC AUTO: 94 FL (ref 82–98)
MONOCYTES # BLD AUTO: 0.4 K/UL (ref 0.3–1)
MONOCYTES NFR BLD: 6 % (ref 4–15)
NEUTROPHILS # BLD AUTO: 5.4 K/UL (ref 1.8–7.7)
NEUTROPHILS NFR BLD: 72.6 % (ref 38–73)
NITRITE UR QL STRIP: NEGATIVE
NRBC BLD-RTO: 0 /100 WBC
PH UR STRIP: 5 [PH] (ref 5–8)
PLATELET # BLD AUTO: 277 K/UL (ref 150–350)
PMV BLD AUTO: 10.8 FL (ref 9.2–12.9)
POCT GLUCOSE: 116 MG/DL (ref 70–110)
POCT GLUCOSE: 120 MG/DL (ref 70–110)
POCT GLUCOSE: 171 MG/DL (ref 70–110)
POTASSIUM SERPL-SCNC: 4.8 MMOL/L (ref 3.5–5.1)
PROT SERPL-MCNC: 7.1 G/DL (ref 6–8.4)
PROT UR QL STRIP: NEGATIVE
RBC # BLD AUTO: 4.34 M/UL (ref 4.6–6.2)
SARS-COV-2 RDRP RESP QL NAA+PROBE: NEGATIVE
SODIUM SERPL-SCNC: 139 MMOL/L (ref 136–145)
SP GR UR STRIP: 1.02 (ref 1–1.03)
TSH SERPL DL<=0.005 MIU/L-ACNC: 2.4 UIU/ML (ref 0.4–4)
URN SPEC COLLECT METH UR: NORMAL
UROBILINOGEN UR STRIP-ACNC: NEGATIVE EU/DL
WBC # BLD AUTO: 7.37 K/UL (ref 3.9–12.7)

## 2020-11-19 PROCEDURE — 93005 ELECTROCARDIOGRAM TRACING: CPT

## 2020-11-19 PROCEDURE — 84443 ASSAY THYROID STIM HORMONE: CPT

## 2020-11-19 PROCEDURE — G0378 HOSPITAL OBSERVATION PER HR: HCPCS | Mod: CS

## 2020-11-19 PROCEDURE — 83690 ASSAY OF LIPASE: CPT

## 2020-11-19 PROCEDURE — G0378 HOSPITAL OBSERVATION PER HR: HCPCS

## 2020-11-19 PROCEDURE — 93010 EKG 12-LEAD: ICD-10-PCS | Mod: ,,, | Performed by: SPECIALIST

## 2020-11-19 PROCEDURE — 85025 COMPLETE CBC W/AUTO DIFF WBC: CPT

## 2020-11-19 PROCEDURE — 93010 ELECTROCARDIOGRAM REPORT: CPT | Mod: ,,, | Performed by: SPECIALIST

## 2020-11-19 PROCEDURE — U0002 COVID-19 LAB TEST NON-CDC: HCPCS

## 2020-11-19 PROCEDURE — 82962 GLUCOSE BLOOD TEST: CPT

## 2020-11-19 PROCEDURE — 81003 URINALYSIS AUTO W/O SCOPE: CPT

## 2020-11-19 PROCEDURE — 99285 EMERGENCY DEPT VISIT HI MDM: CPT | Mod: 25

## 2020-11-19 PROCEDURE — 80053 COMPREHEN METABOLIC PANEL: CPT

## 2020-11-19 PROCEDURE — 36415 COLL VENOUS BLD VENIPUNCTURE: CPT

## 2020-11-19 RX ORDER — INSULIN ASPART 100 [IU]/ML
0-5 INJECTION, SOLUTION INTRAVENOUS; SUBCUTANEOUS
Status: DISCONTINUED | OUTPATIENT
Start: 2020-11-19 | End: 2020-11-20 | Stop reason: HOSPADM

## 2020-11-19 RX ORDER — IBUPROFEN 200 MG
24 TABLET ORAL
Status: DISCONTINUED | OUTPATIENT
Start: 2020-11-19 | End: 2020-11-20 | Stop reason: HOSPADM

## 2020-11-19 RX ORDER — PROCHLORPERAZINE MALEATE 10 MG
10 TABLET ORAL EVERY 6 HOURS PRN
Status: DISCONTINUED | OUTPATIENT
Start: 2020-11-19 | End: 2020-11-20 | Stop reason: HOSPADM

## 2020-11-19 RX ORDER — LANOLIN ALCOHOL/MO/W.PET/CERES
800 CREAM (GRAM) TOPICAL
Status: DISCONTINUED | OUTPATIENT
Start: 2020-11-19 | End: 2020-11-20 | Stop reason: HOSPADM

## 2020-11-19 RX ORDER — GLUCAGON 1 MG
1 KIT INJECTION
Status: DISCONTINUED | OUTPATIENT
Start: 2020-11-19 | End: 2020-11-20 | Stop reason: HOSPADM

## 2020-11-19 RX ORDER — LOSARTAN POTASSIUM 25 MG/1
100 TABLET ORAL DAILY
Status: DISCONTINUED | OUTPATIENT
Start: 2020-11-20 | End: 2020-11-20 | Stop reason: HOSPADM

## 2020-11-19 RX ORDER — POTASSIUM CHLORIDE 20 MEQ/1
20 TABLET, EXTENDED RELEASE ORAL DAILY
Status: DISCONTINUED | OUTPATIENT
Start: 2020-11-20 | End: 2020-11-20 | Stop reason: HOSPADM

## 2020-11-19 RX ORDER — SODIUM CHLORIDE 0.9 % (FLUSH) 0.9 %
10 SYRINGE (ML) INJECTION
Status: DISCONTINUED | OUTPATIENT
Start: 2020-11-19 | End: 2020-11-20 | Stop reason: HOSPADM

## 2020-11-19 RX ORDER — GLUCOSAM/CHONDRO/HERB 149/HYAL 750-100 MG
2 TABLET ORAL DAILY
Status: DISCONTINUED | OUTPATIENT
Start: 2020-11-20 | End: 2020-11-20 | Stop reason: HOSPADM

## 2020-11-19 RX ORDER — FUROSEMIDE 20 MG/1
20 TABLET ORAL DAILY
Status: DISCONTINUED | OUTPATIENT
Start: 2020-11-20 | End: 2020-11-20 | Stop reason: HOSPADM

## 2020-11-19 RX ORDER — ONDANSETRON 4 MG/1
8 TABLET, ORALLY DISINTEGRATING ORAL EVERY 8 HOURS PRN
Status: DISCONTINUED | OUTPATIENT
Start: 2020-11-19 | End: 2020-11-20 | Stop reason: HOSPADM

## 2020-11-19 RX ORDER — IBUPROFEN 200 MG
16 TABLET ORAL
Status: DISCONTINUED | OUTPATIENT
Start: 2020-11-19 | End: 2020-11-20 | Stop reason: HOSPADM

## 2020-11-19 RX ORDER — ASPIRIN 81 MG/1
81 TABLET ORAL DAILY
Status: DISCONTINUED | OUTPATIENT
Start: 2020-11-20 | End: 2020-11-20 | Stop reason: HOSPADM

## 2020-11-19 RX ORDER — ATORVASTATIN CALCIUM 20 MG/1
20 TABLET, FILM COATED ORAL DAILY
Status: DISCONTINUED | OUTPATIENT
Start: 2020-11-20 | End: 2020-11-20 | Stop reason: HOSPADM

## 2020-11-19 NOTE — CONSULTS
[facility]  Neurology  Consult Note    Patient Name: Carlos Tenorio Jr.  MRN: [id number]  Admission Date: 11/19/2020  Hospital Length of Stay: 0 days  Code Status: Full Code   Attending Provider: Cortez Welsh MD   Consulting Provider: Falguni Beard NP  Primary Care Physician: Roney Bradshaw MD  Principal Problem:<principal problem not specified>    Consults  Subjective:     Chief Complaint:    Altered Mental Status        Pt states he woke up this morning and could not remember where he worked      Time seen by provider: 7:38 AM on 11/19/2020     Carlos Tenorio Jr. is a 70 y.o. male with a PMHx of HTN, CAD, and DM who presents to the ED with an onset of AMS. The wife reports the patient woke up this morning confused at all present situations. The patient woke up this morning unsure of what he was suppose to do today. He was suppose to be at work like normal, but was unable to remember where work was or how to get there. The patient did not remember having a job, his bosses name, or that he went to work yesterday. She also reports the patient did not recognize their addition to their home in which they have been working on for 6 months. She denies history of similar symptoms, however, she admits over the last 6 months she has noticed he has had trouble with directions. Presently, the patient is back to his normal mental status, and remembers everything. He does endorse left leg tingling, that has resolved. The patient denies weakness, cough, fever, SOB, diarrhea, vomiting or any other symptoms at this time. PSHx of CABG.     The history is provided by the patient and the spouse.     Neurological Consult: Pt seen and examined. POC discussed with Dr. Altman. Pt alert and oriented to all at this time. He reports this morning started off normal and then all of a sudden he did not know where he worked, how to get there, or his mother-in-law addition. He did recognize his wife. Pt denies  recent head injuries, fevers, episodes of AMS. He does endorse a poor memory. He reports he is at baseline now. Pt denies slurred speech, headaches, weakness, changes in vision. He denies h/o stroke or seizure.     Past Medical History:   Diagnosis Date    Arthritis     knees, back    Bilateral renal cysts 7/30/2012    CAD (coronary artery disease) 1995    no chest pain since CABG, sees Dr Larry Black    Diabetes mellitus     borderline    Hypertension     Kidney stones     uric acid stones    VASYL (obstructive sleep apnea) 2007    is compliant with CPAP    Primary gonadal failure        Past Surgical History:   Procedure Laterality Date    BACK SURGERY  2010    L5-6 fusion, with pins,bone graft    CARDIAC SURGERY  1995, 2007    total 7 vessel bypass    COLONOSCOPY  2008    repeat 2013    COLONOSCOPY N/A 4/4/2017    Procedure: COLONOSCOPY;  Surgeon: Dmitry Sampson MD;  Location: [facility];  Service: Endoscopy;  Laterality: N/A;    CORONARY ARTERY BYPASS GRAFT  1995, 2007    cabgx3, cabgx5    EXTRACORPOREAL SHOCK WAVE LITHOTRIPSY      EYE SURGERY      cataracts bilateral    LITHOTRIPSY      LUMBAR LAMINECTOMY      Partial Nephrectomy Laproscopic      TRANSRECTAL BIOPSY OF PROSTATE WITH ULTRASOUND GUIDANCE N/A 3/19/2019    Procedure: BIOPSY, PROSTATE, RECTAL APPROACH, WITH US GUIDANCE;  Surgeon: Yovany Heart MD;  Location: [facility] OR;  Service: Urology;  Laterality: N/A;    TRANSRECTAL BIOPSY OF PROSTATE WITH ULTRASOUND GUIDANCE N/A 10/20/2020    Procedure: BIOPSY, PROSTATE, RECTAL APPROACH, WITH US GUIDANCE;  Surgeon: Yovany Heart MD;  Location: [facility] OR;  Service: Urology;  Laterality: N/A;       Review of patient's allergies indicates:   Allergen Reactions    Adhesive Blisters       Current Neurological Medications:     Current Facility-Administered Medications on File Prior to Encounter   Medication    triamcinolone acetonide injection 40 mg    triamcinolone acetonide injection  40 mg     Current Outpatient Medications on File Prior to Encounter   Medication Sig    allopurinol (ZYLOPRIM) 100 MG tablet Take 1 tablet (100 mg total) by mouth once daily.    aspirin (ECOTRIN) 81 MG EC tablet Take 81 mg by mouth once daily.    atorvastatin (LIPITOR) 20 MG tablet Take 1 tablet (20 mg total) by mouth once daily.    cyanocobalamin, vitamin B-12, (VITAMIN B-12 ORAL) Take by mouth.    ferrous sulfate (IRON ORAL) Take by mouth.    fish oil-omega-3 fatty acids 300-1,000 mg capsule Take 2 g by mouth 3 (three) times daily.    losartan (COZAAR) 100 MG tablet Take 1 tablet (100 mg total) by mouth once daily.    metFORMIN (GLUCOPHAGE-XR) 500 MG 24 hr tablet Take 1 tablet (500 mg total) by mouth 2 (two) times daily with meals.    potassium chloride SA (K-DUR,KLOR-CON) 20 MEQ tablet Take 1 tablet (20 mEq total) by mouth once daily.    vitamin D 1000 units Tab Take 185 mg by mouth once daily.    furosemide (LASIX) 20 MG tablet Take 1 tablet (20 mg total) by mouth once daily. (Patient taking differently: Take 20 mg by mouth daily as needed. )    sildenafil (VIAGRA) 100 MG tablet Take 1 tablet (100 mg total) by mouth as needed for Erectile Dysfunction.    [DISCONTINUED] allopurinol (ZYLOPRIM) 300 MG tablet Take 1 tablet (300 mg total) by mouth once daily. Add a 100 mg tab .Total 400 mg a day.    [DISCONTINUED] ciprofloxacin HCl (CIPRO) 500 MG tablet Take 1 tablet (500 mg total) by mouth 2 (two) times daily. (Patient not taking: Reported on 2020)    [DISCONTINUED] hydrocortisone 2.5 % cream Thin film to AA onface daily PRN flare; use short term only      Family History     Problem Relation (Age of Onset)    Diabetes Mother, Sister    Heart disease Mother, Father, Paternal Uncle    Hypertension Mother, Father    Urolithiasis Sister        Tobacco Use    Smoking status: Former Smoker     Quit date: 1976     Years since quittin.8    Smokeless tobacco: Never Used   Substance and Sexual  Activity    Alcohol use: Yes     Frequency: Monthly or less     Drinks per session: Patient refused     Binge frequency: Never     Comment: Socially    Drug use: No    Sexual activity: Yes     Review of Systems   Psychiatric/Behavioral: Positive for confusion.   All other systems reviewed and are negative.    Objective:     Vital Signs (Most Recent):  Temp: 98.4 °F (36.9 °C) (11/19/20 1553)  Pulse: (!) 55 (11/19/20 1553)  Resp: 18 (11/19/20 1553)  BP: (!) 147/71 (11/19/20 1553)  SpO2: 98 % (11/19/20 1553) Vital Signs (24h Range):  Temp:  [98.2 °F (36.8 °C)-98.4 °F (36.9 °C)] 98.4 °F (36.9 °C)  Pulse:  [48-64] 55  Resp:  [18] 18  SpO2:  [95 %-99 %] 98 %  BP: (145-178)/(65-94) 147/71     Weight: 107.5 kg (236 lb 15.9 oz)  Body mass index is 34.01 kg/m².    Physical Exam  Constitutional:       Appearance: Normal appearance.   HENT:      Nose: Nose normal.   Eyes:      Pupils: Pupils are equal, round, and reactive to light.   Neck:      Musculoskeletal: Normal range of motion.   Cardiovascular:      Rate and Rhythm: Normal rate.   Pulmonary:      Effort: Pulmonary effort is normal.   Musculoskeletal: Normal range of motion.   Skin:     General: Skin is warm.      Capillary Refill: Capillary refill takes 2 to 3 seconds.   Neurological:      General: No focal deficit present.      Mental Status: He is oriented to person, place, and time.      Coordination: Finger-Nose-Finger Test normal.      Deep Tendon Reflexes: Strength normal.   Psychiatric:         Mood and Affect: Mood normal.         Speech: Speech normal.         Behavior: Behavior normal.         NEUROLOGICAL EXAMINATION:     MENTAL STATUS   Oriented to person, place, and time.   Follows 1 step commands.   Speech: speech is normal   Level of consciousness: alert  Able to name object. Able to repeat.     CRANIAL NERVES   Cranial nerves II through XII intact.     CN III, IV, VI   Pupils are equal, round, and reactive to light.    MOTOR EXAM   Muscle bulk:  normal    Strength   Strength 5/5 throughout.     SENSORY EXAM   Light touch normal.     GAIT AND COORDINATION      Coordination   Finger to nose coordination: normal    Tremor   Resting tremor: absent       jacinto     NIH Stroke Scale:    Level of Consciousness: 0 - alert  LOC Questions: 0 - answers both correctly  LOC Commands: 0 - performs both correctly  Best Gaze: 0 - normal  Visual: 0 - no visual loss  Facial Palsy: 0 - normal  Motor Left Arm: 0 - no drift  Motor Right Arm: 0 - no drift  Motor Left Le - no drift  Motor Right Le - no drift  Limb Ataxia: 0 - absent  Sensory: 0 - normal  Best Language: 0 - no aphasia  Dysarthria: 0 - normal articulation  Extinction and Inattention: 0 - no neglect  NIH Stroke Scale Total: 0          Significant Labs:   Lab Results   Component Value Date    WBC 7.37 2020    HGB 13.2 (L) 2020    HCT 40.6 2020    MCV 94 2020     2020       CMP  Sodium   Date Value Ref Range Status   2020 139 136 - 145 mmol/L Final     Potassium   Date Value Ref Range Status   2020 4.8 3.5 - 5.1 mmol/L Final     Chloride   Date Value Ref Range Status   2020 106 95 - 110 mmol/L Final     CO2   Date Value Ref Range Status   2020 20 (L) 23 - 29 mmol/L Final     Glucose   Date Value Ref Range Status   2020 118 (H) 70 - 110 mg/dL Final     BUN   Date Value Ref Range Status   2020 21 8 - 23 mg/dL Final     Creatinine   Date Value Ref Range Status   2020 1.0 0.5 - 1.4 mg/dL Final     Calcium   Date Value Ref Range Status   2020 9.4 8.7 - 10.5 mg/dL Final     Total Protein   Date Value Ref Range Status   2020 7.1 6.0 - 8.4 g/dL Final     Albumin   Date Value Ref Range Status   2020 3.8 3.5 - 5.2 g/dL Final   2017 4.2 3.6 - 5.1 g/dL Final     Comment:     @ Test Performed By:  [facility] [address] M[address]   [address] [place]  [address]  [facility]  [id number]       Total Bilirubin   Date Value Ref Range Status   11/19/2020 0.5 0.1 - 1.0 mg/dL Final     Comment:     For infants and newborns, interpretation of results should be based  on gestational age, weight and in agreement with clinical  observations.  Premature Infant recommended reference ranges:  Up to 24 hours.............<8.0 mg/dL  Up to 48 hours............<12.0 mg/dL  3-5 days..................<15.0 mg/dL  6-29 days.................<15.0 mg/dL       Alkaline Phosphatase   Date Value Ref Range Status   11/19/2020 64 55 - 135 U/L Final     AST   Date Value Ref Range Status   11/19/2020 22 10 - 40 U/L Final     ALT   Date Value Ref Range Status   11/19/2020 15 10 - 44 U/L Final     Anion Gap   Date Value Ref Range Status   11/19/2020 13 8 - 16 mmol/L Final     eGFR if    Date Value Ref Range Status   11/19/2020 >60 >60 mL/min/1.73 m^2 Final     eGFR if non    Date Value Ref Range Status   11/19/2020 >60 >60 mL/min/1.73 m^2 Final     Comment:     Calculation used to obtain the estimated glomerular filtration  rate (eGFR) is the CKD-EPI equation.              Significant Imaging: CT Head Without Contrast  Narrative: EXAMINATION:  CT HEAD WITHOUT CONTRAST    CLINICAL HISTORY:  Altered mental status;    TECHNIQUE:  Low dose axial CT images obtained throughout the head without intravenous contrast. Sagittal and coronal reconstructions were performed.    COMPARISON:  None.    FINDINGS:  Intracranial compartment:    Ventricles and sulci are normal in size for age without evidence of hydrocephalus. No extra-axial blood or fluid collections.    The brain parenchyma appears normal. No parenchymal mass, hemorrhage, edema or major vascular distribution infarct.    Skull/extracranial contents (limited evaluation): No fracture. There is mild mucous thickening of bilateral maxillary sinuses.  Impression: No acute abnormality.    Electronically signed by: Gabby  MD Samanta  Date:    11/19/2020  Time:    09:53        Assessment and Plan:    Acute Cognitive Dysfunction-Resolved  TIA vs Seziure vs TGA  -CT head:  Negative acute  -MRI brain: ordered  -EEG ordered    PLAN: Pt at baseline. Possible TGA, however must rule out TIA an d seizure.   Work up in progress. Will follow up.       [place] [facility] [place] [phone] within 3 days from discharge.     Stroke education was provided including stroke risk factors modification and any acute neurological changes including weakness, confusion, visual changes to come straight to the ER.     All questions were answered.                                Active Diagnoses:    Diagnosis Date Noted POA    Encephalopathy [G93.40] 11/19/2020 Yes      Problems Resolved During this Admission:       VTE Risk Mitigation (From admission, onward)         Ordered     IP VTE LOW RISK PATIENT  Once      11/19/20 0783                Thank you for your consult. I will follow-up with patient. Please contact us if you have any additional questions.    Falguni Beard NP  Neurology  [facility]

## 2020-11-19 NOTE — PLAN OF CARE
I completed the assessment with the pt and his wife at bedside, Esthela Rodriguez,es [phone]. The pt denies previous admits and arrived from home . He denies HH and has a home cpap.He uses [facility] on [address] [place]. He reports independence in ADL's including driving and does use a CPAP at night. Verified PCP as Dr. Bradshaw and insurance as BCBS and Medicare . Observation status explained and signature obtained on díaz form. The pt does not have any current discharge needs. Jada Bazzi LCSW     11/19/20 3821   Discharge Assessment   Assessment Type Discharge Planning Assessment   Confirmed/corrected address and phone number on facesheet? Yes   Assessment information obtained from? Patient;Caregiver   Communicated expected length of stay with patient/caregiver no   Prior to hospitilization cognitive status: Alert/Oriented   Prior to hospitalization functional status: Independent   Current cognitive status: Alert/Oriented   Lives With spouse   Able to Return to Prior Arrangements yes   Is patient able to care for self after discharge? Yes   Readmission Within the Last 30 Days no previous admission in last 30 days   Patient currently being followed by outpatient case management? No   Patient currently receives any other outside agency services? No   Equipment Currently Used at Home CPAP   Do you have any problems affording any of your prescribed medications? No   Is the patient taking medications as prescribed? yes   Does the patient have transportation home? Yes   Transportation Anticipated family or friend will provide   Does the patient receive services at the [facility]? No   Discharge Plan A Home with family   Discharge Plan B Home   DME Needed Upon Discharge  none   Patient/Family in Agreement with Plan yes

## 2020-11-19 NOTE — TELEPHONE ENCOUNTER
Woke up and did not know where he is suppose to go. Pt is confused to all present situations. Pt has never been like this before. Does not remember having a job, or his boss name. Does not remember going to work yesterday. Does not remember an addition being placed on the home that has been in progress for the past 6 months. Advised to call #[phone] EMS. Pt's wife refused and states she will take him to the ED. Again advised EMS.     Reason for Disposition   [1] Difficult to awaken or acting confused (e.g., disoriented, slurred speech) AND [2] present now AND [3] new onset    Protocols used: CONFUSION - DELIRIUM-A-AH

## 2020-11-19 NOTE — ED PROVIDER NOTES
Encounter Date: 11/19/2020    SCRIBE #1 NOTE: I, Daysi Fuentes, am scribing for, and in the presence of, Bright Ferguson MD.       History     Chief Complaint   Patient presents with    Altered Mental Status     Pt states he woke up this morning and could not remember where he worked     Time seen by provider: 7:38 AM on 11/19/2020    Carlos Tenorio Jr. is a 70 y.o. male with a PMHx of HTN, CAD, and DM who presents to the ED with an onset of AMS. The wife reports the patient woke up this morning confused at all present situations. The patient woke up this morning unsure of what he was suppose to do today. He was suppose to be at work like normal, but was unable to remember where work was or how to get there. The patient did not remember having a job, his bosses name, or that he went to work yesterday. She also reports the patient did not recognize their addition to their home in which they have been working on for 6 months. She denies history of similar symptoms, however, she admits over the last 6 months she has noticed he has had trouble with directions. Presently, the patient is back to his normal mental status, and remembers everything. He does endorse left leg tingling, that has resolved. The patient denies weakness, cough, fever, SOB, diarrhea, vomiting or any other symptoms at this time. PSHx of CABG.    The history is provided by the patient and the spouse.     Review of patient's allergies indicates:   Allergen Reactions    Adhesive Blisters     Past Medical History:   Diagnosis Date    Arthritis     knees, back    Bilateral renal cysts 7/30/2012    CAD (coronary artery disease) 1995    no chest pain since CABG, sees Dr Larry Black    Diabetes mellitus     borderline    Hypertension     Kidney stones     uric acid stones    VASYL (obstructive sleep apnea) 2007    is compliant with CPAP    Primary gonadal failure      Past Surgical History:   Procedure Laterality Date    BACK SURGERY  2010     L5-6 fusion, with pins,bone graft    CARDIAC SURGERY  2007    total 7 vessel bypass    COLONOSCOPY      repeat     COLONOSCOPY N/A 2017    Procedure: COLONOSCOPY;  Surgeon: Dmitry Sampson MD;  Location: [facility];  Service: Endoscopy;  Laterality: N/A;    CORONARY ARTERY BYPASS GRAFT  ,     cabgx3, cabgx5    EXTRACORPOREAL SHOCK WAVE LITHOTRIPSY      EYE SURGERY      cataracts bilateral    LITHOTRIPSY      LUMBAR LAMINECTOMY      Partial Nephrectomy Laproscopic      TRANSRECTAL BIOPSY OF PROSTATE WITH ULTRASOUND GUIDANCE N/A 3/19/2019    Procedure: BIOPSY, PROSTATE, RECTAL APPROACH, WITH US GUIDANCE;  Surgeon: Yovany Heart MD;  Location: [facility] OR;  Service: Urology;  Laterality: N/A;    TRANSRECTAL BIOPSY OF PROSTATE WITH ULTRASOUND GUIDANCE N/A 10/20/2020    Procedure: BIOPSY, PROSTATE, RECTAL APPROACH, WITH US GUIDANCE;  Surgeon: Yovany Heart MD;  Location: [facility] OR;  Service: Urology;  Laterality: N/A;     Family History   Problem Relation Age of Onset    Heart disease Mother     Hypertension Mother     Diabetes Mother     Heart disease Father         premature    Hypertension Father     Diabetes Sister     Urolithiasis Sister     Heart disease Paternal Uncle     Cancer Neg Hx     Prostate cancer Neg Hx     Kidney cancer Neg Hx     Melanoma Neg Hx     Lupus Neg Hx     Eczema Neg Hx     Psoriasis Neg Hx      Social History     Tobacco Use    Smoking status: Former Smoker     Quit date: 1976     Years since quittin.8    Smokeless tobacco: Never Used   Substance Use Topics    Alcohol use: Yes     Frequency: Monthly or less     Drinks per session: Patient refused     Binge frequency: Never     Comment: Socially    Drug use: No     Review of Systems   Constitutional: Negative for activity change, diaphoresis and fever.   HENT: Negative for drooling, rhinorrhea, sore throat and trouble swallowing.    Eyes: Negative for pain and visual  disturbance.   Respiratory: Negative for cough, shortness of breath and stridor.    Cardiovascular: Negative for chest pain and leg swelling.   Gastrointestinal: Negative for abdominal distention, abdominal pain, constipation and vomiting.   Genitourinary: Negative for discharge, dysuria and hematuria.   Musculoskeletal: Negative for gait problem.   Skin: Negative for rash.   Neurological: Positive for numbness (Resolved.). Negative for seizures, facial asymmetry and headaches.   Psychiatric/Behavioral: Positive for confusion. Negative for hallucinations and suicidal ideas.       Physical Exam     Initial Vitals [11/19/20 0736]   BP Pulse Resp Temp SpO2   (!) 178/94 (!) 57 18 98.2 °F (36.8 °C) 97 %      MAP       --         Physical Exam    Nursing note and vitals reviewed.  Constitutional: He appears well-developed. No distress.   HENT:   Head: Normocephalic and atraumatic.   Nose: Nose normal.   Eyes: EOM are normal.   Neck: Neck supple. No tracheal deviation present. No JVD present.   Cardiovascular: Normal rate, regular rhythm, normal heart sounds and intact distal pulses. Exam reveals no gallop and no friction rub.    No murmur heard.  Pulmonary/Chest: Breath sounds normal. No respiratory distress. He has no wheezes. He has no rhonchi. He has no rales.   Abdominal: Soft. Bowel sounds are normal. There is no abdominal tenderness.   Musculoskeletal: Normal range of motion.   Neurological: He is alert and oriented to person, place, and time. He has normal strength. He is not disoriented. No cranial nerve deficit or sensory deficit. Gait normal.   Cranial nerves II-XII intact. Finger-to-nose normal. Tone normal. Sensation intact to light touch. No drift. No disdiadochokinesia. 5/5 BUE and BLE strength. Normal gait. Negative Romberg. Speech and cognition is normal. No focal neurologic deficit. Alert to self, place, and date, but confused. Unable to tell me who the president is.   Skin: Skin is warm and dry.  Capillary refill takes less than 2 seconds. No rash noted.   Psychiatric: He has a normal mood and affect.         ED Course   Procedures  Labs Reviewed   CBC W/ AUTO DIFFERENTIAL - Abnormal; Notable for the following components:       Result Value    RBC 4.34 (*)     Hemoglobin 13.2 (*)     All other components within normal limits    Narrative:     Collection has been rescheduled by DMB2 at 11/19/2020 08:01 Reason:   Unable to collect   COMPREHENSIVE METABOLIC PANEL - Abnormal; Notable for the following components:    CO2 20 (*)     Glucose 118 (*)     All other components within normal limits    Narrative:     Collection has been rescheduled by DMB2 at 11/19/2020 08:01 Reason:   Unable to collect   POCT GLUCOSE - Abnormal; Notable for the following components:    POCT Glucose 116 (*)     All other components within normal limits   LIPASE    Narrative:     Collection has been rescheduled by DMB2 at 11/19/2020 08:01 Reason:   Unable to collect   URINALYSIS, REFLEX TO URINE CULTURE    Narrative:     Specimen Source->Urine   TSH   SARS-COV-2 RNA AMPLIFICATION, QUAL   POCT GLUCOSE MONITORING CONTINUOUS     EKG Readings: (Independently Interpreted)   Initial Reading: No STEMI. Rhythm: Sinus Bradycardia. Heart Rate: 49 bpm. Clinical Impression: with PVCs   PVCs or fusion complexes. Normal intervals otherwise.     ECG Results          EKG 12-lead (In process)  Result time 11/19/20 09:16:34    In process by Interface, Lab In [facility] (11/19/20 09:16:34)                 Narrative:    Test Reason : R00.1,    Vent. Rate : 049 BPM     Atrial Rate : 049 BPM     P-R Int : 198 ms          QRS Dur : 082 ms      QT Int : 466 ms       P-R-T Axes : 066 045 031 degrees     QTc Int : 420 ms    Sinus bradycardia with Premature ventricular complexes or Fusion complexes  Otherwise normal ECG  When compared with ECG of 01-MAY-2017 13:52,  Fusion complexes are now Present  Premature ventricular complexes are now Present  Vent. rate has  decreased BY  40 BPM    Referred By: [email or web address]   SELF           Confirmed By:                             Imaging Results          CT Head Without Contrast (Final result)  Result time 11/19/20 09:53:14    Final result by Gabby England MD (11/19/20 09:53:14)                 Impression:      No acute abnormality.      Electronically signed by: Gabby England MD  Date:    11/19/2020  Time:    09:53             Narrative:    EXAMINATION:  CT HEAD WITHOUT CONTRAST    CLINICAL HISTORY:  Altered mental status;    TECHNIQUE:  Low dose axial CT images obtained throughout the head without intravenous contrast. Sagittal and coronal reconstructions were performed.    COMPARISON:  None.    FINDINGS:  Intracranial compartment:    Ventricles and sulci are normal in size for age without evidence of hydrocephalus. No extra-axial blood or fluid collections.    The brain parenchyma appears normal. No parenchymal mass, hemorrhage, edema or major vascular distribution infarct.    Skull/extracranial contents (limited evaluation): No fracture. There is mild mucous thickening of bilateral maxillary sinuses.                                 Medical Decision Making:   History:   Old Medical Records: I decided to obtain old medical records.  Independently Interpreted Test(s):   I have ordered and independently interpreted EKG Reading(s) - see prior notes  Clinical Tests:   Lab Tests: Ordered and Reviewed  Radiological Study: Ordered and Reviewed  Medical Tests: Ordered and Reviewed  ED Management:  Labs and imaging in the ED unremarkable.  Unclear etiology of encephalopathy.  Will admit to Medicine for further workup management.            Scribe Attestation:   Scribe #1: I performed the above scribed service and the documentation accurately describes the services I performed. I attest to the accuracy of the note.      Attending Attestation:     Physician Attestation for Scribe:    I, Dr. Bright Ferguson, personally performed the services  described in this documentation.   All medical record entries made by the scribe were at my direction and in my presence.   I have reviewed the chart and agree that the record is accurate and complete.   Bright Ferguson MD  6:36 AM 11/20/2020     DISCLAIMER: This note was prepared with [employer] Naturally Speaking voice recognition transcription software. Garbled syntax, mangled pronouns, and other bizarre constructions may be attributed to that software system.          ED Course as of Nov 20 0637   Thu Nov 19, 2020   0742 70 y.o. male with history hypogonadism, kidney stones, prostate cancer, RCC, spermatocele, and ED pw worsening confusion.     [BD]   1011 Impression:     No acute abnormality.        Electronically signed by: Gabby England MD  Date:                                            11/19/2020  Time:                                           09:53    [BD]      ED Course User Index  [BD] Bright Ferguson MD            Clinical Impression:     ICD-10-CM ICD-9-CM   1. Encephalopathy  G93.40 348.30   2. Bradycardia  R00.1 427.89                      Disposition:   Disposition: Placed in Observation     ED Disposition Condition    Observation                             Bright Ferguson MD  11/20/20 0637

## 2020-11-19 NOTE — ED NOTES
Pt presents to the ED with complaints of being confused this morning. Wife reports the patient woke up this morning and couldn't remember where he worked or what he was supposed to do this morning. Advised by nurse triage to present to the ED. Upon arrival to the ED the patient is AAO x 4. Per wife, over the past 6 months she has noticed the patient has experienced difficulty with direction, which were not a problem before. Denies HA, speech difficulty or unilateral weakness.

## 2020-11-19 NOTE — PLAN OF CARE
ADMITTED TO ROOM 216. WIFE AT BEDSIDE. FALL PREVENTION REINFORCED. BED ALARM SET. ABLE, ORIENTED TO PERSON AND PLACE NOT TIME.

## 2020-11-19 NOTE — PLAN OF CARE
11/19/20 1459   LOPEZ Message   Medicare Outpatient and Observation Notification regarding financial responsibility Given to patient/caregiver;Explained to patient/caregiver;Signed/date by patient/caregiver   Date LOPEZ was signed 11/19/20   Time LOPEZ was signed 1457

## 2020-11-20 VITALS
TEMPERATURE: 98 F | HEART RATE: 52 BPM | BODY MASS INDEX: 33.93 KG/M2 | DIASTOLIC BLOOD PRESSURE: 69 MMHG | HEIGHT: 70 IN | OXYGEN SATURATION: 97 % | SYSTOLIC BLOOD PRESSURE: 154 MMHG | RESPIRATION RATE: 17 BRPM | WEIGHT: 237 LBS

## 2020-11-20 PROBLEM — F05 ACUTE CONFUSIONAL STATE: Status: ACTIVE | Noted: 2020-11-19

## 2020-11-20 PROBLEM — F05 ACUTE CONFUSIONAL STATE: Status: RESOLVED | Noted: 2020-11-19 | Resolved: 2020-11-20

## 2020-11-20 LAB
ANION GAP SERPL CALC-SCNC: 11 MMOL/L (ref 8–16)
BUN SERPL-MCNC: 19 MG/DL (ref 8–23)
CALCIUM SERPL-MCNC: 9.1 MG/DL (ref 8.7–10.5)
CHLORIDE SERPL-SCNC: 102 MMOL/L (ref 95–110)
CO2 SERPL-SCNC: 25 MMOL/L (ref 23–29)
CREAT SERPL-MCNC: 0.9 MG/DL (ref 0.5–1.4)
EST. GFR  (AFRICAN AMERICAN): >60 ML/MIN/1.73 M^2
EST. GFR  (NON AFRICAN AMERICAN): >60 ML/MIN/1.73 M^2
GLUCOSE SERPL-MCNC: 113 MG/DL (ref 70–110)
MAGNESIUM SERPL-MCNC: 1.9 MG/DL (ref 1.6–2.6)
POCT GLUCOSE: 130 MG/DL (ref 70–110)
POCT GLUCOSE: 137 MG/DL (ref 70–110)
POTASSIUM SERPL-SCNC: 4.5 MMOL/L (ref 3.5–5.1)
SODIUM SERPL-SCNC: 138 MMOL/L (ref 136–145)

## 2020-11-20 PROCEDURE — G0378 HOSPITAL OBSERVATION PER HR: HCPCS

## 2020-11-20 PROCEDURE — 83735 ASSAY OF MAGNESIUM: CPT

## 2020-11-20 PROCEDURE — 95819 EEG AWAKE AND ASLEEP: CPT | Mod: 26,,, | Performed by: PSYCHIATRY & NEUROLOGY

## 2020-11-20 PROCEDURE — 80048 BASIC METABOLIC PNL TOTAL CA: CPT

## 2020-11-20 PROCEDURE — 95819 PR EEG,W/AWAKE & ASLEEP RECORD: ICD-10-PCS | Mod: 26,,, | Performed by: PSYCHIATRY & NEUROLOGY

## 2020-11-20 PROCEDURE — 25000003 PHARM REV CODE 250: Performed by: HOSPITALIST

## 2020-11-20 PROCEDURE — 36415 COLL VENOUS BLD VENIPUNCTURE: CPT

## 2020-11-20 PROCEDURE — 95819 EEG AWAKE AND ASLEEP: CPT

## 2020-11-20 RX ADMIN — ATORVASTATIN CALCIUM 20 MG: 20 TABLET, FILM COATED ORAL at 07:11

## 2020-11-20 RX ADMIN — LOSARTAN POTASSIUM 100 MG: 25 TABLET, FILM COATED ORAL at 07:11

## 2020-11-20 RX ADMIN — POTASSIUM CHLORIDE 20 MEQ: 1500 TABLET, EXTENDED RELEASE ORAL at 07:11

## 2020-11-20 RX ADMIN — ASPIRIN 81 MG: 81 TABLET, COATED ORAL at 07:11

## 2020-11-20 RX ADMIN — FUROSEMIDE 20 MG: 20 TABLET ORAL at 07:11

## 2020-11-20 RX ADMIN — OMEGA-3 FATTY ACIDS CAP 1000 MG 2 CAPSULE: 1000 CAP at 07:11

## 2020-11-20 NOTE — PLAN OF CARE
The pt is discharging home and has f/u care added to the pts AVS. The pt is cleared for discharge from case management. Jada Bazzi, SHANTA     11/20/20 1417   Final Note   Assessment Type Final Discharge Note   Anticipated Discharge Disposition Home   Hospital Follow Up  Appt(s) scheduled? Yes

## 2020-11-20 NOTE — PROCEDURES
EEG,w/awake & asleep record    Date/Time: 11/20/2020 1:10 PM  Performed by: Rk Land MD  Authorized by: Falguni Beard NP       ELECTROENCEPHALOGRAM REPORT    DATE OF SERVICE: 11/20/20  EEG NUMBER: ON   REQUESTED BY: Chirag  LOCATION OF SERVICE: [facility]   Electroencephalographic (EEG) recording is with electrodes placed according to the International 10-20 placement system.  Thirty two (32) channels of digital signal (sampling rate of 512/sec) including T1 and T2 was simultaneously recorded from the scalp and may include  EKG, EMG, and/or eye monitors.  Recording band pass was 0.1 to 512 hz.  Digital video recording of the patient is simultaneously recorded with the EEG.  The patient is instructed report clinical symptoms which may occur during the recording session.  EEG and video recording is stored and archived in digital format. Activation procedures which include photic stimulation, hyperventilation and instructing patients to perform simple task are done in selected patients.    The EEG is displayed on a monitor screen and can be reviewed using different montages.  Computer assisted analysis is employed to detect spike and electrographic seizure activity.   The entire record is submitted for computer analysis.  The entire recording is visually reviewed and the times identified by computer analysis as being spikes or seizures are reviewed again.  Compresses spectral analysis (CSA) is also performed on the activity recorded from each individual channel.  This is displayed as a power display of frequencies from 0 to 30 Hz over time.   The CSA is reviewed looking for asymmetries in power between homologous areas of the scalp and then compared with the original EEG recording.     [employer] software was also utilized in the review of this study.  This software suite analyzes the EEG recording in multiple domains.  Coherence and rhythmicity is computed to identify EEG sections which  may contain organized seizures.  Each channel undergoes analysis to detect presence of spike and sharp waves which have special and morphological characteristic of epileptic activity.  The routine EEG recording is converted from spacial into frequency domain.  This is then displayed comparing homologous areas to identify areas of significant asymmetry.  Algorithm to identify non-cortically generated artifact is used to separate eye movement, EMG and other artifact from the EEG    EEG FINDINGS  The record shows a good  organization at rest, consisting of a 9 Hz posterior dominant rhythm with good  reactivity. There is moderate bilateral beta activity.    Drowsiness is characterized by attenuation of the background, vertex waves, and bilateral theta slowing. Stage II sleep is characterized by slowing, vertex waves, and symmetric sleep spindles.       Provocative maneuvers including hyperventilation and photic stimulation were performed.     EKG recording shows a regular rhythm.    There is no push button or clinical event.    IMPRESSION:  Study is within normal limits.    Rk Land MD

## 2020-11-20 NOTE — H&P
[facility] Medicine  History & Physical    Patient Name: Carlos Tenorio Jr.  MRN: [id number]  Admission Date: 11/19/2020  Attending Physician: Cortez Welsh MD   Primary Care Provider: Roney Bradshaw MD         Patient information was obtained from patient and ER records.     Subjective:     Principal Problem:Acute confusional state    Chief Complaint:   Chief Complaint   Patient presents with    Altered Mental Status     Pt states he woke up this morning and could not remember where he worked        HPI: Patient presented to our ED today with acute onset of confusion.  This morning, he woke up in a confusional state.  Normally he's very lucid and sharp, but this morning, he forgot what he was doing today, and he forgot what his job is.  He didn't know where his work site is.  He was completely disoriented and cognitively impaired.  This state resolved on its own.  He then returned to his baseline mental state.  He recalls the confusion but not clearly.  He has no other complaints.  Has had no weakness or numbness.  No history of stroke.  No chest pain, syncope, or dyspnea.  No unusual headache.  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    Past Medical History:   Diagnosis Date    Arthritis     knees, back    Bilateral renal cysts 7/30/2012    CAD (coronary artery disease) 1995    no chest pain since CABG, sees Dr Larry Black    Diabetes mellitus     borderline    Hypertension     Kidney stones     uric acid stones    VASYL (obstructive sleep apnea) 2007    is compliant with CPAP    Primary gonadal failure        Past Surgical History:   Procedure Laterality Date    BACK SURGERY  2010    L5-6 fusion, with pins,bone graft    CARDIAC SURGERY  1995, 2007    total 7 vessel bypass    COLONOSCOPY  2008    repeat 2013    COLONOSCOPY N/A 4/4/2017    Procedure: COLONOSCOPY;  Surgeon:  Dmitry Sampson MD;  Location: [facility];  Service: Endoscopy;  Laterality: N/A;    CORONARY ARTERY BYPASS GRAFT  1995, 2007    cabgx3, cabgx5    EXTRACORPOREAL SHOCK WAVE LITHOTRIPSY      EYE SURGERY      cataracts bilateral    LITHOTRIPSY      LUMBAR LAMINECTOMY      Partial Nephrectomy Laproscopic      TRANSRECTAL BIOPSY OF PROSTATE WITH ULTRASOUND GUIDANCE N/A 3/19/2019    Procedure: BIOPSY, PROSTATE, RECTAL APPROACH, WITH US GUIDANCE;  Surgeon: Yovany Heart MD;  Location: [facility] OR;  Service: Urology;  Laterality: N/A;    TRANSRECTAL BIOPSY OF PROSTATE WITH ULTRASOUND GUIDANCE N/A 10/20/2020    Procedure: BIOPSY, PROSTATE, RECTAL APPROACH, WITH US GUIDANCE;  Surgeon: Yovany Heart MD;  Location: [facility] OR;  Service: Urology;  Laterality: N/A;       Review of patient's allergies indicates:   Allergen Reactions    Adhesive Blisters       Current Facility-Administered Medications on File Prior to Encounter   Medication    triamcinolone acetonide injection 40 mg    triamcinolone acetonide injection 40 mg     Current Outpatient Medications on File Prior to Encounter   Medication Sig    allopurinol (ZYLOPRIM) 100 MG tablet Take 1 tablet (100 mg total) by mouth once daily.    aspirin (ECOTRIN) 81 MG EC tablet Take 81 mg by mouth once daily.    atorvastatin (LIPITOR) 20 MG tablet Take 1 tablet (20 mg total) by mouth once daily.    cyanocobalamin, vitamin B-12, (VITAMIN B-12 ORAL) Take by mouth.    ferrous sulfate (IRON ORAL) Take by mouth.    fish oil-omega-3 fatty acids 300-1,000 mg capsule Take 2 g by mouth 3 (three) times daily.    losartan (COZAAR) 100 MG tablet Take 1 tablet (100 mg total) by mouth once daily.    metFORMIN (GLUCOPHAGE-XR) 500 MG 24 hr tablet Take 1 tablet (500 mg total) by mouth 2 (two) times daily with meals.    potassium chloride SA (K-DUR,KLOR-CON) 20 MEQ tablet Take 1 tablet (20 mEq total) by mouth once daily.    vitamin D 1000 units Tab Take 185 mg by mouth once  daily.    furosemide (LASIX) 20 MG tablet Take 1 tablet (20 mg total) by mouth once daily. (Patient taking differently: Take 20 mg by mouth daily as needed. )    sildenafil (VIAGRA) 100 MG tablet Take 1 tablet (100 mg total) by mouth as needed for Erectile Dysfunction.     Family History     Problem Relation (Age of Onset)    Diabetes Mother, Sister    Heart disease Mother, Father, Paternal Uncle    Hypertension Mother, Father    Urolithiasis Sister        Tobacco Use    Smoking status: Former Smoker     Quit date: 1976     Years since quittin.8    Smokeless tobacco: Never Used   Substance and Sexual Activity    Alcohol use: Yes     Frequency: Monthly or less     Drinks per session: Patient refused     Binge frequency: Never     Comment: Socially    Drug use: No    Sexual activity: Yes     Review of Systems   Constitutional: Negative for chills, fatigue and fever.   HENT: Negative for congestion and sinus pressure.    Eyes: Negative for pain and visual disturbance.   Respiratory: Negative for cough, shortness of breath and wheezing.    Cardiovascular: Negative for chest pain, palpitations and leg swelling.   Gastrointestinal: Negative for abdominal pain, diarrhea, nausea and vomiting.   Genitourinary: Negative for difficulty urinating, dysuria and hematuria.   Musculoskeletal: Negative for arthralgias and myalgias.   Skin: Negative for rash and wound.   Neurological: Negative for dizziness, weakness, light-headedness and headaches.   Hematological: Negative for adenopathy. Does not bruise/bleed easily.   Psychiatric/Behavioral: Positive for confusion. Negative for dysphoric mood. The patient is not nervous/anxious.      Objective:     Vital Signs (Most Recent):  Temp: 97.5 °F (36.4 °C) (20 075)  Pulse: (!) 55 (20 075)  Resp: 18 (20)  BP: (!) 166/80 (20 075)  SpO2: 98 % (20) Vital Signs (24h Range):  Temp:  [96.6 °F (35.9 °C)-98.4 °F (36.9 °C)] 97.5 °F (36.4  °C)  Pulse:  [52-61] 55  Resp:  [16-18] 18  SpO2:  [95 %-99 %] 98 %  BP: (127-166)/(59-80) 166/80     Weight: 107.5 kg (236 lb 15.9 oz)  Body mass index is 34.01 kg/m².    Physical Exam  Constitutional:       General: He is not in acute distress.     Appearance: He is not ill-appearing.   HENT:      Head: Normocephalic and atraumatic.      Right Ear: External ear normal.      Left Ear: External ear normal.      Mouth/Throat:      Pharynx: No oropharyngeal exudate.   Eyes:      General: No scleral icterus.        Right eye: No discharge.         Left eye: No discharge.      Conjunctiva/sclera: Conjunctivae normal.   Neck:      Musculoskeletal: Normal range of motion and neck supple.      Thyroid: No thyromegaly.      Vascular: No JVD.   Cardiovascular:      Rate and Rhythm: Normal rate and regular rhythm.      Heart sounds: No gallop.    Pulmonary:      Effort: Pulmonary effort is normal.      Breath sounds: Normal breath sounds. No wheezing.   Abdominal:      General: Bowel sounds are normal. There is no distension.      Palpations: Abdomen is soft. There is no mass.      Tenderness: There is no abdominal tenderness.   Musculoskeletal:         General: No deformity.   Lymphadenopathy:      Cervical: No cervical adenopathy.   Skin:     General: Skin is warm and dry.      Capillary Refill: Capillary refill takes less than 2 seconds.      Findings: No rash.   Neurological:      Mental Status: He is alert and oriented to person, place, and time.      Cranial Nerves: Cranial nerves are intact.      Motor: Motor function is intact.      Coordination: Coordination is intact.   Psychiatric:         Behavior: Behavior normal.             Significant Labs:      11/19/2020 08:22   WBC 7.37   RBC 4.34 (L)   Hemoglobin 13.2 (L)   Hematocrit 40.6   MCV 94   MCH 30.4   MCHC 32.5   RDW 12.6   Platelets 277      11/19/2020 08:22   Sodium 139   Potassium 4.8   Chloride 106   CO2 20 (L)   Anion Gap 13   BUN 21   Creatinine 1.0   eGFR  if non  >60   eGFR if African American >60   Glucose 118 (H)   Calcium 9.4   Alkaline Phosphatase 64   PROTEIN TOTAL 7.1   Albumin 3.8   BILIRUBIN TOTAL 0.5   AST 22   ALT 15   Lipase 42   TSH 2.403     Significant Imaging: I have reviewed all pertinent imaging results/findings within the past 24 hours.    Assessment/Plan:     * Acute confusional state  Unsure of etiology.  CT brain unremarkable.  Labwork so far is unremarkable.  Will consult with neurology for assistance.  Unlikely to be medication-related.  Denies recreational drug use or excessive alcohol.    Type 2 diabetes mellitus, without long-term current use of insulin  Patient's FSGs are controlled on current hypoglycemics.   Last A1c reviewed-   Lab Results   Component Value Date    HGBA1C 5.8 (H) 07/31/2020     Most recent fingerstick glucose reviewed-   Recent Labs   Lab 11/19/20  1613 11/19/20 2050 11/20/20  0422   POCTGLUCOSE 171* 120* 130*     Current correctional scale  Low  Maintain anti-hyperglycemic dose as follows-   Antihyperglycemics (From admission, onward)    Start     Stop Route Frequency Ordered    11/19/20 1456  insulin aspart U-100 pen 0-5 Units      -- SubQ Before meals & nightly PRN 11/19/20 1456        Hold Oral hypoglycemics while patient is in the hospital.        Hypertension associated with diabetes  Chronic, controlled.  Latest blood pressure and vitals reviewed-   Temp:  [96.6 °F (35.9 °C)-98.4 °F (36.9 °C)]   Pulse:  [52-61]   Resp:  [16-18]   BP: (127-166)/(59-80)   SpO2:  [95 %-99 %] .   Home meds for hypertension were reviewed and noted below. Hospital anti-hypertensive changes were made as shown below.  Hypertension Medications at home            furosemide (LASIX) 20 MG tablet Take 1 tablet (20 mg total) by mouth once daily.    losartan (COZAAR) 100 MG tablet Take 1 tablet (100 mg total) by mouth once daily.      Hospital Medications             furosemide tablet 20 mg 20 mg, Oral, Daily    losartan tablet  100 mg 100 mg, Oral, Daily        Will utilize p.r.n. blood pressure medication only if patient's blood pressure greater than  180/110 and he develops symptoms such as worsening chest pain or shortness of breath.          VTE Risk Mitigation (From admission, onward)         Ordered     IP VTE LOW RISK PATIENT  Once      11/19/20 1454                   Cortez Welsh MD  Department of Hospital Medicine   [facility]

## 2020-11-20 NOTE — PROGRESS NOTES
[facility]  Neurology  Consult Note    Patient Name: Carlos Tenorio Jr.  MRN: [id number]  Admission Date: 11/19/2020  Hospital Length of Stay: 0 days  Code Status: Full Code   Attending Provider: Cortez Welsh MD   Consulting Provider: Brionna Haddad DNP  Primary Care Physician: Roney Bradshaw MD  Principal Problem:<principal problem not specified>    Consults  Subjective:     Chief Complaint:    Altered Mental Status        Pt states he woke up this morning and could not remember where he worked      Time seen by provider: 7:38 AM on 11/19/2020     Carlos Tenorio Jr. is a 70 y.o. male with a PMHx of HTN, CAD, and DM who presents to the ED with an onset of AMS. The wife reports the patient woke up this morning confused at all present situations. The patient woke up this morning unsure of what he was suppose to do today. He was suppose to be at work like normal, but was unable to remember where work was or how to get there. The patient did not remember having a job, his bosses name, or that he went to work yesterday. She also reports the patient did not recognize their addition to their home in which they have been working on for 6 months. She denies history of similar symptoms, however, she admits over the last 6 months she has noticed he has had trouble with directions. Presently, the patient is back to his normal mental status, and remembers everything. He does endorse left leg tingling, that has resolved. The patient denies weakness, cough, fever, SOB, diarrhea, vomiting or any other symptoms at this time. PSHx of CABG.     The history is provided by the patient and the spouse.     Neurological Consult: Pt seen and examined. POC discussed with Dr. Altman. Pt alert and oriented to all at this time. He reports this morning started off normal and then all of a sudden he did not know where he worked, how to get there, or his mother-in-law addition. He did recognize his wife. Pt denies  recent head injuries, fevers, episodes of AMS. He does endorse a poor memory. He reports he is at baseline now. Pt denies slurred speech, headaches, weakness, changes in vision. He denies h/o stroke or seizure.     11/20: Patient seen and examined this morning with Dr. Altman.  Patient states he is back to baseline. Neuro exam is non-focal.     EEG      IMPRESSION:  Study is within normal limits.       Past Medical History:   Diagnosis Date    Arthritis     knees, back    Bilateral renal cysts 7/30/2012    CAD (coronary artery disease) 1995    no chest pain since CABG, sees Dr Larry Black    Diabetes mellitus     borderline    Hypertension     Kidney stones     uric acid stones    VASYL (obstructive sleep apnea) 2007    is compliant with CPAP    Primary gonadal failure        Past Surgical History:   Procedure Laterality Date    BACK SURGERY  2010    L5-6 fusion, with pins,bone graft    CARDIAC SURGERY  1995, 2007    total 7 vessel bypass    COLONOSCOPY  2008    repeat 2013    COLONOSCOPY N/A 4/4/2017    Procedure: COLONOSCOPY;  Surgeon: Dmitry Sampson MD;  Location: [facility];  Service: Endoscopy;  Laterality: N/A;    CORONARY ARTERY BYPASS GRAFT  1995, 2007    cabgx3, cabgx5    EXTRACORPOREAL SHOCK WAVE LITHOTRIPSY      EYE SURGERY      cataracts bilateral    LITHOTRIPSY      LUMBAR LAMINECTOMY      Partial Nephrectomy Laproscopic      TRANSRECTAL BIOPSY OF PROSTATE WITH ULTRASOUND GUIDANCE N/A 3/19/2019    Procedure: BIOPSY, PROSTATE, RECTAL APPROACH, WITH US GUIDANCE;  Surgeon: Yovany Heart MD;  Location: [facility] OR;  Service: Urology;  Laterality: N/A;    TRANSRECTAL BIOPSY OF PROSTATE WITH ULTRASOUND GUIDANCE N/A 10/20/2020    Procedure: BIOPSY, PROSTATE, RECTAL APPROACH, WITH US GUIDANCE;  Surgeon: Yovany Heart MD;  Location: [facility] OR;  Service: Urology;  Laterality: N/A;       Review of patient's allergies indicates:   Allergen Reactions    Adhesive Blisters       Current  Neurological Medications:     Current Facility-Administered Medications on File Prior to Encounter   Medication    triamcinolone acetonide injection 40 mg    triamcinolone acetonide injection 40 mg     Current Outpatient Medications on File Prior to Encounter   Medication Sig    allopurinol (ZYLOPRIM) 100 MG tablet Take 1 tablet (100 mg total) by mouth once daily.    aspirin (ECOTRIN) 81 MG EC tablet Take 81 mg by mouth once daily.    atorvastatin (LIPITOR) 20 MG tablet Take 1 tablet (20 mg total) by mouth once daily.    cyanocobalamin, vitamin B-12, (VITAMIN B-12 ORAL) Take by mouth.    ferrous sulfate (IRON ORAL) Take by mouth.    fish oil-omega-3 fatty acids 300-1,000 mg capsule Take 2 g by mouth 3 (three) times daily.    losartan (COZAAR) 100 MG tablet Take 1 tablet (100 mg total) by mouth once daily.    metFORMIN (GLUCOPHAGE-XR) 500 MG 24 hr tablet Take 1 tablet (500 mg total) by mouth 2 (two) times daily with meals.    potassium chloride SA (K-DUR,KLOR-CON) 20 MEQ tablet Take 1 tablet (20 mEq total) by mouth once daily.    vitamin D 1000 units Tab Take 185 mg by mouth once daily.    furosemide (LASIX) 20 MG tablet Take 1 tablet (20 mg total) by mouth once daily. (Patient taking differently: Take 20 mg by mouth daily as needed. )    sildenafil (VIAGRA) 100 MG tablet Take 1 tablet (100 mg total) by mouth as needed for Erectile Dysfunction.      Family History     Problem Relation (Age of Onset)    Diabetes Mother, Sister    Heart disease Mother, Father, Paternal Uncle    Hypertension Mother, Father    Urolithiasis Sister        Tobacco Use    Smoking status: Former Smoker     Quit date: 1976     Years since quittin.8    Smokeless tobacco: Never Used   Substance and Sexual Activity    Alcohol use: Yes     Frequency: Monthly or less     Drinks per session: Patient refused     Binge frequency: Never     Comment: Socially    Drug use: No    Sexual activity: Yes     Review of Systems    Psychiatric/Behavioral: Positive for confusion.   All other systems reviewed and are negative.  RESOLVED   Objective:     Vital Signs (Most Recent):  Temp: 97.5 °F (36.4 °C) (11/20/20 0754)  Pulse: (!) 55 (11/20/20 0754)  Resp: 18 (11/20/20 0754)  BP: (!) 166/80 (11/20/20 0754)  SpO2: 98 % (11/20/20 0754) Vital Signs (24h Range):  Temp:  [96.6 °F (35.9 °C)-98.4 °F (36.9 °C)] 97.5 °F (36.4 °C)  Pulse:  [48-61] 55  Resp:  [16-18] 18  SpO2:  [95 %-99 %] 98 %  BP: (127-166)/(59-80) 166/80     Weight: 107.5 kg (236 lb 15.9 oz)  Body mass index is 34.01 kg/m².    Physical Exam  Constitutional:       Appearance: Normal appearance.   HENT:      Nose: Nose normal.   Eyes:      Pupils: Pupils are equal, round, and reactive to light.   Neck:      Musculoskeletal: Normal range of motion.   Cardiovascular:      Rate and Rhythm: Normal rate.   Pulmonary:      Effort: Pulmonary effort is normal.   Musculoskeletal: Normal range of motion.   Skin:     General: Skin is warm.      Capillary Refill: Capillary refill takes 2 to 3 seconds.   Neurological:      General: No focal deficit present.      Mental Status: He is oriented to person, place, and time.      Coordination: Finger-Nose-Finger Test normal.      Deep Tendon Reflexes: Strength normal.   Psychiatric:         Mood and Affect: Mood normal.         Speech: Speech normal.         Behavior: Behavior normal.         NEUROLOGICAL EXAMINATION:     MENTAL STATUS   Oriented to person, place, and time.   Follows 1 step commands.   Speech: speech is normal   Level of consciousness: alert  Able to name object. Able to repeat.     CRANIAL NERVES   Cranial nerves II through XII intact.     CN III, IV, VI   Pupils are equal, round, and reactive to light.    MOTOR EXAM   Muscle bulk: normal    Strength   Strength 5/5 throughout.     SENSORY EXAM   Light touch normal.     GAIT AND COORDINATION      Coordination   Finger to nose coordination: normal    Tremor   Resting tremor: absent        jacinto     NIH Stroke Scale:    Level of Consciousness: 0 - alert  LOC Questions: 0 - answers both correctly  LOC Commands: 0 - performs both correctly  Best Gaze: 0 - normal  Visual: 0 - no visual loss  Facial Palsy: 0 - normal  Motor Left Arm: 0 - no drift  Motor Right Arm: 0 - no drift  Motor Left Le - no drift  Motor Right Le - no drift  Limb Ataxia: 0 - absent  Sensory: 0 - normal  Best Language: 0 - no aphasia  Dysarthria: 0 - normal articulation  Extinction and Inattention: 0 - no neglect  NIH Stroke Scale Total: 0          Significant Labs:   Lab Results   Component Value Date    WBC 7.37 2020    HGB 13.2 (L) 2020    HCT 40.6 2020    MCV 94 2020     2020       CMP  Sodium   Date Value Ref Range Status   2020 138 136 - 145 mmol/L Final     Potassium   Date Value Ref Range Status   2020 4.5 3.5 - 5.1 mmol/L Final     Chloride   Date Value Ref Range Status   2020 102 95 - 110 mmol/L Final     CO2   Date Value Ref Range Status   2020 25 23 - 29 mmol/L Final     Glucose   Date Value Ref Range Status   2020 113 (H) 70 - 110 mg/dL Final     BUN   Date Value Ref Range Status   2020 19 8 - 23 mg/dL Final     Creatinine   Date Value Ref Range Status   2020 0.9 0.5 - 1.4 mg/dL Final     Calcium   Date Value Ref Range Status   2020 9.1 8.7 - 10.5 mg/dL Final     Total Protein   Date Value Ref Range Status   2020 7.1 6.0 - 8.4 g/dL Final     Albumin   Date Value Ref Range Status   2020 3.8 3.5 - 5.2 g/dL Final   2017 4.2 3.6 - 5.1 g/dL Final     Comment:     @ Test Performed By:  [employer] [facility] M.D., [address]   [address] [place] [address]  [facility]  [id number]       Total Bilirubin   Date Value Ref Range Status   2020 0.5 0.1 - 1.0 mg/dL Final     Comment:     For infants and newborns, interpretation of results should be based  on gestational  age, weight and in agreement with clinical  observations.  Premature Infant recommended reference ranges:  Up to 24 hours.............<8.0 mg/dL  Up to 48 hours............<12.0 mg/dL  3-5 days..................<15.0 mg/dL  6-29 days.................<15.0 mg/dL       Alkaline Phosphatase   Date Value Ref Range Status   11/19/2020 64 55 - 135 U/L Final     AST   Date Value Ref Range Status   11/19/2020 22 10 - 40 U/L Final     ALT   Date Value Ref Range Status   11/19/2020 15 10 - 44 U/L Final     Anion Gap   Date Value Ref Range Status   11/20/2020 11 8 - 16 mmol/L Final     eGFR if    Date Value Ref Range Status   11/20/2020 >60 >60 mL/min/1.73 m^2 Final     eGFR if non    Date Value Ref Range Status   11/20/2020 >60 >60 mL/min/1.73 m^2 Final     Comment:     Calculation used to obtain the estimated glomerular filtration  rate (eGFR) is the CKD-EPI equation.              Significant Imaging: MRI Brain Without Contrast  Narrative: EXAMINATION:  MRI BRAIN WITHOUT CONTRAST    CLINICAL HISTORY:  AMS, cva;    TECHNIQUE:  Multiplanar multisequence MR imaging of the brain was performed without contrast.    COMPARISON:  CT head performed 11/19/2020    FINDINGS:  Parenchyma: There is no restricted diffusion to suggest acute or subacute ischemic infarct.Generalized parenchymal volume loss is noted.  Areas of nonspecific T2/FLAIR hyperintense signal predominantly involving frontoparietal white matter suggests sequela of chronic small vessel ischemic disease.    Additional comments: There is no midline shift, abnormal extra-axial fluid collection, or acute intracranial hemorrhage. The basal cisterns are patent.    Ventricles: Normal.    Flow voids: The normal major intracranial arterial flow voids are visualized.    Sinuses and mastoid air cells: Relatively modest degree of paranasal sinus mucosal thickening without fluid levels.  Partial fluid signal opacification of the pneumatized right  temporal bone may be on the basis of effusion and/or mucosal thickening.    Orbits: Status post bilateral lens replacements.    Midline structures: The pituitary and craniocervical junction are normal.    Marrow: Normal  Impression: No acute intracranial findings.  Specifically, no evidence of acute ischemia or recent infarction, as clinically questioned.    Non-specific white matter signal abnormality suggests sequela of chronic small vessel ischemic disease.    Electronically signed by: Jorgito Wallace  Date:    11/19/2020  Time:    23:36  CT Head Without Contrast  Narrative: EXAMINATION:  CT HEAD WITHOUT CONTRAST    CLINICAL HISTORY:  Altered mental status;    TECHNIQUE:  Low dose axial CT images obtained throughout the head without intravenous contrast. Sagittal and coronal reconstructions were performed.    COMPARISON:  None.    FINDINGS:  Intracranial compartment:    Ventricles and sulci are normal in size for age without evidence of hydrocephalus. No extra-axial blood or fluid collections.    The brain parenchyma appears normal. No parenchymal mass, hemorrhage, edema or major vascular distribution infarct.    Skull/extracranial contents (limited evaluation): No fracture. There is mild mucous thickening of bilateral maxillary sinuses.  Impression: No acute abnormality.    Electronically signed by: Gabby England MD  Date:    11/19/2020  Time:    09:53        Assessment and Plan:    Acute Cognitive Dysfunction-Resolved  Transient Global Amnesia   -CT head:  Negative acute  -MRI brain: noted above, no acute pathology noted  -EEG ordered    PLAN: Pt at baseline. Possible TGA, TIA and seizure ruled out.   EEG normal; no seizures.       [facility] at [phone] within 3 days from discharge.     Stroke education was provided including stroke risk factors modification and any acute neurological changes including weakness, confusion, visual changes to come straight to the ER.      All questions were answered.             NO DRIVING FOR 3 MONTHS                          Active Diagnoses:    Diagnosis Date Noted POA    Encephalopathy [G93.40] 11/19/2020 Yes      Problems Resolved During this Admission:       VTE Risk Mitigation (From admission, onward)         Ordered     IP VTE LOW RISK PATIENT  Once      11/19/20 3516                Thank you for your consult. I will follow-up with patient. Please contact us if you have any additional questions.    Brionna Haddad DNP  Neurology  [facility]

## 2020-11-20 NOTE — HPI
Patient presented to our ED today with acute onset of confusion.  This morning, he woke up in a confusional state.  Normally he's very lucid and sharp, but this morning, he forgot what he was doing today, and he forgot what his job is.  He didn't know where his work site is.  He was completely disoriented and cognitively impaired.  This state resolved on its own.  He then returned to his baseline mental state.  He recalls the confusion but not clearly.  He has no other complaints.  Has had no weakness or numbness.  No history of stroke.  No chest pain, syncope, or dyspnea.  No unusual headache.  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

## 2020-11-20 NOTE — SUBJECTIVE & OBJECTIVE
Past Medical History:   Diagnosis Date    Arthritis     knees, back    Bilateral renal cysts 7/30/2012    CAD (coronary artery disease) 1995    no chest pain since CABG, sees Dr Larry Black    Diabetes mellitus     borderline    Hypertension     Kidney stones     uric acid stones    VASYL (obstructive sleep apnea) 2007    is compliant with CPAP    Primary gonadal failure        Past Surgical History:   Procedure Laterality Date    BACK SURGERY  2010    L5-6 fusion, with pins,bone graft    CARDIAC SURGERY  1995, 2007    total 7 vessel bypass    COLONOSCOPY  2008    repeat 2013    COLONOSCOPY N/A 4/4/2017    Procedure: COLONOSCOPY;  Surgeon: Dmitry Sampson MD;  Location: [facility];  Service: Endoscopy;  Laterality: N/A;    CORONARY ARTERY BYPASS GRAFT  1995, 2007    cabgx3, cabgx5    EXTRACORPOREAL SHOCK WAVE LITHOTRIPSY      EYE SURGERY      cataracts bilateral    LITHOTRIPSY      LUMBAR LAMINECTOMY      Partial Nephrectomy Laproscopic      TRANSRECTAL BIOPSY OF PROSTATE WITH ULTRASOUND GUIDANCE N/A 3/19/2019    Procedure: BIOPSY, PROSTATE, RECTAL APPROACH, WITH US GUIDANCE;  Surgeon: Yovany Heart MD;  Location: [facility];  Service: Urology;  Laterality: N/A;    TRANSRECTAL BIOPSY OF PROSTATE WITH ULTRASOUND GUIDANCE N/A 10/20/2020    Procedure: BIOPSY, PROSTATE, RECTAL APPROACH, WITH US GUIDANCE;  Surgeon: Yovany Heart MD;  Location: [facility];  Service: Urology;  Laterality: N/A;       Review of patient's allergies indicates:   Allergen Reactions    Adhesive Blisters       Current Facility-Administered Medications on File Prior to Encounter   Medication    triamcinolone acetonide injection 40 mg    triamcinolone acetonide injection 40 mg     Current Outpatient Medications on File Prior to Encounter   Medication Sig    allopurinol (ZYLOPRIM) 100 MG tablet Take 1 tablet (100 mg total) by mouth once daily.    aspirin (ECOTRIN) 81 MG EC tablet Take 81 mg by mouth once daily.     atorvastatin (LIPITOR) 20 MG tablet Take 1 tablet (20 mg total) by mouth once daily.    cyanocobalamin, vitamin B-12, (VITAMIN B-12 ORAL) Take by mouth.    ferrous sulfate (IRON ORAL) Take by mouth.    fish oil-omega-3 fatty acids 300-1,000 mg capsule Take 2 g by mouth 3 (three) times daily.    losartan (COZAAR) 100 MG tablet Take 1 tablet (100 mg total) by mouth once daily.    metFORMIN (GLUCOPHAGE-XR) 500 MG 24 hr tablet Take 1 tablet (500 mg total) by mouth 2 (two) times daily with meals.    potassium chloride SA (K-DUR,KLOR-CON) 20 MEQ tablet Take 1 tablet (20 mEq total) by mouth once daily.    vitamin D 1000 units Tab Take 185 mg by mouth once daily.    furosemide (LASIX) 20 MG tablet Take 1 tablet (20 mg total) by mouth once daily. (Patient taking differently: Take 20 mg by mouth daily as needed. )    sildenafil (VIAGRA) 100 MG tablet Take 1 tablet (100 mg total) by mouth as needed for Erectile Dysfunction.     Family History     Problem Relation (Age of Onset)    Diabetes Mother, Sister    Heart disease Mother, Father, Paternal Uncle    Hypertension Mother, Father    Urolithiasis Sister        Tobacco Use    Smoking status: Former Smoker     Quit date: 1976     Years since quittin.8    Smokeless tobacco: Never Used   Substance and Sexual Activity    Alcohol use: Yes     Frequency: Monthly or less     Drinks per session: Patient refused     Binge frequency: Never     Comment: Socially    Drug use: No    Sexual activity: Yes     Review of Systems   Constitutional: Negative for chills, fatigue and fever.   HENT: Negative for congestion and sinus pressure.    Eyes: Negative for pain and visual disturbance.   Respiratory: Negative for cough, shortness of breath and wheezing.    Cardiovascular: Negative for chest pain, palpitations and leg swelling.   Gastrointestinal: Negative for abdominal pain, diarrhea, nausea and vomiting.   Genitourinary: Negative for difficulty urinating, dysuria and  hematuria.   Musculoskeletal: Negative for arthralgias and myalgias.   Skin: Negative for rash and wound.   Neurological: Negative for dizziness, weakness, light-headedness and headaches.   Hematological: Negative for adenopathy. Does not bruise/bleed easily.   Psychiatric/Behavioral: Positive for confusion. Negative for dysphoric mood. The patient is not nervous/anxious.      Objective:     Vital Signs (Most Recent):  Temp: 97.5 °F (36.4 °C) (11/20/20 0754)  Pulse: (!) 55 (11/20/20 0754)  Resp: 18 (11/20/20 0754)  BP: (!) 166/80 (11/20/20 0754)  SpO2: 98 % (11/20/20 0754) Vital Signs (24h Range):  Temp:  [96.6 °F (35.9 °C)-98.4 °F (36.9 °C)] 97.5 °F (36.4 °C)  Pulse:  [52-61] 55  Resp:  [16-18] 18  SpO2:  [95 %-99 %] 98 %  BP: (127-166)/(59-80) 166/80     Weight: 107.5 kg (236 lb 15.9 oz)  Body mass index is 34.01 kg/m².    Physical Exam  Constitutional:       General: He is not in acute distress.     Appearance: He is not ill-appearing.   HENT:      Head: Normocephalic and atraumatic.      Right Ear: External ear normal.      Left Ear: External ear normal.      Mouth/Throat:      Pharynx: No oropharyngeal exudate.   Eyes:      General: No scleral icterus.        Right eye: No discharge.         Left eye: No discharge.      Conjunctiva/sclera: Conjunctivae normal.   Neck:      Musculoskeletal: Normal range of motion and neck supple.      Thyroid: No thyromegaly.      Vascular: No JVD.   Cardiovascular:      Rate and Rhythm: Normal rate and regular rhythm.      Heart sounds: No gallop.    Pulmonary:      Effort: Pulmonary effort is normal.      Breath sounds: Normal breath sounds. No wheezing.   Abdominal:      General: Bowel sounds are normal. There is no distension.      Palpations: Abdomen is soft. There is no mass.      Tenderness: There is no abdominal tenderness.   Musculoskeletal:         General: No deformity.   Lymphadenopathy:      Cervical: No cervical adenopathy.   Skin:     General: Skin is warm and  dry.      Capillary Refill: Capillary refill takes less than 2 seconds.      Findings: No rash.   Neurological:      Mental Status: He is alert and oriented to person, place, and time.      Cranial Nerves: Cranial nerves are intact.      Motor: Motor function is intact.      Coordination: Coordination is intact.   Psychiatric:         Behavior: Behavior normal.             Significant Labs:      11/19/2020 08:22   WBC 7.37   RBC 4.34 (L)   Hemoglobin 13.2 (L)   Hematocrit 40.6   MCV 94   MCH 30.4   MCHC 32.5   RDW 12.6   Platelets 277      11/19/2020 08:22   Sodium 139   Potassium 4.8   Chloride 106   CO2 20 (L)   Anion Gap 13   BUN 21   Creatinine 1.0   eGFR if non  >60   eGFR if African American >60   Glucose 118 (H)   Calcium 9.4   Alkaline Phosphatase 64   PROTEIN TOTAL 7.1   Albumin 3.8   BILIRUBIN TOTAL 0.5   AST 22   ALT 15   Lipase 42   TSH 2.403     Significant Imaging: I have reviewed all pertinent imaging results/findings within the past 24 hours.

## 2020-11-20 NOTE — PLAN OF CARE
Orientation: AAO x 4  Cardiac Monitoring: SR/SB  Vitals: Stable  Glucose Monitoring: Monitored to sliding scale  Pain Evaluation: no complaints of pain  Elimination: urinal at bedside  VTE: Low risk  Safety: Bed in low position, bed alarm set, call light within reach, SR up x 2, free from falls, resting between care, will continue to monitor.   Plan of Care: MRI Brain and EEG

## 2020-11-22 NOTE — DISCHARGE SUMMARY
[facility] Hospital Medicine  Discharge Summary      Patient Name: Carlos Tenorio Jr.  MRN: [id number]  Admission Date: 11/19/2020  Hospital Length of Stay: 0 days  Discharge Date and Time: 11/20/2020  3:21 PM  Attending Physician: No att. providers found   Discharging Provider: Cortez Welsh MD  Primary Care Provider: Ronye Bradshaw MD      HPI:   Patient presented to our ED today with acute onset of confusion.  This morning, he woke up in a confusional state.  Normally he's very lucid and sharp, but this morning, he forgot what he was doing today, and he forgot what his job is.  He didn't know where his work site is.  He was completely disoriented and cognitively impaired.  This state resolved on its own.  He then returned to his baseline mental state.  He recalls the confusion but not clearly.  He has no other complaints.  Has had no weakness or numbness.  No history of stroke.  No chest pain, syncope, or dyspnea.  No unusual headache.  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    * No surgery found *      Hospital Course:   Pt was put in observation.  He was seen by neurology.  He underwent MRI of brain, which was unremarkable.  EEG was done, and it was normal.  Patient had no confusional episodes while admitted.  The acute confusional state was unexplained.  Neurologist recommended that the patient not drive or operate heavy machinery for the next couple of months due to concern about seizures.  Patient was discharged in stable condition.    PHYSICAL EXAM  Neck:      Musculoskeletal: Normal range of motion and neck supple.      Thyroid: No thyromegaly.      Vascular: No JVD.   Cardiovascular:      Rate and Rhythm: Normal rate and regular rhythm.      Heart sounds: No gallop.    Pulmonary:      Effort: Pulmonary effort is normal.      Breath sounds: Normal breath sounds. No wheezing.   Abdominal:       General: Bowel sounds are normal. There is no distension.      Palpations: Abdomen is soft. There is no mass.      Tenderness: There is no abdominal tenderness.      Consults:     No new Assessment & Plan notes have been filed under this hospital service since the last note was generated.  Service: Hospital Medicine    Final Active Diagnoses:    Diagnosis Date Noted POA    Type 2 diabetes mellitus, without long-term current use of insulin [E11.9] 07/20/2012 Yes    Hypertension associated with diabetes [E11.59, I10]  Yes      Problems Resolved During this Admission:    Diagnosis Date Noted Date Resolved POA    PRINCIPAL PROBLEM:  Acute confusional state [F05] 11/19/2020 11/20/2020 Yes       Discharged Condition: good    Disposition: Home or Self Care    Follow Up:  Follow-up Information     Addy Altman MD In 2 weeks.    Specialty: Neurology  Why: Neurologist- Please call to make an appt- office closed for the day   Contact information:  [address] [facility] [address] [phone]                 Patient Instructions:      Diet Adult Regular     Order Specific Question Answer Comments   Additional restrictions: Diabetic 2000      No driving until:   Order Comments: No driving or operating heavy machinery until you see the neurologist in clinic     Activity as tolerated       Significant Diagnostic Studies:   BMP  Lab Results   Component Value Date     11/20/2020    K 4.5 11/20/2020     11/20/2020    CO2 25 11/20/2020    BUN 19 11/20/2020    CREATININE 0.9 11/20/2020    CALCIUM 9.1 11/20/2020    ANIONGAP 11 11/20/2020    ESTGFRAFRICA >60 11/20/2020    EGFRNONAA >60 11/20/2020     Lab Results   Component Value Date    WBC 7.37 11/19/2020    HGB 13.2 (L) 11/19/2020    HCT 40.6 11/19/2020    MCV 94 11/19/2020     11/19/2020           Pending Diagnostic Studies:     None         Medications:  Reconciled Home Medications:      Medication List      CHANGE how you take these medications     furosemide 20 MG tablet  Commonly known as: LASIX  Take 1 tablet (20 mg total) by mouth once daily.  What changed:   · when to take this  · reasons to take this        CONTINUE taking these medications    allopurinoL 100 MG tablet  Commonly known as: ZYLOPRIM  Take 1 tablet (100 mg total) by mouth once daily.     aspirin 81 MG EC tablet  Commonly known as: ECOTRIN  Take 81 mg by mouth once daily.     atorvastatin 20 MG tablet  Commonly known as: LIPITOR  Take 1 tablet (20 mg total) by mouth once daily.     fish oil-omega-3 fatty acids 300-1,000 mg capsule  Take 2 g by mouth 3 (three) times daily.     IRON ORAL  Take by mouth.     losartan 100 MG tablet  Commonly known as: COZAAR  Take 1 tablet (100 mg total) by mouth once daily.     metFORMIN 500 MG ER 24hr tablet  Commonly known as: GLUCOPHAGE-XR  Take 1 tablet (500 mg total) by mouth 2 (two) times daily with meals.     potassium chloride SA 20 MEQ tablet  Commonly known as: K-DUR,KLOR-CON  Take 1 tablet (20 mEq total) by mouth once daily.     sildenafiL 100 MG tablet  Commonly known as: VIAGRA  Take 1 tablet (100 mg total) by mouth as needed for Erectile Dysfunction.     VITAMIN B-12 ORAL  Take by mouth.     vitamin D 1000 units Tab  Commonly known as: VITAMIN D3  Take 185 mg by mouth once daily.            Indwelling Lines/Drains at time of discharge:   Lines/Drains/Airways     None                 Time spent on the discharge of patient: 21 minutes  Patient was seen and examined on the date of discharge and determined to be suitable for discharge.         Cortez Welsh MD  Department of Hospital Medicine  [facility]

## 2020-11-22 NOTE — HOSPITAL COURSE
Health Maintenance Due   Topic Date Due   • Colorectal Cancer Screening-Colonoscopy  05/11/2016   • Shingles Vaccine (1 of 2) 05/11/2016   • Diabetes Foot Exam  07/25/2018   • Diabetes Eye Exam  05/02/2019   • DTaP/Tdap/Td Vaccine (2 - Td) 05/29/2019       Patient is due for the topics as listed above and wishes to proceed with them.           Recent PHQ 2/9 Score    PHQ 2:  Date Adult PHQ 2 Score   7/31/2019 1       PHQ 9:  Date Adult PHQ 9 Score   4/10/2018 6        Pt was put in observation.  He was seen by neurology.  He underwent MRI of brain, which was unremarkable.  EEG was done, and it was normal.  Patient had no confusional episodes while admitted.  The acute confusional state was unexplained.  Neurologist recommended that the patient not drive or operate heavy machinery for the next couple of months due to concern about seizures.  Patient was discharged in stable condition.    PHYSICAL EXAM  Neck:      Musculoskeletal: Normal range of motion and neck supple.      Thyroid: No thyromegaly.      Vascular: No JVD.   Cardiovascular:      Rate and Rhythm: Normal rate and regular rhythm.      Heart sounds: No gallop.    Pulmonary:      Effort: Pulmonary effort is normal.      Breath sounds: Normal breath sounds. No wheezing.   Abdominal:      General: Bowel sounds are normal. There is no distension.      Palpations: Abdomen is soft. There is no mass.      Tenderness: There is no abdominal tenderness.

## 2020-11-27 ENCOUNTER — HOSPITAL ENCOUNTER (OUTPATIENT)
Dept: RADIOLOGY | Facility: HOSPITAL | Age: 70
Discharge: HOME | End: 2020-11-27
Attending: FAMILY MEDICINE
Payer: COMMERCIAL

## 2020-11-27 ENCOUNTER — OFFICE VISIT (OUTPATIENT)
Dept: FAMILY MEDICINE | Facility: CLINIC | Age: 70
End: 2020-11-27
Payer: COMMERCIAL

## 2020-11-27 VITALS
TEMPERATURE: 98 F | DIASTOLIC BLOOD PRESSURE: 60 MMHG | RESPIRATION RATE: 16 BRPM | SYSTOLIC BLOOD PRESSURE: 124 MMHG | HEART RATE: 61 BPM | OXYGEN SATURATION: 97 % | WEIGHT: 233.44 LBS | BODY MASS INDEX: 33.42 KG/M2 | HEIGHT: 70 IN

## 2020-11-27 DIAGNOSIS — D50.9 MICROCYTIC HYPOCHROMIC ANEMIA: ICD-10-CM

## 2020-11-27 DIAGNOSIS — E78.5 HYPERLIPIDEMIA WITH TARGET LDL LESS THAN 70: ICD-10-CM

## 2020-11-27 DIAGNOSIS — G45.9 TIA (TRANSIENT ISCHEMIC ATTACK): Primary | ICD-10-CM

## 2020-11-27 DIAGNOSIS — N18.31 STAGE 3A CHRONIC KIDNEY DISEASE: ICD-10-CM

## 2020-11-27 DIAGNOSIS — G45.9 TIA (TRANSIENT ISCHEMIC ATTACK): ICD-10-CM

## 2020-11-27 PROCEDURE — 99214 OFFICE O/P EST MOD 30 MIN: CPT | Mod: S$GLB,,, | Performed by: FAMILY MEDICINE

## 2020-11-27 PROCEDURE — 99214 PR OFFICE/OUTPT VISIT, EST, LEVL IV, 30-39 MIN: ICD-10-PCS | Mod: S$GLB,,, | Performed by: FAMILY MEDICINE

## 2020-11-27 PROCEDURE — 3008F BODY MASS INDEX DOCD: CPT | Mod: CPTII,S$GLB,, | Performed by: FAMILY MEDICINE

## 2020-11-27 PROCEDURE — 1126F AMNT PAIN NOTED NONE PRSNT: CPT | Mod: S$GLB,,, | Performed by: FAMILY MEDICINE

## 2020-11-27 PROCEDURE — 1101F PR PT FALLS ASSESS DOC 0-1 FALLS W/OUT INJ PAST YR: ICD-10-PCS | Mod: CPTII,S$GLB,, | Performed by: FAMILY MEDICINE

## 2020-11-27 PROCEDURE — 93880 EXTRACRANIAL BILAT STUDY: CPT | Mod: TC

## 2020-11-27 PROCEDURE — 3288F FALL RISK ASSESSMENT DOCD: CPT | Mod: CPTII,S$GLB,, | Performed by: FAMILY MEDICINE

## 2020-11-27 PROCEDURE — 3008F PR BODY MASS INDEX (BMI) DOCUMENTED: ICD-10-PCS | Mod: CPTII,S$GLB,, | Performed by: FAMILY MEDICINE

## 2020-11-27 PROCEDURE — 99999 PR PBB SHADOW E&M-EST. PATIENT-LVL V: CPT | Mod: PBBFAC,,, | Performed by: FAMILY MEDICINE

## 2020-11-27 PROCEDURE — 1126F PR PAIN SEVERITY QUANTIFIED, NO PAIN PRESENT: ICD-10-PCS | Mod: S$GLB,,, | Performed by: FAMILY MEDICINE

## 2020-11-27 PROCEDURE — 93880 EXTRACRANIAL BILAT STUDY: CPT | Mod: 26,,, | Performed by: RADIOLOGY

## 2020-11-27 PROCEDURE — 3288F PR FALLS RISK ASSESSMENT DOCUMENTED: ICD-10-PCS | Mod: CPTII,S$GLB,, | Performed by: FAMILY MEDICINE

## 2020-11-27 PROCEDURE — 99999 PR PBB SHADOW E&M-EST. PATIENT-LVL V: ICD-10-PCS | Mod: PBBFAC,,, | Performed by: FAMILY MEDICINE

## 2020-11-27 PROCEDURE — 1101F PT FALLS ASSESS-DOCD LE1/YR: CPT | Mod: CPTII,S$GLB,, | Performed by: FAMILY MEDICINE

## 2020-11-27 PROCEDURE — 93880 US CAROTID BILATERAL: ICD-10-PCS | Mod: 26,,, | Performed by: RADIOLOGY

## 2020-11-27 RX ORDER — ASPIRIN 325 MG
325 TABLET, DELAYED RELEASE (ENTERIC COATED) ORAL DAILY
Qty: 30 TABLET | Refills: 4 | Status: SHIPPED | OUTPATIENT
Start: 2020-11-27 | End: 2021-05-10

## 2020-11-27 NOTE — PATIENT INSTRUCTIONS

## 2020-12-09 NOTE — PROGRESS NOTES
Subjective:       Patient ID: Carlos Tenorio Jr. is a 70 y.o. male.    Chief Complaint: Follow-up    70 male with resent admission die to expressive aphasia. The neuro deficiency last only 4-6 hrs .  He has improve activity, and has been more active. There are episodes of memory lapses.  Normal CT, adequate BP controlled.No other focal deficit.  Transitional Care Note    Family and/or Caretaker present at visit?  Yes.  Diagnostic tests reviewed/disposition: I have reviewed all completed as well as pending diagnostic tests at the time of discharge.  Disease/illness education: yes  Home health/community services discussion/referrals: Patient does not have home health established from hospital visit.  They do need home health.  If needed, we will set up home health for the patient.   Establishment or re-establishment of referral orders for community resources: No other necessary community resources.   Discussion with other health care providers: No discussion with other health care providers necessary.           Hypertension  This is a chronic problem. The current episode started more than 1 year ago. The problem has been gradually improving since onset. The problem is controlled. Associated symptoms include anxiety, malaise/fatigue and peripheral edema. Pertinent negatives include no chest pain, headaches, palpitations or shortness of breath. Agents associated with hypertension include NSAIDs. Risk factors for coronary artery disease include sedentary lifestyle, male gender, obesity and dyslipidemia. Past treatments include calcium channel blockers and lifestyle changes. The current treatment provides moderate improvement. Compliance problems include exercise and diet.  Hypertensive end-organ damage includes angina and CVA. Identifiable causes of hypertension include a hypertension causing med and sleep apnea.     Review of Systems   Constitutional: Positive for malaise/fatigue. Negative for fatigue and  unexpected weight change.   Respiratory: Negative for chest tightness and shortness of breath.    Cardiovascular: Negative for chest pain, palpitations and leg swelling.   Gastrointestinal: Negative for abdominal pain.   Musculoskeletal: Negative for arthralgias.   Neurological: Negative for dizziness, syncope, light-headedness and headaches.       Patient Active Problem List   Diagnosis    CAD (coronary artery disease)    Hypertension associated with diabetes    VASYL (obstructive sleep apnea)    Type 2 diabetes mellitus, without long-term current use of insulin    Male hypogonadism    Class 2 severe obesity due to excess calories with serious comorbidity and body mass index (BMI) of 38.0 to 38.9 in adult    OA (osteoarthritis) of knee    History of colon polyps    Left renal mass    CKD (chronic kidney disease) stage 3, GFR 30-59 ml/min    Arthritis pain of hand    Elevated PSA    Gout    Hyperlipidemia associated with type 2 diabetes mellitus    Prostate cancer       Objective:      Physical Exam  Vitals signs reviewed.   Constitutional:       General: He is not in acute distress.     Appearance: He is well-developed. He is not diaphoretic.   HENT:      Head: Normocephalic and atraumatic.      Right Ear: External ear normal.      Left Ear: External ear normal.      Nose: Nose normal.      Mouth/Throat:      Pharynx: No oropharyngeal exudate.   Eyes:      General: No scleral icterus.        Right eye: No discharge.         Left eye: No discharge.      Conjunctiva/sclera: Conjunctivae normal.      Pupils: Pupils are equal, round, and reactive to light.   Neck:      Musculoskeletal: Normal range of motion and neck supple.      Thyroid: No thyromegaly.      Vascular: No JVD.      Trachea: No tracheal deviation.   Cardiovascular:      Rate and Rhythm: Normal rate.      Pulses:           Dorsalis pedis pulses are 3+ on the right side and 3+ on the left side.        Posterior tibial pulses are 3+ on the  right side and 3+ on the left side.      Heart sounds: Normal heart sounds. No murmur.   Pulmonary:      Effort: Pulmonary effort is normal. No respiratory distress.      Breath sounds: Normal breath sounds. No wheezing or rales.   Abdominal:      General: Bowel sounds are normal. There is no distension.      Palpations: Abdomen is soft. There is no mass.      Tenderness: There is no abdominal tenderness. There is no guarding or rebound.   Musculoskeletal:         General: No tenderness.      Right foot: Normal range of motion. No deformity.      Comments:        Feet:      Right foot:      Protective Sensation: 6 sites tested. 6 sites sensed.      Skin integrity: No ulcer.      Left foot:      Protective Sensation: 6 sites tested. 6 sites sensed.      Skin integrity: No ulcer or blister.   Lymphadenopathy:      Cervical: No cervical adenopathy.   Skin:     General: Skin is warm and dry.      Coloration: Skin is not pale.      Findings: No erythema or rash.   Neurological:      Mental Status: He is alert and oriented to person, place, and time.      Cranial Nerves: No cranial nerve deficit.      Coordination: Coordination normal.   Psychiatric:         Behavior: Behavior normal.         Thought Content: Thought content normal.         Judgment: Judgment normal.         Lab Results   Component Value Date    WBC 10.47 11/27/2020    HGB 11.8 (L) 11/27/2020    HCT 37.0 (L) 11/27/2020     11/27/2020    CHOL 171 11/27/2020    TRIG 125 11/27/2020    HDL 40 11/27/2020    ALT 15 11/19/2020    AST 22 11/19/2020     11/27/2020    K 4.8 11/27/2020     11/27/2020    CREATININE 1.0 11/27/2020    BUN 16 11/27/2020    CO2 24 11/27/2020    TSH 2.403 11/19/2020    PSA 4.5 (H) 12/14/2018    INR 1.0 06/05/2017    GLUF 109 11/07/2008    HGBA1C 5.8 (H) 07/31/2020     The 10-year ASCVD risk score ([place] NORM Jr., et al., 2013) is: 35.4%    Values used to calculate the score:      Age: 70 years      Sex: Male      Is  Non- : No      Diabetic: Yes      Tobacco smoker: No      Systolic Blood Pressure: 124 mmHg      Is BP treated: Yes      HDL Cholesterol: 40 mg/dL      Total Cholesterol: 171 mg/dL    Assessment:       1. TIA (transient ischemic attack)    2. Hyperlipidemia with target LDL less than 70    3. Stage 3a chronic kidney disease        Plan:       TIA (transient ischemic attack)  -     aspirin (ECOTRIN) 325 MG EC tablet; Take 1 tablet (325 mg total) by mouth once daily.  Dispense: 30 tablet; Refill: 4  -     US Carotid Bilateral; Future; Expected date: 11/27/2020  -     CBC Auto Differential; Future; Expected date: 11/27/2020  -     Basic Metabolic Panel; Future; Expected date: 11/27/2020    Hyperlipidemia with target LDL less than 70  -     Lipid Panel; Future; Expected date: 11/27/2020    Stage 3a chronic kidney disease  -     Basic Metabolic Panel; Future; Expected date: 11/27/2020      Patient readiness: eager and barriers:readiness, household issues and occupational issues    During the course of the visit the patient was educated and counseled about the following:     Hypertension:   Dietary sodium restriction.  Regular aerobic exercise.    Goals: Hypertension: Reduce Blood Pressure    Did patient meet goals/outcomes: Yes    The following self management tools provided: blood pressure log    Patient Instructions (the written plan) was given to the patient/family.     Time spent with patient: 30 minutes    Barriers to medications present (yes )    Adverse reactions to current medications (yes)    Over the counter medications reviewed (Yes)        30-minute visit. 15 minutes spent counseling patient on diet, exercise, and weight loss.  This has been fully explained to the patient, who indicates understanding.    Discussed health maintenance guidelines appropriate for age.

## 2020-12-11 ENCOUNTER — PATIENT MESSAGE (OUTPATIENT)
Dept: OTHER | Facility: OTHER | Age: 70
End: 2020-12-11

## 2020-12-28 ENCOUNTER — OFFICE VISIT (OUTPATIENT)
Dept: ORTHOPEDICS | Facility: CLINIC | Age: 70
End: 2020-12-28
Payer: COMMERCIAL

## 2020-12-28 VITALS — WEIGHT: 233 LBS | RESPIRATION RATE: 16 BRPM | HEIGHT: 70 IN | BODY MASS INDEX: 33.36 KG/M2

## 2020-12-28 DIAGNOSIS — M17.0 PRIMARY OSTEOARTHRITIS OF BOTH KNEES: Primary | ICD-10-CM

## 2020-12-28 PROCEDURE — 3008F PR BODY MASS INDEX (BMI) DOCUMENTED: ICD-10-PCS | Mod: CPTII,S$GLB,, | Performed by: ORTHOPAEDIC SURGERY

## 2020-12-28 PROCEDURE — 99499 LARGE JOINT ASPIRATION/INJECTION: BILATERAL KNEE: ICD-10-PCS | Mod: S$GLB,,, | Performed by: ORTHOPAEDIC SURGERY

## 2020-12-28 PROCEDURE — 20610 PR DRAIN/INJECT LARGE JOINT/BURSA: ICD-10-PCS | Mod: 50,S$GLB,, | Performed by: ORTHOPAEDIC SURGERY

## 2020-12-28 PROCEDURE — 3008F BODY MASS INDEX DOCD: CPT | Mod: CPTII,S$GLB,, | Performed by: ORTHOPAEDIC SURGERY

## 2020-12-28 PROCEDURE — 99499 UNLISTED E&M SERVICE: CPT | Mod: S$GLB,,, | Performed by: ORTHOPAEDIC SURGERY

## 2020-12-28 PROCEDURE — 1101F PT FALLS ASSESS-DOCD LE1/YR: CPT | Mod: CPTII,S$GLB,, | Performed by: ORTHOPAEDIC SURGERY

## 2020-12-28 PROCEDURE — 99999 PR PBB SHADOW E&M-EST. PATIENT-LVL II: ICD-10-PCS | Mod: PBBFAC,,, | Performed by: ORTHOPAEDIC SURGERY

## 2020-12-28 PROCEDURE — 3288F PR FALLS RISK ASSESSMENT DOCUMENTED: ICD-10-PCS | Mod: CPTII,S$GLB,, | Performed by: ORTHOPAEDIC SURGERY

## 2020-12-28 PROCEDURE — 3288F FALL RISK ASSESSMENT DOCD: CPT | Mod: CPTII,S$GLB,, | Performed by: ORTHOPAEDIC SURGERY

## 2020-12-28 PROCEDURE — 20610 DRAIN/INJ JOINT/BURSA W/O US: CPT | Mod: 50,S$GLB,, | Performed by: ORTHOPAEDIC SURGERY

## 2020-12-28 PROCEDURE — 1101F PR PT FALLS ASSESS DOC 0-1 FALLS W/OUT INJ PAST YR: ICD-10-PCS | Mod: CPTII,S$GLB,, | Performed by: ORTHOPAEDIC SURGERY

## 2020-12-28 PROCEDURE — 99999 PR PBB SHADOW E&M-EST. PATIENT-LVL II: CPT | Mod: PBBFAC,,, | Performed by: ORTHOPAEDIC SURGERY

## 2020-12-28 NOTE — PROGRESS NOTES
Past Medical History:   Diagnosis Date    Arthritis     knees, back    Bilateral renal cysts 7/30/2012    CAD (coronary artery disease) 1995    no chest pain since CABG, sees Dr Larry Black    Diabetes mellitus     borderline    Hypertension     Kidney stones     uric acid stones    VASYL (obstructive sleep apnea) 2007    is compliant with CPAP    Primary gonadal failure        Past Surgical History:   Procedure Laterality Date    BACK SURGERY  2010    L5-6 fusion, with pins,bone graft    CARDIAC SURGERY  1995, 2007    total 7 vessel bypass    COLONOSCOPY  2008    repeat 2013    COLONOSCOPY N/A 4/4/2017    Procedure: COLONOSCOPY;  Surgeon: Dmitry Sampson MD;  Location: [facility];  Service: Endoscopy;  Laterality: N/A;    CORONARY ARTERY BYPASS GRAFT  1995, 2007    cabgx3, cabgx5    EXTRACORPOREAL SHOCK WAVE LITHOTRIPSY      EYE SURGERY      cataracts bilateral    LITHOTRIPSY      LUMBAR LAMINECTOMY      Partial Nephrectomy Laproscopic      TRANSRECTAL BIOPSY OF PROSTATE WITH ULTRASOUND GUIDANCE N/A 3/19/2019    Procedure: BIOPSY, PROSTATE, RECTAL APPROACH, WITH US GUIDANCE;  Surgeon: Yovany Heart MD;  Location: [facility] OR;  Service: Urology;  Laterality: N/A;    TRANSRECTAL BIOPSY OF PROSTATE WITH ULTRASOUND GUIDANCE N/A 10/20/2020    Procedure: BIOPSY, PROSTATE, RECTAL APPROACH, WITH US GUIDANCE;  Surgeon: Yovany Heart MD;  Location: [facility];  Service: Urology;  Laterality: N/A;       Current Outpatient Medications   Medication Sig    allopurinol (ZYLOPRIM) 100 MG tablet Take 1 tablet (100 mg total) by mouth once daily.    aspirin (ECOTRIN) 325 MG EC tablet Take 1 tablet (325 mg total) by mouth once daily.    atorvastatin (LIPITOR) 20 MG tablet Take 1 tablet (20 mg total) by mouth once daily.    cyanocobalamin, vitamin B-12, (VITAMIN B-12 ORAL) Take by mouth.    ferrous sulfate (IRON ORAL) Take by mouth.    fish oil-omega-3 fatty acids 300-1,000 mg capsule Take 2 g by mouth 3  (three) times daily.    furosemide (LASIX) 20 MG tablet Take 1 tablet (20 mg total) by mouth once daily. (Patient taking differently: Take 20 mg by mouth daily as needed. )    losartan (COZAAR) 100 MG tablet TAKE 1 TABLET(100 MG) BY MOUTH EVERY DAY    metFORMIN (GLUCOPHAGE-XR) 500 MG 24 hr tablet Take 1 tablet (500 mg total) by mouth 2 (two) times daily with meals.    potassium chloride SA (K-DUR,KLOR-CON) 20 MEQ tablet Take 1 tablet (20 mEq total) by mouth once daily.    vitamin D 1000 units Tab Take 185 mg by mouth once daily.    sildenafil (VIAGRA) 100 MG tablet Take 1 tablet (100 mg total) by mouth as needed for Erectile Dysfunction.     No current facility-administered medications for this visit.      Facility-Administered Medications Ordered in Other Visits   Medication    triamcinolone acetonide injection 40 mg    triamcinolone acetonide injection 40 mg       Review of patient's allergies indicates:   Allergen Reactions    Adhesive Blisters       Family History   Problem Relation Age of Onset    Heart disease Mother     Hypertension Mother     Diabetes Mother     Heart disease Father         premature    Hypertension Father     Diabetes Sister     Urolithiasis Sister     Heart disease Paternal Uncle     Cancer Neg Hx     Prostate cancer Neg Hx     Kidney cancer Neg Hx     Melanoma Neg Hx     Lupus Neg Hx     Eczema Neg Hx     Psoriasis Neg Hx        Social History     Socioeconomic History    Marital status:      Spouse name: Not on file    Number of children: Not on file    Years of education: Not on file    Highest education level: Not on file   Occupational History    Not on file   Social Needs    Financial resource strain: Not on file    Food insecurity     Worry: Not on file     Inability: Not on file    Transportation needs     Medical: Not on file     Non-medical: Not on file   Tobacco Use    Smoking status: Former Smoker     Quit date: 2/7/1976     Years since  quittin.9    Smokeless tobacco: Never Used   Substance and Sexual Activity    Alcohol use: Yes     Frequency: Monthly or less     Drinks per session: Patient refused     Binge frequency: Never     Comment: Socially    Drug use: No    Sexual activity: Yes   Lifestyle    Physical activity     Days per week: 7 days     Minutes per session: 60 min    Stress: Not at all   Relationships    Social connections     Talks on phone: Once a week     Gets together: Patient refused     Attends [employer]ism service: Not on file     Active member of club or organization: No     Attends meetings of clubs or organizations: Never     Relationship status:    Other Topics Concern    Not on file   Social History Narrative    Not on file       Chief Complaint:   Chief Complaint   Patient presents with    Knee Pain     bilateral knee-Synvisc One       History of present illness: Is a 70-year-old male seen for recurrent bilateral knee pain. We did a Synvisc-One back in .  Left knee is the worst of the 2.  Pain started about a month ago.  Pain is up to 7/10.      Answers for HPI/ROS submitted by the patient on 2019   Leg pain  unexpected weight change: No  appetite change : No  sleep disturbance: No  IMMUNOCOMPROMISED: No  nervous/ anxious: No  dysphoric mood: No  rash: No  visual disturbance: No  eye redness: No  eye pain: No  ear pain: No  tinnitus: Yes  hearing loss: Yes  sinus pressure : No  nosebleeds: No  enviro allergies: No  food allergies: No  cough: No  shortness of breath: No  sweating: No  frequency: No  difficulty urinating: No  hematuria: No  chest pain: No  palpitations: No  nausea: No  vomiting: No  diarrhea: No  blood in stool: No  constipation: No  headaches: No  dizziness: No  numbness: No  seizures: No  joint swelling: No  myalgia: No  weakness: No  back pain: No  Pain Chronicity: recurrent  History of trauma: No  Onset: more than 1 month ago  Frequency: daily  Progression since onset:  unchanged  injury location: at work  pain- numeric: 3/10  pain location: left knee, right knee  pain quality: aching, sharp  Radiating Pain: No  Aggravating factors: walking  fever: No  inability to bear weight: No  itching: No  joint locking: No  limited range of motion: Yes  stiffness: Yes  tingling: No  Treatments tried: nothing  physical therapy: not tried  Improvement on treatment: significant        Physical Examination:    Vital Signs:    Vitals:    12/28/20 0834   Resp: 16       Body mass index is 33.43 kg/m².    This a well-developed, well nourished patient in no acute distress.  They are alert and oriented and cooperative to examination.  Pt. walks without an antalgic gait.      Examination of bilateral knees shows no rashes or erythema. There are no masses ecchymosis or effusion. Patient has full range of motion from 0-130°. Patient is nontender to palpation over lateral joint line and moderately tender to palpation over the medial joint line. Knee is stable to varus and valgus stress. 5 out of 5 motor strength. Palpable distal pulses. Intact light touch sensation. Negative Patellofemoral crepitus      X-rays: X-rays of both knees are review which show severe medial joint space narrowing of both knees.  No significant progression     Assessment:: Severe varus knee arthritis    Plan:   we injected both knees again with Synvisc-One today.  He will follow up in 6 months to repeat the injections or sooner if treatment needed in the interim.    This note was created using  voice recognition software that occasionally misinterpreted phrases or words.    Consult note is delivered via Epic messaging service.

## 2020-12-28 NOTE — PROCEDURES
Large Joint Aspiration/Injection: bilateral knee    Date/Time: 12/28/2020 8:45 AM  Performed by: Pablo Dutton MD  Authorized by: Pablo Dutton MD     Consent Done?:  Yes (Verbal)  Indications:  Pain  Site marked: the procedure site was marked    Timeout: prior to procedure the correct patient, procedure, and site was verified      Details:  Needle Size:  20 G  Approach:  Anterolateral  Location:  Knee  Laterality:  Bilateral  Site:  Bilateral knee  Medications (Right):  48 mg hylan g-f 20 48 mg/6 mL  Medications (Left):  48 mg hylan g-f 20 48 mg/6 mL  Patient tolerance:  Patient tolerated the procedure well with no immediate complications

## 2021-01-09 ENCOUNTER — IMMUNIZATION (OUTPATIENT)
Dept: PRIMARY CARE CLINIC | Facility: CLINIC | Age: 71
End: 2021-01-09
Payer: COMMERCIAL

## 2021-01-09 DIAGNOSIS — Z23 NEED FOR VACCINATION: ICD-10-CM

## 2021-01-09 PROCEDURE — 0001A COVID-19, MRNA, LNP-S, PF, 30 MCG/0.3 ML DOSE VACCINE: ICD-10-PCS | Mod: S$GLB,,, | Performed by: FAMILY MEDICINE

## 2021-01-09 PROCEDURE — 91300 COVID-19, MRNA, LNP-S, PF, 30 MCG/0.3 ML DOSE VACCINE: CPT | Mod: S$GLB,,, | Performed by: FAMILY MEDICINE

## 2021-01-09 PROCEDURE — 0001A COVID-19, MRNA, LNP-S, PF, 30 MCG/0.3 ML DOSE VACCINE: CPT | Mod: S$GLB,,, | Performed by: FAMILY MEDICINE

## 2021-01-09 PROCEDURE — 91300 COVID-19, MRNA, LNP-S, PF, 30 MCG/0.3 ML DOSE VACCINE: ICD-10-PCS | Mod: S$GLB,,, | Performed by: FAMILY MEDICINE

## 2021-01-30 ENCOUNTER — IMMUNIZATION (OUTPATIENT)
Dept: PRIMARY CARE CLINIC | Facility: CLINIC | Age: 71
End: 2021-01-30
Payer: COMMERCIAL

## 2021-01-30 DIAGNOSIS — Z23 NEED FOR VACCINATION: Primary | ICD-10-CM

## 2021-01-30 PROCEDURE — 91300 COVID-19, MRNA, LNP-S, PF, 30 MCG/0.3 ML DOSE VACCINE: CPT | Mod: S$GLB,,, | Performed by: FAMILY MEDICINE

## 2021-01-30 PROCEDURE — 0002A COVID-19, MRNA, LNP-S, PF, 30 MCG/0.3 ML DOSE VACCINE: ICD-10-PCS | Mod: S$GLB,,, | Performed by: FAMILY MEDICINE

## 2021-01-30 PROCEDURE — 91300 COVID-19, MRNA, LNP-S, PF, 30 MCG/0.3 ML DOSE VACCINE: ICD-10-PCS | Mod: S$GLB,,, | Performed by: FAMILY MEDICINE

## 2021-01-30 PROCEDURE — 0002A COVID-19, MRNA, LNP-S, PF, 30 MCG/0.3 ML DOSE VACCINE: CPT | Mod: S$GLB,,, | Performed by: FAMILY MEDICINE

## 2021-02-11 DIAGNOSIS — E11.9 TYPE 2 DIABETES MELLITUS WITHOUT COMPLICATION: ICD-10-CM

## 2021-02-17 ENCOUNTER — PES CALL (OUTPATIENT)
Dept: ADMINISTRATIVE | Facility: CLINIC | Age: 71
End: 2021-02-17

## 2021-02-26 ENCOUNTER — PATIENT MESSAGE (OUTPATIENT)
Dept: FAMILY MEDICINE | Facility: CLINIC | Age: 71
End: 2021-02-26

## 2021-02-26 ENCOUNTER — OFFICE VISIT (OUTPATIENT)
Dept: FAMILY MEDICINE | Facility: CLINIC | Age: 71
End: 2021-02-26
Payer: COMMERCIAL

## 2021-02-26 VITALS
SYSTOLIC BLOOD PRESSURE: 144 MMHG | BODY MASS INDEX: 33.74 KG/M2 | DIASTOLIC BLOOD PRESSURE: 66 MMHG | HEIGHT: 70 IN | WEIGHT: 235.69 LBS | TEMPERATURE: 99 F | RESPIRATION RATE: 16 BRPM | HEART RATE: 67 BPM | OXYGEN SATURATION: 95 %

## 2021-02-26 DIAGNOSIS — R97.20 ABNORMAL PSA: ICD-10-CM

## 2021-02-26 DIAGNOSIS — E78.5 HYPERLIPIDEMIA WITH TARGET LDL LESS THAN 70: ICD-10-CM

## 2021-02-26 DIAGNOSIS — N18.31 STAGE 3A CHRONIC KIDNEY DISEASE: ICD-10-CM

## 2021-02-26 DIAGNOSIS — L57.0 ADVANCED ACTINIC KERATOSIS: Primary | ICD-10-CM

## 2021-02-26 DIAGNOSIS — E11.65 TYPE 2 DIABETES MELLITUS WITH HYPERGLYCEMIA, WITHOUT LONG-TERM CURRENT USE OF INSULIN: ICD-10-CM

## 2021-02-26 PROCEDURE — 3044F PR MOST RECENT HEMOGLOBIN A1C LEVEL <7.0%: ICD-10-PCS | Mod: CPTII,S$GLB,, | Performed by: FAMILY MEDICINE

## 2021-02-26 PROCEDURE — 99999 PR PBB SHADOW E&M-EST. PATIENT-LVL V: ICD-10-PCS | Mod: PBBFAC,,, | Performed by: FAMILY MEDICINE

## 2021-02-26 PROCEDURE — 3077F PR MOST RECENT SYSTOLIC BLOOD PRESSURE >= 140 MM HG: ICD-10-PCS | Mod: CPTII,S$GLB,, | Performed by: FAMILY MEDICINE

## 2021-02-26 PROCEDURE — 1101F PT FALLS ASSESS-DOCD LE1/YR: CPT | Mod: CPTII,S$GLB,, | Performed by: FAMILY MEDICINE

## 2021-02-26 PROCEDURE — 3288F FALL RISK ASSESSMENT DOCD: CPT | Mod: CPTII,S$GLB,, | Performed by: FAMILY MEDICINE

## 2021-02-26 PROCEDURE — 1126F AMNT PAIN NOTED NONE PRSNT: CPT | Mod: S$GLB,,, | Performed by: FAMILY MEDICINE

## 2021-02-26 PROCEDURE — 1159F PR MEDICATION LIST DOCUMENTED IN MEDICAL RECORD: ICD-10-PCS | Mod: S$GLB,,, | Performed by: FAMILY MEDICINE

## 2021-02-26 PROCEDURE — 3008F PR BODY MASS INDEX (BMI) DOCUMENTED: ICD-10-PCS | Mod: CPTII,S$GLB,, | Performed by: FAMILY MEDICINE

## 2021-02-26 PROCEDURE — 1159F MED LIST DOCD IN RCRD: CPT | Mod: S$GLB,,, | Performed by: FAMILY MEDICINE

## 2021-02-26 PROCEDURE — 3077F SYST BP >= 140 MM HG: CPT | Mod: CPTII,S$GLB,, | Performed by: FAMILY MEDICINE

## 2021-02-26 PROCEDURE — 99214 PR OFFICE/OUTPT VISIT, EST, LEVL IV, 30-39 MIN: ICD-10-PCS | Mod: S$GLB,,, | Performed by: FAMILY MEDICINE

## 2021-02-26 PROCEDURE — 3078F PR MOST RECENT DIASTOLIC BLOOD PRESSURE < 80 MM HG: ICD-10-PCS | Mod: CPTII,S$GLB,, | Performed by: FAMILY MEDICINE

## 2021-02-26 PROCEDURE — 3078F DIAST BP <80 MM HG: CPT | Mod: CPTII,S$GLB,, | Performed by: FAMILY MEDICINE

## 2021-02-26 PROCEDURE — 99999 PR PBB SHADOW E&M-EST. PATIENT-LVL V: CPT | Mod: PBBFAC,,, | Performed by: FAMILY MEDICINE

## 2021-02-26 PROCEDURE — 1126F PR PAIN SEVERITY QUANTIFIED, NO PAIN PRESENT: ICD-10-PCS | Mod: S$GLB,,, | Performed by: FAMILY MEDICINE

## 2021-02-26 PROCEDURE — 3044F HG A1C LEVEL LT 7.0%: CPT | Mod: CPTII,S$GLB,, | Performed by: FAMILY MEDICINE

## 2021-02-26 PROCEDURE — 1101F PR PT FALLS ASSESS DOC 0-1 FALLS W/OUT INJ PAST YR: ICD-10-PCS | Mod: CPTII,S$GLB,, | Performed by: FAMILY MEDICINE

## 2021-02-26 PROCEDURE — 3008F BODY MASS INDEX DOCD: CPT | Mod: CPTII,S$GLB,, | Performed by: FAMILY MEDICINE

## 2021-02-26 PROCEDURE — 3288F PR FALLS RISK ASSESSMENT DOCUMENTED: ICD-10-PCS | Mod: CPTII,S$GLB,, | Performed by: FAMILY MEDICINE

## 2021-02-26 PROCEDURE — 99214 OFFICE O/P EST MOD 30 MIN: CPT | Mod: S$GLB,,, | Performed by: FAMILY MEDICINE

## 2021-02-26 RX ORDER — ASPIRIN 81 MG/1
81 TABLET ORAL 3 TIMES DAILY
COMMUNITY
End: 2023-06-26

## 2021-03-02 ENCOUNTER — PATIENT MESSAGE (OUTPATIENT)
Dept: FAMILY MEDICINE | Facility: CLINIC | Age: 71
End: 2021-03-02

## 2021-03-02 ENCOUNTER — TELEPHONE (OUTPATIENT)
Dept: FAMILY MEDICINE | Facility: CLINIC | Age: 71
End: 2021-03-02

## 2021-03-02 DIAGNOSIS — G47.33 OSA ON CPAP: Primary | ICD-10-CM

## 2021-03-11 ENCOUNTER — PATIENT OUTREACH (OUTPATIENT)
Dept: ADMINISTRATIVE | Facility: OTHER | Age: 71
End: 2021-03-11

## 2021-03-16 ENCOUNTER — OFFICE VISIT (OUTPATIENT)
Dept: DERMATOLOGY | Facility: CLINIC | Age: 71
End: 2021-03-16
Payer: COMMERCIAL

## 2021-03-16 DIAGNOSIS — L57.8 ACTINIC SKIN DAMAGE: ICD-10-CM

## 2021-03-16 DIAGNOSIS — L82.1 STUCCO KERATOSIS: ICD-10-CM

## 2021-03-16 DIAGNOSIS — T14.8XXA EXCORIATION: ICD-10-CM

## 2021-03-16 DIAGNOSIS — L81.4 LENTIGO: Primary | ICD-10-CM

## 2021-03-16 DIAGNOSIS — L57.0 ACTINIC KERATOSIS: ICD-10-CM

## 2021-03-16 PROCEDURE — 3288F PR FALLS RISK ASSESSMENT DOCUMENTED: ICD-10-PCS | Mod: CPTII,S$GLB,, | Performed by: STUDENT IN AN ORGANIZED HEALTH CARE EDUCATION/TRAINING PROGRAM

## 2021-03-16 PROCEDURE — 1101F PT FALLS ASSESS-DOCD LE1/YR: CPT | Mod: CPTII,S$GLB,, | Performed by: STUDENT IN AN ORGANIZED HEALTH CARE EDUCATION/TRAINING PROGRAM

## 2021-03-16 PROCEDURE — 17004 DESTROY PREMAL LESIONS 15/>: CPT | Mod: S$GLB,,, | Performed by: STUDENT IN AN ORGANIZED HEALTH CARE EDUCATION/TRAINING PROGRAM

## 2021-03-16 PROCEDURE — 99999 PR PBB SHADOW E&M-EST. PATIENT-LVL III: CPT | Mod: PBBFAC,,, | Performed by: STUDENT IN AN ORGANIZED HEALTH CARE EDUCATION/TRAINING PROGRAM

## 2021-03-16 PROCEDURE — 3288F FALL RISK ASSESSMENT DOCD: CPT | Mod: CPTII,S$GLB,, | Performed by: STUDENT IN AN ORGANIZED HEALTH CARE EDUCATION/TRAINING PROGRAM

## 2021-03-16 PROCEDURE — 99999 PR PBB SHADOW E&M-EST. PATIENT-LVL III: ICD-10-PCS | Mod: PBBFAC,,, | Performed by: STUDENT IN AN ORGANIZED HEALTH CARE EDUCATION/TRAINING PROGRAM

## 2021-03-16 PROCEDURE — 17004 PR DESTRUCTION, PREMALIGNANT LESIONS; 15 OR MORE LESIONS: ICD-10-PCS | Mod: S$GLB,,, | Performed by: STUDENT IN AN ORGANIZED HEALTH CARE EDUCATION/TRAINING PROGRAM

## 2021-03-16 PROCEDURE — 99213 PR OFFICE/OUTPT VISIT, EST, LEVL III, 20-29 MIN: ICD-10-PCS | Mod: 25,S$GLB,, | Performed by: STUDENT IN AN ORGANIZED HEALTH CARE EDUCATION/TRAINING PROGRAM

## 2021-03-16 PROCEDURE — 1159F MED LIST DOCD IN RCRD: CPT | Mod: S$GLB,,, | Performed by: STUDENT IN AN ORGANIZED HEALTH CARE EDUCATION/TRAINING PROGRAM

## 2021-03-16 PROCEDURE — 1159F PR MEDICATION LIST DOCUMENTED IN MEDICAL RECORD: ICD-10-PCS | Mod: S$GLB,,, | Performed by: STUDENT IN AN ORGANIZED HEALTH CARE EDUCATION/TRAINING PROGRAM

## 2021-03-16 PROCEDURE — 99213 OFFICE O/P EST LOW 20 MIN: CPT | Mod: 25,S$GLB,, | Performed by: STUDENT IN AN ORGANIZED HEALTH CARE EDUCATION/TRAINING PROGRAM

## 2021-03-16 PROCEDURE — 1101F PR PT FALLS ASSESS DOC 0-1 FALLS W/OUT INJ PAST YR: ICD-10-PCS | Mod: CPTII,S$GLB,, | Performed by: STUDENT IN AN ORGANIZED HEALTH CARE EDUCATION/TRAINING PROGRAM

## 2021-03-21 ENCOUNTER — PATIENT MESSAGE (OUTPATIENT)
Dept: FAMILY MEDICINE | Facility: CLINIC | Age: 71
End: 2021-03-21

## 2021-03-26 ENCOUNTER — PATIENT MESSAGE (OUTPATIENT)
Dept: FAMILY MEDICINE | Facility: CLINIC | Age: 71
End: 2021-03-26

## 2021-03-26 DIAGNOSIS — G47.33 OSA ON CPAP: Primary | ICD-10-CM

## 2021-03-31 ENCOUNTER — PATIENT MESSAGE (OUTPATIENT)
Dept: RHEUMATOLOGY | Facility: CLINIC | Age: 71
End: 2021-03-31

## 2021-04-05 ENCOUNTER — PATIENT MESSAGE (OUTPATIENT)
Dept: ADMINISTRATIVE | Facility: HOSPITAL | Age: 71
End: 2021-04-05

## 2021-04-22 ENCOUNTER — PATIENT MESSAGE (OUTPATIENT)
Dept: FAMILY MEDICINE | Facility: CLINIC | Age: 71
End: 2021-04-22

## 2021-04-22 DIAGNOSIS — A09 TRAVELER'S DIARRHEA: Primary | ICD-10-CM

## 2021-04-23 RX ORDER — AZITHROMYCIN 1 G/1
1 POWDER, FOR SUSPENSION ORAL ONCE
Qty: 1 PACKET | Refills: 0 | Status: SHIPPED | OUTPATIENT
Start: 2021-04-23 | End: 2021-04-23

## 2021-05-03 ENCOUNTER — LAB VISIT (OUTPATIENT)
Dept: LAB | Facility: HOSPITAL | Age: 71
End: 2021-05-03
Attending: UROLOGY
Payer: COMMERCIAL

## 2021-05-03 DIAGNOSIS — C61 PROSTATE CANCER: ICD-10-CM

## 2021-05-03 DIAGNOSIS — R97.20 ABNORMAL PSA: ICD-10-CM

## 2021-05-03 LAB
COMPLEXED PSA SERPL-MCNC: 6 NG/ML (ref 0–4)
PROSTATE SPECIFIC ANTIGEN, TOTAL: 5.4 NG/ML (ref 0–4)
PSA FREE MFR SERPL: 14.81 %
PSA FREE SERPL-MCNC: 0.8 NG/ML (ref 0.01–1.5)

## 2021-05-03 PROCEDURE — 84153 ASSAY OF PSA TOTAL: CPT | Mod: 91 | Performed by: UROLOGY

## 2021-05-03 PROCEDURE — 36415 COLL VENOUS BLD VENIPUNCTURE: CPT | Performed by: FAMILY MEDICINE

## 2021-05-03 PROCEDURE — 84153 ASSAY OF PSA TOTAL: CPT | Performed by: FAMILY MEDICINE

## 2021-05-03 PROCEDURE — 84154 ASSAY OF PSA FREE: CPT | Performed by: FAMILY MEDICINE

## 2021-05-04 ENCOUNTER — PATIENT MESSAGE (OUTPATIENT)
Dept: ADMINISTRATIVE | Facility: HOSPITAL | Age: 71
End: 2021-05-04

## 2021-05-04 ENCOUNTER — PATIENT OUTREACH (OUTPATIENT)
Dept: ADMINISTRATIVE | Facility: HOSPITAL | Age: 71
End: 2021-05-04

## 2021-05-05 DIAGNOSIS — E11.9 TYPE 2 DIABETES MELLITUS WITHOUT COMPLICATION: ICD-10-CM

## 2021-05-06 DIAGNOSIS — M25.562 PAIN IN BOTH KNEES, UNSPECIFIED CHRONICITY: Primary | ICD-10-CM

## 2021-05-06 DIAGNOSIS — M25.561 PAIN IN BOTH KNEES, UNSPECIFIED CHRONICITY: Primary | ICD-10-CM

## 2021-05-09 ENCOUNTER — PATIENT OUTREACH (OUTPATIENT)
Dept: ADMINISTRATIVE | Facility: OTHER | Age: 71
End: 2021-05-09

## 2021-05-10 ENCOUNTER — HOSPITAL ENCOUNTER (OUTPATIENT)
Dept: RADIOLOGY | Facility: HOSPITAL | Age: 71
Discharge: HOME | End: 2021-05-10
Attending: ORTHOPAEDIC SURGERY
Payer: COMMERCIAL

## 2021-05-10 ENCOUNTER — OFFICE VISIT (OUTPATIENT)
Dept: ORTHOPEDICS | Facility: CLINIC | Age: 71
End: 2021-05-10
Payer: COMMERCIAL

## 2021-05-10 VITALS — BODY MASS INDEX: 33.64 KG/M2 | HEIGHT: 70 IN | RESPIRATION RATE: 17 BRPM | WEIGHT: 235 LBS

## 2021-05-10 DIAGNOSIS — M25.561 PAIN IN BOTH KNEES, UNSPECIFIED CHRONICITY: ICD-10-CM

## 2021-05-10 DIAGNOSIS — M17.0 PRIMARY OSTEOARTHRITIS OF BOTH KNEES: Primary | ICD-10-CM

## 2021-05-10 DIAGNOSIS — M25.562 PAIN IN BOTH KNEES, UNSPECIFIED CHRONICITY: ICD-10-CM

## 2021-05-10 PROCEDURE — 1159F MED LIST DOCD IN RCRD: CPT | Mod: S$GLB,,, | Performed by: ORTHOPAEDIC SURGERY

## 2021-05-10 PROCEDURE — 73564 X-RAY EXAM KNEE 4 OR MORE: CPT | Mod: TC,50,PN

## 2021-05-10 PROCEDURE — 3008F PR BODY MASS INDEX (BMI) DOCUMENTED: ICD-10-PCS | Mod: CPTII,S$GLB,, | Performed by: ORTHOPAEDIC SURGERY

## 2021-05-10 PROCEDURE — 99999 PR PBB SHADOW E&M-EST. PATIENT-LVL III: CPT | Mod: PBBFAC,,, | Performed by: ORTHOPAEDIC SURGERY

## 2021-05-10 PROCEDURE — 73564 XR KNEE ORTHO BILAT WITH FLEXION: ICD-10-PCS | Mod: 26,,, | Performed by: RADIOLOGY

## 2021-05-10 PROCEDURE — 1125F AMNT PAIN NOTED PAIN PRSNT: CPT | Mod: S$GLB,,, | Performed by: ORTHOPAEDIC SURGERY

## 2021-05-10 PROCEDURE — 1101F PT FALLS ASSESS-DOCD LE1/YR: CPT | Mod: CPTII,S$GLB,, | Performed by: ORTHOPAEDIC SURGERY

## 2021-05-10 PROCEDURE — 1159F PR MEDICATION LIST DOCUMENTED IN MEDICAL RECORD: ICD-10-PCS | Mod: S$GLB,,, | Performed by: ORTHOPAEDIC SURGERY

## 2021-05-10 PROCEDURE — 3008F BODY MASS INDEX DOCD: CPT | Mod: CPTII,S$GLB,, | Performed by: ORTHOPAEDIC SURGERY

## 2021-05-10 PROCEDURE — 3288F FALL RISK ASSESSMENT DOCD: CPT | Mod: CPTII,S$GLB,, | Performed by: ORTHOPAEDIC SURGERY

## 2021-05-10 PROCEDURE — 20610 LARGE JOINT ASPIRATION/INJECTION: BILATERAL KNEE: ICD-10-PCS | Mod: 50,S$GLB,, | Performed by: ORTHOPAEDIC SURGERY

## 2021-05-10 PROCEDURE — 1125F PR PAIN SEVERITY QUANTIFIED, PAIN PRESENT: ICD-10-PCS | Mod: S$GLB,,, | Performed by: ORTHOPAEDIC SURGERY

## 2021-05-10 PROCEDURE — 99213 PR OFFICE/OUTPT VISIT, EST, LEVL III, 20-29 MIN: ICD-10-PCS | Mod: 25,S$GLB,, | Performed by: ORTHOPAEDIC SURGERY

## 2021-05-10 PROCEDURE — 99999 PR PBB SHADOW E&M-EST. PATIENT-LVL III: ICD-10-PCS | Mod: PBBFAC,,, | Performed by: ORTHOPAEDIC SURGERY

## 2021-05-10 PROCEDURE — 99213 OFFICE O/P EST LOW 20 MIN: CPT | Mod: 25,S$GLB,, | Performed by: ORTHOPAEDIC SURGERY

## 2021-05-10 PROCEDURE — 1101F PR PT FALLS ASSESS DOC 0-1 FALLS W/OUT INJ PAST YR: ICD-10-PCS | Mod: CPTII,S$GLB,, | Performed by: ORTHOPAEDIC SURGERY

## 2021-05-10 PROCEDURE — 20610 DRAIN/INJ JOINT/BURSA W/O US: CPT | Mod: 50,S$GLB,, | Performed by: ORTHOPAEDIC SURGERY

## 2021-05-10 PROCEDURE — 73564 X-RAY EXAM KNEE 4 OR MORE: CPT | Mod: 26,,, | Performed by: RADIOLOGY

## 2021-05-10 PROCEDURE — 3288F PR FALLS RISK ASSESSMENT DOCUMENTED: ICD-10-PCS | Mod: CPTII,S$GLB,, | Performed by: ORTHOPAEDIC SURGERY

## 2021-05-10 RX ORDER — TRIAMCINOLONE ACETONIDE 40 MG/ML
40 INJECTION, SUSPENSION INTRA-ARTICULAR; INTRAMUSCULAR
Status: DISCONTINUED | OUTPATIENT
Start: 2021-05-10 | End: 2021-05-10 | Stop reason: HOSPADM

## 2021-05-10 RX ADMIN — TRIAMCINOLONE ACETONIDE 40 MG: 40 INJECTION, SUSPENSION INTRA-ARTICULAR; INTRAMUSCULAR at 04:05

## 2021-05-16 ENCOUNTER — PATIENT MESSAGE (OUTPATIENT)
Dept: UROLOGY | Facility: CLINIC | Age: 71
End: 2021-05-16

## 2021-05-16 DIAGNOSIS — C61 PROSTATE CA: Primary | ICD-10-CM

## 2021-06-04 ENCOUNTER — OFFICE VISIT (OUTPATIENT)
Dept: PULMONOLOGY | Facility: CLINIC | Age: 71
End: 2021-06-04
Payer: COMMERCIAL

## 2021-06-04 VITALS
DIASTOLIC BLOOD PRESSURE: 85 MMHG | BODY MASS INDEX: 32.24 KG/M2 | WEIGHT: 225.19 LBS | OXYGEN SATURATION: 97 % | HEIGHT: 70 IN | SYSTOLIC BLOOD PRESSURE: 159 MMHG | HEART RATE: 57 BPM

## 2021-06-04 DIAGNOSIS — R06.02 SHORTNESS OF BREATH: Primary | ICD-10-CM

## 2021-06-04 DIAGNOSIS — G47.33 OSA ON CPAP: ICD-10-CM

## 2021-06-04 PROCEDURE — 3008F PR BODY MASS INDEX (BMI) DOCUMENTED: ICD-10-PCS | Mod: CPTII,S$GLB,, | Performed by: NURSE PRACTITIONER

## 2021-06-04 PROCEDURE — 1126F PR PAIN SEVERITY QUANTIFIED, NO PAIN PRESENT: ICD-10-PCS | Mod: S$GLB,,, | Performed by: NURSE PRACTITIONER

## 2021-06-04 PROCEDURE — 3288F PR FALLS RISK ASSESSMENT DOCUMENTED: ICD-10-PCS | Mod: CPTII,S$GLB,, | Performed by: NURSE PRACTITIONER

## 2021-06-04 PROCEDURE — 99204 PR OFFICE/OUTPT VISIT, NEW, LEVL IV, 45-59 MIN: ICD-10-PCS | Mod: S$GLB,,, | Performed by: NURSE PRACTITIONER

## 2021-06-04 PROCEDURE — 99999 PR PBB SHADOW E&M-EST. PATIENT-LVL V: CPT | Mod: PBBFAC,,, | Performed by: NURSE PRACTITIONER

## 2021-06-04 PROCEDURE — 99999 PR PBB SHADOW E&M-EST. PATIENT-LVL V: ICD-10-PCS | Mod: PBBFAC,,, | Performed by: NURSE PRACTITIONER

## 2021-06-04 PROCEDURE — 1159F PR MEDICATION LIST DOCUMENTED IN MEDICAL RECORD: ICD-10-PCS | Mod: S$GLB,,, | Performed by: NURSE PRACTITIONER

## 2021-06-04 PROCEDURE — 1101F PR PT FALLS ASSESS DOC 0-1 FALLS W/OUT INJ PAST YR: ICD-10-PCS | Mod: CPTII,S$GLB,, | Performed by: NURSE PRACTITIONER

## 2021-06-04 PROCEDURE — 99204 OFFICE O/P NEW MOD 45 MIN: CPT | Mod: S$GLB,,, | Performed by: NURSE PRACTITIONER

## 2021-06-04 PROCEDURE — 3008F BODY MASS INDEX DOCD: CPT | Mod: CPTII,S$GLB,, | Performed by: NURSE PRACTITIONER

## 2021-06-04 PROCEDURE — 1159F MED LIST DOCD IN RCRD: CPT | Mod: S$GLB,,, | Performed by: NURSE PRACTITIONER

## 2021-06-04 PROCEDURE — 1126F AMNT PAIN NOTED NONE PRSNT: CPT | Mod: S$GLB,,, | Performed by: NURSE PRACTITIONER

## 2021-06-04 PROCEDURE — 1101F PT FALLS ASSESS-DOCD LE1/YR: CPT | Mod: CPTII,S$GLB,, | Performed by: NURSE PRACTITIONER

## 2021-06-04 PROCEDURE — 3288F FALL RISK ASSESSMENT DOCD: CPT | Mod: CPTII,S$GLB,, | Performed by: NURSE PRACTITIONER

## 2021-06-04 RX ORDER — AZITHROMYCIN 1 G/1
POWDER, FOR SUSPENSION ORAL
COMMUNITY
Start: 2021-04-23 | End: 2021-06-16

## 2021-06-04 RX ORDER — ALBUTEROL SULFATE 90 UG/1
2 AEROSOL, METERED RESPIRATORY (INHALATION) EVERY 4 HOURS PRN
Qty: 18 G | Refills: 2 | Status: SHIPPED | OUTPATIENT
Start: 2021-06-04 | End: 2022-08-29

## 2021-06-04 RX ORDER — COVID-19 TEST SPECIMEN COLLECT
MISCELLANEOUS MISCELLANEOUS
COMMUNITY
Start: 2021-05-10 | End: 2022-06-20 | Stop reason: ALTCHOICE

## 2021-06-07 ENCOUNTER — TELEPHONE (OUTPATIENT)
Dept: PULMONOLOGY | Facility: CLINIC | Age: 71
End: 2021-06-07

## 2021-06-09 ENCOUNTER — TELEPHONE (OUTPATIENT)
Dept: PULMONOLOGY | Facility: CLINIC | Age: 71
End: 2021-06-09

## 2021-06-09 DIAGNOSIS — G47.30 SLEEP APNEA, UNSPECIFIED TYPE: Primary | ICD-10-CM

## 2021-06-15 ENCOUNTER — PATIENT OUTREACH (OUTPATIENT)
Dept: ADMINISTRATIVE | Facility: OTHER | Age: 71
End: 2021-06-15

## 2021-06-15 ENCOUNTER — PROCEDURE VISIT (OUTPATIENT)
Dept: SLEEP MEDICINE | Facility: HOSPITAL | Age: 71
End: 2021-06-15
Payer: COMMERCIAL

## 2021-06-15 DIAGNOSIS — G47.30 SLEEP APNEA, UNSPECIFIED TYPE: ICD-10-CM

## 2021-06-15 PROCEDURE — 95806 SLEEP STUDY UNATT&RESP EFFT: CPT

## 2021-06-16 ENCOUNTER — OFFICE VISIT (OUTPATIENT)
Dept: DERMATOLOGY | Facility: CLINIC | Age: 71
End: 2021-06-16
Payer: COMMERCIAL

## 2021-06-16 DIAGNOSIS — D69.2 SOLAR PURPURA: ICD-10-CM

## 2021-06-16 DIAGNOSIS — D48.5 NEOPLASM OF UNCERTAIN BEHAVIOR OF SKIN: Primary | ICD-10-CM

## 2021-06-16 DIAGNOSIS — L57.0 ACTINIC KERATOSIS: ICD-10-CM

## 2021-06-16 DIAGNOSIS — L57.8 ACTINIC SKIN DAMAGE: ICD-10-CM

## 2021-06-16 DIAGNOSIS — L73.9 FOLLICULITIS: ICD-10-CM

## 2021-06-16 PROCEDURE — 1101F PR PT FALLS ASSESS DOC 0-1 FALLS W/OUT INJ PAST YR: ICD-10-PCS | Mod: CPTII,S$GLB,, | Performed by: STUDENT IN AN ORGANIZED HEALTH CARE EDUCATION/TRAINING PROGRAM

## 2021-06-16 PROCEDURE — 17000 PR DESTRUCTION(LASER SURGERY,CRYOSURGERY,CHEMOSURGERY),PREMALIGNANT LESIONS,FIRST LESION: ICD-10-PCS | Mod: 59,S$GLB,, | Performed by: STUDENT IN AN ORGANIZED HEALTH CARE EDUCATION/TRAINING PROGRAM

## 2021-06-16 PROCEDURE — 99999 PR PBB SHADOW E&M-EST. PATIENT-LVL III: ICD-10-PCS | Mod: PBBFAC,,, | Performed by: STUDENT IN AN ORGANIZED HEALTH CARE EDUCATION/TRAINING PROGRAM

## 2021-06-16 PROCEDURE — 99213 PR OFFICE/OUTPT VISIT, EST, LEVL III, 20-29 MIN: ICD-10-PCS | Mod: 25,S$GLB,, | Performed by: STUDENT IN AN ORGANIZED HEALTH CARE EDUCATION/TRAINING PROGRAM

## 2021-06-16 PROCEDURE — 1101F PT FALLS ASSESS-DOCD LE1/YR: CPT | Mod: CPTII,S$GLB,, | Performed by: STUDENT IN AN ORGANIZED HEALTH CARE EDUCATION/TRAINING PROGRAM

## 2021-06-16 PROCEDURE — 88305 TISSUE EXAM BY PATHOLOGIST: CPT | Mod: 26,,, | Performed by: PATHOLOGY

## 2021-06-16 PROCEDURE — 17003 DESTRUCTION, PREMALIGNANT LESIONS; SECOND THROUGH 14 LESIONS: ICD-10-PCS | Mod: S$GLB,,, | Performed by: STUDENT IN AN ORGANIZED HEALTH CARE EDUCATION/TRAINING PROGRAM

## 2021-06-16 PROCEDURE — 17003 DESTRUCT PREMALG LES 2-14: CPT | Mod: S$GLB,,, | Performed by: STUDENT IN AN ORGANIZED HEALTH CARE EDUCATION/TRAINING PROGRAM

## 2021-06-16 PROCEDURE — 11102 PR TANGENTIAL BIOPSY, SKIN, SINGLE LESION: ICD-10-PCS | Mod: S$GLB,,, | Performed by: STUDENT IN AN ORGANIZED HEALTH CARE EDUCATION/TRAINING PROGRAM

## 2021-06-16 PROCEDURE — 99999 PR PBB SHADOW E&M-EST. PATIENT-LVL III: CPT | Mod: PBBFAC,,, | Performed by: STUDENT IN AN ORGANIZED HEALTH CARE EDUCATION/TRAINING PROGRAM

## 2021-06-16 PROCEDURE — 1126F AMNT PAIN NOTED NONE PRSNT: CPT | Mod: S$GLB,,, | Performed by: STUDENT IN AN ORGANIZED HEALTH CARE EDUCATION/TRAINING PROGRAM

## 2021-06-16 PROCEDURE — 1159F PR MEDICATION LIST DOCUMENTED IN MEDICAL RECORD: ICD-10-PCS | Mod: S$GLB,,, | Performed by: STUDENT IN AN ORGANIZED HEALTH CARE EDUCATION/TRAINING PROGRAM

## 2021-06-16 PROCEDURE — 1159F MED LIST DOCD IN RCRD: CPT | Mod: S$GLB,,, | Performed by: STUDENT IN AN ORGANIZED HEALTH CARE EDUCATION/TRAINING PROGRAM

## 2021-06-16 PROCEDURE — 1126F PR PAIN SEVERITY QUANTIFIED, NO PAIN PRESENT: ICD-10-PCS | Mod: S$GLB,,, | Performed by: STUDENT IN AN ORGANIZED HEALTH CARE EDUCATION/TRAINING PROGRAM

## 2021-06-16 PROCEDURE — 3288F FALL RISK ASSESSMENT DOCD: CPT | Mod: CPTII,S$GLB,, | Performed by: STUDENT IN AN ORGANIZED HEALTH CARE EDUCATION/TRAINING PROGRAM

## 2021-06-16 PROCEDURE — 88305 TISSUE EXAM BY PATHOLOGIST: ICD-10-PCS | Mod: 26,,, | Performed by: PATHOLOGY

## 2021-06-16 PROCEDURE — 11102 TANGNTL BX SKIN SINGLE LES: CPT | Mod: S$GLB,,, | Performed by: STUDENT IN AN ORGANIZED HEALTH CARE EDUCATION/TRAINING PROGRAM

## 2021-06-16 PROCEDURE — 88305 TISSUE EXAM BY PATHOLOGIST: CPT | Performed by: PATHOLOGY

## 2021-06-16 PROCEDURE — 99213 OFFICE O/P EST LOW 20 MIN: CPT | Mod: 25,S$GLB,, | Performed by: STUDENT IN AN ORGANIZED HEALTH CARE EDUCATION/TRAINING PROGRAM

## 2021-06-16 PROCEDURE — 3288F PR FALLS RISK ASSESSMENT DOCUMENTED: ICD-10-PCS | Mod: CPTII,S$GLB,, | Performed by: STUDENT IN AN ORGANIZED HEALTH CARE EDUCATION/TRAINING PROGRAM

## 2021-06-16 PROCEDURE — 17000 DESTRUCT PREMALG LESION: CPT | Mod: 59,S$GLB,, | Performed by: STUDENT IN AN ORGANIZED HEALTH CARE EDUCATION/TRAINING PROGRAM

## 2021-06-16 RX ORDER — NITROGLYCERIN 0.4 MG/1
TABLET SUBLINGUAL
COMMUNITY
Start: 2021-06-04

## 2021-06-18 LAB
FINAL PATHOLOGIC DIAGNOSIS: NORMAL
GROSS: NORMAL
Lab: NORMAL
MICROSCOPIC EXAM: NORMAL

## 2021-06-25 ENCOUNTER — TELEPHONE (OUTPATIENT)
Dept: DERMATOLOGY | Facility: CLINIC | Age: 71
End: 2021-06-25

## 2021-06-29 ENCOUNTER — PATIENT MESSAGE (OUTPATIENT)
Dept: PULMONOLOGY | Facility: CLINIC | Age: 71
End: 2021-06-29

## 2021-07-02 ENCOUNTER — LAB VISIT (OUTPATIENT)
Dept: LAB | Facility: HOSPITAL | Age: 71
End: 2021-07-02
Attending: FAMILY MEDICINE
Payer: COMMERCIAL

## 2021-07-02 ENCOUNTER — OFFICE VISIT (OUTPATIENT)
Dept: FAMILY MEDICINE | Facility: CLINIC | Age: 71
End: 2021-07-02
Payer: COMMERCIAL

## 2021-07-02 VITALS
SYSTOLIC BLOOD PRESSURE: 132 MMHG | BODY MASS INDEX: 32.48 KG/M2 | WEIGHT: 226.88 LBS | HEIGHT: 70 IN | TEMPERATURE: 98 F | OXYGEN SATURATION: 97 % | DIASTOLIC BLOOD PRESSURE: 60 MMHG | HEART RATE: 80 BPM | RESPIRATION RATE: 16 BRPM

## 2021-07-02 DIAGNOSIS — E11.65 TYPE 2 DIABETES MELLITUS WITH HYPERGLYCEMIA, WITHOUT LONG-TERM CURRENT USE OF INSULIN: ICD-10-CM

## 2021-07-02 DIAGNOSIS — E78.00 PURE HYPERCHOLESTEROLEMIA: ICD-10-CM

## 2021-07-02 DIAGNOSIS — I15.2 HYPERTENSION ASSOCIATED WITH DIABETES: ICD-10-CM

## 2021-07-02 DIAGNOSIS — E11.59 HYPERTENSION ASSOCIATED WITH DIABETES: ICD-10-CM

## 2021-07-02 DIAGNOSIS — N18.31 STAGE 3A CHRONIC KIDNEY DISEASE: Primary | ICD-10-CM

## 2021-07-02 LAB
ALBUMIN SERPL BCP-MCNC: 3.4 G/DL (ref 3.5–5.2)
ALP SERPL-CCNC: 66 U/L (ref 55–135)
ALT SERPL W/O P-5'-P-CCNC: 15 U/L (ref 10–44)
ANION GAP SERPL CALC-SCNC: 8 MMOL/L (ref 8–16)
AST SERPL-CCNC: 15 U/L (ref 10–40)
BASOPHILS # BLD AUTO: 0.09 K/UL (ref 0–0.2)
BASOPHILS NFR BLD: 0.9 % (ref 0–1.9)
BILIRUB SERPL-MCNC: 0.4 MG/DL (ref 0.1–1)
BUN SERPL-MCNC: 21 MG/DL (ref 8–23)
CALCIUM SERPL-MCNC: 9.7 MG/DL (ref 8.7–10.5)
CHLORIDE SERPL-SCNC: 107 MMOL/L (ref 95–110)
CHOLEST SERPL-MCNC: 159 MG/DL (ref 120–199)
CHOLEST/HDLC SERPL: 3.8 {RATIO} (ref 2–5)
CO2 SERPL-SCNC: 24 MMOL/L (ref 23–29)
CREAT SERPL-MCNC: 1 MG/DL (ref 0.5–1.4)
DIFFERENTIAL METHOD: ABNORMAL
EOSINOPHIL # BLD AUTO: 0.3 K/UL (ref 0–0.5)
EOSINOPHIL NFR BLD: 2.5 % (ref 0–8)
ERYTHROCYTE [DISTWIDTH] IN BLOOD BY AUTOMATED COUNT: 13.6 % (ref 11.5–14.5)
EST. GFR  (AFRICAN AMERICAN): >60 ML/MIN/1.73 M^2
EST. GFR  (NON AFRICAN AMERICAN): >60 ML/MIN/1.73 M^2
ESTIMATED AVG GLUCOSE: 114 MG/DL (ref 68–131)
GLUCOSE SERPL-MCNC: 108 MG/DL (ref 70–110)
HBA1C MFR BLD: 5.6 % (ref 4–5.6)
HCT VFR BLD AUTO: 35.2 % (ref 40–54)
HDLC SERPL-MCNC: 42 MG/DL (ref 40–75)
HDLC SERPL: 26.4 % (ref 20–50)
HGB BLD-MCNC: 11.2 G/DL (ref 14–18)
IMM GRANULOCYTES # BLD AUTO: 0.03 K/UL (ref 0–0.04)
IMM GRANULOCYTES NFR BLD AUTO: 0.3 % (ref 0–0.5)
LDLC SERPL CALC-MCNC: 102.2 MG/DL (ref 63–159)
LYMPHOCYTES # BLD AUTO: 2.4 K/UL (ref 1–4.8)
LYMPHOCYTES NFR BLD: 23.3 % (ref 18–48)
MCH RBC QN AUTO: 30.6 PG (ref 27–31)
MCHC RBC AUTO-ENTMCNC: 31.8 G/DL (ref 32–36)
MCV RBC AUTO: 96 FL (ref 82–98)
MONOCYTES # BLD AUTO: 1 K/UL (ref 0.3–1)
MONOCYTES NFR BLD: 9.8 % (ref 4–15)
NEUTROPHILS # BLD AUTO: 6.4 K/UL (ref 1.8–7.7)
NEUTROPHILS NFR BLD: 63.2 % (ref 38–73)
NONHDLC SERPL-MCNC: 117 MG/DL
NRBC BLD-RTO: 0 /100 WBC
PLATELET # BLD AUTO: 323 K/UL (ref 150–450)
PMV BLD AUTO: 11.2 FL (ref 9.2–12.9)
POTASSIUM SERPL-SCNC: 4.9 MMOL/L (ref 3.5–5.1)
PROT SERPL-MCNC: 6.7 G/DL (ref 6–8.4)
RBC # BLD AUTO: 3.66 M/UL (ref 4.6–6.2)
SODIUM SERPL-SCNC: 139 MMOL/L (ref 136–145)
TRIGL SERPL-MCNC: 74 MG/DL (ref 30–150)
WBC # BLD AUTO: 10.14 K/UL (ref 3.9–12.7)

## 2021-07-02 PROCEDURE — 3008F PR BODY MASS INDEX (BMI) DOCUMENTED: ICD-10-PCS | Mod: CPTII,S$GLB,, | Performed by: FAMILY MEDICINE

## 2021-07-02 PROCEDURE — 3288F FALL RISK ASSESSMENT DOCD: CPT | Mod: CPTII,S$GLB,, | Performed by: FAMILY MEDICINE

## 2021-07-02 PROCEDURE — 99999 PR PBB SHADOW E&M-EST. PATIENT-LVL V: CPT | Mod: PBBFAC,,, | Performed by: FAMILY MEDICINE

## 2021-07-02 PROCEDURE — 99214 OFFICE O/P EST MOD 30 MIN: CPT | Mod: S$GLB,,, | Performed by: FAMILY MEDICINE

## 2021-07-02 PROCEDURE — 1126F AMNT PAIN NOTED NONE PRSNT: CPT | Mod: S$GLB,,, | Performed by: FAMILY MEDICINE

## 2021-07-02 PROCEDURE — 1126F PR PAIN SEVERITY QUANTIFIED, NO PAIN PRESENT: ICD-10-PCS | Mod: S$GLB,,, | Performed by: FAMILY MEDICINE

## 2021-07-02 PROCEDURE — 99214 PR OFFICE/OUTPT VISIT, EST, LEVL IV, 30-39 MIN: ICD-10-PCS | Mod: S$GLB,,, | Performed by: FAMILY MEDICINE

## 2021-07-02 PROCEDURE — 3288F PR FALLS RISK ASSESSMENT DOCUMENTED: ICD-10-PCS | Mod: CPTII,S$GLB,, | Performed by: FAMILY MEDICINE

## 2021-07-02 PROCEDURE — 1101F PT FALLS ASSESS-DOCD LE1/YR: CPT | Mod: CPTII,S$GLB,, | Performed by: FAMILY MEDICINE

## 2021-07-02 PROCEDURE — 83036 HEMOGLOBIN GLYCOSYLATED A1C: CPT | Performed by: FAMILY MEDICINE

## 2021-07-02 PROCEDURE — 1159F PR MEDICATION LIST DOCUMENTED IN MEDICAL RECORD: ICD-10-PCS | Mod: S$GLB,,, | Performed by: FAMILY MEDICINE

## 2021-07-02 PROCEDURE — 80061 LIPID PANEL: CPT | Performed by: FAMILY MEDICINE

## 2021-07-02 PROCEDURE — 3008F BODY MASS INDEX DOCD: CPT | Mod: CPTII,S$GLB,, | Performed by: FAMILY MEDICINE

## 2021-07-02 PROCEDURE — 1159F MED LIST DOCD IN RCRD: CPT | Mod: S$GLB,,, | Performed by: FAMILY MEDICINE

## 2021-07-02 PROCEDURE — 36415 COLL VENOUS BLD VENIPUNCTURE: CPT | Mod: PO | Performed by: FAMILY MEDICINE

## 2021-07-02 PROCEDURE — 85025 COMPLETE CBC W/AUTO DIFF WBC: CPT | Performed by: FAMILY MEDICINE

## 2021-07-02 PROCEDURE — 80053 COMPREHEN METABOLIC PANEL: CPT | Performed by: FAMILY MEDICINE

## 2021-07-02 PROCEDURE — 99999 PR PBB SHADOW E&M-EST. PATIENT-LVL V: ICD-10-PCS | Mod: PBBFAC,,, | Performed by: FAMILY MEDICINE

## 2021-07-02 PROCEDURE — 1101F PR PT FALLS ASSESS DOC 0-1 FALLS W/OUT INJ PAST YR: ICD-10-PCS | Mod: CPTII,S$GLB,, | Performed by: FAMILY MEDICINE

## 2021-07-02 RX ORDER — CLOPIDOGREL BISULFATE 75 MG/1
75 TABLET ORAL DAILY
COMMUNITY
Start: 2021-06-28 | End: 2024-03-07 | Stop reason: SDUPTHER

## 2021-07-06 ENCOUNTER — PATIENT MESSAGE (OUTPATIENT)
Dept: PULMONOLOGY | Facility: CLINIC | Age: 71
End: 2021-07-06

## 2021-07-06 ENCOUNTER — CLINICAL SUPPORT (OUTPATIENT)
Dept: CARDIAC REHAB | Facility: HOSPITAL | Age: 71
End: 2021-07-06
Attending: SPECIALIST
Payer: COMMERCIAL

## 2021-07-06 DIAGNOSIS — Z95.5 S/P CORONARY ARTERY STENT PLACEMENT: ICD-10-CM

## 2021-07-06 PROCEDURE — 93797 PHYS/QHP OP CAR RHAB WO ECG: CPT

## 2021-07-12 ENCOUNTER — CLINICAL SUPPORT (OUTPATIENT)
Dept: CARDIAC REHAB | Facility: HOSPITAL | Age: 71
End: 2021-07-12
Attending: SPECIALIST
Payer: COMMERCIAL

## 2021-07-12 DIAGNOSIS — Z95.5 STENTED CORONARY ARTERY: ICD-10-CM

## 2021-07-12 PROCEDURE — 93798 PHYS/QHP OP CAR RHAB W/ECG: CPT

## 2021-07-13 ENCOUNTER — PATIENT MESSAGE (OUTPATIENT)
Dept: FAMILY MEDICINE | Facility: CLINIC | Age: 71
End: 2021-07-13

## 2021-07-16 ENCOUNTER — TELEPHONE (OUTPATIENT)
Dept: FAMILY MEDICINE | Facility: CLINIC | Age: 71
End: 2021-07-16

## 2021-07-16 ENCOUNTER — CLINICAL SUPPORT (OUTPATIENT)
Dept: CARDIAC REHAB | Facility: HOSPITAL | Age: 71
End: 2021-07-16
Attending: SPECIALIST
Payer: COMMERCIAL

## 2021-07-16 DIAGNOSIS — Z95.5 S/P CORONARY ARTERY STENT PLACEMENT: ICD-10-CM

## 2021-07-16 PROCEDURE — 93798 PHYS/QHP OP CAR RHAB W/ECG: CPT

## 2021-07-21 ENCOUNTER — CLINICAL SUPPORT (OUTPATIENT)
Dept: CARDIAC REHAB | Facility: HOSPITAL | Age: 71
End: 2021-07-21
Attending: SPECIALIST
Payer: COMMERCIAL

## 2021-07-21 DIAGNOSIS — Z95.5 S/P CORONARY ARTERY STENT PLACEMENT: ICD-10-CM

## 2021-07-21 PROCEDURE — 93798 PHYS/QHP OP CAR RHAB W/ECG: CPT

## 2021-07-26 ENCOUNTER — TELEPHONE (OUTPATIENT)
Dept: FAMILY MEDICINE | Facility: CLINIC | Age: 71
End: 2021-07-26

## 2021-08-02 ENCOUNTER — CLINICAL SUPPORT (OUTPATIENT)
Dept: CARDIAC REHAB | Facility: HOSPITAL | Age: 71
End: 2021-08-02
Attending: SPECIALIST
Payer: COMMERCIAL

## 2021-08-02 DIAGNOSIS — Z95.5 S/P CORONARY ARTERY STENT PLACEMENT: ICD-10-CM

## 2021-08-02 PROCEDURE — 93798 PHYS/QHP OP CAR RHAB W/ECG: CPT

## 2021-08-04 ENCOUNTER — CLINICAL SUPPORT (OUTPATIENT)
Dept: CARDIAC REHAB | Facility: HOSPITAL | Age: 71
End: 2021-08-04
Attending: SPECIALIST
Payer: COMMERCIAL

## 2021-08-04 DIAGNOSIS — Z95.5 S/P CORONARY ARTERY STENT PLACEMENT: ICD-10-CM

## 2021-08-04 PROCEDURE — 93798 PHYS/QHP OP CAR RHAB W/ECG: CPT

## 2021-08-09 ENCOUNTER — CLINICAL SUPPORT (OUTPATIENT)
Dept: CARDIAC REHAB | Facility: HOSPITAL | Age: 71
End: 2021-08-09
Attending: SPECIALIST
Payer: COMMERCIAL

## 2021-08-09 DIAGNOSIS — Z95.5 S/P CORONARY ARTERY STENT PLACEMENT: ICD-10-CM

## 2021-08-09 PROCEDURE — 93798 PHYS/QHP OP CAR RHAB W/ECG: CPT

## 2021-08-13 ENCOUNTER — CLINICAL SUPPORT (OUTPATIENT)
Dept: CARDIAC REHAB | Facility: HOSPITAL | Age: 71
End: 2021-08-13
Attending: SPECIALIST
Payer: COMMERCIAL

## 2021-08-13 DIAGNOSIS — Z95.5 S/P CORONARY ARTERY STENT PLACEMENT: ICD-10-CM

## 2021-08-13 PROCEDURE — 93798 PHYS/QHP OP CAR RHAB W/ECG: CPT

## 2021-08-14 ENCOUNTER — TELEPHONE (OUTPATIENT)
Dept: UROLOGY | Facility: CLINIC | Age: 71
End: 2021-08-14

## 2021-08-14 NOTE — TELEPHONE ENCOUNTER
Pt has appt 9/10/21  Will be out of office  Can cancel appt, as also has appt in November    See results note 5/16/21 from psa  On active surveillance for prostate cancer, with small upstaging in Oct 2020 though psa had been stable. Advise of psa doubling in last 6 months, though still lower than initial elevation in 2017.  Would set psa in 3 months and 6 months, and appointment with me to follow the 6 months labs. If continued rise in 3 months will review and bring in sooner    As well in interim pt has had MI and cardiac stent and is completing cardiac rehab    Please have him repeat his labs as above, psa diagnostic in august, November, and keep november appt.  Cancel sept appt

## 2021-08-16 ENCOUNTER — CLINICAL SUPPORT (OUTPATIENT)
Dept: CARDIAC REHAB | Facility: HOSPITAL | Age: 71
End: 2021-08-16
Attending: SPECIALIST
Payer: COMMERCIAL

## 2021-08-16 DIAGNOSIS — Z95.5 S/P CORONARY ARTERY STENT PLACEMENT: Primary | ICD-10-CM

## 2021-08-16 PROCEDURE — 93798 PHYS/QHP OP CAR RHAB W/ECG: CPT

## 2021-08-16 NOTE — TELEPHONE ENCOUNTER
Spoke with patient regarding appts scheduled for 9/10 and 11/22. Advised we would cancel Sept appointment but to keep appt scheduled in November. And reminded of importance of repeat psa labs already schedule in Aug and Nov. Verbalizes understanding.

## 2021-08-18 ENCOUNTER — LAB VISIT (OUTPATIENT)
Dept: LAB | Facility: HOSPITAL | Age: 71
End: 2021-08-18
Attending: UROLOGY
Payer: COMMERCIAL

## 2021-08-18 DIAGNOSIS — C61 PROSTATE CA: ICD-10-CM

## 2021-08-18 LAB — COMPLEXED PSA SERPL-MCNC: 5 NG/ML (ref 0–4)

## 2021-08-18 PROCEDURE — 36415 COLL VENOUS BLD VENIPUNCTURE: CPT | Performed by: UROLOGY

## 2021-08-18 PROCEDURE — 84153 ASSAY OF PSA TOTAL: CPT | Performed by: UROLOGY

## 2021-08-20 ENCOUNTER — CLINICAL SUPPORT (OUTPATIENT)
Dept: CARDIAC REHAB | Facility: HOSPITAL | Age: 71
End: 2021-08-20
Attending: SPECIALIST
Payer: COMMERCIAL

## 2021-08-20 DIAGNOSIS — Z95.5 S/P CORONARY ARTERY STENT PLACEMENT: ICD-10-CM

## 2021-08-20 PROCEDURE — 93798 PHYS/QHP OP CAR RHAB W/ECG: CPT

## 2021-08-25 ENCOUNTER — CLINICAL SUPPORT (OUTPATIENT)
Dept: CARDIAC REHAB | Facility: HOSPITAL | Age: 71
End: 2021-08-25
Attending: SPECIALIST
Payer: COMMERCIAL

## 2021-08-25 DIAGNOSIS — Z95.5 S/P CORONARY ARTERY STENT PLACEMENT: ICD-10-CM

## 2021-08-25 PROCEDURE — 93798 PHYS/QHP OP CAR RHAB W/ECG: CPT

## 2021-09-08 ENCOUNTER — CLINICAL SUPPORT (OUTPATIENT)
Dept: CARDIAC REHAB | Facility: HOSPITAL | Age: 71
End: 2021-09-08
Attending: SPECIALIST
Payer: COMMERCIAL

## 2021-09-08 DIAGNOSIS — Z95.5 S/P CORONARY ARTERY STENT PLACEMENT: ICD-10-CM

## 2021-09-08 PROCEDURE — 93798 PHYS/QHP OP CAR RHAB W/ECG: CPT

## 2021-09-13 ENCOUNTER — CLINICAL SUPPORT (OUTPATIENT)
Dept: CARDIAC REHAB | Facility: HOSPITAL | Age: 71
End: 2021-09-13
Attending: SPECIALIST
Payer: COMMERCIAL

## 2021-09-13 DIAGNOSIS — Z95.5 S/P CORONARY ARTERY STENT PLACEMENT: ICD-10-CM

## 2021-09-13 PROCEDURE — 93798 PHYS/QHP OP CAR RHAB W/ECG: CPT

## 2021-09-14 ENCOUNTER — PATIENT MESSAGE (OUTPATIENT)
Dept: ORTHOPEDICS | Facility: CLINIC | Age: 71
End: 2021-09-14

## 2021-09-14 DIAGNOSIS — M17.0 PRIMARY OSTEOARTHRITIS OF BOTH KNEES: Primary | ICD-10-CM

## 2021-09-15 DIAGNOSIS — M25.559 ACUTE HIP PAIN, UNSPECIFIED LATERALITY: Primary | ICD-10-CM

## 2021-09-17 ENCOUNTER — CLINICAL SUPPORT (OUTPATIENT)
Dept: CARDIAC REHAB | Facility: HOSPITAL | Age: 71
End: 2021-09-17
Attending: SPECIALIST
Payer: COMMERCIAL

## 2021-09-17 DIAGNOSIS — Z95.5 S/P CORONARY ARTERY STENT PLACEMENT: ICD-10-CM

## 2021-09-17 PROCEDURE — 93798 PHYS/QHP OP CAR RHAB W/ECG: CPT

## 2021-09-21 ENCOUNTER — PATIENT OUTREACH (OUTPATIENT)
Dept: ADMINISTRATIVE | Facility: OTHER | Age: 71
End: 2021-09-21

## 2021-09-23 ENCOUNTER — HOSPITAL ENCOUNTER (OUTPATIENT)
Dept: RADIOLOGY | Facility: HOSPITAL | Age: 71
Discharge: HOME | End: 2021-09-23
Attending: ORTHOPAEDIC SURGERY
Payer: COMMERCIAL

## 2021-09-23 ENCOUNTER — OFFICE VISIT (OUTPATIENT)
Dept: ORTHOPEDICS | Facility: CLINIC | Age: 71
End: 2021-09-23
Payer: COMMERCIAL

## 2021-09-23 DIAGNOSIS — M25.559 ACUTE HIP PAIN, UNSPECIFIED LATERALITY: Primary | ICD-10-CM

## 2021-09-23 DIAGNOSIS — M17.0 PRIMARY OSTEOARTHRITIS OF BOTH KNEES: ICD-10-CM

## 2021-09-23 DIAGNOSIS — M25.559 ACUTE HIP PAIN, UNSPECIFIED LATERALITY: ICD-10-CM

## 2021-09-23 PROCEDURE — 20610 DRAIN/INJ JOINT/BURSA W/O US: CPT | Mod: 50,S$GLB,, | Performed by: ORTHOPAEDIC SURGERY

## 2021-09-23 PROCEDURE — 73502 XR HIP WITH PELVIS WHEN PERFORMED, 2 OR 3  VIEWS RIGHT: ICD-10-PCS | Mod: 26,RT,, | Performed by: RADIOLOGY

## 2021-09-23 PROCEDURE — 3044F PR MOST RECENT HEMOGLOBIN A1C LEVEL <7.0%: ICD-10-PCS | Mod: CPTII,S$GLB,, | Performed by: ORTHOPAEDIC SURGERY

## 2021-09-23 PROCEDURE — 99999 PR PBB SHADOW E&M-EST. PATIENT-LVL I: CPT | Mod: PBBFAC,,, | Performed by: ORTHOPAEDIC SURGERY

## 2021-09-23 PROCEDURE — 73502 X-RAY EXAM HIP UNI 2-3 VIEWS: CPT | Mod: TC,PN,RT

## 2021-09-23 PROCEDURE — 3044F HG A1C LEVEL LT 7.0%: CPT | Mod: CPTII,S$GLB,, | Performed by: ORTHOPAEDIC SURGERY

## 2021-09-23 PROCEDURE — 1160F PR REVIEW ALL MEDS BY PRESCRIBER/CLIN PHARMACIST DOCUMENTED: ICD-10-PCS | Mod: CPTII,S$GLB,, | Performed by: ORTHOPAEDIC SURGERY

## 2021-09-23 PROCEDURE — 1159F PR MEDICATION LIST DOCUMENTED IN MEDICAL RECORD: ICD-10-PCS | Mod: CPTII,S$GLB,, | Performed by: ORTHOPAEDIC SURGERY

## 2021-09-23 PROCEDURE — 4010F PR ACE/ARB THEARPY RXD/TAKEN: ICD-10-PCS | Mod: CPTII,S$GLB,, | Performed by: ORTHOPAEDIC SURGERY

## 2021-09-23 PROCEDURE — 1159F MED LIST DOCD IN RCRD: CPT | Mod: CPTII,S$GLB,, | Performed by: ORTHOPAEDIC SURGERY

## 2021-09-23 PROCEDURE — 99213 OFFICE O/P EST LOW 20 MIN: CPT | Mod: 25,S$GLB,, | Performed by: ORTHOPAEDIC SURGERY

## 2021-09-23 PROCEDURE — 73502 X-RAY EXAM HIP UNI 2-3 VIEWS: CPT | Mod: 26,RT,, | Performed by: RADIOLOGY

## 2021-09-23 PROCEDURE — 3060F POS MICROALBUMINURIA REV: CPT | Mod: CPTII,S$GLB,, | Performed by: ORTHOPAEDIC SURGERY

## 2021-09-23 PROCEDURE — 3066F NEPHROPATHY DOC TX: CPT | Mod: CPTII,S$GLB,, | Performed by: ORTHOPAEDIC SURGERY

## 2021-09-23 PROCEDURE — 99213 PR OFFICE/OUTPT VISIT, EST, LEVL III, 20-29 MIN: ICD-10-PCS | Mod: 25,S$GLB,, | Performed by: ORTHOPAEDIC SURGERY

## 2021-09-23 PROCEDURE — 20610 LARGE JOINT ASPIRATION/INJECTION: BILATERAL KNEE: ICD-10-PCS | Mod: 50,S$GLB,, | Performed by: ORTHOPAEDIC SURGERY

## 2021-09-23 PROCEDURE — 4010F ACE/ARB THERAPY RXD/TAKEN: CPT | Mod: CPTII,S$GLB,, | Performed by: ORTHOPAEDIC SURGERY

## 2021-09-23 PROCEDURE — 3060F PR POS MICROALBUMINURIA RESULT DOCUMENTED/REVIEW: ICD-10-PCS | Mod: CPTII,S$GLB,, | Performed by: ORTHOPAEDIC SURGERY

## 2021-09-23 PROCEDURE — 99999 PR PBB SHADOW E&M-EST. PATIENT-LVL I: ICD-10-PCS | Mod: PBBFAC,,, | Performed by: ORTHOPAEDIC SURGERY

## 2021-09-23 PROCEDURE — 3066F PR DOCUMENTATION OF TREATMENT FOR NEPHROPATHY: ICD-10-PCS | Mod: CPTII,S$GLB,, | Performed by: ORTHOPAEDIC SURGERY

## 2021-09-23 PROCEDURE — 1160F RVW MEDS BY RX/DR IN RCRD: CPT | Mod: CPTII,S$GLB,, | Performed by: ORTHOPAEDIC SURGERY

## 2021-09-27 ENCOUNTER — CLINICAL SUPPORT (OUTPATIENT)
Dept: CARDIAC REHAB | Facility: HOSPITAL | Age: 71
End: 2021-09-27
Attending: SPECIALIST
Payer: COMMERCIAL

## 2021-09-27 ENCOUNTER — PATIENT MESSAGE (OUTPATIENT)
Dept: PRIMARY CARE CLINIC | Facility: CLINIC | Age: 71
End: 2021-09-27

## 2021-09-27 DIAGNOSIS — Z95.5 S/P CORONARY ARTERY STENT PLACEMENT: ICD-10-CM

## 2021-09-27 PROCEDURE — 93798 PHYS/QHP OP CAR RHAB W/ECG: CPT

## 2021-10-01 ENCOUNTER — CLINICAL SUPPORT (OUTPATIENT)
Dept: CARDIAC REHAB | Facility: HOSPITAL | Age: 71
End: 2021-10-01
Payer: COMMERCIAL

## 2021-10-01 DIAGNOSIS — Z95.5 S/P CORONARY ARTERY STENT PLACEMENT: ICD-10-CM

## 2021-10-01 PROCEDURE — 93798 PHYS/QHP OP CAR RHAB W/ECG: CPT

## 2021-10-15 ENCOUNTER — CLINICAL SUPPORT (OUTPATIENT)
Dept: CARDIAC REHAB | Facility: HOSPITAL | Age: 71
End: 2021-10-15
Payer: COMMERCIAL

## 2021-10-15 DIAGNOSIS — Z95.5 S/P CORONARY ARTERY STENT PLACEMENT: ICD-10-CM

## 2021-10-15 PROCEDURE — 93798 PHYS/QHP OP CAR RHAB W/ECG: CPT

## 2021-10-17 ENCOUNTER — PATIENT MESSAGE (OUTPATIENT)
Dept: UROLOGY | Facility: CLINIC | Age: 71
End: 2021-10-17
Payer: MEDICARE

## 2021-10-18 ENCOUNTER — CLINICAL SUPPORT (OUTPATIENT)
Dept: CARDIAC REHAB | Facility: HOSPITAL | Age: 71
End: 2021-10-18
Payer: COMMERCIAL

## 2021-10-18 DIAGNOSIS — Z95.5 S/P CORONARY ARTERY STENT PLACEMENT: ICD-10-CM

## 2021-10-18 PROCEDURE — 93798 PHYS/QHP OP CAR RHAB W/ECG: CPT

## 2021-10-20 ENCOUNTER — CLINICAL SUPPORT (OUTPATIENT)
Dept: CARDIAC REHAB | Facility: HOSPITAL | Age: 71
End: 2021-10-20
Payer: COMMERCIAL

## 2021-10-20 DIAGNOSIS — Z95.5 S/P CORONARY ARTERY STENT PLACEMENT: ICD-10-CM

## 2021-10-20 PROCEDURE — 93798 PHYS/QHP OP CAR RHAB W/ECG: CPT

## 2021-10-25 ENCOUNTER — CLINICAL SUPPORT (OUTPATIENT)
Dept: CARDIAC REHAB | Facility: HOSPITAL | Age: 71
End: 2021-10-25
Payer: COMMERCIAL

## 2021-10-25 DIAGNOSIS — Z95.5 S/P CORONARY ARTERY STENT PLACEMENT: ICD-10-CM

## 2021-10-25 PROCEDURE — 93798 PHYS/QHP OP CAR RHAB W/ECG: CPT

## 2021-11-05 ENCOUNTER — CLINICAL SUPPORT (OUTPATIENT)
Dept: CARDIAC REHAB | Facility: HOSPITAL | Age: 71
End: 2021-11-05
Payer: COMMERCIAL

## 2021-11-05 DIAGNOSIS — Z95.5 S/P CORONARY ARTERY STENT PLACEMENT: ICD-10-CM

## 2021-11-05 PROCEDURE — 93798 PHYS/QHP OP CAR RHAB W/ECG: CPT

## 2021-11-10 ENCOUNTER — CLINICAL SUPPORT (OUTPATIENT)
Dept: CARDIAC REHAB | Facility: HOSPITAL | Age: 71
End: 2021-11-10
Payer: COMMERCIAL

## 2021-11-10 DIAGNOSIS — Z95.5 S/P CORONARY ARTERY STENT PLACEMENT: ICD-10-CM

## 2021-11-10 PROCEDURE — 93798 PHYS/QHP OP CAR RHAB W/ECG: CPT

## 2021-11-11 ENCOUNTER — PATIENT MESSAGE (OUTPATIENT)
Dept: ADMINISTRATIVE | Facility: OTHER | Age: 71
End: 2021-11-11
Payer: MEDICARE

## 2021-11-18 ENCOUNTER — OFFICE VISIT (OUTPATIENT)
Dept: FAMILY MEDICINE | Facility: CLINIC | Age: 71
End: 2021-11-18
Payer: COMMERCIAL

## 2021-11-18 VITALS
HEART RATE: 60 BPM | BODY MASS INDEX: 32.54 KG/M2 | SYSTOLIC BLOOD PRESSURE: 136 MMHG | WEIGHT: 227.31 LBS | TEMPERATURE: 98 F | RESPIRATION RATE: 16 BRPM | OXYGEN SATURATION: 97 % | DIASTOLIC BLOOD PRESSURE: 60 MMHG | HEIGHT: 70 IN

## 2021-11-18 DIAGNOSIS — G45.9 TIA (TRANSIENT ISCHEMIC ATTACK): ICD-10-CM

## 2021-11-18 DIAGNOSIS — N18.31 STAGE 3A CHRONIC KIDNEY DISEASE: ICD-10-CM

## 2021-11-18 DIAGNOSIS — E11.65 TYPE 2 DIABETES MELLITUS WITH HYPERGLYCEMIA, WITHOUT LONG-TERM CURRENT USE OF INSULIN: Primary | ICD-10-CM

## 2021-11-18 DIAGNOSIS — I25.10 CORONARY ARTERY DISEASE INVOLVING NATIVE CORONARY ARTERY OF NATIVE HEART WITHOUT ANGINA PECTORIS: ICD-10-CM

## 2021-11-18 DIAGNOSIS — D50.9 MICROCYTIC HYPOCHROMIC ANEMIA: ICD-10-CM

## 2021-11-18 DIAGNOSIS — E78.5 HYPERLIPIDEMIA WITH TARGET LDL LESS THAN 70: ICD-10-CM

## 2021-11-18 PROCEDURE — 90471 FLU VACCINE - QUADRIVALENT - ADJUVANTED: ICD-10-PCS | Mod: S$GLB,,, | Performed by: FAMILY MEDICINE

## 2021-11-18 PROCEDURE — 90694 VACC AIIV4 NO PRSRV 0.5ML IM: CPT | Mod: S$GLB,,, | Performed by: FAMILY MEDICINE

## 2021-11-18 PROCEDURE — 99213 PR OFFICE/OUTPT VISIT, EST, LEVL III, 20-29 MIN: ICD-10-PCS | Mod: 25,S$GLB,, | Performed by: FAMILY MEDICINE

## 2021-11-18 PROCEDURE — 4010F ACE/ARB THERAPY RXD/TAKEN: CPT | Mod: CPTII,S$GLB,, | Performed by: FAMILY MEDICINE

## 2021-11-18 PROCEDURE — 3060F POS MICROALBUMINURIA REV: CPT | Mod: CPTII,S$GLB,, | Performed by: FAMILY MEDICINE

## 2021-11-18 PROCEDURE — 90694 FLU VACCINE - QUADRIVALENT - ADJUVANTED: ICD-10-PCS | Mod: S$GLB,,, | Performed by: FAMILY MEDICINE

## 2021-11-18 PROCEDURE — 99999 PR PBB SHADOW E&M-EST. PATIENT-LVL V: CPT | Mod: PBBFAC,,, | Performed by: FAMILY MEDICINE

## 2021-11-18 PROCEDURE — 3066F NEPHROPATHY DOC TX: CPT | Mod: CPTII,S$GLB,, | Performed by: FAMILY MEDICINE

## 2021-11-18 PROCEDURE — 90471 IMMUNIZATION ADMIN: CPT | Mod: S$GLB,,, | Performed by: FAMILY MEDICINE

## 2021-11-18 PROCEDURE — 4010F PR ACE/ARB THEARPY RXD/TAKEN: ICD-10-PCS | Mod: CPTII,S$GLB,, | Performed by: FAMILY MEDICINE

## 2021-11-18 PROCEDURE — 99999 PR PBB SHADOW E&M-EST. PATIENT-LVL V: ICD-10-PCS | Mod: PBBFAC,,, | Performed by: FAMILY MEDICINE

## 2021-11-18 PROCEDURE — 3066F PR DOCUMENTATION OF TREATMENT FOR NEPHROPATHY: ICD-10-PCS | Mod: CPTII,S$GLB,, | Performed by: FAMILY MEDICINE

## 2021-11-18 PROCEDURE — 99213 OFFICE O/P EST LOW 20 MIN: CPT | Mod: 25,S$GLB,, | Performed by: FAMILY MEDICINE

## 2021-11-18 PROCEDURE — 3060F PR POS MICROALBUMINURIA RESULT DOCUMENTED/REVIEW: ICD-10-PCS | Mod: CPTII,S$GLB,, | Performed by: FAMILY MEDICINE

## 2021-11-18 RX ORDER — HYDROCHLOROTHIAZIDE 12.5 MG/1
12.5 TABLET ORAL DAILY
Qty: 30 TABLET | Refills: 11 | Status: SHIPPED | OUTPATIENT
Start: 2021-11-18 | End: 2023-04-24 | Stop reason: ALTCHOICE

## 2021-11-18 RX ORDER — ATORVASTATIN CALCIUM 40 MG/1
40 TABLET, FILM COATED ORAL DAILY
Qty: 90 TABLET | Refills: 4 | Status: SHIPPED | OUTPATIENT
Start: 2021-11-18 | End: 2023-02-16

## 2021-11-18 RX ORDER — ALCOHOL 2.38 KG/3.79L
1 GEL TOPICAL DAILY
Qty: 30 CAPSULE | Refills: 4 | Status: SHIPPED | OUTPATIENT
Start: 2021-11-18 | End: 2022-06-20 | Stop reason: ALTCHOICE

## 2021-11-20 ENCOUNTER — PATIENT MESSAGE (OUTPATIENT)
Dept: UROLOGY | Facility: CLINIC | Age: 71
End: 2021-11-20
Payer: MEDICARE

## 2021-11-22 ENCOUNTER — OFFICE VISIT (OUTPATIENT)
Dept: UROLOGY | Facility: CLINIC | Age: 71
End: 2021-11-22
Payer: COMMERCIAL

## 2021-11-22 VITALS
BODY MASS INDEX: 32.54 KG/M2 | WEIGHT: 227.31 LBS | HEART RATE: 71 BPM | DIASTOLIC BLOOD PRESSURE: 78 MMHG | SYSTOLIC BLOOD PRESSURE: 148 MMHG | HEIGHT: 70 IN

## 2021-11-22 DIAGNOSIS — C61 PROSTATE CANCER: Primary | ICD-10-CM

## 2021-11-22 DIAGNOSIS — N20.0 KIDNEY STONES: ICD-10-CM

## 2021-11-22 DIAGNOSIS — Z85.528 HISTORY OF RENAL CELL CANCER: ICD-10-CM

## 2021-11-22 LAB
BILIRUB SERPL-MCNC: NORMAL MG/DL
BLOOD URINE, POC: NORMAL
CLARITY, POC UA: CLEAR
COLOR, POC UA: YELLOW
GLUCOSE UR QL STRIP: NORMAL
KETONES UR QL STRIP: NORMAL
LEUKOCYTE ESTERASE URINE, POC: NORMAL
NITRITE, POC UA: NORMAL
PH, POC UA: 5.5
PROTEIN, POC: NORMAL
SPECIFIC GRAVITY, POC UA: 1.02
UROBILINOGEN, POC UA: 0.2

## 2021-11-22 PROCEDURE — 3066F NEPHROPATHY DOC TX: CPT | Mod: CPTII,S$GLB,, | Performed by: UROLOGY

## 2021-11-22 PROCEDURE — 3060F PR POS MICROALBUMINURIA RESULT DOCUMENTED/REVIEW: ICD-10-PCS | Mod: CPTII,S$GLB,, | Performed by: UROLOGY

## 2021-11-22 PROCEDURE — 3066F PR DOCUMENTATION OF TREATMENT FOR NEPHROPATHY: ICD-10-PCS | Mod: CPTII,S$GLB,, | Performed by: UROLOGY

## 2021-11-22 PROCEDURE — 81002 POCT URINE DIPSTICK WITHOUT MICROSCOPE: ICD-10-PCS | Mod: S$GLB,,, | Performed by: UROLOGY

## 2021-11-22 PROCEDURE — 99214 PR OFFICE/OUTPT VISIT, EST, LEVL IV, 30-39 MIN: ICD-10-PCS | Mod: S$GLB,,, | Performed by: UROLOGY

## 2021-11-22 PROCEDURE — 4010F PR ACE/ARB THEARPY RXD/TAKEN: ICD-10-PCS | Mod: CPTII,S$GLB,, | Performed by: UROLOGY

## 2021-11-22 PROCEDURE — 99999 PR PBB SHADOW E&M-EST. PATIENT-LVL III: CPT | Mod: PBBFAC,,, | Performed by: UROLOGY

## 2021-11-22 PROCEDURE — 81002 URINALYSIS NONAUTO W/O SCOPE: CPT | Mod: S$GLB,,, | Performed by: UROLOGY

## 2021-11-22 PROCEDURE — 99999 PR PBB SHADOW E&M-EST. PATIENT-LVL III: ICD-10-PCS | Mod: PBBFAC,,, | Performed by: UROLOGY

## 2021-11-22 PROCEDURE — 99214 OFFICE O/P EST MOD 30 MIN: CPT | Mod: S$GLB,,, | Performed by: UROLOGY

## 2021-11-22 PROCEDURE — 3060F POS MICROALBUMINURIA REV: CPT | Mod: CPTII,S$GLB,, | Performed by: UROLOGY

## 2021-11-22 PROCEDURE — 4010F ACE/ARB THERAPY RXD/TAKEN: CPT | Mod: CPTII,S$GLB,, | Performed by: UROLOGY

## 2021-11-22 RX ORDER — DONEPEZIL HYDROCHLORIDE 5 MG/1
TABLET, FILM COATED ORAL
COMMUNITY
Start: 2021-10-09 | End: 2022-04-28

## 2021-11-22 RX ORDER — DONEPEZIL HYDROCHLORIDE 10 MG/1
10 TABLET, FILM COATED ORAL DAILY
COMMUNITY
Start: 2021-10-11 | End: 2022-06-20 | Stop reason: ALTCHOICE

## 2021-11-30 ENCOUNTER — OFFICE VISIT (OUTPATIENT)
Dept: OTOLARYNGOLOGY | Facility: CLINIC | Age: 71
End: 2021-11-30
Payer: COMMERCIAL

## 2021-11-30 VITALS — WEIGHT: 227.31 LBS | BODY MASS INDEX: 32.54 KG/M2 | HEIGHT: 70 IN

## 2021-11-30 DIAGNOSIS — Z97.4 WEARS HEARING AID IN BOTH EARS: ICD-10-CM

## 2021-11-30 DIAGNOSIS — H61.23 BILATERAL IMPACTED CERUMEN: Primary | ICD-10-CM

## 2021-11-30 PROCEDURE — 99999 PR PBB SHADOW E&M-EST. PATIENT-LVL III: ICD-10-PCS | Mod: PBBFAC,,, | Performed by: NURSE PRACTITIONER

## 2021-11-30 PROCEDURE — 3066F PR DOCUMENTATION OF TREATMENT FOR NEPHROPATHY: ICD-10-PCS | Mod: CPTII,S$GLB,, | Performed by: NURSE PRACTITIONER

## 2021-11-30 PROCEDURE — 3060F POS MICROALBUMINURIA REV: CPT | Mod: CPTII,S$GLB,, | Performed by: NURSE PRACTITIONER

## 2021-11-30 PROCEDURE — 99499 NO LOS: ICD-10-PCS | Mod: S$GLB,,, | Performed by: NURSE PRACTITIONER

## 2021-11-30 PROCEDURE — 3060F PR POS MICROALBUMINURIA RESULT DOCUMENTED/REVIEW: ICD-10-PCS | Mod: CPTII,S$GLB,, | Performed by: NURSE PRACTITIONER

## 2021-11-30 PROCEDURE — 99999 PR PBB SHADOW E&M-EST. PATIENT-LVL III: CPT | Mod: PBBFAC,,, | Performed by: NURSE PRACTITIONER

## 2021-11-30 PROCEDURE — 69210 REMOVE IMPACTED EAR WAX UNI: CPT | Mod: S$GLB,,, | Performed by: NURSE PRACTITIONER

## 2021-11-30 PROCEDURE — 3066F NEPHROPATHY DOC TX: CPT | Mod: CPTII,S$GLB,, | Performed by: NURSE PRACTITIONER

## 2021-11-30 PROCEDURE — 69210 PR REMOVAL IMPACTED CERUMEN REQUIRING INSTRUMENTATION, UNILATERAL: ICD-10-PCS | Mod: S$GLB,,, | Performed by: NURSE PRACTITIONER

## 2021-11-30 PROCEDURE — 4010F ACE/ARB THERAPY RXD/TAKEN: CPT | Mod: CPTII,S$GLB,, | Performed by: NURSE PRACTITIONER

## 2021-11-30 PROCEDURE — 99499 UNLISTED E&M SERVICE: CPT | Mod: S$GLB,,, | Performed by: NURSE PRACTITIONER

## 2021-11-30 PROCEDURE — 4010F PR ACE/ARB THEARPY RXD/TAKEN: ICD-10-PCS | Mod: CPTII,S$GLB,, | Performed by: NURSE PRACTITIONER

## 2021-12-02 ENCOUNTER — PATIENT MESSAGE (OUTPATIENT)
Dept: UROLOGY | Facility: CLINIC | Age: 71
End: 2021-12-02
Payer: MEDICARE

## 2021-12-02 ENCOUNTER — OFFICE VISIT (OUTPATIENT)
Dept: ORTHOPEDICS | Facility: CLINIC | Age: 71
End: 2021-12-02
Payer: COMMERCIAL

## 2021-12-02 ENCOUNTER — PATIENT MESSAGE (OUTPATIENT)
Dept: OTOLARYNGOLOGY | Facility: CLINIC | Age: 71
End: 2021-12-02
Payer: MEDICARE

## 2021-12-02 VITALS — WEIGHT: 227 LBS | BODY MASS INDEX: 32.5 KG/M2 | RESPIRATION RATE: 16 BRPM | HEIGHT: 70 IN

## 2021-12-02 DIAGNOSIS — M70.61 TROCHANTERIC BURSITIS, RIGHT HIP: Primary | ICD-10-CM

## 2021-12-02 PROCEDURE — 99999 PR PBB SHADOW E&M-EST. PATIENT-LVL IV: ICD-10-PCS | Mod: PBBFAC,,, | Performed by: ORTHOPAEDIC SURGERY

## 2021-12-02 PROCEDURE — 20610 DRAIN/INJ JOINT/BURSA W/O US: CPT | Mod: RT,S$GLB,, | Performed by: ORTHOPAEDIC SURGERY

## 2021-12-02 PROCEDURE — 3066F PR DOCUMENTATION OF TREATMENT FOR NEPHROPATHY: ICD-10-PCS | Mod: CPTII,S$GLB,, | Performed by: ORTHOPAEDIC SURGERY

## 2021-12-02 PROCEDURE — 99213 OFFICE O/P EST LOW 20 MIN: CPT | Mod: 25,S$GLB,, | Performed by: ORTHOPAEDIC SURGERY

## 2021-12-02 PROCEDURE — 4010F PR ACE/ARB THEARPY RXD/TAKEN: ICD-10-PCS | Mod: CPTII,S$GLB,, | Performed by: ORTHOPAEDIC SURGERY

## 2021-12-02 PROCEDURE — 3066F NEPHROPATHY DOC TX: CPT | Mod: CPTII,S$GLB,, | Performed by: ORTHOPAEDIC SURGERY

## 2021-12-02 PROCEDURE — 4010F ACE/ARB THERAPY RXD/TAKEN: CPT | Mod: CPTII,S$GLB,, | Performed by: ORTHOPAEDIC SURGERY

## 2021-12-02 PROCEDURE — 99213 PR OFFICE/OUTPT VISIT, EST, LEVL III, 20-29 MIN: ICD-10-PCS | Mod: 25,S$GLB,, | Performed by: ORTHOPAEDIC SURGERY

## 2021-12-02 PROCEDURE — 99999 PR PBB SHADOW E&M-EST. PATIENT-LVL IV: CPT | Mod: PBBFAC,,, | Performed by: ORTHOPAEDIC SURGERY

## 2021-12-02 PROCEDURE — 20610 LARGE JOINT ASPIRATION/INJECTION: R GREATER TROCHANTERIC BURSA: ICD-10-PCS | Mod: RT,S$GLB,, | Performed by: ORTHOPAEDIC SURGERY

## 2021-12-02 PROCEDURE — 3060F PR POS MICROALBUMINURIA RESULT DOCUMENTED/REVIEW: ICD-10-PCS | Mod: CPTII,S$GLB,, | Performed by: ORTHOPAEDIC SURGERY

## 2021-12-02 PROCEDURE — 3060F POS MICROALBUMINURIA REV: CPT | Mod: CPTII,S$GLB,, | Performed by: ORTHOPAEDIC SURGERY

## 2021-12-02 RX ORDER — TRIAMCINOLONE ACETONIDE 40 MG/ML
40 INJECTION, SUSPENSION INTRA-ARTICULAR; INTRAMUSCULAR
Status: DISCONTINUED | OUTPATIENT
Start: 2021-12-02 | End: 2021-12-02 | Stop reason: HOSPADM

## 2021-12-02 RX ADMIN — TRIAMCINOLONE ACETONIDE 40 MG: 40 INJECTION, SUSPENSION INTRA-ARTICULAR; INTRAMUSCULAR at 03:12

## 2021-12-10 ENCOUNTER — HOSPITAL ENCOUNTER (OUTPATIENT)
Dept: RADIOLOGY | Facility: CLINIC | Age: 71
Discharge: HOME | End: 2021-12-10
Attending: UROLOGY
Payer: COMMERCIAL

## 2021-12-10 DIAGNOSIS — N20.0 KIDNEY STONES: ICD-10-CM

## 2021-12-10 PROCEDURE — 74018 RADEX ABDOMEN 1 VIEW: CPT | Mod: 26,,, | Performed by: RADIOLOGY

## 2021-12-10 PROCEDURE — 74018 XR ABDOMEN AP 1 VIEW: ICD-10-PCS | Mod: 26,,, | Performed by: RADIOLOGY

## 2021-12-10 PROCEDURE — 74018 RADEX ABDOMEN 1 VIEW: CPT | Mod: TC,FY,PO

## 2021-12-22 ENCOUNTER — PATIENT MESSAGE (OUTPATIENT)
Dept: FAMILY MEDICINE | Facility: CLINIC | Age: 71
End: 2021-12-22
Payer: MEDICARE

## 2022-01-05 ENCOUNTER — PES CALL (OUTPATIENT)
Dept: ADMINISTRATIVE | Facility: CLINIC | Age: 72
End: 2022-01-05
Payer: MEDICARE

## 2022-02-02 ENCOUNTER — TELEPHONE (OUTPATIENT)
Dept: FAMILY MEDICINE | Facility: CLINIC | Age: 72
End: 2022-02-02
Payer: MEDICARE

## 2022-02-08 ENCOUNTER — OFFICE VISIT (OUTPATIENT)
Dept: FAMILY MEDICINE | Facility: CLINIC | Age: 72
End: 2022-02-08
Payer: COMMERCIAL

## 2022-02-08 VITALS
SYSTOLIC BLOOD PRESSURE: 126 MMHG | DIASTOLIC BLOOD PRESSURE: 70 MMHG | HEART RATE: 63 BPM | HEIGHT: 70 IN | WEIGHT: 232.13 LBS | OXYGEN SATURATION: 96 % | BODY MASS INDEX: 33.23 KG/M2 | TEMPERATURE: 98 F

## 2022-02-08 DIAGNOSIS — C61 PROSTATE CANCER: ICD-10-CM

## 2022-02-08 DIAGNOSIS — Z00.00 ENCOUNTER FOR PREVENTIVE HEALTH EXAMINATION: Primary | ICD-10-CM

## 2022-02-08 DIAGNOSIS — E11.65 TYPE 2 DIABETES MELLITUS WITH HYPERGLYCEMIA, WITHOUT LONG-TERM CURRENT USE OF INSULIN: ICD-10-CM

## 2022-02-08 DIAGNOSIS — D69.2 SOLAR PURPURA: ICD-10-CM

## 2022-02-08 DIAGNOSIS — C68.9 MALIGNANT NEOPLASM OF URINARY ORGAN, UNSPECIFIED: ICD-10-CM

## 2022-02-08 DIAGNOSIS — E11.69 HYPERLIPIDEMIA ASSOCIATED WITH TYPE 2 DIABETES MELLITUS: ICD-10-CM

## 2022-02-08 DIAGNOSIS — I15.2 HYPERTENSION ASSOCIATED WITH DIABETES: ICD-10-CM

## 2022-02-08 DIAGNOSIS — I25.118 ATHEROSCLEROTIC HEART DISEASE OF NATIVE CORONARY ARTERY WITH OTHER FORMS OF ANGINA PECTORIS: ICD-10-CM

## 2022-02-08 DIAGNOSIS — E11.59 HYPERTENSION ASSOCIATED WITH DIABETES: ICD-10-CM

## 2022-02-08 DIAGNOSIS — E78.5 HYPERLIPIDEMIA ASSOCIATED WITH TYPE 2 DIABETES MELLITUS: ICD-10-CM

## 2022-02-08 PROCEDURE — G0439 PR MEDICARE ANNUAL WELLNESS SUBSEQUENT VISIT: ICD-10-PCS | Mod: S$GLB,,, | Performed by: NURSE PRACTITIONER

## 2022-02-08 PROCEDURE — 1101F PR PT FALLS ASSESS DOC 0-1 FALLS W/OUT INJ PAST YR: ICD-10-PCS | Mod: CPTII,S$GLB,, | Performed by: NURSE PRACTITIONER

## 2022-02-08 PROCEDURE — 1159F PR MEDICATION LIST DOCUMENTED IN MEDICAL RECORD: ICD-10-PCS | Mod: CPTII,S$GLB,, | Performed by: NURSE PRACTITIONER

## 2022-02-08 PROCEDURE — 3074F PR MOST RECENT SYSTOLIC BLOOD PRESSURE < 130 MM HG: ICD-10-PCS | Mod: CPTII,S$GLB,, | Performed by: NURSE PRACTITIONER

## 2022-02-08 PROCEDURE — 1101F PT FALLS ASSESS-DOCD LE1/YR: CPT | Mod: CPTII,S$GLB,, | Performed by: NURSE PRACTITIONER

## 2022-02-08 PROCEDURE — 3008F BODY MASS INDEX DOCD: CPT | Mod: CPTII,S$GLB,, | Performed by: NURSE PRACTITIONER

## 2022-02-08 PROCEDURE — 99999 PR PBB SHADOW E&M-EST. PATIENT-LVL V: ICD-10-PCS | Mod: PBBFAC,,, | Performed by: NURSE PRACTITIONER

## 2022-02-08 PROCEDURE — 1159F MED LIST DOCD IN RCRD: CPT | Mod: CPTII,S$GLB,, | Performed by: NURSE PRACTITIONER

## 2022-02-08 PROCEDURE — 3288F FALL RISK ASSESSMENT DOCD: CPT | Mod: CPTII,S$GLB,, | Performed by: NURSE PRACTITIONER

## 2022-02-08 PROCEDURE — 3078F PR MOST RECENT DIASTOLIC BLOOD PRESSURE < 80 MM HG: ICD-10-PCS | Mod: CPTII,S$GLB,, | Performed by: NURSE PRACTITIONER

## 2022-02-08 PROCEDURE — 1160F RVW MEDS BY RX/DR IN RCRD: CPT | Mod: CPTII,S$GLB,, | Performed by: NURSE PRACTITIONER

## 2022-02-08 PROCEDURE — 1160F PR REVIEW ALL MEDS BY PRESCRIBER/CLIN PHARMACIST DOCUMENTED: ICD-10-PCS | Mod: CPTII,S$GLB,, | Performed by: NURSE PRACTITIONER

## 2022-02-08 PROCEDURE — 1126F AMNT PAIN NOTED NONE PRSNT: CPT | Mod: CPTII,S$GLB,, | Performed by: NURSE PRACTITIONER

## 2022-02-08 PROCEDURE — 99999 PR PBB SHADOW E&M-EST. PATIENT-LVL V: CPT | Mod: PBBFAC,,, | Performed by: NURSE PRACTITIONER

## 2022-02-08 PROCEDURE — 1126F PR PAIN SEVERITY QUANTIFIED, NO PAIN PRESENT: ICD-10-PCS | Mod: CPTII,S$GLB,, | Performed by: NURSE PRACTITIONER

## 2022-02-08 PROCEDURE — 3288F PR FALLS RISK ASSESSMENT DOCUMENTED: ICD-10-PCS | Mod: CPTII,S$GLB,, | Performed by: NURSE PRACTITIONER

## 2022-02-08 PROCEDURE — 3008F PR BODY MASS INDEX (BMI) DOCUMENTED: ICD-10-PCS | Mod: CPTII,S$GLB,, | Performed by: NURSE PRACTITIONER

## 2022-02-08 PROCEDURE — G0439 PPPS, SUBSEQ VISIT: HCPCS | Mod: S$GLB,,, | Performed by: NURSE PRACTITIONER

## 2022-02-08 PROCEDURE — 3074F SYST BP LT 130 MM HG: CPT | Mod: CPTII,S$GLB,, | Performed by: NURSE PRACTITIONER

## 2022-02-08 PROCEDURE — 3078F DIAST BP <80 MM HG: CPT | Mod: CPTII,S$GLB,, | Performed by: NURSE PRACTITIONER

## 2022-02-08 NOTE — PROGRESS NOTES
"  Carlos Tenorio presented for a  Medicare AWV and comprehensive Health Risk Assessment today. The following components were reviewed and updated:    · Medical history  · Family History  · Social history  · Allergies and Current Medications  · Health Risk Assessment  · Health Maintenance  · Care Team         ** See Completed Assessments for Annual Wellness Visit within the encounter summary.**         The following assessments were completed:  · Living Situation  · CAGE  · Depression Screening  · Timed Get Up and Go  · Whisper Test  · Cognitive Function Screening  · Nutrition Screening  · ADL Screening  · PAQ Screening            Vitals:    02/08/22 1456   BP: 126/70   Pulse: 63   Temp: 98.2 °F (36.8 °C)   SpO2: 96%   Weight: 105.3 kg (232 lb 2.3 oz)   Height: 5' 10" (1.778 m)     Body mass index is 33.31 kg/m².  Physical Exam  Constitutional:       Appearance: He is well-developed and well-nourished.   HENT:      Head: Normocephalic and atraumatic.      Right Ear: Hearing normal.      Left Ear: Hearing normal.      Nose: Nose normal.   Eyes:      General: Lids are normal.      Conjunctiva/sclera: Conjunctivae normal.      Pupils: Pupils are equal, round, and reactive to light.   Cardiovascular:      Rate and Rhythm: Normal rate.   Pulmonary:      Effort: Pulmonary effort is normal.   Abdominal:      Palpations: Abdomen is soft.   Musculoskeletal:         General: Normal range of motion.      Cervical back: Normal range of motion and neck supple.   Skin:     General: Skin is warm and dry.   Neurological:      Mental Status: He is alert and oriented to person, place, and time.               Diagnoses and health risks identified today and associated recommendations/orders:    1. Encounter for preventive health examination  Discussed health maintenance guidelines appropriate for age.        2. Hyperlipidemia associated with type 2 diabetes mellitus  Controlled, continue current medication regimen  Patient taking " statin  Followed by pcp      3. Hypertension associated with diabetes  Controlled, continue current medication regimen  Low salt diet  Increase physical activity  Followed by pcp      4. Prostate cancer  Stable, patient under active surveillance with urology     5. Type 2 diabetes mellitus with hyperglycemia, without long-term current use of insulin  Controlled, continue current medication regimen  Last HgA1c - 6.0  ADA diet  Increase physical activity   Followed by pcp      6. Malignant neoplasm of urinary organ, unspecified  Stable, 1/3 kidney removed, no recurrence since   MRI yearly - next in May  Followed by urology     7. Solar purpura  Stable, continue to monitor     8. Atherosclerotic heart disease of native coronary artery with other forms of angina pectoris  Stable, continue to monitor  Followed by cardiology       Provided Carlos with a 5-10 year written screening schedule and personal prevention plan. Recommendations were developed using the USPSTF age appropriate recommendations. Education, counseling, and referrals were provided as needed. After Visit Summary printed and given to patient which includes a list of additional screenings\tests needed.    Follow up for One year for Annual Wellness Visit.    Mary Patrick NP  I offered to discuss advanced care planning, including how to pick a person who would make decisions for you if you were unable to make them for yourself, called a health care power of , and what kind of decisions you might make such as use of life sustaining treatments such as ventilators and tube feeding when faced with a life limiting illness recorded on a living will that they will need to know. (How you want to be cared for as you near the end of your natural life)     X Patient is interested in learning more about how to make advanced directives.  I provided them paperwork and offered to discuss this with them.

## 2022-02-08 NOTE — PATIENT INSTRUCTIONS
Counseling and Referral of Other Preventative  (Italic type indicates deductible and co-insurance are waived)    Patient Name: Carlos Tenorio  Today's Date: 2/8/2022    Health Maintenance       Date Due Completion Date    COVID-19 Vaccine (3 - Booster for Pfizer series) 07/30/2021 1/30/2021    Foot Exam 02/26/2022 2/26/2021    Hemoglobin A1c 04/06/2022 10/6/2021    Diabetes Urine Screening 07/02/2022 7/2/2021    Lipid Panel 10/06/2022 10/6/2021    Override on 2/9/2017: Done ([facility])    Eye Exam 10/29/2022 10/29/2021    Override on 5/14/2019: Done (Dr Allen)    High Dose Statin 12/02/2022 12/2/2021    Aspirin/Antiplatelet Therapy 12/02/2022 12/2/2021    TETANUS VACCINE 08/01/2024 8/1/2014    Colorectal Cancer Screening 04/04/2027 4/4/2017        No orders of the defined types were placed in this encounter.    The following information is provided to all patients.  This information is to help you find resources for any of the problems found today that may be affecting your health:                Living healthy guide: www.[facility].[place].gov      Understanding Diabetes: www.diabetes.org      Eating healthy: www.cdc.gov/healthyweight      CDC home safety checklist: www.cdc.gov/steadi/patient.html      Agency on Aging: www.goea.[place].[facility]      Alcoholics anonymous (AA): www.aa.org      Physical Activity: www.marina.nih.gov/mm4potq      Tobacco use: www.quitwithusla.org

## 2022-02-09 DIAGNOSIS — N20.0 KIDNEY STONE: Primary | ICD-10-CM

## 2022-02-10 ENCOUNTER — PATIENT MESSAGE (OUTPATIENT)
Dept: FAMILY MEDICINE | Facility: CLINIC | Age: 72
End: 2022-02-10
Payer: MEDICARE

## 2022-02-18 PROBLEM — N18.30 CKD (CHRONIC KIDNEY DISEASE) STAGE 3, GFR 30-59 ML/MIN: Chronic | Status: RESOLVED | Noted: 2018-01-26 | Resolved: 2022-02-18

## 2022-02-18 PROBLEM — C68.9 MALIGNANT NEOPLASM OF URINARY ORGAN, UNSPECIFIED: Status: ACTIVE | Noted: 2022-02-18

## 2022-02-18 PROBLEM — D69.2 SOLAR PURPURA: Status: ACTIVE | Noted: 2022-02-18

## 2022-02-21 DIAGNOSIS — E11.59 HYPERTENSION ASSOCIATED WITH DIABETES: ICD-10-CM

## 2022-02-21 DIAGNOSIS — I15.2 HYPERTENSION ASSOCIATED WITH DIABETES: ICD-10-CM

## 2022-02-21 NOTE — TELEPHONE ENCOUNTER
No new care gaps identified.  Powered by [employer]. Reference number: [id number].   2/21/2022 3:38:40 AM CST

## 2022-02-22 RX ORDER — POTASSIUM CHLORIDE 20 MEQ/1
TABLET, EXTENDED RELEASE ORAL
Qty: 90 TABLET | Refills: 2 | Status: SHIPPED | OUTPATIENT
Start: 2022-02-22 | End: 2023-01-03 | Stop reason: SDUPTHER

## 2022-02-22 RX ORDER — LOSARTAN POTASSIUM 100 MG/1
TABLET ORAL
Qty: 90 TABLET | Refills: 2 | Status: SHIPPED | OUTPATIENT
Start: 2022-02-22

## 2022-02-22 NOTE — TELEPHONE ENCOUNTER
Refill Authorization Note   Carlos Tenorio  is requesting a refill authorization.  Brief Assessment and Rationale for Refill:  Approve     Medication Therapy Plan:       Medication Reconciliation Completed: No   Comments:   --->Care Gap information included below if applicable.   Orders Placed This Encounter    losartan (COZAAR) 100 MG tablet    potassium chloride SA (K-DUR,KLOR-CON) 20 MEQ tablet      Requested Prescriptions   Signed Prescriptions Disp Refills    losartan (COZAAR) 100 MG tablet 90 tablet 2     Sig: TAKE 1 TABLET(100 MG) BY MOUTH EVERY DAY       Cardiovascular:  Angiotensin Receptor Blockers Passed - 2/21/2022  3:38 AM        Passed - Patient is at least 18 years old        Passed - Last BP in normal range within 360 days     BP Readings from Last 1 Encounters:   02/08/22 126/70               Passed - Valid encounter within last 15 months     Recent Visits  Date Type Provider Dept   11/18/21 Office Visit Roney Bradshaw MD [facility]   07/02/21 Office Visit Roney Bradshaw MD [facility]   02/26/21 Office Visit Roney Bradshaw MD [facility]   11/27/20 Office Visit Roney Bradshaw MD [facility]   Showing recent visits within past 720 days and meeting all other requirements  Future Appointments  No visits were found meeting these conditions.  Showing future appointments within next 150 days and meeting all other requirements      Future Appointments              In 2 weeks [facility] XR1 [facility] (Cl[facility]), [place]    In 2 weeks SPE[facility]IMEN, [facility] [place] [facility], [place]    In 2 weeks LAB, [facility] [place] [facility], [place]    In 1 month Pablo Dutton MD [place] - Orthopedics, [place] [facility]    In 2 months [facility], [place]    In 2 months [facility], [place]    In 3 months [facility] [place] [facility]                Passed - Cr is 1.39  or below and within 360 days     Lab Results   Component Value Date    CREATININE 1.19 11/23/2021    CREATININE 1.17 10/06/2021    CREATININE 1.0 07/02/2021              Passed - K is 5.2 or below and within 360 days     Potassium   Date Value Ref Range Status   11/23/2021 4.8 3.5 - 5.1 mmol/L Final   10/06/2021 5.0 3.5 - 5.1 mmol/L Final   07/02/2021 4.9 3.5 - 5.1 mmol/L Final              Passed - eGFR within 360 days     Lab Results   Component Value Date    EGFRNONAA >60 11/23/2021    EGFRNONAA >60 10/06/2021    EGFRNONAA >60.0 07/02/2021                  potassium chloride SA (K-DUR,KLOR-CON) 20 MEQ tablet 90 tablet 2     Sig: TAKE 1 TABLET(20 MEQ) BY MOUTH EVERY DAY       Endocrinology:  Minerals - Potassium Supplementation Passed - 2/21/2022  3:38 AM        Passed - Patient is at least 18 years old        Passed - Valid encounter within last 15 months     Recent Visits  Date Type Provider Dept   11/18/21 Office Visit Roney Bradshaw MD [facility]   07/02/21 Office Visit Roney Bradshaw MD [facility]   02/26/21 Office Visit Roney Bradshaw MD [facility]   11/27/20 Office Visit Roney Bradshaw MD [facility]   Showing recent visits within past 720 days and meeting all other requirements  Future Appointments  No visits were found meeting these conditions.  Showing future appointments within next 150 days and meeting all other requirements      Future Appointments              In 2 weeks [facility] XR1 [facility] [place] - Imaging (Clinic), [place]    In 2 weeks SPECIMEN, [facility] [place] [facility], [place]    In 2 weeks LAB, [facility] [place] [facility], [place]    In 1 month Pablo Dutton MD [place] - Orthopedics, [place] [facility]    In 2 months [facility], [place]    In 2 months [facility], [place]    In 3 months [facility] [place] [facility]                Passed - K is 5.2  or below and within 360 days     Potassium   Date Value Ref Range Status   11/23/2021 4.8 3.5 - 5.1 mmol/L Final   10/06/2021 5.0 3.5 - 5.1 mmol/L Final   07/02/2021 4.9 3.5 - 5.1 mmol/L Final                Passed - Cr is 1.39 or below and within 360 days     Lab Results   Component Value Date    CREATININE 1.19 11/23/2021    CREATININE 1.17 10/06/2021    CREATININE 1.0 07/02/2021              Passed - eGFR within 360 days     Lab Results   Component Value Date    EGFRNONAA >60 11/23/2021    EGFRNONAA >60 10/06/2021    EGFRNONAA >60.0 07/02/2021                    Appointments  past 12m or future 3m with PCP    Date Provider   Last Visit   11/18/2021 Roney Bradshaw MD   Next Visit   4/29/2022 Roney Bradshaw MD   ED visits in past 90 days: 0     Note composed:12:44 PM 02/22/2022

## 2022-02-22 NOTE — TELEPHONE ENCOUNTER
Refill Authorization Note   Carlos Tenorio  is requesting a refill authorization.  Brief Assessment and Rationale for Refill:  Approve     Medication Therapy Plan:       Medication Reconciliation Completed: No   Comments:   --->Care Gap information included below if applicable.   Orders Placed This Encounter    losartan (COZAAR) 100 MG tablet    potassium chloride SA (K-DUR,KLOR-CON) 20 MEQ tablet      Requested Prescriptions   Signed Prescriptions Disp Refills    losartan (COZAAR) 100 MG tablet 90 tablet 2     Sig: TAKE 1 TABLET(100 MG) BY MOUTH EVERY DAY       Cardiovascular:  Angiotensin Receptor Blockers Passed - 2/21/2022  3:38 AM        Passed - Patient is at least 18 years old        Passed - Last BP in normal range within 360 days     BP Readings from Last 1 Encounters:   02/08/22 126/70               Passed - Valid encounter within last 15 months     Recent Visits  Date Type Provider Dept   11/18/21 Office Visit Roney Bradshaw MD [facility] Family Medicine   07/02/21 Office Visit Roney Bradshaw MD [facility] Family Medicine   02/26/21 Office Visit Roney Bradshaw MD [facility]   11/27/20 Office Visit Roney Bradshaw MD [facility]   Showing recent visits within past 720 days and meeting all other requirements  Future Appointments  No visits were found meeting these conditions.  Showing future appointments within next 150 days and meeting all other requirements      Future Appointments              In 2 weeks [facility] XR1 [facility]), [place]    In 2 weeks SPECIMEN, [facility] [place] [facility], [place]    In 2 weeks LAB, [facility] [place] [facility], [place]    In 1 month Pablo Dutton MD [place] - Orthopedics, [place] [facility]    In 2 months [facility], [place]    In 2 months [facility], [place]    In 3 months [facility] [place] [facility]                Passed - Cr is 1.39  or below and within 360 days     Lab Results   Component Value Date    CREATININE 1.19 11/23/2021    CREATININE 1.17 10/06/2021    CREATININE 1.0 07/02/2021              Passed - K is 5.2 or below and within 360 days     Potassium   Date Value Ref Range Status   11/23/2021 4.8 3.5 - 5.1 mmol/L Final   10/06/2021 5.0 3.5 - 5.1 mmol/L Final   07/02/2021 4.9 3.5 - 5.1 mmol/L Final              Passed - eGFR within 360 days     Lab Results   Component Value Date    EGFRNONAA >60 11/23/2021    EGFRNONAA >60 10/06/2021    EGFRNONAA >60.0 07/02/2021                  potassium chloride SA (K-DUR,KLOR-CON) 20 MEQ tablet 90 tablet 2     Sig: TAKE 1 TABLET(20 MEQ) BY MOUTH EVERY DAY       Endocrinology:  Minerals - Potassium Supplementation Passed - 2/21/2022  3:38 AM        Passed - Patient is at least 18 years old        Passed - Valid encounter within last 15 months     Recent Visits  Date Type Provider Dept   11/18/21 Office Visit Roney Bradshaw MD [facility] Family Medicine   07/02/21 Office Visit Roney Bradshaw MD [facility] Family Medicine   02/26/21 Office Visit Roney Bradshaw MD [facility] Family Medicine   11/27/20 Office Visit Roney Bradshaw MD [facility]   Showing recent visits within past 720 days and meeting all other requirements  Future Appointments  No visits were found meeting these conditions.  Showing future appointments within next 150 days and meeting all other requirements      Future Appointments              In 2 weeks [facility] XR1 [facility] [place] [facility] (Cl[facility]), [place]    In 2 weeks SPE[facility]IMEN, [facility] [place] [facility], [place]    In 2 weeks LAB, [facility] [place] [facility], [place]    In 1 month Pablo Dutton MD [place] - Orthopedics, [place] [facility]    In 2 months [facility], [place]    In 2 months [facility], [place]    In 3 months [facility] [place] [facility]                Passed - K is 5.2  or below and within 360 days     Potassium   Date Value Ref Range Status   11/23/2021 4.8 3.5 - 5.1 mmol/L Final   10/06/2021 5.0 3.5 - 5.1 mmol/L Final   07/02/2021 4.9 3.5 - 5.1 mmol/L Final                Passed - Cr is 1.39 or below and within 360 days     Lab Results   Component Value Date    CREATININE 1.19 11/23/2021    CREATININE 1.17 10/06/2021    CREATININE 1.0 07/02/2021              Passed - eGFR within 360 days     Lab Results   Component Value Date    EGFRNONAA >60 11/23/2021    EGFRNONAA >60 10/06/2021    EGFRNONAA >60.0 07/02/2021                    Appointments  past 12m or future 3m with PCP    Date Provider   Last Visit   11/18/2021 Roney Bradshaw MD   Next Visit   4/29/2022 Roney Bradshaw MD   ED visits in past 90 days: 0     Note composed:12:43 PM 02/22/2022

## 2022-03-10 ENCOUNTER — PATIENT MESSAGE (OUTPATIENT)
Dept: UROLOGY | Facility: CLINIC | Age: 72
End: 2022-03-10
Payer: MEDICARE

## 2022-03-16 ENCOUNTER — LAB VISIT (OUTPATIENT)
Dept: LAB | Facility: HOSPITAL | Age: 72
End: 2022-03-16
Attending: FAMILY MEDICINE
Payer: COMMERCIAL

## 2022-03-16 DIAGNOSIS — E11.65 TYPE 2 DIABETES MELLITUS WITH HYPERGLYCEMIA, WITHOUT LONG-TERM CURRENT USE OF INSULIN: ICD-10-CM

## 2022-03-16 LAB
ALBUMIN SERPL BCP-MCNC: 3.6 G/DL (ref 3.5–5.2)
ALP SERPL-CCNC: 67 U/L (ref 55–135)
ALT SERPL W/O P-5'-P-CCNC: 15 U/L (ref 10–44)
ANION GAP SERPL CALC-SCNC: 11 MMOL/L (ref 8–16)
AST SERPL-CCNC: 14 U/L (ref 10–40)
BASOPHILS # BLD AUTO: 0.08 K/UL (ref 0–0.2)
BASOPHILS NFR BLD: 1 % (ref 0–1.9)
BILIRUB SERPL-MCNC: 0.5 MG/DL (ref 0.1–1)
BUN SERPL-MCNC: 23 MG/DL (ref 8–23)
CALCIUM SERPL-MCNC: 9.9 MG/DL (ref 8.7–10.5)
CHLORIDE SERPL-SCNC: 105 MMOL/L (ref 95–110)
CHOLEST SERPL-MCNC: 148 MG/DL (ref 120–199)
CHOLEST/HDLC SERPL: 3.7 {RATIO} (ref 2–5)
CO2 SERPL-SCNC: 24 MMOL/L (ref 23–29)
CREAT SERPL-MCNC: 1.2 MG/DL (ref 0.5–1.4)
DIFFERENTIAL METHOD: ABNORMAL
EOSINOPHIL # BLD AUTO: 0.1 K/UL (ref 0–0.5)
EOSINOPHIL NFR BLD: 1.7 % (ref 0–8)
ERYTHROCYTE [DISTWIDTH] IN BLOOD BY AUTOMATED COUNT: 13 % (ref 11.5–14.5)
EST. GFR  (AFRICAN AMERICAN): >60 ML/MIN/1.73 M^2
EST. GFR  (NON AFRICAN AMERICAN): >60 ML/MIN/1.73 M^2
ESTIMATED AVG GLUCOSE: 117 MG/DL (ref 68–131)
GLUCOSE SERPL-MCNC: 118 MG/DL (ref 70–110)
HBA1C MFR BLD: 5.7 % (ref 4–5.6)
HCT VFR BLD AUTO: 35.5 % (ref 40–54)
HDLC SERPL-MCNC: 40 MG/DL (ref 40–75)
HDLC SERPL: 27 % (ref 20–50)
HGB BLD-MCNC: 11.2 G/DL (ref 14–18)
IMM GRANULOCYTES # BLD AUTO: 0.02 K/UL (ref 0–0.04)
IMM GRANULOCYTES NFR BLD AUTO: 0.2 % (ref 0–0.5)
LDLC SERPL CALC-MCNC: 93.6 MG/DL (ref 63–159)
LYMPHOCYTES # BLD AUTO: 1.3 K/UL (ref 1–4.8)
LYMPHOCYTES NFR BLD: 15.3 % (ref 18–48)
MCH RBC QN AUTO: 31 PG (ref 27–31)
MCHC RBC AUTO-ENTMCNC: 31.5 G/DL (ref 32–36)
MCV RBC AUTO: 98 FL (ref 82–98)
MONOCYTES # BLD AUTO: 0.9 K/UL (ref 0.3–1)
MONOCYTES NFR BLD: 10.8 % (ref 4–15)
NEUTROPHILS # BLD AUTO: 5.9 K/UL (ref 1.8–7.7)
NEUTROPHILS NFR BLD: 71 % (ref 38–73)
NONHDLC SERPL-MCNC: 108 MG/DL
NRBC BLD-RTO: 0 /100 WBC
PLATELET # BLD AUTO: 322 K/UL (ref 150–450)
PMV BLD AUTO: 11.1 FL (ref 9.2–12.9)
POTASSIUM SERPL-SCNC: 4.6 MMOL/L (ref 3.5–5.1)
PROT SERPL-MCNC: 6.5 G/DL (ref 6–8.4)
RBC # BLD AUTO: 3.61 M/UL (ref 4.6–6.2)
SODIUM SERPL-SCNC: 140 MMOL/L (ref 136–145)
TRIGL SERPL-MCNC: 72 MG/DL (ref 30–150)
WBC # BLD AUTO: 8.31 K/UL (ref 3.9–12.7)

## 2022-03-16 PROCEDURE — 36415 COLL VENOUS BLD VENIPUNCTURE: CPT | Mod: PO | Performed by: FAMILY MEDICINE

## 2022-03-16 PROCEDURE — 80053 COMPREHEN METABOLIC PANEL: CPT | Performed by: FAMILY MEDICINE

## 2022-03-16 PROCEDURE — 83036 HEMOGLOBIN GLYCOSYLATED A1C: CPT | Performed by: FAMILY MEDICINE

## 2022-03-16 PROCEDURE — 85025 COMPLETE CBC W/AUTO DIFF WBC: CPT | Performed by: FAMILY MEDICINE

## 2022-03-16 PROCEDURE — 80061 LIPID PANEL: CPT | Performed by: FAMILY MEDICINE

## 2022-03-30 ENCOUNTER — LAB VISIT (OUTPATIENT)
Dept: LAB | Facility: HOSPITAL | Age: 72
End: 2022-03-30
Attending: FAMILY MEDICINE
Payer: COMMERCIAL

## 2022-03-30 DIAGNOSIS — G30.1 DEMENTIA OF THE ALZHEIMER'S TYPE, WITH LATE ONSET, WITH DELIRIUM: Primary | ICD-10-CM

## 2022-03-30 DIAGNOSIS — F02.82 DEMENTIA OF THE ALZHEIMER'S TYPE, WITH LATE ONSET, WITH DELIRIUM: Primary | ICD-10-CM

## 2022-03-30 LAB
ALBUMIN SERPL BCP-MCNC: 3.6 G/DL (ref 3.5–5.2)
ALP SERPL-CCNC: 71 U/L (ref 55–135)
ALT SERPL W/O P-5'-P-CCNC: 9 U/L (ref 10–44)
ANION GAP SERPL CALC-SCNC: 11 MMOL/L (ref 8–16)
AST SERPL-CCNC: 11 U/L (ref 10–40)
BASOPHILS # BLD AUTO: 0.05 K/UL (ref 0–0.2)
BASOPHILS # BLD AUTO: 0.05 K/UL (ref 0–0.2)
BASOPHILS NFR BLD: 0.5 % (ref 0–1.9)
BASOPHILS NFR BLD: 0.5 % (ref 0–1.9)
BILIRUB SERPL-MCNC: 0.4 MG/DL (ref 0.1–1)
BUN SERPL-MCNC: 28 MG/DL (ref 8–23)
CALCIUM SERPL-MCNC: 10.4 MG/DL (ref 8.7–10.5)
CHLORIDE SERPL-SCNC: 107 MMOL/L (ref 95–110)
CO2 SERPL-SCNC: 24 MMOL/L (ref 23–29)
CREAT SERPL-MCNC: 1.3 MG/DL (ref 0.5–1.4)
DIFFERENTIAL METHOD: ABNORMAL
DIFFERENTIAL METHOD: ABNORMAL
EOSINOPHIL # BLD AUTO: 0.1 K/UL (ref 0–0.5)
EOSINOPHIL # BLD AUTO: 0.1 K/UL (ref 0–0.5)
EOSINOPHIL NFR BLD: 1.4 % (ref 0–8)
EOSINOPHIL NFR BLD: 1.4 % (ref 0–8)
ERYTHROCYTE [DISTWIDTH] IN BLOOD BY AUTOMATED COUNT: 13 % (ref 11.5–14.5)
ERYTHROCYTE [DISTWIDTH] IN BLOOD BY AUTOMATED COUNT: 13 % (ref 11.5–14.5)
EST. GFR  (AFRICAN AMERICAN): >60 ML/MIN/1.73 M^2
EST. GFR  (NON AFRICAN AMERICAN): 54.5 ML/MIN/1.73 M^2
GLUCOSE SERPL-MCNC: 108 MG/DL (ref 70–110)
HCT VFR BLD AUTO: 32.4 % (ref 40–54)
HCT VFR BLD AUTO: 32.4 % (ref 40–54)
HGB BLD-MCNC: 10.4 G/DL (ref 14–18)
HGB BLD-MCNC: 10.4 G/DL (ref 14–18)
IMM GRANULOCYTES # BLD AUTO: 0.03 K/UL (ref 0–0.04)
IMM GRANULOCYTES # BLD AUTO: 0.03 K/UL (ref 0–0.04)
IMM GRANULOCYTES NFR BLD AUTO: 0.3 % (ref 0–0.5)
IMM GRANULOCYTES NFR BLD AUTO: 0.3 % (ref 0–0.5)
LYMPHOCYTES # BLD AUTO: 1.9 K/UL (ref 1–4.8)
LYMPHOCYTES # BLD AUTO: 1.9 K/UL (ref 1–4.8)
LYMPHOCYTES NFR BLD: 20.7 % (ref 18–48)
LYMPHOCYTES NFR BLD: 20.7 % (ref 18–48)
MCH RBC QN AUTO: 31.4 PG (ref 27–31)
MCH RBC QN AUTO: 31.4 PG (ref 27–31)
MCHC RBC AUTO-ENTMCNC: 32.1 G/DL (ref 32–36)
MCHC RBC AUTO-ENTMCNC: 32.1 G/DL (ref 32–36)
MCV RBC AUTO: 98 FL (ref 82–98)
MCV RBC AUTO: 98 FL (ref 82–98)
MONOCYTES # BLD AUTO: 0.9 K/UL (ref 0.3–1)
MONOCYTES # BLD AUTO: 0.9 K/UL (ref 0.3–1)
MONOCYTES NFR BLD: 10.1 % (ref 4–15)
MONOCYTES NFR BLD: 10.1 % (ref 4–15)
NEUTROPHILS # BLD AUTO: 6.2 K/UL (ref 1.8–7.7)
NEUTROPHILS # BLD AUTO: 6.2 K/UL (ref 1.8–7.7)
NEUTROPHILS NFR BLD: 67 % (ref 38–73)
NEUTROPHILS NFR BLD: 67 % (ref 38–73)
NRBC BLD-RTO: 0 /100 WBC
NRBC BLD-RTO: 0 /100 WBC
PLATELET # BLD AUTO: 366 K/UL (ref 150–450)
PLATELET # BLD AUTO: 366 K/UL (ref 150–450)
PMV BLD AUTO: 11.1 FL (ref 9.2–12.9)
PMV BLD AUTO: 11.1 FL (ref 9.2–12.9)
POTASSIUM SERPL-SCNC: 4.6 MMOL/L (ref 3.5–5.1)
PROT SERPL-MCNC: 6.6 G/DL (ref 6–8.4)
RBC # BLD AUTO: 3.31 M/UL (ref 4.6–6.2)
RBC # BLD AUTO: 3.31 M/UL (ref 4.6–6.2)
SODIUM SERPL-SCNC: 142 MMOL/L (ref 136–145)
WBC # BLD AUTO: 9.2 K/UL (ref 3.9–12.7)
WBC # BLD AUTO: 9.2 K/UL (ref 3.9–12.7)

## 2022-03-30 PROCEDURE — 36415 COLL VENOUS BLD VENIPUNCTURE: CPT | Mod: PO | Performed by: NURSE PRACTITIONER

## 2022-03-30 PROCEDURE — 85025 COMPLETE CBC W/AUTO DIFF WBC: CPT | Performed by: NURSE PRACTITIONER

## 2022-03-30 PROCEDURE — 80053 COMPREHEN METABOLIC PANEL: CPT | Performed by: NURSE PRACTITIONER

## 2022-04-06 DIAGNOSIS — M17.0 PRIMARY OSTEOARTHRITIS OF BOTH KNEES: Primary | ICD-10-CM

## 2022-04-21 ENCOUNTER — PATIENT MESSAGE (OUTPATIENT)
Dept: FAMILY MEDICINE | Facility: CLINIC | Age: 72
End: 2022-04-21
Payer: COMMERCIAL

## 2022-04-21 ENCOUNTER — PATIENT MESSAGE (OUTPATIENT)
Dept: UROLOGY | Facility: CLINIC | Age: 72
End: 2022-04-21
Payer: COMMERCIAL

## 2022-04-22 NOTE — TELEPHONE ENCOUNTER
Called pt regrading lab and appts   Pt was informed of psa lab need to be performed in the same location for consistency and contunity of care.  Pt unsure if have been getting done at [facility]/or [facility] and will sned message once wife wakes up regarding labs and location

## 2022-04-28 ENCOUNTER — OFFICE VISIT (OUTPATIENT)
Dept: ORTHOPEDICS | Facility: CLINIC | Age: 72
End: 2022-04-28
Payer: COMMERCIAL

## 2022-04-28 VITALS — HEIGHT: 70 IN | RESPIRATION RATE: 18 BRPM | BODY MASS INDEX: 33.21 KG/M2 | WEIGHT: 232 LBS

## 2022-04-28 DIAGNOSIS — M17.0 PRIMARY OSTEOARTHRITIS OF BOTH KNEES: Primary | ICD-10-CM

## 2022-04-28 PROCEDURE — 99499 NO LOS: ICD-10-PCS | Mod: S$GLB,,, | Performed by: ORTHOPAEDIC SURGERY

## 2022-04-28 PROCEDURE — 99999 PR PBB SHADOW E&M-EST. PATIENT-LVL IV: CPT | Mod: PBBFAC,,, | Performed by: ORTHOPAEDIC SURGERY

## 2022-04-28 PROCEDURE — 20610 DRAIN/INJ JOINT/BURSA W/O US: CPT | Mod: 50,PBBFAC,PN | Performed by: ORTHOPAEDIC SURGERY

## 2022-04-28 PROCEDURE — 20610 LARGE JOINT ASPIRATION/INJECTION: BILATERAL KNEE: ICD-10-PCS | Mod: 50,S$GLB,, | Performed by: ORTHOPAEDIC SURGERY

## 2022-04-28 PROCEDURE — 99214 OFFICE O/P EST MOD 30 MIN: CPT | Mod: PBBFAC,PN | Performed by: ORTHOPAEDIC SURGERY

## 2022-04-28 PROCEDURE — 99499 UNLISTED E&M SERVICE: CPT | Mod: S$GLB,,, | Performed by: ORTHOPAEDIC SURGERY

## 2022-04-28 PROCEDURE — 99999 PR PBB SHADOW E&M-EST. PATIENT-LVL IV: ICD-10-PCS | Mod: PBBFAC,,, | Performed by: ORTHOPAEDIC SURGERY

## 2022-04-28 RX ADMIN — Medication 48 MG: at 01:04

## 2022-04-28 NOTE — PROGRESS NOTES
Past Medical History:   Diagnosis Date    Arthritis     knees, back    Bilateral renal cysts 7/30/2012    CAD (coronary artery disease) 1995    no chest pain since CABG, sees Dr Larry Black    Diabetes mellitus     borderline    Hypertension     Kidney stones     uric acid stones    VASYL (obstructive sleep apnea) 2007    is compliant with CPAP    Primary gonadal failure        Past Surgical History:   Procedure Laterality Date    BACK SURGERY  2010    L5-6 fusion, with pins,bone graft    cardiac stents      2 stents 12/2021 and 1/2022    CARDIAC SURGERY  1995, 2007    total 7 vessel bypass    COLONOSCOPY  2008    repeat 2013    COLONOSCOPY N/A 4/4/2017    Procedure: COLONOSCOPY;  Surgeon: Dmitry Sampson MD;  Location: [facility] ENDO;  Service: Endoscopy;  Laterality: N/A;    CORONARY ARTERY BYPASS GRAFT  1995, 2007    cabgx3, cabgx5    EXTRACORPOREAL SHOCK WAVE LITHOTRIPSY      EYE SURGERY      cataracts bilateral    LITHOTRIPSY      LUMBAR LAMINECTOMY      Partial Nephrectomy Laproscopic      TRANSRECTAL BIOPSY OF PROSTATE WITH ULTRASOUND GUIDANCE N/A 3/19/2019    Procedure: BIOPSY, PROSTATE, RECTAL APPROACH, WITH US GUIDANCE;  Surgeon: Yovany Heart MD;  Location: [facility] OR;  Service: Urology;  Laterality: N/A;    TRANSRECTAL BIOPSY OF PROSTATE WITH ULTRASOUND GUIDANCE N/A 10/20/2020    Procedure: BIOPSY, PROSTATE, RECTAL APPROACH, WITH US GUIDANCE;  Surgeon: Yovany Heart MD;  Location: [facility] OR;  Service: Urology;  Laterality: N/A;       Current Outpatient Medications   Medication Sig    albuterol (VENTOLIN HFA) 90 mcg/actuation inhaler Inhale 2 puffs into the lungs every 4 (four) hours as needed for Wheezing or Shortness of Breath. Rescue    allopurinoL (ZYLOPRIM) 100 MG tablet TAKE 1 TABLET(100 MG) BY MOUTH EVERY DAY    aspirin (ECOTRIN) 81 MG EC tablet Take 81 mg by mouth 3 (three) times daily.    atorvastatin (LIPITOR) 40 MG tablet Take 1 tablet (40 mg total) by mouth once  daily.    clopidogreL (PLAVIX) 75 mg tablet Take 75 mg by mouth once daily.    COVID-19 test specimen collect Misc TEST AS DIRCECTED    cyanocobalamin, vitamin B-12, (VITAMIN B-12 ORAL) Take by mouth.    donepeziL (ARICEPT) 10 MG tablet Take 10 mg by mouth once daily.    ferrous sulfate (IRON ORAL) Take by mouth.    fish oil-omega-3 fatty acids 300-1,000 mg capsule Take 2 g by mouth 3 (three) times daily.    hydroCHLOROthiazide (HYDRODIURIL) 12.5 MG Tab Take 1 tablet (12.5 mg total) by mouth once daily.    cczclcwtv-[id number]-foZ76-algal oil (METANX/FOLTANX RF) 3 mg-35 mg-2 mg -90.314 mg Cap Take 1 capsule by mouth once daily.    losartan (COZAAR) 100 MG tablet TAKE 1 TABLET(100 MG) BY MOUTH EVERY DAY    metFORMIN (GLUCOPHAGE-XR) 500 MG 24 hr tablet Take 1 tablet (500 mg total) by mouth 2 (two) times daily with meals.    nitroGLYCERIN (NITROSTAT) 0.4 MG SL tablet SMARTSI Tablet(s) Sublingual PRN    potassium chloride SA (K-DUR,KLOR-CON) 20 MEQ tablet TAKE 1 TABLET(20 MEQ) BY MOUTH EVERY DAY    SLOW  mg (45 mg iron) TbSR Take by mouth.    vitamin D 1000 units Tab Take 185 mg by mouth once daily.    donepeziL (ARICEPT) 5 MG tablet     sildenafil (VIAGRA) 100 MG tablet Take 1 tablet (100 mg total) by mouth as needed for Erectile Dysfunction.     No current facility-administered medications for this visit.     Facility-Administered Medications Ordered in Other Visits   Medication    triamcinolone acetonide injection 40 mg    triamcinolone acetonide injection 40 mg       Review of patient's allergies indicates:   Allergen Reactions    Adhesive Blisters       Family History   Problem Relation Age of Onset    Heart disease Mother     Hypertension Mother     Diabetes Mother     Heart disease Father         premature    Hypertension Father     Diabetes Sister     Urolithiasis Sister     Heart disease Paternal Uncle     Cancer Neg Hx     Prostate cancer Neg Hx     Kidney cancer Neg Hx      Melanoma Neg Hx     Lupus Neg Hx     Eczema Neg Hx     Psoriasis Neg Hx        Social History     Socioeconomic History    Marital status:    Tobacco Use    Smoking status: Former Smoker     Quit date: 1976     Years since quittin.2    Smokeless tobacco: Never Used   Substance and Sexual Activity    Alcohol use: Yes     Comment: Socially    Drug use: No    Sexual activity: Yes     Social Determinants of Health     Financial Resource Strain: Low Risk     Difficulty of Paying Living Expenses: Not hard at all   Food Insecurity: No Food Insecurity    Worried About Running Out of Food in the Last Year: Never true    Ran Out of Food in the Last Year: Never true   Transportation Needs: No Transportation Needs    Lack of Transportation (Medical): No    Lack of Transportation (Non-Medical): No   Physical Activity: Insufficiently Active    Days of Exercise per Week: 2 days    Minutes of Exercise per Session: 20 min   Stress: Stress Concern Present    Feeling of Stress : To some extent   Social Connections: Unknown    Frequency of Communication with Friends and Family: More than three times a week    Frequency of Social Gatherings with Friends and Family: Twice a week    Active Member of Clubs or Organizations: No    Attends Club or Organization Meetings: Never    Marital Status:    Housing Stability: Low Risk     Unable to Pay for Housing in the Last Year: No    Number of Places Lived in the Last Year: 1    Unstable Housing in the Last Year: No       Chief Complaint:   Chief Complaint   Patient presents with    Knee Pain     B synvisc one       History of present illness: Is a 71-year-old male seen for some right hip pain.  This started about 9 months ago.  No injury or trauma.  Pain laying on it at night.  Pain going up and down stairs.  Pain in the hip is a 6/10.  Using some topical anti-inflammatories and some Tylenol.      Answers for HPI/ROS submitted by the patient on  11/29/2019   Leg pain  unexpected weight change: No  appetite change : No  sleep disturbance: No  IMMUNOCOMPROMISED: No  nervous/ anxious: No  dysphoric mood: No  rash: No  visual disturbance: No  eye redness: No  eye pain: No  ear pain: No  tinnitus: Yes  hearing loss: Yes  sinus pressure : No  nosebleeds: No  enviro allergies: No  food allergies: No  cough: No  shortness of breath: No  sweating: No  frequency: No  difficulty urinating: No  hematuria: No  chest pain: No  palpitations: No  nausea: No  vomiting: No  diarrhea: No  blood in stool: No  constipation: No  headaches: No  dizziness: No  numbness: No  seizures: No  joint swelling: No  myalgia: No  weakness: No  back pain: No  Pain Chronicity: recurrent  History of trauma: No  Onset: more than 1 month ago  Frequency: daily  Progression since onset: unchanged  injury location: at work  pain- numeric: 3/10  pain location: left knee, right knee  pain quality: aching, sharp  Radiating Pain: No  Aggravating factors: walking  fever: No  inability to bear weight: No  itching: No  joint locking: No  limited range of motion: Yes  stiffness: Yes  tingling: No  Treatments tried: nothing  physical therapy: not tried  Improvement on treatment: significant        Physical Examination:    Vital Signs:    Vitals:    04/28/22 1342   Resp: 18       Body mass index is 33.29 kg/m².    This a well-developed, well nourished patient in no acute distress.  They are alert and oriented and cooperative to examination.  Pt. walks without an antalgic gait.      Examination of bilateral knees shows no rashes or erythema. There are no masses ecchymosis or effusion. Patient has full range of motion from 0-130°. Patient is nontender to palpation over lateral joint line and moderately tender to palpation over the medial joint line. Knee is stable to varus and valgus stress. 5 out of 5 motor strength. Palpable distal pulses. Intact light touch sensation. Negative Patellofemoral  crepitus    Examination of the patient's right hip shows full range of motion with flexion to 160°, extension to 0, external rotation to 50°, internal rotation of 15°, abduction of 50°, adduction of 15°. Skin has no rashes or bruising. Patient has negative Stinchfield exam. Patient has negative straight leg raise.Negative internal impingement test. Negative MISHEL test. Negative Tabatha's test. Patient has no pain with hip range of motion.  Mildly tender to palpation over the greater trochanteric bursa. Patient is 5 out of 5 motor strength, palpable distal pulses, and intact light touch sensation.       X-rays: X-rays of both knees are  review which show severe medial joint space narrowing of both knees.  No significant progression  X-rays of the right hip are  reviewed which shows no atypical findings.  Patient does have history of prior lumbar spine surgery.     Assessment:: Severe varus knee arthritis  Right hip bursitis    Plan:    I injected both knees with Synvisc-One.  Follow-up in 6 months.    This note was created using  voice recognition software that occasionally misinterpreted phrases or words.    Consult note is delivered via Epic messaging service.

## 2022-04-29 ENCOUNTER — OFFICE VISIT (OUTPATIENT)
Dept: FAMILY MEDICINE | Facility: CLINIC | Age: 72
End: 2022-04-29
Payer: COMMERCIAL

## 2022-04-29 ENCOUNTER — TELEPHONE (OUTPATIENT)
Dept: FAMILY MEDICINE | Facility: CLINIC | Age: 72
End: 2022-04-29

## 2022-04-29 ENCOUNTER — LAB VISIT (OUTPATIENT)
Dept: LAB | Facility: HOSPITAL | Age: 72
End: 2022-04-29
Attending: UROLOGY
Payer: COMMERCIAL

## 2022-04-29 ENCOUNTER — TELEPHONE (OUTPATIENT)
Dept: GASTROENTEROLOGY | Facility: CLINIC | Age: 72
End: 2022-04-29
Payer: COMMERCIAL

## 2022-04-29 VITALS
TEMPERATURE: 98 F | HEIGHT: 70 IN | OXYGEN SATURATION: 99 % | BODY MASS INDEX: 31.47 KG/M2 | HEART RATE: 63 BPM | DIASTOLIC BLOOD PRESSURE: 66 MMHG | RESPIRATION RATE: 16 BRPM | SYSTOLIC BLOOD PRESSURE: 138 MMHG | WEIGHT: 219.81 LBS

## 2022-04-29 DIAGNOSIS — C61 PROSTATE CANCER: ICD-10-CM

## 2022-04-29 DIAGNOSIS — I25.118 ATHEROSCLEROTIC HEART DISEASE OF NATIVE CORONARY ARTERY WITH OTHER FORMS OF ANGINA PECTORIS: ICD-10-CM

## 2022-04-29 DIAGNOSIS — D63.8 ANEMIA, CHRONIC DISEASE: Primary | ICD-10-CM

## 2022-04-29 DIAGNOSIS — F43.22 ADJUSTMENT DISORDER WITH ANXIOUS MOOD: ICD-10-CM

## 2022-04-29 LAB — COMPLEXED PSA SERPL-MCNC: 5.8 NG/ML (ref 0–4)

## 2022-04-29 PROCEDURE — 99215 OFFICE O/P EST HI 40 MIN: CPT | Mod: PBBFAC,PO | Performed by: FAMILY MEDICINE

## 2022-04-29 PROCEDURE — 99999 PR PBB SHADOW E&M-EST. PATIENT-LVL V: CPT | Mod: PBBFAC,,, | Performed by: FAMILY MEDICINE

## 2022-04-29 PROCEDURE — 99999 PR PBB SHADOW E&M-EST. PATIENT-LVL V: ICD-10-PCS | Mod: PBBFAC,,, | Performed by: FAMILY MEDICINE

## 2022-04-29 PROCEDURE — 99214 PR OFFICE/OUTPT VISIT, EST, LEVL IV, 30-39 MIN: ICD-10-PCS | Mod: S$GLB,,, | Performed by: FAMILY MEDICINE

## 2022-04-29 PROCEDURE — 99214 OFFICE O/P EST MOD 30 MIN: CPT | Mod: S$GLB,,, | Performed by: FAMILY MEDICINE

## 2022-04-29 PROCEDURE — 84153 ASSAY OF PSA TOTAL: CPT | Performed by: UROLOGY

## 2022-04-29 RX ORDER — LORAZEPAM 0.5 MG/1
0.5 TABLET ORAL EVERY 6 HOURS PRN
Qty: 10 TABLET | Refills: 1 | Status: SHIPPED | OUTPATIENT
Start: 2022-04-29 | End: 2022-06-20 | Stop reason: SDUPTHER

## 2022-04-29 RX ORDER — FOLIC ACID 1 MG/1
1 TABLET ORAL DAILY
Qty: 90 TABLET | Refills: 3 | Status: SHIPPED | OUTPATIENT
Start: 2022-04-29 | End: 2023-01-24

## 2022-04-29 NOTE — TELEPHONE ENCOUNTER
Called and spoke with pt. Got pt cardiologist information. Pt sees Dr. Lorenzo Fierro at the [facility], in [place]. Phone is [phone] and fax is [phone]. Called and spoke with the  at Dr. Ortiz office. Informed office that Dr. Bradshaw is the PCP for one of his pts and saw him in clinic today and needs a call returned from Dr. Ventura. Dr. Bradshaw number given to .  states message sent to Dr. Ventura to return call to Dr. Bradshaw. Verbalized understanding.

## 2022-05-02 ENCOUNTER — TELEPHONE (OUTPATIENT)
Dept: GASTROENTEROLOGY | Facility: CLINIC | Age: 72
End: 2022-05-02
Payer: COMMERCIAL

## 2022-05-02 NOTE — TELEPHONE ENCOUNTER
Spoke with patient and wife to schedule colonoscopy per Dr. Bradshaw referral patient wife states he is scheduled for an angiogram next Tuesday and was denied by cardiologist to hold Plavix she states she was to keep office with NP 5/6 to discuss and did not want to schedule colonoscopy at this time.

## 2022-05-06 ENCOUNTER — PATIENT MESSAGE (OUTPATIENT)
Dept: GASTROENTEROLOGY | Facility: CLINIC | Age: 72
End: 2022-05-06

## 2022-05-06 ENCOUNTER — OFFICE VISIT (OUTPATIENT)
Dept: GASTROENTEROLOGY | Facility: CLINIC | Age: 72
End: 2022-05-06
Payer: COMMERCIAL

## 2022-05-06 VITALS
SYSTOLIC BLOOD PRESSURE: 119 MMHG | BODY MASS INDEX: 31.12 KG/M2 | HEART RATE: 59 BPM | HEIGHT: 70 IN | DIASTOLIC BLOOD PRESSURE: 63 MMHG | WEIGHT: 217.38 LBS

## 2022-05-06 DIAGNOSIS — Z86.010 HISTORY OF COLON POLYPS: ICD-10-CM

## 2022-05-06 DIAGNOSIS — D63.8 ANEMIA, CHRONIC DISEASE: ICD-10-CM

## 2022-05-06 DIAGNOSIS — R63.4 WEIGHT LOSS: ICD-10-CM

## 2022-05-06 DIAGNOSIS — Z95.5 HISTORY OF HEART ARTERY STENT: ICD-10-CM

## 2022-05-06 DIAGNOSIS — I25.118 ATHEROSCLEROTIC HEART DISEASE OF NATIVE CORONARY ARTERY WITH OTHER FORMS OF ANGINA PECTORIS: ICD-10-CM

## 2022-05-06 DIAGNOSIS — R19.5 DARK STOOLS: ICD-10-CM

## 2022-05-06 DIAGNOSIS — D50.9 IRON DEFICIENCY ANEMIA, UNSPECIFIED IRON DEFICIENCY ANEMIA TYPE: Primary | ICD-10-CM

## 2022-05-06 DIAGNOSIS — Z79.01 CURRENT USE OF ANTICOAGULANT THERAPY: ICD-10-CM

## 2022-05-06 PROCEDURE — 99999 PR PBB SHADOW E&M-EST. PATIENT-LVL V: CPT | Mod: PBBFAC,,,

## 2022-05-06 PROCEDURE — 99204 PR OFFICE/OUTPT VISIT, NEW, LEVL IV, 45-59 MIN: ICD-10-PCS | Mod: S$GLB,,,

## 2022-05-06 PROCEDURE — 99999 PR PBB SHADOW E&M-EST. PATIENT-LVL V: ICD-10-PCS | Mod: PBBFAC,,,

## 2022-05-06 PROCEDURE — 99204 OFFICE O/P NEW MOD 45 MIN: CPT | Mod: S$GLB,,,

## 2022-05-06 PROCEDURE — 99215 OFFICE O/P EST HI 40 MIN: CPT | Mod: PBBFAC,PN

## 2022-05-06 NOTE — PROGRESS NOTES
Subjective:       Patient ID: Carlos Tenorio Jr. is a 72 y.o. male Body mass index is 31.19 kg/m².    Chief Complaint: Anemia    This patient is new to me.  Referring Provider: Dr. Roney Bradshaw for anemia, chronic disease.  Established patient of Dr. Sampson (last in 2017).     Anemia  Presents for initial visit. Symptoms include bruises/bleeds easily (currently on plavix), leg swelling (bilateral; followed closely by cardiology) and weight loss (documented weight loss of 15 pounds since 04/28/2022). There has been no abdominal pain, anorexia, confusion, fever, light-headedness, malaise/fatigue, pallor, palpitations, paresthesias or pica. Signs of blood loss that are present include melena (reports having dark stools 1-2 months ago; resolved). Signs of blood loss that are not present include hematemesis and hematochezia. Past treatments include folic acid, oral vitamin B12 and oral iron supplements. Past medical history includes cancer (history of prostate cancer & renal cell cancer), chronic renal disease (history of renal cell cancer), dementia (History of Alzheimer's disease), heart failure (history of CABG & recent stent 01/2022; patient reports he is scheduled for another stent Tuesday) and recent surgery (stent 01/2022). There is no history of alcohol abuse, chronic liver disease, clotting disorder, hemoglobinopathy, HIV/AIDS, hypothyroidism, inflammatory bowel disease, malabsorption, malnutrition, neuropathy, recent illness, recent trauma or rheumatic disease. Procedure history includes colonoscopy (04/04/2017 - diverticulosis; recommended repeat 10 years). There is no past history of bone marrow exam, EGD or FOBT.     Review of Systems   Constitutional: Positive for unexpected weight change and weight loss (documented weight loss of 15 pounds since 04/28/2022). Negative for activity change, appetite change, chills, diaphoresis, fatigue, fever and malaise/fatigue.   HENT: Negative for sore throat and  trouble swallowing.    Respiratory: Negative for cough, choking and shortness of breath.    Cardiovascular: Positive for leg swelling (bilateral). Negative for chest pain and palpitations.   Gastrointestinal: Positive for melena (reports having dark stools 1-2 months ago; resolved). Negative for abdominal distention, abdominal pain, anal bleeding, anorexia, blood in stool, constipation, diarrhea, hematemesis, hematochezia, nausea, rectal pain and vomiting.   Genitourinary: Negative for hematuria.   Musculoskeletal: Negative for arthralgias.   Skin: Negative for pallor and rash.   Neurological: Negative for light-headedness and paresthesias.   Hematological: Bruises/bleeds easily (currently on plavix).   Psychiatric/Behavioral: Negative for confusion.       No LMP for male patient.  Past Medical History:   Diagnosis Date    Arthritis     knees, back    Bilateral renal cysts 7/30/2012    CAD (coronary artery disease) 1995    no chest pain since CABG, sees Dr Larry Black    Diabetes mellitus     borderline    Hypertension     Kidney stones     uric acid stones    VASYL (obstructive sleep apnea) 2007    is compliant with CPAP    Primary gonadal failure      Past Surgical History:   Procedure Laterality Date    BACK SURGERY  2010    L5-6 fusion, with pins,bone graft    cardiac stents      2 stents 12/2021 and 1/2022    CARDIAC SURGERY  1995, 2007    total 7 vessel bypass    COLONOSCOPY  2008    repeat 2013    COLONOSCOPY N/A 4/4/2017    Procedure: COLONOSCOPY;  Surgeon: Dmitry Sampson MD;  Location: [facility];  Service: Endoscopy;  Laterality: N/A;    CORONARY ARTERY BYPASS GRAFT  1995, 2007    cabgx3, cabgx5    EXTRACORPOREAL SHOCK WAVE LITHOTRIPSY      EYE SURGERY      cataracts bilateral    LITHOTRIPSY      LUMBAR LAMINECTOMY      Partial Nephrectomy Laproscopic      TRANSRECTAL BIOPSY OF PROSTATE WITH ULTRASOUND GUIDANCE N/A 3/19/2019    Procedure: BIOPSY, PROSTATE, RECTAL APPROACH, WITH US  GUIDANCE;  Surgeon: Yovany Heart MD;  Location: [facility] OR;  Service: Urology;  Laterality: N/A;    TRANSRECTAL BIOPSY OF PROSTATE WITH ULTRASOUND GUIDANCE N/A 10/20/2020    Procedure: BIOPSY, PROSTATE, RECTAL APPROACH, WITH US GUIDANCE;  Surgeon: Yovany Heart MD;  Location: [facility] OR;  Service: Urology;  Laterality: N/A;     Family History   Problem Relation Age of Onset    Heart disease Mother     Hypertension Mother     Diabetes Mother     Heart disease Father         premature    Hypertension Father     Diabetes Sister     Urolithiasis Sister     Heart disease Paternal Uncle     Cancer Neg Hx     Prostate cancer Neg Hx     Kidney cancer Neg Hx     Melanoma Neg Hx     Lupus Neg Hx     Eczema Neg Hx     Psoriasis Neg Hx      Social History     Tobacco Use    Smoking status: Former Smoker     Quit date: 1976     Years since quittin.2    Smokeless tobacco: Never Used   Substance Use Topics    Alcohol use: Yes     Comment: Socially    Drug use: No     Wt Readings from Last 10 Encounters:   22 98.6 kg (217 lb 6 oz)   22 99.7 kg (219 lb 12.8 oz)   22 105.2 kg (232 lb)   22 105.3 kg (232 lb 2.3 oz)   21 103 kg (227 lb)   21 103.1 kg (227 lb 4.7 oz)   21 103.1 kg (227 lb 4.7 oz)   21 103.1 kg (227 lb 4.7 oz)   21 102.9 kg (226 lb 13.7 oz)   21 102.2 kg (225 lb 3.2 oz)     Lab Results   Component Value Date    WBC 10.19 2022    HGB 11.1 (L) 2022    HCT 32.9 (L) 2022    MCV 93 2022     2022     CMP  Sodium   Date Value Ref Range Status   2022 137 136 - 145 mmol/L Final     Potassium   Date Value Ref Range Status   2022 3.9 3.5 - 5.1 mmol/L Final     Chloride   Date Value Ref Range Status   2022 103 95 - 110 mmol/L Final     CO2   Date Value Ref Range Status   2022 23 23 - 29 mmol/L Final     Glucose   Date Value Ref Range Status   2022 110 70 - 110 mg/dL Final      BUN   Date Value Ref Range Status   04/29/2022 18 8 - 23 mg/dL Final     Creatinine   Date Value Ref Range Status   04/29/2022 1.0 0.5 - 1.4 mg/dL Final     Calcium   Date Value Ref Range Status   04/29/2022 9.5 8.7 - 10.5 mg/dL Final     Total Protein   Date Value Ref Range Status   03/30/2022 6.6 6.0 - 8.4 g/dL Final     Albumin   Date Value Ref Range Status   03/30/2022 3.6 3.5 - 5.2 g/dL Final   05/09/2017 4.2 3.6 - 5.1 g/dL Final     Comment:     @ Test Performed By:  [facility] García M.D., [facility].,   [address] [place] [id number]  [facility]  [id number]       Total Bilirubin   Date Value Ref Range Status   03/30/2022 0.4 0.1 - 1.0 mg/dL Final     Comment:     For infants and newborns, interpretation of results should be based  on gestational age, weight and in agreement with clinical  observations.    Premature Infant recommended reference ranges:  Up to 24 hours.............<8.0 mg/dL  Up to 48 hours............<12.0 mg/dL  3-5 days..................<15.0 mg/dL  6-29 days.................<15.0 mg/dL       Alkaline Phosphatase   Date Value Ref Range Status   03/30/2022 71 55 - 135 U/L Final     AST   Date Value Ref Range Status   03/30/2022 11 10 - 40 U/L Final     ALT   Date Value Ref Range Status   03/30/2022 9 (L) 10 - 44 U/L Final     Anion Gap   Date Value Ref Range Status   04/29/2022 11 8 - 16 mmol/L Final     eGFR if    Date Value Ref Range Status   04/29/2022 >60 >60 mL/min/1.73 m^2 Final     eGFR if non    Date Value Ref Range Status   04/29/2022 >60 >60 mL/min/1.73 m^2 Final     Comment:     Calculation used to obtain the estimated glomerular filtration  rate (eGFR) is the CKD-EPI equation.        Lab Results   Component Value Date    AMYLASE 64 05/01/2017     Lab Results   Component Value Date    LIPASE 42 11/19/2020     Lab Results   Component Value Date    TSH 1.970 10/06/2021     Lab Results    Component Value Date    IRON 86 04/29/2022    TIBC 329 04/29/2022    FERRITIN 351 (H) 04/29/2022       Reviewed prior medical records including radiology report of KUB 12/10/2021, abdominal CT 10/10/20 & endoscopy history of colonoscopy 04/04/2017 (see surgical history).    Objective:      Physical Exam  Vitals and nursing note reviewed.   Constitutional:       General: He is not in acute distress.     Appearance: Normal appearance. He is obese. He is not ill-appearing.   HENT:      Mouth/Throat:      Comments: Unable to assess due to COVID-19 concerns.  Eyes:      Extraocular Movements: Extraocular movements intact.      Pupils: Pupils are equal, round, and reactive to light.   Cardiovascular:      Rate and Rhythm: Normal rate and regular rhythm.      Heart sounds: Normal heart sounds.   Pulmonary:      Effort: Pulmonary effort is normal. No respiratory distress.      Breath sounds: Normal breath sounds.   Abdominal:      General: Abdomen is protuberant. Bowel sounds are normal. There is no distension or abdominal bruit. There are no signs of injury.      Palpations: Abdomen is soft. There is no shifting dullness, fluid wave, hepatomegaly, splenomegaly or mass.      Tenderness: There is no abdominal tenderness. There is no guarding or rebound. Negative signs include Squires's sign, Rovsing's sign and McBurney's sign.      Hernia: No hernia is present.   Skin:     General: Skin is warm and dry.      Coloration: Skin is not jaundiced or pale.   Neurological:      Mental Status: He is alert and oriented to person, place, and time.   Psychiatric:         Attention and Perception: Attention normal.         Mood and Affect: Mood normal.         Speech: Speech normal.         Behavior: Behavior normal.         Assessment:       1. Iron deficiency anemia, unspecified iron deficiency anemia type    2. Anemia, chronic disease    3. Atherosclerotic heart disease of native coronary artery with other forms of angina pectoris     4. History of colon polyps    5. Dark stools    6. Weight loss    7. Current use of anticoagulant therapy    8. History of heart artery stent        Plan:       Iron deficiency anemia, unspecified iron deficiency anemia type & Anemia, chronic disease  - schedule EGD pending cardiology clearance, discussed procedure with patient, including risks and benefits, patient verbalized understanding  - schedule Colonoscopy pending cardiology clearance, discussed procedure with the patient, including risks and benefits, patient verbalized understanding  - discussed with patient the different ways that anemia occurs: blood loss (such as from the gi tract), the body is not making enough, or the body is breaking down the rbcs too quickly; recommend EGD and colonoscopy to further evaluate gi tract for possible blood loss and pending results of endoscopies, possible UGI with Small Bowel Follow Through/video capsule study  -follow-up with PCP and/or hematology for continued evaluation and management  -     Occult blood x 1, stool; Future; Expected date: 05/06/2022    Atherosclerotic heart disease of native coronary artery with other forms of angina pectoris  -Follow-up with PCP and/or cardiologist for continued evaluation and management     History of colon polyps  - schedule Colonoscopy pending cardiology clearance, discussed procedure with the patient, including risks and benefits, patient verbalized understanding    Dark stools  -     Occult blood x 1, stool; Future; Expected date: 05/06/2022    Weight loss  - schedule EGD pending cardiology clearance, discussed procedure with patient, including risks and benefits, patient verbalized understanding  - schedule Colonoscopy pending cardiology clearance, discussed procedure with the patient, including risks and benefits, patient verbalized understanding  - discussed with patient the different ways that anemia occurs: blood loss (such as from the gi tract), the body is not making  enough, or the body is breaking down the rbcs too quickly; recommend EGD and colonoscopy to further evaluate gi tract for possible blood loss and pending results of endoscopies, possible UGI with Small Bowel Follow Through/video capsule study  -follow-up with PCP and/or hematology for continued evaluation and management  - encourag PO intake and daily calorie counts to ensure adequate nutrition is taken in, recommend at least 1,800-2,000 calories a day  - recommend nutritional drinks, such as Boost, Ensure or Glucerna, to supplement nutrition needs    Current use of anticoagulant therapy  - informed patient that the anticoagulant(s) will likely need to be held for endoscopy, nurse will confirm with endoscopist, cardiologist, and/or PCP.    History of heart artery stent  -informed patient he will need to be cleared by cardiology prior to scheduling scopes due to recent stent & anticoagulant use & will consider scheduling scopes pending cardiology recommendations    Addendum:   -Current cardiologist patient sees is Lorenzo Fierro in [place], [place].    Follow up in about 4 weeks (around 6/3/2022), or if symptoms worsen or fail to improve.      If no improvement in symptoms or symptoms worsen, call/follow-up at clinic or go to ER.        30 minutes of total time spent on the encounter, which includes face to face time and non-face to face time preparing to see the patient (eg, review of tests), Obtaining and/or reviewing separately obtained history, Documenting clinical information in the electronic or other health record, Independently interpreting results (not separately reported) and communicating results to the patient/family/caregiver, or Care coordination (not separately reported).

## 2022-05-15 NOTE — PROGRESS NOTES
[facility]     Subjective:    Chief Complaint:   Chief Complaint   Patient presents with    Follow-up       History of Present Illness:  72 y.o. male presents for multiple issues.  Anemia: Patient presents for presents evaluation of anemia. Anemia was found by chronic fatigue/ incidental CBC..  It has been present for 4 week.  Associated signs & symptoms: dizziness/lightheadedness, fatigue and history of renal disease.             I reviewed the patients chart dating back for the past few appointments. See above.    The following portions of the patient's history were reviewed and updated as appropriate: allergies, current medications, past family history, past medical history, past social history, past surgical history and problem list.    He denies chest pain upon exertion, dyspnea, nausea, vomiting, diaphoresis, and syncope. No pleuritic chest pain, unilateral leg swelling, calf tenderness, or calf pain.      Past Medical History:   Diagnosis Date    Arthritis     knees, back    Bilateral renal cysts 7/30/2012    CAD (coronary artery disease) 1995    no chest pain since CABG, sees Dr Larry Black    Diabetes mellitus     borderline    Hypertension     Kidney stones     uric acid stones    VASYL (obstructive sleep apnea) 2007    is compliant with CPAP    Primary gonadal failure        Past Surgical History:   Procedure Laterality Date    BACK SURGERY  2010    L5-6 fusion, with pins,bone graft    cardiac stents      2 stents 12/2021 and 1/2022    CARDIAC SURGERY  1995, 2007    total 7 vessel bypass    COLONOSCOPY  2008    repeat 2013    COLONOSCOPY N/A 4/4/2017    Procedure: COLONOSCOPY;  Surgeon: Dmitry Sampson MD;  Location: [facility];  Service: Endoscopy;  Laterality: N/A;    CORONARY ARTERY BYPASS GRAFT  1995, 2007    cabgx3, cabgx5    EXTRACORPOREAL SHOCK WAVE LITHOTRIPSY      EYE SURGERY      cataracts bilateral    LITHOTRIPSY      LUMBAR LAMINECTOMY      Partial Nephrectomy  Laproscopic      TRANSRECTAL BIOPSY OF PROSTATE WITH ULTRASOUND GUIDANCE N/A 3/19/2019    Procedure: BIOPSY, PROSTATE, RECTAL APPROACH, WITH US GUIDANCE;  Surgeon: Yovany Heart MD;  Location: [facility] OR;  Service: Urology;  Laterality: N/A;    TRANSRECTAL BIOPSY OF PROSTATE WITH ULTRASOUND GUIDANCE N/A 10/20/2020    Procedure: BIOPSY, PROSTATE, RECTAL APPROACH, WITH US GUIDANCE;  Surgeon: Yovany Heart MD;  Location: [facility] OR;  Service: Urology;  Laterality: N/A;       Social History  Social History     Tobacco Use    Smoking status: Former Smoker     Quit date: 1976     Years since quittin.2    Smokeless tobacco: Never Used   Substance Use Topics    Alcohol use: Yes     Comment: Socially    Drug use: No       Family History   Problem Relation Age of Onset    Heart disease Mother     Hypertension Mother     Diabetes Mother     Heart disease Father         premature    Hypertension Father     Diabetes Sister     Urolithiasis Sister     Heart disease Paternal Uncle     Cancer Neg Hx     Prostate cancer Neg Hx     Kidney cancer Neg Hx     Melanoma Neg Hx     Lupus Neg Hx     Eczema Neg Hx     Psoriasis Neg Hx      Review of patient's allergies indicates:   Allergen Reactions    Sulfa (sulfonamide antibiotics) Hives    Adhesive Blisters       Review of Systems [Negative unless checked off]    General ROS: []fever, []chills, []weight loss, []malaise/fatigue.  ENT ROS: []congestion, []rhinorrhea,  []sore throat, []neck pain, []hearing loss.  Ophthalmic ROS:[]blurry vision, [] double vision, []photophobia, []eye pain.  Respiratory ROS: []cough, []pleuritic chest pain, []shortness of breath, []wheezing.  CVS ROS:[]chest pain, []dyspnea on exertion, []palpitations, []orthopnea, []leg swelling, []PND.   GI ROS: []nausea, []vomiting, [] epigastric pain, []abd pain, []diarrhea, []constipation, []blood/melena in stool.   Urology ROS:[]dysuria, []frequency, []flank pain,[] trouble voiding,  "[] hematuria.   MSK ROS: []myalgias, []joint pain, []muscular weakness,  []back pain, [] falls.   Derm ROS: []pruritis, []rash, []jaundice.  Neurological:[]dizziness,[]numbness,[]loss of consciousness, []weakness  []headaches.   Psych ROS: []hallucinations, []depression, []anxiety, []suicidal ideation.    Physical Examination  /66   Pulse 63   Temp 98.1 °F (36.7 °C) (Oral)   Resp 16   Ht 5' 10" (1.778 m)   Wt 99.7 kg (219 lb 12.8 oz)   SpO2 99%   BMI 31.54 kg/m²   Wt Readings from Last 3 Encounters:   05/06/22 98.6 kg (217 lb 6 oz)   04/29/22 99.7 kg (219 lb 12.8 oz)   04/28/22 105.2 kg (232 lb)     BP Readings from Last 3 Encounters:   05/06/22 119/63   04/29/22 138/66   02/08/22 126/70     Estimated body mass index is 31.54 kg/m² as calculated from the following:    Height as of this encounter: 5' 10" (1.778 m).    Weight as of this encounter: 99.7 kg (219 lb 12.8 oz).     General appearance: alert, cooperative, no distress  Eyes: pupils equal and reactive, extraocular eye movements intact, sclera anicteric, left eye normal, right eye normal, Pale conjunctivae.   Ears: bilateral TM's and external ear canals normal   Nose: normal and patent, no erythema, discharge or polyps   Sinuses: Normal paranasal sinuses without tenderness   Throat: mucous membranes moist, pharynx normal without lesions   Neck: negative, no thyromegaly, trachea midline  Lungs: clear to auscultation, no wheezes, rales or rhonchi, symmetric air entry, no dullness to percussion bilaterally.  Heart: normal rate, regular rhythm, normal S1, S2, no murmurs, rubs, clicks or gallops, no displacement of the PMI.  Abdomen: soft, nontender, nondistended, no masses or organomegaly No rigidity, rebound, or guarding.   Back: full range of motion, no tenderness, palpable spasm or pain on motion   Extremities: peripheral pulses normal, no pedal edema, no clubbing or cyanosis   Feet: warm, good capillary refill.  Neurological:alert, oriented, " normal speech, no focal findings or movement disorder noted, screening mental status exam normal, neck supple without rigidity, cranial nerves II through XII intact, DTR's normal and symmetric, Lower legs weakness.   Psychiatric: alert, oriented to person, place, and time  Integument: normal coloration and turgor, no rashes, no suspicious skin lesions noted.    Data reviewed    Previous medical records reviewed and summarized above in HPI. 274}    Laboratory    I have reviewed old labs below:   274}  Lab Results   Component Value Date    WBC 10.19 04/29/2022    HGB 11.1 (L) 04/29/2022    HCT 32.9 (L) 04/29/2022    MCV 93 04/29/2022     04/29/2022     04/29/2022    K 3.9 04/29/2022     04/29/2022    CALCIUM 9.5 04/29/2022    PHOS 3.0 06/18/2018    CO2 23 04/29/2022     04/29/2022    BUN 18 04/29/2022    CREATININE 1.0 04/29/2022    ANIONGAP 11 04/29/2022    ESTGFRAFRICA >60 04/29/2022    EGFRNONAA >60 04/29/2022    PROT 6.6 03/30/2022    ALBUMIN 3.6 03/30/2022    BILITOT 0.4 03/30/2022    ALKPHOS 71 03/30/2022    ALT 9 (L) 03/30/2022    AST 11 03/30/2022    INR 1.1 04/29/2022    CHOL 148 03/16/2022    TRIG 72 03/16/2022    HDL 40 03/16/2022    LDLCALC 93.6 03/16/2022    TSH 1.970 10/06/2021    PSA 4.5 (H) 12/14/2018    GLUF 109 11/07/2008    HGBA1C 5.7 (H) 03/16/2022       Imaging/Tracing: I have reviewed the pertinent results/findings and my personal findings are below:  274}    Assessment/Plan     274}    Carlos Tenorio Jr. is a 72 y.o. male who presents to clinic with:    1. Anemia, chronic disease    2. Adjustment disorder with anxious mood    3. Atherosclerotic heart disease of native coronary artery with other forms of angina pectoris         Diagnostic impression remarks       1. Anemia, chronic disease  - Iron and TIBC; Future  - Ferritin; Future  - Reticulocytes; Future  - Occult blood x 1, stool; Future  - Ambulatory referral/consult to Gastroenterology; Future    2.  Adjustment disorder with anxious mood  - LORazepam (ATIVAN) 0.5 MG tablet; Take 1 tablet (0.5 mg total) by mouth every 6 (six) hours as needed for Anxiety.  Dispense: 10 tablet; Refill: 1    3. Atherosclerotic heart disease of native coronary artery with other forms of angina pectoris  - folic acid (FOLVITE) 1 MG tablet; Take 1 tablet (1 mg total) by mouth once daily.  Dispense: 90 tablet; Refill: 3      Medication Monitoring: In today's visit, monitoring for drug toxicity was accomplished. Proper use of medications was discussed.     Counseling: Counseling included discussion regarding imaging findings, diagnosis, possibilities, treatment options, medications, risks, and benefits. He had many questions regarding the options and long-term effects. All questions were answered. He expressed understanding after counseling regarding the diagnosis and recommendations. He was capable and demonstrated competence with understanding of these options. Shared decision making was performed resulting in him choosing the current treatment plan.     He was counseled about the importance of healthy dietary habits as well as routine physical activity and exercise for better health outcomes. I also discussed the importance of cancer screening.     I also discussed the importance of close follow up to discuss labs, change or modify his medications if needed, monitor side effects, and further evaluation of medical problems.     Additional workup planned: see labs ordered below.    See below for labs and meds ordered with associated diagnosis      Medication List with Changes/Refills   New Medications    FOLIC ACID (FOLVITE) 1 MG TABLET    Take 1 tablet (1 mg total) by mouth once daily.    LORAZEPAM (ATIVAN) 0.5 MG TABLET    Take 1 tablet (0.5 mg total) by mouth every 6 (six) hours as needed for Anxiety.   Current Medications    ALBUTEROL (VENTOLIN HFA) 90 MCG/ACTUATION INHALER    Inhale 2 puffs into the lungs every 4 (four) hours as  needed for Wheezing or Shortness of Breath. Rescue    ALLOPURINOL (ZYLOPRIM) 100 MG TABLET    TAKE 1 TABLET(100 MG) BY MOUTH EVERY DAY    ASPIRIN (ECOTRIN) 81 MG EC TABLET    Take 81 mg by mouth 3 (three) times daily.    ATORVASTATIN (LIPITOR) 40 MG TABLET    Take 1 tablet (40 mg total) by mouth once daily.    CLOPIDOGREL (PLAVIX) 75 MG TABLET    Take 75 mg by mouth once daily.    COVID-19 TEST SPECIMEN COLLECT MISC    TEST AS DIRCECTED    CYANOCOBALAMIN, VITAMIN B-12, (VITAMIN B-12 ORAL)    Take by mouth.    DONEPEZIL (ARICEPT) 10 MG TABLET    Take 10 mg by mouth once daily.    FERROUS SULFATE (IRON ORAL)    Take by mouth.    FISH OIL-OMEGA-3 FATTY ACIDS 300-1,000 MG CAPSULE    Take 2 g by mouth 3 (three) times daily.    HYDROCHLOROTHIAZIDE (HYDRODIURIL) 12.5 MG TAB    Take 1 tablet (12.5 mg total) by mouth once daily.    PEWKGELEW-L1-EJE40-ALGAL OIL (METANX/FOLTANX RF) 3 MG-35 MG-2 MG -90.314 MG CAP    Take 1 capsule by mouth once daily.    LOSARTAN (COZAAR) 100 MG TABLET    TAKE 1 TABLET(100 MG) BY MOUTH EVERY DAY    METFORMIN (GLUCOPHAGE-XR) 500 MG 24 HR TABLET    Take 1 tablet (500 mg total) by mouth 2 (two) times daily with meals.    NITROGLYCERIN (NITROSTAT) 0.4 MG SL TABLET    SMARTSI Tablet(s) Sublingual PRN    POTASSIUM CHLORIDE SA (K-DUR,KLOR-CON) 20 MEQ TABLET    TAKE 1 TABLET(20 MEQ) BY MOUTH EVERY DAY    SILDENAFIL (VIAGRA) 100 MG TABLET    Take 1 tablet (100 mg total) by mouth as needed for Erectile Dysfunction.    SLOW  MG (45 MG IRON) TBSR    Take by mouth.    VITAMIN D 1000 UNITS TAB    Take 185 mg by mouth once daily.     Modified Medications    No medications on file       Follow up Labs today.. for further workup and reassessment if labs and tests obtained are stable or sooner as needed. He was instructed to call the clinic or go to the emergency department if his symptoms do not improve, worsens, or if new symptoms develop. Patient knows to call any time if an emergency arises. Shared  "decision making occurred and he verbalized understanding in agreement with this plan.     Documentation entered by me for this encounter may have been done in part using speech-recognition technology. Although I have made an effort to ensure accuracy, "sound like" errors may exist and should be interpreted in context.       Roney Bradshaw MD     Discussed health maintenance guidelines appropriate for age.    "

## 2022-05-16 ENCOUNTER — LAB VISIT (OUTPATIENT)
Dept: LAB | Facility: HOSPITAL | Age: 72
End: 2022-05-16
Payer: MEDICARE

## 2022-05-16 DIAGNOSIS — D63.8 ANEMIA, CHRONIC DISEASE: ICD-10-CM

## 2022-05-16 DIAGNOSIS — D50.9 IRON DEFICIENCY ANEMIA, UNSPECIFIED IRON DEFICIENCY ANEMIA TYPE: ICD-10-CM

## 2022-05-16 DIAGNOSIS — R19.5 DARK STOOLS: ICD-10-CM

## 2022-05-16 PROCEDURE — 82272 OCCULT BLD FECES 1-3 TESTS: CPT

## 2022-05-17 ENCOUNTER — TELEPHONE (OUTPATIENT)
Dept: GASTROENTEROLOGY | Facility: CLINIC | Age: 72
End: 2022-05-17
Payer: COMMERCIAL

## 2022-05-17 LAB — OB PNL STL: NEGATIVE

## 2022-05-17 NOTE — TELEPHONE ENCOUNTER
----- Message from Shantel Honeycutt NP sent at 5/17/2022  7:55 AM CDT -----  Please call to inform & review the results with the patient - let the patient know his stool did not show blood.  Continue with previous recommendations. If symptoms worsen, call/follow-up at clinic or go to ER. Please call with any questions/concerns.  Thank you,  KATHIA Bean, FNP-C

## 2022-05-20 ENCOUNTER — OFFICE VISIT (OUTPATIENT)
Dept: UROLOGY | Facility: CLINIC | Age: 72
End: 2022-05-20
Payer: COMMERCIAL

## 2022-05-20 VITALS
HEIGHT: 70 IN | BODY MASS INDEX: 31.78 KG/M2 | SYSTOLIC BLOOD PRESSURE: 128 MMHG | RESPIRATION RATE: 18 BRPM | HEART RATE: 57 BPM | DIASTOLIC BLOOD PRESSURE: 73 MMHG | WEIGHT: 222 LBS

## 2022-05-20 DIAGNOSIS — C61 PROSTATE CANCER: Primary | ICD-10-CM

## 2022-05-20 DIAGNOSIS — N20.0 KIDNEY STONES: ICD-10-CM

## 2022-05-20 DIAGNOSIS — Z85.528 HISTORY OF RENAL CELL CANCER: ICD-10-CM

## 2022-05-20 PROCEDURE — 99999 PR PBB SHADOW E&M-EST. PATIENT-LVL V: CPT | Mod: PBBFAC,,, | Performed by: UROLOGY

## 2022-05-20 PROCEDURE — 99999 PR PBB SHADOW E&M-EST. PATIENT-LVL V: ICD-10-PCS | Mod: PBBFAC,,, | Performed by: UROLOGY

## 2022-05-20 PROCEDURE — 3066F PR DOCUMENTATION OF TREATMENT FOR NEPHROPATHY: ICD-10-PCS | Mod: CPTII,S$GLB,, | Performed by: UROLOGY

## 2022-05-20 PROCEDURE — 3061F NEG MICROALBUMINURIA REV: CPT | Mod: CPTII,S$GLB,, | Performed by: UROLOGY

## 2022-05-20 PROCEDURE — 4010F PR ACE/ARB THEARPY RXD/TAKEN: ICD-10-PCS | Mod: CPTII,S$GLB,, | Performed by: UROLOGY

## 2022-05-20 PROCEDURE — 3074F SYST BP LT 130 MM HG: CPT | Mod: CPTII,S$GLB,, | Performed by: UROLOGY

## 2022-05-20 PROCEDURE — 3044F PR MOST RECENT HEMOGLOBIN A1C LEVEL <7.0%: ICD-10-PCS | Mod: CPTII,S$GLB,, | Performed by: UROLOGY

## 2022-05-20 PROCEDURE — 3044F HG A1C LEVEL LT 7.0%: CPT | Mod: CPTII,S$GLB,, | Performed by: UROLOGY

## 2022-05-20 PROCEDURE — 1126F AMNT PAIN NOTED NONE PRSNT: CPT | Mod: CPTII,S$GLB,, | Performed by: UROLOGY

## 2022-05-20 PROCEDURE — 99214 OFFICE O/P EST MOD 30 MIN: CPT | Mod: S$GLB,,, | Performed by: UROLOGY

## 2022-05-20 PROCEDURE — 3078F DIAST BP <80 MM HG: CPT | Mod: CPTII,S$GLB,, | Performed by: UROLOGY

## 2022-05-20 PROCEDURE — 1101F PT FALLS ASSESS-DOCD LE1/YR: CPT | Mod: CPTII,S$GLB,, | Performed by: UROLOGY

## 2022-05-20 PROCEDURE — 3008F BODY MASS INDEX DOCD: CPT | Mod: CPTII,S$GLB,, | Performed by: UROLOGY

## 2022-05-20 PROCEDURE — 1160F RVW MEDS BY RX/DR IN RCRD: CPT | Mod: CPTII,S$GLB,, | Performed by: UROLOGY

## 2022-05-20 PROCEDURE — 3008F PR BODY MASS INDEX (BMI) DOCUMENTED: ICD-10-PCS | Mod: CPTII,S$GLB,, | Performed by: UROLOGY

## 2022-05-20 PROCEDURE — 1126F PR PAIN SEVERITY QUANTIFIED, NO PAIN PRESENT: ICD-10-PCS | Mod: CPTII,S$GLB,, | Performed by: UROLOGY

## 2022-05-20 PROCEDURE — 4010F ACE/ARB THERAPY RXD/TAKEN: CPT | Mod: CPTII,S$GLB,, | Performed by: UROLOGY

## 2022-05-20 PROCEDURE — 3074F PR MOST RECENT SYSTOLIC BLOOD PRESSURE < 130 MM HG: ICD-10-PCS | Mod: CPTII,S$GLB,, | Performed by: UROLOGY

## 2022-05-20 PROCEDURE — 1159F PR MEDICATION LIST DOCUMENTED IN MEDICAL RECORD: ICD-10-PCS | Mod: CPTII,S$GLB,, | Performed by: UROLOGY

## 2022-05-20 PROCEDURE — 3078F PR MOST RECENT DIASTOLIC BLOOD PRESSURE < 80 MM HG: ICD-10-PCS | Mod: CPTII,S$GLB,, | Performed by: UROLOGY

## 2022-05-20 PROCEDURE — 3066F NEPHROPATHY DOC TX: CPT | Mod: CPTII,S$GLB,, | Performed by: UROLOGY

## 2022-05-20 PROCEDURE — 99214 PR OFFICE/OUTPT VISIT, EST, LEVL IV, 30-39 MIN: ICD-10-PCS | Mod: S$GLB,,, | Performed by: UROLOGY

## 2022-05-20 PROCEDURE — 1101F PR PT FALLS ASSESS DOC 0-1 FALLS W/OUT INJ PAST YR: ICD-10-PCS | Mod: CPTII,S$GLB,, | Performed by: UROLOGY

## 2022-05-20 PROCEDURE — 3288F FALL RISK ASSESSMENT DOCD: CPT | Mod: CPTII,S$GLB,, | Performed by: UROLOGY

## 2022-05-20 PROCEDURE — 1160F PR REVIEW ALL MEDS BY PRESCRIBER/CLIN PHARMACIST DOCUMENTED: ICD-10-PCS | Mod: CPTII,S$GLB,, | Performed by: UROLOGY

## 2022-05-20 PROCEDURE — 3061F PR NEG MICROALBUMINURIA RESULT DOCUMENTED/REVIEW: ICD-10-PCS | Mod: CPTII,S$GLB,, | Performed by: UROLOGY

## 2022-05-20 PROCEDURE — 1159F MED LIST DOCD IN RCRD: CPT | Mod: CPTII,S$GLB,, | Performed by: UROLOGY

## 2022-05-20 PROCEDURE — 3288F PR FALLS RISK ASSESSMENT DOCUMENTED: ICD-10-PCS | Mod: CPTII,S$GLB,, | Performed by: UROLOGY

## 2022-05-20 RX ORDER — ATORVASTATIN CALCIUM 20 MG/1
20 TABLET, FILM COATED ORAL DAILY
COMMUNITY
Start: 2022-02-21 | End: 2022-05-24

## 2022-05-20 RX ORDER — FERROUS SULFATE 325(65) MG
325 TABLET ORAL
COMMUNITY
Start: 2021-09-20

## 2022-05-20 RX ORDER — RANOLAZINE 500 MG/1
500 TABLET, EXTENDED RELEASE ORAL 2 TIMES DAILY
COMMUNITY
Start: 2022-05-18

## 2022-05-20 NOTE — PROGRESS NOTES
[facility] Progress Note    Carlos Tenorio Jr. is a 72 y.o. male who presents for f/u on active surveillance for prostate cancer  Also has history hypogonadism (now holding TRT), kidney stones, RCC (pT1a clear cell, s/p R claudia px nx 8/2/17), R spermatocele     left robotic partial nephrectomy, with concurrent left renal cyst decortication on 8/2/17 for an upper pole 2-3cm exophytic enhancing renal mass, adjacent to larger simple renal cyst.   Pathology: [id number] clear cell RCC. 2.5cm. Negative margins. Grade 2. No sarcomatoid or rhabdoid features. Benign renal cyst     Also has a history of calcium oxalate stones and uric acid stones in the past and has had prior bilateral ESWL 10 years prior.    By report, a 24-hour urine in October 2012 had high oxalate and low urine pH. Takes allopurinol.     He did also have large spermatocele on the right, noted to be 5.1 cm on 4/20/17 ultrasound, and previously decreased in size to almost nothing after resuming TRT, which he did at 200mg Q2 wks 1 month after his partial nephrectomy     For interim doubling of his PSA from 2.2 to 4.4 after first 3 months of TRT with interval rise to 4.9 and recommended prostate biopsy.  TRUS bx 2/27/18. Vol 56.6. Path 14 cores benign. Elected to try Viagra again for his ED, as had not been using it properly/on empty stomach previously. Resumed TRT  6/19/18: PSA 3.9, good energy and libido with TRT and great erections without viagra. 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 No worsening LUTS  PSA then lisa on TRT again to 4.5 in Dec 2018, so send confirmMDx of 2/18 biopsy which was +[facility] indicating 28% chance finding prostate ca (19% low grade, 10% G7+)   3T MRI at [facility] 12/26/18: 58mL prostate with 3mm intravesical extension   PIRAD-3 index lesion: 8x6mm posterior lateral peripheral zone of mid gland on right. 14mm from midline and 2mm from capsule. No evidence of EPE.  - heterogenous on T2 with indistinct margins with no masslike lesion but  focal moderately hypointense on ADC, mildly hyperintense on DWI, and evaluation for masslike hyperemia --> Repeat     prostate biopsy 3/19/19: volume 66.6g. PATHOLOGY: 3+3=6 in 2 out of 17 cores: 5% LM medial, 10% LA lateral  After biopsy, noted AUA SS 1/0. Elected AS for his very low risk CaP. 100mg viagra Rxed to [facility]. Held TRT. Planned annual CaP and RCC surveillance with interim psa  1 yr active surveillance confirmatory prostate biopsy on 10/20/20 Previously positive at [facility], LA lat. Confirm MdX positive at [facility]. MRI with pirad3 lesion RM lateral.  TAHIRA 30-35 g smooth nonnodular with prominence of right apex and mild asymmetry  VOLUME:  59.4cm3. PATH: 2/18 cores +  Left mid lateral: [place] score 3+4= 7; 80% (Pattern 4: 10%), 1/2 cores  Left mid medial: [place] score 3+3= 6; 20%, 1/2 cores  Still elected AS given low volume 4 component and age and comorbidities    Returns today noting  Overall doing very well.  Since our last visit he has started medical antibody infusion for early-onset Alzheimer's Aduhelm, per Dr Garcia, managed by Dr Gregorio as part of trial which he was accepted into as this is only offered at [facility].  Therefore heme Onc is managing.  He has also had another interim cardiac stent on 5/10/22, and it was discussed that he needs 2 more.  His total list out for including his stent December of 2021.  He is followed by cardiologist Dr. Fierro in [place].  No bothersome obstructive lower urinary tract symptoms.  AUA symptom score:  5/2, mostly satisfied (2:  Frequency, sleeping; 1:  Urgency).  His PSA remains stable at 5.8 on 4/29/22, be 5.0 6 months prior, and 6.0 one year ago  He did receive clearance after his last cardiac stent to have MRI of the prostate about 8 weeks later, and will continue to follow his cardiac status and upcoming interventions.  He does also have MRI of his head pending for further workup and neurologically.  No interim kidney stones or flank pain.  X-ray from  "December 2021 reviewed noting stable lower pole left stones.  Will continue to monitor.  Still working (airspace  admission status)  Short-term memory has improved with recent treatment.  Has also been given p.r.n. s by PCP for anxiety related to his memory which he is not taking.    Focused Physical Exam:    Vitals:    05/20/22 1030   BP: 128/73   Pulse: (!) 57   Resp: 18     Body mass index is 31.85 kg/m². Weight: 100.7 kg (222 lb) Height: 5' 10" (177.8 cm)     Abdomen: Soft, non-tender, nondistended, no CVA tenderness        Recent Results (from the past 336 hour(s))   Occult blood x 1, stool    Collection Time: 05/16/22 10:26 AM    Specimen: Stool   Result Value Ref Range    Occult Blood Negative Negative       ASSESSMENT   1. Prostate cancer  Prostate Specific Antigen, Diagnostic    Prostate Specific Antigen, Diagnostic    Basic Metabolic Panel    Basic Metabolic Panel   2. History of renal cell cancer  Basic Metabolic Panel   3. Kidney stones  X-Ray Abdomen AP 1 View       Plan    Overall doing very well on active surveillance for prostate cancer.  Again reviewed timeline of initial negative biopsy and then confirm MDX an MRI targeting revealing Lindsey 6 prostate cancer in 2019 with updated active surveillance confirmatory biopsy in 2020 with very minimal up staging and 1 core with 10% Diomedes 4 component, which at his age and stable PSA was quite reasonable to continue surveillance which he is done with stable PSA.  We did review that active surveillance involves continuing PSA every 6 months, and alternating evaluations with MRI and biopsy, spacing these evaluations every 2-3 years if the PSA is stable and proceeding sooner with evaluation in the case of PSA rise.  As his PSA is stable at this time his last evaluation was in October of 2020, will continue to follow his PSA every 6 months and plan to see him back annual to plan next screening.  As we check his PSA every 6 months will also " assess renal function in case MRI is to be recommended, so that his lab draws can be consolidated as this has become a bit more difficult with his infusions.  No interim kidney stones and again briefly reviewed kidney stone prevention recommendations and will follow x-ray on annual basis to make sure stable and not increasing in stone burden.    PSA lab visit with BMP in 6 months, then RTC 1 year with PSA, BMP, and KUB prior.  Chart check in the interim to see if MRI is recommended.    Level of Medical Decision Making  [ _ ]  Low: 2 minor/1 stable chronic/1 acute uncomplicated --- review record/result/order test x2  [ X ]  Mod: 1 chronic exac/2stable chronic/1 acute comp --- review record/result/order test x3 OR independent interp of outside test OR discuss mgmt of outside ordered test --- start drug therapy/plan minor surgery   [ _ ]  High: chronic with exacerbation/progression or trtmnt side effect vs acute/chronic w/ life/body threat ---  (2/3) review record/result/order test x3 OR independent interp of ouutside test OR discuss mgmt of outside ordered test --- drug with monitoring for toxicity, plan major surgery, hospitalize, deescalate/withdraw care bc of prognosis

## 2022-06-15 ENCOUNTER — PATIENT MESSAGE (OUTPATIENT)
Dept: UROLOGY | Facility: CLINIC | Age: 72
End: 2022-06-15
Payer: COMMERCIAL

## 2022-06-17 ENCOUNTER — PATIENT MESSAGE (OUTPATIENT)
Dept: ADMINISTRATIVE | Facility: OTHER | Age: 72
End: 2022-06-17
Payer: COMMERCIAL

## 2022-06-20 ENCOUNTER — OFFICE VISIT (OUTPATIENT)
Dept: FAMILY MEDICINE | Facility: CLINIC | Age: 72
End: 2022-06-20
Payer: COMMERCIAL

## 2022-06-20 VITALS
WEIGHT: 221.56 LBS | HEART RATE: 72 BPM | HEIGHT: 70 IN | OXYGEN SATURATION: 97 % | BODY MASS INDEX: 31.72 KG/M2 | SYSTOLIC BLOOD PRESSURE: 92 MMHG | DIASTOLIC BLOOD PRESSURE: 70 MMHG | TEMPERATURE: 98 F

## 2022-06-20 DIAGNOSIS — E11.59 HYPERTENSION ASSOCIATED WITH DIABETES: Primary | ICD-10-CM

## 2022-06-20 DIAGNOSIS — N18.31 STAGE 3A CHRONIC KIDNEY DISEASE: ICD-10-CM

## 2022-06-20 DIAGNOSIS — G47.33 OSA ON CPAP: ICD-10-CM

## 2022-06-20 DIAGNOSIS — F43.22 ADJUSTMENT DISORDER WITH ANXIOUS MOOD: ICD-10-CM

## 2022-06-20 DIAGNOSIS — I15.2 HYPERTENSION ASSOCIATED WITH DIABETES: Primary | ICD-10-CM

## 2022-06-20 DIAGNOSIS — E78.5 HYPERLIPIDEMIA WITH TARGET LDL LESS THAN 70: ICD-10-CM

## 2022-06-20 DIAGNOSIS — I25.10 CORONARY ARTERY DISEASE INVOLVING NATIVE CORONARY ARTERY OF NATIVE HEART WITHOUT ANGINA PECTORIS: ICD-10-CM

## 2022-06-20 PROCEDURE — 1101F PT FALLS ASSESS-DOCD LE1/YR: CPT | Mod: CPTII,S$GLB,, | Performed by: FAMILY MEDICINE

## 2022-06-20 PROCEDURE — 3074F PR MOST RECENT SYSTOLIC BLOOD PRESSURE < 130 MM HG: ICD-10-PCS | Mod: CPTII,S$GLB,, | Performed by: FAMILY MEDICINE

## 2022-06-20 PROCEDURE — 99214 PR OFFICE/OUTPT VISIT, EST, LEVL IV, 30-39 MIN: ICD-10-PCS | Mod: S$GLB,,, | Performed by: FAMILY MEDICINE

## 2022-06-20 PROCEDURE — 3066F PR DOCUMENTATION OF TREATMENT FOR NEPHROPATHY: ICD-10-PCS | Mod: CPTII,S$GLB,, | Performed by: FAMILY MEDICINE

## 2022-06-20 PROCEDURE — 3044F HG A1C LEVEL LT 7.0%: CPT | Mod: CPTII,S$GLB,, | Performed by: FAMILY MEDICINE

## 2022-06-20 PROCEDURE — 3288F PR FALLS RISK ASSESSMENT DOCUMENTED: ICD-10-PCS | Mod: CPTII,S$GLB,, | Performed by: FAMILY MEDICINE

## 2022-06-20 PROCEDURE — 99999 PR PBB SHADOW E&M-EST. PATIENT-LVL III: CPT | Mod: PBBFAC,,, | Performed by: FAMILY MEDICINE

## 2022-06-20 PROCEDURE — 1159F MED LIST DOCD IN RCRD: CPT | Mod: CPTII,S$GLB,, | Performed by: FAMILY MEDICINE

## 2022-06-20 PROCEDURE — 99214 OFFICE O/P EST MOD 30 MIN: CPT | Mod: S$GLB,,, | Performed by: FAMILY MEDICINE

## 2022-06-20 PROCEDURE — 3078F PR MOST RECENT DIASTOLIC BLOOD PRESSURE < 80 MM HG: ICD-10-PCS | Mod: CPTII,S$GLB,, | Performed by: FAMILY MEDICINE

## 2022-06-20 PROCEDURE — 4010F ACE/ARB THERAPY RXD/TAKEN: CPT | Mod: CPTII,S$GLB,, | Performed by: FAMILY MEDICINE

## 2022-06-20 PROCEDURE — 3074F SYST BP LT 130 MM HG: CPT | Mod: CPTII,S$GLB,, | Performed by: FAMILY MEDICINE

## 2022-06-20 PROCEDURE — 3008F BODY MASS INDEX DOCD: CPT | Mod: CPTII,S$GLB,, | Performed by: FAMILY MEDICINE

## 2022-06-20 PROCEDURE — 3061F NEG MICROALBUMINURIA REV: CPT | Mod: CPTII,S$GLB,, | Performed by: FAMILY MEDICINE

## 2022-06-20 PROCEDURE — 3078F DIAST BP <80 MM HG: CPT | Mod: CPTII,S$GLB,, | Performed by: FAMILY MEDICINE

## 2022-06-20 PROCEDURE — 1159F PR MEDICATION LIST DOCUMENTED IN MEDICAL RECORD: ICD-10-PCS | Mod: CPTII,S$GLB,, | Performed by: FAMILY MEDICINE

## 2022-06-20 PROCEDURE — 1160F PR REVIEW ALL MEDS BY PRESCRIBER/CLIN PHARMACIST DOCUMENTED: ICD-10-PCS | Mod: CPTII,S$GLB,, | Performed by: FAMILY MEDICINE

## 2022-06-20 PROCEDURE — 3008F PR BODY MASS INDEX (BMI) DOCUMENTED: ICD-10-PCS | Mod: CPTII,S$GLB,, | Performed by: FAMILY MEDICINE

## 2022-06-20 PROCEDURE — 1126F AMNT PAIN NOTED NONE PRSNT: CPT | Mod: CPTII,S$GLB,, | Performed by: FAMILY MEDICINE

## 2022-06-20 PROCEDURE — 99999 PR PBB SHADOW E&M-EST. PATIENT-LVL III: ICD-10-PCS | Mod: PBBFAC,,, | Performed by: FAMILY MEDICINE

## 2022-06-20 PROCEDURE — 3288F FALL RISK ASSESSMENT DOCD: CPT | Mod: CPTII,S$GLB,, | Performed by: FAMILY MEDICINE

## 2022-06-20 PROCEDURE — 4010F PR ACE/ARB THEARPY RXD/TAKEN: ICD-10-PCS | Mod: CPTII,S$GLB,, | Performed by: FAMILY MEDICINE

## 2022-06-20 PROCEDURE — 3044F PR MOST RECENT HEMOGLOBIN A1C LEVEL <7.0%: ICD-10-PCS | Mod: CPTII,S$GLB,, | Performed by: FAMILY MEDICINE

## 2022-06-20 PROCEDURE — 3061F PR NEG MICROALBUMINURIA RESULT DOCUMENTED/REVIEW: ICD-10-PCS | Mod: CPTII,S$GLB,, | Performed by: FAMILY MEDICINE

## 2022-06-20 PROCEDURE — 1101F PR PT FALLS ASSESS DOC 0-1 FALLS W/OUT INJ PAST YR: ICD-10-PCS | Mod: CPTII,S$GLB,, | Performed by: FAMILY MEDICINE

## 2022-06-20 PROCEDURE — 3066F NEPHROPATHY DOC TX: CPT | Mod: CPTII,S$GLB,, | Performed by: FAMILY MEDICINE

## 2022-06-20 PROCEDURE — 1160F RVW MEDS BY RX/DR IN RCRD: CPT | Mod: CPTII,S$GLB,, | Performed by: FAMILY MEDICINE

## 2022-06-20 PROCEDURE — 1126F PR PAIN SEVERITY QUANTIFIED, NO PAIN PRESENT: ICD-10-PCS | Mod: CPTII,S$GLB,, | Performed by: FAMILY MEDICINE

## 2022-06-20 RX ORDER — AMLODIPINE BESYLATE 2.5 MG/1
2.5 TABLET ORAL NIGHTLY
Qty: 30 TABLET | Refills: 11 | Status: SHIPPED | OUTPATIENT
Start: 2022-06-20 | End: 2023-11-07

## 2022-06-20 RX ORDER — LORAZEPAM 0.5 MG/1
0.5 TABLET ORAL EVERY 6 HOURS PRN
Qty: 15 TABLET | Refills: 2 | Status: SHIPPED | OUTPATIENT
Start: 2022-06-20 | End: 2023-04-24 | Stop reason: ALTCHOICE

## 2022-06-20 RX ORDER — EZETIMIBE 10 MG/1
10 TABLET ORAL DAILY
Qty: 90 TABLET | Refills: 3
Start: 2022-06-20 | End: 2023-05-01

## 2022-06-20 NOTE — PROGRESS NOTES
[facility]     Subjective:    Chief Complaint:   Chief Complaint   Patient presents with    Follow-up       History of Present Illness:  72 y.o. male presents for multiple issues.  Anemia: Patient presents for presents evaluation of anemia.He has been through cardiac angiogram and cardiac stenting.  Anemia was found by chronic fatigue/ incidental CBC..  It has been present for 4 week.  Associated signs & symptoms: dizziness/lightheadedness, fatigue and history of renal disease.Patient has been improving. He has mild short term dementia but still working at high level. He has behavior problems Irritability, no yelling, no violence . He has to stop driving and to learn how let got.              I reviewed the patients chart dating back for the past few appointments. See above.    The following portions of the patient's history were reviewed and updated as appropriate: allergies, current medications, past family history, past medical history, past social history, past surgical history and problem list.    He denies chest pain upon exertion, dyspnea, nausea, vomiting, diaphoresis, and syncope. No pleuritic chest pain, unilateral leg swelling, calf tenderness, or calf pain.      Past Medical History:   Diagnosis Date    Arthritis     knees, back    Bilateral renal cysts 7/30/2012    CAD (coronary artery disease) 1995    no chest pain since CABG, sees Dr Larry Black    Diabetes mellitus     borderline    Hypertension     Kidney stones     uric acid stones    VASYL (obstructive sleep apnea) 2007    is compliant with CPAP    Primary gonadal failure        Past Surgical History:   Procedure Laterality Date    BACK SURGERY  2010    L5-6 fusion, with pins,bone graft    cardiac stents      2 stents 12/2021 and 1/2022    CARDIAC SURGERY  1995, 2007    total 7 vessel bypass    COLONOSCOPY  2008    repeat 2013    COLONOSCOPY N/A 4/4/2017    Procedure: COLONOSCOPY;  Surgeon: Dmitry Sampson MD;  Location:  [facility];  Service: Endoscopy;  Laterality: N/A;    CORONARY ARTERY BYPASS GRAFT  ,     cabgx3, cabgx5    EXTRACORPOREAL SHOCK WAVE LITHOTRIPSY      EYE SURGERY      cataracts bilateral    LITHOTRIPSY      LUMBAR LAMINECTOMY      Partial Nephrectomy Laproscopic      TRANSRECTAL BIOPSY OF PROSTATE WITH ULTRASOUND GUIDANCE N/A 3/19/2019    Procedure: BIOPSY, PROSTATE, RECTAL APPROACH, WITH US GUIDANCE;  Surgeon: Yovany Heart MD;  Location: [facility] OR;  Service: Urology;  Laterality: N/A;    TRANSRECTAL BIOPSY OF PROSTATE WITH ULTRASOUND GUIDANCE N/A 10/20/2020    Procedure: BIOPSY, PROSTATE, RECTAL APPROACH, WITH US GUIDANCE;  Surgeon: Yovany Heart MD;  Location: [facility] OR;  Service: Urology;  Laterality: N/A;       Social History  Social History     Tobacco Use    Smoking status: Former Smoker     Quit date: 1976     Years since quittin.3    Smokeless tobacco: Never Used   Substance Use Topics    Alcohol use: Yes     Comment: Socially    Drug use: No       Family History   Problem Relation Age of Onset    Heart disease Mother     Hypertension Mother     Diabetes Mother     Heart disease Father         premature    Hypertension Father     Diabetes Sister     Urolithiasis Sister     Heart disease Paternal Uncle     Cancer Neg Hx     Prostate cancer Neg Hx     Kidney cancer Neg Hx     Melanoma Neg Hx     Lupus Neg Hx     Eczema Neg Hx     Psoriasis Neg Hx      Review of patient's allergies indicates:   Allergen Reactions    Sulfa (sulfonamide antibiotics) Hives    Adhesive Blisters       Review of Systems [Negative unless checked off]    General ROS: []fever, []chills, []weight loss, [x]malaise/fatigue.  ENT ROS: []congestion, []rhinorrhea,  []sore throat, []neck pain, []hearing loss.  Ophthalmic ROS:[]blurry vision, [] double vision, []photophobia, []eye pain.  Respiratory ROS: []cough, []pleuritic chest pain, []shortness of breath, []wheezing.  CVS ROS:[]chest  "pain, []dyspnea on exertion, []palpitations, []orthopnea, []leg swelling, []PND.   GI ROS: []nausea, []vomiting, [] epigastric pain, []abd pain, []diarrhea, []constipation, []blood/melena in stool.   Urology ROS:[]dysuria, []frequency, []flank pain,[] trouble voiding, [] hematuria.   MSK ROS: []myalgias, [x]joint pain, []muscular weakness,  [x]back pain, [] falls.   Derm ROS: []pruritis, []rash, []jaundice.  Neurological:[]dizziness,[]numbness,[]loss of consciousness, []weakness  []headaches.   Psych ROS: []hallucinations, [x]depression, []anxiety, [x]suicidal ideation.    Physical Examination  BP 92/70 (BP Location: Right arm, Patient Position: Sitting, BP Method: Medium (Manual))   Pulse 72   Temp 98.1 °F (36.7 °C) (Oral)   Ht 5' 10" (1.778 m)   Wt 100.5 kg (221 lb 9 oz)   SpO2 97%   BMI 31.79 kg/m²   Wt Readings from Last 3 Encounters:   06/20/22 100.5 kg (221 lb 9 oz)   05/20/22 100.7 kg (222 lb)   05/06/22 98.6 kg (217 lb 6 oz)     BP Readings from Last 3 Encounters:   06/20/22 92/70   05/20/22 128/73   05/06/22 119/63     Estimated body mass index is 31.79 kg/m² as calculated from the following:    Height as of this encounter: 5' 10" (1.778 m).    Weight as of this encounter: 100.5 kg (221 lb 9 oz).     General appearance: alert, cooperative, no distress  Eyes: pupils equal and reactive, extraocular eye movements intact, sclera anicteric, left eye normal, right eye normal, Pale conjunctivae.   Ears: bilateral TM's and external ear canals normal   Nose: normal and patent, no erythema, discharge or polyps   Sinuses: Normal paranasal sinuses without tenderness   Throat: mucous membranes moist, pharynx normal without lesions   Neck: negative, no thyromegaly, trachea midline  Lungs: clear to auscultation, no wheezes, rales or rhonchi, symmetric air entry, no dullness to percussion bilaterally.  Heart: normal rate, regular rhythm, normal S1, S2, no murmurs, rubs, clicks or gallops, no displacement of the " PMI.  Abdomen: soft, nontender, nondistended, no masses or organomegaly No rigidity, rebound, or guarding.   Back: full range of motion, no tenderness, palpable spasm or pain on motion   Extremities: peripheral pulses normal, no pedal edema, no clubbing or cyanosis   Feet: warm, good capillary refill.  Neurological:alert, oriented, normal speech, no focal findings or movement disorder noted, screening mental status exam normal, neck supple without rigidity, cranial nerves II through XII intact, DTR's normal and symmetric, Lower legs weakness.   Psychiatric: alert, oriented to person, place, and time.Dementia: He is here for evaluation and treatment of cognitive problems. He is accompanied by patient and spouse. Primary caregiver is patient and wife. The family and the patient identify problems with changes in short term memory and getting disoriented outside of familiar environment. Family and patient report problems with suspiciousness. Family and patient are concerned about  driving, however, they are not concerned about wandering, cooking and inability to maintain adequate nutrition. Medication administration: caregiver monitors the use of medications          Integument: normal coloration and turgor, no rashes, no suspicious skin lesions noted.    Data reviewed    Previous medical records reviewed and summarized above in HPI. 274}    Laboratory    I have reviewed old labs below:   274}  Lab Results   Component Value Date    WBC 7.99 05/21/2022    HGB 10.8 (L) 05/21/2022    HCT 33.2 (L) 05/21/2022    MCV 96 05/21/2022     05/21/2022     05/21/2022    K 5.0 05/21/2022     05/21/2022    CALCIUM 9.5 05/21/2022    PHOS 3.0 06/18/2018    CO2 25 05/21/2022     05/21/2022    BUN 22 (H) 05/21/2022    CREATININE 1.14 05/21/2022    ANIONGAP 6 (L) 05/21/2022    ESTGFRAFRICA >60 05/21/2022    EGFRNONAA >60 05/21/2022    PROT 6.6 03/30/2022    ALBUMIN 3.6 03/30/2022    BILITOT 0.4 03/30/2022     ALKPHOS 71 03/30/2022    ALT 9 (L) 03/30/2022    AST 11 03/30/2022    INR 1.1 05/21/2022    CHOL 148 03/16/2022    TRIG 72 03/16/2022    HDL 40 03/16/2022    LDLCALC 93.6 03/16/2022    TSH 1.970 10/06/2021    PSA 4.5 (H) 12/14/2018    GLUF 109 11/07/2008    HGBA1C 5.7 (H) 03/16/2022       Imaging/Tracing: I have reviewed the pertinent results/findings and my personal findings are below:  274}    Assessment/Plan     274}    Carlos Tenorio Jr. is a 72 y.o. male who presents to clinic with:    1. Hypertension associated with diabetes    2. Adjustment disorder with anxious mood    3. Coronary artery disease involving native coronary artery of native heart without angina pectoris    4. VASYL on CPAP    5. Stage 3a chronic kidney disease    6. Hyperlipidemia with target LDL less than 70         Diagnostic impression remarks       1. Hypertension associated with diabetes  - amLODIPine (NORVASC) 2.5 MG tablet; Take 1 tablet (2.5 mg total) by mouth every evening.  Dispense: 30 tablet; Refill: 11    2. Adjustment disorder with anxious mood  - LORazepam (ATIVAN) 0.5 MG tablet; Take 1 tablet (0.5 mg total) by mouth every 6 (six) hours as needed for Anxiety.  Dispense: 15 tablet; Refill: 2    3. Coronary artery disease involving native coronary artery of native heart without angina pectoris    4. VASYL on CPAP    5. Stage 3a chronic kidney disease    6. Hyperlipidemia with target LDL less than 70  - ezetimibe (ZETIA) 10 mg tablet; Take 1 tablet (10 mg total) by mouth once daily.  Dispense: 90 tablet; Refill: 3    Patient denies any exertional chest pain, dyspnea, palpitations, syncope, orthopnea, edema or paroxysmal nocturnal dyspnea.    Medication Monitoring: In today's visit, monitoring for drug toxicity was accomplished. Proper use of medications was discussed.     Counseling: Counseling included discussion regarding imaging findings, diagnosis, possibilities, treatment options, medications, risks, and benefits. He had many  questions regarding the options and long-term effects. All questions were answered. He expressed understanding after counseling regarding the diagnosis and recommendations. He was capable and demonstrated competence with understanding of these options. Shared decision making was performed resulting in him choosing the current treatment plan.     He was counseled about the importance of healthy dietary habits as well as routine physical activity and exercise for better health outcomes. I also discussed the importance of cancer screening.     I also discussed the importance of close follow up to discuss labs, change or modify his medications if needed, monitor side effects, and further evaluation of medical problems.     Additional workup planned: see labs ordered below.    See below for labs and meds ordered with associated diagnosis      Medication List with Changes/Refills   Current Medications    ALBUTEROL (VENTOLIN HFA) 90 MCG/ACTUATION INHALER    Inhale 2 puffs into the lungs every 4 (four) hours as needed for Wheezing or Shortness of Breath. Rescue    ALLOPURINOL (ZYLOPRIM) 100 MG TABLET    TAKE 1 TABLET(100 MG) BY MOUTH EVERY DAY    ASPIRIN (ECOTRIN) 81 MG EC TABLET    Take 81 mg by mouth 3 (three) times daily.    ATORVASTATIN (LIPITOR) 40 MG TABLET    Take 1 tablet (40 mg total) by mouth once daily.    CLOPIDOGREL (PLAVIX) 75 MG TABLET    Take 75 mg by mouth once daily.    COVID-19 TEST SPECIMEN COLLECT MISC    TEST AS DIRCECTED    CYANOCOBALAMIN, VITAMIN B-12, (VITAMIN B-12 ORAL)    Take by mouth.    DONEPEZIL (ARICEPT) 10 MG TABLET    Take 10 mg by mouth once daily.    FERROUS SULFATE (FEOSOL) 325 MG (65 MG IRON) TAB TABLET    Take 325 mg by mouth.    FERROUS SULFATE (IRON ORAL)    Take by mouth.    FISH OIL-OMEGA-3 FATTY ACIDS 300-1,000 MG CAPSULE    Take 2 g by mouth 3 (three) times daily.    FOLIC ACID (FOLVITE) 1 MG TABLET    Take 1 tablet (1 mg total) by mouth once daily.    HYDROCHLOROTHIAZIDE  "(HYDRODIURIL) 12.5 MG TAB    Take 1 tablet (12.5 mg total) by mouth once daily.    [id number]-ALGAL OIL (METANX/FOLTANX RF) 3 MG-35 MG-2 MG -90.314 MG CAP    Take 1 capsule by mouth once daily.    LORAZEPAM (ATIVAN) 0.5 MG TABLET    Take 1 tablet (0.5 mg total) by mouth every 6 (six) hours as needed for Anxiety.    LOSARTAN (COZAAR) 100 MG TABLET    TAKE 1 TABLET(100 MG) BY MOUTH EVERY DAY    METFORMIN (GLUCOPHAGE-XR) 500 MG 24 HR TABLET    Take 1 tablet (500 mg total) by mouth 2 (two) times daily with meals.    NITROGLYCERIN (NITROSTAT) 0.4 MG SL TABLET    SMARTSI Tablet(s) Sublingual PRN    POTASSIUM CHLORIDE SA (K-DUR,KLOR-CON) 20 MEQ TABLET    TAKE 1 TABLET(20 MEQ) BY MOUTH EVERY DAY    RANOLAZINE (RANEXA) 500 MG TB12    Take 500 mg by mouth 2 (two) times daily.    SILDENAFIL (VIAGRA) 100 MG TABLET    Take 1 tablet (100 mg total) by mouth as needed for Erectile Dysfunction.    SLOW  MG (45 MG IRON) TBSR    Take by mouth.    VITAMIN D 1000 UNITS TAB    Take 185 mg by mouth once daily.     Modified Medications    No medications on file       No follow-ups on file. for further workup and reassessment if labs and tests obtained are stable or sooner as needed. He was instructed to call the clinic or go to the emergency department if his symptoms do not improve, worsens, or if new symptoms develop. Patient knows to call any time if an emergency arises. Shared decision making occurred and he verbalized understanding in agreement with this plan.     Documentation entered by me for this encounter may have been done in part using speech-recognition technology. Although I have made an effort to ensure accuracy, "sound like" errors may exist and should be interpreted in context.       Roney Bradshaw MD     Discussed health maintenance guidelines appropriate for age.  Discussed health maintenance guidelines appropriate for age.    "

## 2022-07-18 ENCOUNTER — CLINICAL SUPPORT (OUTPATIENT)
Dept: FAMILY MEDICINE | Facility: CLINIC | Age: 72
End: 2022-07-18
Payer: COMMERCIAL

## 2022-07-18 VITALS — DIASTOLIC BLOOD PRESSURE: 64 MMHG | OXYGEN SATURATION: 99 % | SYSTOLIC BLOOD PRESSURE: 122 MMHG | HEART RATE: 70 BPM

## 2022-07-18 PROCEDURE — 99999 PR PBB SHADOW E&M-EST. PATIENT-LVL II: ICD-10-PCS | Mod: PBBFAC,,,

## 2022-07-18 PROCEDURE — 99999 PR PBB SHADOW E&M-EST. PATIENT-LVL II: CPT | Mod: PBBFAC,,,

## 2022-07-18 NOTE — PROGRESS NOTES
Carlos Fontenot Jona . 72 y.o. male is here today for Blood Pressure check.   History of HTN yes.    Review of patient's allergies indicates:   Allergen Reactions    Sulfa (sulfonamide antibiotics) Hives    Adhesive Blisters     Creatinine   Date Value Ref Range Status   05/21/2022 1.14 0.50 - 1.40 mg/dL Final     Sodium   Date Value Ref Range Status   05/21/2022 139 136 - 145 mmol/L Final     Potassium   Date Value Ref Range Status   05/21/2022 5.0 3.5 - 5.1 mmol/L Final   ]  Patient verifies taking blood pressure medications on a regular basis at the same time of the day.     Current Outpatient Medications:     albuterol (VENTOLIN HFA) 90 mcg/actuation inhaler, Inhale 2 puffs into the lungs every 4 (four) hours as needed for Wheezing or Shortness of Breath. Rescue, Disp: 18 g, Rfl: 2    allopurinoL (ZYLOPRIM) 100 MG tablet, TAKE 1 TABLET(100 MG) BY MOUTH EVERY DAY, Disp: 90 tablet, Rfl: 3    amLODIPine (NORVASC) 2.5 MG tablet, Take 1 tablet (2.5 mg total) by mouth every evening., Disp: 30 tablet, Rfl: 11    aspirin (ECOTRIN) 81 MG EC tablet, Take 81 mg by mouth 3 (three) times daily., Disp: , Rfl:     atorvastatin (LIPITOR) 40 MG tablet, Take 1 tablet (40 mg total) by mouth once daily., Disp: 90 tablet, Rfl: 4    clopidogreL (PLAVIX) 75 mg tablet, Take 75 mg by mouth once daily., Disp: , Rfl:     cyanocobalamin, vitamin B-12, (VITAMIN B-12 ORAL), Take by mouth., Disp: , Rfl:     ezetimibe (ZETIA) 10 mg tablet, Take 1 tablet (10 mg total) by mouth once daily., Disp: 90 tablet, Rfl: 3    ferrous sulfate (FEOSOL) 325 mg (65 mg iron) Tab tablet, Take 325 mg by mouth., Disp: , Rfl:     fish oil-omega-3 fatty acids 300-1,000 mg capsule, Take 2 g by mouth 3 (three) times daily., Disp: , Rfl:     folic acid (FOLVITE) 1 MG tablet, Take 1 tablet (1 mg total) by mouth once daily., Disp: 90 tablet, Rfl: 3    hydroCHLOROthiazide (HYDRODIURIL) 12.5 MG Tab, Take 1 tablet (12.5 mg total) by mouth once daily., Disp:  30 tablet, Rfl: 11    LORazepam (ATIVAN) 0.5 MG tablet, Take 1 tablet (0.5 mg total) by mouth every 6 (six) hours as needed for Anxiety., Disp: 15 tablet, Rfl: 2    losartan (COZAAR) 100 MG tablet, TAKE 1 TABLET(100 MG) BY MOUTH EVERY DAY, Disp: 90 tablet, Rfl: 2    metFORMIN (GLUCOPHAGE-XR) 500 MG 24 hr tablet, Take 1 tablet (500 mg total) by mouth 2 (two) times daily with meals., Disp: 180 tablet, Rfl: 4    nitroGLYCERIN (NITROSTAT) 0.4 MG SL tablet, SMARTSI Tablet(s) Sublingual PRN, Disp: , Rfl:     potassium chloride SA (K-DUR,KLOR-CON) 20 MEQ tablet, TAKE 1 TABLET(20 MEQ) BY MOUTH EVERY DAY, Disp: 90 tablet, Rfl: 2    ranolazine (RANEXA) 500 MG Tb12, Take 500 mg by mouth 2 (two) times daily., Disp: , Rfl:     SLOW  mg (45 mg iron) TbSR, Take by mouth., Disp: , Rfl:     vitamin D 1000 units Tab, Take 185 mg by mouth once daily., Disp: , Rfl:   No current facility-administered medications for this visit.    Facility-Administered Medications Ordered in Other Visits:     triamcinolone acetonide injection 40 mg, 40 mg, Intra-articular, , Mason Macedo MD, 40 mg at 03/13/15 0929    triamcinolone acetonide injection 40 mg, 40 mg, Intra-articular, , Mason Macedo MD, 40 mg at 03/13/15 0929  Does patient have record of home blood pressure readings yes. Readings have been averaging 122/70.   Last dose of blood pressure medication was taken at 0700.  Patient is asymptomatic.   Complains of N/A.    BP: 122/64 , Pulse: 70 .      Dr. Bradshaw notified.

## 2022-08-19 ENCOUNTER — PATIENT MESSAGE (OUTPATIENT)
Dept: ORTHOPEDICS | Facility: CLINIC | Age: 72
End: 2022-08-19
Payer: COMMERCIAL

## 2022-08-23 DIAGNOSIS — M17.0 PRIMARY OSTEOARTHRITIS OF BOTH KNEES: Primary | ICD-10-CM

## 2022-08-26 ENCOUNTER — HOSPITAL ENCOUNTER (OUTPATIENT)
Dept: RADIOLOGY | Facility: HOSPITAL | Age: 72
Discharge: HOME | End: 2022-08-26
Attending: ORTHOPAEDIC SURGERY
Payer: COMMERCIAL

## 2022-08-26 DIAGNOSIS — M17.0 PRIMARY OSTEOARTHRITIS OF BOTH KNEES: ICD-10-CM

## 2022-08-26 PROCEDURE — 73564 X-RAY EXAM KNEE 4 OR MORE: CPT | Mod: 26,,, | Performed by: RADIOLOGY

## 2022-08-26 PROCEDURE — 73564 XR KNEE ORTHO BILAT WITH FLEXION: ICD-10-PCS | Mod: 26,,, | Performed by: RADIOLOGY

## 2022-08-26 PROCEDURE — 73564 X-RAY EXAM KNEE 4 OR MORE: CPT | Mod: TC,50,FY

## 2022-08-29 ENCOUNTER — OFFICE VISIT (OUTPATIENT)
Dept: ORTHOPEDICS | Facility: CLINIC | Age: 72
End: 2022-08-29
Payer: COMMERCIAL

## 2022-08-29 VITALS — HEIGHT: 70 IN | RESPIRATION RATE: 18 BRPM | BODY MASS INDEX: 31.64 KG/M2 | WEIGHT: 221 LBS

## 2022-08-29 DIAGNOSIS — M17.0 PRIMARY OSTEOARTHRITIS OF BOTH KNEES: Primary | ICD-10-CM

## 2022-08-29 PROCEDURE — 99213 PR OFFICE/OUTPT VISIT, EST, LEVL III, 20-29 MIN: ICD-10-PCS | Mod: 25,S$GLB,, | Performed by: ORTHOPAEDIC SURGERY

## 2022-08-29 PROCEDURE — 4010F ACE/ARB THERAPY RXD/TAKEN: CPT | Mod: CPTII,S$GLB,, | Performed by: ORTHOPAEDIC SURGERY

## 2022-08-29 PROCEDURE — 3008F PR BODY MASS INDEX (BMI) DOCUMENTED: ICD-10-PCS | Mod: CPTII,S$GLB,, | Performed by: ORTHOPAEDIC SURGERY

## 2022-08-29 PROCEDURE — 3044F PR MOST RECENT HEMOGLOBIN A1C LEVEL <7.0%: ICD-10-PCS | Mod: CPTII,S$GLB,, | Performed by: ORTHOPAEDIC SURGERY

## 2022-08-29 PROCEDURE — 1160F PR REVIEW ALL MEDS BY PRESCRIBER/CLIN PHARMACIST DOCUMENTED: ICD-10-PCS | Mod: CPTII,S$GLB,, | Performed by: ORTHOPAEDIC SURGERY

## 2022-08-29 PROCEDURE — 1101F PR PT FALLS ASSESS DOC 0-1 FALLS W/OUT INJ PAST YR: ICD-10-PCS | Mod: CPTII,S$GLB,, | Performed by: ORTHOPAEDIC SURGERY

## 2022-08-29 PROCEDURE — 99999 PR PBB SHADOW E&M-EST. PATIENT-LVL IV: CPT | Mod: PBBFAC,,, | Performed by: ORTHOPAEDIC SURGERY

## 2022-08-29 PROCEDURE — 3044F HG A1C LEVEL LT 7.0%: CPT | Mod: CPTII,S$GLB,, | Performed by: ORTHOPAEDIC SURGERY

## 2022-08-29 PROCEDURE — 3008F BODY MASS INDEX DOCD: CPT | Mod: CPTII,S$GLB,, | Performed by: ORTHOPAEDIC SURGERY

## 2022-08-29 PROCEDURE — 1160F RVW MEDS BY RX/DR IN RCRD: CPT | Mod: CPTII,S$GLB,, | Performed by: ORTHOPAEDIC SURGERY

## 2022-08-29 PROCEDURE — 20610 DRAIN/INJ JOINT/BURSA W/O US: CPT | Mod: 50,S$GLB,, | Performed by: ORTHOPAEDIC SURGERY

## 2022-08-29 PROCEDURE — 20610 LARGE JOINT ASPIRATION/INJECTION: BILATERAL KNEE: ICD-10-PCS | Mod: 50,S$GLB,, | Performed by: ORTHOPAEDIC SURGERY

## 2022-08-29 PROCEDURE — 3066F NEPHROPATHY DOC TX: CPT | Mod: CPTII,S$GLB,, | Performed by: ORTHOPAEDIC SURGERY

## 2022-08-29 PROCEDURE — 3288F FALL RISK ASSESSMENT DOCD: CPT | Mod: CPTII,S$GLB,, | Performed by: ORTHOPAEDIC SURGERY

## 2022-08-29 PROCEDURE — 3061F NEG MICROALBUMINURIA REV: CPT | Mod: CPTII,S$GLB,, | Performed by: ORTHOPAEDIC SURGERY

## 2022-08-29 PROCEDURE — 3061F PR NEG MICROALBUMINURIA RESULT DOCUMENTED/REVIEW: ICD-10-PCS | Mod: CPTII,S$GLB,, | Performed by: ORTHOPAEDIC SURGERY

## 2022-08-29 PROCEDURE — 1101F PT FALLS ASSESS-DOCD LE1/YR: CPT | Mod: CPTII,S$GLB,, | Performed by: ORTHOPAEDIC SURGERY

## 2022-08-29 PROCEDURE — 99999 PR PBB SHADOW E&M-EST. PATIENT-LVL IV: ICD-10-PCS | Mod: PBBFAC,,, | Performed by: ORTHOPAEDIC SURGERY

## 2022-08-29 PROCEDURE — 1125F PR PAIN SEVERITY QUANTIFIED, PAIN PRESENT: ICD-10-PCS | Mod: CPTII,S$GLB,, | Performed by: ORTHOPAEDIC SURGERY

## 2022-08-29 PROCEDURE — 3066F PR DOCUMENTATION OF TREATMENT FOR NEPHROPATHY: ICD-10-PCS | Mod: CPTII,S$GLB,, | Performed by: ORTHOPAEDIC SURGERY

## 2022-08-29 PROCEDURE — 3288F PR FALLS RISK ASSESSMENT DOCUMENTED: ICD-10-PCS | Mod: CPTII,S$GLB,, | Performed by: ORTHOPAEDIC SURGERY

## 2022-08-29 PROCEDURE — 1159F MED LIST DOCD IN RCRD: CPT | Mod: CPTII,S$GLB,, | Performed by: ORTHOPAEDIC SURGERY

## 2022-08-29 PROCEDURE — 1159F PR MEDICATION LIST DOCUMENTED IN MEDICAL RECORD: ICD-10-PCS | Mod: CPTII,S$GLB,, | Performed by: ORTHOPAEDIC SURGERY

## 2022-08-29 PROCEDURE — 4010F PR ACE/ARB THEARPY RXD/TAKEN: ICD-10-PCS | Mod: CPTII,S$GLB,, | Performed by: ORTHOPAEDIC SURGERY

## 2022-08-29 PROCEDURE — 1125F AMNT PAIN NOTED PAIN PRSNT: CPT | Mod: CPTII,S$GLB,, | Performed by: ORTHOPAEDIC SURGERY

## 2022-08-29 PROCEDURE — 99213 OFFICE O/P EST LOW 20 MIN: CPT | Mod: 25,S$GLB,, | Performed by: ORTHOPAEDIC SURGERY

## 2022-08-29 RX ORDER — TRIAMCINOLONE ACETONIDE 40 MG/ML
40 INJECTION, SUSPENSION INTRA-ARTICULAR; INTRAMUSCULAR
Status: DISCONTINUED | OUTPATIENT
Start: 2022-08-29 | End: 2022-08-29 | Stop reason: HOSPADM

## 2022-08-29 RX ADMIN — TRIAMCINOLONE ACETONIDE 40 MG: 40 INJECTION, SUSPENSION INTRA-ARTICULAR; INTRAMUSCULAR at 03:08

## 2022-08-29 NOTE — PROCEDURES
Large Joint Aspiration/Injection: bilateral knee    Date/Time: 8/29/2022 3:45 PM  Performed by: Pablo Dutton MD  Authorized by: Pablo Dutton MD     Consent Done?:  Yes (Verbal)  Indications:  Pain  Site marked: the procedure site was marked    Timeout: prior to procedure the correct patient, procedure, and site was verified    Prep: patient was prepped and draped in usual sterile fashion      Local anesthesia used?: Yes    Anesthesia:  Local infiltration  Local anesthetic:  Bupivacaine 0.25% without epinephrine and lidocaine 2% without epinephrine  Anesthetic total (ml):  6      Details:  Needle Size:  22 G  Ultrasonic Guidance for needle placement?: No    Approach:  Anterolateral  Location:  Knee  Laterality:  Bilateral  Site:  Bilateral knee  Medications (Right):  40 mg triamcinolone acetonide 40 mg/mL  Medications (Left):  40 mg triamcinolone acetonide 40 mg/mL  Patient tolerance:  Patient tolerated the procedure well with no immediate complications

## 2022-08-29 NOTE — PROGRESS NOTES
Past Medical History:   Diagnosis Date    Arthritis     knees, back    Bilateral renal cysts 7/30/2012    CAD (coronary artery disease) 1995    no chest pain since CABG, sees Dr Larry Black    Diabetes mellitus     borderline    Hypertension     Kidney stones     uric acid stones    VASYL (obstructive sleep apnea) 2007    is compliant with CPAP    Primary gonadal failure        Past Surgical History:   Procedure Laterality Date    BACK SURGERY  2010    L5-6 fusion, with pins,bone graft    cardiac stents      2 stents 12/2021 and 1/2022    CARDIAC SURGERY  1995, 2007    total 7 vessel bypass    COLONOSCOPY  2008    repeat 2013    COLONOSCOPY N/A 4/4/2017    Procedure: COLONOSCOPY;  Surgeon: Dmitry Sampson MD;  Location: [facility] ENDO;  Service: Endoscopy;  Laterality: N/A;    CORONARY ARTERY BYPASS GRAFT  1995, 2007    cabgx3, cabgx5    EXTRACORPOREAL SHOCK WAVE LITHOTRIPSY      EYE SURGERY      cataracts bilateral    LITHOTRIPSY      LUMBAR LAMINECTOMY      Partial Nephrectomy Laproscopic      TRANSRECTAL BIOPSY OF PROSTATE WITH ULTRASOUND GUIDANCE N/A 3/19/2019    Procedure: BIOPSY, PROSTATE, RECTAL APPROACH, WITH US GUIDANCE;  Surgeon: Yovany Heart MD;  Location: [facility] OR;  Service: Urology;  Laterality: N/A;    TRANSRECTAL BIOPSY OF PROSTATE WITH ULTRASOUND GUIDANCE N/A 10/20/2020    Procedure: BIOPSY, PROSTATE, RECTAL APPROACH, WITH US GUIDANCE;  Surgeon: Yovany Heart MD;  Location: [facility] OR;  Service: Urology;  Laterality: N/A;       Current Outpatient Medications   Medication Sig    albuterol (VENTOLIN HFA) 90 mcg/actuation inhaler Inhale 2 puffs into the lungs every 4 (four) hours as needed for Wheezing or Shortness of Breath. Rescue    allopurinoL (ZYLOPRIM) 100 MG tablet TAKE 1 TABLET(100 MG) BY MOUTH EVERY DAY    amLODIPine (NORVASC) 2.5 MG tablet Take 1 tablet (2.5 mg total) by mouth every evening.    aspirin (ECOTRIN) 81 MG EC tablet Take 81 mg by mouth 3 (three) times daily.    atorvastatin  (LIPITOR) 40 MG tablet Take 1 tablet (40 mg total) by mouth once daily.    clopidogreL (PLAVIX) 75 mg tablet Take 75 mg by mouth once daily.    cyanocobalamin, vitamin B-12, (VITAMIN B-12 ORAL) Take by mouth.    ezetimibe (ZETIA) 10 mg tablet Take 1 tablet (10 mg total) by mouth once daily.    ferrous sulfate (FEOSOL) 325 mg (65 mg iron) Tab tablet Take 325 mg by mouth.    fish oil-omega-3 fatty acids 300-1,000 mg capsule Take 2 g by mouth 3 (three) times daily.    folic acid (FOLVITE) 1 MG tablet Take 1 tablet (1 mg total) by mouth once daily.    hydroCHLOROthiazide (HYDRODIURIL) 12.5 MG Tab Take 1 tablet (12.5 mg total) by mouth once daily.    LORazepam (ATIVAN) 0.5 MG tablet Take 1 tablet (0.5 mg total) by mouth every 6 (six) hours as needed for Anxiety.    losartan (COZAAR) 100 MG tablet TAKE 1 TABLET(100 MG) BY MOUTH EVERY DAY    metFORMIN (GLUCOPHAGE-XR) 500 MG 24 hr tablet Take 1 tablet (500 mg total) by mouth 2 (two) times daily with meals.    nitroGLYCERIN (NITROSTAT) 0.4 MG SL tablet SMARTSI Tablet(s) Sublingual PRN    potassium chloride SA (K-DUR,KLOR-CON) 20 MEQ tablet TAKE 1 TABLET(20 MEQ) BY MOUTH EVERY DAY    ranolazine (RANEXA) 500 MG Tb12 Take 500 mg by mouth 2 (two) times daily.    SLOW  mg (45 mg iron) TbSR Take by mouth.    vitamin D 1000 units Tab Take 185 mg by mouth once daily.     No current facility-administered medications for this visit.     Facility-Administered Medications Ordered in Other Visits   Medication    triamcinolone acetonide injection 40 mg    triamcinolone acetonide injection 40 mg       Review of patient's allergies indicates:   Allergen Reactions    Adhesive Blisters       Family History   Problem Relation Age of Onset    Heart disease Mother     Hypertension Mother     Diabetes Mother     Heart disease Father         premature    Hypertension Father     Diabetes Sister     Urolithiasis Sister     Heart disease Paternal Uncle     Cancer Neg Hx     Prostate cancer  Neg Hx     Kidney cancer Neg Hx     Melanoma Neg Hx     Lupus Neg Hx     Eczema Neg Hx     Psoriasis Neg Hx        Social History     Socioeconomic History    Marital status:    Tobacco Use    Smoking status: Former     Types: Cigarettes     Quit date: 1976     Years since quittin.5    Smokeless tobacco: Never   Substance and Sexual Activity    Alcohol use: Yes     Comment: Socially    Drug use: No    Sexual activity: Yes     Social Determinants of Health     Financial Resource Strain: Low Risk     Difficulty of Paying Living Expenses: Not hard at all   Food Insecurity: No Food Insecurity    Worried About Running Out of Food in the Last Year: Never true    Ran Out of Food in the Last Year: Never true   Transportation Needs: No Transportation Needs    Lack of Transportation (Medical): No    Lack of Transportation (Non-Medical): No   Physical Activity: Insufficiently Active    Days of Exercise per Week: 5 days    Minutes of Exercise per Session: 20 min   Stress: Stress Concern Present    Feeling of Stress : To some extent   Social Connections: Unknown    Frequency of Communication with Friends and Family: Three times a week    Frequency of Social Gatherings with Friends and Family: Once a week    Active Member of Clubs or Organizations: No    Attends Club or Organization Meetings: Never    Marital Status:    Housing Stability: Low Risk     Unable to Pay for Housing in the Last Year: No    Number of Places Lived in the Last Year: 1    Unstable Housing in the Last Year: No       Chief Complaint:   Chief Complaint   Patient presents with    Left Knee - Pain    Right Knee - Pain       History of present illness: Is a 72-year-old male seen for some right hip pain.  This started about 9 months ago.  No injury or trauma.  Pain laying on it at night.  Pain going up and down stairs.  Pain in the hip is a 6/10.  Using some topical anti-inflammatories and some Tylenol.      Answers for HPI/ROS submitted by  the patient on 11/29/2019   Leg pain  unexpected weight change: No  appetite change : No  sleep disturbance: No  IMMUNOCOMPROMISED: No  nervous/ anxious: No  dysphoric mood: No  rash: No  visual disturbance: No  eye redness: No  eye pain: No  ear pain: No  tinnitus: Yes  hearing loss: Yes  sinus pressure : No  nosebleeds: No  enviro allergies: No  food allergies: No  cough: No  shortness of breath: No  sweating: No  frequency: No  difficulty urinating: No  hematuria: No  chest pain: No  palpitations: No  nausea: No  vomiting: No  diarrhea: No  blood in stool: No  constipation: No  headaches: No  dizziness: No  numbness: No  seizures: No  joint swelling: No  myalgia: No  weakness: No  back pain: No  Pain Chronicity: recurrent  History of trauma: No  Onset: more than 1 month ago  Frequency: daily  Progression since onset: unchanged  injury location: at work  pain- numeric: 3/10  pain location: left knee, right knee  pain quality: aching, sharp  Radiating Pain: No  Aggravating factors: walking  fever: No  inability to bear weight: No  itching: No  joint locking: No  limited range of motion: Yes  stiffness: Yes  tingling: No  Treatments tried: nothing  physical therapy: not tried  Improvement on treatment: significant        Physical Examination:    Vital Signs:    Vitals:    08/29/22 1526   Resp: 18       Body mass index is 31.71 kg/m².    This a well-developed, well nourished patient in no acute distress.  They are alert and oriented and cooperative to examination.  Pt. walks without an antalgic gait.      Examination of bilateral knees shows no rashes or erythema. There are no masses ecchymosis or effusion. Patient has full range of motion from 0-130°. Patient is nontender to palpation over lateral joint line and moderately tender to palpation over the medial joint line. Knee is stable to varus and valgus stress. 5 out of 5 motor strength. Palpable distal pulses. Intact light touch sensation. Negative Patellofemoral  crepitus    Examination of the patient's right hip shows full range of motion with flexion to 160°, extension to 0, external rotation to 50°, internal rotation of 15°, abduction of 50°, adduction of 15°. Skin has no rashes or bruising. Patient has negative Stinchfield exam. Patient has negative straight leg raise.Negative internal impingement test. Negative MISHEL test. Negative Tabatha's test. Patient has no pain with hip range of motion.  Mildly tender to palpation over the greater trochanteric bursa. Patient is 5 out of 5 motor strength, palpable distal pulses, and intact light touch sensation.       X-rays: X-rays of both knees are  review which show severe medial joint space narrowing of both knees.  No significant progression  X-rays of the right hip are  reviewed which shows no atypical findings.  Patient does have history of prior lumbar spine surgery.     Assessment:: Severe varus knee arthritis  Right hip bursitis    Plan:    I injected both knees with cortisone today.    This note was created using  voice recognition software that occasionally misinterpreted phrases or words.    Consult note is delivered via Epic messaging service.

## 2022-09-22 ENCOUNTER — TELEPHONE (OUTPATIENT)
Dept: FAMILY MEDICINE | Facility: CLINIC | Age: 72
End: 2022-09-22
Payer: COMMERCIAL

## 2022-10-01 ENCOUNTER — LAB VISIT (OUTPATIENT)
Dept: LAB | Facility: HOSPITAL | Age: 72
End: 2022-10-01
Attending: INTERNAL MEDICINE
Payer: COMMERCIAL

## 2022-10-01 DIAGNOSIS — I50.30 HEART FAILURE, DIASTOLIC: ICD-10-CM

## 2022-10-01 DIAGNOSIS — I70.422 ATHEROSCLEROSIS OF AUTOLOGOUS VEIN BYPASS GRAFT OF LEFT LOWER EXTREMITY WITH REST PAIN: ICD-10-CM

## 2022-10-01 DIAGNOSIS — I25.118 ATHSCL HEART DISEASE OF NATIVE COR ART W OTH ANG PCTRS: Primary | ICD-10-CM

## 2022-10-01 DIAGNOSIS — E11.9 DIABETES MELLITUS WITHOUT COMPLICATION: ICD-10-CM

## 2022-10-01 DIAGNOSIS — I10 ESSENTIAL HYPERTENSION, MALIGNANT: ICD-10-CM

## 2022-10-01 DIAGNOSIS — Z98.61 POSTSURGICAL PERCUTANEOUS TRANSLUMINAL CORONARY ANGIOPLASTY STATUS: ICD-10-CM

## 2022-10-01 DIAGNOSIS — E78.2 MIXED HYPERLIPIDEMIA: ICD-10-CM

## 2022-10-01 LAB
ALBUMIN SERPL BCP-MCNC: 3.7 G/DL (ref 3.5–5.2)
ALP SERPL-CCNC: 52 U/L (ref 55–135)
ALT SERPL W/O P-5'-P-CCNC: 14 U/L (ref 10–44)
ANION GAP SERPL CALC-SCNC: 7 MMOL/L (ref 8–16)
AST SERPL-CCNC: 12 U/L (ref 10–40)
BILIRUB SERPL-MCNC: 1 MG/DL (ref 0.1–1)
BUN SERPL-MCNC: 25 MG/DL (ref 8–23)
CALCIUM SERPL-MCNC: 9.7 MG/DL (ref 8.7–10.5)
CHLORIDE SERPL-SCNC: 108 MMOL/L (ref 95–110)
CHOLEST SERPL-MCNC: 110 MG/DL (ref 120–199)
CHOLEST/HDLC SERPL: 2.4 {RATIO} (ref 2–5)
CK SERPL-CCNC: 53 U/L (ref 20–200)
CO2 SERPL-SCNC: 25 MMOL/L (ref 23–29)
CREAT SERPL-MCNC: 1.2 MG/DL (ref 0.5–1.4)
ERYTHROCYTE [DISTWIDTH] IN BLOOD BY AUTOMATED COUNT: 13.3 % (ref 11.5–14.5)
EST. GFR  (NO RACE VARIABLE): >60 ML/MIN/1.73 M^2
ESTIMATED AVG GLUCOSE: 111 MG/DL (ref 68–131)
GLUCOSE SERPL-MCNC: 111 MG/DL (ref 70–110)
HBA1C MFR BLD: 5.5 % (ref 4.5–6.2)
HCT VFR BLD AUTO: 33.6 % (ref 40–54)
HDLC SERPL-MCNC: 45 MG/DL (ref 40–75)
HDLC SERPL: 40.9 % (ref 20–50)
HGB BLD-MCNC: 10.9 G/DL (ref 14–18)
LDLC SERPL CALC-MCNC: 53 MG/DL (ref 63–159)
MCH RBC QN AUTO: 31.6 PG (ref 27–31)
MCHC RBC AUTO-ENTMCNC: 32.4 G/DL (ref 32–36)
MCV RBC AUTO: 97 FL (ref 82–98)
NONHDLC SERPL-MCNC: 65 MG/DL
PLATELET # BLD AUTO: 318 K/UL (ref 150–450)
PMV BLD AUTO: 10.9 FL (ref 9.2–12.9)
POTASSIUM SERPL-SCNC: 4.6 MMOL/L (ref 3.5–5.1)
PROT SERPL-MCNC: 6.6 G/DL (ref 6–8.4)
RBC # BLD AUTO: 3.45 M/UL (ref 4.6–6.2)
SODIUM SERPL-SCNC: 140 MMOL/L (ref 136–145)
TRIGL SERPL-MCNC: 60 MG/DL (ref 30–150)
TSH SERPL DL<=0.005 MIU/L-ACNC: 2.31 UIU/ML (ref 0.34–5.6)
WBC # BLD AUTO: 8.62 K/UL (ref 3.9–12.7)

## 2022-10-01 PROCEDURE — 82550 ASSAY OF CK (CPK): CPT | Performed by: INTERNAL MEDICINE

## 2022-10-01 PROCEDURE — 80061 LIPID PANEL: CPT | Performed by: INTERNAL MEDICINE

## 2022-10-01 PROCEDURE — 83036 HEMOGLOBIN GLYCOSYLATED A1C: CPT | Performed by: INTERNAL MEDICINE

## 2022-10-01 PROCEDURE — 36415 COLL VENOUS BLD VENIPUNCTURE: CPT | Performed by: INTERNAL MEDICINE

## 2022-10-01 PROCEDURE — 80053 COMPREHEN METABOLIC PANEL: CPT | Performed by: INTERNAL MEDICINE

## 2022-10-01 PROCEDURE — 85027 COMPLETE CBC AUTOMATED: CPT | Performed by: INTERNAL MEDICINE

## 2022-10-01 PROCEDURE — 84443 ASSAY THYROID STIM HORMONE: CPT | Performed by: INTERNAL MEDICINE

## 2022-10-14 ENCOUNTER — PATIENT MESSAGE (OUTPATIENT)
Dept: UROLOGY | Facility: CLINIC | Age: 72
End: 2022-10-14
Payer: COMMERCIAL

## 2022-10-14 ENCOUNTER — PATIENT MESSAGE (OUTPATIENT)
Dept: FAMILY MEDICINE | Facility: CLINIC | Age: 72
End: 2022-10-14
Payer: COMMERCIAL

## 2022-10-14 NOTE — PROGRESS NOTES
"To TT, pt requesting your advise if her diuretics is causing her to have UTI. Reviewed perineal hygiene and to drink 64 ounces of water daily.  Thank you    =================    2nd attempt to return pt call,  [phone], to review concerns.  Lengthy conversation noted.  She states she was seen in urgent care for UTI and followed up with PCP.  Pt was advised to contact this office to review her current medications.      Pt states concerns \"everyone is telling me I a not drinking enough water.   am I taking all that lasix and 2nd water pill knocking the water out of me, so it's giving me that infections?\"  She states she was advised to drink 8, 8 ounce water bottles daily and states, \"I dont think I even begin to drink that much.\"  Encouraged pt to drink that amount to help flush out bad bacteria.  Reviewed perineal hygiene and advised to keep the area clean and to make sure she is wiping from front to back and not back to front to prevent UTI.  Also advised not to hold urine if she is holding it too long.     Reassurance given that findings will be relayed to MD for advise.  Pt verbalizes understanding and states no other concerns or questions at this time.  She is appreciative of information given.    Total call with patient: 19 minutes 20 seconds  " Pre-Visit Chart Review  For Appointment Scheduled on 2/22/17    Health Maintenance Due   Topic Date Due    Fecal Occult Blood Test (FOBT)  1950    Pneumococcal (65+) (1 of 2 - PCV13) 01/21/2015    Eye Exam  01/23/2015    Colonoscopy  11/19/2015    Foot Exam  06/19/2016    Influenza Vaccine  08/01/2016    Hemoglobin A1c  11/05/2016

## 2022-10-24 ENCOUNTER — PATIENT MESSAGE (OUTPATIENT)
Dept: UROLOGY | Facility: CLINIC | Age: 72
End: 2022-10-24
Payer: COMMERCIAL

## 2022-10-28 ENCOUNTER — LAB VISIT (OUTPATIENT)
Dept: LAB | Facility: HOSPITAL | Age: 72
End: 2022-10-28
Attending: INTERNAL MEDICINE
Payer: COMMERCIAL

## 2022-10-28 DIAGNOSIS — C61 PROSTATE CANCER: ICD-10-CM

## 2022-10-28 LAB
ALBUMIN SERPL BCP-MCNC: 3.6 G/DL (ref 3.5–5.2)
ALP SERPL-CCNC: 64 U/L (ref 55–135)
ALT SERPL W/O P-5'-P-CCNC: 10 U/L (ref 10–44)
ANION GAP SERPL CALC-SCNC: 8 MMOL/L (ref 8–16)
AST SERPL-CCNC: 11 U/L (ref 10–40)
BASOPHILS # BLD AUTO: 0.09 K/UL (ref 0–0.2)
BASOPHILS NFR BLD: 0.9 % (ref 0–1.9)
BILIRUB SERPL-MCNC: 0.6 MG/DL (ref 0.1–1)
BUN SERPL-MCNC: 18 MG/DL (ref 8–23)
CALCIUM SERPL-MCNC: 9.6 MG/DL (ref 8.7–10.5)
CHLORIDE SERPL-SCNC: 107 MMOL/L (ref 95–110)
CO2 SERPL-SCNC: 22 MMOL/L (ref 23–29)
CREAT SERPL-MCNC: 1.2 MG/DL (ref 0.5–1.4)
DIFFERENTIAL METHOD: ABNORMAL
EOSINOPHIL # BLD AUTO: 0.3 K/UL (ref 0–0.5)
EOSINOPHIL NFR BLD: 3.3 % (ref 0–8)
ERYTHROCYTE [DISTWIDTH] IN BLOOD BY AUTOMATED COUNT: 13.2 % (ref 11.5–14.5)
EST. GFR  (NO RACE VARIABLE): >60 ML/MIN/1.73 M^2
GLUCOSE SERPL-MCNC: 107 MG/DL (ref 70–110)
HCT VFR BLD AUTO: 31.3 % (ref 40–54)
HGB BLD-MCNC: 10.5 G/DL (ref 14–18)
IMM GRANULOCYTES # BLD AUTO: 0.03 K/UL (ref 0–0.04)
IMM GRANULOCYTES NFR BLD AUTO: 0.3 % (ref 0–0.5)
LYMPHOCYTES # BLD AUTO: 1.6 K/UL (ref 1–4.8)
LYMPHOCYTES NFR BLD: 15.4 % (ref 18–48)
MCH RBC QN AUTO: 32.1 PG (ref 27–31)
MCHC RBC AUTO-ENTMCNC: 33.5 G/DL (ref 32–36)
MCV RBC AUTO: 96 FL (ref 82–98)
MONOCYTES # BLD AUTO: 1.1 K/UL (ref 0.3–1)
MONOCYTES NFR BLD: 10.4 % (ref 4–15)
NEUTROPHILS # BLD AUTO: 7.1 K/UL (ref 1.8–7.7)
NEUTROPHILS NFR BLD: 69.7 % (ref 38–73)
NRBC BLD-RTO: 0 /100 WBC
PLATELET # BLD AUTO: 377 K/UL (ref 150–450)
PMV BLD AUTO: 9.9 FL (ref 9.2–12.9)
POTASSIUM SERPL-SCNC: 5.3 MMOL/L (ref 3.5–5.1)
PROT SERPL-MCNC: 6.4 G/DL (ref 6–8.4)
RBC # BLD AUTO: 3.27 M/UL (ref 4.6–6.2)
SODIUM SERPL-SCNC: 137 MMOL/L (ref 136–145)
WBC # BLD AUTO: 10.12 K/UL (ref 3.9–12.7)

## 2022-10-28 PROCEDURE — 85025 COMPLETE CBC W/AUTO DIFF WBC: CPT | Mod: PN | Performed by: INTERNAL MEDICINE

## 2022-10-28 PROCEDURE — 80053 COMPREHEN METABOLIC PANEL: CPT | Mod: PN | Performed by: INTERNAL MEDICINE

## 2022-10-28 PROCEDURE — 36415 COLL VENOUS BLD VENIPUNCTURE: CPT | Mod: PN | Performed by: INTERNAL MEDICINE

## 2022-11-02 ENCOUNTER — HOSPITAL ENCOUNTER (OUTPATIENT)
Dept: RADIOLOGY | Facility: CLINIC | Age: 72
Discharge: HOME | End: 2022-11-02
Attending: UROLOGY
Payer: COMMERCIAL

## 2022-11-02 DIAGNOSIS — N20.0 KIDNEY STONE: ICD-10-CM

## 2022-11-02 DIAGNOSIS — N20.0 KIDNEY STONES: ICD-10-CM

## 2022-11-02 PROCEDURE — 74018 RADEX ABDOMEN 1 VIEW: CPT | Mod: TC,FY,PO

## 2022-11-02 PROCEDURE — 74018 RADEX ABDOMEN 1 VIEW: CPT | Mod: 26,,, | Performed by: RADIOLOGY

## 2022-11-02 PROCEDURE — 74018 XR ABDOMEN AP 1 VIEW: ICD-10-PCS | Mod: 26,,, | Performed by: RADIOLOGY

## 2022-11-03 ENCOUNTER — PATIENT MESSAGE (OUTPATIENT)
Dept: ADMINISTRATIVE | Facility: HOSPITAL | Age: 72
End: 2022-11-03
Payer: COMMERCIAL

## 2022-11-05 ENCOUNTER — TELEPHONE (OUTPATIENT)
Dept: UROLOGY | Facility: CLINIC | Age: 72
End: 2022-11-05
Payer: COMMERCIAL

## 2022-11-05 DIAGNOSIS — N20.0 KIDNEY STONES: Primary | ICD-10-CM

## 2022-11-05 NOTE — TELEPHONE ENCOUNTER
Plan as of may visit  PSA lab visit with BMP in 6 months, then RTC 1 year with PSA, BMP, and KUB prior.  Chart check in the interim to see if MRI is recommended.    However had KUB last week and no psa  Doesn't need bmp bc had cmp, but should schedule psa diagnostic this week so can review to see if mri needed or just next lab visit below  Then schedule psa/bmp/kub in may and annual visit to follow

## 2022-11-08 ENCOUNTER — OFFICE VISIT (OUTPATIENT)
Dept: DERMATOLOGY | Facility: CLINIC | Age: 72
End: 2022-11-08
Payer: COMMERCIAL

## 2022-11-08 DIAGNOSIS — L57.0 ACTINIC KERATOSIS: ICD-10-CM

## 2022-11-08 DIAGNOSIS — L57.8 ACTINIC SKIN DAMAGE: ICD-10-CM

## 2022-11-08 DIAGNOSIS — L82.1 SEBORRHEIC KERATOSES: ICD-10-CM

## 2022-11-08 DIAGNOSIS — L21.9 SEBORRHEIC DERMATITIS: Primary | ICD-10-CM

## 2022-11-08 DIAGNOSIS — D18.01 CHERRY ANGIOMA: ICD-10-CM

## 2022-11-08 PROCEDURE — 17004 PR DESTRUCTION, PREMALIGNANT LESIONS; 15 OR MORE LESIONS: ICD-10-PCS | Mod: S$GLB,,, | Performed by: STUDENT IN AN ORGANIZED HEALTH CARE EDUCATION/TRAINING PROGRAM

## 2022-11-08 PROCEDURE — 3288F PR FALLS RISK ASSESSMENT DOCUMENTED: ICD-10-PCS | Mod: CPTII,S$GLB,, | Performed by: STUDENT IN AN ORGANIZED HEALTH CARE EDUCATION/TRAINING PROGRAM

## 2022-11-08 PROCEDURE — 4010F ACE/ARB THERAPY RXD/TAKEN: CPT | Mod: CPTII,S$GLB,, | Performed by: STUDENT IN AN ORGANIZED HEALTH CARE EDUCATION/TRAINING PROGRAM

## 2022-11-08 PROCEDURE — 3044F PR MOST RECENT HEMOGLOBIN A1C LEVEL <7.0%: ICD-10-PCS | Mod: CPTII,S$GLB,, | Performed by: STUDENT IN AN ORGANIZED HEALTH CARE EDUCATION/TRAINING PROGRAM

## 2022-11-08 PROCEDURE — 99214 OFFICE O/P EST MOD 30 MIN: CPT | Mod: 25,S$GLB,, | Performed by: STUDENT IN AN ORGANIZED HEALTH CARE EDUCATION/TRAINING PROGRAM

## 2022-11-08 PROCEDURE — 99999 PR PBB SHADOW E&M-EST. PATIENT-LVL III: ICD-10-PCS | Mod: PBBFAC,,, | Performed by: STUDENT IN AN ORGANIZED HEALTH CARE EDUCATION/TRAINING PROGRAM

## 2022-11-08 PROCEDURE — 99999 PR PBB SHADOW E&M-EST. PATIENT-LVL III: CPT | Mod: PBBFAC,,, | Performed by: STUDENT IN AN ORGANIZED HEALTH CARE EDUCATION/TRAINING PROGRAM

## 2022-11-08 PROCEDURE — 3044F HG A1C LEVEL LT 7.0%: CPT | Mod: CPTII,S$GLB,, | Performed by: STUDENT IN AN ORGANIZED HEALTH CARE EDUCATION/TRAINING PROGRAM

## 2022-11-08 PROCEDURE — 17004 DESTROY PREMAL LESIONS 15/>: CPT | Mod: S$GLB,,, | Performed by: STUDENT IN AN ORGANIZED HEALTH CARE EDUCATION/TRAINING PROGRAM

## 2022-11-08 PROCEDURE — 3066F NEPHROPATHY DOC TX: CPT | Mod: CPTII,S$GLB,, | Performed by: STUDENT IN AN ORGANIZED HEALTH CARE EDUCATION/TRAINING PROGRAM

## 2022-11-08 PROCEDURE — 1159F MED LIST DOCD IN RCRD: CPT | Mod: CPTII,S$GLB,, | Performed by: STUDENT IN AN ORGANIZED HEALTH CARE EDUCATION/TRAINING PROGRAM

## 2022-11-08 PROCEDURE — 3066F PR DOCUMENTATION OF TREATMENT FOR NEPHROPATHY: ICD-10-PCS | Mod: CPTII,S$GLB,, | Performed by: STUDENT IN AN ORGANIZED HEALTH CARE EDUCATION/TRAINING PROGRAM

## 2022-11-08 PROCEDURE — 99214 PR OFFICE/OUTPT VISIT, EST, LEVL IV, 30-39 MIN: ICD-10-PCS | Mod: 25,S$GLB,, | Performed by: STUDENT IN AN ORGANIZED HEALTH CARE EDUCATION/TRAINING PROGRAM

## 2022-11-08 PROCEDURE — 3061F NEG MICROALBUMINURIA REV: CPT | Mod: CPTII,S$GLB,, | Performed by: STUDENT IN AN ORGANIZED HEALTH CARE EDUCATION/TRAINING PROGRAM

## 2022-11-08 PROCEDURE — 4010F PR ACE/ARB THEARPY RXD/TAKEN: ICD-10-PCS | Mod: CPTII,S$GLB,, | Performed by: STUDENT IN AN ORGANIZED HEALTH CARE EDUCATION/TRAINING PROGRAM

## 2022-11-08 PROCEDURE — 1159F PR MEDICATION LIST DOCUMENTED IN MEDICAL RECORD: ICD-10-PCS | Mod: CPTII,S$GLB,, | Performed by: STUDENT IN AN ORGANIZED HEALTH CARE EDUCATION/TRAINING PROGRAM

## 2022-11-08 PROCEDURE — 1101F PT FALLS ASSESS-DOCD LE1/YR: CPT | Mod: CPTII,S$GLB,, | Performed by: STUDENT IN AN ORGANIZED HEALTH CARE EDUCATION/TRAINING PROGRAM

## 2022-11-08 PROCEDURE — 1101F PR PT FALLS ASSESS DOC 0-1 FALLS W/OUT INJ PAST YR: ICD-10-PCS | Mod: CPTII,S$GLB,, | Performed by: STUDENT IN AN ORGANIZED HEALTH CARE EDUCATION/TRAINING PROGRAM

## 2022-11-08 PROCEDURE — 3288F FALL RISK ASSESSMENT DOCD: CPT | Mod: CPTII,S$GLB,, | Performed by: STUDENT IN AN ORGANIZED HEALTH CARE EDUCATION/TRAINING PROGRAM

## 2022-11-08 PROCEDURE — 1126F AMNT PAIN NOTED NONE PRSNT: CPT | Mod: CPTII,S$GLB,, | Performed by: STUDENT IN AN ORGANIZED HEALTH CARE EDUCATION/TRAINING PROGRAM

## 2022-11-08 PROCEDURE — 3061F PR NEG MICROALBUMINURIA RESULT DOCUMENTED/REVIEW: ICD-10-PCS | Mod: CPTII,S$GLB,, | Performed by: STUDENT IN AN ORGANIZED HEALTH CARE EDUCATION/TRAINING PROGRAM

## 2022-11-08 PROCEDURE — 1160F RVW MEDS BY RX/DR IN RCRD: CPT | Mod: CPTII,S$GLB,, | Performed by: STUDENT IN AN ORGANIZED HEALTH CARE EDUCATION/TRAINING PROGRAM

## 2022-11-08 PROCEDURE — 1126F PR PAIN SEVERITY QUANTIFIED, NO PAIN PRESENT: ICD-10-PCS | Mod: CPTII,S$GLB,, | Performed by: STUDENT IN AN ORGANIZED HEALTH CARE EDUCATION/TRAINING PROGRAM

## 2022-11-08 PROCEDURE — 1160F PR REVIEW ALL MEDS BY PRESCRIBER/CLIN PHARMACIST DOCUMENTED: ICD-10-PCS | Mod: CPTII,S$GLB,, | Performed by: STUDENT IN AN ORGANIZED HEALTH CARE EDUCATION/TRAINING PROGRAM

## 2022-11-08 RX ORDER — KETOCONAZOLE 20 MG/G
CREAM TOPICAL 2 TIMES DAILY
Qty: 60 G | Refills: 4 | Status: SHIPPED | OUTPATIENT
Start: 2022-11-08 | End: 2023-06-20 | Stop reason: SDUPTHER

## 2022-11-08 NOTE — PROGRESS NOTES
Subjective:       Patient ID:  Carlos Tenorio Jr. is a 72 y.o. male who presents for   Chief Complaint   Patient presents with    Skin Check     LOV: 6/16/22  AK tx with cryo     Pt is here today for skin check, UBSE   C/o dry itchy spots on scalp x several months. No treatment.     Final Pathologic Diagnosis Skin, right posterior scalp, shave biopsy:   -GRANULOMATOUS INFLAMMATION, see comment      DERM HX:  Denies PHX of NMSC  Denies FHX of MM         Review of Systems   Constitutional:  Negative for fever, chills and fatigue.   Respiratory:  Negative for cough and shortness of breath.    Gastrointestinal:  Negative for nausea and vomiting.   Skin:  Positive for dry skin and activity-related sunscreen use. Negative for itching, rash and daily sunscreen use.   Hematologic/Lymphatic: Bruises/bleeds easily (asa, plavix).      Objective:    Physical Exam   Constitutional: He appears well-developed and well-nourished. No distress.   Neurological: He is alert and oriented to person, place, and time. He is not disoriented.   Psychiatric: He has a normal mood and affect.   Skin:   Areas Examined (abnormalities noted in diagram):   Scalp / Hair Palpated and Inspected  Head / Face Inspection Performed  Neck Inspection Performed  Chest / Axilla Inspection Performed  Abdomen Inspection Performed  Back Inspection Performed  RUE Inspected  LUE Inspection Performed  Nails and Digits Inspection Performed                 Diagram Legend     Erythematous scaling macule/papule c/w actinic keratosis       Vascular papule c/w angioma      Pigmented verrucoid papule/plaque c/w seborrheic keratosis      Yellow umbilicated papule c/w sebaceous hyperplasia      Irregularly shaped tan macule c/w lentigo     1-2 mm smooth white papules consistent with Milia      Movable subcutaneous cyst with punctum c/w epidermal inclusion cyst      Subcutaneous movable cyst c/w pilar cyst      Firm pink to brown papule c/w dermatofibroma       Pedunculated fleshy papule(s) c/w skin tag(s)      Evenly pigmented macule c/w junctional nevus     Mildly variegated pigmented, slightly irregular-bordered macule c/w mildly atypical nevus      Flesh colored to evenly pigmented papule c/w intradermal nevus       Pink pearly papule/plaque c/w basal cell carcinoma      Erythematous hyperkeratotic cursted plaque c/w SCC      Surgical scar with no sign of skin cancer recurrence      Open and closed comedones      Inflammatory papules and pustules      Verrucoid papule consistent consistent with wart     Erythematous eczematous patches and plaques     Dystrophic onycholytic nail with subungual debris c/w onychomycosis     Umbilicated papule    Erythematous-base heme-crusted tan verrucoid plaque consistent with inflamed seborrheic keratosis     Erythematous Silvery Scaling Plaque c/w Psoriasis     See annotation      Assessment / Plan:        Seborrheic dermatitis  -     ketoconazole (NIZORAL) 2 % cream; Apply topically 2 (two) times daily.  Dispense: 60 g; Refill: 4  - neck, lower face, ears    Actinic keratosis  Cryosurgery Procedure Note    Verbal consent from the patient is obtained and the patient is aware of the precancerous quality and need for treatment of these lesions. Liquid nitrogen cryosurgery is applied to the 23 actinic keratoses, as detailed in the physical exam, to produce a freeze injury. The patient is aware that blisters may form and is instructed on wound care with gentle cleansing and use of vaseline ointment to keep moist until healed. The patient is supplied a handout on cryosurgery and is instructed to call if lesions do not completely resolve.  Will likely need efudex in the future  Recheck ears after using keto cream    Cherry angioma  This is a benign vascular lesion. Reassurance given. No treatment required.     Seborrheic keratoses  These are benign inherited growths without a malignant potential. Reassurance given to patient. No treatment  is necessary.     Actinic skin damage  Area(s) of previous NMSC evaluated with no signs of recurrence.  Upper body skin examination performed today including at least 9 points as noted in physical examination. No lesions suspicious for malignancy noted.  Patient instructed in importance in daily broad spectrum sun protection of at least spf 30. Mineral sunscreen ingredients preferred (Zinc +/- Titanium) and can be found OTC.   Patient encouraged to wear hat for all outdoor exposure.   Also discussed sun avoidance and use of protective clothing.       6 months    No follow-ups on file.

## 2022-11-08 NOTE — PATIENT INSTRUCTIONS

## 2022-11-17 ENCOUNTER — PATIENT MESSAGE (OUTPATIENT)
Dept: ORTHOPEDICS | Facility: CLINIC | Age: 72
End: 2022-11-17
Payer: COMMERCIAL

## 2022-11-17 NOTE — TELEPHONE ENCOUNTER
Pt had  HA injection 4/28/2022, cortisone injections 8/29/2022. Jon advise which you would like for next appt.

## 2022-11-18 ENCOUNTER — PATIENT MESSAGE (OUTPATIENT)
Dept: ORTHOPEDICS | Facility: CLINIC | Age: 72
End: 2022-11-18
Payer: COMMERCIAL

## 2022-11-18 DIAGNOSIS — M17.0 PRIMARY OSTEOARTHRITIS OF BOTH KNEES: Primary | ICD-10-CM

## 2022-11-23 ENCOUNTER — LAB VISIT (OUTPATIENT)
Dept: LAB | Facility: HOSPITAL | Age: 72
End: 2022-11-23
Attending: INTERNAL MEDICINE
Payer: COMMERCIAL

## 2022-11-23 DIAGNOSIS — C61 PROSTATE CANCER: ICD-10-CM

## 2022-11-23 LAB
ALBUMIN SERPL BCP-MCNC: 3.4 G/DL (ref 3.5–5.2)
ALP SERPL-CCNC: 59 U/L (ref 55–135)
ALT SERPL W/O P-5'-P-CCNC: 8 U/L (ref 10–44)
ANION GAP SERPL CALC-SCNC: 11 MMOL/L (ref 8–16)
AST SERPL-CCNC: 8 U/L (ref 10–40)
BASOPHILS # BLD AUTO: 0.07 K/UL (ref 0–0.2)
BASOPHILS NFR BLD: 0.8 % (ref 0–1.9)
BILIRUB SERPL-MCNC: 0.5 MG/DL (ref 0.1–1)
BUN SERPL-MCNC: 21 MG/DL (ref 8–23)
CALCIUM SERPL-MCNC: 9.3 MG/DL (ref 8.7–10.5)
CHLORIDE SERPL-SCNC: 104 MMOL/L (ref 95–110)
CO2 SERPL-SCNC: 20 MMOL/L (ref 23–29)
CREAT SERPL-MCNC: 1.3 MG/DL (ref 0.5–1.4)
DIFFERENTIAL METHOD: ABNORMAL
EOSINOPHIL # BLD AUTO: 0.6 K/UL (ref 0–0.5)
EOSINOPHIL NFR BLD: 6.6 % (ref 0–8)
ERYTHROCYTE [DISTWIDTH] IN BLOOD BY AUTOMATED COUNT: 12.6 % (ref 11.5–14.5)
EST. GFR  (NO RACE VARIABLE): 58.4 ML/MIN/1.73 M^2
GLUCOSE SERPL-MCNC: 111 MG/DL (ref 70–110)
HCT VFR BLD AUTO: 29.1 % (ref 40–54)
HGB BLD-MCNC: 9.8 G/DL (ref 14–18)
IMM GRANULOCYTES # BLD AUTO: 0.03 K/UL (ref 0–0.04)
IMM GRANULOCYTES NFR BLD AUTO: 0.4 % (ref 0–0.5)
LYMPHOCYTES # BLD AUTO: 0.8 K/UL (ref 1–4.8)
LYMPHOCYTES NFR BLD: 8.9 % (ref 18–48)
MCH RBC QN AUTO: 31.4 PG (ref 27–31)
MCHC RBC AUTO-ENTMCNC: 33.7 G/DL (ref 32–36)
MCV RBC AUTO: 93 FL (ref 82–98)
MONOCYTES # BLD AUTO: 1.4 K/UL (ref 0.3–1)
MONOCYTES NFR BLD: 15.9 % (ref 4–15)
NEUTROPHILS # BLD AUTO: 5.7 K/UL (ref 1.8–7.7)
NEUTROPHILS NFR BLD: 67.4 % (ref 38–73)
NRBC BLD-RTO: 0 /100 WBC
PLATELET # BLD AUTO: 331 K/UL (ref 150–450)
PMV BLD AUTO: 9.5 FL (ref 9.2–12.9)
POTASSIUM SERPL-SCNC: 5 MMOL/L (ref 3.5–5.1)
PROT SERPL-MCNC: 5.9 G/DL (ref 6–8.4)
RBC # BLD AUTO: 3.12 M/UL (ref 4.6–6.2)
SODIUM SERPL-SCNC: 135 MMOL/L (ref 136–145)
WBC # BLD AUTO: 8.5 K/UL (ref 3.9–12.7)

## 2022-11-23 PROCEDURE — 85025 COMPLETE CBC W/AUTO DIFF WBC: CPT | Mod: PN | Performed by: INTERNAL MEDICINE

## 2022-11-23 PROCEDURE — 36415 COLL VENOUS BLD VENIPUNCTURE: CPT | Mod: PN | Performed by: INTERNAL MEDICINE

## 2022-11-23 PROCEDURE — 80053 COMPREHEN METABOLIC PANEL: CPT | Mod: PN | Performed by: INTERNAL MEDICINE

## 2022-12-01 ENCOUNTER — LAB VISIT (OUTPATIENT)
Dept: LAB | Facility: HOSPITAL | Age: 72
End: 2022-12-01
Attending: UROLOGY
Payer: COMMERCIAL

## 2022-12-01 DIAGNOSIS — C61 PROSTATE CANCER: ICD-10-CM

## 2022-12-01 LAB — COMPLEXED PSA SERPL-MCNC: 5.7 NG/ML (ref 0–4)

## 2022-12-01 PROCEDURE — 36415 COLL VENOUS BLD VENIPUNCTURE: CPT | Performed by: UROLOGY

## 2022-12-01 PROCEDURE — 84153 ASSAY OF PSA TOTAL: CPT | Performed by: UROLOGY

## 2022-12-04 ENCOUNTER — PATIENT MESSAGE (OUTPATIENT)
Dept: FAMILY MEDICINE | Facility: CLINIC | Age: 72
End: 2022-12-04
Payer: COMMERCIAL

## 2022-12-08 ENCOUNTER — OFFICE VISIT (OUTPATIENT)
Dept: FAMILY MEDICINE | Facility: CLINIC | Age: 72
End: 2022-12-08
Payer: COMMERCIAL

## 2022-12-08 VITALS
OXYGEN SATURATION: 99 % | TEMPERATURE: 98 F | BODY MASS INDEX: 29.26 KG/M2 | HEART RATE: 57 BPM | HEIGHT: 70 IN | WEIGHT: 204.38 LBS | SYSTOLIC BLOOD PRESSURE: 146 MMHG | DIASTOLIC BLOOD PRESSURE: 68 MMHG

## 2022-12-08 DIAGNOSIS — K92.1 GASTROINTESTINAL HEMORRHAGE WITH MELENA: ICD-10-CM

## 2022-12-08 DIAGNOSIS — E11.65 TYPE 2 DIABETES MELLITUS WITH HYPERGLYCEMIA, WITHOUT LONG-TERM CURRENT USE OF INSULIN: ICD-10-CM

## 2022-12-08 DIAGNOSIS — N18.31 STAGE 3A CHRONIC KIDNEY DISEASE: ICD-10-CM

## 2022-12-08 DIAGNOSIS — S43.411A SPRAIN OF RIGHT CORACOHUMERAL LIGAMENT, INITIAL ENCOUNTER: Primary | ICD-10-CM

## 2022-12-08 DIAGNOSIS — I25.10 CORONARY ARTERY DISEASE INVOLVING NATIVE CORONARY ARTERY OF NATIVE HEART WITHOUT ANGINA PECTORIS: ICD-10-CM

## 2022-12-08 PROCEDURE — 3288F FALL RISK ASSESSMENT DOCD: CPT | Mod: CPTII,S$GLB,, | Performed by: FAMILY MEDICINE

## 2022-12-08 PROCEDURE — 99999 PR PBB SHADOW E&M-EST. PATIENT-LVL IV: CPT | Mod: PBBFAC,,, | Performed by: FAMILY MEDICINE

## 2022-12-08 PROCEDURE — 1125F AMNT PAIN NOTED PAIN PRSNT: CPT | Mod: CPTII,S$GLB,, | Performed by: FAMILY MEDICINE

## 2022-12-08 PROCEDURE — 4010F PR ACE/ARB THEARPY RXD/TAKEN: ICD-10-PCS | Mod: CPTII,S$GLB,, | Performed by: FAMILY MEDICINE

## 2022-12-08 PROCEDURE — 3078F DIAST BP <80 MM HG: CPT | Mod: CPTII,S$GLB,, | Performed by: FAMILY MEDICINE

## 2022-12-08 PROCEDURE — 99214 OFFICE O/P EST MOD 30 MIN: CPT | Mod: S$GLB,,, | Performed by: FAMILY MEDICINE

## 2022-12-08 PROCEDURE — 3078F PR MOST RECENT DIASTOLIC BLOOD PRESSURE < 80 MM HG: ICD-10-PCS | Mod: CPTII,S$GLB,, | Performed by: FAMILY MEDICINE

## 2022-12-08 PROCEDURE — 1159F MED LIST DOCD IN RCRD: CPT | Mod: CPTII,S$GLB,, | Performed by: FAMILY MEDICINE

## 2022-12-08 PROCEDURE — 1159F PR MEDICATION LIST DOCUMENTED IN MEDICAL RECORD: ICD-10-PCS | Mod: CPTII,S$GLB,, | Performed by: FAMILY MEDICINE

## 2022-12-08 PROCEDURE — 3061F PR NEG MICROALBUMINURIA RESULT DOCUMENTED/REVIEW: ICD-10-PCS | Mod: CPTII,S$GLB,, | Performed by: FAMILY MEDICINE

## 2022-12-08 PROCEDURE — 3077F SYST BP >= 140 MM HG: CPT | Mod: CPTII,S$GLB,, | Performed by: FAMILY MEDICINE

## 2022-12-08 PROCEDURE — 3008F PR BODY MASS INDEX (BMI) DOCUMENTED: ICD-10-PCS | Mod: CPTII,S$GLB,, | Performed by: FAMILY MEDICINE

## 2022-12-08 PROCEDURE — 1160F PR REVIEW ALL MEDS BY PRESCRIBER/CLIN PHARMACIST DOCUMENTED: ICD-10-PCS | Mod: CPTII,S$GLB,, | Performed by: FAMILY MEDICINE

## 2022-12-08 PROCEDURE — 1100F PTFALLS ASSESS-DOCD GE2>/YR: CPT | Mod: CPTII,S$GLB,, | Performed by: FAMILY MEDICINE

## 2022-12-08 PROCEDURE — 1100F PR PT FALLS ASSESS DOC 2+ FALLS/FALL W/INJURY/YR: ICD-10-PCS | Mod: CPTII,S$GLB,, | Performed by: FAMILY MEDICINE

## 2022-12-08 PROCEDURE — 4010F ACE/ARB THERAPY RXD/TAKEN: CPT | Mod: CPTII,S$GLB,, | Performed by: FAMILY MEDICINE

## 2022-12-08 PROCEDURE — 3008F BODY MASS INDEX DOCD: CPT | Mod: CPTII,S$GLB,, | Performed by: FAMILY MEDICINE

## 2022-12-08 PROCEDURE — 3066F NEPHROPATHY DOC TX: CPT | Mod: CPTII,S$GLB,, | Performed by: FAMILY MEDICINE

## 2022-12-08 PROCEDURE — 99214 PR OFFICE/OUTPT VISIT, EST, LEVL IV, 30-39 MIN: ICD-10-PCS | Mod: S$GLB,,, | Performed by: FAMILY MEDICINE

## 2022-12-08 PROCEDURE — 3077F PR MOST RECENT SYSTOLIC BLOOD PRESSURE >= 140 MM HG: ICD-10-PCS | Mod: CPTII,S$GLB,, | Performed by: FAMILY MEDICINE

## 2022-12-08 PROCEDURE — 3066F PR DOCUMENTATION OF TREATMENT FOR NEPHROPATHY: ICD-10-PCS | Mod: CPTII,S$GLB,, | Performed by: FAMILY MEDICINE

## 2022-12-08 PROCEDURE — 3061F NEG MICROALBUMINURIA REV: CPT | Mod: CPTII,S$GLB,, | Performed by: FAMILY MEDICINE

## 2022-12-08 PROCEDURE — 99999 PR PBB SHADOW E&M-EST. PATIENT-LVL IV: ICD-10-PCS | Mod: PBBFAC,,, | Performed by: FAMILY MEDICINE

## 2022-12-08 PROCEDURE — 3044F HG A1C LEVEL LT 7.0%: CPT | Mod: CPTII,S$GLB,, | Performed by: FAMILY MEDICINE

## 2022-12-08 PROCEDURE — 3288F PR FALLS RISK ASSESSMENT DOCUMENTED: ICD-10-PCS | Mod: CPTII,S$GLB,, | Performed by: FAMILY MEDICINE

## 2022-12-08 PROCEDURE — 3044F PR MOST RECENT HEMOGLOBIN A1C LEVEL <7.0%: ICD-10-PCS | Mod: CPTII,S$GLB,, | Performed by: FAMILY MEDICINE

## 2022-12-08 PROCEDURE — 1160F RVW MEDS BY RX/DR IN RCRD: CPT | Mod: CPTII,S$GLB,, | Performed by: FAMILY MEDICINE

## 2022-12-08 PROCEDURE — 1125F PR PAIN SEVERITY QUANTIFIED, PAIN PRESENT: ICD-10-PCS | Mod: CPTII,S$GLB,, | Performed by: FAMILY MEDICINE

## 2022-12-08 RX ORDER — SERTRALINE HYDROCHLORIDE 25 MG/1
TABLET, FILM COATED ORAL
COMMUNITY
End: 2023-04-24 | Stop reason: ALTCHOICE

## 2022-12-09 ENCOUNTER — HOSPITAL ENCOUNTER (OUTPATIENT)
Dept: RADIOLOGY | Facility: CLINIC | Age: 72
Discharge: HOME | End: 2022-12-09
Attending: FAMILY MEDICINE
Payer: COMMERCIAL

## 2022-12-09 DIAGNOSIS — S43.411A SPRAIN OF RIGHT CORACOHUMERAL LIGAMENT, INITIAL ENCOUNTER: ICD-10-CM

## 2022-12-09 PROCEDURE — 73030 X-RAY EXAM OF SHOULDER: CPT | Mod: 26,RT,S$GLB, | Performed by: RADIOLOGY

## 2022-12-09 PROCEDURE — 73030 XR SHOULDER TRAUMA 3 VIEW RIGHT: ICD-10-PCS | Mod: 26,RT,S$GLB, | Performed by: RADIOLOGY

## 2022-12-09 PROCEDURE — 73030 X-RAY EXAM OF SHOULDER: CPT | Mod: TC,FY,PO,RT

## 2022-12-12 ENCOUNTER — OFFICE VISIT (OUTPATIENT)
Dept: ORTHOPEDICS | Facility: CLINIC | Age: 72
End: 2022-12-12
Payer: COMMERCIAL

## 2022-12-12 ENCOUNTER — TELEPHONE (OUTPATIENT)
Dept: ORTHOPEDICS | Facility: CLINIC | Age: 72
End: 2022-12-12
Payer: COMMERCIAL

## 2022-12-12 DIAGNOSIS — M17.0 PRIMARY OSTEOARTHRITIS OF BOTH KNEES: Primary | ICD-10-CM

## 2022-12-12 PROCEDURE — 99499 NO LOS: ICD-10-PCS | Mod: S$GLB,,, | Performed by: ORTHOPAEDIC SURGERY

## 2022-12-12 PROCEDURE — 3061F PR NEG MICROALBUMINURIA RESULT DOCUMENTED/REVIEW: ICD-10-PCS | Mod: CPTII,S$GLB,, | Performed by: ORTHOPAEDIC SURGERY

## 2022-12-12 PROCEDURE — 3061F NEG MICROALBUMINURIA REV: CPT | Mod: CPTII,S$GLB,, | Performed by: ORTHOPAEDIC SURGERY

## 2022-12-12 PROCEDURE — 99999 PR PBB SHADOW E&M-EST. PATIENT-LVL II: ICD-10-PCS | Mod: PBBFAC,,, | Performed by: ORTHOPAEDIC SURGERY

## 2022-12-12 PROCEDURE — 3066F NEPHROPATHY DOC TX: CPT | Mod: CPTII,S$GLB,, | Performed by: ORTHOPAEDIC SURGERY

## 2022-12-12 PROCEDURE — 4010F PR ACE/ARB THEARPY RXD/TAKEN: ICD-10-PCS | Mod: CPTII,S$GLB,, | Performed by: ORTHOPAEDIC SURGERY

## 2022-12-12 PROCEDURE — 1159F PR MEDICATION LIST DOCUMENTED IN MEDICAL RECORD: ICD-10-PCS | Mod: CPTII,S$GLB,, | Performed by: ORTHOPAEDIC SURGERY

## 2022-12-12 PROCEDURE — 3066F PR DOCUMENTATION OF TREATMENT FOR NEPHROPATHY: ICD-10-PCS | Mod: CPTII,S$GLB,, | Performed by: ORTHOPAEDIC SURGERY

## 2022-12-12 PROCEDURE — 1159F MED LIST DOCD IN RCRD: CPT | Mod: CPTII,S$GLB,, | Performed by: ORTHOPAEDIC SURGERY

## 2022-12-12 PROCEDURE — 1160F PR REVIEW ALL MEDS BY PRESCRIBER/CLIN PHARMACIST DOCUMENTED: ICD-10-PCS | Mod: CPTII,S$GLB,, | Performed by: ORTHOPAEDIC SURGERY

## 2022-12-12 PROCEDURE — 3044F PR MOST RECENT HEMOGLOBIN A1C LEVEL <7.0%: ICD-10-PCS | Mod: CPTII,S$GLB,, | Performed by: ORTHOPAEDIC SURGERY

## 2022-12-12 PROCEDURE — 99499 UNLISTED E&M SERVICE: CPT | Mod: S$GLB,,, | Performed by: ORTHOPAEDIC SURGERY

## 2022-12-12 PROCEDURE — 20610 LARGE JOINT ASPIRATION/INJECTION: BILATERAL KNEE: ICD-10-PCS | Mod: 50,S$GLB,, | Performed by: ORTHOPAEDIC SURGERY

## 2022-12-12 PROCEDURE — 4010F ACE/ARB THERAPY RXD/TAKEN: CPT | Mod: CPTII,S$GLB,, | Performed by: ORTHOPAEDIC SURGERY

## 2022-12-12 PROCEDURE — 99999 PR PBB SHADOW E&M-EST. PATIENT-LVL II: CPT | Mod: PBBFAC,,, | Performed by: ORTHOPAEDIC SURGERY

## 2022-12-12 PROCEDURE — 1160F RVW MEDS BY RX/DR IN RCRD: CPT | Mod: CPTII,S$GLB,, | Performed by: ORTHOPAEDIC SURGERY

## 2022-12-12 PROCEDURE — 20610 DRAIN/INJ JOINT/BURSA W/O US: CPT | Mod: 50,S$GLB,, | Performed by: ORTHOPAEDIC SURGERY

## 2022-12-12 PROCEDURE — 3044F HG A1C LEVEL LT 7.0%: CPT | Mod: CPTII,S$GLB,, | Performed by: ORTHOPAEDIC SURGERY

## 2022-12-12 NOTE — PROGRESS NOTES
Past Medical History:   Diagnosis Date    Arthritis     knees, back    Bilateral renal cysts 7/30/2012    CAD (coronary artery disease) 1995    no chest pain since CABG, sees Dr Larry Black    Diabetes mellitus     borderline    Hypertension     Kidney stones     uric acid stones    VASYL (obstructive sleep apnea) 2007    is compliant with CPAP    Primary gonadal failure        Past Surgical History:   Procedure Laterality Date    BACK SURGERY  2010    L5-6 fusion, with pins,bone graft    cardiac stents      2 stents 12/2021 and 1/2022    CARDIAC SURGERY  1995, 2007    total 7 vessel bypass    COLONOSCOPY  2008    repeat 2013    COLONOSCOPY N/A 4/4/2017    Procedure: COLONOSCOPY;  Surgeon: Dmitry Sampson MD;  Location: [facility] ENDO;  Service: Endoscopy;  Laterality: N/A;    CORONARY ARTERY BYPASS GRAFT  1995, 2007    cabgx3, cabgx5    EXTRACORPOREAL SHOCK WAVE LITHOTRIPSY      EYE SURGERY      cataracts bilateral    LITHOTRIPSY      LUMBAR LAMINECTOMY      Partial Nephrectomy Laproscopic      TRANSRECTAL BIOPSY OF PROSTATE WITH ULTRASOUND GUIDANCE N/A 3/19/2019    Procedure: BIOPSY, PROSTATE, RECTAL APPROACH, WITH US GUIDANCE;  Surgeon: Yovany Heart MD;  Location: [facility] OR;  Service: Urology;  Laterality: N/A;    TRANSRECTAL BIOPSY OF PROSTATE WITH ULTRASOUND GUIDANCE N/A 10/20/2020    Procedure: BIOPSY, PROSTATE, RECTAL APPROACH, WITH US GUIDANCE;  Surgeon: Yovany Heart MD;  Location: [facility] OR;  Service: Urology;  Laterality: N/A;       Current Outpatient Medications   Medication Sig    allopurinoL (ZYLOPRIM) 100 MG tablet TAKE 1 TABLET(100 MG) BY MOUTH EVERY DAY    amLODIPine (NORVASC) 2.5 MG tablet Take 1 tablet (2.5 mg total) by mouth every evening.    aspirin (ECOTRIN) 81 MG EC tablet Take 81 mg by mouth 3 (three) times daily.    atorvastatin (LIPITOR) 40 MG tablet Take 1 tablet (40 mg total) by mouth once daily.    clopidogreL (PLAVIX) 75 mg tablet Take 75 mg by mouth once daily.    cyanocobalamin,  vitamin B-12, (VITAMIN B-12 ORAL) Take by mouth.    ezetimibe (ZETIA) 10 mg tablet Take 1 tablet (10 mg total) by mouth once daily.    ferrous sulfate (FEOSOL) 325 mg (65 mg iron) Tab tablet Take 325 mg by mouth.    fish oil-omega-3 fatty acids 300-1,000 mg capsule Take 2 g by mouth 3 (three) times daily.    folic acid (FOLVITE) 1 MG tablet Take 1 tablet (1 mg total) by mouth once daily.    hydroCHLOROthiazide (HYDRODIURIL) 12.5 MG Tab Take 1 tablet (12.5 mg total) by mouth once daily.    ketoconazole (NIZORAL) 2 % cream Apply topically 2 (two) times daily.    LORazepam (ATIVAN) 0.5 MG tablet Take 1 tablet (0.5 mg total) by mouth every 6 (six) hours as needed for Anxiety.    losartan (COZAAR) 100 MG tablet TAKE 1 TABLET(100 MG) BY MOUTH EVERY DAY    metFORMIN (GLUCOPHAGE-XR) 500 MG 24 hr tablet Take 1 tablet (500 mg total) by mouth 2 (two) times daily with meals.    nitroGLYCERIN (NITROSTAT) 0.4 MG SL tablet SMARTSI Tablet(s) Sublingual PRN    potassium chloride SA (K-DUR,KLOR-CON) 20 MEQ tablet TAKE 1 TABLET(20 MEQ) BY MOUTH EVERY DAY    ranolazine (RANEXA) 500 MG Tb12 Take 500 mg by mouth 2 (two) times daily.    sertraline (ZOLOFT) 25 MG tablet sertraline 25 mg tablet   Take 1 tablet every day by oral route in the morning.    SLOW  mg (45 mg iron) TbSR Take by mouth.    vitamin D 1000 units Tab Take 185 mg by mouth once daily.     No current facility-administered medications for this visit.     Facility-Administered Medications Ordered in Other Visits   Medication    triamcinolone acetonide injection 40 mg    triamcinolone acetonide injection 40 mg       Review of patient's allergies indicates:   Allergen Reactions    Adhesive Blisters       Family History   Problem Relation Age of Onset    Heart disease Mother     Hypertension Mother     Diabetes Mother     Heart disease Father         premature    Hypertension Father     Diabetes Sister     Urolithiasis Sister     Heart disease Paternal Uncle     Cancer  Neg Hx     Prostate cancer Neg Hx     Kidney cancer Neg Hx     Melanoma Neg Hx     Lupus Neg Hx     Eczema Neg Hx     Psoriasis Neg Hx        Social History     Socioeconomic History    Marital status:    Tobacco Use    Smoking status: Former     Types: Cigarettes     Quit date: 1976     Years since quittin.8    Smokeless tobacco: Never   Substance and Sexual Activity    Alcohol use: Yes     Comment: Socially    Drug use: No    Sexual activity: Yes     Social Determinants of Health     Financial Resource Strain: Low Risk     Difficulty of Paying Living Expenses: Not hard at all   Food Insecurity: No Food Insecurity    Worried About Running Out of Food in the Last Year: Never true    Ran Out of Food in the Last Year: Never true   Transportation Needs: No Transportation Needs    Lack of Transportation (Medical): No    Lack of Transportation (Non-Medical): No   Physical Activity: Insufficiently Active    Days of Exercise per Week: 1 day    Minutes of Exercise per Session: 20 min   Stress: No Stress Concern Present    Feeling of Stress : Not at all   Social Connections: Unknown    Frequency of Communication with Friends and Family: Three times a week    Frequency of Social Gatherings with Friends and Family: Twice a week    Active Member of Clubs or Organizations: No    Attends Club or Organization Meetings: Never    Marital Status:    Housing Stability: Low Risk     Unable to Pay for Housing in the Last Year: No    Number of Places Lived in the Last Year: 1    Unstable Housing in the Last Year: No       Chief Complaint:   No chief complaint on file.      History of present illness: Is a 72-year-old male seen for some right hip pain.  This started about 9 months ago.  No injury or trauma.  Pain laying on it at night.  Pain going up and down stairs.  Pain in the hip is a 6/10.  Using some topical anti-inflammatories and some Tylenol.      Answers for HPI/ROS submitted by the patient on 2019    Leg pain  unexpected weight change: No  appetite change : No  sleep disturbance: No  IMMUNOCOMPROMISED: No  nervous/ anxious: No  dysphoric mood: No  rash: No  visual disturbance: No  eye redness: No  eye pain: No  ear pain: No  tinnitus: Yes  hearing loss: Yes  sinus pressure : No  nosebleeds: No  enviro allergies: No  food allergies: No  cough: No  shortness of breath: No  sweating: No  frequency: No  difficulty urinating: No  hematuria: No  chest pain: No  palpitations: No  nausea: No  vomiting: No  diarrhea: No  blood in stool: No  constipation: No  headaches: No  dizziness: No  numbness: No  seizures: No  joint swelling: No  myalgia: No  weakness: No  back pain: No  Pain Chronicity: recurrent  History of trauma: No  Onset: more than 1 month ago  Frequency: daily  Progression since onset: unchanged  injury location: at work  pain- numeric: 3/10  pain location: left knee, right knee  pain quality: aching, sharp  Radiating Pain: No  Aggravating factors: walking  fever: No  inability to bear weight: No  itching: No  joint locking: No  limited range of motion: Yes  stiffness: Yes  tingling: No  Treatments tried: nothing  physical therapy: not tried  Improvement on treatment: significant        Physical Examination:    Vital Signs:    There were no vitals filed for this visit.      There is no height or weight on file to calculate BMI.    This a well-developed, well nourished patient in no acute distress.  They are alert and oriented and cooperative to examination.  Pt. walks without an antalgic gait.      Examination of bilateral knees shows no rashes or erythema. There are no masses ecchymosis or effusion. Patient has full range of motion from 0-130°. Patient is nontender to palpation over lateral joint line and moderately tender to palpation over the medial joint line. Knee is stable to varus and valgus stress. 5 out of 5 motor strength. Palpable distal pulses. Intact light touch sensation. Negative  Patellofemoral crepitus    Examination of the patient's right hip shows full range of motion with flexion to 160°, extension to 0, external rotation to 50°, internal rotation of 15°, abduction of 50°, adduction of 15°. Skin has no rashes or bruising. Patient has negative Stinchfield exam. Patient has negative straight leg raise.Negative internal impingement test. Negative MISHEL test. Negative Tabatha's test. Patient has no pain with hip range of motion.  Mildly tender to palpation over the greater trochanteric bursa. Patient is 5 out of 5 motor strength, palpable distal pulses, and intact light touch sensation.       X-rays: X-rays of both knees are  review which show severe medial joint space narrowing of both knees.  No significant progression  X-rays of the right hip are  reviewed which shows no atypical findings.  Patient does have history of prior lumbar spine surgery.     Assessment:: Severe varus knee arthritis      Plan:    I injected both knees with Synvisc-One today.  Follow-up in 6 weeks    The patient has tried a self guided exercise program that has included walking without significant improvement. Minimal relief with tylenol or OTC Nsaids. Reports less than 8 weeks relief with IA steroid injection. Kellgren Guillermo scale of 4. They are not receiving another HA injectable at this time. I will precert for gel injection.       This note was created using  voice recognition software that occasionally misinterpreted phrases or words.    Consult note is delivered via Epic messaging service.

## 2022-12-12 NOTE — TELEPHONE ENCOUNTER
----- Message from Angelica Paz MA sent at 12/12/2022 10:25 AM CST -----  Contact: wife,  pop  Wants to reschedule injection to later today   Call back  [phone]

## 2022-12-12 NOTE — PROCEDURES
Large Joint Aspiration/Injection: bilateral knee    Date/Time: 12/12/2022 1:15 PM  Performed by: Pablo Dutton MD  Authorized by: Pablo Dutton MD     Consent Done?:  Yes (Verbal)  Indications:  Pain  Site marked: the procedure site was marked    Timeout: prior to procedure the correct patient, procedure, and site was verified      Details:  Needle Size:  18 G  Approach:  Anterolateral  Location:  Knee  Laterality:  Bilateral  Site:  Bilateral knee  Medications (Right):  48 mg hylan g-f 20 48 mg/6 mL  Medications (Left):  48 mg hylan g-f 20 48 mg/6 mL  Patient tolerance:  Patient tolerated the procedure well with no immediate complications

## 2022-12-13 ENCOUNTER — PATIENT MESSAGE (OUTPATIENT)
Dept: ADMINISTRATIVE | Facility: OTHER | Age: 72
End: 2022-12-13
Payer: COMMERCIAL

## 2022-12-14 ENCOUNTER — TELEPHONE (OUTPATIENT)
Dept: FAMILY MEDICINE | Facility: CLINIC | Age: 72
End: 2022-12-14
Payer: COMMERCIAL

## 2022-12-21 ENCOUNTER — PATIENT MESSAGE (OUTPATIENT)
Dept: FAMILY MEDICINE | Facility: CLINIC | Age: 72
End: 2022-12-21
Payer: COMMERCIAL

## 2022-12-21 NOTE — PROGRESS NOTES
[facility]     Subjective:    Chief Complaint:   Chief Complaint   Patient presents with    Follow-up       History of Present Illness:  72 y.o. male presents for multiple issues.  Anemia: Patient presents for presents evaluation of anemia.He has been through cardiac angiogram and cardiac stenting.  Anemia was found by  chronic fatigue/ incidental CBC. .  It has been present for 4 week.  Associated signs & symptoms: dizziness/lightheadedness, fatigue and history of renal disease.Patient has been improving. He has mild short term dementia but still working at high level. He has behavior problems Irritability, no yelling, no violence . He has to stop driving and to learn how let got.   DM,over ten years.  Lab Results   Component Value Date    HGBA1C 5.5 10/01/2022     SUBJECTIVE:  Carlos Tenorio Jr. is a 72 y.o. male who sustained a right shoulder injury poor ROM  x 3 month(s) ago. Mechanism of injury: repetitive movements. Immediate symptoms: immediate pain, was able to bear weight directly after injury, was able to use arm directly after injury, no deformity was noted by the patient. Symptoms have been constant and insidious since that time. Prior history of related problems: previous shoulder injury .    OSee orders for this visit as documented in the electronic medical record.             I reviewed the patients chart dating back for the past few appointments. See above.    The following portions of the patient's history were reviewed and updated as appropriate: allergies, current medications, past family history, past medical history, past social history, past surgical history and problem list.    He denies chest pain upon exertion, dyspnea, nausea, vomiting, diaphoresis, and syncope. No pleuritic chest pain, unilateral leg swelling, calf tenderness, or calf pain.      Past Medical History:   Diagnosis Date    Arthritis     knees, back    Bilateral renal cysts 7/30/2012    CAD (coronary artery  disease)     no chest pain since CABG, sees Dr Larry Black    Diabetes mellitus     borderline    Hypertension     Kidney stones     uric acid stones    VASYL (obstructive sleep apnea)     is compliant with CPAP    Primary gonadal failure        Past Surgical History:   Procedure Laterality Date    BACK SURGERY      L5-6 fusion, with pins,bone graft    cardiac stents      2 stents 2021 and 2022    CARDIAC SURGERY  ,     total 7 vessel bypass    COLONOSCOPY      repeat     COLONOSCOPY N/A 2017    Procedure: COLONOSCOPY;  Surgeon: Dmitry Sampson MD;  Location: [facility] ENDO;  Service: Endoscopy;  Laterality: N/A;    CORONARY ARTERY BYPASS GRAFT  ,     cabgx3, cabgx5    EXTRACORPOREAL SHOCK WAVE LITHOTRIPSY      EYE SURGERY      cataracts bilateral    LITHOTRIPSY      LUMBAR LAMINECTOMY      Partial Nephrectomy Laproscopic      TRANSRECTAL BIOPSY OF PROSTATE WITH ULTRASOUND GUIDANCE N/A 3/19/2019    Procedure: BIOPSY, PROSTATE, RECTAL APPROACH, WITH US GUIDANCE;  Surgeon: Yovany Heart MD;  Location: [facility];  Service: Urology;  Laterality: N/A;    TRANSRECTAL BIOPSY OF PROSTATE WITH ULTRASOUND GUIDANCE N/A 10/20/2020    Procedure: BIOPSY, PROSTATE, RECTAL APPROACH, WITH US GUIDANCE;  Surgeon: Yovany Heart MD;  Location: [facility];  Service: Urology;  Laterality: N/A;       Social History  Social History     Tobacco Use    Smoking status: Former     Types: Cigarettes     Quit date: 1976     Years since quittin.9    Smokeless tobacco: Never   Substance Use Topics    Alcohol use: Yes     Comment: Socially    Drug use: No       Family History   Problem Relation Age of Onset    Heart disease Mother     Hypertension Mother     Diabetes Mother     Heart disease Father         premature    Hypertension Father     Diabetes Sister     Urolithiasis Sister     Heart disease Paternal Uncle     Cancer Neg Hx     Prostate cancer Neg Hx     Kidney cancer Neg Hx      "Melanoma Neg Hx     Lupus Neg Hx     Eczema Neg Hx     Psoriasis Neg Hx      Review of patient's allergies indicates:   Allergen Reactions    Sulfa (sulfonamide antibiotics) Hives    Adhesive Blisters       Review of Systems [Negative unless checked off]    General ROS: []fever, []chills, []weight loss, [x]malaise/fatigue.  ENT ROS: []congestion, []rhinorrhea,  []sore throat, []neck pain, []hearing loss.  Ophthalmic ROS:[]blurry vision, [] double vision, []photophobia, []eye pain.  Respiratory ROS: []cough, []pleuritic chest pain, []shortness of breath, []wheezing.  CVS ROS:[]chest pain, []dyspnea on exertion, []palpitations, []orthopnea, []leg swelling, []PND.   GI ROS: []nausea, []vomiting, [] epigastric pain, []abd pain, []diarrhea, []constipation, []blood/melena in stool.   Urology ROS:[]dysuria, []frequency, []flank pain,[] trouble voiding, [] hematuria.   MSK ROS: []myalgias, [x]joint pain, []muscular weakness,  [x]back pain, [] falls.   Derm ROS: []pruritis, []rash, []jaundice.  Neurological:[]dizziness,[]numbness,[]loss of consciousness, []weakness  []headaches.   Psych ROS: []hallucinations, [x]depression, []anxiety, [x]suicidal ideation.    Physical Examination  BP (!) 146/68 (BP Location: Right arm, Patient Position: Sitting, BP Method: Large (Manual))   Pulse (!) 57   Temp 97.9 °F (36.6 °C)   Ht 5' 10" (1.778 m)   Wt 92.7 kg (204 lb 5.9 oz)   SpO2 99%   BMI 29.32 kg/m²   Wt Readings from Last 3 Encounters:   12/08/22 92.7 kg (204 lb 5.9 oz)   08/29/22 100.2 kg (221 lb)   06/20/22 100.5 kg (221 lb 9 oz)     BP Readings from Last 3 Encounters:   12/08/22 (!) 146/68   07/18/22 122/64   06/20/22 92/70     Estimated body mass index is 29.32 kg/m² as calculated from the following:    Height as of this encounter: 5' 10" (1.778 m).    Weight as of this encounter: 92.7 kg (204 lb 5.9 oz).     General appearance: alert, cooperative, no distress  Eyes: pupils equal and reactive, extraocular eye movements " intact, sclera anicteric, left eye normal, right eye normal, Pale conjunctivae.   Ears: bilateral TM's and external ear canals normal   Nose: normal and patent, no erythema, discharge or polyps   Sinuses: Normal paranasal sinuses without tenderness   Throat: mucous membranes moist, pharynx normal without lesions   Neck: negative, no thyromegaly, trachea midline  Lungs: clear to auscultation, no wheezes, rales or rhonchi, symmetric air entry, no dullness to percussion bilaterally.  Heart: normal rate, regular rhythm, normal S1, S2, no murmurs, rubs, clicks or gallops, no displacement of the PMI.  Abdomen: soft, nontender, nondistended, no masses or organomegaly No rigidity, rebound, or guarding.   Back: full range of motion, no tenderness, palpable spasm or pain on motion   Extremities: peripheral pulses normal, no pedal edema, no clubbing or cyanosis Shoulder exam shows positive impingement signs are present with pain at high arc of abduction and forward flexion on right. There is tenderness of the AC joint.    Feet: warm, good capillary refill.  Neurological:alert, oriented, normal speech, no focal findings or movement disorder noted, screening mental status exam normal, neck supple without rigidity, cranial nerves II through XII intact, DTR's normal and symmetric, Lower legs weakness.   Psychiatric: alert, oriented to person, place, and time.Dementia: He is here for evaluation and treatment of cognitive problems. He is accompanied by patient and spouse. Primary caregiver is patient and wife. The family and the patient identify problems with changes in short term memory and getting disoriented outside of familiar environment. Family and patient report problems with suspiciousness. Family and patient are concerned about  driving, however, they are not concerned about wandering, cooking and inability to maintain adequate nutrition. Medication administration: caregiver monitors the use of medications           Integument: normal coloration and turgor, no rashes, no suspicious skin lesions noted.    Data reviewed    Previous medical records reviewed and summarized above in HPI. 274}    Laboratory    I have reviewed old labs below:   274}  Lab Results   Component Value Date    WBC 8.50 11/23/2022    HGB 9.8 (L) 11/23/2022    HCT 29.1 (L) 11/23/2022    MCV 93 11/23/2022     11/23/2022     (L) 11/23/2022    K 5.0 11/23/2022     11/23/2022    CALCIUM 9.3 11/23/2022    PHOS 3.0 06/18/2018    CO2 20 (L) 11/23/2022     (H) 11/23/2022    BUN 21 11/23/2022    CREATININE 1.3 11/23/2022    ANIONGAP 11 11/23/2022    ESTGFRAFRICA >60 05/21/2022    EGFRNONAA >60 05/21/2022    PROT 5.9 (L) 11/23/2022    ALBUMIN 3.4 (L) 11/23/2022    BILITOT 0.5 11/23/2022    ALKPHOS 59 11/23/2022    ALT 8 (L) 11/23/2022    AST 8 (L) 11/23/2022    INR 1.1 05/21/2022    CHOL 110 (L) 10/01/2022    TRIG 60 10/01/2022    HDL 45 10/01/2022    LDLCALC 53.0 (L) 10/01/2022    TSH 2.310 10/01/2022    PSA 4.5 (H) 12/14/2018    GLUF 109 11/07/2008    HGBA1C 5.5 10/01/2022       Imaging/Tracing: I have reviewed the pertinent results/findings and my personal findings are below:  274}    Assessment/Plan     274}    Carlos Fontenot Jona Ambrosio. is a 72 y.o. male who presents to clinic with:    1. Sprain of right coracohumeral ligament, initial encounter    2. Gastrointestinal hemorrhage with melena    3. Type 2 diabetes mellitus with hyperglycemia, without long-term current use of insulin    4. Coronary artery disease involving native coronary artery of native heart without angina pectoris    5. Stage 3a chronic kidney disease         Diagnostic impression remarks       1. Sprain of right coracohumeral ligament, initial encounter  - X-Ray Shoulder Trauma 3 view Right; Future    2. Gastrointestinal hemorrhage with melena  - Fecal Immunochemical Test (iFOBT); Future    3. Type 2 diabetes mellitus with hyperglycemia, without long-term current  use of insulin  - HEMOGLOBIN A1C; Future    4. Coronary artery disease involving native coronary artery of native heart without angina pectoris    5. Stage 3a chronic kidney disease  - HEMOGLOBIN A1C; Future    Patient denies any exertional chest pain, dyspnea, palpitations, syncope, orthopnea, edema or paroxysmal nocturnal dyspnea.    Medication Monitoring: In today's visit, monitoring for drug toxicity was accomplished. Proper use of medications was discussed.     Counseling: Counseling included discussion regarding imaging findings, diagnosis, possibilities, treatment options, medications, risks, and benefits. He had many questions regarding the options and long-term effects. All questions were answered. He expressed understanding after counseling regarding the diagnosis and recommendations. He was capable and demonstrated competence with understanding of these options. Shared decision making was performed resulting in him choosing the current treatment plan.     He was counseled about the importance of healthy dietary habits as well as routine physical activity and exercise for better health outcomes. I also discussed the importance of cancer screening.     I also discussed the importance of close follow up to discuss labs, change or modify his medications if needed, monitor side effects, and further evaluation of medical problems.     Additional workup planned: see labs ordered below.    See below for labs and meds ordered with associated diagnosis      Medication List with Changes/Refills   Current Medications    ALLOPURINOL (ZYLOPRIM) 100 MG TABLET    TAKE 1 TABLET(100 MG) BY MOUTH EVERY DAY    AMLODIPINE (NORVASC) 2.5 MG TABLET    Take 1 tablet (2.5 mg total) by mouth every evening.    ASPIRIN (ECOTRIN) 81 MG EC TABLET    Take 81 mg by mouth 3 (three) times daily.    ATORVASTATIN (LIPITOR) 40 MG TABLET    Take 1 tablet (40 mg total) by mouth once daily.    CLOPIDOGREL (PLAVIX) 75 MG TABLET    Take 75 mg by  mouth once daily.    CYANOCOBALAMIN, VITAMIN B-12, (VITAMIN B-12 ORAL)    Take by mouth.    EZETIMIBE (ZETIA) 10 MG TABLET    Take 1 tablet (10 mg total) by mouth once daily.    FERROUS SULFATE (FEOSOL) 325 MG (65 MG IRON) TAB TABLET    Take 325 mg by mouth.    FISH OIL-OMEGA-3 FATTY ACIDS 300-1,000 MG CAPSULE    Take 2 g by mouth 3 (three) times daily.    FOLIC ACID (FOLVITE) 1 MG TABLET    Take 1 tablet (1 mg total) by mouth once daily.    HYDROCHLOROTHIAZIDE (HYDRODIURIL) 12.5 MG TAB    Take 1 tablet (12.5 mg total) by mouth once daily.    KETOCONAZOLE (NIZORAL) 2 % CREAM    Apply topically 2 (two) times daily.    LORAZEPAM (ATIVAN) 0.5 MG TABLET    Take 1 tablet (0.5 mg total) by mouth every 6 (six) hours as needed for Anxiety.    LOSARTAN (COZAAR) 100 MG TABLET    TAKE 1 TABLET(100 MG) BY MOUTH EVERY DAY    METFORMIN (GLUCOPHAGE-XR) 500 MG 24 HR TABLET    Take 1 tablet (500 mg total) by mouth 2 (two) times daily with meals.    NITROGLYCERIN (NITROSTAT) 0.4 MG SL TABLET    SMARTSI Tablet(s) Sublingual PRN    POTASSIUM CHLORIDE SA (K-DUR,KLOR-CON) 20 MEQ TABLET    TAKE 1 TABLET(20 MEQ) BY MOUTH EVERY DAY    RANOLAZINE (RANEXA) 500 MG TB12    Take 500 mg by mouth 2 (two) times daily.    SERTRALINE (ZOLOFT) 25 MG TABLET    sertraline 25 mg tablet   Take 1 tablet every day by oral route in the morning.    SLOW  MG (45 MG IRON) TBSR    Take by mouth.    VITAMIN D 1000 UNITS TAB    Take 185 mg by mouth once daily.     Modified Medications    No medications on file   Apply a compressive ACE bandage. Rest and elevate the affected painful area.  Apply cold compresses intermittently as needed.  As pain recedes, begin normal activities slowly as tolerated.  Call if symptoms persist.      Follow up in about 4 months (around 2023) for labs before next visit. for further workup and reassessment if labs and tests obtained are stable or sooner as needed. He was instructed to call the clinic or go to the emergency  "department if his symptoms do not improve, worsens, or if new symptoms develop. Patient knows to call any time if an emergency arises. Shared decision making occurred and he verbalized understanding in agreement with this plan.     Documentation entered by me for this encounter may have been done in part using speech-recognition technology. Although I have made an effort to ensure accuracy, "sound like" errors may exist and should be interpreted in context.       Roney Bradshaw MD     Discussed health maintenance guidelines appropriate for age.  Discussed health maintenance guidelines appropriate for age.  Discussed health maintenance guidelines appropriate for age.    "

## 2022-12-22 ENCOUNTER — LAB VISIT (OUTPATIENT)
Dept: LAB | Facility: HOSPITAL | Age: 72
End: 2022-12-22
Attending: STUDENT IN AN ORGANIZED HEALTH CARE EDUCATION/TRAINING PROGRAM
Payer: COMMERCIAL

## 2022-12-22 DIAGNOSIS — F03.90 SENILE DEMENTIA, UNCOMPLICATED: Primary | ICD-10-CM

## 2022-12-22 LAB
ALBUMIN SERPL BCP-MCNC: 3.4 G/DL (ref 3.5–5.2)
ALP SERPL-CCNC: 63 U/L (ref 55–135)
ALT SERPL W/O P-5'-P-CCNC: 11 U/L (ref 10–44)
ANION GAP SERPL CALC-SCNC: 10 MMOL/L (ref 8–16)
AST SERPL-CCNC: 10 U/L (ref 10–40)
BASOPHILS # BLD AUTO: 0.09 K/UL (ref 0–0.2)
BASOPHILS NFR BLD: 1.1 % (ref 0–1.9)
BILIRUB SERPL-MCNC: 0.5 MG/DL (ref 0.1–1)
BUN SERPL-MCNC: 25 MG/DL (ref 8–23)
CALCIUM SERPL-MCNC: 9.6 MG/DL (ref 8.7–10.5)
CHLORIDE SERPL-SCNC: 110 MMOL/L (ref 95–110)
CO2 SERPL-SCNC: 21 MMOL/L (ref 23–29)
CREAT SERPL-MCNC: 1.2 MG/DL (ref 0.5–1.4)
DIFFERENTIAL METHOD: ABNORMAL
EOSINOPHIL # BLD AUTO: 0.5 K/UL (ref 0–0.5)
EOSINOPHIL NFR BLD: 6.6 % (ref 0–8)
ERYTHROCYTE [DISTWIDTH] IN BLOOD BY AUTOMATED COUNT: 13.1 % (ref 11.5–14.5)
EST. GFR  (NO RACE VARIABLE): >60 ML/MIN/1.73 M^2
GLUCOSE SERPL-MCNC: 105 MG/DL (ref 70–110)
HCT VFR BLD AUTO: 30.4 % (ref 40–54)
HGB BLD-MCNC: 9.9 G/DL (ref 14–18)
IMM GRANULOCYTES # BLD AUTO: 0.02 K/UL (ref 0–0.04)
IMM GRANULOCYTES NFR BLD AUTO: 0.2 % (ref 0–0.5)
LYMPHOCYTES # BLD AUTO: 1.4 K/UL (ref 1–4.8)
LYMPHOCYTES NFR BLD: 17.8 % (ref 18–48)
MCH RBC QN AUTO: 31.5 PG (ref 27–31)
MCHC RBC AUTO-ENTMCNC: 32.6 G/DL (ref 32–36)
MCV RBC AUTO: 97 FL (ref 82–98)
MONOCYTES # BLD AUTO: 0.9 K/UL (ref 0.3–1)
MONOCYTES NFR BLD: 11.5 % (ref 4–15)
NEUTROPHILS # BLD AUTO: 5.1 K/UL (ref 1.8–7.7)
NEUTROPHILS NFR BLD: 62.8 % (ref 38–73)
NRBC BLD-RTO: 0 /100 WBC
PLATELET # BLD AUTO: 317 K/UL (ref 150–450)
PMV BLD AUTO: 10.1 FL (ref 9.2–12.9)
POTASSIUM SERPL-SCNC: 4.6 MMOL/L (ref 3.5–5.1)
PROT SERPL-MCNC: 6.1 G/DL (ref 6–8.4)
RBC # BLD AUTO: 3.14 M/UL (ref 4.6–6.2)
SODIUM SERPL-SCNC: 141 MMOL/L (ref 136–145)
WBC # BLD AUTO: 8.08 K/UL (ref 3.9–12.7)

## 2022-12-22 PROCEDURE — 85025 COMPLETE CBC W/AUTO DIFF WBC: CPT | Mod: PN | Performed by: INTERNAL MEDICINE

## 2022-12-22 PROCEDURE — 36415 COLL VENOUS BLD VENIPUNCTURE: CPT | Mod: PN | Performed by: INTERNAL MEDICINE

## 2022-12-22 PROCEDURE — 80053 COMPREHEN METABOLIC PANEL: CPT | Mod: PN | Performed by: INTERNAL MEDICINE

## 2023-01-08 ENCOUNTER — PATIENT MESSAGE (OUTPATIENT)
Dept: UROLOGY | Facility: CLINIC | Age: 73
End: 2023-01-08
Payer: COMMERCIAL

## 2023-01-08 DIAGNOSIS — N50.819 PAIN IN TESTICLE, UNSPECIFIED LATERALITY: Primary | ICD-10-CM

## 2023-01-10 ENCOUNTER — PATIENT MESSAGE (OUTPATIENT)
Dept: FAMILY MEDICINE | Facility: CLINIC | Age: 73
End: 2023-01-10
Payer: COMMERCIAL

## 2023-01-11 ENCOUNTER — LAB VISIT (OUTPATIENT)
Dept: LAB | Facility: HOSPITAL | Age: 73
End: 2023-01-11
Attending: UROLOGY
Payer: COMMERCIAL

## 2023-01-11 DIAGNOSIS — Z85.528 HISTORY OF RENAL CELL CANCER: ICD-10-CM

## 2023-01-11 DIAGNOSIS — C61 PROSTATE CANCER: ICD-10-CM

## 2023-01-11 LAB
ANION GAP SERPL CALC-SCNC: 14 MMOL/L (ref 8–16)
BUN SERPL-MCNC: 21 MG/DL (ref 8–23)
CALCIUM SERPL-MCNC: 9.5 MG/DL (ref 8.7–10.5)
CHLORIDE SERPL-SCNC: 99 MMOL/L (ref 95–110)
CO2 SERPL-SCNC: 21 MMOL/L (ref 23–29)
CREAT SERPL-MCNC: 1.2 MG/DL (ref 0.5–1.4)
EST. GFR  (NO RACE VARIABLE): >60 ML/MIN/1.73 M^2
GLUCOSE SERPL-MCNC: 110 MG/DL (ref 70–110)
POTASSIUM SERPL-SCNC: 4.3 MMOL/L (ref 3.5–5.1)
SODIUM SERPL-SCNC: 134 MMOL/L (ref 136–145)

## 2023-01-11 PROCEDURE — 80048 BASIC METABOLIC PNL TOTAL CA: CPT | Performed by: UROLOGY

## 2023-01-11 PROCEDURE — 36415 COLL VENOUS BLD VENIPUNCTURE: CPT | Performed by: UROLOGY

## 2023-01-30 ENCOUNTER — TELEPHONE (OUTPATIENT)
Dept: UROLOGY | Facility: CLINIC | Age: 73
End: 2023-01-30
Payer: COMMERCIAL

## 2023-01-30 NOTE — TELEPHONE ENCOUNTER
Spoke w pts wife who had questions regarding scheduling annual appt   Reviewed with pts wife md recs   See last tele encounter   Pts wife voiced ok

## 2023-01-30 NOTE — TELEPHONE ENCOUNTER
----- Message from Aura Watson sent at 1/30/2023  1:45 PM CST -----  Regarding: appt  Contact: patient  Type:  Sooner Appointment Request    Caller is requesting a sooner appointment.  Caller declined first available appointment listed below.  Caller will not accept being placed on the waitlist and is requesting a message be sent to doctor.    Name of Caller:  Patient  When is the first available appointment?    Symptoms:  recall letter  Best Call Back Number:  [phone] (home)       Additional Information:  Patient states the march appt are not available and that it is stating not until may. Please call to schedule. Thanks!

## 2023-01-31 ENCOUNTER — HOSPITAL ENCOUNTER (OUTPATIENT)
Dept: RADIOLOGY | Facility: HOSPITAL | Age: 73
Discharge: HOME | End: 2023-01-31
Attending: UROLOGY
Payer: COMMERCIAL

## 2023-01-31 ENCOUNTER — PATIENT MESSAGE (OUTPATIENT)
Dept: UROLOGY | Facility: CLINIC | Age: 73
End: 2023-01-31
Payer: COMMERCIAL

## 2023-01-31 DIAGNOSIS — N50.819 PAIN IN TESTICLE, UNSPECIFIED LATERALITY: ICD-10-CM

## 2023-01-31 PROCEDURE — 76870 US SCROTUM AND TESTICLES: ICD-10-PCS | Mod: 26,,, | Performed by: RADIOLOGY

## 2023-01-31 PROCEDURE — 76870 US EXAM SCROTUM: CPT | Mod: TC

## 2023-01-31 PROCEDURE — 76870 US EXAM SCROTUM: CPT | Mod: 26,,, | Performed by: RADIOLOGY

## 2023-02-10 ENCOUNTER — OFFICE VISIT (OUTPATIENT)
Dept: FAMILY MEDICINE | Facility: CLINIC | Age: 73
End: 2023-02-10
Payer: COMMERCIAL

## 2023-02-10 VITALS
SYSTOLIC BLOOD PRESSURE: 128 MMHG | HEART RATE: 60 BPM | HEIGHT: 70 IN | DIASTOLIC BLOOD PRESSURE: 74 MMHG | WEIGHT: 207.69 LBS | OXYGEN SATURATION: 98 % | BODY MASS INDEX: 29.73 KG/M2 | TEMPERATURE: 99 F

## 2023-02-10 DIAGNOSIS — D69.2 SOLAR PURPURA: ICD-10-CM

## 2023-02-10 DIAGNOSIS — E11.69 HYPERLIPIDEMIA ASSOCIATED WITH TYPE 2 DIABETES MELLITUS: ICD-10-CM

## 2023-02-10 DIAGNOSIS — I15.2 HYPERTENSION ASSOCIATED WITH DIABETES: ICD-10-CM

## 2023-02-10 DIAGNOSIS — I50.32 CHRONIC DIASTOLIC CONGESTIVE HEART FAILURE: ICD-10-CM

## 2023-02-10 DIAGNOSIS — E11.59 HYPERTENSION ASSOCIATED WITH DIABETES: ICD-10-CM

## 2023-02-10 DIAGNOSIS — G30.0 ALZHEIMER'S DISEASE WITH EARLY ONSET (CODE): ICD-10-CM

## 2023-02-10 DIAGNOSIS — Z00.00 ENCOUNTER FOR PREVENTIVE HEALTH EXAMINATION: Primary | ICD-10-CM

## 2023-02-10 DIAGNOSIS — C61 PROSTATE CANCER: ICD-10-CM

## 2023-02-10 DIAGNOSIS — I25.118 ATHEROSCLEROTIC HEART DISEASE OF NATIVE CORONARY ARTERY WITH OTHER FORMS OF ANGINA PECTORIS: ICD-10-CM

## 2023-02-10 DIAGNOSIS — E11.65 TYPE 2 DIABETES MELLITUS WITH HYPERGLYCEMIA, WITHOUT LONG-TERM CURRENT USE OF INSULIN: ICD-10-CM

## 2023-02-10 DIAGNOSIS — E78.5 HYPERLIPIDEMIA ASSOCIATED WITH TYPE 2 DIABETES MELLITUS: ICD-10-CM

## 2023-02-10 PROBLEM — C68.9 MALIGNANT NEOPLASM OF URINARY ORGAN, UNSPECIFIED: Status: RESOLVED | Noted: 2022-02-18 | Resolved: 2023-02-10

## 2023-02-10 PROCEDURE — 1170F PR FUNCTIONAL STATUS ASSESSED: ICD-10-PCS | Mod: CPTII,S$GLB,, | Performed by: NURSE PRACTITIONER

## 2023-02-10 PROCEDURE — G0439 PR MEDICARE ANNUAL WELLNESS SUBSEQUENT VISIT: ICD-10-PCS | Mod: S$GLB,,, | Performed by: NURSE PRACTITIONER

## 2023-02-10 PROCEDURE — 3078F PR MOST RECENT DIASTOLIC BLOOD PRESSURE < 80 MM HG: ICD-10-PCS | Mod: CPTII,S$GLB,, | Performed by: NURSE PRACTITIONER

## 2023-02-10 PROCEDURE — 3008F BODY MASS INDEX DOCD: CPT | Mod: CPTII,S$GLB,, | Performed by: NURSE PRACTITIONER

## 2023-02-10 PROCEDURE — 99999 PR PBB SHADOW E&M-EST. PATIENT-LVL V: CPT | Mod: PBBFAC,,, | Performed by: NURSE PRACTITIONER

## 2023-02-10 PROCEDURE — 1170F FXNL STATUS ASSESSED: CPT | Mod: CPTII,S$GLB,, | Performed by: NURSE PRACTITIONER

## 2023-02-10 PROCEDURE — 3288F PR FALLS RISK ASSESSMENT DOCUMENTED: ICD-10-PCS | Mod: CPTII,S$GLB,, | Performed by: NURSE PRACTITIONER

## 2023-02-10 PROCEDURE — G0439 PPPS, SUBSEQ VISIT: HCPCS | Mod: S$GLB,,, | Performed by: NURSE PRACTITIONER

## 2023-02-10 PROCEDURE — 1101F PR PT FALLS ASSESS DOC 0-1 FALLS W/OUT INJ PAST YR: ICD-10-PCS | Mod: CPTII,S$GLB,, | Performed by: NURSE PRACTITIONER

## 2023-02-10 PROCEDURE — 1159F MED LIST DOCD IN RCRD: CPT | Mod: CPTII,S$GLB,, | Performed by: NURSE PRACTITIONER

## 2023-02-10 PROCEDURE — 1159F PR MEDICATION LIST DOCUMENTED IN MEDICAL RECORD: ICD-10-PCS | Mod: CPTII,S$GLB,, | Performed by: NURSE PRACTITIONER

## 2023-02-10 PROCEDURE — 1160F RVW MEDS BY RX/DR IN RCRD: CPT | Mod: CPTII,S$GLB,, | Performed by: NURSE PRACTITIONER

## 2023-02-10 PROCEDURE — 1101F PT FALLS ASSESS-DOCD LE1/YR: CPT | Mod: CPTII,S$GLB,, | Performed by: NURSE PRACTITIONER

## 2023-02-10 PROCEDURE — 99999 PR PBB SHADOW E&M-EST. PATIENT-LVL V: ICD-10-PCS | Mod: PBBFAC,,, | Performed by: NURSE PRACTITIONER

## 2023-02-10 PROCEDURE — 1160F PR REVIEW ALL MEDS BY PRESCRIBER/CLIN PHARMACIST DOCUMENTED: ICD-10-PCS | Mod: CPTII,S$GLB,, | Performed by: NURSE PRACTITIONER

## 2023-02-10 PROCEDURE — 3078F DIAST BP <80 MM HG: CPT | Mod: CPTII,S$GLB,, | Performed by: NURSE PRACTITIONER

## 2023-02-10 PROCEDURE — 3074F PR MOST RECENT SYSTOLIC BLOOD PRESSURE < 130 MM HG: ICD-10-PCS | Mod: CPTII,S$GLB,, | Performed by: NURSE PRACTITIONER

## 2023-02-10 PROCEDURE — 3074F SYST BP LT 130 MM HG: CPT | Mod: CPTII,S$GLB,, | Performed by: NURSE PRACTITIONER

## 2023-02-10 PROCEDURE — 3288F FALL RISK ASSESSMENT DOCD: CPT | Mod: CPTII,S$GLB,, | Performed by: NURSE PRACTITIONER

## 2023-02-10 PROCEDURE — 3008F PR BODY MASS INDEX (BMI) DOCUMENTED: ICD-10-PCS | Mod: CPTII,S$GLB,, | Performed by: NURSE PRACTITIONER

## 2023-02-10 RX ORDER — CETIRIZINE HCL 1 MG/ML
10 SOLUTION, ORAL ORAL DAILY PRN
COMMUNITY
Start: 2023-02-02 | End: 2023-06-26

## 2023-02-10 RX ORDER — IBUPROFEN 200 MG
1 CAPSULE ORAL DAILY
COMMUNITY
Start: 2023-02-02

## 2023-02-10 RX ORDER — ADUCANUMAB 100 MG/ML
100 INJECTION, SOLUTION INTRAVENOUS
COMMUNITY
Start: 2022-10-04 | End: 2024-03-07

## 2023-02-10 NOTE — PROGRESS NOTES
"  Carlos Tenorio presented for a  Medicare AWV and comprehensive Health Risk Assessment today. The following components were reviewed and updated:    Medical history  Family History  Social history  Allergies and Current Medications  Health Risk Assessment  Health Maintenance  Care Team         ** See Completed Assessments for Annual Wellness Visit within the encounter summary.**         The following assessments were completed:  Living Situation  CAGE  Depression Screening  Timed Get Up and Go  Whisper Test  Cognitive Function Screening  Nutrition Screening  ADL Screening  PAQ Screening          Vitals:    02/10/23 0807   BP: 128/74   Pulse: 60   Temp: 98.8 °F (37.1 °C)   SpO2: 98%   Weight: 94.2 kg (207 lb 10.8 oz)   Height: 5' 10" (1.778 m)     Body mass index is 29.8 kg/m².  Physical Exam  Constitutional:       Appearance: He is well-developed.   HENT:      Head: Normocephalic and atraumatic.      Right Ear: Hearing normal.      Left Ear: Hearing normal.      Nose: Nose normal.   Eyes:      General: Lids are normal.      Conjunctiva/sclera: Conjunctivae normal.      Pupils: Pupils are equal, round, and reactive to light.   Cardiovascular:      Rate and Rhythm: Normal rate.      Pulses:           Posterior tibial pulses are 2+ on the right side and 2+ on the left side.   Pulmonary:      Effort: Pulmonary effort is normal.   Abdominal:      Palpations: Abdomen is soft.   Musculoskeletal:         General: Normal range of motion.      Cervical back: Normal range of motion and neck supple.      Right foot: Normal range of motion. No deformity, bunion, Charcot foot, foot drop or prominent metatarsal heads.      Left foot: Normal range of motion. No deformity, bunion, Charcot foot, foot drop or prominent metatarsal heads.   Feet:      Right foot:      Protective Sensation: 8 sites tested.  8 sites sensed.      Skin integrity: Skin integrity normal.      Toenail Condition: Right toenails are abnormally thick.      Left " foot:      Protective Sensation: 8 sites tested.  8 sites sensed.      Skin integrity: Skin integrity normal.      Toenail Condition: Left toenails are normal.   Skin:     General: Skin is warm and dry.   Neurological:      Mental Status: He is alert and oriented to person, place, and time.             Diagnoses and health risks identified today and associated recommendations/orders:    1. Encounter for preventive health examination  Discussed health maintenance guidelines appropriate for age.    Review for Opioid Screening: Patient does not have rx for Opioids.    Review for Substance Use Disorders: Patient does not use substance.      2. Hypertension associated with diabetes  Controlled, continue current medication regimen  Low salt diet  Increase physical activity  Followed by pcp     3. Hyperlipidemia associated with type 2 diabetes mellitus  Controlled, continue current medication regimen  Patient taking statin  Followed by pcp      4. Atherosclerotic heart disease of native coronary artery with other forms of angina pectoris  Stable, continue current medications  Followed by cardiology     5. Solar purpura  Stable, continue to monitor    6. Type 2 diabetes mellitus with hyperglycemia, without long-term current use of insulin  Controlled, continue current medication regimen  Last HgA1c - 5.5  ADA diet  Increase physical activity   Followed by pcp      7. Chronic diastolic congestive heart failure  Stable, continue current meds  Followed by cardiology     8. Alzheimer's disease with early onset (CODE)  Stable, continue current level of care and clinical trial     9.Prostate Cancer  -Stable, continue care and surveillance per urology  Provided Carlos with a 5-10 year written screening schedule and personal prevention plan. Recommendations were developed using the USPSTF age appropriate recommendations. Education, counseling, and referrals were provided as needed. After Visit Summary printed and given to patient  which includes a list of additional screenings\tests needed.    Follow up for One year for Annual Wellness Visit.    Mary Patrick, NP  I offered to discuss advanced care planning, including how to pick a person who would make decisions for you if you were unable to make them for yourself, called a health care power of , and what kind of decisions you might make such as use of life sustaining treatments such as ventilators and tube feeding when faced with a life limiting illness recorded on a living will that they will need to know. (How you want to be cared for as you near the end of your natural life)     X Patient is interested in learning more about how to make advanced directives.  I provided them paperwork and offered to discuss this with them.

## 2023-02-10 NOTE — PATIENT INSTRUCTIONS
Counseling and Referral of Other Preventative  (Italic type indicates deductible and co-insurance are waived)    Patient Name: Carlos Tenorio  Today's Date: 2/10/2023    Health Maintenance       Date Due Completion Date    Foot Exam 02/26/2022 2/26/2021    Eye Exam 10/29/2022 10/29/2021    Override on 5/14/2019: Done (Dr Allen)    Diabetes Urine Screening 03/16/2023 3/16/2022    Hemoglobin A1c 04/04/2023 10/4/2022    Lipid Panel 10/04/2023 10/4/2022    Override on 2/9/2017: Done ([facility])    High Dose Statin 02/10/2024 2/10/2023    Aspirin/Antiplatelet Therapy 02/10/2024 2/10/2023    TETANUS VACCINE 08/01/2024 8/1/2014    Colorectal Cancer Screening 04/04/2027 4/4/2017        No orders of the defined types were placed in this encounter.      The following information is provided to all patients.  This information is to help you find resources for any of the problems found today that may be affecting your health:                Living healthy guide: www.Count includes the [facility].[place].gov      Understanding Diabetes: www.diabetes.org      Eating healthy: www.cdc.gov/healthyweight      CDC home safety checklist: www.cdc.gov/steadi/patient.html      Agency on Aging: www.goea.[place].[facility]      Alcoholics anonymous (AA): www.aa.org      Physical Activity: www.marina.nih.gov/xs2pbyv      Tobacco use: www.quitwithusla.org

## 2023-02-24 ENCOUNTER — PATIENT MESSAGE (OUTPATIENT)
Dept: FAMILY MEDICINE | Facility: CLINIC | Age: 73
End: 2023-02-24
Payer: COMMERCIAL

## 2023-02-24 ENCOUNTER — PATIENT MESSAGE (OUTPATIENT)
Dept: UROLOGY | Facility: CLINIC | Age: 73
End: 2023-02-24
Payer: COMMERCIAL

## 2023-02-25 ENCOUNTER — PATIENT MESSAGE (OUTPATIENT)
Dept: FAMILY MEDICINE | Facility: CLINIC | Age: 73
End: 2023-02-25
Payer: COMMERCIAL

## 2023-02-25 DIAGNOSIS — N18.31 STAGE 3A CHRONIC KIDNEY DISEASE: Primary | ICD-10-CM

## 2023-03-09 ENCOUNTER — OFFICE VISIT (OUTPATIENT)
Dept: ORTHOPEDICS | Facility: CLINIC | Age: 73
End: 2023-03-09
Payer: COMMERCIAL

## 2023-03-09 ENCOUNTER — LAB VISIT (OUTPATIENT)
Dept: LAB | Facility: HOSPITAL | Age: 73
End: 2023-03-09
Attending: FAMILY MEDICINE
Payer: COMMERCIAL

## 2023-03-09 VITALS — RESPIRATION RATE: 18 BRPM | BODY MASS INDEX: 29.63 KG/M2 | WEIGHT: 207 LBS | HEIGHT: 70 IN

## 2023-03-09 DIAGNOSIS — M17.0 PRIMARY OSTEOARTHRITIS OF BOTH KNEES: Primary | ICD-10-CM

## 2023-03-09 DIAGNOSIS — K92.1 GASTROINTESTINAL HEMORRHAGE WITH MELENA: ICD-10-CM

## 2023-03-09 PROCEDURE — 82274 ASSAY TEST FOR BLOOD FECAL: CPT | Performed by: FAMILY MEDICINE

## 2023-03-09 PROCEDURE — 1160F PR REVIEW ALL MEDS BY PRESCRIBER/CLIN PHARMACIST DOCUMENTED: ICD-10-PCS | Mod: CPTII,S$GLB,, | Performed by: ORTHOPAEDIC SURGERY

## 2023-03-09 PROCEDURE — 99499 UNLISTED E&M SERVICE: CPT | Mod: S$GLB,,, | Performed by: ORTHOPAEDIC SURGERY

## 2023-03-09 PROCEDURE — 20610 LARGE JOINT ASPIRATION/INJECTION: BILATERAL KNEE: ICD-10-PCS | Mod: 50,S$GLB,, | Performed by: ORTHOPAEDIC SURGERY

## 2023-03-09 PROCEDURE — 1159F MED LIST DOCD IN RCRD: CPT | Mod: CPTII,S$GLB,, | Performed by: ORTHOPAEDIC SURGERY

## 2023-03-09 PROCEDURE — 20610 DRAIN/INJ JOINT/BURSA W/O US: CPT | Mod: 50,S$GLB,, | Performed by: ORTHOPAEDIC SURGERY

## 2023-03-09 PROCEDURE — 1160F RVW MEDS BY RX/DR IN RCRD: CPT | Mod: CPTII,S$GLB,, | Performed by: ORTHOPAEDIC SURGERY

## 2023-03-09 PROCEDURE — 3008F PR BODY MASS INDEX (BMI) DOCUMENTED: ICD-10-PCS | Mod: CPTII,S$GLB,, | Performed by: ORTHOPAEDIC SURGERY

## 2023-03-09 PROCEDURE — 1159F PR MEDICATION LIST DOCUMENTED IN MEDICAL RECORD: ICD-10-PCS | Mod: CPTII,S$GLB,, | Performed by: ORTHOPAEDIC SURGERY

## 2023-03-09 PROCEDURE — 99999 PR PBB SHADOW E&M-EST. PATIENT-LVL III: ICD-10-PCS | Mod: PBBFAC,,, | Performed by: ORTHOPAEDIC SURGERY

## 2023-03-09 PROCEDURE — 99499 NO LOS: ICD-10-PCS | Mod: S$GLB,,, | Performed by: ORTHOPAEDIC SURGERY

## 2023-03-09 PROCEDURE — 3008F BODY MASS INDEX DOCD: CPT | Mod: CPTII,S$GLB,, | Performed by: ORTHOPAEDIC SURGERY

## 2023-03-09 PROCEDURE — 99999 PR PBB SHADOW E&M-EST. PATIENT-LVL III: CPT | Mod: PBBFAC,,, | Performed by: ORTHOPAEDIC SURGERY

## 2023-03-09 RX ORDER — TRIAMCINOLONE ACETONIDE 40 MG/ML
40 INJECTION, SUSPENSION INTRA-ARTICULAR; INTRAMUSCULAR
Status: DISCONTINUED | OUTPATIENT
Start: 2023-03-09 | End: 2023-03-09 | Stop reason: HOSPADM

## 2023-03-09 RX ADMIN — TRIAMCINOLONE ACETONIDE 40 MG: 40 INJECTION, SUSPENSION INTRA-ARTICULAR; INTRAMUSCULAR at 04:03

## 2023-03-09 NOTE — PROCEDURES
Large Joint Aspiration/Injection: bilateral knee    Date/Time: 3/9/2023 4:00 PM  Performed by: Pablo Dutton MD  Authorized by: Pablo Dutton MD     Consent Done?:  Yes (Verbal)  Indications:  Pain  Site marked: the procedure site was marked    Timeout: prior to procedure the correct patient, procedure, and site was verified    Prep: patient was prepped and draped in usual sterile fashion      Local anesthesia used?: Yes    Anesthesia:  Local infiltration  Local anesthetic:  Bupivacaine 0.25% without epinephrine and lidocaine 2% without epinephrine  Anesthetic total (ml):  6      Details:  Needle Size:  22 G  Ultrasonic Guidance for needle placement?: No    Approach:  Anterolateral  Location:  Knee  Laterality:  Bilateral  Site:  Bilateral knee  Medications (Right):  40 mg triamcinolone acetonide 40 mg/mL  Medications (Left):  40 mg triamcinolone acetonide 40 mg/mL  Patient tolerance:  Patient tolerated the procedure well with no immediate complications

## 2023-03-09 NOTE — PROGRESS NOTES
Past Medical History:   Diagnosis Date    Arthritis     knees, back    Bilateral renal cysts 7/30/2012    CAD (coronary artery disease) 1995    no chest pain since CABG, sees Dr Larry Black    Diabetes mellitus     borderline    Hypertension     Kidney stones     uric acid stones    VASYL (obstructive sleep apnea) 2007    is compliant with CPAP    Primary gonadal failure        Past Surgical History:   Procedure Laterality Date    BACK SURGERY  2010    L5-6 fusion, with pins,bone graft    cardiac stents      2 stents 12/2021 and 1/2022    CARDIAC SURGERY  1995, 2007    total 7 vessel bypass    COLONOSCOPY  2008    repeat 2013    COLONOSCOPY N/A 4/4/2017    Procedure: COLONOSCOPY;  Surgeon: Dmitry Sampson MD;  Location: [facility] ENDO;  Service: Endoscopy;  Laterality: N/A;    CORONARY ARTERY BYPASS GRAFT  1995, 2007    cabgx3, cabgx5    EXTRACORPOREAL SHOCK WAVE LITHOTRIPSY      EYE SURGERY      cataracts bilateral    LITHOTRIPSY      LUMBAR LAMINECTOMY      Partial Nephrectomy Laproscopic      TRANSRECTAL BIOPSY OF PROSTATE WITH ULTRASOUND GUIDANCE N/A 3/19/2019    Procedure: BIOPSY, PROSTATE, RECTAL APPROACH, WITH US GUIDANCE;  Surgeon: Yovany Heart MD;  Location: [facility] OR;  Service: Urology;  Laterality: N/A;    TRANSRECTAL BIOPSY OF PROSTATE WITH ULTRASOUND GUIDANCE N/A 10/20/2020    Procedure: BIOPSY, PROSTATE, RECTAL APPROACH, WITH US GUIDANCE;  Surgeon: Yovany Heart MD;  Location: [facility] [place] [facility] OR;  Service: Urology;  Laterality: N/A;       Current Outpatient Medications   Medication Sig    ADUHELM 100 mg/mL Soln 100 mg.    allopurinoL (ZYLOPRIM) 100 MG tablet TAKE 1 TABLET(100 MG) BY MOUTH EVERY DAY    amLODIPine (NORVASC) 2.5 MG tablet Take 1 tablet (2.5 mg total) by mouth every evening.    aspirin (ECOTRIN) 81 MG EC tablet Take 81 mg by mouth 3 (three) times daily.    atorvastatin (LIPITOR) 40 MG tablet TAKE 1 TABLET(40 MG) BY MOUTH EVERY DAY    CALCIUM 500 + D 500 mg-5 mcg (200 unit) per tablet Take 1  tablet by mouth Daily.    clopidogreL (PLAVIX) 75 mg tablet Take 75 mg by mouth once daily.    cyanocobalamin, vitamin B-12, (VITAMIN B-12 ORAL) Take by mouth.    ezetimibe (ZETIA) 10 mg tablet Take 1 tablet (10 mg total) by mouth once daily.    ferrous sulfate (FEOSOL) 325 mg (65 mg iron) Tab tablet Take 325 mg by mouth.    fish oil-omega-3 fatty acids 300-1,000 mg capsule Take 2 g by mouth 3 (three) times daily.    folic acid (FOLVITE) 1 MG tablet TAKE 1 TABLET(1 MG) BY MOUTH EVERY DAY    hydroCHLOROthiazide (HYDRODIURIL) 12.5 MG Tab Take 1 tablet (12.5 mg total) by mouth once daily.    ketoconazole (NIZORAL) 2 % cream Apply topically 2 (two) times daily.    LORazepam (ATIVAN) 0.5 MG tablet Take 1 tablet (0.5 mg total) by mouth every 6 (six) hours as needed for Anxiety.    losartan (COZAAR) 100 MG tablet TAKE 1 TABLET(100 MG) BY MOUTH EVERY DAY    metFORMIN (GLUCOPHAGE-XR) 500 MG 24 hr tablet Take 1 tablet (500 mg total) by mouth 2 (two) times daily with meals.    nitroGLYCERIN (NITROSTAT) 0.4 MG SL tablet SMARTSI Tablet(s) Sublingual PRN    potassium chloride SA (K-DUR,KLOR-CON) 20 MEQ tablet Take 1 tablet (20 mEq total) by mouth once daily.    ranolazine (RANEXA) 500 MG Tb12 Take 500 mg by mouth 2 (two) times daily.    sertraline (ZOLOFT) 25 MG tablet sertraline 25 mg tablet   Take 1 tablet every day by oral route in the morning.    SLOW  mg (45 mg iron) TbSR Take by mouth.    ZYRTEC 10 mg tablet Take 10 mg by mouth daily as needed.     No current facility-administered medications for this visit.     Facility-Administered Medications Ordered in Other Visits   Medication    triamcinolone acetonide injection 40 mg    triamcinolone acetonide injection 40 mg       Review of patient's allergies indicates:   Allergen Reactions    Adhesive Blisters       Family History   Problem Relation Age of Onset    Heart disease Mother     Hypertension Mother     Diabetes Mother     Alzheimer's disease Mother      Alzheimer's disease Father     Heart disease Father         premature    Hypertension Father     Diabetes Sister     Urolithiasis Sister     Alzheimer's disease Sister     Heart disease Paternal Uncle     Cancer Neg Hx     Prostate cancer Neg Hx     Kidney cancer Neg Hx     Melanoma Neg Hx     Lupus Neg Hx     Eczema Neg Hx     Psoriasis Neg Hx        Social History     Socioeconomic History    Marital status:    Tobacco Use    Smoking status: Former     Types: Cigarettes     Quit date: 1976     Years since quittin.1    Smokeless tobacco: Never   Substance and Sexual Activity    Alcohol use: Yes     Comment: Socially    Drug use: No    Sexual activity: Yes     Social Determinants of Health     Financial Resource Strain: Low Risk     Difficulty of Paying Living Expenses: Not hard at all   Food Insecurity: No Food Insecurity    Worried About Running Out of Food in the Last Year: Never true    Ran Out of Food in the Last Year: Never true   Transportation Needs: No Transportation Needs    Lack of Transportation (Medical): No    Lack of Transportation (Non-Medical): No   Physical Activity: Sufficiently Active    Days of Exercise per Week: 7 days    Minutes of Exercise per Session: 30 min   Stress: No Stress Concern Present    Feeling of Stress : Not at all   Social Connections: Moderately Isolated    Frequency of Communication with Friends and Family: More than three times a week    Frequency of Social Gatherings with Friends and Family: More than three times a week    Attends [employer] Services: Never    Active Member of Clubs or Organizations: No    Attends Club or Organization Meetings: Never    Marital Status:    Housing Stability: Low Risk     Unable to Pay for Housing in the Last Year: No    Number of Places Lived in the Last Year: 1    Unstable Housing in the Last Year: No       Chief Complaint:   No chief complaint on file.      History of present illness: Is a 73-year-old male seen for  bilateral knee pain.    No injury or trauma.  Pain laying on it at night.  Pain going up and down stairs.  Pain in the hip is a 6/10.  Using some topical anti-inflammatories and some Tylenol.  Tried Synvisc-One on him back in December.  Did not work very well.      Answers for HPI/ROS submitted by the patient on 11/29/2019   Leg pain  unexpected weight change: No  appetite change : No  sleep disturbance: No  IMMUNOCOMPROMISED: No  nervous/ anxious: No  dysphoric mood: No  rash: No  visual disturbance: No  eye redness: No  eye pain: No  ear pain: No  tinnitus: Yes  hearing loss: Yes  sinus pressure : No  nosebleeds: No  enviro allergies: No  food allergies: No  cough: No  shortness of breath: No  sweating: No  frequency: No  difficulty urinating: No  hematuria: No  chest pain: No  palpitations: No  nausea: No  vomiting: No  diarrhea: No  blood in stool: No  constipation: No  headaches: No  dizziness: No  numbness: No  seizures: No  joint swelling: No  myalgia: No  weakness: No  back pain: No  Pain Chronicity: recurrent  History of trauma: No  Onset: more than 1 month ago  Frequency: daily  Progression since onset: unchanged  injury location: at work  pain- numeric: 3/10  pain location: left knee, right knee  pain quality: aching, sharp  Radiating Pain: No  Aggravating factors: walking  fever: No  inability to bear weight: No  itching: No  joint locking: No  limited range of motion: Yes  stiffness: Yes  tingling: No  Treatments tried: nothing  physical therapy: not tried  Improvement on treatment: significant        Physical Examination:    Vital Signs:    There were no vitals filed for this visit.      There is no height or weight on file to calculate BMI.    This a well-developed, well nourished patient in no acute distress.  They are alert and oriented and cooperative to examination.  Pt. walks without an antalgic gait.      Examination of bilateral knees shows no rashes or erythema. There are no masses ecchymosis  or effusion. Patient has full range of motion from 0-130°. Patient is nontender to palpation over lateral joint line and moderately tender to palpation over the medial joint line. Knee is stable to varus and valgus stress. 5 out of 5 motor strength. Palpable distal pulses. Intact light touch sensation. Negative Patellofemoral crepitus    Examination of the patient's right hip shows full range of motion with flexion to 160°, extension to 0, external rotation to 50°, internal rotation of 15°, abduction of 50°, adduction of 15°. Skin has no rashes or bruising. Patient has negative Stinchfield exam. Patient has negative straight leg raise.Negative internal impingement test. Negative MISHEL test. Negative Tabatha's test. Patient has no pain with hip range of motion.  Mildly tender to palpation over the greater trochanteric bursa. Patient is 5 out of 5 motor strength, palpable distal pulses, and intact light touch sensation.       X-rays: X-rays of both knees are  review which show severe medial joint space narrowing of both knees.  No significant progression  X-rays of the right hip are  reviewed which shows no atypical findings.  Patient does have history of prior lumbar spine surgery.     Assessment:: Severe bilateral varus knee arthritis      Plan:    I injected both knees with cortisone today.  Follow-up for repeat treatments as needed.    The patient has tried a self guided exercise program that has included walking without significant improvement. Minimal relief with tylenol or OTC Nsaids. Reports less than 8 weeks relief with IA steroid injection. Kellgren Guillermo scale of 4. They are not receiving another HA injectable at this time. I will precert for gel injection.       This note was created using  voice recognition software that occasionally misinterpreted phrases or words.    Consult note is delivered via Epic messaging service.

## 2023-03-14 LAB — HEMOCCULT STL QL IA: NEGATIVE

## 2023-04-14 ENCOUNTER — LAB VISIT (OUTPATIENT)
Dept: LAB | Facility: HOSPITAL | Age: 73
End: 2023-04-14
Attending: FAMILY MEDICINE
Payer: COMMERCIAL

## 2023-04-14 ENCOUNTER — TELEPHONE (OUTPATIENT)
Dept: DERMATOLOGY | Facility: CLINIC | Age: 73
End: 2023-04-14
Payer: COMMERCIAL

## 2023-04-14 DIAGNOSIS — E11.65 TYPE 2 DIABETES MELLITUS WITH HYPERGLYCEMIA, WITHOUT LONG-TERM CURRENT USE OF INSULIN: ICD-10-CM

## 2023-04-14 DIAGNOSIS — N18.31 STAGE 3A CHRONIC KIDNEY DISEASE: ICD-10-CM

## 2023-04-14 LAB
ESTIMATED AVG GLUCOSE: 105 MG/DL (ref 68–131)
HBA1C MFR BLD: 5.3 % (ref 4–5.6)

## 2023-04-14 PROCEDURE — 83036 HEMOGLOBIN GLYCOSYLATED A1C: CPT | Performed by: FAMILY MEDICINE

## 2023-04-14 PROCEDURE — 36415 COLL VENOUS BLD VENIPUNCTURE: CPT | Mod: PO | Performed by: FAMILY MEDICINE

## 2023-04-24 ENCOUNTER — OFFICE VISIT (OUTPATIENT)
Dept: FAMILY MEDICINE | Facility: CLINIC | Age: 73
End: 2023-04-24
Payer: COMMERCIAL

## 2023-04-24 VITALS
OXYGEN SATURATION: 97 % | BODY MASS INDEX: 30.14 KG/M2 | TEMPERATURE: 98 F | HEART RATE: 59 BPM | HEIGHT: 70 IN | SYSTOLIC BLOOD PRESSURE: 100 MMHG | WEIGHT: 210.56 LBS | DIASTOLIC BLOOD PRESSURE: 42 MMHG

## 2023-04-24 DIAGNOSIS — D63.8 ANEMIA, CHRONIC DISEASE: ICD-10-CM

## 2023-04-24 DIAGNOSIS — G89.29 CHRONIC RIGHT SHOULDER PAIN: Primary | ICD-10-CM

## 2023-04-24 DIAGNOSIS — E87.1 HYPONATREMIA: ICD-10-CM

## 2023-04-24 DIAGNOSIS — N18.31 STAGE 3A CHRONIC KIDNEY DISEASE: ICD-10-CM

## 2023-04-24 DIAGNOSIS — M25.511 CHRONIC RIGHT SHOULDER PAIN: Primary | ICD-10-CM

## 2023-04-24 PROCEDURE — 3288F PR FALLS RISK ASSESSMENT DOCUMENTED: ICD-10-PCS | Mod: CPTII,S$GLB,, | Performed by: FAMILY MEDICINE

## 2023-04-24 PROCEDURE — 1125F PR PAIN SEVERITY QUANTIFIED, PAIN PRESENT: ICD-10-PCS | Mod: CPTII,S$GLB,, | Performed by: FAMILY MEDICINE

## 2023-04-24 PROCEDURE — 3078F DIAST BP <80 MM HG: CPT | Mod: CPTII,S$GLB,, | Performed by: FAMILY MEDICINE

## 2023-04-24 PROCEDURE — 3044F HG A1C LEVEL LT 7.0%: CPT | Mod: CPTII,S$GLB,, | Performed by: FAMILY MEDICINE

## 2023-04-24 PROCEDURE — 4010F ACE/ARB THERAPY RXD/TAKEN: CPT | Mod: CPTII,S$GLB,, | Performed by: FAMILY MEDICINE

## 2023-04-24 PROCEDURE — 99214 PR OFFICE/OUTPT VISIT, EST, LEVL IV, 30-39 MIN: ICD-10-PCS | Mod: S$GLB,,, | Performed by: FAMILY MEDICINE

## 2023-04-24 PROCEDURE — 1100F PTFALLS ASSESS-DOCD GE2>/YR: CPT | Mod: CPTII,S$GLB,, | Performed by: FAMILY MEDICINE

## 2023-04-24 PROCEDURE — 1125F AMNT PAIN NOTED PAIN PRSNT: CPT | Mod: CPTII,S$GLB,, | Performed by: FAMILY MEDICINE

## 2023-04-24 PROCEDURE — 3078F PR MOST RECENT DIASTOLIC BLOOD PRESSURE < 80 MM HG: ICD-10-PCS | Mod: CPTII,S$GLB,, | Performed by: FAMILY MEDICINE

## 2023-04-24 PROCEDURE — 3008F PR BODY MASS INDEX (BMI) DOCUMENTED: ICD-10-PCS | Mod: CPTII,S$GLB,, | Performed by: FAMILY MEDICINE

## 2023-04-24 PROCEDURE — 3074F SYST BP LT 130 MM HG: CPT | Mod: CPTII,S$GLB,, | Performed by: FAMILY MEDICINE

## 2023-04-24 PROCEDURE — 99214 OFFICE O/P EST MOD 30 MIN: CPT | Mod: S$GLB,,, | Performed by: FAMILY MEDICINE

## 2023-04-24 PROCEDURE — 1159F MED LIST DOCD IN RCRD: CPT | Mod: CPTII,S$GLB,, | Performed by: FAMILY MEDICINE

## 2023-04-24 PROCEDURE — 1160F RVW MEDS BY RX/DR IN RCRD: CPT | Mod: CPTII,S$GLB,, | Performed by: FAMILY MEDICINE

## 2023-04-24 PROCEDURE — 4010F PR ACE/ARB THEARPY RXD/TAKEN: ICD-10-PCS | Mod: CPTII,S$GLB,, | Performed by: FAMILY MEDICINE

## 2023-04-24 PROCEDURE — 1100F PR PT FALLS ASSESS DOC 2+ FALLS/FALL W/INJURY/YR: ICD-10-PCS | Mod: CPTII,S$GLB,, | Performed by: FAMILY MEDICINE

## 2023-04-24 PROCEDURE — 3288F FALL RISK ASSESSMENT DOCD: CPT | Mod: CPTII,S$GLB,, | Performed by: FAMILY MEDICINE

## 2023-04-24 PROCEDURE — 1160F PR REVIEW ALL MEDS BY PRESCRIBER/CLIN PHARMACIST DOCUMENTED: ICD-10-PCS | Mod: CPTII,S$GLB,, | Performed by: FAMILY MEDICINE

## 2023-04-24 PROCEDURE — 1159F PR MEDICATION LIST DOCUMENTED IN MEDICAL RECORD: ICD-10-PCS | Mod: CPTII,S$GLB,, | Performed by: FAMILY MEDICINE

## 2023-04-24 PROCEDURE — 99999 PR PBB SHADOW E&M-EST. PATIENT-LVL V: CPT | Mod: PBBFAC,,, | Performed by: FAMILY MEDICINE

## 2023-04-24 PROCEDURE — 99999 PR PBB SHADOW E&M-EST. PATIENT-LVL V: ICD-10-PCS | Mod: PBBFAC,,, | Performed by: FAMILY MEDICINE

## 2023-04-24 PROCEDURE — 3074F PR MOST RECENT SYSTOLIC BLOOD PRESSURE < 130 MM HG: ICD-10-PCS | Mod: CPTII,S$GLB,, | Performed by: FAMILY MEDICINE

## 2023-04-24 PROCEDURE — 3008F BODY MASS INDEX DOCD: CPT | Mod: CPTII,S$GLB,, | Performed by: FAMILY MEDICINE

## 2023-04-24 PROCEDURE — 3044F PR MOST RECENT HEMOGLOBIN A1C LEVEL <7.0%: ICD-10-PCS | Mod: CPTII,S$GLB,, | Performed by: FAMILY MEDICINE

## 2023-04-24 RX ORDER — EZETIMIBE 10 MG/1
10 TABLET ORAL DAILY
COMMUNITY

## 2023-04-24 RX ORDER — BENZONATATE 100 MG/1
100 CAPSULE ORAL 2 TIMES DAILY PRN
COMMUNITY
Start: 2023-02-22 | End: 2023-11-09 | Stop reason: SDUPTHER

## 2023-04-24 RX ORDER — AMOXICILLIN 500 MG
1 CAPSULE ORAL 3 TIMES DAILY
Start: 2023-04-24 | End: 2023-06-26

## 2023-04-24 NOTE — PATIENT INSTRUCTIONS
DASH diet for Hypertension and Healthy Eating  Provided by the [facility]    Healthy Lifestyle   Nutrition and healthy eating  The DASH diet emphasizes portion size, eating a variety of foods and getting the right amount of nutrients. Discover how DASH can improve your health and lower your blood pressure.  By [facility] Staff   DASH stands for Dietary Approaches to Stop Hypertension. The DASH diet is a lifelong approach to healthy eating that's designed to help treat or prevent high blood pressure (hypertension). The DASH diet encourages you to reduce the sodium in your diet and eat a variety of foods rich in nutrients that help lower blood pressure, such as potassium, calcium and magnesium.  By following the DASH diet, you may be able to reduce your blood pressure by a few points in just two weeks. Over time, your systolic blood pressure could drop by eight to 14 points, which can make a significant difference in your health risks.  Because the DASH diet is a healthy way of eating, it offers health benefits besides just lowering blood pressure. The DASH diet is also in line with dietary recommendations to prevent osteoporosis, cancer, heart disease, stroke and diabetes.  The DASH diet emphasizes vegetables, fruits and low-fat dairy foods -- and moderate amounts of whole grains, fish, poultry and nuts.  In addition to the standard DASH diet, there is also a lower sodium version of the diet. You can choose the version of the diet that meets your health needs:  Standard DASH diet. You can consume up to 2,300 milligrams (mg) of sodium a day.   Lower sodium DASH diet. You can consume up to 1,500 mg of sodium a day.  Both versions of the DASH diet aim to reduce the amount of sodium in your diet compared with what you might get in a typical American diet, which can amount to a whopping 3,400 mg of sodium a day or more.  The standard DASH diet meets the recommendation from the Dietary Guidelines for Americans to keep  "daily sodium intake to less than 2,300 mg a day.  The American Heart Association recommends 1,500 mg a day of sodium as an upper limit for all adults. If you aren't sure what sodium level is right for you, talk to your doctor.  Both versions of the DASH diet include lots of whole grains, fruits, vegetables and low-fat dairy products. The DASH diet also includes some fish, poultry and legumes, and encourages a small amount of nuts and seeds a few times a week.   You can eat red meat, sweets and fats in small amounts. The DASH diet is low in saturated fat, cholesterol and total fat.  Here's a look at the recommended servings from each food group for the 2,737-[id number]-c-day DASH diet.  Grains: 6 to 8 servings a day  Grains include bread, cereal, rice and pasta. Examples of one serving of grains include 1 slice whole-wheat bread, 1 ounce dry cereal, or 1/2 cup cooked cereal, rice or pasta.  Focus on whole grains because they have more fiber and nutrients than do refined grains. For instance, use brown rice instead of white rice, whole-wheat pasta instead of regular pasta and whole-grain bread instead of white bread. Look for products labeled "100 percent whole grain" or "100 percent whole wheat."   Grains are naturally low in fat. Keep them this way by avoiding butter, cream and cheese sauces.  Vegetables: 4 to 5 servings a day  Tomatoes, carrots, broccoli, sweet potatoes, greens and other vegetables are full of fiber, vitamins, and such minerals as potassium and magnesium. Examples of one serving include 1 cup raw leafy green vegetables or 1/2 cup cut-up raw or cooked vegetables.  Don't think of vegetables only as side dishes -- a hearty blend of vegetables served over brown rice or whole-wheat noodles can serve as the main dish for a meal.   Fresh and frozen vegetables are both good choices. When buying frozen and canned vegetables, choose those labeled as low sodium or without added salt.   To increase the number of " servings you fit in daily, be creative. In a stir-rosas, for instance, cut the amount of meat in half and double up on the vegetables.  Fruits: 4 to 5 servings a day  Many fruits need little preparation to become a healthy part of a meal or snack. Like vegetables, they're packed with fiber, potassium and magnesium and are typically low in fat -- coconuts are an exception. Examples of one serving include one medium fruit, 1/2 cup fresh, frozen or canned fruit, or 4 ounces of juice.  Have a piece of fruit with meals and one as a snack, then round out your day with a dessert of fresh fruits topped with a dollop of low-fat yogurt.   Leave on edible peels whenever possible. The peels of apples, pears and most fruits with pits add interesting texture to recipes and contain healthy nutrients and fiber.   Remember that citrus fruits and juices, such as grapefruit, can interact with certain medications, so check with your doctor or pharmacist to see if they're OK for you.   If you choose canned fruit or juice, make sure no sugar is added.  Dairy: 2 to 3 servings a day  Milk, yogurt, cheese and other dairy products are major sources of calcium, vitamin D and protein. But the key is to make sure that you choose dairy products that are low fat or fat-free because otherwise they can be a major source of fat -- and most of it is saturated. Examples of one serving include 1 cup skim or 1 percent milk, 1 cup low fat yogurt, or 1 1/2 ounces part-skim cheese.  Low-fat or fat-free frozen yogurt can help you boost the amount of dairy products you eat while offering a sweet treat. Add fruit for a healthy twist.   If you have trouble digesting dairy products, choose lactose-free products or consider taking an over-the-counter product that contains the enzyme lactase, which can reduce or prevent the symptoms of lactose intolerance.   Go easy on regular and even fat-free cheeses because they are typically high in sodium.  Lean meat, poultry  and fish: 6 servings or fewer a day  Meat can be a rich source of protein, B vitamins, iron and zinc. Choose lean varieties and aim for no more than 6 ounces a day. Cutting back on your meat portion will allow room for more vegetables.  Trim away skin and fat from poultry and meat and then bake, broil, grill or roast instead of frying in fat.   Eat heart-healthy fish, such as salmon, herring and tuna. These types of fish are high in omega-3 fatty acids, which can help lower your total cholesterol.  Nuts, seeds and legumes: 4 to 5 servings a week  Almonds, sunflower seeds, kidney beans, peas, lentils and other foods in this family are good sources of magnesium, potassium and protein. They're also full of fiber and phytochemicals, which are plant compounds that may protect against some cancers and cardiovascular disease.  Serving sizes are small and are intended to be consumed only a few times a week because these foods are high in calories. Examples of one serving include 1/3 cup nuts, 2 tablespoons seeds, or 1/2 cup cooked beans or peas.   Nuts sometimes get a bad rap because of their fat content, but they contain healthy types of fat -- monounsaturated fat and omega-3 fatty acids. They're high in calories, however, so eat them in moderation. Try adding them to stir-fries, salads or cereals.   Soybean-based products, such as tofu and tempeh, can be a good alternative to meat because they contain all of the amino acids your body needs to make a complete protein, just like meat.  Fats and oils: 2 to 3 servings a day  Fat helps your body absorb essential vitamins and helps your body's immune system. But too much fat increases your risk of heart disease, diabetes and obesity. The DASH diet strives for a healthy balance by limiting total fat to less than 30 percent of daily calories from fat, with a focus on the healthier monounsaturated fats.  Examples of one serving include 1 teaspoon soft margarine, 1 tablespoon  mayonnaise or 2 tablespoons salad dressing.  Saturated fat and trans fat are the main dietary culprits in increasing your risk of coronary artery disease. DASH helps keep your daily saturated fat to less than 6 percent of your total calories by limiting use of meat, butter, cheese, whole milk, cream and eggs in your diet, along with foods made from lard, solid shortenings, and palm and coconut oils.   Avoid trans fat, commonly found in such processed foods as crackers, baked goods and fried items.   Read food labels on margarine and salad dressing so that you can choose those that are lowest in saturated fat and free of trans fat.  Sweets: 5 servings or fewer a week  You don't have to banish sweets entirely while following the DASH diet -- just go easy on them. Examples of one serving include 1 tablespoon sugar, jelly or jam, 1/2 cup sorbet, or 1 cup lemonade.  When you eat sweets, choose those that are fat-free or low-fat, such as sorbets, fruit ices, jelly beans, hard candy, pilar crackers or low-fat cookies.   Artificial sweeteners such as aspartame (NutraSweet, Equal) and sucralose (Splenda) may help satisfy your sweet tooth while sparing the sugar. But remember that you still must use them sensibly. It's OK to swap a diet cola for a regular cola, but not in place of a more nutritious beverage such as low-fat milk or even plain water.   Cut back on added sugar, which has no nutritional value but can pack on calories.  Drinking too much alcohol can increase blood pressure. The Dietary Guidelines for Americans recommends that men limit alcohol to no more than two drinks a day and women to one or less.  The DASH diet doesn't address caffeine consumption. The influence of caffeine on blood pressure remains unclear. But caffeine can cause your blood pressure to rise at least temporarily. If you already have high blood pressure or if you think caffeine is affecting your blood pressure, talk to your doctor about your  "caffeine consumption.  While the DASH diet is not a weight-loss program, you may indeed lose unwanted pounds because it can help guide you toward healthier food choices.  The DASH diet generally includes about 2,000 calories a day. If you're trying to lose weight, you may need to eat fewer calories. You may also need to adjust your serving goals based on your individual circumstances -- something your health care team can help you decide.  The foods at the core of the DASH diet are naturally low in sodium. So just by following the DASH diet, you're likely to reduce your sodium intake. You also reduce sodium further by:  Using sodium-free spices or flavorings with your food instead of salt   Not adding salt when cooking rice, pasta or hot cereal   Rinsing canned foods to remove some of the sodium   Buying foods labeled "no salt added," "sodium-free," "low sodium" or "very low sodium"  One teaspoon of table salt has 2,325 mg of sodium. When you read food labels, you may be surprised at just how much sodium some processed foods contain. Even low-fat soups, canned vegetables, ready-to-eat cereals and sliced turkey from the local deli -- foods you may have considered healthy -- often have lots of sodium.  You may notice a difference in taste when you choose low-sodium food and beverages. If things seem too bland, gradually introduce low-sodium foods and cut back on table salt until you reach your sodium goal. That'll give your palate time to adjust.  Using salt-free seasoning blends or herbs and spices may also ease the transition. It can take several weeks for your taste buds to get used to less salty foods.  Try these strategies to get started on the DASH diet:   Change gradually. If you now eat only one or two servings of fruits or vegetables a day, try to add a serving at lunch and one at dinner. Rather than switching to all whole grains, start by making one or two of your grain servings whole grains. Increasing " fruits, vegetables and whole grains gradually can also help prevent bloating or diarrhea that may occur if you aren't used to eating a diet with lots of fiber. You can also try over-the-counter products to help reduce gas from beans and vegetables.   Reward successes and forgive slip-ups. Reward yourself with a nonfood treat for your accomplishments -- rent a movie, purchase a book or get together with a friend. Everyone slips, especially when learning something new. Remember that changing your lifestyle is a long-term process. Find out what triggered your setback and then just  where you left off with the DASH diet.   Add physical activity. To boost your blood pressure lowering efforts even more, consider increasing your physical activity in addition to following the DASH diet. Combining both the DASH diet and physical activity makes it more likely that you'll reduce your blood pressure.   Get support if you need it. If you're having trouble sticking to your diet, talk to your doctor or dietitian about it. You might get some tips that will help you stick to the DASH diet.  Remember, healthy eating isn't an all-or-nothing proposition. What's most important is that, on average, you eat healthier foods with plenty of variety -- both to keep your diet nutritious and to avoid boredom or extremes. And with the DASH diet, you can have both.

## 2023-05-01 NOTE — PROGRESS NOTES
[facility]     Subjective:    Chief Complaint:   Chief Complaint   Patient presents with    Follow-up       History of Present Illness:  73 y.o. male presents for multiple issues.  Anemia: Patient presents for presents evaluation of anemia.He has been through cardiac angiogram and cardiac stenting.  Anemia was found by  chronic fatigue/ incidental CBC. .  It has been present for 4 week.  Associated signs & symptoms: dizziness/lightheadedness, fatigue and history of renal disease.Patient has been improving. He has mild short term dementia but still working at high level. He has behavior problems Irritability, no yelling, no violence . He has to stop driving and to learn how let got.              I reviewed the patients chart dating back for the past few appointments. See above.    The following portions of the patient's history were reviewed and updated as appropriate: allergies, current medications, past family history, past medical history, past social history, past surgical history and problem list.    He denies chest pain upon exertion, dyspnea, nausea, vomiting, diaphoresis, and syncope. No pleuritic chest pain, unilateral leg swelling, calf tenderness, or calf pain.      Past Medical History:   Diagnosis Date    Arthritis     knees, back    Bilateral renal cysts 7/30/2012    CAD (coronary artery disease) 1995    no chest pain since CABG, sees Dr Larry Black    Diabetes mellitus     borderline    Hypertension     Kidney stones     uric acid stones    VASYL (obstructive sleep apnea) 2007    is compliant with CPAP    Primary gonadal failure        Past Surgical History:   Procedure Laterality Date    BACK SURGERY  2010    L5-6 fusion, with pins,bone graft    cardiac stents      2 stents 12/2021 and 1/2022    CARDIAC SURGERY  1995, 2007    total 7 vessel bypass    CAROTID ARTERY ANGIOPLASTY  01/22/2023    1 stent posterior descending    COLONOSCOPY  2008    repeat 2013    COLONOSCOPY N/A 04/04/2017     Procedure: COLONOSCOPY;  Surgeon: Dmitry Sampson MD;  Location: [facility];  Service: Endoscopy;  Laterality: N/A;    CORONARY ARTERY BYPASS GRAFT  ,     cabgx3, cabgx5    EXTRACORPOREAL SHOCK WAVE LITHOTRIPSY      EYE SURGERY      cataracts bilateral    LITHOTRIPSY      LUMBAR LAMINECTOMY      Partial Nephrectomy Laproscopic      TRANSRECTAL BIOPSY OF PROSTATE WITH ULTRASOUND GUIDANCE N/A 2019    Procedure: BIOPSY, PROSTATE, RECTAL APPROACH, WITH US GUIDANCE;  Surgeon: Yovany Heart MD;  Location: [facility];  Service: Urology;  Laterality: N/A;    TRANSRECTAL BIOPSY OF PROSTATE WITH ULTRASOUND GUIDANCE N/A 10/20/2020    Procedure: BIOPSY, PROSTATE, RECTAL APPROACH, WITH US GUIDANCE;  Surgeon: Yovany Heart MD;  Location: [facility];  Service: Urology;  Laterality: N/A;       Social History  Social History     Tobacco Use    Smoking status: Former     Types: Cigarettes     Quit date: 1976     Years since quittin.2    Smokeless tobacco: Never   Substance Use Topics    Alcohol use: Yes     Comment: Socially    Drug use: No       Family History   Problem Relation Age of Onset    Heart disease Mother     Hypertension Mother     Diabetes Mother     Alzheimer's disease Mother     Alzheimer's disease Father     Heart disease Father         premature    Hypertension Father     Diabetes Sister     Urolithiasis Sister     Alzheimer's disease Sister     Heart disease Paternal Uncle     Cancer Neg Hx     Prostate cancer Neg Hx     Kidney cancer Neg Hx     Melanoma Neg Hx     Lupus Neg Hx     Eczema Neg Hx     Psoriasis Neg Hx      Review of patient's allergies indicates:   Allergen Reactions    Sulfa (sulfonamide antibiotics) Hives    Adhesive Blisters       Review of Systems [Negative unless checked off]    General ROS: []fever, []chills, []weight loss, [x]malaise/fatigue.  ENT ROS: []congestion, []rhinorrhea,  []sore throat, []neck pain, []hearing loss.  Ophthalmic ROS:[]blurry vision, [] double  "vision, []photophobia, []eye pain.  Respiratory ROS: []cough, []pleuritic chest pain, []shortness of breath, []wheezing.  CVS ROS:[]chest pain, []dyspnea on exertion, []palpitations, []orthopnea, []leg swelling, []PND.   GI ROS: []nausea, []vomiting, [] epigastric pain, []abd pain, []diarrhea, []constipation, []blood/melena in stool.   Urology ROS:[]dysuria, []frequency, []flank pain,[] trouble voiding, [] hematuria.   MSK ROS: []myalgias, [x]joint pain, []muscular weakness,  [x]back pain, [] falls.   Derm ROS: []pruritis, []rash, []jaundice.  Neurological:[]dizziness,[]numbness,[]loss of consciousness, []weakness  []headaches.   Psych ROS: []hallucinations, [x]depression, []anxiety, [x]suicidal ideation.    Physical Examination  BP (!) 100/42   Pulse (!) 59   Temp 97.9 °F (36.6 °C) (Oral)   Ht 5' 10" (1.778 m)   Wt 95.5 kg (210 lb 8.6 oz)   SpO2 97%   BMI 30.21 kg/m²   Wt Readings from Last 3 Encounters:   04/24/23 95.5 kg (210 lb 8.6 oz)   03/09/23 93.9 kg (207 lb)   02/10/23 94.2 kg (207 lb 10.8 oz)     BP Readings from Last 3 Encounters:   04/24/23 (!) 100/42   02/10/23 128/74   12/08/22 (!) 146/68     Estimated body mass index is 30.21 kg/m² as calculated from the following:    Height as of this encounter: 5' 10" (1.778 m).    Weight as of this encounter: 95.5 kg (210 lb 8.6 oz).     General appearance: alert, cooperative, no distress  Eyes: pupils equal and reactive, extraocular eye movements intact, sclera anicteric, left eye normal, right eye normal, Pale conjunctivae.   Ears: bilateral TM's and external ear canals normal   Nose: normal and patent, no erythema, discharge or polyps   Sinuses: Normal paranasal sinuses without tenderness   Throat: mucous membranes moist, pharynx normal without lesions   Neck: negative, no thyromegaly, trachea midline  Lungs: clear to auscultation, no wheezes, rales or rhonchi, symmetric air entry, no dullness to percussion bilaterally.  Heart: normal rate, regular rhythm, " normal S1, S2, no murmurs, rubs, clicks or gallops, no displacement of the PMI.  Abdomen: soft, nontender, nondistended, no masses or organomegaly No rigidity, rebound, or guarding.   Back: full range of motion, no tenderness, palpable spasm or pain on motion   Extremities: peripheral pulses normal, no pedal edema, no clubbing or cyanosis   Feet: warm, good capillary refill.  Neurological:alert, oriented, normal speech, no focal findings or movement disorder noted, screening mental status exam normal, neck supple without rigidity, cranial nerves II through XII intact, DTR's normal and symmetric, Lower legs weakness.   Psychiatric: alert, oriented to person, place, and time.Dementia: He is here for evaluation and treatment of cognitive problems. He is accompanied by patient and spouse. Primary caregiver is patient and wife. The family and the patient identify problems with changes in short term memory and getting disoriented outside of familiar environment. Family and patient report problems with suspiciousness. Family and patient are concerned about  driving, however, they are not concerned about wandering, cooking and inability to maintain adequate nutrition. Medication administration: caregiver monitors the use of medications          Integument: normal coloration and turgor, no rashes, no suspicious skin lesions noted.    Data reviewed    Previous medical records reviewed and summarized above in HPI. 274}    Laboratory    I have reviewed old labs below:   274}  Lab Results   Component Value Date    WBC 7.98 04/28/2023    HGB 9.8 (L) 04/28/2023    HCT 30.4 (L) 04/28/2023    MCV 97 04/28/2023     04/28/2023     (L) 04/28/2023    K 5.2 (H) 04/28/2023     04/28/2023    CALCIUM 9.5 04/28/2023    PHOS 3.0 06/18/2018    CO2 25 04/28/2023     04/28/2023    BUN 33 (H) 04/28/2023    CREATININE 1.31 04/28/2023    ANIONGAP 7 04/28/2023    ESTGFRAFRICA >60 05/21/2022    EGFRNONAA >60 05/21/2022     PROT 6.1 04/28/2023    ALBUMIN 3.9 04/28/2023    BILITOT 0.7 04/28/2023    ALKPHOS 58 04/28/2023    ALT 16 04/28/2023    AST 16 (L) 04/28/2023    INR 1.2 01/16/2023    CHOL 106 (L) 04/28/2023    TRIG 67 04/28/2023    HDL 45 04/28/2023    LDLCALC 47.6 (L) 04/28/2023    TSH 2.050 04/28/2023    PSA 4.5 (H) 12/14/2018    GLUF 109 11/07/2008    HGBA1C 5.3 04/14/2023       Imaging/Tracing: I have reviewed the pertinent results/findings and my personal findings are below:  274}    Assessment/Plan     274}    Carlos Tenorio Jr. is a 73 y.o. male who presents to clinic with:    1. Chronic right shoulder pain    2. Anemia, chronic disease    3. Hyponatremia    4. Stage 3a chronic kidney disease         Diagnostic impression remarks       1. Chronic right shoulder pain  - Ambulatory referral/consult to Physical/Occupational Therapy; Future    2. Anemia, chronic disease  - Ferritin; Future  - Iron and TIBC; Future  - ERYTHROPOIETIN; Future    3. Hyponatremia  - Sodium, urine, random  - Basic Metabolic Panel; Future    4. Stage 3a chronic kidney disease  - Uric Acid; Future    Patient denies any exertional chest pain, dyspnea, palpitations, syncope, orthopnea, edema or paroxysmal nocturnal dyspnea.    Medication Monitoring: In today's visit, monitoring for drug toxicity was accomplished. Proper use of medications was discussed.     Counseling: Counseling included discussion regarding imaging findings, diagnosis, possibilities, treatment options, medications, risks, and benefits. He had many questions regarding the options and long-term effects. All questions were answered. He expressed understanding after counseling regarding the diagnosis and recommendations. He was capable and demonstrated competence with understanding of these options. Shared decision making was performed resulting in him choosing the current treatment plan.     He was counseled about the importance of healthy dietary habits as well as routine physical  activity and exercise for better health outcomes. I also discussed the importance of cancer screening.     I also discussed the importance of close follow up to discuss labs, change or modify his medications if needed, monitor side effects, and further evaluation of medical problems.     Additional workup planned: see labs ordered below.    See below for labs and meds ordered with associated diagnosis      Medication List with Changes/Refills   Current Medications    ADUHELM 100 MG/ML SOLN    100 mg.    ALLOPURINOL (ZYLOPRIM) 100 MG TABLET    TAKE 1 TABLET(100 MG) BY MOUTH EVERY DAY    AMLODIPINE (NORVASC) 2.5 MG TABLET    Take 1 tablet (2.5 mg total) by mouth every evening.    ASPIRIN (ECOTRIN) 81 MG EC TABLET    Take 81 mg by mouth 3 (three) times daily.    ATORVASTATIN (LIPITOR) 40 MG TABLET    TAKE 1 TABLET(40 MG) BY MOUTH EVERY DAY    BENZONATATE (TESSALON) 100 MG CAPSULE    Take 100 mg by mouth 2 (two) times daily as needed.    CALCIUM 500 + D 500 MG-5 MCG (200 UNIT) PER TABLET    Take 1 tablet by mouth Daily.    CLOPIDOGREL (PLAVIX) 75 MG TABLET    Take 75 mg by mouth once daily.    CYANOCOBALAMIN, VITAMIN B-12, (VITAMIN B-12 ORAL)    Take by mouth.    EZETIMIBE (ZETIA) 10 MG TABLET    Take 1 tablet (10 mg total) by mouth once daily.    EZETIMIBE (ZETIA) 10 MG TABLET    Take 10 mg by mouth once daily.    FERROUS SULFATE (FEOSOL) 325 MG (65 MG IRON) TAB TABLET    Take 325 mg by mouth.    FOLIC ACID (FOLVITE) 1 MG TABLET    TAKE 1 TABLET(1 MG) BY MOUTH EVERY DAY    KETOCONAZOLE (NIZORAL) 2 % CREAM    Apply topically 2 (two) times daily.    LOSARTAN (COZAAR) 100 MG TABLET    TAKE 1 TABLET(100 MG) BY MOUTH EVERY DAY    METFORMIN (GLUCOPHAGE-XR) 500 MG 24 HR TABLET    Take 1 tablet (500 mg total) by mouth 2 (two) times daily with meals.    NITROGLYCERIN (NITROSTAT) 0.4 MG SL TABLET    SMARTSI Tablet(s) Sublingual PRN    POTASSIUM CHLORIDE SA (K-DUR,KLOR-CON) 20 MEQ TABLET    Take 1 tablet (20 mEq total) by  "mouth once daily.    RANOLAZINE (RANEXA) 500 MG TB12    Take 500 mg by mouth 2 (two) times daily.    SLOW  MG (45 MG IRON) TBSR    Take by mouth.    ZYRTEC 10 MG TABLET    Take 10 mg by mouth daily as needed.   Changed and/or Refilled Medications    Modified Medication Previous Medication    OMEGA-3 FATTY ACIDS/FISH OIL (FISH OIL-OMEGA-3 FATTY ACIDS) 300-1,000 MG CAPSULE fish oil-omega-3 fatty acids 300-1,000 mg capsule       Take 1 capsule by mouth 3 (three) times daily.    Take 2 g by mouth 3 (three) times daily.   Discontinued Medications    HYDROCHLOROTHIAZIDE (HYDRODIURIL) 12.5 MG TAB    Take 1 tablet (12.5 mg total) by mouth once daily.    LORAZEPAM (ATIVAN) 0.5 MG TABLET    Take 1 tablet (0.5 mg total) by mouth every 6 (six) hours as needed for Anxiety.    SERTRALINE (ZOLOFT) 25 MG TABLET    sertraline 25 mg tablet   Take 1 tablet every day by oral route in the morning.     Modified Medications    Modified Medication Previous Medication    OMEGA-3 FATTY ACIDS/FISH OIL (FISH OIL-OMEGA-3 FATTY ACIDS) 300-1,000 MG CAPSULE fish oil-omega-3 fatty acids 300-1,000 mg capsule       Take 1 capsule by mouth 3 (three) times daily.    Take 2 g by mouth 3 (three) times daily.       Follow up in about 6 weeks (around 6/5/2023), or labs in 6 weeks. for further workup and reassessment if labs and tests obtained are stable or sooner as needed. He was instructed to call the clinic or go to the emergency department if his symptoms do not improve, worsens, or if new symptoms develop. Patient knows to call any time if an emergency arises. Shared decision making occurred and he verbalized understanding in agreement with this plan.     Documentation entered by me for this encounter may have been done in part using speech-recognition technology. Although I have made an effort to ensure accuracy, "sound like" errors may exist and should be interpreted in context.       Roney Bradshaw MD     Discussed health maintenance " guidelines appropriate for age.  Discussed health maintenance guidelines appropriate for age.  Discussed health maintenance guidelines appropriate for age.

## 2023-05-12 ENCOUNTER — HOSPITAL ENCOUNTER (OUTPATIENT)
Dept: RADIOLOGY | Facility: HOSPITAL | Age: 73
Discharge: HOME | End: 2023-05-12
Attending: UROLOGY
Payer: COMMERCIAL

## 2023-05-12 DIAGNOSIS — N20.0 KIDNEY STONES: ICD-10-CM

## 2023-05-12 PROCEDURE — 74018 RADEX ABDOMEN 1 VIEW: CPT | Mod: TC,FY

## 2023-05-12 PROCEDURE — 74018 RADEX ABDOMEN 1 VIEW: CPT | Mod: 26,,, | Performed by: RADIOLOGY

## 2023-05-12 PROCEDURE — 74018 XR KUB: ICD-10-PCS | Mod: 26,,, | Performed by: RADIOLOGY

## 2023-05-19 ENCOUNTER — OFFICE VISIT (OUTPATIENT)
Dept: UROLOGY | Facility: CLINIC | Age: 73
End: 2023-05-19
Payer: COMMERCIAL

## 2023-05-19 ENCOUNTER — PATIENT MESSAGE (OUTPATIENT)
Dept: ADMINISTRATIVE | Facility: HOSPITAL | Age: 73
End: 2023-05-19
Payer: COMMERCIAL

## 2023-05-19 VITALS
WEIGHT: 201 LBS | BODY MASS INDEX: 30.46 KG/M2 | SYSTOLIC BLOOD PRESSURE: 152 MMHG | HEART RATE: 57 BPM | DIASTOLIC BLOOD PRESSURE: 73 MMHG | HEIGHT: 68 IN

## 2023-05-19 DIAGNOSIS — C61 PROSTATE CANCER: Primary | ICD-10-CM

## 2023-05-19 DIAGNOSIS — Z85.528 HISTORY OF RENAL CELL CANCER: ICD-10-CM

## 2023-05-19 DIAGNOSIS — N43.40 SPERMATOCELE: ICD-10-CM

## 2023-05-19 DIAGNOSIS — N20.0 KIDNEY STONES: ICD-10-CM

## 2023-05-19 LAB
BILIRUBIN, UA POC OHS: NEGATIVE
BLOOD, UA POC OHS: NEGATIVE
CLARITY, UA POC OHS: CLEAR
COLOR, UA POC OHS: YELLOW
GLUCOSE, UA POC OHS: NEGATIVE
KETONES, UA POC OHS: NEGATIVE
LEUKOCYTES, UA POC OHS: ABNORMAL
NITRITE, UA POC OHS: NEGATIVE
PH, UA POC OHS: 6
PROTEIN, UA POC OHS: NEGATIVE
SPECIFIC GRAVITY, UA POC OHS: 1.01
UROBILINOGEN, UA POC OHS: 0.2

## 2023-05-19 PROCEDURE — 3008F BODY MASS INDEX DOCD: CPT | Mod: CPTII,S$GLB,, | Performed by: UROLOGY

## 2023-05-19 PROCEDURE — 99999 PR PBB SHADOW E&M-EST. PATIENT-LVL IV: CPT | Mod: PBBFAC,,, | Performed by: UROLOGY

## 2023-05-19 PROCEDURE — 3008F PR BODY MASS INDEX (BMI) DOCUMENTED: ICD-10-PCS | Mod: CPTII,S$GLB,, | Performed by: UROLOGY

## 2023-05-19 PROCEDURE — 1159F PR MEDICATION LIST DOCUMENTED IN MEDICAL RECORD: ICD-10-PCS | Mod: CPTII,S$GLB,, | Performed by: UROLOGY

## 2023-05-19 PROCEDURE — 1126F PR PAIN SEVERITY QUANTIFIED, NO PAIN PRESENT: ICD-10-PCS | Mod: CPTII,S$GLB,, | Performed by: UROLOGY

## 2023-05-19 PROCEDURE — 3078F PR MOST RECENT DIASTOLIC BLOOD PRESSURE < 80 MM HG: ICD-10-PCS | Mod: CPTII,S$GLB,, | Performed by: UROLOGY

## 2023-05-19 PROCEDURE — 99999 PR PBB SHADOW E&M-EST. PATIENT-LVL IV: ICD-10-PCS | Mod: PBBFAC,,, | Performed by: UROLOGY

## 2023-05-19 PROCEDURE — 1159F MED LIST DOCD IN RCRD: CPT | Mod: CPTII,S$GLB,, | Performed by: UROLOGY

## 2023-05-19 PROCEDURE — 1160F PR REVIEW ALL MEDS BY PRESCRIBER/CLIN PHARMACIST DOCUMENTED: ICD-10-PCS | Mod: CPTII,S$GLB,, | Performed by: UROLOGY

## 2023-05-19 PROCEDURE — 1101F PR PT FALLS ASSESS DOC 0-1 FALLS W/OUT INJ PAST YR: ICD-10-PCS | Mod: CPTII,S$GLB,, | Performed by: UROLOGY

## 2023-05-19 PROCEDURE — 3077F PR MOST RECENT SYSTOLIC BLOOD PRESSURE >= 140 MM HG: ICD-10-PCS | Mod: CPTII,S$GLB,, | Performed by: UROLOGY

## 2023-05-19 PROCEDURE — 99215 PR OFFICE/OUTPT VISIT, EST, LEVL V, 40-54 MIN: ICD-10-PCS | Mod: S$GLB,,, | Performed by: UROLOGY

## 2023-05-19 PROCEDURE — 1160F RVW MEDS BY RX/DR IN RCRD: CPT | Mod: CPTII,S$GLB,, | Performed by: UROLOGY

## 2023-05-19 PROCEDURE — 3044F PR MOST RECENT HEMOGLOBIN A1C LEVEL <7.0%: ICD-10-PCS | Mod: CPTII,S$GLB,, | Performed by: UROLOGY

## 2023-05-19 PROCEDURE — 3288F PR FALLS RISK ASSESSMENT DOCUMENTED: ICD-10-PCS | Mod: CPTII,S$GLB,, | Performed by: UROLOGY

## 2023-05-19 PROCEDURE — 3044F HG A1C LEVEL LT 7.0%: CPT | Mod: CPTII,S$GLB,, | Performed by: UROLOGY

## 2023-05-19 PROCEDURE — 3288F FALL RISK ASSESSMENT DOCD: CPT | Mod: CPTII,S$GLB,, | Performed by: UROLOGY

## 2023-05-19 PROCEDURE — 4010F ACE/ARB THERAPY RXD/TAKEN: CPT | Mod: CPTII,S$GLB,, | Performed by: UROLOGY

## 2023-05-19 PROCEDURE — 81003 URINALYSIS AUTO W/O SCOPE: CPT | Mod: QW,S$GLB,, | Performed by: UROLOGY

## 2023-05-19 PROCEDURE — 99215 OFFICE O/P EST HI 40 MIN: CPT | Mod: S$GLB,,, | Performed by: UROLOGY

## 2023-05-19 PROCEDURE — 3078F DIAST BP <80 MM HG: CPT | Mod: CPTII,S$GLB,, | Performed by: UROLOGY

## 2023-05-19 PROCEDURE — 81003 POCT URINALYSIS(INSTRUMENT): ICD-10-PCS | Mod: QW,S$GLB,, | Performed by: UROLOGY

## 2023-05-19 PROCEDURE — 1101F PT FALLS ASSESS-DOCD LE1/YR: CPT | Mod: CPTII,S$GLB,, | Performed by: UROLOGY

## 2023-05-19 PROCEDURE — 1126F AMNT PAIN NOTED NONE PRSNT: CPT | Mod: CPTII,S$GLB,, | Performed by: UROLOGY

## 2023-05-19 PROCEDURE — 3077F SYST BP >= 140 MM HG: CPT | Mod: CPTII,S$GLB,, | Performed by: UROLOGY

## 2023-05-19 PROCEDURE — 4010F PR ACE/ARB THEARPY RXD/TAKEN: ICD-10-PCS | Mod: CPTII,S$GLB,, | Performed by: UROLOGY

## 2023-05-19 NOTE — PROGRESS NOTES
[facility] Urology Progress Note    Carlos Tenorio Jr. is a 73 y.o. male who presents for f/u prostate ca (on AS), kidney stones, spermatocele, hx RCC    left robotic partial nephrectomy, with concurrent left renal cyst decortication on 8/2/17 for an upper pole 2-3cm exophytic enhancing renal mass, adjacent to larger simple renal cyst.   Pathology: [id number] clear cell RCC. 2.5cm. Negative margins. Grade 2. No sarcomatoid or rhabdoid features. Benign renal cyst  - has completed 3 yrs p/o surveillance  Hx calcium oxalate stones and uric acid stones in the past and has had prior bilateral ESWL 10 years prior.  By report, 24-hr urine 10/2012 had high oxalate and low urine pH. Takes allopurinol.  He did also have large spermatocele on the right, noted to be 5.1 cm on 4/20/17 ultrasound, and previously decreased in size to almost nothing after resuming TRT, which he did at 200mg Q2 wks 1 month after his partial nephrectomy. Has since enlarged. *off TRT since prostate ca dx     For interim doubling of his PSA from 2.2 to 4.4 after first 3 months of TRT with interval rise to 4.9 and recommended prostate biopsy.  TRUS bx 2/27/18. Vol 56.6. Path 14 cores benign. Elected to try Viagra again for his ED, as had not been using it properly/on empty stomach previously. Resumed TRT  6/19/18: PSA 3.9, good energy and libido with TRT and great erections without viagra. 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 No worsening LUTS  PSA then lisa on TRT again to 4.5 in Dec 2018, so send confirmMDx of 2/18 biopsy which was +LTZ indicating 28% chance finding prostate ca (19% low grade, 10% G7+)   3T MRI at [facility] 12/26/18: 58mL prostate with 3mm intravesical extension   PIRAD-3 index lesion: 8x6mm posterior lateral peripheral zone of mid gland on right. 14mm from midline and 2mm from capsule. No evidence of EPE.  - heterogenous on T2 with indistinct margins with no masslike lesion but focal moderately hypointense on ADC, mildly hyperintense on  DWI, and evaluation for masslike hyperemia --> Repeat   prostate biopsy 3/19/19: volume 66.6g. PATHOLOGY: 3+3=6 in 2 out of 17 cores: 5% LM medial, 10% LA lateral  After biopsy, noted AUA SS 1/0. Elected AS for his very low risk CaP. 100mg viagra Rxed to [facility]. Held TRT. Planned annual CaP and RCC surveillance with interim psa  1 yr active surveillance confirmatory prostate biopsy on 10/20/20 Previously positive at LM med, LA lat. Confirm MdX positive at LTZ. MRI with pirad3 lesion RM lateral.  TAHIRA 30-35 g smooth nonnodular with prominence of right apex and mild asymmetry  VOLUME:  59.4cm3. PATH: 2/18 cores + (Left mid lateral: Diomedes score 3+4= 7; 80% (Pattern 4: 10%), 1/2 cores; Left mid medial: [place] score 3+3= 6; 20%, 1/2 cores)  - Still elected AS given low volume 4 component and age and comorbidities     Last seen 5/2022:   In interim has started medical antibody infusion for early-onset Alzheimer's Aduhelm, per Dr Garcia, managed by Dr Gregorio as part of trial which he was accepted into as this is only offered at [facility].  Therefore heme Onc is managing. Also had another interim cardiac stent on 5/10/22, and it was discussed that he needs 2 more.  Dr. Fierro in [place].  AUA symptom score:  5/2, mostly satisfied (2:  Frequency, sleeping; 1:  Urgency). His PSA remains stable at 5.8 on 4/29/22, be 5.0 6 months prior, and 6.0 one year ago  He did receive clearance after his last cardiac stent to have MRI of the prostate about 8 weeks later, and will continue to follow his cardiac status and upcoming interventions.  He does also have MRI of his head pending for further workup and neurologically. No interim kidney stones or flank pain.  X-ray from December 2021 reviewed noting stable lower pole left stones.  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 Short-term memory has improved with recent treatment.  Has also been given p.r.n. s by PCP for anxiety related to his memory     He  "returns today noting  In interim had concerns for enlarging right spermatocele  Scrotal US on 1/31/23:    A 7.7 cm cyst or loculated hydrocele is noted along the lateral testicle.  This has increased in size from the prior study when it measured 6.4 cm.  Memory decline has slowed with aduhelm.  There was concern about his renal function by being on this medication, and reassurance provided there was minimal decline, which has normalized as per review of his renal function below with last cr 1.2/eGFR >60. He is still working instruciting at [employer]  PSA stable at 5.2  KUB 5/12/23 without distinct visualization of stones previously visualized on prior KUB  - on personal review of KUB, no distinct radiopacity seen, and review of KUB previously had very small lower pole stone  AUA symptom score:  3/2 (2: Sleeping; 1: Frequency)    Focused Physical Exam:    Vitals:    05/19/23 0911   BP: (!) 152/73   Pulse: (!) 57     Body mass index is 30.56 kg/m². Weight: 91.2 kg (201 lb) Height: 5' 8" (172.7 cm)     Abdomen: Soft, non-tender, nondistended, no CVA tenderness  :  Circ normal phallus with bilaterally descended testes, with right testis displaced centrally in the scrotum with significant spermatocele/epididymal cyst displacing testes centrally, noted superolateral to the testis, nontender to palpation, larger than the testis itself.    Recent Results (from the past 336 hour(s))   Prostate Specific Antigen, Diagnostic    Collection Time: 05/12/23  7:14 AM   Result Value Ref Range    PSA Diagnostic 5.2 (H) 0.00 - 4.00 ng/mL   Basic Metabolic Panel    Collection Time: 05/12/23  7:14 AM   Result Value Ref Range    Sodium 138 136 - 145 mmol/L    Potassium 4.6 3.5 - 5.1 mmol/L    Chloride 106 95 - 110 mmol/L    CO2 24 23 - 29 mmol/L    Glucose 104 70 - 110 mg/dL    BUN 16 8 - 23 mg/dL    Creatinine 1.2 0.5 - 1.4 mg/dL    Calcium 9.6 8.7 - 10.5 mg/dL    Anion Gap 8 8 - 16 mmol/L    eGFR >60 >60 mL/min/1.73 m^2   POCT " Urinalysis(Instrument)    Collection Time: 05/19/23  9:26 AM   Result Value Ref Range    Color, POC UA Yellow Yellow, Straw, Colorless    Clarity, POC UA Clear Clear    Glucose, POC UA Negative Negative    Bilirubin, POC UA Negative Negative    Ketones, POC UA Negative Negative    Spec Grav POC UA 1.015 1.005 - 1.030    Blood, POC UA Negative Negative    pH, POC UA 6.0 5.0 - 8.0    Protein, POC UA Negative Negative    Urobilinogen, POC UA 0.2 <=1.0    Nitrite, POC UA Negative Negative    WBC, POC UA Trace (A) Negative      Reference Range 10/28/22 09:42 11/23/22 08:11 12/22/22 08:58 01/11/23 16:06 01/16/23 12:26 04/28/23 08:35 05/12/23 07:14   Creatinine 0.5 - 1.4  1.2 1.3 1.2 1.2 1.28 1.31 1.2   eGFR >60  >60.0 58.4 ! >60.0 >60 59 ! 57 ! >60      Reference Range  04/29/22 12:12 12/01/22 14:52 05/12/23 07:14   PSA Diagnostic 0.00 - 4.00 ng/mL 5.8 (H) 5.7 (H) 5.2 (H)       ASSESSMENT   1. Prostate cancer  POCT Urinalysis(Instrument)    Prostate Specific Antigen, Diagnostic    Creatinine, Serum    MRI Prostate W W/O Contrast      2. Kidney stones        3. History of renal cell cancer        4. Spermatocele            Plan    Regards to his prostate cancer, he has been stable on active surveillance.  Despite mild grading to Diomedes 7 with minimal Diomedes 4 component, with his age and comorbidities elected to remain on active surveillance and PSA has remained completely stable.  We did again review typical active surveillance recommendations of PSA every 6 months and alternating evaluations with MRI and biopsy and spacing these evaluations out every 2-3 years if PSA is stable, intervening sooner in the case of PSA rise.  Given interval since last biopsy, did recommend MRI.  He was in the process of cardiac stenting, and when I saw him last year had just been stented and was due for another.  He does have his stent implant cards with him today which note that they are MRI conditional an MRI safe, and he was cleared  previously by Dr. Fierro to have MRI at least 8 weeks after placement of stent.  As the stents are now stable and he has MRI safety card, discuss that MRI of prostate would be the next point of evaluation and active surveillance given stable PSA.  He elected to have his MRI done at the time of his next six-month PSA.  Assuming that it is stable, and PSA is stable, could continue annual follow-up with Q six-month PSA.    Again reviewed his longstanding right spermatocele, seen on ultrasound recently to be enlarging from previous consistent with his suspicion.  He has no pain or discomfort, just is bothered by it but again had risk benefit discussion about elective surgical intervention versus continued observation and scrotal support.  After extensive review will continue supportive and conservative measures and defer surgical intervention.    KUB reviewed as above without any significant stones.  Reviewed basic stone prevention recommendations.    As noted above completed initial 3 years of screening for T1 fully resected renal cell carcinoma.  No routine screening    Will chart check MRI and labs in 6 months, and if all stable on surveillance, could see 1 year later with PSA every 6 months    Level of Medical Decision Making  [ X ]  High: chronic with exacerbation/progression or trtmnt side effect vs acute/chronic w/ life/body threat ---  (2/3) review record/result/order test x3 OR independent interp of ouutside test OR discuss mgmt of outside ordered test --- drug with monitoring for toxicity, plan major surgery, hospitalize, deescalate/withdraw care bc of prognosis

## 2023-05-24 NOTE — ASSESSMENT & PLAN NOTE
Chronic, controlled.  Latest blood pressure and vitals reviewed-   Temp:  [96.6 °F (35.9 °C)-98.4 °F (36.9 °C)]   Pulse:  [52-61]   Resp:  [16-18]   BP: (127-166)/(59-80)   SpO2:  [95 %-99 %] .   Home meds for hypertension were reviewed and noted below. Hospital anti-hypertensive changes were made as shown below.  Hypertension Medications at home            furosemide (LASIX) 20 MG tablet Take 1 tablet (20 mg total) by mouth once daily.    losartan (COZAAR) 100 MG tablet Take 1 tablet (100 mg total) by mouth once daily.      Hospital Medications             furosemide tablet 20 mg 20 mg, Oral, Daily    losartan tablet 100 mg 100 mg, Oral, Daily        Will utilize p.r.n. blood pressure medication only if patient's blood pressure greater than  180/110 and he develops symptoms such as worsening chest pain or shortness of breath.      
Patient's FSGs are controlled on current hypoglycemics.   Last A1c reviewed-   Lab Results   Component Value Date    HGBA1C 5.8 (H) 07/31/2020     Most recent fingerstick glucose reviewed-   Recent Labs   Lab 11/19/20  1613 11/19/20 2050 11/20/20  0422   POCTGLUCOSE 171* 120* 130*     Current correctional scale  Low  Maintain anti-hyperglycemic dose as follows-   Antihyperglycemics (From admission, onward)    Start     Stop Route Frequency Ordered    11/19/20 1456  insulin aspart U-100 pen 0-5 Units      -- SubQ Before meals & nightly PRN 11/19/20 1456        Hold Oral hypoglycemics while patient is in the hospital.      
Unsure of etiology.  CT brain unremarkable.  Labwork so far is unremarkable.  Will consult with neurology for assistance.  Unlikely to be medication-related.  Denies recreational drug use or excessive alcohol.  
114

## 2023-06-01 ENCOUNTER — LAB VISIT (OUTPATIENT)
Dept: LAB | Facility: HOSPITAL | Age: 73
End: 2023-06-01
Attending: FAMILY MEDICINE
Payer: COMMERCIAL

## 2023-06-01 DIAGNOSIS — E87.1 HYPONATREMIA: ICD-10-CM

## 2023-06-01 DIAGNOSIS — D63.8 ANEMIA, CHRONIC DISEASE: ICD-10-CM

## 2023-06-01 DIAGNOSIS — N18.31 STAGE 3A CHRONIC KIDNEY DISEASE: ICD-10-CM

## 2023-06-01 LAB
ALBUMIN SERPL BCP-MCNC: 3.8 G/DL (ref 3.5–5.2)
ANION GAP SERPL CALC-SCNC: 11 MMOL/L (ref 8–16)
ANION GAP SERPL CALC-SCNC: 11 MMOL/L (ref 8–16)
BASOPHILS # BLD AUTO: 0.1 K/UL (ref 0–0.2)
BASOPHILS NFR BLD: 1 % (ref 0–1.9)
BUN SERPL-MCNC: 13 MG/DL (ref 8–23)
BUN SERPL-MCNC: 13 MG/DL (ref 8–23)
CALCIUM SERPL-MCNC: 10 MG/DL (ref 8.7–10.5)
CALCIUM SERPL-MCNC: 10 MG/DL (ref 8.7–10.5)
CHLORIDE SERPL-SCNC: 105 MMOL/L (ref 95–110)
CHLORIDE SERPL-SCNC: 105 MMOL/L (ref 95–110)
CO2 SERPL-SCNC: 23 MMOL/L (ref 23–29)
CO2 SERPL-SCNC: 23 MMOL/L (ref 23–29)
CREAT SERPL-MCNC: 0.9 MG/DL (ref 0.5–1.4)
CREAT SERPL-MCNC: 0.9 MG/DL (ref 0.5–1.4)
DIFFERENTIAL METHOD: ABNORMAL
EOSINOPHIL # BLD AUTO: 0.3 K/UL (ref 0–0.5)
EOSINOPHIL NFR BLD: 2.6 % (ref 0–8)
ERYTHROCYTE [DISTWIDTH] IN BLOOD BY AUTOMATED COUNT: 13.2 % (ref 11.5–14.5)
EST. GFR  (NO RACE VARIABLE): >60 ML/MIN/1.73 M^2
EST. GFR  (NO RACE VARIABLE): >60 ML/MIN/1.73 M^2
FERRITIN SERPL-MCNC: 312 NG/ML (ref 20–300)
GLUCOSE SERPL-MCNC: 95 MG/DL (ref 70–110)
GLUCOSE SERPL-MCNC: 95 MG/DL (ref 70–110)
HCT VFR BLD AUTO: 33.7 % (ref 40–54)
HGB BLD-MCNC: 10.7 G/DL (ref 14–18)
IMM GRANULOCYTES # BLD AUTO: 0.05 K/UL (ref 0–0.04)
IMM GRANULOCYTES NFR BLD AUTO: 0.5 % (ref 0–0.5)
IRON SERPL-MCNC: 67 UG/DL (ref 45–160)
IRON SERPL-MCNC: 67 UG/DL (ref 45–160)
LYMPHOCYTES # BLD AUTO: 2.5 K/UL (ref 1–4.8)
LYMPHOCYTES NFR BLD: 24 % (ref 18–48)
MCH RBC QN AUTO: 31.6 PG (ref 27–31)
MCHC RBC AUTO-ENTMCNC: 31.8 G/DL (ref 32–36)
MCV RBC AUTO: 99 FL (ref 82–98)
MONOCYTES # BLD AUTO: 1.2 K/UL (ref 0.3–1)
MONOCYTES NFR BLD: 11.1 % (ref 4–15)
NEUTROPHILS # BLD AUTO: 6.3 K/UL (ref 1.8–7.7)
NEUTROPHILS NFR BLD: 60.8 % (ref 38–73)
NRBC BLD-RTO: 0 /100 WBC
PHOSPHATE SERPL-MCNC: 3.2 MG/DL (ref 2.7–4.5)
PLATELET # BLD AUTO: 327 K/UL (ref 150–450)
PMV BLD AUTO: 10.7 FL (ref 9.2–12.9)
POTASSIUM SERPL-SCNC: 4.1 MMOL/L (ref 3.5–5.1)
POTASSIUM SERPL-SCNC: 4.1 MMOL/L (ref 3.5–5.1)
PTH-INTACT SERPL-MCNC: 58.7 PG/ML (ref 9–77)
RBC # BLD AUTO: 3.39 M/UL (ref 4.6–6.2)
RETICS/RBC NFR AUTO: 1.7 % (ref 0.4–2)
SATURATED IRON: 24 % (ref 20–50)
SATURATED IRON: 24 % (ref 20–50)
SODIUM SERPL-SCNC: 139 MMOL/L (ref 136–145)
SODIUM SERPL-SCNC: 139 MMOL/L (ref 136–145)
TOTAL IRON BINDING CAPACITY: 284 UG/DL (ref 250–450)
TOTAL IRON BINDING CAPACITY: 284 UG/DL (ref 250–450)
TRANSFERRIN SERPL-MCNC: 192 MG/DL (ref 200–375)
TRANSFERRIN SERPL-MCNC: 192 MG/DL (ref 200–375)
URATE SERPL-MCNC: 4.3 MG/DL (ref 3.4–7)
WBC # BLD AUTO: 10.35 K/UL (ref 3.9–12.7)

## 2023-06-01 PROCEDURE — 82668 ASSAY OF ERYTHROPOIETIN: CPT | Performed by: FAMILY MEDICINE

## 2023-06-01 PROCEDURE — 84550 ASSAY OF BLOOD/URIC ACID: CPT | Performed by: FAMILY MEDICINE

## 2023-06-01 PROCEDURE — 80069 RENAL FUNCTION PANEL: CPT | Performed by: FAMILY MEDICINE

## 2023-06-01 PROCEDURE — 82728 ASSAY OF FERRITIN: CPT | Performed by: FAMILY MEDICINE

## 2023-06-01 PROCEDURE — 83970 ASSAY OF PARATHORMONE: CPT | Performed by: FAMILY MEDICINE

## 2023-06-01 PROCEDURE — 36415 COLL VENOUS BLD VENIPUNCTURE: CPT | Mod: PO | Performed by: FAMILY MEDICINE

## 2023-06-01 PROCEDURE — 85045 AUTOMATED RETICULOCYTE COUNT: CPT | Performed by: FAMILY MEDICINE

## 2023-06-01 PROCEDURE — 85025 COMPLETE CBC W/AUTO DIFF WBC: CPT | Performed by: FAMILY MEDICINE

## 2023-06-01 PROCEDURE — 84466 ASSAY OF TRANSFERRIN: CPT | Performed by: FAMILY MEDICINE

## 2023-06-05 ENCOUNTER — TELEPHONE (OUTPATIENT)
Dept: FAMILY MEDICINE | Facility: CLINIC | Age: 73
End: 2023-06-05
Payer: COMMERCIAL

## 2023-06-05 LAB
EPO SERPL-ACNC: 9.4 MIU/ML (ref 2.6–18.5)
EPO SERPL-ACNC: 9.4 MIU/ML (ref 2.6–18.5)

## 2023-06-05 NOTE — TELEPHONE ENCOUNTER
----- Message from Roney Bradshaw MD sent at 6/4/2023 10:10 PM CDT -----  I note some minor lab abnormalities that have been stable over time, these are of doubtful clinical significance.   There is an improved results lcontinue same medication.  She reported

## 2023-06-05 NOTE — PROGRESS NOTES
I note some minor lab abnormalities that have been stable over time, these are of doubtful clinical significance.   There is an improved results lcontinue same medication.  She reported

## 2023-06-06 ENCOUNTER — PATIENT MESSAGE (OUTPATIENT)
Dept: FAMILY MEDICINE | Facility: CLINIC | Age: 73
End: 2023-06-06
Payer: COMMERCIAL

## 2023-06-12 ENCOUNTER — PATIENT MESSAGE (OUTPATIENT)
Dept: ADMINISTRATIVE | Facility: OTHER | Age: 73
End: 2023-06-12
Payer: COMMERCIAL

## 2023-06-20 ENCOUNTER — OFFICE VISIT (OUTPATIENT)
Dept: DERMATOLOGY | Facility: CLINIC | Age: 73
End: 2023-06-20
Payer: COMMERCIAL

## 2023-06-20 DIAGNOSIS — L81.4 LENTIGO: ICD-10-CM

## 2023-06-20 DIAGNOSIS — L21.9 SEBORRHEIC DERMATITIS: ICD-10-CM

## 2023-06-20 DIAGNOSIS — L57.8 ACTINIC SKIN DAMAGE: ICD-10-CM

## 2023-06-20 DIAGNOSIS — L82.1 SEBORRHEIC KERATOSES: ICD-10-CM

## 2023-06-20 DIAGNOSIS — L57.0 ACTINIC KERATOSIS: Primary | ICD-10-CM

## 2023-06-20 PROCEDURE — 1160F PR REVIEW ALL MEDS BY PRESCRIBER/CLIN PHARMACIST DOCUMENTED: ICD-10-PCS | Mod: CPTII,S$GLB,, | Performed by: STUDENT IN AN ORGANIZED HEALTH CARE EDUCATION/TRAINING PROGRAM

## 2023-06-20 PROCEDURE — 1101F PT FALLS ASSESS-DOCD LE1/YR: CPT | Mod: CPTII,S$GLB,, | Performed by: STUDENT IN AN ORGANIZED HEALTH CARE EDUCATION/TRAINING PROGRAM

## 2023-06-20 PROCEDURE — 17003 DESTRUCT PREMALG LES 2-14: CPT | Mod: S$GLB,,, | Performed by: STUDENT IN AN ORGANIZED HEALTH CARE EDUCATION/TRAINING PROGRAM

## 2023-06-20 PROCEDURE — 4010F PR ACE/ARB THEARPY RXD/TAKEN: ICD-10-PCS | Mod: CPTII,S$GLB,, | Performed by: STUDENT IN AN ORGANIZED HEALTH CARE EDUCATION/TRAINING PROGRAM

## 2023-06-20 PROCEDURE — 1126F AMNT PAIN NOTED NONE PRSNT: CPT | Mod: CPTII,S$GLB,, | Performed by: STUDENT IN AN ORGANIZED HEALTH CARE EDUCATION/TRAINING PROGRAM

## 2023-06-20 PROCEDURE — 17003 DESTRUCTION, PREMALIGNANT LESIONS; SECOND THROUGH 14 LESIONS: ICD-10-PCS | Mod: S$GLB,,, | Performed by: STUDENT IN AN ORGANIZED HEALTH CARE EDUCATION/TRAINING PROGRAM

## 2023-06-20 PROCEDURE — 17000 PR DESTRUCTION(LASER SURGERY,CRYOSURGERY,CHEMOSURGERY),PREMALIGNANT LESIONS,FIRST LESION: ICD-10-PCS | Mod: S$GLB,,, | Performed by: STUDENT IN AN ORGANIZED HEALTH CARE EDUCATION/TRAINING PROGRAM

## 2023-06-20 PROCEDURE — 4010F ACE/ARB THERAPY RXD/TAKEN: CPT | Mod: CPTII,S$GLB,, | Performed by: STUDENT IN AN ORGANIZED HEALTH CARE EDUCATION/TRAINING PROGRAM

## 2023-06-20 PROCEDURE — 1126F PR PAIN SEVERITY QUANTIFIED, NO PAIN PRESENT: ICD-10-PCS | Mod: CPTII,S$GLB,, | Performed by: STUDENT IN AN ORGANIZED HEALTH CARE EDUCATION/TRAINING PROGRAM

## 2023-06-20 PROCEDURE — 1159F MED LIST DOCD IN RCRD: CPT | Mod: CPTII,S$GLB,, | Performed by: STUDENT IN AN ORGANIZED HEALTH CARE EDUCATION/TRAINING PROGRAM

## 2023-06-20 PROCEDURE — 17000 DESTRUCT PREMALG LESION: CPT | Mod: S$GLB,,, | Performed by: STUDENT IN AN ORGANIZED HEALTH CARE EDUCATION/TRAINING PROGRAM

## 2023-06-20 PROCEDURE — 1159F PR MEDICATION LIST DOCUMENTED IN MEDICAL RECORD: ICD-10-PCS | Mod: CPTII,S$GLB,, | Performed by: STUDENT IN AN ORGANIZED HEALTH CARE EDUCATION/TRAINING PROGRAM

## 2023-06-20 PROCEDURE — 3288F PR FALLS RISK ASSESSMENT DOCUMENTED: ICD-10-PCS | Mod: CPTII,S$GLB,, | Performed by: STUDENT IN AN ORGANIZED HEALTH CARE EDUCATION/TRAINING PROGRAM

## 2023-06-20 PROCEDURE — 3044F HG A1C LEVEL LT 7.0%: CPT | Mod: CPTII,S$GLB,, | Performed by: STUDENT IN AN ORGANIZED HEALTH CARE EDUCATION/TRAINING PROGRAM

## 2023-06-20 PROCEDURE — 1160F RVW MEDS BY RX/DR IN RCRD: CPT | Mod: CPTII,S$GLB,, | Performed by: STUDENT IN AN ORGANIZED HEALTH CARE EDUCATION/TRAINING PROGRAM

## 2023-06-20 PROCEDURE — 3044F PR MOST RECENT HEMOGLOBIN A1C LEVEL <7.0%: ICD-10-PCS | Mod: CPTII,S$GLB,, | Performed by: STUDENT IN AN ORGANIZED HEALTH CARE EDUCATION/TRAINING PROGRAM

## 2023-06-20 PROCEDURE — 99214 PR OFFICE/OUTPT VISIT, EST, LEVL IV, 30-39 MIN: ICD-10-PCS | Mod: 25,S$GLB,, | Performed by: STUDENT IN AN ORGANIZED HEALTH CARE EDUCATION/TRAINING PROGRAM

## 2023-06-20 PROCEDURE — 99214 OFFICE O/P EST MOD 30 MIN: CPT | Mod: 25,S$GLB,, | Performed by: STUDENT IN AN ORGANIZED HEALTH CARE EDUCATION/TRAINING PROGRAM

## 2023-06-20 PROCEDURE — 1101F PR PT FALLS ASSESS DOC 0-1 FALLS W/OUT INJ PAST YR: ICD-10-PCS | Mod: CPTII,S$GLB,, | Performed by: STUDENT IN AN ORGANIZED HEALTH CARE EDUCATION/TRAINING PROGRAM

## 2023-06-20 PROCEDURE — 3288F FALL RISK ASSESSMENT DOCD: CPT | Mod: CPTII,S$GLB,, | Performed by: STUDENT IN AN ORGANIZED HEALTH CARE EDUCATION/TRAINING PROGRAM

## 2023-06-20 RX ORDER — KETOCONAZOLE 20 MG/G
CREAM TOPICAL 2 TIMES DAILY
Qty: 60 G | Refills: 4 | Status: SHIPPED | OUTPATIENT
Start: 2023-06-20 | End: 2023-11-07

## 2023-06-20 RX ORDER — TRIAMCINOLONE ACETONIDE 0.25 MG/G
CREAM TOPICAL 2 TIMES DAILY
Qty: 80 G | Refills: 0 | Status: SHIPPED | OUTPATIENT
Start: 2023-06-20 | End: 2024-03-07

## 2023-06-20 NOTE — PROGRESS NOTES
Subjective:      Patient ID:  Carlos Tenorio Jr. is a 73 y.o. male who presents for   Chief Complaint   Patient presents with    Follow-up     Follow up; rash     LOV  11/08/2022    Patient here today for follow up for rash on both ears and neck; states that rash has improved; states he still using the Keto cream and cream is helping.        Final Pathologic Diagnosis Skin, right posterior scalp, shave biopsy:   -GRANULOMATOUS INFLAMMATION, see comment      DERM HX:  Denies PHX of NMSC  Denies FHX of MM            Review of Systems   Constitutional:  Negative for fever, chills and fatigue.   Respiratory:  Negative for cough and shortness of breath.    Gastrointestinal:  Negative for nausea and vomiting.   Skin:  Positive for dry skin, activity-related sunscreen use and wears hat. Negative for itching, rash and daily sunscreen use.   Hematologic/Lymphatic: Bruises/bleeds easily (asa, plavix).     Objective:   Physical Exam   Constitutional: He appears well-developed and well-nourished. No distress.   Neurological: He is alert and oriented to person, place, and time. He is not disoriented.   Psychiatric: He has a normal mood and affect.   Skin:   Areas Examined (abnormalities noted in diagram):   Scalp / Hair Palpated and Inspected  Head / Face Inspection Performed  Neck Inspection Performed  Chest / Axilla Inspection Performed  Abdomen Inspection Performed  Back Inspection Performed  RUE Inspected  LUE Inspection Performed  Nails and Digits Inspection Performed                   Diagram Legend     Erythematous scaling macule/papule c/w actinic keratosis       Vascular papule c/w angioma      Pigmented verrucoid papule/plaque c/w seborrheic keratosis      Yellow umbilicated papule c/w sebaceous hyperplasia      Irregularly shaped tan macule c/w lentigo     1-2 mm smooth white papules consistent with Milia      Movable subcutaneous cyst with punctum c/w epidermal inclusion cyst      Subcutaneous movable cyst c/w  pilar cyst      Firm pink to brown papule c/w dermatofibroma      Pedunculated fleshy papule(s) c/w skin tag(s)      Evenly pigmented macule c/w junctional nevus     Mildly variegated pigmented, slightly irregular-bordered macule c/w mildly atypical nevus      Flesh colored to evenly pigmented papule c/w intradermal nevus       Pink pearly papule/plaque c/w basal cell carcinoma      Erythematous hyperkeratotic cursted plaque c/w SCC      Surgical scar with no sign of skin cancer recurrence      Open and closed comedones      Inflammatory papules and pustules      Verrucoid papule consistent consistent with wart     Erythematous eczematous patches and plaques     Dystrophic onycholytic nail with subungual debris c/w onychomycosis     Umbilicated papule    Erythematous-base heme-crusted tan verrucoid plaque consistent with inflamed seborrheic keratosis     Erythematous Silvery Scaling Plaque c/w Psoriasis     See annotation      Assessment / Plan:        Actinic keratosis  Cryosurgery Procedure Note    Verbal consent from the patient is obtained and the patient is aware of the precancerous quality and need for treatment of these lesions. Liquid nitrogen cryosurgery is applied to the 11 actinic keratoses, as detailed in the physical exam, to produce a freeze injury. The patient is aware that blisters may form and is instructed on wound care with gentle cleansing and use of vaseline ointment to keep moist until healed. The patient is supplied a handout on cryosurgery and is instructed to call if lesions do not completely resolve.  Efudex on scalp at f/u    Actinic skin damage  Upper body skin examination performed today including at least 9 points as noted in physical examination. No lesions suspicious for malignancy noted.  Patient instructed in importance in daily broad spectrum sun protection of at least spf 30. Mineral sunscreen ingredients preferred (Zinc +/- Titanium) and can be found OTC.   Recommend Elta MD for  daily use on face and neck.  Patient encouraged to wear hat for all outdoor exposure.   Also discussed sun avoidance and use of protective clothing.    Seborrheic dermatitis  -     triamcinolone acetonide 0.025% (KENALOG) 0.025 % cream; Apply topically 2 (two) times daily.  Dispense: 80 g; Refill: 0  -     ketoconazole (NIZORAL) 2 % cream; Apply topically 2 (two) times daily.  Dispense: 60 g; Refill: 4  Add tac cream  Ears are better  Eruption is persistent on neck    Lentigo  This is a benign hyperpigmented sun induced lesion. Daily sun protection will reduce the number of new lesions. Treatment of these benign lesions are considered cosmetic.    Seborrheic keratoses  These are benign inherited growths without a malignant potential. Reassurance given to patient. No treatment is necessary.            6 months  No follow-ups on file.

## 2023-06-20 NOTE — PATIENT INSTRUCTIONS

## 2023-06-26 ENCOUNTER — OFFICE VISIT (OUTPATIENT)
Dept: ORTHOPEDICS | Facility: CLINIC | Age: 73
End: 2023-06-26
Payer: COMMERCIAL

## 2023-06-26 VITALS — HEIGHT: 68 IN | WEIGHT: 201 LBS | RESPIRATION RATE: 18 BRPM | BODY MASS INDEX: 30.46 KG/M2

## 2023-06-26 DIAGNOSIS — M17.0 PRIMARY OSTEOARTHRITIS OF BOTH KNEES: Primary | ICD-10-CM

## 2023-06-26 DIAGNOSIS — M75.100 ROTATOR CUFF SYNDROME, UNSPECIFIED LATERALITY: ICD-10-CM

## 2023-06-26 PROCEDURE — 3044F HG A1C LEVEL LT 7.0%: CPT | Mod: CPTII,S$GLB,, | Performed by: ORTHOPAEDIC SURGERY

## 2023-06-26 PROCEDURE — 3044F PR MOST RECENT HEMOGLOBIN A1C LEVEL <7.0%: ICD-10-PCS | Mod: CPTII,S$GLB,, | Performed by: ORTHOPAEDIC SURGERY

## 2023-06-26 PROCEDURE — 20610 LARGE JOINT ASPIRATION/INJECTION: BILATERAL KNEE: ICD-10-PCS | Mod: 50,S$GLB,, | Performed by: ORTHOPAEDIC SURGERY

## 2023-06-26 PROCEDURE — 99999 PR PBB SHADOW E&M-EST. PATIENT-LVL III: ICD-10-PCS | Mod: PBBFAC,,, | Performed by: ORTHOPAEDIC SURGERY

## 2023-06-26 PROCEDURE — 1159F PR MEDICATION LIST DOCUMENTED IN MEDICAL RECORD: ICD-10-PCS | Mod: CPTII,S$GLB,, | Performed by: ORTHOPAEDIC SURGERY

## 2023-06-26 PROCEDURE — 4010F ACE/ARB THERAPY RXD/TAKEN: CPT | Mod: CPTII,S$GLB,, | Performed by: ORTHOPAEDIC SURGERY

## 2023-06-26 PROCEDURE — 20610 DRAIN/INJ JOINT/BURSA W/O US: CPT | Mod: 50,S$GLB,, | Performed by: ORTHOPAEDIC SURGERY

## 2023-06-26 PROCEDURE — 1160F RVW MEDS BY RX/DR IN RCRD: CPT | Mod: CPTII,S$GLB,, | Performed by: ORTHOPAEDIC SURGERY

## 2023-06-26 PROCEDURE — 1160F PR REVIEW ALL MEDS BY PRESCRIBER/CLIN PHARMACIST DOCUMENTED: ICD-10-PCS | Mod: CPTII,S$GLB,, | Performed by: ORTHOPAEDIC SURGERY

## 2023-06-26 PROCEDURE — 3008F BODY MASS INDEX DOCD: CPT | Mod: CPTII,S$GLB,, | Performed by: ORTHOPAEDIC SURGERY

## 2023-06-26 PROCEDURE — 3008F PR BODY MASS INDEX (BMI) DOCUMENTED: ICD-10-PCS | Mod: CPTII,S$GLB,, | Performed by: ORTHOPAEDIC SURGERY

## 2023-06-26 PROCEDURE — 99214 PR OFFICE/OUTPT VISIT, EST, LEVL IV, 30-39 MIN: ICD-10-PCS | Mod: 25,S$GLB,, | Performed by: ORTHOPAEDIC SURGERY

## 2023-06-26 PROCEDURE — 1159F MED LIST DOCD IN RCRD: CPT | Mod: CPTII,S$GLB,, | Performed by: ORTHOPAEDIC SURGERY

## 2023-06-26 PROCEDURE — 99214 OFFICE O/P EST MOD 30 MIN: CPT | Mod: 25,S$GLB,, | Performed by: ORTHOPAEDIC SURGERY

## 2023-06-26 PROCEDURE — 99999 PR PBB SHADOW E&M-EST. PATIENT-LVL III: CPT | Mod: PBBFAC,,, | Performed by: ORTHOPAEDIC SURGERY

## 2023-06-26 PROCEDURE — 4010F PR ACE/ARB THEARPY RXD/TAKEN: ICD-10-PCS | Mod: CPTII,S$GLB,, | Performed by: ORTHOPAEDIC SURGERY

## 2023-06-26 RX ORDER — TRIAMCINOLONE ACETONIDE 40 MG/ML
40 INJECTION, SUSPENSION INTRA-ARTICULAR; INTRAMUSCULAR
Status: DISCONTINUED | OUTPATIENT
Start: 2023-06-26 | End: 2023-06-26 | Stop reason: HOSPADM

## 2023-06-26 RX ADMIN — TRIAMCINOLONE ACETONIDE 40 MG: 40 INJECTION, SUSPENSION INTRA-ARTICULAR; INTRAMUSCULAR at 04:06

## 2023-06-26 NOTE — PROCEDURES
Large Joint Aspiration/Injection: bilateral knee    Date/Time: 6/26/2023 4:00 PM  Performed by: Pablo Dutton MD  Authorized by: Pablo Dutton MD     Consent Done?:  Yes (Verbal)  Indications:  Pain  Site marked: the procedure site was marked    Timeout: prior to procedure the correct patient, procedure, and site was verified    Prep: patient was prepped and draped in usual sterile fashion      Local anesthesia used?: Yes    Anesthesia:  Local infiltration  Local anesthetic: Ropivicaine.  Anesthetic total (ml):  3      Details:  Needle Size:  22 G  Ultrasonic Guidance for needle placement?: No    Approach:  Anterolateral  Location:  Knee  Laterality:  Bilateral  Site:  Bilateral knee  Medications (Right):  40 mg triamcinolone acetonide 40 mg/mL  Medications (Left):  40 mg triamcinolone acetonide 40 mg/mL  Patient tolerance:  Patient tolerated the procedure well with no immediate complications

## 2023-06-26 NOTE — PROGRESS NOTES
Past Medical History:   Diagnosis Date    Arthritis     knees, back    Bilateral renal cysts 7/30/2012    CAD (coronary artery disease) 1995    no chest pain since CABG, sees Dr Larry Black    Diabetes mellitus     borderline    Hypertension     Kidney stones     uric acid stones    VASYL (obstructive sleep apnea) 2007    is compliant with CPAP    Primary gonadal failure        Past Surgical History:   Procedure Laterality Date    BACK SURGERY  2010    L5-6 fusion, with pins,bone graft    cardiac stents      2 stents 12/2021 and 1/2022    CARDIAC SURGERY  1995, 2007    total 7 vessel bypass    CAROTID ARTERY ANGIOPLASTY  01/22/2023    1 stent posterior descending    COLONOSCOPY  2008    repeat 2013    COLONOSCOPY N/A 04/04/2017    Procedure: COLONOSCOPY;  Surgeon: Dmitry Sampson MD;  Location: [facility];  Service: Endoscopy;  Laterality: N/A;    CORONARY ARTERY BYPASS GRAFT  1995, 2007    cabgx3, cabgx5    CORONARY STENT PLACEMENT  10/2022    CORONARY STENT PLACEMENT  11/2022    EXTRACORPOREAL SHOCK WAVE LITHOTRIPSY      EYE SURGERY      cataracts bilateral    LITHOTRIPSY      LUMBAR LAMINECTOMY      Partial Nephrectomy Laproscopic      TRANSRECTAL BIOPSY OF PROSTATE WITH ULTRASOUND GUIDANCE N/A 03/19/2019    Procedure: BIOPSY, PROSTATE, RECTAL APPROACH, WITH US GUIDANCE;  Surgeon: Yovany Heart MD;  Location: [facility];  Service: Urology;  Laterality: N/A;    TRANSRECTAL BIOPSY OF PROSTATE WITH ULTRASOUND GUIDANCE N/A 10/20/2020    Procedure: BIOPSY, PROSTATE, RECTAL APPROACH, WITH US GUIDANCE;  Surgeon: Yovany Heart MD;  Location: [facility];  Service: Urology;  Laterality: N/A;       Current Outpatient Medications   Medication Sig    ADUHELM 100 mg/mL Soln 100 mg.    allopurinoL (ZYLOPRIM) 100 MG tablet TAKE 1 TABLET(100 MG) BY MOUTH EVERY DAY    amLODIPine (NORVASC) 2.5 MG tablet Take 1 tablet (2.5 mg total) by mouth every evening.    atorvastatin (LIPITOR) 40 MG tablet TAKE 1 TABLET(40 MG) BY MOUTH  EVERY DAY    benzonatate (TESSALON) 100 MG capsule Take 100 mg by mouth 2 (two) times daily as needed.    CALCIUM 500 + D 500 mg-5 mcg (200 unit) per tablet Take 1 tablet by mouth Daily.    clopidogreL (PLAVIX) 75 mg tablet Take 75 mg by mouth once daily.    cyanocobalamin, vitamin B-12, (VITAMIN B-12 ORAL) Take by mouth.    ezetimibe (ZETIA) 10 mg tablet Take 10 mg by mouth once daily.    ferrous sulfate (FEOSOL) 325 mg (65 mg iron) Tab tablet Take 325 mg by mouth.    folic acid (FOLVITE) 1 MG tablet TAKE 1 TABLET(1 MG) BY MOUTH EVERY DAY    ketoconazole (NIZORAL) 2 % cream Apply topically 2 (two) times daily.    losartan (COZAAR) 100 MG tablet TAKE 1 TABLET(100 MG) BY MOUTH EVERY DAY    metFORMIN (GLUCOPHAGE-XR) 500 MG 24 hr tablet Take 1 tablet (500 mg total) by mouth 2 (two) times daily with meals.    nitroGLYCERIN (NITROSTAT) 0.4 MG SL tablet SMARTSI Tablet(s) Sublingual PRN    potassium chloride SA (K-DUR,KLOR-CON) 20 MEQ tablet Take 1 tablet (20 mEq total) by mouth once daily.    ranolazine (RANEXA) 500 MG Tb12 Take 500 mg by mouth 2 (two) times daily.    triamcinolone acetonide 0.025% (KENALOG) 0.025 % cream Apply topically 2 (two) times daily.     No current facility-administered medications for this visit.     Facility-Administered Medications Ordered in Other Visits   Medication    triamcinolone acetonide injection 40 mg    triamcinolone acetonide injection 40 mg       Review of patient's allergies indicates:   Allergen Reactions    Adhesive Blisters       Family History   Problem Relation Age of Onset    Heart disease Mother     Hypertension Mother     Diabetes Mother     Alzheimer's disease Mother     Alzheimer's disease Father     Heart disease Father         premature    Hypertension Father     Diabetes Sister     Urolithiasis Sister     Alzheimer's disease Sister     Heart disease Paternal Uncle     Cancer Neg Hx     Prostate cancer Neg Hx     Kidney cancer Neg Hx     Melanoma Neg Hx     Lupus Neg  Hx     Eczema Neg Hx     Psoriasis Neg Hx        Social History     Socioeconomic History    Marital status:    Tobacco Use    Smoking status: Former     Types: Cigarettes     Quit date: 1976     Years since quittin.4    Smokeless tobacco: Never   Substance and Sexual Activity    Alcohol use: Yes     Comment: Socially    Drug use: No    Sexual activity: Yes     Social Determinants of Health     Financial Resource Strain: Low Risk     Difficulty of Paying Living Expenses: Not hard at all   Food Insecurity: No Food Insecurity    Worried About Running Out of Food in the Last Year: Never true    Ran Out of Food in the Last Year: Never true   Transportation Needs: No Transportation Needs    Lack of Transportation (Medical): No    Lack of Transportation (Non-Medical): No   Physical Activity: Insufficiently Active    Days of Exercise per Week: 1 day    Minutes of Exercise per Session: 30 min   Stress: Stress Concern Present    Feeling of Stress : To some extent   Social Connections: Moderately Isolated    Frequency of Communication with Friends and Family: Twice a week    Frequency of Social Gatherings with Friends and Family: Twice a week    Attends Christian Services: Never    Active Member of Clubs or Organizations: No    Attends Club or Organization Meetings: Never    Marital Status:    Housing Stability: Low Risk     Unable to Pay for Housing in the Last Year: No    Number of Places Lived in the Last Year: 1    Unstable Housing in the Last Year: No       Chief Complaint:   No chief complaint on file.      History of present illness: Is a 73-year-old male seen for bilateral knee pain.    No injury or trauma.  Pain laying on it at night.  Pain going up and down stairs.  Pain in the hip is a 6/10.  Using some topical anti-inflammatories and some Tylenol.  Tried Synvisc-One on him back in December.  Did not work very well.  Last treatment was just cortisone.  Worked for about 3 months.  Both  shoulders have been hurting him more.  Patient had a fall in December.  Right shoulder is the worst.  Was given some home exercises but these did not really help.      Answers for HPI/ROS submitted by the patient on 11/29/2019   Leg pain  unexpected weight change: No  appetite change : No  sleep disturbance: No  IMMUNOCOMPROMISED: No  nervous/ anxious: No  dysphoric mood: No  rash: No  visual disturbance: No  eye redness: No  eye pain: No  ear pain: No  tinnitus: Yes  hearing loss: Yes  sinus pressure : No  nosebleeds: No  enviro allergies: No  food allergies: No  cough: No  shortness of breath: No  sweating: No  frequency: No  difficulty urinating: No  hematuria: No  chest pain: No  palpitations: No  nausea: No  vomiting: No  diarrhea: No  blood in stool: No  constipation: No  headaches: No  dizziness: No  numbness: No  seizures: No  joint swelling: No  myalgia: No  weakness: No  back pain: No  Pain Chronicity: recurrent  History of trauma: No  Onset: more than 1 month ago  Frequency: daily  Progression since onset: unchanged  injury location: at work  pain- numeric: 3/10  pain location: left knee, right knee  pain quality: aching, sharp  Radiating Pain: No  Aggravating factors: walking  fever: No  inability to bear weight: No  itching: No  joint locking: No  limited range of motion: Yes  stiffness: Yes  tingling: No  Treatments tried: nothing  physical therapy: not tried  Improvement on treatment: significant        Physical Examination:    Vital Signs:    There were no vitals filed for this visit.      There is no height or weight on file to calculate BMI.    This a well-developed, well nourished patient in no acute distress.  They are alert and oriented and cooperative to examination.  Pt. walks without an antalgic gait.      Examination of bilateral knees shows no rashes or erythema. There are no masses ecchymosis or effusion. Patient has full range of motion from 0-130°. Patient is nontender to palpation over  lateral joint line and moderately tender to palpation over the medial joint line. Knee is stable to varus and valgus stress. 5 out of 5 motor strength. Palpable distal pulses. Intact light touch sensation. Negative Patellofemoral crepitus    Examination of the right shoulder shows no rashes or erythema. There are no masses, ecchymosis, or atrophy. The patient has full range of motion in forward flexion, external rotation, and internal rotation to the mid T-spine. The patient has and Neer test. - Sussex's test. - Speeds test. Nontender to palpation over a.c. joint. Normal stability anteriorly, posteriorly, and negative sulcus sign. Passive range of motion: Forward flexion of 180°, external rotation at 90° of 90°, internal rotation of 50°, and external rotation at 0° of 50°. 2+ radial pulse. Intact axillary, radial, median and ulnar sensation. 5 out of 5 resisted forward flexion, external rotation, and negative lift off test.      X-rays: X-rays of both knees are  review which show severe medial joint space narrowing of both knees.  No significant progression  X-rays of the right shoulder are available for review which show some mild degenerative change    Assessment:: Severe bilateral varus knee arthritis  Bilateral shoulder pain.  Likely right rotator cuff tear      Plan:    I injected both knees with cortisone today.  We also got him a brace for his right knee.  We will also plan on doing viscosupplementation before his vacation in September.  Recommended formal physical therapy for his shoulders and his knees.    The patient has tried a self guided exercise program that has included walking without significant improvement. Minimal relief with tylenol or OTC Nsaids. Reports less than 8 weeks relief with IA steroid injection. Kellgren Guillermo scale of 4. They are not receiving another HA injectable at this time. I will precert for gel injection.       This note was created using  voice recognition software that  occasionally misinterpreted phrases or words.    Consult note is delivered via Epic messaging service.

## 2023-07-06 DIAGNOSIS — N18.30 CKD (CHRONIC KIDNEY DISEASE) STAGE 3, GFR 30-59 ML/MIN: Chronic | ICD-10-CM

## 2023-07-06 DIAGNOSIS — E11.65 TYPE 2 DIABETES MELLITUS WITH HYPERGLYCEMIA, WITHOUT LONG-TERM CURRENT USE OF INSULIN: ICD-10-CM

## 2023-07-06 NOTE — TELEPHONE ENCOUNTER
Refill Routing Note   Medication(s) are not appropriate for processing by [facility] for the following reason(s):      Due for refill >6 months ago    [facility] action(s):  Defer Care Due:  None identified          Appointments  past 12m or future 3m with PCP    Date Provider   Last Visit   4/24/2023 Roney Bradshaw MD   Next Visit   Visit date not found Roney Bradshaw MD   ED visits in past 90 days: 0        Note composed:4:16 PM 07/06/2023

## 2023-07-06 NOTE — TELEPHONE ENCOUNTER
No care due was identified.  [facility] Embedded Care Due Messages. Reference number: [id number].   7/06/2023 11:21:10 AM CDT

## 2023-07-07 RX ORDER — METFORMIN HYDROCHLORIDE 500 MG/1
500 TABLET, EXTENDED RELEASE ORAL 2 TIMES DAILY WITH MEALS
Qty: 180 TABLET | Refills: 1 | Status: SHIPPED | OUTPATIENT
Start: 2023-07-07

## 2023-08-31 ENCOUNTER — OFFICE VISIT (OUTPATIENT)
Dept: ORTHOPEDICS | Facility: CLINIC | Age: 73
End: 2023-08-31
Payer: COMMERCIAL

## 2023-08-31 VITALS — HEIGHT: 68 IN | RESPIRATION RATE: 18 BRPM | WEIGHT: 201 LBS | BODY MASS INDEX: 30.46 KG/M2

## 2023-08-31 DIAGNOSIS — M17.11 OSTEOARTHRITIS OF RIGHT KNEE, UNSPECIFIED OSTEOARTHRITIS TYPE: Primary | ICD-10-CM

## 2023-08-31 DIAGNOSIS — M23.51 CHRONIC KNEE INSTABILITY, RIGHT: ICD-10-CM

## 2023-08-31 DIAGNOSIS — M17.0 PRIMARY OSTEOARTHRITIS OF BOTH KNEES: Primary | ICD-10-CM

## 2023-08-31 PROCEDURE — 97760 PR ORTHOTIC MGMT&TRAINJ INITIAL ENC EA 15 MINS: ICD-10-PCS | Mod: GP,S$GLB,, | Performed by: ORTHOPAEDIC SURGERY

## 2023-08-31 PROCEDURE — 99999 PR PBB SHADOW E&M-EST. PATIENT-LVL IV: CPT | Mod: PBBFAC,,, | Performed by: ORTHOPAEDIC SURGERY

## 2023-08-31 PROCEDURE — 4010F ACE/ARB THERAPY RXD/TAKEN: CPT | Mod: CPTII,S$GLB,, | Performed by: ORTHOPAEDIC SURGERY

## 2023-08-31 PROCEDURE — 20610 DRAIN/INJ JOINT/BURSA W/O US: CPT | Mod: 50,S$GLB,, | Performed by: ORTHOPAEDIC SURGERY

## 2023-08-31 PROCEDURE — 1101F PT FALLS ASSESS-DOCD LE1/YR: CPT | Mod: CPTII,S$GLB,, | Performed by: ORTHOPAEDIC SURGERY

## 2023-08-31 PROCEDURE — 97760 ORTHOTIC MGMT&TRAING 1ST ENC: CPT | Mod: GP,S$GLB,, | Performed by: ORTHOPAEDIC SURGERY

## 2023-08-31 PROCEDURE — 4010F PR ACE/ARB THEARPY RXD/TAKEN: ICD-10-PCS | Mod: CPTII,S$GLB,, | Performed by: ORTHOPAEDIC SURGERY

## 2023-08-31 PROCEDURE — 1160F RVW MEDS BY RX/DR IN RCRD: CPT | Mod: CPTII,S$GLB,, | Performed by: ORTHOPAEDIC SURGERY

## 2023-08-31 PROCEDURE — 99213 OFFICE O/P EST LOW 20 MIN: CPT | Mod: 25,S$GLB,, | Performed by: ORTHOPAEDIC SURGERY

## 2023-08-31 PROCEDURE — 1101F PR PT FALLS ASSESS DOC 0-1 FALLS W/OUT INJ PAST YR: ICD-10-PCS | Mod: CPTII,S$GLB,, | Performed by: ORTHOPAEDIC SURGERY

## 2023-08-31 PROCEDURE — 1160F PR REVIEW ALL MEDS BY PRESCRIBER/CLIN PHARMACIST DOCUMENTED: ICD-10-PCS | Mod: CPTII,S$GLB,, | Performed by: ORTHOPAEDIC SURGERY

## 2023-08-31 PROCEDURE — 99999 PR PBB SHADOW E&M-EST. PATIENT-LVL IV: ICD-10-PCS | Mod: PBBFAC,,, | Performed by: ORTHOPAEDIC SURGERY

## 2023-08-31 PROCEDURE — 20610 LARGE JOINT ASPIRATION/INJECTION: BILATERAL KNEE: ICD-10-PCS | Mod: 50,S$GLB,, | Performed by: ORTHOPAEDIC SURGERY

## 2023-08-31 PROCEDURE — 3288F FALL RISK ASSESSMENT DOCD: CPT | Mod: CPTII,S$GLB,, | Performed by: ORTHOPAEDIC SURGERY

## 2023-08-31 PROCEDURE — 3044F HG A1C LEVEL LT 7.0%: CPT | Mod: CPTII,S$GLB,, | Performed by: ORTHOPAEDIC SURGERY

## 2023-08-31 PROCEDURE — 99213 PR OFFICE/OUTPT VISIT, EST, LEVL III, 20-29 MIN: ICD-10-PCS | Mod: 25,S$GLB,, | Performed by: ORTHOPAEDIC SURGERY

## 2023-08-31 PROCEDURE — 3008F BODY MASS INDEX DOCD: CPT | Mod: CPTII,S$GLB,, | Performed by: ORTHOPAEDIC SURGERY

## 2023-08-31 PROCEDURE — 1159F MED LIST DOCD IN RCRD: CPT | Mod: CPTII,S$GLB,, | Performed by: ORTHOPAEDIC SURGERY

## 2023-08-31 PROCEDURE — 3008F PR BODY MASS INDEX (BMI) DOCUMENTED: ICD-10-PCS | Mod: CPTII,S$GLB,, | Performed by: ORTHOPAEDIC SURGERY

## 2023-08-31 PROCEDURE — 1125F AMNT PAIN NOTED PAIN PRSNT: CPT | Mod: CPTII,S$GLB,, | Performed by: ORTHOPAEDIC SURGERY

## 2023-08-31 PROCEDURE — 3044F PR MOST RECENT HEMOGLOBIN A1C LEVEL <7.0%: ICD-10-PCS | Mod: CPTII,S$GLB,, | Performed by: ORTHOPAEDIC SURGERY

## 2023-08-31 PROCEDURE — 1125F PR PAIN SEVERITY QUANTIFIED, PAIN PRESENT: ICD-10-PCS | Mod: CPTII,S$GLB,, | Performed by: ORTHOPAEDIC SURGERY

## 2023-08-31 PROCEDURE — 3288F PR FALLS RISK ASSESSMENT DOCUMENTED: ICD-10-PCS | Mod: CPTII,S$GLB,, | Performed by: ORTHOPAEDIC SURGERY

## 2023-08-31 PROCEDURE — 1159F PR MEDICATION LIST DOCUMENTED IN MEDICAL RECORD: ICD-10-PCS | Mod: CPTII,S$GLB,, | Performed by: ORTHOPAEDIC SURGERY

## 2023-08-31 NOTE — PROCEDURES
Large Joint Aspiration/Injection: bilateral knee    Date/Time: 8/31/2023 3:45 PM    Performed by: Pablo Dutton MD  Authorized by: Pablo Dutton MD    Consent Done?:  Yes (Verbal)  Indications:  Pain  Site marked: the procedure site was marked    Timeout: prior to procedure the correct patient, procedure, and site was verified      Details:  Needle Size:  18 G  Approach:  Anterolateral  Location:  Knee  Laterality:  Bilateral  Site:  Bilateral knee  Medications (Right):  48 mg hylan g-f 20 48 mg/6 mL  Medications (Left):  48 mg hylan g-f 20 48 mg/6 mL  Patient tolerance:  Patient tolerated the procedure well with no immediate complications

## 2023-08-31 NOTE — PROGRESS NOTES
Past Medical History:   Diagnosis Date    Arthritis     knees, back    Bilateral renal cysts 7/30/2012    CAD (coronary artery disease) 1995    no chest pain since CABG, sees Dr Larry Black    Diabetes mellitus     borderline    Hypertension     Kidney stones     uric acid stones    VASYL (obstructive sleep apnea) 2007    is compliant with CPAP    Primary gonadal failure        Past Surgical History:   Procedure Laterality Date    BACK SURGERY  2010    L5-6 fusion, with pins,bone graft    cardiac stents      2 stents 12/2021 and 1/2022    CARDIAC SURGERY  1995, 2007    total 7 vessel bypass    CAROTID ARTERY ANGIOPLASTY  01/22/2023    1 stent posterior descending    COLONOSCOPY  2008    repeat 2013    COLONOSCOPY N/A 04/04/2017    Procedure: COLONOSCOPY;  Surgeon: Dmitry Sampson MD;  Location: [facility];  Service: Endoscopy;  Laterality: N/A;    CORONARY ARTERY BYPASS GRAFT  1995, 2007    cabgx3, cabgx5    CORONARY STENT PLACEMENT  10/2022    CORONARY STENT PLACEMENT  11/2022    EXTRACORPOREAL SHOCK WAVE LITHOTRIPSY      EYE SURGERY      cataracts bilateral    LITHOTRIPSY      LUMBAR LAMINECTOMY      Partial Nephrectomy Laproscopic      TRANSRECTAL BIOPSY OF PROSTATE WITH ULTRASOUND GUIDANCE N/A 03/19/2019    Procedure: BIOPSY, PROSTATE, RECTAL APPROACH, WITH US GUIDANCE;  Surgeon: Yovany Heart MD;  Location: [facility] OR;  Service: Urology;  Laterality: N/A;    TRANSRECTAL BIOPSY OF PROSTATE WITH ULTRASOUND GUIDANCE N/A 10/20/2020    Procedure: BIOPSY, PROSTATE, RECTAL APPROACH, WITH US GUIDANCE;  Surgeon: Yovany Heart MD;  Location: [facility];  Service: Urology;  Laterality: N/A;       Current Outpatient Medications   Medication Sig    ADUHELM 100 mg/mL Soln 100 mg.    allopurinoL (ZYLOPRIM) 100 MG tablet TAKE 1 TABLET(100 MG) BY MOUTH EVERY DAY    atorvastatin (LIPITOR) 40 MG tablet TAKE 1 TABLET(40 MG) BY MOUTH EVERY DAY    benzonatate (TESSALON) 100 MG capsule Take 100 mg by mouth 2 (two) times daily as  needed.    CALCIUM 500 + D 500 mg-5 mcg (200 unit) per tablet Take 1 tablet by mouth Daily.    clopidogreL (PLAVIX) 75 mg tablet Take 75 mg by mouth once daily.    cyanocobalamin, vitamin B-12, (VITAMIN B-12 ORAL) Take by mouth.    ezetimibe (ZETIA) 10 mg tablet Take 10 mg by mouth once daily.    ferrous sulfate (FEOSOL) 325 mg (65 mg iron) Tab tablet Take 325 mg by mouth.    folic acid (FOLVITE) 1 MG tablet TAKE 1 TABLET(1 MG) BY MOUTH EVERY DAY    ketoconazole (NIZORAL) 2 % cream Apply topically 2 (two) times daily.    losartan (COZAAR) 100 MG tablet TAKE 1 TABLET(100 MG) BY MOUTH EVERY DAY    metFORMIN (GLUCOPHAGE-XR) 500 MG ER 24hr tablet Take 1 tablet (500 mg total) by mouth 2 (two) times daily with meals.    nitroGLYCERIN (NITROSTAT) 0.4 MG SL tablet SMARTSI Tablet(s) Sublingual PRN    potassium chloride SA (K-DUR,KLOR-CON) 20 MEQ tablet Take 1 tablet (20 mEq total) by mouth once daily.    ranolazine (RANEXA) 500 MG Tb12 Take 500 mg by mouth 2 (two) times daily.    triamcinolone acetonide 0.025% (KENALOG) 0.025 % cream Apply topically 2 (two) times daily.    amLODIPine (NORVASC) 2.5 MG tablet Take 1 tablet (2.5 mg total) by mouth every evening.     No current facility-administered medications for this visit.     Facility-Administered Medications Ordered in Other Visits   Medication    triamcinolone acetonide injection 40 mg    triamcinolone acetonide injection 40 mg       Review of patient's allergies indicates:   Allergen Reactions    Adhesive Blisters       Family History   Problem Relation Age of Onset    Heart disease Mother     Hypertension Mother     Diabetes Mother     Alzheimer's disease Mother     Alzheimer's disease Father     Heart disease Father         premature    Hypertension Father     Diabetes Sister     Urolithiasis Sister     Alzheimer's disease Sister     Heart disease Paternal Uncle     Cancer Neg Hx     Prostate cancer Neg Hx     Kidney cancer Neg Hx     Melanoma Neg Hx     Lupus Neg  Hx     Eczema Neg Hx     Psoriasis Neg Hx        Social History     Socioeconomic History    Marital status:    Tobacco Use    Smoking status: Former     Current packs/day: 0.00     Types: Cigarettes     Quit date: 1976     Years since quittin.5    Smokeless tobacco: Never   Substance and Sexual Activity    Alcohol use: Yes     Comment: Socially    Drug use: No    Sexual activity: Yes     Social Determinants of Health     Financial Resource Strain: Low Risk  (2023)    Overall Financial Resource Strain (CARDIA)     Difficulty of Paying Living Expenses: Not hard at all   Food Insecurity: No Food Insecurity (2023)    Hunger Vital Sign     Worried About Running Out of Food in the Last Year: Never true     Ran Out of Food in the Last Year: Never true   Transportation Needs: No Transportation Needs (2023)    PRAPARE - Transportation     Lack of Transportation (Medical): No     Lack of Transportation (Non-Medical): No   Physical Activity: Insufficiently Active (2023)    Exercise Vital Sign     Days of Exercise per Week: 1 day     Minutes of Exercise per Session: 30 min   Stress: Stress Concern Present (2023)    [employer] [place] [employer] - Occupational Stress Questionnaire     Feeling of Stress : To some extent   Social Connections: Moderately Isolated (2023)    Social Connection and Isolation Panel [NHANES]     Frequency of Communication with Friends and Family: Twice a week     Frequency of Social Gatherings with Friends and Family: Twice a week     Attends Catholic Services: Never     Active Member of Clubs or Organizations: No     Attends Club or Organization Meetings: Never     Marital Status:    Housing Stability: Low Risk  (2023)    Housing Stability Vital Sign     Unable to Pay for Housing in the Last Year: No     Number of Places Lived in the Last Year: 1     Unstable Housing in the Last Year: No       Chief Complaint:   Chief Complaint    Patient presents with    Injections     B synvisc one injections        History of present illness: Is a 73-year-old male seen for bilateral knee pain.    No injury or trauma.  Pain laying on it at night.  Pain going up and down stairs.  Pain in the hip is a 6/10.  Using some topical anti-inflammatories and some Tylenol.  Tried Synvisc-One on him back in December.  Did not work very well.  Last treatment was just cortisone.  Worked for about 3 months.  Both shoulders have been hurting him more.  Patient had a fall in December.  Right shoulder is the worst.  Was given some home exercises but these did not really help.      Answers for HPI/ROS submitted by the patient on 11/29/2019   Leg pain  unexpected weight change: No  appetite change : No  sleep disturbance: No  IMMUNOCOMPROMISED: No  nervous/ anxious: No  dysphoric mood: No  rash: No  visual disturbance: No  eye redness: No  eye pain: No  ear pain: No  tinnitus: Yes  hearing loss: Yes  sinus pressure : No  nosebleeds: No  enviro allergies: No  food allergies: No  cough: No  shortness of breath: No  sweating: No  frequency: No  difficulty urinating: No  hematuria: No  chest pain: No  palpitations: No  nausea: No  vomiting: No  diarrhea: No  blood in stool: No  constipation: No  headaches: No  dizziness: No  numbness: No  seizures: No  joint swelling: No  myalgia: No  weakness: No  back pain: No  Pain Chronicity: recurrent  History of trauma: No  Onset: more than 1 month ago  Frequency: daily  Progression since onset: unchanged  injury location: at work  pain- numeric: 3/10  pain location: left knee, right knee  pain quality: aching, sharp  Radiating Pain: No  Aggravating factors: walking  fever: No  inability to bear weight: No  itching: No  joint locking: No  limited range of motion: Yes  stiffness: Yes  tingling: No  Treatments tried: nothing  physical therapy: not tried  Improvement on treatment: significant        Physical Examination:    Vital Signs:     Vitals:    08/31/23 1542   Resp: 18         Body mass index is 30.56 kg/m².    This a well-developed, well nourished patient in no acute distress.  They are alert and oriented and cooperative to examination.  Pt. walks without an antalgic gait.      Examination of bilateral knees shows no rashes or erythema. There are no masses ecchymosis or effusion. Patient has full range of motion from 0-130°. Patient is nontender to palpation over lateral joint line and moderately tender to palpation over the medial joint line. Knee is stable to varus and valgus stress. 5 out of 5 motor strength. Palpable distal pulses. Intact light touch sensation. Negative Patellofemoral crepitus    Examination of the right shoulder shows no rashes or erythema. There are no masses, ecchymosis, or atrophy. The patient has full range of motion in forward flexion, external rotation, and internal rotation to the mid T-spine. The patient has and Neer test. - Warden's test. - Speeds test. Nontender to palpation over a.c. joint. Normal stability anteriorly, posteriorly, and negative sulcus sign. Passive range of motion: Forward flexion of 180°, external rotation at 90° of 90°, internal rotation of 50°, and external rotation at 0° of 50°. 2+ radial pulse. Intact axillary, radial, median and ulnar sensation. 5 out of 5 resisted forward flexion, external rotation, and negative lift off test.      X-rays: X-rays of both knees are  review which show severe medial joint space narrowing of both knees.  No significant progression  X-rays of the right shoulder are available for review which show some mild degenerative change    Assessment:: Severe bilateral varus knee arthritis  Bilateral shoulder pain.  Likely right rotator cuff tear      Plan:    I injected both knees with in Synvisc-One today.  I also got him a bio skin brace today.  Follow-up in 3 months for more treatment if needed.    The patient has tried a self guided exercise program that has  included walking without significant improvement. Minimal relief with tylenol or OTC Nsaids. Reports less than 8 weeks relief with IA steroid injection. Kellgren Guillermo scale of 4. They are not receiving another HA injectable at this time. I will precert for gel injection.     We performed a custom orthotic/brace fitting, adjusting and training with the patient. The patient demonstrated understanding and proper care. This was performed for 15 minutes.          This note was created using  voice recognition software that occasionally misinterpreted phrases or words.    Consult note is delivered via Epic messaging service.

## 2023-09-29 ENCOUNTER — PATIENT MESSAGE (OUTPATIENT)
Dept: UROLOGY | Facility: CLINIC | Age: 73
End: 2023-09-29
Payer: COMMERCIAL

## 2023-10-13 ENCOUNTER — HOSPITAL ENCOUNTER (OUTPATIENT)
Dept: RADIOLOGY | Facility: HOSPITAL | Age: 73
Discharge: HOME | End: 2023-10-13
Attending: UROLOGY
Payer: COMMERCIAL

## 2023-10-13 DIAGNOSIS — C61 PROSTATE CANCER: ICD-10-CM

## 2023-10-13 PROCEDURE — 72197 MRI PELVIS W/O & W/DYE: CPT | Mod: TC

## 2023-10-13 PROCEDURE — 72197 MRI PROSTATE W W/O CONTRAST: ICD-10-PCS | Mod: 26,,, | Performed by: RADIOLOGY

## 2023-10-13 PROCEDURE — 72197 MRI PELVIS W/O & W/DYE: CPT | Mod: 26,,, | Performed by: RADIOLOGY

## 2023-10-13 PROCEDURE — A9585 GADOBUTROL INJECTION: HCPCS

## 2023-10-13 PROCEDURE — 25500020 PHARM REV CODE 255

## 2023-10-13 RX ORDER — GADOBUTROL 604.72 MG/ML
INJECTION INTRAVENOUS
Status: COMPLETED
Start: 2023-10-13 | End: 2023-10-13

## 2023-10-13 RX ADMIN — GADOBUTROL 9 ML: 604.72 INJECTION INTRAVENOUS at 08:10

## 2023-10-18 ENCOUNTER — TELEPHONE (OUTPATIENT)
Dept: FAMILY MEDICINE | Facility: CLINIC | Age: 73
End: 2023-10-18
Payer: COMMERCIAL

## 2023-10-18 DIAGNOSIS — C61 PROSTATE CANCER: Primary | ICD-10-CM

## 2023-10-18 NOTE — TELEPHONE ENCOUNTER
----- Message from Reji Bangura sent at 10/18/2023 10:55 AM CDT -----  Regarding: est care  Contact: wife  Type:  Appointment Request    Caller is requesting an appointment.     Name of Caller:  Pt's wife Esthela    Symptoms:  Est care    Would the patient rather a call back or a response via [facility]? Call back    Best Call Back Number:  [phone]      Additional Information:  sts she has seen Dr Blevins and the PT wants to also est care with him.  Sts December or Jan is fine.   Please advise -- Thank you

## 2023-10-21 ENCOUNTER — PATIENT MESSAGE (OUTPATIENT)
Dept: ORTHOPEDICS | Facility: CLINIC | Age: 73
End: 2023-10-21
Payer: COMMERCIAL

## 2023-10-21 DIAGNOSIS — M17.0 PRIMARY OSTEOARTHRITIS OF BOTH KNEES: Primary | ICD-10-CM

## 2023-10-30 ENCOUNTER — TELEPHONE (OUTPATIENT)
Dept: DERMATOLOGY | Facility: CLINIC | Age: 73
End: 2023-10-30
Payer: COMMERCIAL

## 2023-10-30 NOTE — TELEPHONE ENCOUNTER
----- Message from Ino Shearer sent at 10/30/2023  8:09 AM CDT -----  Contact: Self  Type: Sooner Appointment Request        Caller is requesting a sooner appointment. Caller declined first available appointment listed below. Caller will not accept being placed on the waitlist and is requesting a message be sent to doctor.        Name of Caller: Patient   Best Call Back Number: [phone]  Additional Information: Pt was sent a  letter to make an appt with Dr. Emre gordon call to get him scheduled for a skin check. Thanks

## 2023-10-31 DIAGNOSIS — M17.12 UNILATERAL PRIMARY OSTEOARTHRITIS, LEFT KNEE: Primary | ICD-10-CM

## 2023-11-07 ENCOUNTER — OFFICE VISIT (OUTPATIENT)
Dept: FAMILY MEDICINE | Facility: CLINIC | Age: 73
End: 2023-11-07
Payer: COMMERCIAL

## 2023-11-07 ENCOUNTER — LAB VISIT (OUTPATIENT)
Dept: LAB | Facility: HOSPITAL | Age: 73
End: 2023-11-07
Attending: FAMILY MEDICINE
Payer: COMMERCIAL

## 2023-11-07 VITALS
BODY MASS INDEX: 28.94 KG/M2 | HEART RATE: 60 BPM | SYSTOLIC BLOOD PRESSURE: 128 MMHG | WEIGHT: 190.94 LBS | OXYGEN SATURATION: 99 % | HEIGHT: 68 IN | DIASTOLIC BLOOD PRESSURE: 74 MMHG

## 2023-11-07 DIAGNOSIS — Z79.899 DRUG THERAPY: ICD-10-CM

## 2023-11-07 DIAGNOSIS — R09.82 POST-NASAL DRIP: ICD-10-CM

## 2023-11-07 DIAGNOSIS — Z79.899 DRUG THERAPY: Primary | ICD-10-CM

## 2023-11-07 LAB
ALBUMIN SERPL BCP-MCNC: 3.8 G/DL (ref 3.5–5.2)
ALP SERPL-CCNC: 70 U/L (ref 55–135)
ALT SERPL W/O P-5'-P-CCNC: 9 U/L (ref 10–44)
ANION GAP SERPL CALC-SCNC: 11 MMOL/L (ref 8–16)
AST SERPL-CCNC: 17 U/L (ref 10–40)
BASOPHILS # BLD AUTO: 0.09 K/UL (ref 0–0.2)
BASOPHILS NFR BLD: 1.1 % (ref 0–1.9)
BILIRUB SERPL-MCNC: 0.6 MG/DL (ref 0.1–1)
BUN SERPL-MCNC: 15 MG/DL (ref 8–23)
CALCIUM SERPL-MCNC: 9.9 MG/DL (ref 8.7–10.5)
CHLORIDE SERPL-SCNC: 104 MMOL/L (ref 95–110)
CHOLEST SERPL-MCNC: 116 MG/DL (ref 120–199)
CHOLEST/HDLC SERPL: 2.3 {RATIO} (ref 2–5)
CO2 SERPL-SCNC: 23 MMOL/L (ref 23–29)
COMPLEXED PSA SERPL-MCNC: 6 NG/ML (ref 0–4)
CREAT SERPL-MCNC: 1 MG/DL (ref 0.5–1.4)
DIFFERENTIAL METHOD: ABNORMAL
EOSINOPHIL # BLD AUTO: 0.2 K/UL (ref 0–0.5)
EOSINOPHIL NFR BLD: 2.4 % (ref 0–8)
ERYTHROCYTE [DISTWIDTH] IN BLOOD BY AUTOMATED COUNT: 12.8 % (ref 11.5–14.5)
EST. GFR  (NO RACE VARIABLE): >60 ML/MIN/1.73 M^2
ESTIMATED AVG GLUCOSE: 97 MG/DL (ref 68–131)
GLUCOSE SERPL-MCNC: 92 MG/DL (ref 70–110)
HBA1C MFR BLD: 5 % (ref 4–5.6)
HCT VFR BLD AUTO: 32.2 % (ref 40–54)
HDLC SERPL-MCNC: 50 MG/DL (ref 40–75)
HDLC SERPL: 43.1 % (ref 20–50)
HGB BLD-MCNC: 11.1 G/DL (ref 14–18)
IMM GRANULOCYTES # BLD AUTO: 0.01 K/UL (ref 0–0.04)
IMM GRANULOCYTES NFR BLD AUTO: 0.1 % (ref 0–0.5)
LDLC SERPL CALC-MCNC: 54.6 MG/DL (ref 63–159)
LYMPHOCYTES # BLD AUTO: 2 K/UL (ref 1–4.8)
LYMPHOCYTES NFR BLD: 24.6 % (ref 18–48)
MCH RBC QN AUTO: 33.2 PG (ref 27–31)
MCHC RBC AUTO-ENTMCNC: 34.5 G/DL (ref 32–36)
MCV RBC AUTO: 96 FL (ref 82–98)
MONOCYTES # BLD AUTO: 0.9 K/UL (ref 0.3–1)
MONOCYTES NFR BLD: 11.6 % (ref 4–15)
NEUTROPHILS # BLD AUTO: 4.8 K/UL (ref 1.8–7.7)
NEUTROPHILS NFR BLD: 60.2 % (ref 38–73)
NONHDLC SERPL-MCNC: 66 MG/DL
NRBC BLD-RTO: 0 /100 WBC
PLATELET # BLD AUTO: 384 K/UL (ref 150–450)
PMV BLD AUTO: 11.3 FL (ref 9.2–12.9)
POTASSIUM SERPL-SCNC: 4.6 MMOL/L (ref 3.5–5.1)
PROT SERPL-MCNC: 6.6 G/DL (ref 6–8.4)
RBC # BLD AUTO: 3.34 M/UL (ref 4.6–6.2)
SODIUM SERPL-SCNC: 138 MMOL/L (ref 136–145)
T4 FREE SERPL-MCNC: 0.93 NG/DL (ref 0.71–1.51)
TRIGL SERPL-MCNC: 57 MG/DL (ref 30–150)
TSH SERPL DL<=0.005 MIU/L-ACNC: 1.99 UIU/ML (ref 0.4–4)
WBC # BLD AUTO: 8.02 K/UL (ref 3.9–12.7)

## 2023-11-07 PROCEDURE — 36415 COLL VENOUS BLD VENIPUNCTURE: CPT | Mod: PN | Performed by: FAMILY MEDICINE

## 2023-11-07 PROCEDURE — 4010F PR ACE/ARB THEARPY RXD/TAKEN: ICD-10-PCS | Mod: CPTII,S$GLB,, | Performed by: FAMILY MEDICINE

## 2023-11-07 PROCEDURE — 84443 ASSAY THYROID STIM HORMONE: CPT | Performed by: FAMILY MEDICINE

## 2023-11-07 PROCEDURE — 3288F PR FALLS RISK ASSESSMENT DOCUMENTED: ICD-10-PCS | Mod: CPTII,S$GLB,, | Performed by: FAMILY MEDICINE

## 2023-11-07 PROCEDURE — 80053 COMPREHEN METABOLIC PANEL: CPT | Performed by: FAMILY MEDICINE

## 2023-11-07 PROCEDURE — 99214 PR OFFICE/OUTPT VISIT, EST, LEVL IV, 30-39 MIN: ICD-10-PCS | Mod: S$GLB,,, | Performed by: FAMILY MEDICINE

## 2023-11-07 PROCEDURE — 84439 ASSAY OF FREE THYROXINE: CPT | Performed by: FAMILY MEDICINE

## 2023-11-07 PROCEDURE — 99999 PR PBB SHADOW E&M-EST. PATIENT-LVL IV: CPT | Mod: PBBFAC,,, | Performed by: FAMILY MEDICINE

## 2023-11-07 PROCEDURE — 4010F ACE/ARB THERAPY RXD/TAKEN: CPT | Mod: CPTII,S$GLB,, | Performed by: FAMILY MEDICINE

## 2023-11-07 PROCEDURE — 99999 PR PBB SHADOW E&M-EST. PATIENT-LVL IV: ICD-10-PCS | Mod: PBBFAC,,, | Performed by: FAMILY MEDICINE

## 2023-11-07 PROCEDURE — 3044F PR MOST RECENT HEMOGLOBIN A1C LEVEL <7.0%: ICD-10-PCS | Mod: CPTII,S$GLB,, | Performed by: FAMILY MEDICINE

## 2023-11-07 PROCEDURE — 83036 HEMOGLOBIN GLYCOSYLATED A1C: CPT | Performed by: FAMILY MEDICINE

## 2023-11-07 PROCEDURE — 1100F PTFALLS ASSESS-DOCD GE2>/YR: CPT | Mod: CPTII,S$GLB,, | Performed by: FAMILY MEDICINE

## 2023-11-07 PROCEDURE — 85025 COMPLETE CBC W/AUTO DIFF WBC: CPT | Performed by: FAMILY MEDICINE

## 2023-11-07 PROCEDURE — 3008F BODY MASS INDEX DOCD: CPT | Mod: CPTII,S$GLB,, | Performed by: FAMILY MEDICINE

## 2023-11-07 PROCEDURE — 3074F PR MOST RECENT SYSTOLIC BLOOD PRESSURE < 130 MM HG: ICD-10-PCS | Mod: CPTII,S$GLB,, | Performed by: FAMILY MEDICINE

## 2023-11-07 PROCEDURE — 1100F PR PT FALLS ASSESS DOC 2+ FALLS/FALL W/INJURY/YR: ICD-10-PCS | Mod: CPTII,S$GLB,, | Performed by: FAMILY MEDICINE

## 2023-11-07 PROCEDURE — 1159F PR MEDICATION LIST DOCUMENTED IN MEDICAL RECORD: ICD-10-PCS | Mod: CPTII,S$GLB,, | Performed by: FAMILY MEDICINE

## 2023-11-07 PROCEDURE — 3078F DIAST BP <80 MM HG: CPT | Mod: CPTII,S$GLB,, | Performed by: FAMILY MEDICINE

## 2023-11-07 PROCEDURE — 3074F SYST BP LT 130 MM HG: CPT | Mod: CPTII,S$GLB,, | Performed by: FAMILY MEDICINE

## 2023-11-07 PROCEDURE — 3078F PR MOST RECENT DIASTOLIC BLOOD PRESSURE < 80 MM HG: ICD-10-PCS | Mod: CPTII,S$GLB,, | Performed by: FAMILY MEDICINE

## 2023-11-07 PROCEDURE — 1126F AMNT PAIN NOTED NONE PRSNT: CPT | Mod: CPTII,S$GLB,, | Performed by: FAMILY MEDICINE

## 2023-11-07 PROCEDURE — 99214 OFFICE O/P EST MOD 30 MIN: CPT | Mod: S$GLB,,, | Performed by: FAMILY MEDICINE

## 2023-11-07 PROCEDURE — 80061 LIPID PANEL: CPT | Performed by: FAMILY MEDICINE

## 2023-11-07 PROCEDURE — 3288F FALL RISK ASSESSMENT DOCD: CPT | Mod: CPTII,S$GLB,, | Performed by: FAMILY MEDICINE

## 2023-11-07 PROCEDURE — 84153 ASSAY OF PSA TOTAL: CPT | Performed by: FAMILY MEDICINE

## 2023-11-07 PROCEDURE — 1126F PR PAIN SEVERITY QUANTIFIED, NO PAIN PRESENT: ICD-10-PCS | Mod: CPTII,S$GLB,, | Performed by: FAMILY MEDICINE

## 2023-11-07 PROCEDURE — 1159F MED LIST DOCD IN RCRD: CPT | Mod: CPTII,S$GLB,, | Performed by: FAMILY MEDICINE

## 2023-11-07 PROCEDURE — 3008F PR BODY MASS INDEX (BMI) DOCUMENTED: ICD-10-PCS | Mod: CPTII,S$GLB,, | Performed by: FAMILY MEDICINE

## 2023-11-07 PROCEDURE — 3044F HG A1C LEVEL LT 7.0%: CPT | Mod: CPTII,S$GLB,, | Performed by: FAMILY MEDICINE

## 2023-11-07 RX ORDER — IPRATROPIUM BROMIDE 21 UG/1
2 SPRAY, METERED NASAL 3 TIMES DAILY PRN
Qty: 30 ML | Refills: 3 | Status: SHIPPED | OUTPATIENT
Start: 2023-11-07

## 2023-11-07 NOTE — PROGRESS NOTES
THIS DOCUMENT WAS MADE IN PART WITH VOICE RECOGNITION SOFTWARE.  OCCASIONALLY THIS SOFTWARE WILL MISINTERPRET WORDS OR PHRASES.    Assessment and Plan:    1. Drug therapy  CBC Auto Differential    Comprehensive Metabolic Panel    Lipid Panel    Hemoglobin A1C    TSH    T4, Free    Urinalysis Microscopic    Urinalysis, Reflex to Urine Culture Urine, Clean Catch    PSA, Screening    Microalbumin/Creatinine Ratio, Urine    ipratropium (ATROVENT) 21 mcg (0.03 %) nasal spray      2. Post-nasal drip            PLAN    Wellness labs as above     Chronic conditions reviewed, see below     New medication Atrovent to use t.i.d. for postnasal drip related cough, recommended over-the-counter antihistamine and Flonase as well.  If no improvement, consider allergist referral      ______________________________________________________________________  Subjective:    Chief Complaint:  Chief Complaint   Patient presents with    [facility]         HPI:  Carlos is a 73 y.o. year old     Has TKA left knee upcoming with Dr Lehman     Complains of several weeks of postnasal drip, nonproductive cough.  Typically worse at night.  No current allergy medicines.  Denies any wheezing or shortness of breath or fever.  Denies any heartburn symptoms    Chronic conditions reviewed, see below    History of gout   Rx-allopurinol 100 mg  Prev flare  years ago     Essential hypertension  Rx-amlodipine 2.5 mg, losartan 100 mg  Normotensive in clinic   Home : in normal range    Coronary artery disease   Rx-atorvastatin 40 mg, Zetia 10 mg, Plavix 75 mg, Ranexa, NTG  Cardiology - MD Vadim   TUYET : x 7   CABG : 7  Denies exertional chest symptoms     Diastolic heart failure    Obstructive sleep apnea  Prev used CPAP --> GRADUATED     Memory impairment  Rx : Aduhelm (infusion)  ---> secondary neuropathy   Neurology : Curtis --- > MD Jg Contreras MD manages infusions     History diabetes / Prediab  Rx- none   Previous A1c- 5.6      Elevated PSA --> Prostate Cancer   Urology : MD Sophie   Tx : Active Monitoring     Renal mass --> Renal Cell Cancer (Left Side)   Urology : MD Sophie   Sx : partial nephrectomy with robot (2017)  No RTX / Chemo    History of Nephroliths  Several  Prev episode > 10 years ago     Large Spermatocele Cyst  Monitored by urology     Cystic Acne   Location : back     Hearing Aid Status              Past Medical History:  Past Medical History:   Diagnosis Date    Arthritis     knees, back    Bilateral renal cysts 7/30/2012    CAD (coronary artery disease) 1995    no chest pain since CABG, sees Dr Larry Black    Diabetes mellitus     borderline    Hypertension     Kidney stones     uric acid stones    VASYL (obstructive sleep apnea) 2007    is compliant with CPAP    Primary gonadal failure        Past Surgical History:  Past Surgical History:   Procedure Laterality Date    BACK SURGERY  2010    L5-6 fusion, with pins,bone graft    cardiac stents      2 stents 12/2021 and 1/2022    CARDIAC SURGERY  1995, 2007    total 7 vessel bypass    CAROTID ARTERY ANGIOPLASTY  01/22/2023    1 stent posterior descending    COLONOSCOPY  2008    repeat 2013    COLONOSCOPY N/A 04/04/2017    Procedure: COLONOSCOPY;  Surgeon: Dmitry Sampson MD;  Location: [facility];  Service: Endoscopy;  Laterality: N/A;    CORONARY ARTERY BYPASS GRAFT  1995, 2007    cabgx3, cabgx5    CORONARY STENT PLACEMENT  10/2022    CORONARY STENT PLACEMENT  11/2022    EXTRACORPOREAL SHOCK WAVE LITHOTRIPSY      EYE SURGERY      cataracts bilateral    LITHOTRIPSY      LUMBAR LAMINECTOMY      Partial Nephrectomy Laproscopic      TRANSRECTAL BIOPSY OF PROSTATE WITH ULTRASOUND GUIDANCE N/A 03/19/2019    Procedure: BIOPSY, PROSTATE, RECTAL APPROACH, WITH US GUIDANCE;  Surgeon: Yovany Heart MD;  Location: [facility];  Service: Urology;  Laterality: N/A;    TRANSRECTAL BIOPSY OF PROSTATE WITH ULTRASOUND GUIDANCE N/A 10/20/2020    Procedure: BIOPSY, PROSTATE, RECTAL  APPROACH, WITH US GUIDANCE;  Surgeon: Yovany Heart MD;  Location: [facility];  Service: Urology;  Laterality: N/A;       Family History:  Family History   Problem Relation Age of Onset    Heart disease Mother     Hypertension Mother     Diabetes Mother     Alzheimer's disease Mother     Alzheimer's disease Father     Heart disease Father         premature    Hypertension Father     Diabetes Sister     Urolithiasis Sister     Alzheimer's disease Sister     Heart disease Paternal Uncle     Cancer Neg Hx     Prostate cancer Neg Hx     Kidney cancer Neg Hx     Melanoma Neg Hx     Lupus Neg Hx     Eczema Neg Hx     Psoriasis Neg Hx        Social History:  Social History     Socioeconomic History    Marital status:    Tobacco Use    Smoking status: Former     Current packs/day: 0.00     Types: Cigarettes     Quit date: 1976     Years since quittin.7    Smokeless tobacco: Never   Substance and Sexual Activity    Alcohol use: Yes     Comment: Socially    Drug use: No    Sexual activity: Yes     Social Determinants of Health     Financial Resource Strain: Low Risk  (10/31/2023)    Overall Financial Resource Strain (CARDIA)     Difficulty of Paying Living Expenses: Not hard at all   Food Insecurity: No Food Insecurity (10/31/2023)    Hunger Vital Sign     Worried About Running Out of Food in the Last Year: Never true     Ran Out of Food in the Last Year: Never true   Transportation Needs: No Transportation Needs (10/31/2023)    PRAPARE - Transportation     Lack of Transportation (Medical): No     Lack of Transportation (Non-Medical): No   Physical Activity: Inactive (10/31/2023)    Exercise Vital Sign     Days of Exercise per Week: 0 days     Minutes of Exercise per Session: 30 min   Stress: No Stress Concern Present (10/31/2023)    [employer] [place] [employer] - Occupational Stress Questionnaire     Feeling of Stress : Only a little   Social Connections: Moderately Isolated (10/31/2023)     Social Connection and Isolation Panel [NHANES]     Frequency of Communication with Friends and Family: More than three times a week     Frequency of Social Gatherings with Friends and Family: Twice a week     Attends Oriental orthodox Services: Never     Active Member of Clubs or Organizations: No     Attends Club or Organization Meetings: Never     Marital Status:    Housing Stability: Low Risk  (10/31/2023)    Housing Stability Vital Sign     Unable to Pay for Housing in the Last Year: No     Number of Places Lived in the Last Year: 1     Unstable Housing in the Last Year: No       Medications:  Current Outpatient Medications on File Prior to Visit   Medication Sig Dispense Refill    allopurinoL (ZYLOPRIM) 100 MG tablet TAKE 1 TABLET(100 MG) BY MOUTH EVERY DAY 90 tablet 3    amLODIPine (NORVASC) 2.5 MG tablet Take 1 tablet (2.5 mg total) by mouth every evening. 30 tablet 11    atorvastatin (LIPITOR) 40 MG tablet TAKE 1 TABLET(40 MG) BY MOUTH EVERY DAY 90 tablet 2    CALCIUM 500 + D 500 mg-5 mcg (200 unit) per tablet Take 1 tablet by mouth Daily.      clopidogreL (PLAVIX) 75 mg tablet Take 75 mg by mouth once daily.      cyanocobalamin, vitamin B-12, (VITAMIN B-12 ORAL) Take by mouth.      ezetimibe (ZETIA) 10 mg tablet Take 10 mg by mouth once daily.      ferrous sulfate (FEOSOL) 325 mg (65 mg iron) Tab tablet Take 325 mg by mouth.      folic acid (FOLVITE) 1 MG tablet TAKE 1 TABLET(1 MG) BY MOUTH EVERY DAY 90 tablet 3    losartan (COZAAR) 100 MG tablet TAKE 1 TABLET(100 MG) BY MOUTH EVERY DAY 90 tablet 2    metFORMIN (GLUCOPHAGE-XR) 500 MG ER 24hr tablet Take 1 tablet (500 mg total) by mouth 2 (two) times daily with meals. 180 tablet 1    nitroGLYCERIN (NITROSTAT) 0.4 MG SL tablet SMARTSI Tablet(s) Sublingual PRN      ranolazine (RANEXA) 500 MG Tb12 Take 500 mg by mouth 2 (two) times daily.      triamcinolone acetonide 0.025% (KENALOG) 0.025 % cream Apply topically 2 (two) times daily. 80 g 0    ADUHELM 100  mg/mL Soln 100 mg.      benzonatate (TESSALON) 100 MG capsule Take 100 mg by mouth 2 (two) times daily as needed.      [DISCONTINUED] ketoconazole (NIZORAL) 2 % cream Apply topically 2 (two) times daily. (Patient not taking: Reported on 11/7/2023) 60 g 4    [DISCONTINUED] potassium chloride SA (K-DUR,KLOR-CON) 20 MEQ tablet Take 1 tablet (20 mEq total) by mouth once daily. 90 tablet 3     Current Facility-Administered Medications on File Prior to Visit   Medication Dose Route Frequency Provider Last Rate Last Admin    triamcinolone acetonide injection 40 mg  40 mg Intra-articular  Mason Macedo MD   40 mg at 03/13/15 0929    triamcinolone acetonide injection 40 mg  40 mg Intra-articular  Mason Macedo MD   40 mg at 03/13/15 0929       Allergies:  Sulfa (sulfonamide antibiotics) and Adhesive    Immunizations:  Immunization History   Administered Date(s) Administered    COVID-19, MRNA, LN-S, PF (MODERNA FULL 0.5 ML DOSE) 08/19/2023    COVID-19, MRNA, LN-S, PF (Pfizer) (Purple Cap) 01/09/2021, 01/30/2021    Hepatitis A, Adult 09/02/2009, 08/04/2014    Hepatitis B, Adult 09/02/2009, 09/29/2009    Influenza 10/26/2007, 09/19/2009, 09/07/2011, 01/15/2014, 09/23/2020    Influenza (FLUAD) - Quadrivalent - Adjuvanted - PF *Preferred* (65+) 11/18/2021    Influenza - High Dose - PF (65 years and older) 09/29/2015, 02/09/2017, 01/26/2018, 02/20/2019, 11/27/2019, 09/23/2020    Influenza - Intradermal - Quadrivalent - PF 11/19/2012    Influenza - Intradermal - Trivalent - PF 11/19/2012    Influenza - Quadrivalent 11/13/2014    Influenza - Trivalent - PF (ADULT) 11/01/2014, 11/01/2014, 02/09/2017, 02/09/2017, 01/01/2018, 01/01/2018, 02/10/2019    Influenza Split 10/26/2007, 09/19/2009, 09/07/2011, 01/15/2014, 01/15/2014    Pneumococcal Conjugate - 13 Valent 01/13/2016, 06/01/2018    Pneumococcal Polysaccharide - 23 Valent 11/28/2012, 02/09/2017    Tdap 04/23/2012, 08/01/2014    Typhoid - ViCPs 08/04/2014, 03/02/2020     "Zoster 11/19/2012    Zoster Recombinant 04/30/2019, 04/30/2019, 07/15/2019       Review of Systems:  Review of Systems   All other systems reviewed and are negative.      Objective:    Vitals:  Vitals:    11/07/23 1055   BP: 128/74   Pulse: 60   SpO2: 99%   Weight: 86.6 kg (190 lb 14.7 oz)   Height: 5' 8" (1.727 m)   PainSc: 0-No pain       Physical Exam  Vitals reviewed.   Constitutional:       General: He is not in acute distress.  HENT:      Head: Normocephalic and atraumatic.      Nose: Rhinorrhea present.   Eyes:      Pupils: Pupils are equal, round, and reactive to light.   Cardiovascular:      Rate and Rhythm: Normal rate and regular rhythm.      Heart sounds: No murmur heard.     No friction rub.   Pulmonary:      Effort: Pulmonary effort is normal.      Breath sounds: Normal breath sounds.   Abdominal:      General: Bowel sounds are normal. There is no distension.      Palpations: Abdomen is soft.      Tenderness: There is no abdominal tenderness.   Musculoskeletal:      Cervical back: Neck supple.   Skin:     General: Skin is warm and dry.      Findings: No rash.   Psychiatric:         Behavior: Behavior normal.             Morteza Blevins MD  Family Medicine      "

## 2023-11-08 ENCOUNTER — PATIENT MESSAGE (OUTPATIENT)
Dept: ADMINISTRATIVE | Facility: HOSPITAL | Age: 73
End: 2023-11-08
Payer: COMMERCIAL

## 2023-11-09 ENCOUNTER — PATIENT MESSAGE (OUTPATIENT)
Dept: FAMILY MEDICINE | Facility: CLINIC | Age: 73
End: 2023-11-09
Payer: COMMERCIAL

## 2023-11-09 DIAGNOSIS — R09.82 POST-NASAL DRIP: Primary | ICD-10-CM

## 2023-11-09 RX ORDER — BENZONATATE 100 MG/1
100 CAPSULE ORAL 3 TIMES DAILY PRN
Qty: 60 CAPSULE | Refills: 2 | Status: SHIPPED | OUTPATIENT
Start: 2023-11-09 | End: 2024-03-07

## 2023-11-14 ENCOUNTER — CLINICAL SUPPORT (OUTPATIENT)
Dept: REHABILITATION | Facility: HOSPITAL | Age: 73
End: 2023-11-14
Payer: COMMERCIAL

## 2023-11-14 ENCOUNTER — PATIENT MESSAGE (OUTPATIENT)
Dept: FAMILY MEDICINE | Facility: CLINIC | Age: 73
End: 2023-11-14
Payer: COMMERCIAL

## 2023-11-14 DIAGNOSIS — R29.3 POSTURE ABNORMALITY: Primary | ICD-10-CM

## 2023-11-14 DIAGNOSIS — R26.9 GAIT ABNORMALITY: ICD-10-CM

## 2023-11-14 PROCEDURE — 97161 PT EVAL LOW COMPLEX 20 MIN: CPT | Mod: PN

## 2023-11-14 PROCEDURE — 97110 THERAPEUTIC EXERCISES: CPT | Mod: PN

## 2023-11-15 PROBLEM — R29.3 POSTURE ABNORMALITY: Status: ACTIVE | Noted: 2023-11-15

## 2023-11-15 PROBLEM — R26.9 GAIT ABNORMALITY: Status: ACTIVE | Noted: 2023-11-15

## 2023-11-15 NOTE — PLAN OF CARE
[facility]   Physical Therapy Initial Evaluation      Name: Carlos Tenorio Jr.  Clinic Number: [id number]    Therapy Diagnosis:   Encounter Diagnoses   Name Primary?    Posture abnormality Yes    Gait abnormality         Physician: Martin Lehman,*    Physician Orders: PT eval and treat  Medical Diagnosis from Referral:   Diagnosis   M17.12 (ICD-10-CM) - Unilateral primary osteoarthritis, left knee     Evaluation Date: 11/14/2023  Authorization Period Expiration: 12/31/2023   Plan of Care Expiration: 2/19/2024  Progress Note Due: 12/14/2023  Visit # / Visits authorized: 1/ 1  FOTO: 1/ 3    Precautions: Standard , early onset dementia    Time In: 500pm  Time Out: 555pm  Total Billable Time: 55 minutes    Subjective     Date of onset: chronic knee pain    History of current condition - Vlad reports: Bilateral knee pain for many years. More pain with walking and stairs. He still works full time and has trouble ascending the stairs to his work. He also travels a lot with his wife. He has fallen a few times overseas on their travels, usually because of a trip or trouble lifting his legs high enough.   He has Bilateral knee braces that help with the stairs. He is having a total knee arthroplasty on the left knee on November 27th. He will have the right knee done three months later if all goes well with the first one.  He is complaining of low back pain today that has been occurring for a few days now.   PMH: HTN, early prostate cancer, pre-diabetic, neuropathy (medication side effect)    Falls: a few time on trips    Imaging: see imaging section    Prior Therapy: none  Social History: Lives with family  Occupation: retired  Prior Level of Function: independent physically, need some help with his memory  Current Level of Function: pain affecting work and travel    Pain:  Current 3/10, worst 4/10, best 2/10   Location: left knee  Description: Aching  Aggravating Factors: Standing and  Walking, stairs  Easing Factors: rest    Patients goals: ascend stairs pain free, return to traveling     Medical History:   Past Medical History:   Diagnosis Date    Arthritis     knees, back    Bilateral renal cysts 7/30/2012    CAD (coronary artery disease) 1995    no chest pain since CABG, sees Dr Larry Black    Diabetes mellitus     borderline    Hypertension     Kidney stones     uric acid stones    VASYL (obstructive sleep apnea) 2007    is compliant with CPAP    Primary gonadal failure        Surgical History:   Carlos Tenorio Jr.  has a past surgical history that includes Coronary artery bypass graft (1995, 2007); Extracorporeal shock wave lithotripsy; Colonoscopy (2008); Lithotripsy; Lumbar laminectomy; Colonoscopy (N/A, 04/04/2017); Cardiac surgery (1995, 2007); Back surgery (2010); Eye surgery; Partial Nephrectomy Laproscopic; Transrectal biopsy of prostate with ultrasound guidance (N/A, 03/19/2019); Transrectal biopsy of prostate with ultrasound guidance (N/A, 10/20/2020); cardiac stents; Carotid angioplasty (01/22/2023); Coronary stent placement (10/2022); and Coronary stent placement (11/2022).    Medications:   Carlos has a current medication list which includes the following prescription(s): aduhelm, allopurinol, amlodipine, atorvastatin, benzonatate, calcium 500 + d, clopidogrel, cyanocobalamin (vitamin b-12), ezetimibe, ferrous sulfate, folic acid, ipratropium, losartan, metformin, nitroglycerin, ranolazine, and triamcinolone acetonide 0.025%, and the following Facility-Administered Medications: triamcinolone acetonide and triamcinolone acetonide.    Allergies:   Review of patient's allergies indicates:   Allergen Reactions    Sulfa (sulfonamide antibiotics) Hives    Adhesive Blisters        Objective      Observation:  Patient presents to clinic alert and oriented with his wife. Bilateral knee braces.   Bilateral genu varum.    Posture:   Significant thoracic kyphosis, flat lumbar spine,  Bilateral genu varum    Gait:  Slow jr, no AD, genu varum    Range of Motion:   Knee Left Passive R passive   Flexion 110 -10   Extension -5 105     Joint Mobility:  PF joint mobility limited, TF joint mobility more limited    Lower Extremity Strength    Quad Set: good Bilateral   SLR: able to Straight leg raise Bilateral, twice on the right and three times on the left    Functional Testing:   Squat: pain in the back, very shallow depth    Balance Assessment:       Evaluation   Single Limb Stance R LE 2 seconds  (<10 sec = HIGH FALL RISK)   Single Limb Stance L LE 1 second  (<10 sec = HIGH FALL RISK)      Palpation:  Bilateral knees not tender throughout       Intake Outcome Measure for FOTO Knee Survey    Therapist reviewed FOTO scores for Carlos Tenorio  on 11/14/2023.   FOTO report - see Media section or FOTO account episode details.    Intake Score: 36%  Predicted Score: 53%         Treatment     Total Treatment time (time-based codes) separate from Evaluation: 23 minutes     Vlad received the treatments listed below:      therapeutic exercises to develop strength and ROM for 23 minutes including:    Home Exercise Program education for pre and post op  Post op: heel prop, quad set, heel slide, resisted PF  Pre op:   Bridges x 10    Straight leg raise x 10 Bilateral    Supine clamshell red band x 15    Patient Education and Home Exercises     Education provided:   - Home Exercise Program, diagnosis, prognosis, Plan of care     Written Home Exercises Provided: yes. Exercises were reviewed and Vlad was able to demonstrate them prior to the end of the session.  Vlad demonstrated fair  understanding of the education provided. See EMR under Patient Instructions for exercises provided during therapy sessions.    Assessment     Carlos is a 73 y.o. male referred to outpatient Physical Therapy two week pre left TKA. He presented with limited range of motion bilaterally, inhibited mobility, balance, and strength  bilaterally. He has functional limitations of pain with ascending stairs and walking for longer periods. The plan is to have both knees replaced. He will return to me after he completes home health post op. We will work on achieving his post op goals as well as strengthening his other knee for the second surgery.     Patient prognosis is Good.   Patient will benefit from skilled outpatient Physical Therapy to address the deficits stated above and in the chart below, provide patient /family education, and to maximize patientt's level of independence.     Plan of care discussed with patient: Yes  Patient's spiritual, cultural and educational needs considered and patient is agreeable to the plan of care and goals as stated below:     Anticipated Barriers for therapy: co-morbidities, compliance    Medical Necessity is demonstrated by the following  History  Co-morbidities and personal factors that may impact the plan of care [] LOW: no personal factors / co-morbidities  [] MODERATE: 1-2 personal factors / co-morbidities  [x] HIGH: 3+ personal factors / co-morbidities    Moderate / High Support Documentation:   Co-morbidities affecting plan of care: CAD, DM, HTN, sleep apnea, age, BMI    Personal Factors:   no deficits     Examination  Body Structures and Functions, activity limitations and participation restrictions that may impact the plan of care [] LOW: addressing 1-2 elements  [] MODERATE: 3+ elements  [x] HIGH: 4+ elements (please support below)    Moderate / High Support Documentation: strength, range of motion, gait, balance     Clinical Presentation [x] LOW: stable  [] MODERATE: Evolving  [] HIGH: Unstable     Decision Making/ Complexity Score: low       Goals:  Short Term Goals: (8 weeks)  1. Patient will be independent with Home Exercise Program in order to supplement patient in improving functional mobility.   2. Patient will improve left knee AROM to at least 5-100 in order to improve gait.   3. Patient will  improve hip flexor/quadriceps mobility to WNL in order to improve hip/knee AROM and improved gait function   4. Pt will improve hip abduction strength to 4+/5 to improve functional gait deviation.      Long Term Goals: (14 weeks)  1. Patient will be independent with updated HEP supplement PT in improving functional mobility.   2. Patient will improve left knee AROM to at least 0-120 in order to improve gait and ability to perform ADLs.   3. Patient will improve FOTO knee survey score to predicted level in order to demo improved functional mobility.   4. Patient will perform TUG in < 12 seconds without AD in order to decrease risk of falling   5. Patient will perform at least 10 sit to stands without UE support on 30 second sit to stand test in order to demo improved ability to perform transfers.   6. Patient goal: ascend stairs pain free, return to traveling    Plan     Plan of care Certification: 11/14/2023 to 2/19/2024.    Outpatient Physical Therapy 1-2 times weekly for 14 weeks to include the following interventions: Gait Training, Manual Therapy, Moist Heat/ Ice, Neuromuscular Re-ed, Patient Education, Therapeutic Activities, and Therapeutic Exercise.     Aarti Palmer, PT, DPT        Physician's Signature: _________________________________________ Date: ________________

## 2023-11-16 ENCOUNTER — TELEPHONE (OUTPATIENT)
Dept: FAMILY MEDICINE | Facility: CLINIC | Age: 73
End: 2023-11-16
Payer: COMMERCIAL

## 2023-11-16 NOTE — TELEPHONE ENCOUNTER
----- Message from Hermelinda Callaway sent at 11/16/2023  8:46 AM CST -----  Contact: self  Type:  Needs Medical Advice    Who Called: wife Jessica  Symptoms (please be specific): wife sts she needs a surgical clearance for Dr. Lehman, [facility], the number is .    Would the patient rather a call back or a response via [facility]? call  Best Call Back Number:     Additional Information: please advise and thank you.

## 2023-11-16 NOTE — TELEPHONE ENCOUNTER
Handling in portal message--spoke with pt wife and she did state that they spoke to Dr. Blevins about this at visit on 11/7/23      Surgery is TKA

## 2023-12-28 DIAGNOSIS — M25.561 RIGHT KNEE PAIN: Primary | ICD-10-CM

## 2024-01-03 ENCOUNTER — CLINICAL SUPPORT (OUTPATIENT)
Dept: REHABILITATION | Facility: HOSPITAL | Age: 74
End: 2024-01-03
Payer: COMMERCIAL

## 2024-01-03 DIAGNOSIS — R29.3 POSTURE ABNORMALITY: Primary | ICD-10-CM

## 2024-01-03 DIAGNOSIS — R26.9 GAIT ABNORMALITY: ICD-10-CM

## 2024-01-03 PROCEDURE — 97110 THERAPEUTIC EXERCISES: CPT | Mod: PN

## 2024-01-03 PROCEDURE — 97530 THERAPEUTIC ACTIVITIES: CPT | Mod: PN

## 2024-01-03 PROCEDURE — 97112 NEUROMUSCULAR REEDUCATION: CPT | Mod: PN

## 2024-01-03 PROCEDURE — 97161 PT EVAL LOW COMPLEX 20 MIN: CPT | Mod: PN

## 2024-01-03 NOTE — PROGRESS NOTES
[facility]   Physical Therapy Initial Evaluation      Name: Carlos Tenorio Jr.  Clinic Number: [id number]    Therapy Diagnosis:   Encounter Diagnoses   Name Primary?    Posture abnormality Yes    Gait abnormality         Physician: Martin Lehman,*    Physician Orders: PT eval and treat  Medical Diagnosis from Referral:   Diagnosis   M25.561 (ICD-10-CM) - Right knee pain     Evaluation Date: 1/3/2024  Authorization Period Expiration:     12/27/2024     Plan of Care Expiration: 2/28/2024  Progress Note Due: 2/3/2024  Visit # / Visits authorized: 1/ 12  FOTO: 1/ 3    Precautions: Standard , early onset dementia      Time In: 1100am  Time Out: 1155am  Total Billable Time: 55 minutes    Date of Surgery: 12/13/2023  Post Op Day: 3 weeks as of 1/3  Procedure: right total knee replacement    Subjective     Date of onset: chronic knee pain, 3 weeks post op    History of current condition - Vlad reports:   Previously:   Bilateral knee pain for many years. More pain with walking and stairs. He still works full time and has trouble ascending the stairs to his work. He also travels a lot with his wife. He has fallen a few times overseas on their travels, usually because of a trip or trouble lifting his legs high enough.   He has Bilateral knee braces that help with the stairs. He is having a total knee arthroplasty on the left knee on November 27th. He will have the right knee done three months later if all goes well with the first one.  He is complaining of low back pain today that has been occurring for a few days now.   PMH: HTN, early prostate cancer, pre-diabetic, neuropathy (medication side effect)    Update:  Doing well since the surgery. Had home health for two weeks. He fell before surgery and had a subdural hematoma which delayed the surgery. He also fell two other time at work going up the stairs. He plans to return to work at the end of Jan.    Falls: a few time on trips   Fell  three more times before surgery    Imaging: see imaging section     Prior Therapy: rehab for knee  Social History: Lives with wife and family  Occupation: retired but teaches some  Prior Level of Function: independent physically, need some help with his memory   Current Level of Function: pain affecting work and travel     Pain:  Current 2/10, worst 5/10, best 1/10   Location: right knee  Description: achy  Aggravating Factors: standing, bending  Easing Factors: ice, rest    Patients goals: ascend stairs pain free, return to traveling      Medical History:   Past Medical History:   Diagnosis Date    Arthritis     knees, back    Bilateral renal cysts 7/30/2012    CAD (coronary artery disease) 1995    no chest pain since CABG, sees Dr Larry Black    Diabetes mellitus     borderline    Hypertension     Kidney stones     uric acid stones    VASYL (obstructive sleep apnea) 2007    is compliant with CPAP    Primary gonadal failure        Surgical History:   Carlos Tenorio   has a past surgical history that includes Coronary artery bypass graft (1995, 2007); Extracorporeal shock wave lithotripsy; Colonoscopy (2008); Lithotripsy; Lumbar laminectomy; Colonoscopy (N/A, 04/04/2017); Cardiac surgery (1995, 2007); Back surgery (2010); Eye surgery; Partial Nephrectomy Laproscopic; Transrectal biopsy of prostate with ultrasound guidance (N/A, 03/19/2019); Transrectal biopsy of prostate with ultrasound guidance (N/A, 10/20/2020); cardiac stents; Carotid angioplasty (01/22/2023); Coronary stent placement (10/2022); and Coronary stent placement (11/2022).    Medications:   Carlos has a current medication list which includes the following prescription(s): aduhelm, allopurinol, amlodipine, atorvastatin, benzonatate, calcium 500 + d, clopidogrel, cyanocobalamin (vitamin b-12), ezetimibe, ferrous sulfate, folic acid, ipratropium, losartan, metformin, nitroglycerin, ranolazine, and triamcinolone acetonide 0.025%, and the  "following Facility-Administered Medications: triamcinolone acetonide and triamcinolone acetonide.    Allergies:   Review of patient's allergies indicates:   Allergen Reactions    Sulfa (sulfonamide antibiotics) Hives    Adhesive Blisters        Objective      Observation:  Patient presents to clinic with wife, alert and oriented.     Gait:  Ambulating with RW, lacking full knee extension in gait, slight forward trunk lean, slow jr.     Range of Motion:   Knee R passive   Flexion 90   Extension -5     Joint Mobility:  Impaired TF and PF as expected post op    Lower Extremity Strength    Quad Set: fair  SLR: unable in supine    Special Tests:  Not performed due to post op status.    Functional Testing:   Timed Up and Go: 35 seconds      Table: Population Norms for TUG    Age  Average TUG    60 - 69 years  8.1 seconds    70 - 79 years  9.2 seconds    80 - 99 years  11.3 seconds            Evaluation   30 second Chair Rise  (adults > 59 y/o) Unable to complete with no arms     Palpation:  Not specifically tender.     Edema: minimal     Girth Measurement Joint line 5 cm below 10 cm above   Right 44 cm 42.5 cm 52.5 cm        Intake Outcome Measure for FOTO Knee Survey    Therapist reviewed FOTO scores for Carlos Fontenot Jona Corrigan on 1/3/2024.   FOTO report - see Media section or FOTO account episode details.    Intake Score: 39%  Predicted Score: 74%         Treatment     Total Treatment time (time-based codes) separate from Evaluation: 30 minutes     Vlad received the treatments listed below:      therapeutic exercises to develop strength, endurance, ROM, flexibility, posture, and core stabilization for 10 minutes including:    Heel slides 5" x 10  Bridges x 10    neuromuscular re-education activities to improve: Balance, Coordination, Kinesthetic, Sense, Proprioception, and Posture for 10 minutes. The following activities were included:    Quad set 10" x 10  Modified Straight leg raise at edge of table x 10  Standing " hip abduction x 10 Bilateral     therapeutic activities to improve functional performance for 10  minutes, including:    Sit to stand training  Gait training  Discussion about AD use      Patient Education and Home Exercises     Education provided:   - Home Exercise Program, diagnosis, prognosis, Plan of care     Written Home Exercises Provided: yes. Exercises were reviewed and Vlad was able to demonstrate them prior to the end of the session.  Vlad demonstrated fair  understanding of the education provided. See EMR under Patient Instructions for exercises provided during therapy sessions.    Assessment     Carlos is a 73 y.o. male referred to outpatient Physical Therapy 3 weeks post right total knee replacement. He is doing well so far but has some continued limitation in extension, flexion, is unable to Straight leg raise, and has gait impairments. He has functional limitations of inability to drive or negotiate stairs.    Patient prognosis is Good.   Patient will benefit from skilled outpatient Physical Therapy to address the deficits stated above and in the chart below, provide patient /family education, and to maximize patientt's level of independence.     Plan of care discussed with patient: Yes  Patient's spiritual, cultural and educational needs considered and patient is agreeable to the plan of care and goals as stated below:     Anticipated Barriers for therapy: co-morbidities, compliance     Medical Necessity is demonstrated by the following  History  Co-morbidities and personal factors that may impact the plan of care [] LOW: no personal factors / co-morbidities  [] MODERATE: 1-2 personal factors / co-morbidities  [x] HIGH: 3+ personal factors / co-morbidities    Moderate / High Support Documentation:   Co-morbidities affecting plan of care: CAD, DM, HTN, sleep apnea, age, BMI     Personal Factors:   no deficits     Examination  Body Structures and Functions, activity limitations and participation  restrictions that may impact the plan of care [] LOW: addressing 1-2 elements  [] MODERATE: 3+ elements  [x] HIGH: 4+ elements (please support below)    Moderate / High Support Documentation: strength, range of motion, gait, balance      Clinical Presentation [x] LOW: stable  [] MODERATE: Evolving  [] HIGH: Unstable     Decision Making/ Complexity Score: low       Goals:  Short Term Goals: (8 weeks)  1. Patient will be independent with Home Exercise Program in order to supplement patient in improving functional mobility.   2. Patient will improve left knee AROM to at least 5-100 in order to improve gait.   3. Patient will improve hip flexor/quadriceps mobility to WNL in order to improve hip/knee AROM and improved gait function   4. Pt will improve hip abduction strength to 4+/5 to improve functional gait deviation.      Long Term Goals: (14 weeks)  1. Patient will be independent with updated HEP supplement PT in improving functional mobility.   2. Patient will improve left knee AROM to at least 0-120 in order to improve gait and ability to perform ADLs.   3. Patient will improve FOTO knee survey score to predicted level in order to demo improved functional mobility.   4. Patient will perform TUG in < 12 seconds without AD in order to decrease risk of falling   5. Patient will perform at least 10 sit to stands without UE support on 30 second sit to stand test in order to demo improved ability to perform transfers.   6. Patient goal: ascend stairs pain free, return to traveling    Plan     Plan of care Certification: 1/3/2024 to 2/28/2024.    Outpatient Physical Therapy 1-2 times weekly for 8 weeks to include the following interventions: Gait Training, Manual Therapy, Moist Heat/ Ice, Neuromuscular Re-ed, Patient Education, Therapeutic Activities, and Therapeutic Exercise.     Aarti Palmer, PT, DPT        Physician's Signature: _________________________________________ Date: ________________

## 2024-01-04 NOTE — PLAN OF CARE
[facility]   Physical Therapy Initial Evaluation      Name: Carlos Tenorio Jr.  Clinic Number: [id number]    Therapy Diagnosis:   Encounter Diagnoses   Name Primary?    Posture abnormality Yes    Gait abnormality         Physician: Martin Lehman,*    Physician Orders: PT eval and treat  Medical Diagnosis from Referral:   Diagnosis   M25.561 (ICD-10-CM) - Right knee pain     Evaluation Date: 1/3/2024  Authorization Period Expiration:     12/27/2024     Plan of Care Expiration: 2/28/2024  Progress Note Due: 2/3/2024  Visit # / Visits authorized: 1/ 12  FOTO: 1/ 3    Precautions: Standard , early onset dementia      Time In: 1100am  Time Out: 1155am  Total Billable Time: 55 minutes    Date of Surgery: 12/13/2023  Post Op Day: 3 weeks as of 1/3  Procedure: right total knee replacement    Subjective     Date of onset: chronic knee pain, 3 weeks post op    History of current condition - Vlad reports:   Previously:   Bilateral knee pain for many years. More pain with walking and stairs. He still works full time and has trouble ascending the stairs to his work. He also travels a lot with his wife. He has fallen a few times [place] on their travels, usually because of a trip or trouble lifting his legs high enough.   He has Bilateral knee braces that help with the stairs. He is having a total knee arthroplasty on the left knee on November 27th. He will have the right knee done three months later if all goes well with the first one.  He is complaining of low back pain today that has been occurring for a few days now.   PMH: HTN, early prostate cancer, pre-diabetic, neuropathy (medication side effect)    Update:  Doing well since the surgery. Had home health for two weeks. He fell before surgery and had a subdural hematoma which delayed the surgery. He also fell two other time at work going up the stairs. He plans to return to work at the end of Jan.    Falls: a few time on trips   Fell  three more times before surgery    Imaging: see imaging section     Prior Therapy: rehab for knee  Social History: Lives with wife and family  Occupation: retired but teaches some  Prior Level of Function: independent physically, need some help with his memory   Current Level of Function: pain affecting work and travel     Pain:  Current 2/10, worst 5/10, best 1/10   Location: right knee  Description: achy  Aggravating Factors: standing, bending  Easing Factors: ice, rest    Patients goals: ascend stairs pain free, return to traveling      Medical History:   Past Medical History:   Diagnosis Date    Arthritis     knees, back    Bilateral renal cysts 7/30/2012    CAD (coronary artery disease) 1995    no chest pain since CABG, sees Dr Larry Black    Diabetes mellitus     borderline    Hypertension     Kidney stones     uric acid stones    VASYL (obstructive sleep apnea) 2007    is compliant with CPAP    Primary gonadal failure        Surgical History:   Carlos Tenorio   has a past surgical history that includes Coronary artery bypass graft (1995, 2007); Extracorporeal shock wave lithotripsy; Colonoscopy (2008); Lithotripsy; Lumbar laminectomy; Colonoscopy (N/A, 04/04/2017); Cardiac surgery (1995, 2007); Back surgery (2010); Eye surgery; Partial Nephrectomy Laproscopic; Transrectal biopsy of prostate with ultrasound guidance (N/A, 03/19/2019); Transrectal biopsy of prostate with ultrasound guidance (N/A, 10/20/2020); cardiac stents; Carotid angioplasty (01/22/2023); Coronary stent placement (10/2022); and Coronary stent placement (11/2022).    Medications:   Carlos has a current medication list which includes the following prescription(s): aduhelm, allopurinol, amlodipine, atorvastatin, benzonatate, calcium 500 + d, clopidogrel, cyanocobalamin (vitamin b-12), ezetimibe, ferrous sulfate, folic acid, ipratropium, losartan, metformin, nitroglycerin, ranolazine, and triamcinolone acetonide 0.025%, and the  "following Facility-Administered Medications: triamcinolone acetonide and triamcinolone acetonide.    Allergies:   Review of patient's allergies indicates:   Allergen Reactions    Sulfa (sulfonamide antibiotics) Hives    Adhesive Blisters        Objective      Observation:  Patient presents to clinic with wife, alert and oriented.     Gait:  Ambulating with RW, lacking full knee extension in gait, slight forward trunk lean, slow jr.     Range of Motion:   Knee R passive   Flexion 90   Extension -5     Joint Mobility:  Impaired TF and PF as expected post op    Lower Extremity Strength    Quad Set: fair  SLR: unable in supine    Special Tests:  Not performed due to post op status.    Functional Testing:   Timed Up and Go: 35 seconds      Table: Population Norms for TUG    Age  Average TUG    60 - 69 years  8.1 seconds    70 - 79 years  9.2 seconds    80 - 99 years  11.3 seconds            Evaluation   30 second Chair Rise  (adults > 59 y/o) Unable to complete with no arms     Palpation:  Not specifically tender.     Edema: minimal     Girth Measurement Joint line 5 cm below 10 cm above   Right 44 cm 42.5 cm 52.5 cm        Intake Outcome Measure for FOTO Knee Survey    Therapist reviewed FOTO scores for Carlos Fontenot Jona Corrigan on 1/3/2024.   FOTO report - see Media section or FOTO account episode details.    Intake Score: 39%  Predicted Score: 74%         Treatment     Total Treatment time (time-based codes) separate from Evaluation: 30 minutes     Vlad received the treatments listed below:      therapeutic exercises to develop strength, endurance, ROM, flexibility, posture, and core stabilization for 10 minutes including:    Heel slides 5" x 10  Bridges x 10    neuromuscular re-education activities to improve: Balance, Coordination, Kinesthetic, Sense, Proprioception, and Posture for 10 minutes. The following activities were included:    Quad set 10" x 10  Modified Straight leg raise at edge of table x 10  Standing " hip abduction x 10 Bilateral     therapeutic activities to improve functional performance for 10  minutes, including:    Sit to stand training  Gait training  Discussion about AD use      Patient Education and Home Exercises     Education provided:   - Home Exercise Program, diagnosis, prognosis, Plan of care     Written Home Exercises Provided: yes. Exercises were reviewed and Vlad was able to demonstrate them prior to the end of the session.  Vlad demonstrated fair  understanding of the education provided. See EMR under Patient Instructions for exercises provided during therapy sessions.    Assessment     Carlos is a 73 y.o. male referred to outpatient Physical Therapy 3 weeks post right total knee replacement. He is doing well so far but has some continued limitation in extension, flexion, is unable to Straight leg raise, and has gait impairments. He has functional limitations of inability to drive or negotiate stairs.    Patient prognosis is Good.   Patient will benefit from skilled outpatient Physical Therapy to address the deficits stated above and in the chart below, provide patient /family education, and to maximize patientt's level of independence.     Plan of care discussed with patient: Yes  Patient's spiritual, cultural and educational needs considered and patient is agreeable to the plan of care and goals as stated below:     Anticipated Barriers for therapy: co-morbidities, compliance     Medical Necessity is demonstrated by the following  History  Co-morbidities and personal factors that may impact the plan of care [] LOW: no personal factors / co-morbidities  [] MODERATE: 1-2 personal factors / co-morbidities  [x] HIGH: 3+ personal factors / co-morbidities    Moderate / High Support Documentation:   Co-morbidities affecting plan of care: CAD, DM, HTN, sleep apnea, age, BMI     Personal Factors:   no deficits     Examination  Body Structures and Functions, activity limitations and participation  restrictions that may impact the plan of care [] LOW: addressing 1-2 elements  [] MODERATE: 3+ elements  [x] HIGH: 4+ elements (please support below)    Moderate / High Support Documentation: strength, range of motion, gait, balance      Clinical Presentation [x] LOW: stable  [] MODERATE: Evolving  [] HIGH: Unstable     Decision Making/ Complexity Score: low       Goals:  Short Term Goals: (8 weeks)  1. Patient will be independent with Home Exercise Program in order to supplement patient in improving functional mobility.   2. Patient will improve left knee AROM to at least 5-100 in order to improve gait.   3. Patient will improve hip flexor/quadriceps mobility to WNL in order to improve hip/knee AROM and improved gait function   4. Pt will improve hip abduction strength to 4+/5 to improve functional gait deviation.      Long Term Goals: (14 weeks)  1. Patient will be independent with updated HEP supplement PT in improving functional mobility.   2. Patient will improve left knee AROM to at least 0-120 in order to improve gait and ability to perform ADLs.   3. Patient will improve FOTO knee survey score to predicted level in order to demo improved functional mobility.   4. Patient will perform TUG in < 12 seconds without AD in order to decrease risk of falling   5. Patient will perform at least 10 sit to stands without UE support on 30 second sit to stand test in order to demo improved ability to perform transfers.   6. Patient goal: ascend stairs pain free, return to traveling    Plan     Plan of care Certification: 1/3/2024 to 2/28/2024.    Outpatient Physical Therapy 1-2 times weekly for 8 weeks to include the following interventions: Gait Training, Manual Therapy, Moist Heat/ Ice, Neuromuscular Re-ed, Patient Education, Therapeutic Activities, and Therapeutic Exercise.     Aarti Palmer, PT, DPT        Physician's Signature: _________________________________________ Date: ________________

## 2024-01-08 ENCOUNTER — CLINICAL SUPPORT (OUTPATIENT)
Dept: REHABILITATION | Facility: HOSPITAL | Age: 74
End: 2024-01-08
Payer: COMMERCIAL

## 2024-01-08 DIAGNOSIS — R26.9 GAIT ABNORMALITY: Primary | ICD-10-CM

## 2024-01-08 PROCEDURE — 97112 NEUROMUSCULAR REEDUCATION: CPT | Mod: PN

## 2024-01-08 PROCEDURE — 97110 THERAPEUTIC EXERCISES: CPT | Mod: PN

## 2024-01-08 PROCEDURE — 97530 THERAPEUTIC ACTIVITIES: CPT | Mod: PN

## 2024-01-08 NOTE — PROGRESS NOTES
"[facility]   Physical Therapy Treatment Note     Name: Carlos Tenorio Jr.  Clinic Number: [id number]    Therapy Diagnosis:   Encounter Diagnosis   Name Primary?    Gait abnormality Yes     Physician: Martin Lehman,*    Visit Date: 1/8/2024    Physician Orders: PT eval and treat  Medical Diagnosis from Referral:   Diagnosis   M25.561 (ICD-10-CM) - Right knee pain      Evaluation Date: 1/3/2024  Authorization Period Expiration:     12/27/2024      Plan of Care Expiration: 2/28/2024  Progress Note Due: 2/3/2024  Visit # / Visits authorized: 2/ 12  FOTO: 1/ 3    FOTO 1st: 39%  Predicted Score: 74%  FOTO 3rd:  FOTO 10th:    Time in: 1100am  Time out: 1155am  Total Billable Time: 55 minutes    Precautions:  Standard , early onset dementia       SUBJECTIVE     Pt reports: feeling okay, walking with the cane now, doing exercises at home, still can't Straight leg raise.    He was compliant with home exercise program.  Response to previous treatment: ongoing  Functional change: ongoing    Pain: 2/10  Location: right knee     OBJECTIVE     Objective Measures updated at progress report unless specified.     Treatment       Vlad received the treatments listed below:      Manual therapy techniques: Joint mobilizations were applied to the: right knee for 0 minutes, including:    Therapeutic exercises to develop strength, endurance, and ROM for 9 minutes including:    Heel prop 3# above knee 2# below knee 8'  Heel slides x 20    Neuromuscular re-education activities to improve: Balance, Coordination, Kinesthetic, Sense, Proprioception, and Posture for 23 minutes. The following activities were included:    Quad set 10" x 10  Active Assisted Range of Motion Straight leg raise x 10  Modified Straight leg raise 3 x 10 Bilateral   Short arc quad 4# x 30  Long arc quad  4# x 30  Bridges x 30   Supine clamshell x 30    Therapeutic activities to improve functional performance for 23 minutes, " including:    Nustep for activity tolerance level 2, 10'  Sit to stands from elevated mat 3 x 10  Gait training with cane (opposite hand)      Patient Education and Home Exercises     Home Exercises Provided and Patient Education Provided     Education provided:   - Home Exercise Program     Written Home Exercises Provided: Patient instructed to cont prior HEP. Exercises were reviewed and Vlad was able to demonstrate them prior to the end of the session.  Vlad demonstrated fair  understanding of the education provided. See EMR under Patient Instructions for exercises provided during therapy sessions    ASSESSMENT     Vlad presented with good range of motion compared to the evaluation. He is still unable to Straight leg raise so we performed modified Straight leg raises along with several other quad interventions. He did well with some fatigue but no increase in pain.    Vlad is progressing well towards his goals.     Pt prognosis is Good.     Pt will continue to benefit from skilled outpatient physical therapy to address the deficits listed in the problem list box on initial evaluation, provide pt/family education and to maximize pt's level of independence in the home and community environment.     Pt's spiritual, cultural and educational needs considered and pt agreeable to plan of care and goals.     Anticipated barriers to physical therapy: co-morbidities, compliance     Goals:   Short Term Goals: (8 weeks) -Progressing, not met   1. Patient will be independent with Home Exercise Program in order to supplement patient in improving functional mobility.   2. Patient will improve left knee AROM to at least 5-100 in order to improve gait.   3. Patient will improve hip flexor/quadriceps mobility to WNL in order to improve hip/knee AROM and improved gait function   4. Pt will improve hip abduction strength to 4+/5 to improve functional gait deviation.      Long Term Goals: (14 weeks) -Progressing, not met   1. Patient will  be independent with updated HEP supplement PT in improving functional mobility.   2. Patient will improve left knee AROM to at least 0-120 in order to improve gait and ability to perform ADLs.   3. Patient will improve FOTO knee survey score to predicted level in order to demo improved functional mobility.   4. Patient will perform TUG in < 12 seconds without AD in order to decrease risk of falling   5. Patient will perform at least 10 sit to stands without UE support on 30 second sit to stand test in order to demo improved ability to perform transfers.   6. Patient goal: ascend stairs pain free, return to traveling    PLAN   Plan of care Certification: 1/3/2024 to 2/28/2024.     Continue to progress as tolerated per Plan of care.    Aarti Palmer, PT , DPT

## 2024-01-11 ENCOUNTER — CLINICAL SUPPORT (OUTPATIENT)
Dept: REHABILITATION | Facility: HOSPITAL | Age: 74
End: 2024-01-11
Payer: COMMERCIAL

## 2024-01-11 DIAGNOSIS — R26.9 GAIT ABNORMALITY: Primary | ICD-10-CM

## 2024-01-11 PROCEDURE — 97112 NEUROMUSCULAR REEDUCATION: CPT | Mod: PN

## 2024-01-11 PROCEDURE — 97110 THERAPEUTIC EXERCISES: CPT | Mod: PN

## 2024-01-11 PROCEDURE — 97530 THERAPEUTIC ACTIVITIES: CPT | Mod: PN

## 2024-01-11 NOTE — PROGRESS NOTES
"[facility]   Physical Therapy Treatment Note     Name: Carlos Tenorio Jr.  Clinic Number: [id number]    Therapy Diagnosis:   Encounter Diagnosis   Name Primary?    Gait abnormality Yes     Physician: Martin Lehman,*    Visit Date: 1/11/2024    Physician Orders: PT eval and treat  Medical Diagnosis from Referral:   Diagnosis   M25.561 (ICD-10-CM) - Right knee pain      Evaluation Date: 1/3/2024  Authorization Period Expiration:     12/27/2024      Plan of Care Expiration: 2/28/2024  Progress Note Due: 2/3/2024  Visit # / Visits authorized: 3/ 12  FOTO: 1/ 3    FOTO 1st: 39%  Predicted Score: 74%  FOTO 3rd:  FOTO 10th:    Time in: 1100am  Time out: 1155am  Total Billable Time: 55 minutes    Precautions:  Standard , early onset dementia       SUBJECTIVE     Pt reports: a little sore today but overall doing fine.     He was compliant with home exercise program.  Response to previous treatment: ongoing  Functional change: ongoing    Pain: 2/10  Location: right knee     OBJECTIVE     Objective Measures updated at progress report unless specified.     Treatment       Vlad received the treatments listed below:      Manual therapy techniques: Joint mobilizations were applied to the: right knee for 0 minutes, including:    Therapeutic exercises to develop strength, endurance, and ROM for 9 minutes including:    Heel prop 5# above knee 4# below knee 8'  Heel slides x 20    Neuromuscular re-education activities to improve: Balance, Coordination, Kinesthetic, Sense, Proprioception, and Posture for 23 minutes. The following activities were included:    Quad set 10" x 10  Modified Straight leg raise 3 x 10 Bilateral 4#  Short arc quad 4# x 30 Bilateral   Long arc quad  4# x 30  Double leg heel raises 2 x 20    Not Performed Today:  Bridges x 30   Supine clamshell x 30    Therapeutic activities to improve functional performance for 23 minutes, including:    Nustep for activity tolerance level " 2, 10'  Sit to stands from elevated mat 2 x 10  Step ups 3 x 10 on right     Patient Education and Home Exercises     Home Exercises Provided and Patient Education Provided     Education provided:   - Home Exercise Program     Written Home Exercises Provided: Patient instructed to cont prior HEP. Exercises were reviewed and Vlad was able to demonstrate them prior to the end of the session.  Vlad demonstrated fair  understanding of the education provided. See EMR under Patient Instructions for exercises provided during therapy sessions    ASSESSMENT     Vlad presented with improved quad activation but is still unable to Straight leg raise. We progressed his modified Straight leg raise. He did well but needed cuing to prevent lag. We added step ups to work on stair climbing since he will need that to go back to work.     Vlad is progressing well towards his goals.     Pt prognosis is Good.     Pt will continue to benefit from skilled outpatient physical therapy to address the deficits listed in the problem list box on initial evaluation, provide pt/family education and to maximize pt's level of independence in the home and community environment.     Pt's spiritual, cultural and educational needs considered and pt agreeable to plan of care and goals.     Anticipated barriers to physical therapy: co-morbidities, compliance     Goals:   Short Term Goals: (8 weeks) -Progressing, not met   1. Patient will be independent with Home Exercise Program in order to supplement patient in improving functional mobility.   2. Patient will improve left knee AROM to at least 5-100 in order to improve gait.   3. Patient will improve hip flexor/quadriceps mobility to WNL in order to improve hip/knee AROM and improved gait function   4. Pt will improve hip abduction strength to 4+/5 to improve functional gait deviation.      Long Term Goals: (14 weeks) -Progressing, not met   1. Patient will be independent with updated HEP supplement PT in  improving functional mobility.   2. Patient will improve left knee AROM to at least 0-120 in order to improve gait and ability to perform ADLs.   3. Patient will improve FOTO knee survey score to predicted level in order to demo improved functional mobility.   4. Patient will perform TUG in < 12 seconds without AD in order to decrease risk of falling   5. Patient will perform at least 10 sit to stands without UE support on 30 second sit to stand test in order to demo improved ability to perform transfers.   6. Patient goal: ascend stairs pain free, return to traveling    PLAN   Plan of care Certification: 1/3/2024 to 2/28/2024.     Continue to progress as tolerated per Plan of care.    Aarti Christiansen, PT , DPT

## 2024-01-16 ENCOUNTER — CLINICAL SUPPORT (OUTPATIENT)
Dept: REHABILITATION | Facility: HOSPITAL | Age: 74
End: 2024-01-16
Payer: COMMERCIAL

## 2024-01-16 DIAGNOSIS — R26.9 GAIT ABNORMALITY: Primary | ICD-10-CM

## 2024-01-16 PROCEDURE — 97112 NEUROMUSCULAR REEDUCATION: CPT | Mod: PN

## 2024-01-16 PROCEDURE — 97110 THERAPEUTIC EXERCISES: CPT | Mod: PN

## 2024-01-16 PROCEDURE — 97530 THERAPEUTIC ACTIVITIES: CPT | Mod: PN

## 2024-01-16 NOTE — PROGRESS NOTES
"[facility]   Physical Therapy Treatment Note     Name: Carlos Tenorio Jr.  Clinic Number: [id number]    Therapy Diagnosis:   Encounter Diagnosis   Name Primary?    Gait abnormality Yes     Physician: Martin Lehman,*    Visit Date: 1/16/2024    Physician Orders: PT eval and treat  Medical Diagnosis from Referral:   Diagnosis   M25.561 (ICD-10-CM) - Right knee pain      Evaluation Date: 1/3/2024  Authorization Period Expiration:     12/27/2024      Plan of Care Expiration: 2/28/2024  Progress Note Due: 2/3/2024  Visit # / Visits authorized: 4/ 12  FOTO: 1/ 3    FOTO 1st: 39%  Predicted Score: 74%  FOTO 3rd:  FOTO 10th:    Time in: 1105am  Time out: 1200pm  Total Billable Time: 55 minutes    Precautions:  Standard , early onset dementia       SUBJECTIVE     Pt reports: felt fine. No pain. Had a misstep on a curb but did not fall.     He was compliant with home exercise program.  Response to previous treatment: ongoing  Functional change: ongoing    Pain: 2/10  Location: right knee     OBJECTIVE     Objective Measures updated at progress report unless specified.     Treatment     Vlad received the treatments listed below:      Manual therapy techniques: Joint mobilizations were applied to the: right knee for 0 minutes, including:    Therapeutic exercises to develop strength, endurance, and ROM for 9 minutes including:    Heel prop 5# above knee 4# below knee 8'  Heel slides x 20    Neuromuscular re-education activities to improve: Balance, Coordination, Kinesthetic, Sense, Proprioception, and Posture for 23 minutes. The following activities were included:    Quad set 10" x 10  Modified Straight leg raise 3 x 10 Bilateral 4#  Short arc quad 4# x 30 Bilateral   Long arc quad  4# x 30  Double leg heel raises 2 x 20  Bridges x 30   Standing hip abduction 2 x 15 Bilateral     Not Performed Today:  Supine clamshell x 30    Therapeutic activities to improve functional performance for 23 " "minutes, including:    Nustep for activity tolerance level 2, 10'  Sit to stands from elevated mat 2 x 10 (5# weight added on the second round  Step ups 2 x 10 Bilateral      Patient Education and Home Exercises     Home Exercises Provided and Patient Education Provided     Education provided:   - Home Exercise Program     Written Home Exercises Provided: Patient instructed to cont prior HEP. Exercises were reviewed and Vlad was able to demonstrate them prior to the end of the session.  Vlad demonstrated fair  understanding of the education provided. See EMR under Patient Instructions for exercises provided during therapy sessions    ASSESSMENT     Vlad presented with limited range of motion so we focused on that as well as functional strengthening. He did well and rated most activities as "medium" difficulty.     Vlad is progressing well towards his goals.     Pt prognosis is Good.     Pt will continue to benefit from skilled outpatient physical therapy to address the deficits listed in the problem list box on initial evaluation, provide pt/family education and to maximize pt's level of independence in the home and community environment.     Pt's spiritual, cultural and educational needs considered and pt agreeable to plan of care and goals.     Anticipated barriers to physical therapy: co-morbidities, compliance     Goals:   Short Term Goals: (8 weeks) -Progressing, not met   1. Patient will be independent with Home Exercise Program in order to supplement patient in improving functional mobility.   2. Patient will improve left knee AROM to at least 5-100 in order to improve gait.   3. Patient will improve hip flexor/quadriceps mobility to WNL in order to improve hip/knee AROM and improved gait function   4. Pt will improve hip abduction strength to 4+/5 to improve functional gait deviation.      Long Term Goals: (14 weeks) -Progressing, not met   1. Patient will be independent with updated HEP supplement PT in " improving functional mobility.   2. Patient will improve left knee AROM to at least 0-120 in order to improve gait and ability to perform ADLs.   3. Patient will improve FOTO knee survey score to predicted level in order to demo improved functional mobility.   4. Patient will perform TUG in < 12 seconds without AD in order to decrease risk of falling   5. Patient will perform at least 10 sit to stands without UE support on 30 second sit to stand test in order to demo improved ability to perform transfers.   6. Patient goal: ascend stairs pain free, return to traveling    PLAN   Plan of care Certification: 1/3/2024 to 2/28/2024.     Continue to progress as tolerated per Plan of care.    Aarti Christiansen, PT , DPT

## 2024-01-19 ENCOUNTER — CLINICAL SUPPORT (OUTPATIENT)
Dept: REHABILITATION | Facility: HOSPITAL | Age: 74
End: 2024-01-19
Payer: COMMERCIAL

## 2024-01-19 DIAGNOSIS — R26.9 GAIT ABNORMALITY: Primary | ICD-10-CM

## 2024-01-19 PROCEDURE — 97110 THERAPEUTIC EXERCISES: CPT | Mod: PN

## 2024-01-19 PROCEDURE — 97530 THERAPEUTIC ACTIVITIES: CPT | Mod: PN

## 2024-01-19 PROCEDURE — 97112 NEUROMUSCULAR REEDUCATION: CPT | Mod: PN

## 2024-01-19 NOTE — PROGRESS NOTES
"[facility]   Physical Therapy Treatment Note     Name: Carlos Tenorio Jr.  Clinic Number: [id number]    Therapy Diagnosis:   Encounter Diagnosis   Name Primary?    Gait abnormality Yes     Physician: Martin Lehman,*    Visit Date: 1/19/2024    Physician Orders: PT eval and treat  Medical Diagnosis from Referral:   Diagnosis   M25.561 (ICD-10-CM) - Right knee pain      Evaluation Date: 1/3/2024  Authorization Period Expiration:     12/27/2024      Plan of Care Expiration: 2/28/2024  Progress Note Due: 2/3/2024  Visit # / Visits authorized: 5/ 20  FOTO: 1/ 3    FOTO 1st: 39%  Predicted Score: 74%  FOTO 3rd:  FOTO 10th:    Time in: 1100am  Time out: 1200pm  Total Billable Time: 55 minutes    Precautions:  Standard , early onset dementia       SUBJECTIVE     Pt reports: feeling fine, saw the doctor and he is pleased with his progress. Goes back to work on Monday.    He was compliant with home exercise program.  Response to previous treatment: ongoing  Functional change: ongoing    Pain: 2/10  Location: right knee     OBJECTIVE     Objective Measures updated at progress report unless specified.     Treatment     Vlad received the treatments listed below:      Manual therapy techniques: Joint mobilizations were applied to the: right knee for 0 minutes, including:    Therapeutic exercises to develop strength, endurance, and ROM for 9 minutes including:    Heel prop 5# above knee 4# below knee 8'  Heel slides x 20    Neuromuscular re-education activities to improve: Balance, Coordination, Kinesthetic, Sense, Proprioception, and Posture for 23 minutes. The following activities were included:    Blue band resisted PF for 3'  Quad set 10" x 2'  Supine clamshell 3 x 10 green band  Short arc quad 4# x 2' Bilateral   Long arc quad  4# x 2' Bilateral   Double leg heel raises 2 x 20  Bridges 3 x 10  Supine Straight leg raise 2 x 3    Not Performed Today:  Modified Straight leg raise 3 x 10 " Bilateral 4#  Standing hip abduction 2 x 15 Bilateral     Therapeutic activities to improve functional performance for 23 minutes, including:    Nustep for activity tolerance level 2, 10'  Sit to stands from elevated mat 2 x 10 (5# weight added on the second round  Step ups 2 x 10 Bilateral      Patient Education and Home Exercises     Home Exercises Provided and Patient Education Provided     Education provided:   - Home Exercise Program     Written Home Exercises Provided: Patient instructed to cont prior HEP. Exercises were reviewed and Vlad was able to demonstrate them prior to the end of the session.  Vlad demonstrated fair  understanding of the education provided. See EMR under Patient Instructions for exercises provided during therapy sessions    ASSESSMENT     Vlad presented with improved quad activation. He was able to Straight leg raise a few times without assistance in supine today which thus far he has been unable to do. He starts work Monday so we will see how that goes.     Vlad is progressing well towards his goals.     Pt prognosis is Good.     Pt will continue to benefit from skilled outpatient physical therapy to address the deficits listed in the problem list box on initial evaluation, provide pt/family education and to maximize pt's level of independence in the home and community environment.     Pt's spiritual, cultural and educational needs considered and pt agreeable to plan of care and goals.     Anticipated barriers to physical therapy: co-morbidities, compliance     Goals:   Short Term Goals: (8 weeks) -Progressing, not met   1. Patient will be independent with Home Exercise Program in order to supplement patient in improving functional mobility.   2. Patient will improve left knee AROM to at least 5-100 in order to improve gait.   3. Patient will improve hip flexor/quadriceps mobility to WNL in order to improve hip/knee AROM and improved gait function   4. Pt will improve hip abduction  strength to 4+/5 to improve functional gait deviation.      Long Term Goals: (14 weeks) -Progressing, not met   1. Patient will be independent with updated HEP supplement PT in improving functional mobility.   2. Patient will improve left knee AROM to at least 0-120 in order to improve gait and ability to perform ADLs.   3. Patient will improve FOTO knee survey score to predicted level in order to demo improved functional mobility.   4. Patient will perform TUG in < 12 seconds without AD in order to decrease risk of falling   5. Patient will perform at least 10 sit to stands without UE support on 30 second sit to stand test in order to demo improved ability to perform transfers.   6. Patient goal: ascend stairs pain free, return to traveling    PLAN   Plan of care Certification: 1/3/2024 to 2/28/2024.     Continue to progress as tolerated per Plan of care.    Aarti Christiansen, PT , DPT

## 2024-01-23 ENCOUNTER — CLINICAL SUPPORT (OUTPATIENT)
Dept: REHABILITATION | Facility: HOSPITAL | Age: 74
End: 2024-01-23
Payer: COMMERCIAL

## 2024-01-23 DIAGNOSIS — R26.9 GAIT ABNORMALITY: Primary | ICD-10-CM

## 2024-01-23 PROCEDURE — 97530 THERAPEUTIC ACTIVITIES: CPT | Mod: PN

## 2024-01-23 PROCEDURE — 97112 NEUROMUSCULAR REEDUCATION: CPT | Mod: PN

## 2024-01-23 NOTE — PROGRESS NOTES
[facility]   Physical Therapy Treatment Note     Name: Carlos Tenorio Jr.  Clinic Number: [id number]    Therapy Diagnosis:   Encounter Diagnosis   Name Primary?    Gait abnormality Yes     Physician: Martin Lehman,*    Visit Date: 1/23/2024    Physician Orders: PT eval and treat  Medical Diagnosis from Referral:   Diagnosis   M25.561 (ICD-10-CM) - Right knee pain      Evaluation Date: 1/3/2024  Authorization Period Expiration:     12/27/2024      Plan of Care Expiration: 2/28/2024  Progress Note Due: 2/3/2024  Visit # / Visits authorized: 6/ 20  FOTO: 1/ 3    FOTO 1st: 39%  Predicted Score: 74%  FOTO 3rd:  FOTO 10th:    Time in: 100pm  Time out: 201pm  Total Billable Time: 61 minutes    Precautions:  Standard , early onset dementia       SUBJECTIVE     Pt reports: had a good birthday, went to [employer] and had [place] Chocolate cake.   He went back to work yesterday and fell going up the curb. He was talking with someone and was distracted when he was stepping up. Now he is supposed to work from home all week.     He was compliant with home exercise program.  Response to previous treatment: ongoing  Functional change: ongoing    Pain: 2/10  Location: right knee     OBJECTIVE     Objective Measures updated at progress report unless specified.     Unable to single leg balance.  Tandem balance for 20 seconds Bilateral.  Ambulation without cane today, slow jr and compensated trendelenburg noted, left knee structural valgus.    Treatment     Vlad received the treatments listed below:      Manual therapy techniques: Joint mobilizations were applied to the: right knee for 0 minutes, including:    Therapeutic exercises to develop strength, endurance, and ROM for 0 minutes including:    Heel prop 5# above knee 4# below knee 8'  Heel slides x 20    Neuromuscular re-education activities to improve: Balance, Coordination, Kinesthetic, Sense, Proprioception, and Posture for 23  "minutes. The following activities were included:    Blue band resisted PF for 3'  Quad set 10" x 2'  Supine clamshell 3 x 10 green band  Short arc quad 4# x 2' Bilateral   Long arc quad  4# x 2' Bilateral   Double leg heel raises 2 x 20  Bridges 3 x 10  Supine Straight leg raise 2 x 3    Not Performed Today:  Modified Straight leg raise 3 x 10 Bilateral 4#  Standing hip abduction 2 x 15 Bilateral     Therapeutic activities to improve functional performance for 38 minutes, including:    Nustep for activity tolerance level 2, 10'  Sit to stands from elevated mat 2 x 10 (5# weight added on the second round  Step ups 3 x 10 Bilateral    Taps on the step 3 x 15 Bilateral     Patient Education and Home Exercises     Home Exercises Provided and Patient Education Provided     Education provided:   - Home Exercise Program     Written Home Exercises Provided: Patient instructed to cont prior HEP. Exercises were reviewed and Vlad was able to demonstrate them prior to the end of the session.  Vlad demonstrated fair  understanding of the education provided. See EMR under Patient Instructions for exercises provided during therapy sessions    ASSESSMENT     Vlad presented with improved ability to Straight leg raise, though progress is slow. He could go a few more times today. He did have a fall when he went back to work. This may be an issue with balance and/or with dual tasking. We added more balance interventions today. He did well with tandem balance but could not single leg balance.     Vlad is progressing well towards his goals.     Pt prognosis is Good.     Pt will continue to benefit from skilled outpatient physical therapy to address the deficits listed in the problem list box on initial evaluation, provide pt/family education and to maximize pt's level of independence in the home and community environment.     Pt's spiritual, cultural and educational needs considered and pt agreeable to plan of care and goals.     Anticipated " barriers to physical therapy: co-morbidities, compliance     Goals:   Short Term Goals: (8 weeks) -Progressing, not met   1. Patient will be independent with Home Exercise Program in order to supplement patient in improving functional mobility.   2. Patient will improve left knee AROM to at least 5-100 in order to improve gait.   3. Patient will improve hip flexor/quadriceps mobility to WNL in order to improve hip/knee AROM and improved gait function   4. Pt will improve hip abduction strength to 4+/5 to improve functional gait deviation.      Long Term Goals: (14 weeks) -Progressing, not met   1. Patient will be independent with updated HEP supplement PT in improving functional mobility.   2. Patient will improve left knee AROM to at least 0-120 in order to improve gait and ability to perform ADLs.   3. Patient will improve FOTO knee survey score to predicted level in order to demo improved functional mobility.   4. Patient will perform TUG in < 12 seconds without AD in order to decrease risk of falling   5. Patient will perform at least 10 sit to stands without UE support on 30 second sit to stand test in order to demo improved ability to perform transfers.   6. Patient goal: ascend stairs pain free, return to traveling    PLAN   Plan of care Certification: 1/3/2024 to 2/28/2024.     Continue to progress as tolerated per Plan of care.    Aarti Christiansen, PT , DPT

## 2024-01-25 ENCOUNTER — CLINICAL SUPPORT (OUTPATIENT)
Dept: REHABILITATION | Facility: HOSPITAL | Age: 74
End: 2024-01-25
Payer: COMMERCIAL

## 2024-01-25 DIAGNOSIS — R26.9 GAIT ABNORMALITY: Primary | ICD-10-CM

## 2024-01-25 PROCEDURE — 97110 THERAPEUTIC EXERCISES: CPT | Mod: PN

## 2024-01-25 PROCEDURE — 97530 THERAPEUTIC ACTIVITIES: CPT | Mod: PN

## 2024-01-25 PROCEDURE — 97112 NEUROMUSCULAR REEDUCATION: CPT | Mod: PN

## 2024-01-25 NOTE — PLAN OF CARE
[facility]   Physical Therapy Treatment Note     Name: Carlos Tenorio Jr.  Clinic Number: [id number]    Therapy Diagnosis:   Encounter Diagnosis   Name Primary?    Gait abnormality Yes     Physician: Martin Lehman,*    Visit Date: 1/23/2024    Physician Orders: PT eval and treat  Medical Diagnosis from Referral:   Diagnosis   M25.561 (ICD-10-CM) - Right knee pain      Evaluation Date: 1/3/2024  Authorization Period Expiration:     12/27/2024      Plan of Care Expiration: 2/28/2024  Progress Note Due: 2/3/2024  Visit # / Visits authorized: 6/ 20  FOTO: 1/ 3    FOTO 1st: 39%  Predicted Score: 74%  FOTO 3rd:  FOTO 10th:    Time in: 100pm  Time out: 201pm  Total Billable Time: 61 minutes    Precautions:  Standard , early onset dementia       SUBJECTIVE     Pt reports: had a good birthday, went to [employer] and had [place] Chocolate cake.   He went back to work yesterday and fell going up the curb. He was talking with someone and was distracted when he was stepping up. Now he is supposed to work from home all week.     He was compliant with home exercise program.  Response to previous treatment: ongoing  Functional change: ongoing    Pain: 2/10  Location: right knee     OBJECTIVE     Objective Measures updated at progress report unless specified.     Unable to single leg balance.  Tandem balance for 20 seconds Bilateral.  Ambulation without cane today, slow jr and compensated trendelenburg noted, left knee structural valgus.    Treatment     Vlad received the treatments listed below:      Manual therapy techniques: Joint mobilizations were applied to the: right knee for 0 minutes, including:    Therapeutic exercises to develop strength, endurance, and ROM for 0 minutes including:    Heel prop 5# above knee 4# below knee 8'  Heel slides x 20    Neuromuscular re-education activities to improve: Balance, Coordination, Kinesthetic, Sense, Proprioception, and Posture for 23  "minutes. The following activities were included:    Blue band resisted PF for 3'  Quad set 10" x 2'  Supine clamshell 3 x 10 green band  Short arc quad 4# x 2' Bilateral   Long arc quad  4# x 2' Bilateral   Double leg heel raises 2 x 20  Bridges 3 x 10  Supine Straight leg raise 2 x 3    Not Performed Today:  Modified Straight leg raise 3 x 10 Bilateral 4#  Standing hip abduction 2 x 15 Bilateral     Therapeutic activities to improve functional performance for 38 minutes, including:    Nustep for activity tolerance level 2, 10'  Sit to stands from elevated mat 2 x 10 (5# weight added on the second round  Step ups 3 x 10 Bilateral    Taps on the step 3 x 15 Bilateral     Patient Education and Home Exercises     Home Exercises Provided and Patient Education Provided     Education provided:   - Home Exercise Program     Written Home Exercises Provided: Patient instructed to cont prior HEP. Exercises were reviewed and Vlad was able to demonstrate them prior to the end of the session.  Vlad demonstrated fair  understanding of the education provided. See EMR under Patient Instructions for exercises provided during therapy sessions    ASSESSMENT     Vlad presented with improved ability to Straight leg raise, though progress is slow. He could go a few more times today. He did have a fall when he went back to work. This may be an issue with balance and/or with dual tasking. We added more balance interventions today. He did well with tandem balance but could not single leg balance.     Vlad is progressing well towards his goals.     Pt prognosis is Good.     Pt will continue to benefit from skilled outpatient physical therapy to address the deficits listed in the problem list box on initial evaluation, provide pt/family education and to maximize pt's level of independence in the home and community environment.     Pt's spiritual, cultural and educational needs considered and pt agreeable to plan of care and goals.     Anticipated " barriers to physical therapy: co-morbidities, compliance     Goals:   Short Term Goals: (8 weeks) -Progressing, not met   1. Patient will be independent with Home Exercise Program in order to supplement patient in improving functional mobility.   2. Patient will improve left knee AROM to at least 5-100 in order to improve gait.   3. Patient will improve hip flexor/quadriceps mobility to WNL in order to improve hip/knee AROM and improved gait function   4. Pt will improve hip abduction strength to 4+/5 to improve functional gait deviation.      Long Term Goals: (14 weeks) -Progressing, not met   1. Patient will be independent with updated HEP supplement PT in improving functional mobility.   2. Patient will improve left knee AROM to at least 0-120 in order to improve gait and ability to perform ADLs.   3. Patient will improve FOTO knee survey score to predicted level in order to demo improved functional mobility.   4. Patient will perform TUG in < 12 seconds without AD in order to decrease risk of falling   5. Patient will perform at least 10 sit to stands without UE support on 30 second sit to stand test in order to demo improved ability to perform transfers.   6. Patient goal: ascend stairs pain free, return to traveling    PLAN   Plan of care Certification: 1/3/2024 to 2/28/2024.     Continue to progress as tolerated per Plan of care.    Aarti Christiansen, PT , DPT

## 2024-01-25 NOTE — PROGRESS NOTES
[facility]   Physical Therapy Treatment Note     Name: Carlos Tenorio Jr.  Clinic Number: [id number]    Therapy Diagnosis:   Encounter Diagnosis   Name Primary?    Gait abnormality Yes     Physician: Martin Lehman,*    Visit Date: 1/23/2024    Physician Orders: PT eval and treat  Medical Diagnosis from Referral:   Diagnosis   M25.561 (ICD-10-CM) - Right knee pain      Evaluation Date: 1/3/2024  Authorization Period Expiration:     12/27/2024      Plan of Care Expiration: 2/28/2024  Progress Note Due: 2/3/2024  Visit # / Visits authorized: 6/ 20  FOTO: 1/ 3    FOTO 1st: 39%  Predicted Score: 74%  FOTO 3rd:  FOTO 10th:    Time in: 100pm  Time out: 201pm  Total Billable Time: 61 minutes    Precautions:  Standard , early onset dementia       SUBJECTIVE     Pt reports: had a good birthday, went to [employer] and had [place] Chocolate cake.   He went back to work yesterday and fell going up the curb. He was talking with someone and was distracted when he was stepping up. Now he is supposed to work from home all week.     He was compliant with home exercise program.  Response to previous treatment: ongoing  Functional change: ongoing    Pain: 2/10  Location: right knee     OBJECTIVE     Objective Measures updated at progress report unless specified.     Unable to single leg balance.  Tandem balance for 20 seconds Bilateral.  Ambulation without cane today, slow jr and compensated trendelenburg noted, left knee structural valgus.    Treatment     Vlad received the treatments listed below:      Manual therapy techniques: Joint mobilizations were applied to the: right knee for 0 minutes, including:    Therapeutic exercises to develop strength, endurance, and ROM for 0 minutes including:    Heel prop 5# above knee 4# below knee 8'  Heel slides x 20    Neuromuscular re-education activities to improve: Balance, Coordination, Kinesthetic, Sense, Proprioception, and Posture for 23  "minutes. The following activities were included:    Blue band resisted PF for 3'  Quad set 10" x 2'  Supine clamshell 3 x 10 green band  Short arc quad 4# x 2' Bilateral   Long arc quad  4# x 2' Bilateral   Double leg heel raises 2 x 20  Bridges 3 x 10  Supine Straight leg raise 2 x 3    Not Performed Today:  Modified Straight leg raise 3 x 10 Bilateral 4#  Standing hip abduction 2 x 15 Bilateral     Therapeutic activities to improve functional performance for 38 minutes, including:    Nustep for activity tolerance level 2, 10'  Sit to stands from elevated mat 2 x 10 (5# weight added on the second round  Step ups 3 x 10 Bilateral    Taps on the step 3 x 15 Bilateral     Patient Education and Home Exercises     Home Exercises Provided and Patient Education Provided     Education provided:   - Home Exercise Program     Written Home Exercises Provided: Patient instructed to cont prior HEP. Exercises were reviewed and Vlad was able to demonstrate them prior to the end of the session.  Vlad demonstrated fair  understanding of the education provided. See EMR under Patient Instructions for exercises provided during therapy sessions    ASSESSMENT     Vlad presented with improved ability to Straight leg raise, though progress is slow. He could go a few more times today. He did have a fall when he went back to work. This may be an issue with balance and/or with dual tasking. We added more balance interventions today. He did well with tandem balance but could not single leg balance.     Vlad is progressing well towards his goals.     Pt prognosis is Good.     Pt will continue to benefit from skilled outpatient physical therapy to address the deficits listed in the problem list box on initial evaluation, provide pt/family education and to maximize pt's level of independence in the home and community environment.     Pt's spiritual, cultural and educational needs considered and pt agreeable to plan of care and goals.     Anticipated " barriers to physical therapy: co-morbidities, compliance     Goals:   Short Term Goals: (8 weeks) -Progressing, not met   1. Patient will be independent with Home Exercise Program in order to supplement patient in improving functional mobility.   2. Patient will improve left knee AROM to at least 5-100 in order to improve gait.   3. Patient will improve hip flexor/quadriceps mobility to WNL in order to improve hip/knee AROM and improved gait function   4. Pt will improve hip abduction strength to 4+/5 to improve functional gait deviation.      Long Term Goals: (14 weeks) -Progressing, not met   1. Patient will be independent with updated HEP supplement PT in improving functional mobility.   2. Patient will improve left knee AROM to at least 0-120 in order to improve gait and ability to perform ADLs.   3. Patient will improve FOTO knee survey score to predicted level in order to demo improved functional mobility.   4. Patient will perform TUG in < 12 seconds without AD in order to decrease risk of falling   5. Patient will perform at least 10 sit to stands without UE support on 30 second sit to stand test in order to demo improved ability to perform transfers.   6. Patient goal: ascend stairs pain free, return to traveling    PLAN   Plan of care Certification: 1/3/2024 to 2/28/2024.     Continue to progress as tolerated per Plan of care.    Aarti Christiansen, PT , DPT

## 2024-01-25 NOTE — PROGRESS NOTES
"[facility]   Physical Therapy Treatment Note     Name: Carlos Tenorio Jr.  Clinic Number: [id number]    Therapy Diagnosis:   Encounter Diagnosis   Name Primary?    Gait abnormality Yes     Physician: Martin Lehman,*    Visit Date: 1/25/2024    Physician Orders: PT eval and treat  Medical Diagnosis from Referral:   Diagnosis   M25.561 (ICD-10-CM) - Right knee pain      Evaluation Date: 1/3/2024  Authorization Period Expiration:     12/27/2024      Plan of Care Expiration: 2/28/2024  Progress Note Due: 2/3/2024  Visit # / Visits authorized: 7/ 20  FOTO: 1/ 3    FOTO 1st: 39%  Predicted Score: 74%  FOTO 3rd:  FOTO 10th:    Time in: 100pm  Time out: 201pm  Total Billable Time: 61 minutes    Precautions:  Standard , early onset dementia       SUBJECTIVE     Pt reports: has been doing exercises at home. Has not fallen at home. Is concerned about his progress as well as his wife.     He was compliant with home exercise program.  Response to previous treatment: ongoing  Functional change: ongoing    Pain: 2/10  Location: right knee     OBJECTIVE     Objective Measures updated at progress report unless specified.     Treatment     Vlad received the treatments listed below:      Manual therapy techniques: Joint mobilizations were applied to the: right knee for 0 minutes, including:    Therapeutic exercises to develop strength, endurance, and ROM for 10 minutes including:    Heel prop 5# above knee 4# below knee 8'  Heel slides x 20    Neuromuscular re-education activities to improve: Balance, Coordination, Kinesthetic, Sense, Proprioception, and Posture for 23 minutes. The following activities were included:    Bridges 3 x 10  Supine Straight leg raise x 3 (x 10 Active Assisted Range of Motion)  Straight leg raise on the left 3 x 10  Modified Straight leg raise on the right 3 x 10  Tandem balance 2 x 20" (more staggered than tandem)    Therapeutic activities to improve functional " performance for 28 minutes, including:    Nustep for activity tolerance level 2, 5'  Sit to stands from elevated mat 3 x 10   Step ups 3 x 10 Bilateral    Taps on the step 3 x 15 Bilateral     Patient Education and Home Exercises     Home Exercises Provided and Patient Education Provided     Education provided:   - Home Exercise Program     Written Home Exercises Provided: Patient instructed to cont prior HEP. Exercises were reviewed and Vlad was able to demonstrate them prior to the end of the session.  Vlad demonstrated fair  understanding of the education provided. See EMR under Patient Instructions for exercises provided during therapy sessions    ASSESSMENT     Vlad presented with good range of motion but continued difficulty with strength and balance. He had one incidence of buckling during tandem balance, but that may have been due to fatigue since we were at the end of the session. He did not fall since I was guarding him, but he would have fallen if I were not there. We will continue to progress strength and balance activities as tolerated.     Vlad is progressing well towards his goals.     Pt prognosis is Good.     Pt will continue to benefit from skilled outpatient physical therapy to address the deficits listed in the problem list box on initial evaluation, provide pt/family education and to maximize pt's level of independence in the home and community environment.     Pt's spiritual, cultural and educational needs considered and pt agreeable to plan of care and goals.     Anticipated barriers to physical therapy: co-morbidities, compliance     Goals:   Short Term Goals: (8 weeks) -Progressing, not met   1. Patient will be independent with Home Exercise Program in order to supplement patient in improving functional mobility.   2. Patient will improve left knee AROM to at least 5-100 in order to improve gait.   3. Patient will improve hip flexor/quadriceps mobility to WNL in order to improve hip/knee AROM  and improved gait function   4. Pt will improve hip abduction strength to 4+/5 to improve functional gait deviation.      Long Term Goals: (14 weeks) -Progressing, not met   1. Patient will be independent with updated HEP supplement PT in improving functional mobility.   2. Patient will improve left knee AROM to at least 0-120 in order to improve gait and ability to perform ADLs.   3. Patient will improve FOTO knee survey score to predicted level in order to demo improved functional mobility.   4. Patient will perform TUG in < 12 seconds without AD in order to decrease risk of falling   5. Patient will perform at least 10 sit to stands without UE support on 30 second sit to stand test in order to demo improved ability to perform transfers.   6. Patient goal: ascend stairs pain free, return to traveling    PLAN   Plan of care Certification: 1/3/2024 to 2/28/2024.     Continue to progress as tolerated per Plan of care.    Aarti Christiansen, PT , DPT

## 2024-01-26 ENCOUNTER — CLINICAL SUPPORT (OUTPATIENT)
Dept: REHABILITATION | Facility: HOSPITAL | Age: 74
End: 2024-01-26
Payer: COMMERCIAL

## 2024-01-26 DIAGNOSIS — R26.9 GAIT ABNORMALITY: Primary | ICD-10-CM

## 2024-01-26 PROCEDURE — 97530 THERAPEUTIC ACTIVITIES: CPT | Mod: PN

## 2024-01-26 PROCEDURE — 97112 NEUROMUSCULAR REEDUCATION: CPT | Mod: PN

## 2024-01-26 PROCEDURE — 97110 THERAPEUTIC EXERCISES: CPT | Mod: PN

## 2024-01-26 NOTE — PROGRESS NOTES
"[facility]   Physical Therapy Treatment Note     Name: Carlos Tenorio Jr.  Clinic Number: [id number]    Therapy Diagnosis:   Encounter Diagnosis   Name Primary?    Gait abnormality Yes     Physician: Martin Lehman,*    Visit Date: 1/26/2024    Physician Orders: PT eval and treat  Medical Diagnosis from Referral:   Diagnosis   M25.561 (ICD-10-CM) - Right knee pain      Evaluation Date: 1/3/2024  Authorization Period Expiration:     12/27/2024      Plan of Care Expiration: 2/28/2024  Progress Note Due: 2/3/2024  Visit # / Visits authorized: 8/ 20  FOTO: 1/ 3    FOTO 1st: 39%  Predicted Score: 74%  FOTO 3rd:  FOTO 10th:    Time in: 1000am  Time out: 1100am  Total Billable Time: 60 minutes    Precautions:  Standard , early onset dementia       SUBJECTIVE     Pt reports: did his exercises again last night.     He was compliant with home exercise program.  Response to previous treatment: ongoing  Functional change: ongoing    Pain: 2/10  Location: right knee     OBJECTIVE     Objective Measures updated at progress report unless specified.     Treatment     Vlad received the treatments listed below:      Manual therapy techniques: Joint mobilizations were applied to the: right knee for 0 minutes, including:    Therapeutic exercises to develop strength, endurance, and ROM for 10 minutes including:    Heel prop 5# above knee 4# below knee 8'  Heel slides x 20    Neuromuscular re-education activities to improve: Balance, Coordination, Kinesthetic, Sense, Proprioception, and Posture for 23 minutes. The following activities were included:    Bridges 3 x 10  Supine Straight leg raise x 3 (x 10 Active Assisted Range of Motion)  Straight leg raise on the left 3 x 10  Modified Straight leg raise on the right 3 x 10  Tandem balance 2 x 20" (more staggered than tandem)    Therapeutic activities to improve functional performance for 27 minutes, including:    Nustep for activity tolerance level 2, " 5'  Sit to stands from elevated mat 3 x 10   Step ups 3 x 10 Bilateral    Taps on the step 3 x 15 Bilateral     Patient Education and Home Exercises     Home Exercises Provided and Patient Education Provided     Education provided:   - Home Exercise Program     Written Home Exercises Provided: Patient instructed to cont prior HEP. Exercises were reviewed and Vlad was able to demonstrate them prior to the end of the session.  Vlad demonstrated fair  understanding of the education provided. See EMR under Patient Instructions for exercises provided during therapy sessions    ASSESSMENT     Vlad tolerated treatment well today. He was fatigued with strengthening interventions but did well with no losses of balance today. We did have a gait belt on him and he was SBA/CGA with all functional activities.    Vlad is progressing well towards his goals.     Pt prognosis is Good.     Pt will continue to benefit from skilled outpatient physical therapy to address the deficits listed in the problem list box on initial evaluation, provide pt/family education and to maximize pt's level of independence in the home and community environment.     Pt's spiritual, cultural and educational needs considered and pt agreeable to plan of care and goals.     Anticipated barriers to physical therapy: co-morbidities, compliance     Goals:   Short Term Goals: (8 weeks) -Progressing, not met   1. Patient will be independent with Home Exercise Program in order to supplement patient in improving functional mobility.   2. Patient will improve left knee AROM to at least 5-100 in order to improve gait.   3. Patient will improve hip flexor/quadriceps mobility to WNL in order to improve hip/knee AROM and improved gait function   4. Pt will improve hip abduction strength to 4+/5 to improve functional gait deviation.      Long Term Goals: (14 weeks) -Progressing, not met   1. Patient will be independent with updated HEP supplement PT in improving functional  mobility.   2. Patient will improve left knee AROM to at least 0-120 in order to improve gait and ability to perform ADLs.   3. Patient will improve FOTO knee survey score to predicted level in order to demo improved functional mobility.   4. Patient will perform TUG in < 12 seconds without AD in order to decrease risk of falling   5. Patient will perform at least 10 sit to stands without UE support on 30 second sit to stand test in order to demo improved ability to perform transfers.   6. Patient goal: ascend stairs pain free, return to traveling    PLAN   Plan of care Certification: 1/3/2024 to 2/28/2024.     Continue to progress as tolerated per Plan of care.    Aarti Christiansen, PT , DPT

## 2024-01-30 ENCOUNTER — CLINICAL SUPPORT (OUTPATIENT)
Dept: REHABILITATION | Facility: HOSPITAL | Age: 74
End: 2024-01-30
Payer: COMMERCIAL

## 2024-01-30 DIAGNOSIS — R26.9 GAIT ABNORMALITY: Primary | ICD-10-CM

## 2024-01-30 PROCEDURE — 97530 THERAPEUTIC ACTIVITIES: CPT | Mod: PN

## 2024-01-30 PROCEDURE — 97112 NEUROMUSCULAR REEDUCATION: CPT | Mod: PN

## 2024-01-30 NOTE — PROGRESS NOTES
"[facility]   Physical Therapy Treatment Note     Name: Carlos Tenorio Jr.  Clinic Number: [id number]    Therapy Diagnosis:   Encounter Diagnosis   Name Primary?    Gait abnormality Yes     Physician: Martin Lehman,*    Visit Date: 1/30/2024    Physician Orders: PT eval and treat  Medical Diagnosis from Referral:   Diagnosis   M25.561 (ICD-10-CM) - Right knee pain      Evaluation Date: 1/3/2024  Authorization Period Expiration:     12/27/2024      Plan of Care Expiration: 2/28/2024  Progress Note Due: 2/3/2024  Visit # / Visits authorized: 9/ 20  FOTO: 1/ 3    FOTO 1st: 39%  Predicted Score: 74%  FOTO 3rd:  FOTO 10th:    Time in: 100pm  Time out: 201pm  Total Billable Time: 61 minutes    Precautions:  Standard , early onset dementia       SUBJECTIVE     Pt reports[email or web address] Misjudged the parking barrier thing after the curb. Isn't interested in using a walker because he will feel silly taking it to work.     He was compliant with home exercise program.  Response to previous treatment: ongoing  Functional change: ongoing    Pain: 2/10  Location: right knee     OBJECTIVE     Objective Measures updated at progress report unless specified.     Treatment     Vlad received the treatments listed below:      Manual therapy techniques: Joint mobilizations were applied to the: right knee for 0 minutes, including:    Therapeutic exercises to develop strength, endurance, and ROM for 0 minutes including:    Heel prop 5# above knee 4# below knee 8'  Heel slides x 20    Neuromuscular re-education activities to improve: Balance, Coordination, Kinesthetic, Sense, Proprioception, and Posture for 38 minutes. The following activities were included:    Bridges 3 x 10  Supine Straight leg raise x 3 (x 10 Active Assisted Range of Motion)  Straight leg raise on the left 3 x 10  Modified Straight leg raise on the right 3 x 10  Tandem balance 2 x 20" (more staggered than " tandem)    Therapeutic activities to improve functional performance for 23 minutes, including:    Nustep for activity tolerance level 2, 5'  Sit to stands from elevated mat 3 x 10   Step ups 3 x 10 Bilateral    Taps on the step 3 x 15 Bilateral     Patient Education and Home Exercises     Home Exercises Provided and Patient Education Provided     Education provided:   - Home Exercise Program     Written Home Exercises Provided: Patient instructed to cont prior HEP. Exercises were reviewed and Vlad was able to demonstrate them prior to the end of the session.  Vlad demonstrated fair  understanding of the education provided. See EMR under Patient Instructions for exercises provided during therapy sessions    ASSESSMENT     Vlad tolerated treatment well again today. Again showing improvement in Straight leg raise performance. He continued to fall outside of therapy. We discussed use of walker while we build strength. We will continue to discuss this next session as well.    Vlad is progressing well towards his goals.     Pt prognosis is Good.     Pt will continue to benefit from skilled outpatient physical therapy to address the deficits listed in the problem list box on initial evaluation, provide pt/family education and to maximize pt's level of independence in the home and community environment.     Pt's spiritual, cultural and educational needs considered and pt agreeable to plan of care and goals.     Anticipated barriers to physical therapy: co-morbidities, compliance     Goals:   Short Term Goals: (8 weeks) -Progressing, not met   1. Patient will be independent with Home Exercise Program in order to supplement patient in improving functional mobility.   2. Patient will improve left knee AROM to at least 5-100 in order to improve gait.   3. Patient will improve hip flexor/quadriceps mobility to WNL in order to improve hip/knee AROM and improved gait function   4. Pt will improve hip abduction strength to 4+/5 to  improve functional gait deviation.      Long Term Goals: (14 weeks) -Progressing, not met   1. Patient will be independent with updated HEP supplement PT in improving functional mobility.   2. Patient will improve left knee AROM to at least 0-120 in order to improve gait and ability to perform ADLs.   3. Patient will improve FOTO knee survey score to predicted level in order to demo improved functional mobility.   4. Patient will perform TUG in < 12 seconds without AD in order to decrease risk of falling   5. Patient will perform at least 10 sit to stands without UE support on 30 second sit to stand test in order to demo improved ability to perform transfers.   6. Patient goal: ascend stairs pain free, return to traveling    PLAN   Plan of care Certification: 1/3/2024 to 2/28/2024.     Continue to progress as tolerated per Plan of care.    Aarti Christiansen, PT , DPT

## 2024-02-01 ENCOUNTER — CLINICAL SUPPORT (OUTPATIENT)
Dept: REHABILITATION | Facility: HOSPITAL | Age: 74
End: 2024-02-01
Payer: COMMERCIAL

## 2024-02-01 DIAGNOSIS — R26.9 GAIT ABNORMALITY: Primary | ICD-10-CM

## 2024-02-01 PROCEDURE — 97112 NEUROMUSCULAR REEDUCATION: CPT | Mod: PN

## 2024-02-01 PROCEDURE — 97530 THERAPEUTIC ACTIVITIES: CPT | Mod: PN

## 2024-02-01 NOTE — PROGRESS NOTES
"[facility]   Physical Therapy Treatment Note     Name: Carlos Tenorio Jr.  Clinic Number: [id number]    Therapy Diagnosis:   Encounter Diagnosis   Name Primary?    Gait abnormality Yes     Physician: Martin Lehman,*    Visit Date: 2/1/2024    Physician Orders: PT eval and treat  Medical Diagnosis from Referral:   Diagnosis   M25.561 (ICD-10-CM) - Right knee pain      Evaluation Date: 1/3/2024  Authorization Period Expiration:     12/27/2024      Plan of Care Expiration: 2/28/2024  Progress Note Due: 2/3/2024  Visit # / Visits authorized: 10/ 20  FOTO: 1/ 3    FOTO 1st: 39%  Predicted Score: 74%  FOTO 3rd:  FOTO 10th:    Time in: 100pm  Time out: 201pm  Total Billable Time: 61 minutes    Precautions:  Standard , early onset dementia       SUBJECTIVE     Pt reports: he is supposed to go back to work Monday but is trying to get remote work again.     He was compliant with home exercise program.  Response to previous treatment: ongoing  Functional change: ongoing    Pain: 2/10  Location: right knee     OBJECTIVE     Objective Measures updated at progress report unless specified.     Treatment     Vlad received the treatments listed below:      Manual therapy techniques: Joint mobilizations were applied to the: right knee for 0 minutes, including:    Therapeutic exercises to develop strength, endurance, and ROM for 0 minutes including:    Heel prop 5# above knee 4# below knee 8'  Heel slides x 20    Neuromuscular re-education activities to improve: Balance, Coordination, Kinesthetic, Sense, Proprioception, and Posture for 38 minutes. The following activities were included:    Bridges 3 x 10  Supine Straight leg raise x 3 (x 10 Active Assisted Range of Motion)  Straight leg raise on the left 3 x 10  Modified Straight leg raise on the right 3 x 10  Tandem balance 2 x 20" (more staggered than tandem)  Red band horizontal abduction x 30   Seated thoracic extension x 30    Therapeutic " activities to improve functional performance for 23 minutes, including:    Nustep for activity tolerance level 2, 10'  Step ups 3 x 10 Bilateral    Walking marches 2 laps   Side steps 2 laps     Not Performed Today:  Sit to stands from elevated mat 3 x 10   Taps on the step 3 x 15 Bilateral     Patient Education and Home Exercises     Home Exercises Provided and Patient Education Provided     Education provided:   - Home Exercise Program     Written Home Exercises Provided: Patient instructed to cont prior HEP. Exercises were reviewed and Vlad was able to demonstrate them prior to the end of the session.  Vlad demonstrated fair  understanding of the education provided. See EMR under Patient Instructions for exercises provided during therapy sessions    ASSESSMENT     Vlad presented with continued reports of feeling like he is going to trip. We did more today with workingon picking up his feet while walking. Will progress as able.     Vlad is progressing well towards his goals.     Pt prognosis is Good.     Pt will continue to benefit from skilled outpatient physical therapy to address the deficits listed in the problem list box on initial evaluation, provide pt/family education and to maximize pt's level of independence in the home and community environment.     Pt's spiritual, cultural and educational needs considered and pt agreeable to plan of care and goals.     Anticipated barriers to physical therapy: co-morbidities, compliance     Goals:   Short Term Goals: (8 weeks) -Progressing, not met   1. Patient will be independent with Home Exercise Program in order to supplement patient in improving functional mobility.   2. Patient will improve left knee AROM to at least 5-100 in order to improve gait.   3. Patient will improve hip flexor/quadriceps mobility to WNL in order to improve hip/knee AROM and improved gait function   4. Pt will improve hip abduction strength to 4+/5 to improve functional gait deviation.       Long Term Goals: (14 weeks) -Progressing, not met   1. Patient will be independent with updated HEP supplement PT in improving functional mobility.   2. Patient will improve left knee AROM to at least 0-120 in order to improve gait and ability to perform ADLs.   3. Patient will improve FOTO knee survey score to predicted level in order to demo improved functional mobility.   4. Patient will perform TUG in < 12 seconds without AD in order to decrease risk of falling   5. Patient will perform at least 10 sit to stands without UE support on 30 second sit to stand test in order to demo improved ability to perform transfers.   6. Patient goal: ascend stairs pain free, return to traveling    PLAN   Plan of care Certification: 1/3/2024 to 2/28/2024.     Continue to progress as tolerated per Plan of care.    Aarti Christiansen, PT , DPT

## 2024-02-06 ENCOUNTER — CLINICAL SUPPORT (OUTPATIENT)
Dept: REHABILITATION | Facility: HOSPITAL | Age: 74
End: 2024-02-06
Payer: COMMERCIAL

## 2024-02-06 DIAGNOSIS — R26.9 GAIT ABNORMALITY: Primary | ICD-10-CM

## 2024-02-06 PROCEDURE — 97530 THERAPEUTIC ACTIVITIES: CPT | Mod: PN

## 2024-02-06 PROCEDURE — 97112 NEUROMUSCULAR REEDUCATION: CPT | Mod: PN

## 2024-02-06 PROCEDURE — 97110 THERAPEUTIC EXERCISES: CPT | Mod: PN

## 2024-02-06 NOTE — PROGRESS NOTES
"[facility]   Physical Therapy Treatment Note     Name: Carlos Tenorio Jr.  Clinic Number: [id number]    Therapy Diagnosis:   Encounter Diagnosis   Name Primary?    Gait abnormality Yes     Physician: Martin Lehman,*    Visit Date: 2/6/2024    Physician Orders: PT eval and treat  Medical Diagnosis from Referral:   Diagnosis   M25.561 (ICD-10-CM) - Right knee pain      Evaluation Date: 1/3/2024  Authorization Period Expiration:     12/27/2024      Plan of Care Expiration: 2/28/2024  Progress Note Due: 2/3/2024  Visit # / Visits authorized: 11/ 20  FOTO: 1/ 3    FOTO 1st: 39%  Predicted Score: 74%  FOTO 3rd:  FOTO 10th:    Time in: 200pm  Time out: 255pm  Total Billable Time: 55 minutes    Precautions:  Standard , early onset dementia       SUBJECTIVE     Pt reports: out of work again this week, not using the walker at home or in the community.     He was compliant with home exercise program.  Response to previous treatment: ongoing  Functional change: ongoing    Pain: 2/10  Location: right knee     OBJECTIVE     Objective Measures updated at progress report unless specified.     Treatment     Vlad received the treatments listed below:      Manual therapy techniques: Joint mobilizations were applied to the: right knee for 0 minutes, including:    Therapeutic exercises to develop strength, endurance, and ROM for 9 minutes including:    Seated thoracic extension x 30  Bilateral bicep curls 2 x 30, 5#    Not Performed Today:  Heel prop 5# above knee 4# below knee 8'  Heel slides x 20    Neuromuscular re-education activities to improve: Balance, Coordination, Kinesthetic, Sense, Proprioception, and Posture for 23 minutes. The following activities were included:    Bridges 3 x 10  Supine Straight leg raise 4 x 6 (x 10 Active Assisted Range of Motion)  Straight leg raise on the left 4 x 10  Tandem balance 2 x 20" (more staggered than tandem)  Red band horizontal abduction x 30 "     Therapeutic activities to improve functional performance for 23 minutes, including:    Nustep for activity tolerance level 2, 10'  Step ups 3 x 10 Bilateral    Walking marches 2 laps   Side steps 2 laps   Lateral step overs 2 x 10    Not Performed Today:  Sit to stands from elevated mat 3 x 10   Taps on the step 3 x 15 Bilateral     Patient Education and Home Exercises     Home Exercises Provided and Patient Education Provided     Education provided:   - Home Exercise Program     Written Home Exercises Provided: Patient instructed to cont prior HEP. Exercises were reviewed and Vlad was able to demonstrate them prior to the end of the session.  Vlad demonstrated fair  understanding of the education provided. See EMR under Patient Instructions for exercises provided during therapy sessions    ASSESSMENT     Vlad presented with good knee range of motion. We progressed functional strengthening with lateral step overs. No loss of balance today. 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     Vlad is progressing well towards his goals.     Pt prognosis is Good.     Pt will continue to benefit from skilled outpatient physical therapy to address the deficits listed in the problem list box on initial evaluation, provide pt/family education and to maximize pt's level of independence in the home and community environment.     Pt's spiritual, cultural and educational needs considered and pt agreeable to plan of care and goals.     Anticipated barriers to physical therapy: co-morbidities, compliance     Goals:   Short Term Goals: (8 weeks) -Progressing, not met   1. Patient will be independent with Home Exercise Program in order to supplement patient in improving functional mobility.   2. Patient will improve left knee AROM to at least 5-100 in order to improve gait.   3. Patient will improve hip flexor/quadriceps mobility to WNL in order to improve hip/knee AROM and improved gait function   4. Pt will  improve hip abduction strength to 4+/5 to improve functional gait deviation.      Long Term Goals: (14 weeks) -Progressing, not met   1. Patient will be independent with updated HEP supplement PT in improving functional mobility.   2. Patient will improve left knee AROM to at least 0-120 in order to improve gait and ability to perform ADLs.   3. Patient will improve FOTO knee survey score to predicted level in order to demo improved functional mobility.   4. Patient will perform TUG in < 12 seconds without AD in order to decrease risk of falling   5. Patient will perform at least 10 sit to stands without UE support on 30 second sit to stand test in order to demo improved ability to perform transfers.   6. Patient goal: ascend stairs pain free, return to traveling    PLAN   Plan of care Certification: 1/3/2024 to 2/28/2024.     Continue to progress as tolerated per Plan of care.    Aarti Christiansen, PT , DPT

## 2024-02-08 ENCOUNTER — CLINICAL SUPPORT (OUTPATIENT)
Dept: REHABILITATION | Facility: HOSPITAL | Age: 74
End: 2024-02-08
Payer: COMMERCIAL

## 2024-02-08 DIAGNOSIS — R26.9 GAIT ABNORMALITY: Primary | ICD-10-CM

## 2024-02-08 PROCEDURE — 97530 THERAPEUTIC ACTIVITIES: CPT | Mod: PN

## 2024-02-08 PROCEDURE — 97112 NEUROMUSCULAR REEDUCATION: CPT | Mod: PN

## 2024-02-08 NOTE — PROGRESS NOTES
[facility]   Physical Therapy Treatment Note     Name: Carlos Tenorio Jr.  Clinic Number: [id number]    Therapy Diagnosis:   Encounter Diagnosis   Name Primary?    Gait abnormality Yes     Physician: Martin Lehman,*    Visit Date: 2/8/2024    Physician Orders: PT eval and treat  Medical Diagnosis from Referral:   Diagnosis   M25.561 (ICD-10-CM) - Right knee pain      Evaluation Date: 1/3/2024  Authorization Period Expiration:     12/27/2024      Plan of Care Expiration: 2/28/2024  Progress Note Due: 2/3/2024  Visit # / Visits authorized: 12/ 20  FOTO: 1/ 3    FOTO 1st: 39%  Predicted Score: 74%  FOTO 3rd:  FOTO 10th:    Time in: 200pm  Time out: 255pm  Total Billable Time: 55 minutes    Precautions:  Standard , early onset dementia       SUBJECTIVE     Pt reports: feeling fine today. Might be going back to work next week.    He was compliant with home exercise program.  Response to previous treatment: ongoing  Functional change: ongoing    Pain: 2/10  Location: right knee     OBJECTIVE     Objective Measures updated at progress report unless specified.     Sit to stand x 10 without Upper extremity support  Unable to single leg balance   Knee range of motion 0-100    Treatment     Vlad received the treatments listed below:      Manual therapy techniques: Joint mobilizations were applied to the: right knee for 0 minutes, including:    Therapeutic exercises to develop strength, endurance, and ROM for 0 minutes including:    Seated thoracic extension x 30  Bilateral bicep curls 2 x 30, 5#    Not Performed Today:  Heel prop 5# above knee 4# below knee 8'  Heel slides x 20    Neuromuscular re-education activities to improve: Balance, Coordination, Kinesthetic, Sense, Proprioception, and Posture for 23 minutes. The following activities were included:    Bridges 3 x 10  Supine Straight leg raise 4 x 6 (x 10 Active Assisted Range of Motion)  Straight leg raise on the left 4 x 10  Tandem  "balance 2 x 20" (more staggered than tandem)  Red band horizontal abduction x 30     Therapeutic activities to improve functional performance for 32 minutes, including:    Nustep for activity tolerance level 2, 10'  Step ups 3 x 10 Bilateral    Walking marches 2 laps   Side steps 2 laps   Lateral step overs 2 x 10    Not Performed Today:  Sit to stands from elevated mat 3 x 10   Taps on the step 3 x 15 Bilateral     Patient Education and Home Exercises     Home Exercises Provided and Patient Education Provided     Education provided:   - Home Exercise Program     Written Home Exercises Provided: Patient instructed to cont prior HEP. Exercises were reviewed and Vlad was able to demonstrate them prior to the end of the session.  Vlad demonstrated fair  understanding of the education provided. See EMR under Patient Instructions for exercises provided during therapy sessions    ASSESSMENT     Vlad presented improved tolerance of functional strengthening. He continued to have impaired balance, but did demonstrate improved dynamic balance with better performance of lateral step overs. Will continue to progress as able with emphasis on functional strength, balance, and return to work.     Vlad is progressing well towards his goals.     Pt prognosis is Good.     Pt will continue to benefit from skilled outpatient physical therapy to address the deficits listed in the problem list box on initial evaluation, provide pt/family education and to maximize pt's level of independence in the home and community environment.     Pt's spiritual, cultural and educational needs considered and pt agreeable to plan of care and goals.     Anticipated barriers to physical therapy: co-morbidities, compliance     Goals:   Short Term Goals: (8 weeks) -Progressing, not met   1. Patient will be independent with Home Exercise Program in order to supplement patient in improving functional mobility.   2. Patient will improve left knee AROM to at least " 5-100 in order to improve gait.   3. Patient will improve hip flexor/quadriceps mobility to WNL in order to improve hip/knee AROM and improved gait function   4. Pt will improve hip abduction strength to 4+/5 to improve functional gait deviation.      Long Term Goals: (14 weeks) -Progressing, not met   1. Patient will be independent with updated HEP supplement PT in improving functional mobility.   2. Patient will improve left knee AROM to at least 0-120 in order to improve gait and ability to perform ADLs.   3. Patient will improve FOTO knee survey score to predicted level in order to demo improved functional mobility.   4. Patient will perform TUG in < 12 seconds without AD in order to decrease risk of falling   5. Patient will perform at least 10 sit to stands without UE support on 30 second sit to stand test in order to demo improved ability to perform transfers.   6. Patient goal: ascend stairs pain free, return to traveling    PLAN   Plan of care Certification: 1/3/2024 to 2/28/2024.     Continue to progress as tolerated per Plan of care.    Aarti Christiansen, PT , DPT

## 2024-02-15 ENCOUNTER — CLINICAL SUPPORT (OUTPATIENT)
Dept: REHABILITATION | Facility: HOSPITAL | Age: 74
End: 2024-02-15
Payer: COMMERCIAL

## 2024-02-15 DIAGNOSIS — R26.9 GAIT ABNORMALITY: Primary | ICD-10-CM

## 2024-02-15 PROCEDURE — 97112 NEUROMUSCULAR REEDUCATION: CPT | Mod: PN

## 2024-02-15 PROCEDURE — 97530 THERAPEUTIC ACTIVITIES: CPT | Mod: PN

## 2024-02-15 NOTE — PROGRESS NOTES
[facility]   Physical Therapy Treatment Note     Name: Carlos Tenorio Jr.  Clinic Number: [id number]    Therapy Diagnosis:   Encounter Diagnosis   Name Primary?    Gait abnormality Yes     Physician: Martin Lehman,*    Visit Date: 2/15/2024    Physician Orders: PT eval and treat  Medical Diagnosis from Referral:   Diagnosis   M25.561 (ICD-10-CM) - Right knee pain      Evaluation Date: 1/3/2024  Authorization Period Expiration:     12/27/2024      Plan of Care Expiration: 2/28/2024  Progress Note Due: 2/3/2024  Visit # / Visits authorized: 13/ 20  FOTO: 1/ 3    FOTO 1st: 39%  Predicted Score: 74%  FOTO 3rd:  FOTO 10th:    Time in: 200pm  Time out: 255pm  Total Billable Time: 55 minutes    Precautions:  Standard , early onset dementia       SUBJECTIVE     Pt reports: he took a fall in the lobby. Hit his hand but not his head. Got a band aid from the . Otherwise, he was feeling fine. Still working from home. Not interested in a walker.    He was compliant with home exercise program.  Response to previous treatment: ongoing  Functional change: ongoing    Pain: 2/10  Location: right knee     OBJECTIVE     Objective Measures updated at progress report unless specified.     Straight leg raise x 10 today on the surgery side.    Treatment     Vlad received the treatments listed below:      Manual therapy techniques: Joint mobilizations were applied to the: right knee for 0 minutes, including:    Therapeutic exercises to develop strength, endurance, and ROM for 0 minutes including:    Seated thoracic extension x 30  Bilateral bicep curls 2 x 30, 5#    Not Performed Today:  Heel prop 5# above knee 4# below knee 8'  Heel slides x 20    Neuromuscular re-education activities to improve: Balance, Coordination, Kinesthetic, Sense, Proprioception, and Posture for 23 minutes. The following activities were included:    Bridges 3 x 10  Supine Straight leg raise 4 x 6 (x 10 Active  "Assisted Range of Motion)  Straight leg raise on the left 4 x 10  Tandem balance 2 x 20" (more staggered than tandem)  Red band horizontal abduction x 30     Therapeutic activities to improve functional performance for 32 minutes, including:    Nustep for activity tolerance level 2, 10'  Step ups 3 x 10 Bilateral    Walking marches 2 laps   Side steps 2 laps   Lateral step overs 2 x 10    Not Performed Today:  Sit to stands from elevated mat 3 x 10   Taps on the step 3 x 15 Bilateral     Patient Education and Home Exercises     Home Exercises Provided and Patient Education Provided     Education provided:   - Home Exercise Program     Written Home Exercises Provided: Patient instructed to cont prior HEP. Exercises were reviewed and Vlad was able to demonstrate them prior to the end of the session.  Vlad demonstrated fair  understanding of the education provided. See EMR under Patient Instructions for exercises provided during therapy sessions    ASSESSMENT     Vlad fell in the lobby today before coming in to therapy. He reported just tripping over his feet. He was not seriously injured, and he spoke with our supervisor before coming into his therapy session. Therapy went as usual and he progressed with tolerance of exercise. He was able to Straight leg raise x 10 today for the first time. I continue to stress the benefits of using a walker. He is not interested at this time, though he does have one at home. I did contact his wife after he left and updated her on the fall and my desire for him to use a walker.    Vlad is progressing well towards his goals.     Pt prognosis is Good.     Pt will continue to benefit from skilled outpatient physical therapy to address the deficits listed in the problem list box on initial evaluation, provide pt/family education and to maximize pt's level of independence in the home and community environment.     Pt's spiritual, cultural and educational needs considered and pt agreeable to " plan of care and goals.     Anticipated barriers to physical therapy: co-morbidities, compliance     Goals:   Short Term Goals: (8 weeks) -Progressing, not met   1. Patient will be independent with Home Exercise Program in order to supplement patient in improving functional mobility.   2. Patient will improve left knee AROM to at least 5-100 in order to improve gait.   3. Patient will improve hip flexor/quadriceps mobility to WNL in order to improve hip/knee AROM and improved gait function   4. Pt will improve hip abduction strength to 4+/5 to improve functional gait deviation.      Long Term Goals: (14 weeks) -Progressing, not met   1. Patient will be independent with updated HEP supplement PT in improving functional mobility.   2. Patient will improve left knee AROM to at least 0-120 in order to improve gait and ability to perform ADLs.   3. Patient will improve FOTO knee survey score to predicted level in order to demo improved functional mobility.   4. Patient will perform TUG in < 12 seconds without AD in order to decrease risk of falling   5. Patient will perform at least 10 sit to stands without UE support on 30 second sit to stand test in order to demo improved ability to perform transfers.   6. Patient goal: ascend stairs pain free, return to traveling    PLAN   Plan of care Certification: 1/3/2024 to 2/28/2024.     Continue to progress as tolerated per Plan of care.    Aarti Christiansen, PT , DPT

## 2024-02-19 ENCOUNTER — CLINICAL SUPPORT (OUTPATIENT)
Dept: REHABILITATION | Facility: HOSPITAL | Age: 74
End: 2024-02-19
Payer: COMMERCIAL

## 2024-02-19 DIAGNOSIS — R26.9 GAIT ABNORMALITY: Primary | ICD-10-CM

## 2024-02-19 PROCEDURE — 97112 NEUROMUSCULAR REEDUCATION: CPT | Mod: PN

## 2024-02-19 PROCEDURE — 97530 THERAPEUTIC ACTIVITIES: CPT | Mod: PN

## 2024-02-19 NOTE — PROGRESS NOTES
"[facility]   Physical Therapy Treatment Note     Name: Carlos Tenorio Jr.  Clinic Number: [id number]    Therapy Diagnosis:   Encounter Diagnosis   Name Primary?    Gait abnormality Yes     Physician: Martin Lehman,*    Visit Date: 2/19/2024    Physician Orders: PT eval and treat  Medical Diagnosis from Referral:   Diagnosis   M25.561 (ICD-10-CM) - Right knee pain      Evaluation Date: 1/3/2024  Authorization Period Expiration:     12/27/2024      Plan of Care Expiration: 2/28/2024  Progress Note Due: 2/3/2024  Visit # / Visits authorized: 14/ 20  FOTO: 1/ 3    FOTO 1st: 39%  Predicted Score: 74%  FOTO 3rd:  FOTO 10th:    Time in: 400pm  Time out: 455pm  Total Billable Time: 55 minutes    Precautions:  Standard , early onset dementia       SUBJECTIVE     Pt reports: doing well today. No issues currently.     He was compliant with home exercise program.  Response to previous treatment: ongoing  Functional change: ongoing    Pain: 2/10  Location: right knee     OBJECTIVE     Objective Measures updated at progress report unless specified.     Treatment     Vlad received the treatments listed below:      Manual therapy techniques: Joint mobilizations were applied to the: right knee for 0 minutes, including:    Therapeutic exercises to develop strength, endurance, and ROM for 0 minutes including:    Seated thoracic extension x 30  Bilateral bicep curls 2 x 30, 5#    Not Performed Today:  Heel prop 5# above knee 4# below knee 8'  Heel slides x 20    Neuromuscular re-education activities to improve: Balance, Coordination, Kinesthetic, Sense, Proprioception, and Posture for 23 minutes. The following activities were included:    Bridges 3 x 10  Supine Straight leg raise 4 x 6 (x 10 Active Assisted Range of Motion)  Straight leg raise on the left 4 x 10  Tandem balance 2 x 20" (more staggered than tandem)  Red band horizontal abduction x 30     Therapeutic activities to improve functional " performance for 32 minutes, including:    Nustep for activity tolerance level 2, 10'  Step ups 3 x 10 Bilateral    Walking marches 2 laps   Side steps 2 laps   Lateral step overs 2 x 10    Not Performed Today:  Sit to stands from elevated mat 3 x 10   Taps on the step 3 x 15 Bilateral     Patient Education and Home Exercises     Home Exercises Provided and Patient Education Provided     Education provided:   - Home Exercise Program     Written Home Exercises Provided: Patient instructed to cont prior HEP. Exercises were reviewed and Vlad was able to demonstrate them prior to the end of the session.  Vlad demonstrated fair  understanding of the education provided. See EMR under Patient Instructions for exercises provided during therapy sessions    ASSESSMENT     Today we focused on functional mobility and improving strength. He did well but was fatigued at the end of the session. He almost lost his balance again today but did not fall. I told him he needs to bring his walker next time he comes and I want him to start using it outside of therapy. His FOTO improved to 70%.    Vlad is progressing well towards his goals.     Pt prognosis is Good.     Pt will continue to benefit from skilled outpatient physical therapy to address the deficits listed in the problem list box on initial evaluation, provide pt/family education and to maximize pt's level of independence in the home and community environment.     Pt's spiritual, cultural and educational needs considered and pt agreeable to plan of care and goals.     Anticipated barriers to physical therapy: co-morbidities, compliance     Goals:   Short Term Goals: (8 weeks) -Progressing, not met   1. Patient will be independent with Home Exercise Program in order to supplement patient in improving functional mobility.   2. Patient will improve left knee AROM to at least 5-100 in order to improve gait.   3. Patient will improve hip flexor/quadriceps mobility to WNL in order to  improve hip/knee AROM and improved gait function   4. Pt will improve hip abduction strength to 4+/5 to improve functional gait deviation.      Long Term Goals: (14 weeks) -Progressing, not met   1. Patient will be independent with updated HEP supplement PT in improving functional mobility.   2. Patient will improve left knee AROM to at least 0-120 in order to improve gait and ability to perform ADLs.   3. Patient will improve FOTO knee survey score to predicted level in order to demo improved functional mobility.   4. Patient will perform TUG in < 12 seconds without AD in order to decrease risk of falling   5. Patient will perform at least 10 sit to stands without UE support on 30 second sit to stand test in order to demo improved ability to perform transfers.   6. Patient goal: ascend stairs pain free, return to traveling    PLAN   Plan of care Certification: 1/3/2024 to 2/28/2024.     Continue to progress as tolerated per Plan of care.    Aarti Christiansen, PT , DPT

## 2024-02-27 ENCOUNTER — OFFICE VISIT (OUTPATIENT)
Dept: DERMATOLOGY | Facility: CLINIC | Age: 74
End: 2024-02-27
Payer: COMMERCIAL

## 2024-02-27 DIAGNOSIS — L81.4 LENTIGO: ICD-10-CM

## 2024-02-27 DIAGNOSIS — L82.1 SEBORRHEIC KERATOSES: ICD-10-CM

## 2024-02-27 DIAGNOSIS — D69.2 SOLAR PURPURA: ICD-10-CM

## 2024-02-27 DIAGNOSIS — L85.3 XEROSIS CUTIS: Primary | ICD-10-CM

## 2024-02-27 DIAGNOSIS — L57.0 ACTINIC KERATOSIS: ICD-10-CM

## 2024-02-27 DIAGNOSIS — L57.8 ACTINIC SKIN DAMAGE: ICD-10-CM

## 2024-02-27 DIAGNOSIS — D69.2 PURPURA: ICD-10-CM

## 2024-02-27 PROCEDURE — 17003 DESTRUCT PREMALG LES 2-14: CPT | Mod: S$GLB,,, | Performed by: STUDENT IN AN ORGANIZED HEALTH CARE EDUCATION/TRAINING PROGRAM

## 2024-02-27 PROCEDURE — 1126F AMNT PAIN NOTED NONE PRSNT: CPT | Mod: CPTII,S$GLB,, | Performed by: STUDENT IN AN ORGANIZED HEALTH CARE EDUCATION/TRAINING PROGRAM

## 2024-02-27 PROCEDURE — 1159F MED LIST DOCD IN RCRD: CPT | Mod: CPTII,S$GLB,, | Performed by: STUDENT IN AN ORGANIZED HEALTH CARE EDUCATION/TRAINING PROGRAM

## 2024-02-27 PROCEDURE — 17000 DESTRUCT PREMALG LESION: CPT | Mod: S$GLB,,, | Performed by: STUDENT IN AN ORGANIZED HEALTH CARE EDUCATION/TRAINING PROGRAM

## 2024-02-27 PROCEDURE — 1160F RVW MEDS BY RX/DR IN RCRD: CPT | Mod: CPTII,S$GLB,, | Performed by: STUDENT IN AN ORGANIZED HEALTH CARE EDUCATION/TRAINING PROGRAM

## 2024-02-27 PROCEDURE — 1101F PT FALLS ASSESS-DOCD LE1/YR: CPT | Mod: CPTII,S$GLB,, | Performed by: STUDENT IN AN ORGANIZED HEALTH CARE EDUCATION/TRAINING PROGRAM

## 2024-02-27 PROCEDURE — 99214 OFFICE O/P EST MOD 30 MIN: CPT | Mod: 25,S$GLB,, | Performed by: STUDENT IN AN ORGANIZED HEALTH CARE EDUCATION/TRAINING PROGRAM

## 2024-02-27 PROCEDURE — 3288F FALL RISK ASSESSMENT DOCD: CPT | Mod: CPTII,S$GLB,, | Performed by: STUDENT IN AN ORGANIZED HEALTH CARE EDUCATION/TRAINING PROGRAM

## 2024-02-27 RX ORDER — AMMONIUM LACTATE 12 G/100G
LOTION TOPICAL NIGHTLY
Qty: 225 G | Refills: 1 | Status: SHIPPED | OUTPATIENT
Start: 2024-02-27

## 2024-02-27 NOTE — PROGRESS NOTES
Subjective:      Patient ID:  Carlos Tenorio Jr. is a 74 y.o. male who presents for   Chief Complaint   Patient presents with    Skin Check     UBSC     LOV 06/20/2023    Patient is coming in today for UBSC. States he has spots on his scalp and nose that he is concerned about.   Wife states he has very dry skin.     DERM HX:  Denies PHX of NMSC  Denies FHX of MM        Review of Systems   Constitutional:  Negative for fever, chills and fatigue.   Respiratory:  Negative for cough and shortness of breath.    Gastrointestinal:  Negative for nausea and vomiting.   Skin:  Positive for dry skin, activity-related sunscreen use and wears hat. Negative for itching, rash and daily sunscreen use.   Hematologic/Lymphatic: Bruises/bleeds easily (asa, plavix).       Objective:   Physical Exam   Constitutional: He appears well-developed and well-nourished. No distress.   Neurological: He is alert and oriented to person, place, and time. He is not disoriented.   Psychiatric: He has a normal mood and affect.   Skin:   Areas Examined (abnormalities noted in diagram):   Scalp / Hair Palpated and Inspected  Head / Face Inspection Performed  Neck Inspection Performed  Chest / Axilla Inspection Performed  Abdomen Inspection Performed  Back Inspection Performed  RUE Inspected  LUE Inspection Performed  Nails and Digits Inspection Performed                     Diagram Legend     Erythematous scaling macule/papule c/w actinic keratosis       Vascular papule c/w angioma      Pigmented verrucoid papule/plaque c/w seborrheic keratosis      Yellow umbilicated papule c/w sebaceous hyperplasia      Irregularly shaped tan macule c/w lentigo     1-2 mm smooth white papules consistent with Milia      Movable subcutaneous cyst with punctum c/w epidermal inclusion cyst      Subcutaneous movable cyst c/w pilar cyst      Firm pink to brown papule c/w dermatofibroma      Pedunculated fleshy papule(s) c/w skin tag(s)      Evenly pigmented macule  c/w junctional nevus     Mildly variegated pigmented, slightly irregular-bordered macule c/w mildly atypical nevus      Flesh colored to evenly pigmented papule c/w intradermal nevus       Pink pearly papule/plaque c/w basal cell carcinoma      Erythematous hyperkeratotic cursted plaque c/w SCC      Surgical scar with no sign of skin cancer recurrence      Open and closed comedones      Inflammatory papules and pustules      Verrucoid papule consistent consistent with wart     Erythematous eczematous patches and plaques     Dystrophic onycholytic nail with subungual debris c/w onychomycosis     Umbilicated papule    Erythematous-base heme-crusted tan verrucoid plaque consistent with inflamed seborrheic keratosis     Erythematous Silvery Scaling Plaque c/w Psoriasis     See annotation      Assessment / Plan:        Xerosis cutis  -     ammonium lactate (LAC-HYDRIN) 12 % lotion; Apply topically every evening.  Dispense: 225 g; Refill: 1    Actinic keratosis  Cryosurgery Procedure Note    Verbal consent from the patient is obtained and the patient is aware of the precancerous quality and need for treatment of these lesions. Liquid nitrogen cryosurgery is applied to the 13 actinic keratoses, as detailed in the physical exam, to produce a freeze injury. The patient is aware that blisters may form and is instructed on wound care with gentle cleansing and use of vaseline ointment to keep moist until healed. The patient is supplied a handout on cryosurgery and is instructed to call if lesions do not completely resolve.    Actinic skin damage  -     Photodynamic Therapy; Future  Scalp   Discussed Efudex vs. PDT, patient feels it would be difficult to put cream on, will have him do pdt  Area(s) of previous NMSC evaluated with no signs of recurrence.  Upper body skin examination performed today including at least 9 points as noted in physical examination. No lesions suspicious for malignancy noted.  Patient instructed in  importance in daily broad spectrum sun protection of at least spf 30. Mineral sunscreen ingredients preferred (Zinc +/- Titanium) and can be found OTC.   Recommend Elta MD for daily use on face and neck.  Patient encouraged to wear hat for all outdoor exposure.   Also discussed sun avoidance and use of protective clothing.      Seborrheic keratoses  These are benign inherited growths without a malignant potential. Reassurance given to patient. No treatment is necessary.     Lentigo  This is a benign hyperpigmented sun induced lesion. Daily sun protection will reduce the number of new lesions. Treatment of these benign lesions are considered cosmetic.    Solar purpura  Arms   Secondary to chronic sun exposure    Purpura  Secondary to trauma s/p wearing braces on legs  benign       3 months    No follow-ups on file.

## 2024-02-27 NOTE — PATIENT INSTRUCTIONS

## 2024-02-29 ENCOUNTER — CLINICAL SUPPORT (OUTPATIENT)
Dept: REHABILITATION | Facility: HOSPITAL | Age: 74
End: 2024-02-29
Payer: COMMERCIAL

## 2024-02-29 DIAGNOSIS — R26.9 GAIT ABNORMALITY: Primary | ICD-10-CM

## 2024-02-29 PROCEDURE — 97530 THERAPEUTIC ACTIVITIES: CPT | Mod: PN

## 2024-02-29 PROCEDURE — 97112 NEUROMUSCULAR REEDUCATION: CPT | Mod: PN

## 2024-02-29 NOTE — PLAN OF CARE
"[facility]   Physical Therapy Treatment Note     Name: Carlos Tenorio Jr.  Clinic Number: [id number]    Therapy Diagnosis:   Encounter Diagnosis   Name Primary?    Gait abnormality Yes     Physician: Martin Lehman,*    Visit Date: 2/29/2024    Physician Orders: PT eval and treat  Medical Diagnosis from Referral:   Diagnosis   M25.561 (ICD-10-CM) - Right knee pain      Evaluation Date: 1/3/2024  Authorization Period Expiration:     12/27/2024      Plan of Care Expiration: 3/29/2024  Progress Note Due: 2/3/2024  Visit # / Visits authorized: 15/ 20  FOTO: 1/ 3    FOTO 1st: 39%  Predicted Score: 74%  FOTO 3rd:  FOTO 10th:    Time in: 300pm  Time out: 355pm  Total Billable Time: 55 minutes    Precautions:  Standard , early onset dementia       SUBJECTIVE     Pt reports: doing well today. No issues currently.     He was compliant with home exercise program.  Response to previous treatment: ongoing  Functional change: improved gait    Pain: 2/10  Location: right knee     OBJECTIVE     Objective Measures updated at progress report unless specified.     Straight leg raise x 12 without lag  Sit to stand x 10 with no hands    Treatment     Vlad received the treatments listed below:      Manual therapy techniques: Joint mobilizations were applied to the: right knee for 0 minutes, including:    Therapeutic exercises to develop strength, endurance, and ROM for 0 minutes including:    Seated thoracic extension x 30  Bilateral bicep curls 2 x 30, 5#    Not Performed Today:  Heel prop 5# above knee 4# below knee 8'  Heel slides x 20    Neuromuscular re-education activities to improve: Balance, Coordination, Kinesthetic, Sense, Proprioception, and Posture for 23 minutes. The following activities were included:    Bridges 3 x 10  Supine Straight leg raise 4 x 6 (x 10 Active Assisted Range of Motion)  Straight leg raise on the left 4 x 10  Tandem balance 2 x 20" (more staggered than tandem)  Red " band horizontal abduction x 30     Therapeutic activities to improve functional performance for 32 minutes, including:    Nustep for activity tolerance level 2, 10'  Step ups 3 x 10 Bilateral    Walking marches 2 laps   Side steps 2 laps   Lateral step overs 2 x 10    Not Performed Today:  Sit to stands from elevated mat 3 x 10   Taps on the step 3 x 15 Bilateral     Patient Education and Home Exercises     Home Exercises Provided and Patient Education Provided     Education provided:   - Home Exercise Program     Written Home Exercises Provided: Patient instructed to cont prior HEP. Exercises were reviewed and Vlad was able to demonstrate them prior to the end of the session.  Vlad demonstrated fair  understanding of the education provided. See EMR under Patient Instructions for exercises provided during therapy sessions    ASSESSMENT     Vlad presented with improved quad strength and tolerance of exercise. He has been doing increasingly well with functional interventions. I will see him one time a week for a few more weeks to progress strengthening before discharge.    Vlad is progressing well towards his goals.     Pt prognosis is Good.     Pt will continue to benefit from skilled outpatient physical therapy to address the deficits listed in the problem list box on initial evaluation, provide pt/family education and to maximize pt's level of independence in the home and community environment.     Pt's spiritual, cultural and educational needs considered and pt agreeable to plan of care and goals.     Anticipated barriers to physical therapy: co-morbidities, compliance     Goals:   Short Term Goals: (8 weeks) -Met  1. Patient will be independent with Home Exercise Program in order to supplement patient in improving functional mobility.   2. Patient will improve left knee AROM to at least 5-100 in order to improve gait.   3. Patient will improve hip flexor/quadriceps mobility to WNL in order to improve hip/knee AROM  and improved gait function   4. Pt will improve hip abduction strength to 4+/5 to improve functional gait deviation.      Long Term Goals: (14 weeks) -Progressing, not met   1. Patient will be independent with updated HEP supplement PT in improving functional mobility.   2. Patient will improve left knee AROM to at least 0-120 in order to improve gait and ability to perform ADLs.   3. Patient will improve FOTO knee survey score to predicted level in order to demo improved functional mobility.   4. Patient will perform TUG in < 12 seconds without AD in order to decrease risk of falling   5. Patient will perform at least 10 sit to stands without UE support on 30 second sit to stand test in order to demo improved ability to perform transfers.   6. Patient goal: ascend stairs pain free, return to traveling    PLAN   Plan of care Certification: 1/3/2024 to 3/29/2024. (Updated)    Continue to progress as tolerated per Plan of care.    Aarti Christiansen, PT , DPT

## 2024-02-29 NOTE — PROGRESS NOTES
"[facility]   Physical Therapy Treatment Note     Name: Carlos Tenorio Jr.  Clinic Number: [id number]    Therapy Diagnosis:   Encounter Diagnosis   Name Primary?    Gait abnormality Yes     Physician: Martin Lehman,*    Visit Date: 2/29/2024    Physician Orders: PT eval and treat  Medical Diagnosis from Referral:   Diagnosis   M25.561 (ICD-10-CM) - Right knee pain      Evaluation Date: 1/3/2024  Authorization Period Expiration:     12/27/2024      Plan of Care Expiration: 3/29/2024  Progress Note Due: 2/3/2024  Visit # / Visits authorized: 15/ 20  FOTO: 1/ 3    FOTO 1st: 39%  Predicted Score: 74%  FOTO 3rd:  FOTO 10th:    Time in: 300pm  Time out: 355pm  Total Billable Time: 55 minutes    Precautions:  Standard , early onset dementia       SUBJECTIVE     Pt reports: doing well today. No issues currently.     He was compliant with home exercise program.  Response to previous treatment: ongoing  Functional change: improved gait    Pain: 2/10  Location: right knee     OBJECTIVE     Objective Measures updated at progress report unless specified.     Straight leg raise x 12 without lag  Sit to stand x 10 with no hands    Treatment     Vlad received the treatments listed below:      Manual therapy techniques: Joint mobilizations were applied to the: right knee for 0 minutes, including:    Therapeutic exercises to develop strength, endurance, and ROM for 0 minutes including:    Seated thoracic extension x 30  Bilateral bicep curls 2 x 30, 5#    Not Performed Today:  Heel prop 5# above knee 4# below knee 8'  Heel slides x 20    Neuromuscular re-education activities to improve: Balance, Coordination, Kinesthetic, Sense, Proprioception, and Posture for 23 minutes. The following activities were included:    Bridges 3 x 10  Supine Straight leg raise 4 x 6 (x 10 Active Assisted Range of Motion)  Straight leg raise on the left 4 x 10  Tandem balance 2 x 20" (more staggered than tandem)  Red " band horizontal abduction x 30     Therapeutic activities to improve functional performance for 32 minutes, including:    Nustep for activity tolerance level 2, 10'  Step ups 3 x 10 Bilateral    Walking marches 2 laps   Side steps 2 laps   Lateral step overs 2 x 10    Not Performed Today:  Sit to stands from elevated mat 3 x 10   Taps on the step 3 x 15 Bilateral     Patient Education and Home Exercises     Home Exercises Provided and Patient Education Provided     Education provided:   - Home Exercise Program     Written Home Exercises Provided: Patient instructed to cont prior HEP. Exercises were reviewed and Vlad was able to demonstrate them prior to the end of the session.  Vlad demonstrated fair  understanding of the education provided. See EMR under Patient Instructions for exercises provided during therapy sessions    ASSESSMENT     Vlad presented with improved quad strength and tolerance of exercise. He has been doing increasingly well with functional interventions. I will see him one time a week for a few more weeks to progress strengthening before discharge.    Vlad is progressing well towards his goals.     Pt prognosis is Good.     Pt will continue to benefit from skilled outpatient physical therapy to address the deficits listed in the problem list box on initial evaluation, provide pt/family education and to maximize pt's level of independence in the home and community environment.     Pt's spiritual, cultural and educational needs considered and pt agreeable to plan of care and goals.     Anticipated barriers to physical therapy: co-morbidities, compliance     Goals:   Short Term Goals: (8 weeks) -Met  1. Patient will be independent with Home Exercise Program in order to supplement patient in improving functional mobility.   2. Patient will improve left knee AROM to at least 5-100 in order to improve gait.   3. Patient will improve hip flexor/quadriceps mobility to WNL in order to improve hip/knee AROM  and improved gait function   4. Pt will improve hip abduction strength to 4+/5 to improve functional gait deviation.      Long Term Goals: (14 weeks) -Progressing, not met   1. Patient will be independent with updated HEP supplement PT in improving functional mobility.   2. Patient will improve left knee AROM to at least 0-120 in order to improve gait and ability to perform ADLs.   3. Patient will improve FOTO knee survey score to predicted level in order to demo improved functional mobility.   4. Patient will perform TUG in < 12 seconds without AD in order to decrease risk of falling   5. Patient will perform at least 10 sit to stands without UE support on 30 second sit to stand test in order to demo improved ability to perform transfers.   6. Patient goal: ascend stairs pain free, return to traveling    PLAN   Plan of care Certification: 1/3/2024 to 3/29/2024. (Updated)    Continue to progress as tolerated per Plan of care.    Aarti Christiansen, PT , DPT

## 2024-03-05 ENCOUNTER — CLINICAL SUPPORT (OUTPATIENT)
Dept: REHABILITATION | Facility: HOSPITAL | Age: 74
End: 2024-03-05
Payer: COMMERCIAL

## 2024-03-05 DIAGNOSIS — R26.9 GAIT ABNORMALITY: Primary | ICD-10-CM

## 2024-03-05 PROCEDURE — 97530 THERAPEUTIC ACTIVITIES: CPT | Mod: PN

## 2024-03-05 PROCEDURE — 97112 NEUROMUSCULAR REEDUCATION: CPT | Mod: PN

## 2024-03-05 NOTE — PROGRESS NOTES
"[facility]   Physical Therapy Treatment Note     Name: Carlos Tenorio Jr.  Clinic Number: [id number]    Therapy Diagnosis:   Encounter Diagnosis   Name Primary?    Gait abnormality Yes     Physician: Martin Lehman,*    Visit Date: 3/5/2024    Physician Orders: PT eval and treat  Medical Diagnosis from Referral:   Diagnosis   M25.561 (ICD-10-CM) - Right knee pain      Evaluation Date: 1/3/2024  Authorization Period Expiration:     12/27/2024      Plan of Care Expiration: 3/29/2024  Progress Note Due: 2/3/2024  Visit # / Visits authorized: 15/ 20  FOTO: 1/ 3    FOTO 1st: 39%  Predicted Score: 74%  FOTO 3rd:  FOTO 10th:    Time in: 215pm  Time out: 255pm  Total Billable Time: 40 minutes    Precautions:  Standard , early onset dementia       SUBJECTIVE     Pt reports: doing well today. No issues currently.     He was compliant with home exercise program.  Response to previous treatment: ongoing  Functional change: improved gait    Pain: 2/10  Location: right knee     OBJECTIVE     Objective Measures updated at progress report unless specified.     Straight leg raise x 12 without lag  Sit to stand x 10 with no hands    Treatment     Vlad received the treatments listed below:      Manual therapy techniques: Joint mobilizations were applied to the: right knee for 0 minutes, including:    Therapeutic exercises to develop strength, endurance, and ROM for 0 minutes including:    Seated thoracic extension x 30  Bilateral bicep curls 2 x 30, 5#    Not Performed Today:  Heel prop 5# above knee 4# below knee 8'  Heel slides x 20    Neuromuscular re-education activities to improve: Balance, Coordination, Kinesthetic, Sense, Proprioception, and Posture for 23 minutes. The following activities were included:    Bridges 3 x 10  Supine Straight leg raise 4 x 6 (x 10 Active Assisted Range of Motion)  Straight leg raise on the left 4 x 10  Tandem balance 2 x 20" (more staggered than tandem)  Red " band horizontal abduction x 30     Therapeutic activities to improve functional performance for 17 minutes, including:    Nustep for activity tolerance level 2, 10'  Step ups 3 x 10 Bilateral      Not Performed Today:  Walking marches 2 laps   Side steps 2 laps   Lateral step overs 2 x 10    Not Performed Today:  Sit to stands from elevated mat 3 x 10   Taps on the step 3 x 15 Bilateral     Patient Education and Home Exercises     Home Exercises Provided and Patient Education Provided     Education provided:   - Home Exercise Program     Written Home Exercises Provided: Patient instructed to cont prior HEP. Exercises were reviewed and Vlad was able to demonstrate them prior to the end of the session.  lVad demonstrated fair  understanding of the education provided. See EMR under Patient Instructions for exercises provided during therapy sessions    ASSESSMENT     Vlad presented to clinic today without an appointment, but I was available so I treated him for a shortened session. He did well but was fatigued at end of session. Getting medically cleared to go back to work tomorrow so we will see how that goes.    Vlad is progressing well towards his goals.     Pt prognosis is Good.     Pt will continue to benefit from skilled outpatient physical therapy to address the deficits listed in the problem list box on initial evaluation, provide pt/family education and to maximize pt's level of independence in the home and community environment.     Pt's spiritual, cultural and educational needs considered and pt agreeable to plan of care and goals.     Anticipated barriers to physical therapy: co-morbidities, compliance     Goals:   Short Term Goals: (8 weeks) -Met  1. Patient will be independent with Home Exercise Program in order to supplement patient in improving functional mobility.   2. Patient will improve left knee AROM to at least 5-100 in order to improve gait.   3. Patient will improve hip flexor/quadriceps mobility to  WNL in order to improve hip/knee AROM and improved gait function   4. Pt will improve hip abduction strength to 4+/5 to improve functional gait deviation.      Long Term Goals: (14 weeks) -Progressing, not met   1. Patient will be independent with updated HEP supplement PT in improving functional mobility.   2. Patient will improve left knee AROM to at least 0-120 in order to improve gait and ability to perform ADLs.   3. Patient will improve FOTO knee survey score to predicted level in order to demo improved functional mobility.   4. Patient will perform TUG in < 12 seconds without AD in order to decrease risk of falling   5. Patient will perform at least 10 sit to stands without UE support on 30 second sit to stand test in order to demo improved ability to perform transfers.   6. Patient goal: ascend stairs pain free, return to traveling    PLAN   Plan of care Certification: 1/3/2024 to 3/29/2024. (Updated)    Continue to progress as tolerated per Plan of care.    Aarti Christiansen, PT , DPT

## 2024-03-07 ENCOUNTER — LAB VISIT (OUTPATIENT)
Dept: LAB | Facility: HOSPITAL | Age: 74
End: 2024-03-07
Attending: FAMILY MEDICINE
Payer: COMMERCIAL

## 2024-03-07 ENCOUNTER — OFFICE VISIT (OUTPATIENT)
Dept: FAMILY MEDICINE | Facility: CLINIC | Age: 74
End: 2024-03-07
Payer: COMMERCIAL

## 2024-03-07 VITALS
WEIGHT: 185.63 LBS | OXYGEN SATURATION: 98 % | SYSTOLIC BLOOD PRESSURE: 124 MMHG | BODY MASS INDEX: 28.13 KG/M2 | HEART RATE: 66 BPM | RESPIRATION RATE: 18 BRPM | HEIGHT: 68 IN | DIASTOLIC BLOOD PRESSURE: 78 MMHG

## 2024-03-07 DIAGNOSIS — E11.59 HYPERTENSION ASSOCIATED WITH DIABETES: ICD-10-CM

## 2024-03-07 DIAGNOSIS — I15.2 HYPERTENSION ASSOCIATED WITH DIABETES: ICD-10-CM

## 2024-03-07 DIAGNOSIS — G47.33 OSA (OBSTRUCTIVE SLEEP APNEA): ICD-10-CM

## 2024-03-07 DIAGNOSIS — Z00.00 WELLNESS EXAMINATION: ICD-10-CM

## 2024-03-07 DIAGNOSIS — Z12.5 PROSTATE CANCER SCREENING: ICD-10-CM

## 2024-03-07 DIAGNOSIS — R97.20 ELEVATED PSA: ICD-10-CM

## 2024-03-07 DIAGNOSIS — E11.3213 TYPE 2 DIABETES MELLITUS WITH BOTH EYES AFFECTED BY MILD NONPROLIFERATIVE RETINOPATHY AND MACULAR EDEMA, WITHOUT LONG-TERM CURRENT USE OF INSULIN: ICD-10-CM

## 2024-03-07 DIAGNOSIS — G30.0 ALZHEIMER'S DISEASE WITH EARLY ONSET (CODE): ICD-10-CM

## 2024-03-07 DIAGNOSIS — R80.9 MICROALBUMINURIA: ICD-10-CM

## 2024-03-07 DIAGNOSIS — N28.89 LEFT RENAL MASS: ICD-10-CM

## 2024-03-07 DIAGNOSIS — E11.65 TYPE 2 DIABETES MELLITUS WITH HYPERGLYCEMIA, WITHOUT LONG-TERM CURRENT USE OF INSULIN: ICD-10-CM

## 2024-03-07 DIAGNOSIS — Z23 IMMUNIZATION DUE: ICD-10-CM

## 2024-03-07 DIAGNOSIS — R09.82 POSTNASAL DRIP: ICD-10-CM

## 2024-03-07 DIAGNOSIS — E29.1 MALE HYPOGONADISM: ICD-10-CM

## 2024-03-07 DIAGNOSIS — Z86.010 HISTORY OF COLON POLYPS: ICD-10-CM

## 2024-03-07 DIAGNOSIS — E78.5 HYPERLIPIDEMIA ASSOCIATED WITH TYPE 2 DIABETES MELLITUS: ICD-10-CM

## 2024-03-07 DIAGNOSIS — C61 PROSTATE CANCER: ICD-10-CM

## 2024-03-07 DIAGNOSIS — I50.32 CHRONIC DIASTOLIC CONGESTIVE HEART FAILURE: ICD-10-CM

## 2024-03-07 DIAGNOSIS — E11.69 HYPERLIPIDEMIA ASSOCIATED WITH TYPE 2 DIABETES MELLITUS: ICD-10-CM

## 2024-03-07 DIAGNOSIS — I25.118 ATHEROSCLEROTIC HEART DISEASE OF NATIVE CORONARY ARTERY WITH OTHER FORMS OF ANGINA PECTORIS: Primary | ICD-10-CM

## 2024-03-07 DIAGNOSIS — G62.9 PERIPHERAL POLYNEUROPATHY: ICD-10-CM

## 2024-03-07 DIAGNOSIS — M10.9 GOUT, UNSPECIFIED CAUSE, UNSPECIFIED CHRONICITY, UNSPECIFIED SITE: ICD-10-CM

## 2024-03-07 DIAGNOSIS — Z95.5 S/P CORONARY ARTERY STENT PLACEMENT: ICD-10-CM

## 2024-03-07 PROBLEM — N18.31 STAGE 3A CHRONIC KIDNEY DISEASE: Status: RESOLVED | Noted: 2018-01-26 | Resolved: 2024-03-07

## 2024-03-07 PROBLEM — N18.31 STAGE 3A CHRONIC KIDNEY DISEASE: Status: ACTIVE | Noted: 2018-01-26

## 2024-03-07 LAB
ALBUMIN SERPL BCP-MCNC: 3.7 G/DL (ref 3.5–5.2)
ALP SERPL-CCNC: 56 U/L (ref 55–135)
ALT SERPL W/O P-5'-P-CCNC: 8 U/L (ref 10–44)
ANION GAP SERPL CALC-SCNC: 10 MMOL/L (ref 8–16)
AST SERPL-CCNC: 11 U/L (ref 10–40)
BASOPHILS # BLD AUTO: 0.05 K/UL (ref 0–0.2)
BASOPHILS NFR BLD: 0.7 % (ref 0–1.9)
BILIRUB SERPL-MCNC: 0.6 MG/DL (ref 0.1–1)
BUN SERPL-MCNC: 19 MG/DL (ref 8–23)
CALCIUM SERPL-MCNC: 9.9 MG/DL (ref 8.7–10.5)
CHLORIDE SERPL-SCNC: 105 MMOL/L (ref 95–110)
CO2 SERPL-SCNC: 25 MMOL/L (ref 23–29)
CREAT SERPL-MCNC: 1 MG/DL (ref 0.5–1.4)
DIFFERENTIAL METHOD BLD: ABNORMAL
EOSINOPHIL # BLD AUTO: 0.1 K/UL (ref 0–0.5)
EOSINOPHIL NFR BLD: 1.2 % (ref 0–8)
ERYTHROCYTE [DISTWIDTH] IN BLOOD BY AUTOMATED COUNT: 14 % (ref 11.5–14.5)
EST. GFR  (NO RACE VARIABLE): >60 ML/MIN/1.73 M^2
ESTIMATED AVG GLUCOSE: 100 MG/DL (ref 68–131)
GLUCOSE SERPL-MCNC: 92 MG/DL (ref 70–110)
HBA1C MFR BLD: 5.1 % (ref 4–5.6)
HCT VFR BLD AUTO: 32.6 % (ref 40–54)
HGB BLD-MCNC: 11 G/DL (ref 14–18)
IMM GRANULOCYTES # BLD AUTO: 0.02 K/UL (ref 0–0.04)
IMM GRANULOCYTES NFR BLD AUTO: 0.3 % (ref 0–0.5)
LYMPHOCYTES # BLD AUTO: 2.3 K/UL (ref 1–4.8)
LYMPHOCYTES NFR BLD: 30.2 % (ref 18–48)
MCH RBC QN AUTO: 31.9 PG (ref 27–31)
MCHC RBC AUTO-ENTMCNC: 33.7 G/DL (ref 32–36)
MCV RBC AUTO: 95 FL (ref 82–98)
MONOCYTES # BLD AUTO: 0.9 K/UL (ref 0.3–1)
MONOCYTES NFR BLD: 12.3 % (ref 4–15)
NEUTROPHILS # BLD AUTO: 4.2 K/UL (ref 1.8–7.7)
NEUTROPHILS NFR BLD: 55.3 % (ref 38–73)
NRBC BLD-RTO: 0 /100 WBC
PLATELET # BLD AUTO: 277 K/UL (ref 150–450)
PMV BLD AUTO: 11.7 FL (ref 9.2–12.9)
POTASSIUM SERPL-SCNC: 4.1 MMOL/L (ref 3.5–5.1)
PROT SERPL-MCNC: 6.2 G/DL (ref 6–8.4)
RBC # BLD AUTO: 3.45 M/UL (ref 4.6–6.2)
SODIUM SERPL-SCNC: 140 MMOL/L (ref 136–145)
WBC # BLD AUTO: 7.55 K/UL (ref 3.9–12.7)

## 2024-03-07 PROCEDURE — 1100F PTFALLS ASSESS-DOCD GE2>/YR: CPT | Mod: CPTII,S$GLB,, | Performed by: FAMILY MEDICINE

## 2024-03-07 PROCEDURE — 3074F SYST BP LT 130 MM HG: CPT | Mod: CPTII,S$GLB,, | Performed by: FAMILY MEDICINE

## 2024-03-07 PROCEDURE — 36415 COLL VENOUS BLD VENIPUNCTURE: CPT | Mod: PN | Performed by: FAMILY MEDICINE

## 2024-03-07 PROCEDURE — 83036 HEMOGLOBIN GLYCOSYLATED A1C: CPT | Performed by: FAMILY MEDICINE

## 2024-03-07 PROCEDURE — 85025 COMPLETE CBC W/AUTO DIFF WBC: CPT | Performed by: FAMILY MEDICINE

## 2024-03-07 PROCEDURE — 1159F MED LIST DOCD IN RCRD: CPT | Mod: CPTII,S$GLB,, | Performed by: FAMILY MEDICINE

## 2024-03-07 PROCEDURE — 1126F AMNT PAIN NOTED NONE PRSNT: CPT | Mod: CPTII,S$GLB,, | Performed by: FAMILY MEDICINE

## 2024-03-07 PROCEDURE — 99214 OFFICE O/P EST MOD 30 MIN: CPT | Mod: S$GLB,,, | Performed by: FAMILY MEDICINE

## 2024-03-07 PROCEDURE — 3288F FALL RISK ASSESSMENT DOCD: CPT | Mod: CPTII,S$GLB,, | Performed by: FAMILY MEDICINE

## 2024-03-07 PROCEDURE — 99999 PR PBB SHADOW E&M-EST. PATIENT-LVL V: CPT | Mod: PBBFAC,,, | Performed by: FAMILY MEDICINE

## 2024-03-07 PROCEDURE — 80053 COMPREHEN METABOLIC PANEL: CPT | Performed by: FAMILY MEDICINE

## 2024-03-07 PROCEDURE — 4010F ACE/ARB THERAPY RXD/TAKEN: CPT | Mod: CPTII,S$GLB,, | Performed by: FAMILY MEDICINE

## 2024-03-07 PROCEDURE — G2211 COMPLEX E/M VISIT ADD ON: HCPCS | Mod: S$GLB,,, | Performed by: FAMILY MEDICINE

## 2024-03-07 PROCEDURE — 3078F DIAST BP <80 MM HG: CPT | Mod: CPTII,S$GLB,, | Performed by: FAMILY MEDICINE

## 2024-03-07 PROCEDURE — 3008F BODY MASS INDEX DOCD: CPT | Mod: CPTII,S$GLB,, | Performed by: FAMILY MEDICINE

## 2024-03-07 RX ORDER — ONDANSETRON HYDROCHLORIDE 8 MG/1
8 TABLET, FILM COATED ORAL 2 TIMES DAILY
COMMUNITY
Start: 2023-12-07

## 2024-03-07 RX ORDER — HYDROCHLOROTHIAZIDE 25 MG/1
TABLET ORAL
COMMUNITY

## 2024-03-07 RX ORDER — FUROSEMIDE 40 MG/1
40 TABLET ORAL
COMMUNITY

## 2024-03-07 RX ORDER — CHOLECALCIFEROL (VITAMIN D3) 25 MCG
TABLET ORAL
COMMUNITY

## 2024-03-07 RX ORDER — KETOCONAZOLE 20 MG/G
1 CREAM TOPICAL 2 TIMES DAILY
COMMUNITY
Start: 2023-12-26

## 2024-03-07 RX ORDER — SERTRALINE HYDROCHLORIDE 50 MG/1
50 TABLET, FILM COATED ORAL DAILY
COMMUNITY

## 2024-03-07 RX ORDER — SOY PROTEIN
POWDER (GRAM) ORAL
COMMUNITY

## 2024-03-07 RX ORDER — CLOPIDOGREL BISULFATE 75 MG/1
TABLET ORAL
Start: 2024-03-07

## 2024-03-07 NOTE — PROGRESS NOTES
THIS DOCUMENT WAS MADE IN PART WITH VOICE RECOGNITION SOFTWARE.  OCCASIONALLY THIS SOFTWARE WILL MISINTERPRET WORDS OR PHRASES.    Assessment and Plan:    1. Atherosclerotic heart disease of native coronary artery with other forms of angina pectoris        2. Chronic diastolic congestive heart failure        3. Hyperlipidemia associated with type 2 diabetes mellitus        4. Hypertension associated with diabetes  CBC Auto Differential      5. S/P coronary artery stent placement        6. Elevated PSA        7. Left renal mass        8. Prostate cancer        9. Male hypogonadism        10. Type 2 diabetes mellitus with hyperglycemia, without long-term current use of insulin  Comprehensive Metabolic Panel    Hemoglobin A1C      11. History of colon polyps        12. Gout, unspecified cause, unspecified chronicity, unspecified site        13. Alzheimer's disease with early onset (CODE)        14. VASYL (obstructive sleep apnea)        15. Type 2 diabetes mellitus with both eyes affected by mild nonproliferative retinopathy and macular edema, without long-term current use of insulin  CBC Auto Differential      16. Wellness examination        17. Immunization due        18. Prostate cancer screening        19. Microalbuminuria        20. Postnasal drip        21. Peripheral polyneuropathy            PLAN    Lab work as above to follow-up on anemia, nutrition levels, A1c     Topical medicine from compounding pharmacy for neuropathy symptoms    Other chronic conditions stable, continue workup with physical therapy for frequent falls, follow-up orthopedist for contralateral knee pain, knee replacement    Visit today included increased complexity associated with the care of the episodic problem noted above addressed and managing the longitudinal care of the patient due to the serious and/or complex managed problem(s) .    ______________________________________________________________________  Subjective:    Chief  Complaint:  Chief Complaint   Patient presents with    Follow-up     6 month f/u        HPI:  Carlos is a 74 y.o. year old     Follow-up postnasal drip, prescribed Atrovent at prior visit for t.i.d. use, to be use concurrently with antihistamine and Flonase.  Cough has resolved, medicine working great     Complains of continued neuropathy from his dementia infusions, mostly tingling  Interested in medication     Has had a few falls recently following his knee replacement surgery.  Reports his untreated knee will give out on him sometimes      History of gout   Rx-allopurinol 100 mg  Prev flare  years ago      Essential hypertension  Rx-amlodipine 2.5 mg, losartan 100 mg  Normotensive in clinic   Home : in normal range     Coronary artery disease   Rx-atorvastatin 40 mg, Zetia 10 mg, Plavix 75 mg, Ranexa, NTG  Cardiology - MD Vadim   TUYET : x 7   CABG : 7  Denies exertional chest symptoms      Diastolic heart failure     Obstructive sleep apnea  Prev used CPAP --> GRADUATED      Memory impairment  Prev Rx : Aduhelm (bleeding)  ---> secondary neuropathy   Neurology : Curtis --- > MD Jg Contreras MD manages infusions      History diabetes / Prediab  Rx- metformin   Previous A1c- 5.6      Elevated PSA --> Prostate Cancer   Urology : MD Sophie   Tx : Active Monitoring   Previous PSA 6.0%     Renal mass --> Renal Cell Cancer (Left Side)   Urology : MD Sophie   Sx : partial nephrectomy with robot (2017)  No RTX / Chemo     History of Nephroliths  Several  Prev episode > 10 years ago      Large Spermatocele Cyst  Monitored by urology      Cystic Acne   Location : back      Hearing Aid Status        Past Medical History:  Past Medical History:   Diagnosis Date    Arthritis     knees, back    Bilateral renal cysts 7/30/2012    CAD (coronary artery disease) 1995    no chest pain since CABG, sees Dr Larry Black    Diabetes mellitus     borderline    Hypertension     Kidney stones     uric acid stones    VASYL  (obstructive sleep apnea) 2007    is compliant with CPAP    Primary gonadal failure        Past Surgical History:  Past Surgical History:   Procedure Laterality Date    BACK SURGERY  2010    L5-6 fusion, with pins,bone graft    cardiac stents      2 stents 12/2021 and 1/2022    CARDIAC SURGERY  1995, 2007    total 7 vessel bypass    CAROTID ARTERY ANGIOPLASTY  01/22/2023    1 stent posterior descending    COLONOSCOPY  2008    repeat 2013    COLONOSCOPY N/A 04/04/2017    Procedure: COLONOSCOPY;  Surgeon: Dmitry Sampson MD;  Location: [facility];  Service: Endoscopy;  Laterality: N/A;    CORONARY ARTERY BYPASS GRAFT  1995, 2007    cabgx3, cabgx5    CORONARY STENT PLACEMENT  10/2022    CORONARY STENT PLACEMENT  11/2022    EXTRACORPOREAL SHOCK WAVE LITHOTRIPSY      EYE SURGERY      cataracts bilateral    LITHOTRIPSY      LUMBAR LAMINECTOMY      Partial Nephrectomy Laproscopic      TRANSRECTAL BIOPSY OF PROSTATE WITH ULTRASOUND GUIDANCE N/A 03/19/2019    Procedure: BIOPSY, PROSTATE, RECTAL APPROACH, WITH US GUIDANCE;  Surgeon: Yovany Heart MD;  Location: [facility];  Service: Urology;  Laterality: N/A;    TRANSRECTAL BIOPSY OF PROSTATE WITH ULTRASOUND GUIDANCE N/A 10/20/2020    Procedure: BIOPSY, PROSTATE, RECTAL APPROACH, WITH US GUIDANCE;  Surgeon: Yovany Heart MD;  Location: [facility];  Service: Urology;  Laterality: N/A;       Family History:  Family History   Problem Relation Age of Onset    Heart disease Mother     Hypertension Mother     Diabetes Mother     Alzheimer's disease Mother     Alzheimer's disease Father     Heart disease Father         premature    Hypertension Father     Diabetes Sister     Urolithiasis Sister     Alzheimer's disease Sister     Heart disease Paternal Uncle     Cancer Neg Hx     Prostate cancer Neg Hx     Kidney cancer Neg Hx     Melanoma Neg Hx     Lupus Neg Hx     Eczema Neg Hx     Psoriasis Neg Hx        Social History:  Social History     Socioeconomic History    Marital  status:    Tobacco Use    Smoking status: Former     Current packs/day: 0.00     Types: Cigarettes     Quit date: 1976     Years since quittin.1    Smokeless tobacco: Never   Substance and Sexual Activity    Alcohol use: Yes     Comment: Socially    Drug use: No    Sexual activity: Yes     Social Determinants of Health     Financial Resource Strain: Low Risk  (2024)    Overall Financial Resource Strain (CARDIA)     Difficulty of Paying Living Expenses: Not hard at all   Food Insecurity: No Food Insecurity (2024)    Hunger Vital Sign     Worried About Running Out of Food in the Last Year: Never true     Ran Out of Food in the Last Year: Never true   Transportation Needs: No Transportation Needs (2024)    PRAPARE - Transportation     Lack of Transportation (Medical): No     Lack of Transportation (Non-Medical): No   Physical Activity: Inactive (2024)    Exercise Vital Sign     Days of Exercise per Week: 0 days     Minutes of Exercise per Session: 0 min   Stress: No Stress Concern Present (2024)    [employer] [place] [employer] - Occupational Stress Questionnaire     Feeling of Stress : Not at all   Social Connections: Unknown (2024)    Social Connection and Isolation Panel [NHANES]     Frequency of Communication with Friends and Family: Three times a week     Frequency of Social Gatherings with Friends and Family: Three times a week     Active Member of Clubs or Organizations: No     Attends Club or Organization Meetings: Never     Marital Status:    Housing Stability: Low Risk  (2024)    Housing Stability Vital Sign     Unable to Pay for Housing in the Last Year: No     Number of Places Lived in the Last Year: 1     Unstable Housing in the Last Year: No       Medications:  Current Outpatient Medications on File Prior to Visit   Medication Sig Dispense Refill    allopurinoL (ZYLOPRIM) 100 MG tablet TAKE 1 TABLET(100 MG) BY MOUTH EVERY DAY 90 tablet 3     ammonium lactate (LAC-HYDRIN) 12 % lotion Apply topically every evening. 225 g 1    atorvastatin (LIPITOR) 40 MG tablet TAKE 1 TABLET(40 MG) BY MOUTH EVERY DAY 90 tablet 2    CALCIUM 500 + D 500 mg-5 mcg (200 unit) per tablet Take 1 tablet by mouth Daily.      cyanocobalamin, vitamin B-12, (VITAMIN B-12 ORAL) Take by mouth.      cyanocobalamin/folic acid (VITAMIN B12-FOLIC ACID) 500-400 mcg Tab Take by mouth.      ezetimibe (ZETIA) 10 mg tablet Take 10 mg by mouth once daily.      ferrous sulfate (FEOSOL) 325 mg (65 mg iron) Tab tablet Take 325 mg by mouth.      folic acid (FOLVITE) 1 MG tablet TAKE 1 TABLET(1 MG) BY MOUTH EVERY DAY 90 tablet 3    hydroCHLOROthiazide (HYDRODIURIL) 25 MG tablet Take by mouth.      ipratropium (ATROVENT) 21 mcg (0.03 %) nasal spray 2 sprays by Each Nostril route 3 (three) times daily as needed for Rhinitis. 30 mL 3    losartan (COZAAR) 100 MG tablet TAKE 1 TABLET(100 MG) BY MOUTH EVERY DAY 90 tablet 2    multivit-min-FA-lycopen-lutein 0.4 mg-300 mcg- 250 mcg Tab Take by mouth.      nitroGLYCERIN (NITROSTAT) 0.4 MG SL tablet SMARTSI Tablet(s) Sublingual PRN      ranolazine (RANEXA) 500 MG Tb12 Take 500 mg by mouth 2 (two) times daily.      vitamin D (VITAMIN D3) 1000 units Tab Take by mouth.      amLODIPine (NORVASC) 2.5 MG tablet Take 1 tablet (2.5 mg total) by mouth every evening. 30 tablet 11    furosemide (LASIX) 40 MG tablet Take 40 mg by mouth 3 (three) times a week.      ketoconazole (NIZORAL) 2 % cream 1 application  2 (two) times daily. Apply to affected area      metFORMIN (GLUCOPHAGE-XR) 500 MG ER 24hr tablet Take 1 tablet (500 mg total) by mouth 2 (two) times daily with meals. 180 tablet 1    ondansetron (ZOFRAN) 8 MG tablet Take 8 mg by mouth 2 (two) times daily.      sertraline (ZOLOFT) 50 MG tablet Take 50 mg by mouth once daily.      [DISCONTINUED] ADUHELM 100 mg/mL Soln 100 mg.      [DISCONTINUED] benzonatate (TESSALON) 100 MG capsule Take 1 capsule (100 mg  total) by mouth 3 (three) times daily as needed for Cough. (Patient not taking: Reported on 3/7/2024) 60 capsule 2    [DISCONTINUED] clopidogreL (PLAVIX) 75 mg tablet Take 75 mg by mouth once daily.      [DISCONTINUED] triamcinolone acetonide 0.025% (KENALOG) 0.025 % cream Apply topically 2 (two) times daily. (Patient not taking: Reported on 3/7/2024) 80 g 0     Current Facility-Administered Medications on File Prior to Visit   Medication Dose Route Frequency Provider Last Rate Last Admin    triamcinolone acetonide injection 40 mg  40 mg Intra-articular  Maosn Macedo MD   40 mg at 03/13/15 0929    triamcinolone acetonide injection 40 mg  40 mg Intra-articular  Mason Macedo MD   40 mg at 03/13/15 0929       Allergies:  Sulfa (sulfonamide antibiotics) and Adhesive    Immunizations:  Immunization History   Administered Date(s) Administered    COVID-19, MRNA, LN-S, PF (MODERNA FULL 0.5 ML DOSE) 08/19/2023    COVID-19, MRNA, LN-S, PF (Pfizer) (Purple Cap) 01/09/2021, 01/30/2021    COVID-19, mRNA, LNP-S, bivalent booster, PF (Moderna Omicron)12 + YEARS 08/19/2023    Hepatitis A, Adult 09/02/2009, 08/04/2014    Hepatitis B, Adult 09/02/2009, 09/29/2009    Influenza 10/26/2007, 09/19/2009, 09/07/2011, 01/15/2014, 09/23/2020    Influenza (FLUAD) - Quadrivalent - Adjuvanted - PF *Preferred* (65+) 11/18/2021    Influenza - High Dose - PF (65 years and older) 09/29/2015, 02/09/2017, 01/26/2018, 02/20/2019, 11/27/2019, 09/23/2020    Influenza - Intradermal - Quadrivalent - PF 11/19/2012    Influenza - Intradermal - Trivalent - PF 11/19/2012    Influenza - Quadrivalent 11/13/2014    Influenza - Trivalent - PF (ADULT) 11/01/2014, 11/01/2014, 02/09/2017, 02/09/2017, 01/01/2018, 01/01/2018, 02/10/2019    Influenza Split 10/26/2007, 09/19/2009, 09/07/2011, 01/15/2014, 01/15/2014    Pneumococcal Conjugate - 13 Valent 01/13/2016, 06/01/2018    Pneumococcal Polysaccharide - 23 Valent 11/28/2012, 02/09/2017    Tdap  "04/23/2012, 08/01/2014    Typhoid - ViCPs 08/04/2014, 03/02/2020    Zoster 11/19/2012    Zoster Recombinant 04/30/2019, 04/30/2019, 07/15/2019       Review of Systems:  Review of Systems   All other systems reviewed and are negative.      Objective:    Vitals:  Vitals:    03/07/24 1525 03/07/24 1538   BP: (!) 142/74 124/78   Pulse: 66    Resp: 18    SpO2: 98%    Weight: 84.2 kg (185 lb 10 oz)    Height: 5' 8" (1.727 m)    PainSc: 0-No pain        Physical Exam  Vitals reviewed.   Constitutional:       General: He is not in acute distress.  HENT:      Head: Normocephalic and atraumatic.   Eyes:      Pupils: Pupils are equal, round, and reactive to light.   Cardiovascular:      Rate and Rhythm: Normal rate and regular rhythm.      Heart sounds: No murmur heard.     No friction rub.   Pulmonary:      Effort: Pulmonary effort is normal.      Breath sounds: Normal breath sounds.   Abdominal:      General: Bowel sounds are normal. There is no distension.      Palpations: Abdomen is soft.      Tenderness: There is no abdominal tenderness.   Musculoskeletal:      Cervical back: Neck supple.   Skin:     General: Skin is warm and dry.      Findings: No rash.   Psychiatric:         Behavior: Behavior normal.           Morteza Blevins MD  Family Medicine      "

## 2024-03-11 ENCOUNTER — PATIENT MESSAGE (OUTPATIENT)
Dept: DERMATOLOGY | Facility: CLINIC | Age: 74
End: 2024-03-11
Payer: COMMERCIAL

## 2024-03-11 ENCOUNTER — TELEPHONE (OUTPATIENT)
Dept: DERMATOLOGY | Facility: CLINIC | Age: 74
End: 2024-03-11
Payer: COMMERCIAL

## 2024-03-11 NOTE — TELEPHONE ENCOUNTER
----- Message from Jeannette Bailon, Patient Care Assistant sent at 3/11/2024 10:36 AM CDT -----  Type: Needs Medical Advice  Who Called:  min  Best Call Back Number: [phone]    Additional Information: min states he needs to reschedule his 3/14 to any  Friday if possible,.  Please call to further discuss ,thank you ,

## 2024-03-14 ENCOUNTER — TELEPHONE (OUTPATIENT)
Dept: DERMATOLOGY | Facility: CLINIC | Age: 74
End: 2024-03-14
Payer: COMMERCIAL

## 2024-03-14 ENCOUNTER — PATIENT MESSAGE (OUTPATIENT)
Dept: FAMILY MEDICINE | Facility: CLINIC | Age: 74
End: 2024-03-14
Payer: COMMERCIAL

## 2024-03-14 NOTE — TELEPHONE ENCOUNTER
"Please review last OV and advise if you can clear for surg or if pt need to be seen again. Please see note in your "sign" folder  "

## 2024-03-14 NOTE — TELEPHONE ENCOUNTER
----- Message from Stephania Lamb sent at 3/14/2024  8:35 AM CDT -----  Type: Need Medical Advice   Who Called: patient   Best callback number: [phone]  Additional Information: wife of patient called to stale her  has knee surgery on 3/27 she has questions about his appointment on 3/26  Please call to further assist, Thanks.

## 2024-03-20 ENCOUNTER — TELEPHONE (OUTPATIENT)
Dept: DERMATOLOGY | Facility: CLINIC | Age: 74
End: 2024-03-20
Payer: COMMERCIAL

## 2024-03-20 ENCOUNTER — PATIENT MESSAGE (OUTPATIENT)
Dept: DERMATOLOGY | Facility: CLINIC | Age: 74
End: 2024-03-20
Payer: COMMERCIAL

## 2024-03-21 ENCOUNTER — PATIENT MESSAGE (OUTPATIENT)
Dept: DERMATOLOGY | Facility: CLINIC | Age: 74
End: 2024-03-21
Payer: COMMERCIAL

## 2024-03-22 ENCOUNTER — TELEPHONE (OUTPATIENT)
Dept: DERMATOLOGY | Facility: CLINIC | Age: 74
End: 2024-03-22
Payer: COMMERCIAL

## 2024-03-22 NOTE — TELEPHONE ENCOUNTER
----- Message from Lesly Rees sent at 3/22/2024 10:31 AM CDT -----  Type: Needs Medical Advice  Who Called:  Esthela (spouse)  Symptoms (please be specific):    How long has patient had these symptoms:    Pharmacy name and phone #:    Best Call Back Number: [phone]  Additional Information: Esthela is requesting a call back from Gro to regarding her  appt..

## 2024-04-11 ENCOUNTER — TELEPHONE (OUTPATIENT)
Dept: PHARMACY | Facility: CLINIC | Age: 74
End: 2024-04-11
Payer: COMMERCIAL

## 2024-04-11 DIAGNOSIS — N18.30 CKD (CHRONIC KIDNEY DISEASE) STAGE 3, GFR 30-59 ML/MIN: Chronic | ICD-10-CM

## 2024-04-11 DIAGNOSIS — E11.65 TYPE 2 DIABETES MELLITUS WITH HYPERGLYCEMIA, WITHOUT LONG-TERM CURRENT USE OF INSULIN: ICD-10-CM

## 2024-04-11 RX ORDER — METFORMIN HYDROCHLORIDE 500 MG/1
500 TABLET, EXTENDED RELEASE ORAL 2 TIMES DAILY WITH MEALS
Qty: 180 TABLET | Refills: 1 | Status: SHIPPED | OUTPATIENT
Start: 2024-04-11

## 2024-04-11 NOTE — TELEPHONE ENCOUNTER
No care due was identified.  [facility] Embedded Care Due Messages. Reference number: [id number].   4/11/2024 5:02:12 AM CDT

## 2024-04-11 NOTE — TELEPHONE ENCOUNTER
Refill Routing Note   Medication(s) are not appropriate for processing by [facility] for the following reason(s):        No active prescription written by provider    [facility] action(s):  Defer               Appointments  past 12m or future 3m with PCP    Date Provider   Last Visit   3/7/2024 Morteza Blevins MD   Next Visit   7/9/2024 Morteza Blevins MD   ED visits in past 90 days: 0        Note composed:5:02 AM 04/11/2024

## 2024-04-18 ENCOUNTER — PATIENT MESSAGE (OUTPATIENT)
Dept: REHABILITATION | Facility: HOSPITAL | Age: 74
End: 2024-04-18
Payer: COMMERCIAL

## 2024-04-19 ENCOUNTER — PATIENT MESSAGE (OUTPATIENT)
Dept: UROLOGY | Facility: CLINIC | Age: 74
End: 2024-04-19
Payer: COMMERCIAL

## 2024-04-22 ENCOUNTER — PROCEDURE VISIT (OUTPATIENT)
Dept: DERMATOLOGY | Facility: CLINIC | Age: 74
End: 2024-04-22
Payer: COMMERCIAL

## 2024-04-22 DIAGNOSIS — L57.0 ACTINIC KERATOSES: Primary | ICD-10-CM

## 2024-04-22 PROCEDURE — 96574 DBRDMT PRMLG LES W/PDT: CPT | Mod: S$GLB,,, | Performed by: DERMATOLOGY

## 2024-04-23 DIAGNOSIS — M25.562 LEFT KNEE PAIN: Primary | ICD-10-CM

## 2024-04-24 ENCOUNTER — LAB VISIT (OUTPATIENT)
Dept: LAB | Facility: HOSPITAL | Age: 74
End: 2024-04-24
Attending: UROLOGY
Payer: COMMERCIAL

## 2024-04-24 DIAGNOSIS — C61 PROSTATE CANCER: ICD-10-CM

## 2024-04-24 LAB — COMPLEXED PSA SERPL-MCNC: 6.9 NG/ML (ref 0–4)

## 2024-04-24 PROCEDURE — 84153 ASSAY OF PSA TOTAL: CPT | Performed by: UROLOGY

## 2024-04-24 PROCEDURE — 36415 COLL VENOUS BLD VENIPUNCTURE: CPT | Performed by: UROLOGY

## 2024-04-26 NOTE — PROGRESS NOTES
PHOTODYNAMIC THERAPY PROCEDURE NOTE  Patient: Carlos Tenorio Jr.  YOB: 1950  ATTENDING PHYSICIAN: Zoe Salgado MD    PROCEDURE: photodynamic therapy for the treatment of refractory actinic keratosis    SKIN PREP: Acetone was applied to the skin vigorously with roughly woven gauze to degrease the epidermis and allow penetration of the aminolevulinic acid solution. This gauze was used to debride the skin surface until actinic keratoses were de-roofed with subsequent bleeding of heavily sun damaged areas.    LOCATION: [facility]    PDT solution: 1 Levulan Kerastick(s) was(were) applied to the area, NDC: 69245-568-87     INCUBATION TIME: 180 minutes allowed for PDT solution to penetrate into actinic keratoses    PROCEDURE:     After discussion of risks, benefits, and limitations of photodynamic therapy, patient placed in seated position with Sean-U light approximately 4 inches from the treatment area. Protective goggles/glasses were placed on the patient to protect the eyes from the intense light of the device. The timer was set to 17:30 and the physician activated the device. The patient was given a fan to assist with burning during the procedure. Any intensely burning areas were dabbed with cool saline gauze. They were also given a call button to summon assistance from the nursing station if any issues were experienced during treatment.     After treatment, they were provided with a broad brimmed hat if they did not bring one. The patient tolerated the procedure well, and will follow up in 2 weeks for a post PDT wound check, and assessment of weather a second PDT treatment would be beneficial for the area. Patient instructed not to get any additional sun exposure for 2 days post-op. Patient instructed to keep aloe or vaseline in the fridge and apply liberally as needed for discomfort.    Patient provided with written discharge instructions and instructed to call clinic for any concerns.        Zoe Salgado MD

## 2024-04-29 ENCOUNTER — OFFICE VISIT (OUTPATIENT)
Dept: UROLOGY | Facility: CLINIC | Age: 74
End: 2024-04-29
Payer: COMMERCIAL

## 2024-04-29 VITALS
SYSTOLIC BLOOD PRESSURE: 134 MMHG | WEIGHT: 191.81 LBS | DIASTOLIC BLOOD PRESSURE: 70 MMHG | BODY MASS INDEX: 29.07 KG/M2 | HEART RATE: 65 BPM | HEIGHT: 68 IN

## 2024-04-29 DIAGNOSIS — N30.00 ACUTE CYSTITIS WITHOUT HEMATURIA: ICD-10-CM

## 2024-04-29 DIAGNOSIS — C61 PROSTATE CANCER: Primary | ICD-10-CM

## 2024-04-29 DIAGNOSIS — N43.40 SPERMATOCELE: ICD-10-CM

## 2024-04-29 DIAGNOSIS — N20.0 KIDNEY STONES: ICD-10-CM

## 2024-04-29 DIAGNOSIS — Z85.528 HISTORY OF RENAL CELL CANCER: ICD-10-CM

## 2024-04-29 LAB
BILIRUBIN, UA POC OHS: NEGATIVE
BLOOD, UA POC OHS: NEGATIVE
CLARITY, UA POC OHS: ABNORMAL
COLOR, UA POC OHS: YELLOW
GLUCOSE, UA POC OHS: NEGATIVE
KETONES, UA POC OHS: ABNORMAL
LEUKOCYTES, UA POC OHS: ABNORMAL
NITRITE, UA POC OHS: POSITIVE
PH, UA POC OHS: 5.5
POC RESIDUAL URINE VOLUME: 50 ML (ref 0–100)
PROTEIN, UA POC OHS: 30
SPECIFIC GRAVITY, UA POC OHS: 1.02
UROBILINOGEN, UA POC OHS: 0.2

## 2024-04-29 PROCEDURE — 87088 URINE BACTERIA CULTURE: CPT | Performed by: UROLOGY

## 2024-04-29 PROCEDURE — 51798 US URINE CAPACITY MEASURE: CPT | Mod: S$GLB,,, | Performed by: UROLOGY

## 2024-04-29 PROCEDURE — 1126F AMNT PAIN NOTED NONE PRSNT: CPT | Mod: CPTII,S$GLB,, | Performed by: UROLOGY

## 2024-04-29 PROCEDURE — 87086 URINE CULTURE/COLONY COUNT: CPT | Performed by: UROLOGY

## 2024-04-29 PROCEDURE — 4010F ACE/ARB THERAPY RXD/TAKEN: CPT | Mod: CPTII,S$GLB,, | Performed by: UROLOGY

## 2024-04-29 PROCEDURE — 3044F HG A1C LEVEL LT 7.0%: CPT | Mod: CPTII,S$GLB,, | Performed by: UROLOGY

## 2024-04-29 PROCEDURE — 87186 SC STD MICRODIL/AGAR DIL: CPT | Performed by: UROLOGY

## 2024-04-29 PROCEDURE — 3075F SYST BP GE 130 - 139MM HG: CPT | Mod: CPTII,S$GLB,, | Performed by: UROLOGY

## 2024-04-29 PROCEDURE — 3078F DIAST BP <80 MM HG: CPT | Mod: CPTII,S$GLB,, | Performed by: UROLOGY

## 2024-04-29 PROCEDURE — 3288F FALL RISK ASSESSMENT DOCD: CPT | Mod: CPTII,S$GLB,, | Performed by: UROLOGY

## 2024-04-29 PROCEDURE — 1101F PT FALLS ASSESS-DOCD LE1/YR: CPT | Mod: CPTII,S$GLB,, | Performed by: UROLOGY

## 2024-04-29 PROCEDURE — 99417 PROLNG OP E/M EACH 15 MIN: CPT | Mod: S$GLB,,, | Performed by: UROLOGY

## 2024-04-29 PROCEDURE — 81003 URINALYSIS AUTO W/O SCOPE: CPT | Mod: QW,S$GLB,, | Performed by: UROLOGY

## 2024-04-29 PROCEDURE — 99215 OFFICE O/P EST HI 40 MIN: CPT | Mod: S$GLB,,, | Performed by: UROLOGY

## 2024-04-29 PROCEDURE — 87077 CULTURE AEROBIC IDENTIFY: CPT | Performed by: UROLOGY

## 2024-04-29 PROCEDURE — 99999 PR PBB SHADOW E&M-EST. PATIENT-LVL III: CPT | Mod: PBBFAC,,, | Performed by: UROLOGY

## 2024-04-29 RX ORDER — CIPROFLOXACIN 500 MG/1
500 TABLET ORAL 2 TIMES DAILY
Qty: 20 TABLET | Refills: 0 | Status: SHIPPED | OUTPATIENT
Start: 2024-04-29 | End: 2024-05-02

## 2024-04-29 RX ORDER — TAMSULOSIN HYDROCHLORIDE 0.4 MG/1
0.4 CAPSULE ORAL NIGHTLY
Qty: 90 CAPSULE | Refills: 0 | Status: SHIPPED | OUTPATIENT
Start: 2024-04-29 | End: 2024-07-28

## 2024-04-29 NOTE — PROGRESS NOTES
[facility] Urology Progress Note     Carlos Tenorio Jr. is a 74 y.o. male who presents for f/u prostate ca (on AS), kidney stones, spermatocele, hx RCC     left robotic partial nephrectomy, with concurrent left renal cyst decortication on 8/2/17 for an upper pole 2-3cm exophytic enhancing renal mass, adjacent to larger simple renal cyst.   Pathology: [id number] clear cell RCC. 2.5cm. Negative margins. Grade 2. No sarcomatoid or rhabdoid features. Benign renal cyst  - has completed 3 yrs p/o surveillance  Hx calcium oxalate stones and uric acid stones in the past and has had prior bilateral ESWL 10 years prior.  By report, 24-hr urine 10/2012 had high oxalate and low urine pH. Takes allopurinol.  He did also have large spermatocele on the right, noted to be 5.1 cm on 4/20/17 ultrasound, and previously decreased in size to almost nothing after resuming TRT, which he did at 200mg Q2 wks 1 month after his partial nephrectomy. Has since enlarged. *off TRT since prostate ca dx     For interim doubling of his PSA from 2.2 to 4.4 after first 3 months of TRT with interval rise to 4.9 and recommended prostate biopsy.  TRUS bx 2/27/18. Vol 56.6. Path 14 cores benign. Elected to try Viagra again for his ED, as had not been using it properly/on empty stomach previously. Resumed TRT  6/19/18: PSA 3.9, good energy and libido with TRT and great erections without viagra. 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 No worsening LUTS  PSA then lisa on TRT again to 4.5 in Dec 2018, so send confirmMDx of 2/18 biopsy which was +LTZ indicating 28% chance finding prostate ca (19% low grade, 10% G7+)   3T MRI at [facility] 12/26/18: 58mL prostate with 3mm intravesical extension   PIRAD-3 index lesion: 8x6mm posterior lateral peripheral zone of mid gland on right. 14mm from midline and 2mm from capsule. No evidence of EPE.  - heterogenous on T2 with indistinct margins with no masslike lesion but focal moderately hypointense on ADC, mildly hyperintense on  DWI, and evaluation for masslike hyperemia --> Repeat   prostate biopsy 3/19/19: volume 66.6g. PATHOLOGY: 3+3=6 in 2 out of 17 cores: 5% LM medial, 10% LA lateral    After biopsy, noted AUA SS 1/0. Elected AS for his very low risk CaP. 100mg viagra Rxed to [facility]. Held TRT. Planned annual CaP and RCC surveillance with interim psa  1 yr active surveillance confirmatory prostate biopsy on 10/20/20 Previously positive at LM med, LA lat. Confirm MdX positive at LTZ. MRI with pirad3 lesion RM lateral.  TAHIRA 30-35 g smooth nonnodular with prominence of right apex and mild asymmetry  VOLUME:  59.4cm3. PATH: 2/18 cores + (Left mid lateral: Diomedes score 3+4= 7; 80% (Pattern 4: 10%), 1/2 cores; Left mid medial: Diomedes score 3+3= 6; 20%, 1/2 cores)  - Still elected AS given low volume 4 component and age and comorbidities     5/2022:   In interim has started medical antibody infusion for early-onset Alzheimer's Aduhelm, per Dr Garcia, managed by Dr Gregorio as part of trial which he was accepted into as this is only offered at [facility].  Therefore heme Onc is managing. Also had another interim cardiac stent on 5/10/22, and it was discussed that he needs 2 more.  Dr. Fierro in [place].  AUA symptom score:  5/2, mostly satisfied (2:  Frequency, sleeping; 1:  Urgency). His PSA remains stable at 5.8 on 4/29/22, be 5.0 6 months prior, and 6.0 one year ago  He did receive clearance after his last cardiac stent to have MRI of the prostate about 8 weeks later, and will continue to follow his cardiac status and upcoming interventions.  He does also have MRI of his head pending for further workup and neurologically. No interim kidney stones or flank pain.  X-ray from December 2021 reviewed noting stable lower pole left stones.  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 Short-term memory has improved with recent treatment.  Has also been given p.r.n. s by PCP for anxiety related to his memory      5/19/23: In  interim had concerns for enlarging right spermatocele  Scrotal US on 1/31/23:   A 7.7 cm cyst or loculated hydrocele is noted along the lateral testicle.  This has increased in size from the prior study when it measured 6.4 cm.  Memory decline has slowed with aduhelm.  There was concern about his renal function by being on this medication, and reassurance provided there was minimal decline, which has normalized as per review of his renal function below with last cr 1.2/eGFR >60. He is still working instruciting at [employer]  PSA stable at 5.2 (prev 5.7 and 5.8 six and 12 mos prior)  KUB 5/12/23 without distinct visualization of stones previously visualized on prior KUB - on personal review, no distinct radiopacity seen, where previously had very small lower pole stone  AUA symptom score:  3/2 (2: Sleeping; 1: Frequency)  O/E: significant spermatocele/epididymal cyst displacing testes centrally, noted superolateral to the testis, nontender to palpation, larger than the testis itself.     He returns today noting  10/13/23 Cr 1.1, eGFR >60, PSA stable at 5.5, MRI prostate 40g PIRAD2  11/7/23 screening psa included with pcp labs of 6.0, UA micro with 1 rbc/7 wbc  3/7/24 pcp labs Cr 1.0, eGFR >60, HbA1c 5.1  4/24/24 PSA 6.9  AUA SS: 17/4 ( 5: Urgency; 3: Frequency, weak stream, sleeping; 2: Intermittency; 1: Straining)   In the interim since our last visit he is had 2 knee replacements, right on 12/13/23, and the last 1 was left on 4/1/24.  Since that time he has experienced low-grade fevers.  Ninety-nine today.    Second surgery was done with nerve block only ( questions spinal) as needed a Petty catheter and after his 1st procedure for 48 hours, as was unable to void, and after the 2nd procedure a Petty catheter was put in upfront and he was given Flomax attempts one-week course.  The 2nd Petty was in for 1.5 days   Has had bothersome urinary urgency lately.  Urinalysis dipstick is nitrite positive today.    Also  "has concerns that  the weight of this spermatocele is such that he has spraying of urination requiring cleanup   As well his Alzheimer's have worsened in the interim.   Aduhelm infusion has stopped as he had a fall with subdural hematoma in December of 2023,  which prompted the need for knee replacements.  He has been on a different drug for his Alzheimer's since February because of the subdural hematoma   [facility] appointment pending to file for agent orange exposure for prostate cancer  Udip nit+      Focused Physical Exam:    Vitals:    04/29/24 1449   BP: 134/70   Pulse: 65     Body mass index is 29.16 kg/m². Weight: 87 kg (191 lb 12.8 oz) Height: 5' 8" (172.7 cm)     Abdomen: Soft, non-tender, nondistended, no CVA tenderness    Recent Results (from the past 336 hour(s))   PROSTATE SPECIFIC ANTIGEN, DIAGNOSTIC    Collection Time: 04/24/24 12:45 PM   Result Value Ref Range    PSA Diagnostic 6.9 (H) 0.00 - 4.00 ng/mL   POCT Urinalysis(Instrument)    Collection Time: 04/29/24  2:57 PM   Result Value Ref Range    Color, POC UA Yellow Yellow, Straw, Colorless    Clarity, POC UA Slight Cloudy (A) Clear    Glucose, POC UA Negative Negative    Bilirubin, POC UA Negative Negative    Ketones, POC UA Trace (A) Negative    Spec Grav POC UA 1.025 1.005 - 1.030    Blood, POC UA Negative Negative    pH, POC UA 5.5 5.0 - 8.0    Protein, POC UA 30 (A) Negative    Urobilinogen, POC UA 0.2 <=1.0    Nitrite, POC UA Positive (A) Negative    WBC, POC UA Moderate (A) Negative   POCT Bladder Scan    Collection Time: 04/29/24  3:55 PM   Result Value Ref Range    POC Residual Urine Volume 50 0 - 100 mL      Reference Range  04/29/22 12:12 12/01/22 14:52 05/12/23 07:14 10/13/23 07:23 11/07/23 11:40 04/24/24 12:45   Prostate Specific Antigen 0.00 - 4.00      6.0 (H)    PSA Diagnostic 0.00 - 4.00  5.8 (H) 5.7 (H) 5.2 (H) 5.5 (H)  6.9 (H)       ASSESSMENT   1. Prostate cancer  POCT Urinalysis(Instrument)    POCT Bladder Scan    Prostate Specific " Antigen, Diagnostic    Prostate Specific Antigen, Diagnostic      2. History of renal cell cancer        3. Kidney stones        4. Acute cystitis without hematuria  Urine culture      5. Spermatocele            Plan    Regards to his prostate cancer, he has been stable on active surveillance.  Despite mild upgrading to Diomedes 7 with minimal [place] 4 component, with his age and comorbidities elected to remain on active surveillance and PSA has remained completely stable through 6 months ago, where MRI was pirad2 only with 49g gland,  but noted to be 66 g on previous ultrasound,and so  even his interim slight rise is not of concern with normal PSA density, and also there was concern for active urinary tract infection which may have been smoldering since his knee surgeries, which certainly can yield a false elevation as well..  We did again review typical active surveillance recommendations of PSA every 6 months and alternating evaluations with MRI and biopsy and spacing these evaluations out every 2-3 years if PSA is stable, intervening sooner in the case of PSA rise.     He did have interim cardiac stenting from time of diagnosis to follow up MRI, but had MRI safety card and clearance to proceed from Dr Fierro, and  can utilize MRI in the future, and as long as PSA and MRI remained stable, likely will not need future biopsy, and unlikely to need treatment for his prostate cancer especially in the setting of all of his other comorbidities, which are now not only cardiac but also subdural hematoma, worsening Alzheimer's, etc.   Will recheck his PSA after clearance of current urinary tract infection and continue to follow it every 6 months     Prior to his interim new surgeries with Petty catheter used x2, he has not had any voiding complaints.  Unclear if spinal anesthesia was used, which can increase risk of postop urinary retention or true general anesthesia which can also do so especially in the setting of an  enlarged prostate, and was only given Flomax for a one-week supply after his last Petty catheter.  His urine does appear grossly infected today with nitrite positive urine correlating with his increases urinary urgency and I will go ahead and empirically start antibiotics, Cipro, and follow up final culture for sensitivities and adjust if needed.  Would plan 10 day course of antibiotics given the concern for possible smoldering UTI and perhaps prostatitis element given low-grade fever and PSA change.  However, in the setting of low-grade temperature after multiple lower extremity surgeries, also recommended reaching out to orthopedics to discuss possible assessment for lower extremity DVT.      After adequate antibiotic treatment for his infection, will return a few days later  for nurse visit for repeat urine to send for urinalysis and culture, as well as reassess his symptom score and his emptying.  He is not had any voiding or emptying complaints previously, and his prostate was 66 g by transrectal ultrasound, and 49 g by recent MRI,  and with the 10 days of antibiotics would also do a three-month course of Flomax before discontinuing and again, also noting the contributions of long-term Flomax increased risk of dementia which is already worsening as above, and then see how he is voiding 3 months later with repeat assessment before considering lower tract evaluation such as cysto/truss to assess for prostate obstruction which may need intervention, as he is high-risk candidate for elective intervention   Though risk benefit, if having continued obstruction on recurrent urinary tract infection needing intervention, minimally invasive BPH intervention may be safe in this patient     G of the increasing size of his spermatocele which pulls wait and effects voiding causing some spraying, again had risk benefit discussion about spermatocele ectomy versus observation, and as the main concern is having to clean up after  urinating rather than any pain or discomfort, elected cleaning over surgical intervention for scrotal surgery.      As well, we reviewed he is out of the window of routine surveillance for his history of renal cell carcinoma T1a resected with negative margins, and at last evaluation KUB had stable lower pole stone, and has had no interim stone complaints or concerns so will continue to monitor this at further visits     For now:   10 days antibiotics, return on 05/14 for nurse visit for UA,  urine culture, PVR, AUA symptom score, then to lab for recheck of PSA.  Will plan PSA six-month later with follow-up with me after three-month on Flomax and three-month off of Flomax      Total time spent in/on encounter today, including face to face time with patient, counseling, medical record review, interpretation of tests/results, , and treatment plan coordination: 115 minutes

## 2024-04-29 NOTE — PATIENT INSTRUCTIONS
Discussed conservative measures to control urgency and frequency including   1. Avoiding/minimizing bladder irritants (see below), especially in afternoon and evening hours    Discussed bladder irritants include coffe (even decaf), tea, alcohol, soda, spicy foods, acidic juices (orange, tomato), vinegar, and artificial sweeteners/sugary beverages.    2. timed voiding - empty on a schedule (approx ~2-3 hours) in spite of need to urinate, to get ahead of urge    3. dont postponing voiding - dont hold it on purpose     4. bowel regimen as distended bowel has extrinsic compressive effect on bladder.   - any or all of the following in any combination, titrate to soft daily bowel movement without pushing or straining  - colace/stool softener capsule - once to twice daily  - miralax - 1 capful daily to start, can increase to 2x daily (or decrease to 1/2 cap daily)  - increase dietary fibery  - fiber supplements, such as metamucil  - prunes, prune juice    5. INCREASE water intake    6. Stop fluids 2 hours before bed, and urinate just before bed    90d flomax  10d abx

## 2024-04-30 ENCOUNTER — HOSPITAL ENCOUNTER (OUTPATIENT)
Dept: RADIOLOGY | Facility: HOSPITAL | Age: 74
Discharge: HOME | End: 2024-04-30
Attending: ORTHOPAEDIC SURGERY
Payer: COMMERCIAL

## 2024-04-30 DIAGNOSIS — R22.42 LOCALIZED SWELLING, MASS AND LUMP, LEFT LOWER LIMB: Primary | ICD-10-CM

## 2024-04-30 DIAGNOSIS — R22.42 LOCALIZED SWELLING, MASS AND LUMP, LEFT LOWER LIMB: ICD-10-CM

## 2024-04-30 PROCEDURE — 93971 EXTREMITY STUDY: CPT | Mod: TC,LT

## 2024-04-30 PROCEDURE — 93971 EXTREMITY STUDY: CPT | Mod: 26,LT,, | Performed by: RADIOLOGY

## 2024-05-01 ENCOUNTER — CLINICAL SUPPORT (OUTPATIENT)
Dept: REHABILITATION | Facility: HOSPITAL | Age: 74
End: 2024-05-01
Payer: COMMERCIAL

## 2024-05-01 DIAGNOSIS — R29.898 WEAKNESS OF LEFT LOWER EXTREMITY: ICD-10-CM

## 2024-05-01 DIAGNOSIS — M25.662 DECREASED RANGE OF MOTION OF LEFT KNEE: Primary | ICD-10-CM

## 2024-05-01 DIAGNOSIS — Z74.09 IMPAIRED FUNCTIONAL MOBILITY, BALANCE, GAIT, AND ENDURANCE: ICD-10-CM

## 2024-05-01 DIAGNOSIS — M25.562 LEFT KNEE PAIN: ICD-10-CM

## 2024-05-01 PROBLEM — R29.3 POSTURE ABNORMALITY: Status: RESOLVED | Noted: 2023-11-15 | Resolved: 2024-05-01

## 2024-05-01 PROBLEM — R26.9 GAIT ABNORMALITY: Status: RESOLVED | Noted: 2023-11-15 | Resolved: 2024-05-01

## 2024-05-01 PROCEDURE — 97161 PT EVAL LOW COMPLEX 20 MIN: CPT | Mod: PO

## 2024-05-01 PROCEDURE — 97110 THERAPEUTIC EXERCISES: CPT | Mod: PO

## 2024-05-01 NOTE — PROGRESS NOTES
[facility]  Physical Therapy Initial Evaluation    Name: Carlos Fontenot Jona Corrigan  Clinic Number: [id number]    Therapy Diagnosis:   Encounter Diagnoses   Name Primary?    Left knee pain     Decreased range of motion of left knee Yes    Impaired functional mobility, balance, gait, and endurance     Weakness of left lower extremity      Physician: Order, Paper    Physician Orders: PT Eval and Treat   Medical Diagnosis from Referral: M25.562 (ICD-10-CM) - Left knee pain   Evaluation Date: 5/1/2024  Authorization Period Expiration: 12/31/2024  Plan of Care Expiration: 8/07/2024 or 20 visits post eval  Visit # (episodes) / Visits authorized: 1/ eval (POC 0 / 20)  2nd FOTO visit 5  3rd FOTO visit 10  Progress Note Due: 6/1/2024    Time In: 145  Time Out: 229  Total Billable Time: 44 minutes    Precautions: Right TKR; CAD; DM; HTN  Insurance: Payor: [employer] / Plan: [facility] [place] PPO / Product Type: PPO /     Subjective   Date of onset: April 1, 2024 Left TKR  History of current condition - Vlad reports: Arrived walking with Single Point Cane. Reports no complications with surgery. Had  physical therapy after surgery. Ultrasound was done yesterday to rule out DVT b/c of low grade fever. Wife at home is able to help him with recovery of TKR. Diagnosed with early onset Alzheimers. Had his Right TKR in January of this year; recovery went well.      Medical History:   Past Medical History:   Diagnosis Date    Arthritis     knees, back    Bilateral renal cysts 7/30/2012    CAD (coronary artery disease) 1995    no chest pain since CABG, sees Dr Larry Black    Diabetes mellitus     borderline    Hypertension     Kidney stones     uric acid stones    VASYL (obstructive sleep apnea) 2007    is compliant with CPAP    Primary gonadal failure        Surgical History:   Carlos Fontenot Jona Corrigan  has a past surgical history that includes Coronary artery bypass graft (1995, 2007); Extracorporeal  shock wave lithotripsy; Colonoscopy (2008); Lithotripsy; Lumbar laminectomy; Colonoscopy (N/A, 04/04/2017); Cardiac surgery (1995, 2007); Back surgery (2010); Eye surgery; Partial Nephrectomy Laproscopic; Transrectal biopsy of prostate with ultrasound guidance (N/A, 03/19/2019); Transrectal biopsy of prostate with ultrasound guidance (N/A, 10/20/2020); cardiac stents; Carotid angioplasty (01/22/2023); Coronary stent placement (10/2022); and Coronary stent placement (11/2022).    Medications:   Carlos has a current medication list which includes the following prescription(s): allopurinol, amlodipine, ammonium lactate, atorvastatin, calcium 500 + d, ciprofloxacin hcl, clopidogrel, cyanocobalamin (vitamin b-12), vitamin b12-folic acid, ezetimibe, ferrous sulfate, folic acid, furosemide, hydrochlorothiazide, ipratropium, ketoconazole, losartan, metformin, multivit-min-fa-lycopen-lutein, nitroglycerin, ondansetron, ranolazine, sertraline, tamsulosin, and vitamin d, and the following Facility-Administered Medications: triamcinolone acetonide and triamcinolone acetonide.    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  Cancer hx: kidney hx  Recent weight loss: no  Falls in last 6 months: in November, January 20th  Prior Level of Function: no cane prior to surgeries; ambulating with cane  Current Level of Function: difficult/pain with dressing-min assist; not released by surgeon to drive    Pain:  Current 1/10, worst 2/10, best 0/10   Location: lateral to incision  Description: sharp; neuropathy has resolved  Aggravating Factors: dressing-min assist; not released by surgeon to drive  Easing Factors: no pn med; ice; exercises and walking    Pts  goals: return to normal; would like to get back to traveling    Objective     Observation/Palpation: Incision healing well; normal warmth and edema    Gait With AD.  Device Used -  Cane   Analysis Forward flx; decreased Weight Bearing on Left extremity       Range of Motion/Strength:     Knee Range of Motion: in degrees Left Right Pain/Dysfunction with Movement   Knee flexion  (135 norm) 95 degrees AROM 120 degress    Knee extension   (0 norm) 5 degrees flexed AROM / 0 degrees PROM 0 degrees      Knee MMT: 5/5 norm Left Right   Knee flexion 4/5 5/5   Knee extension 4/5 5/5   Hip Flexion: L 5 / R 5  Hip Extension: L 4 / R 4    Flexibility Left Right   Hamstrings @ 90/90   (-20 norm) -40 degrees -20 degrees     Girth measurements Left Right   5 cm above MJL  51 cm  47 cm    At MJL  48.1 cm  43.5 cm    5 cm below MJL 43.5 cm  39.9 cm      Other:        Evaluation   Single Limb Stance R LE NT seconds  (<10 sec = HIGH FALL RISK)   Single Limb Stance L LE NT seconds  (<10 sec = HIGH FALL RISK)           TREATMENT   Treatment Time In: 215  Treatment Time Out: 229  Total Treatment time separate from Evaluation: 14 minutes    Vlad received therapeutic exercises to develop strength, endurance, ROM, and flexibility for 10 minutes including:  NMRE utilized to activate appropriate mm to improve balance, proprioception, stability and gait skills: 4 minutes   therapeutic activities to improve dynamic functional performance for 0 minutes, including:     DOS: 4/01/2024 Left TKR;   NOTE: early onset Alzheimer's; mild cognitive impairment with counting    TABLE:  Glute squeeze, [id number] - progress to bridge once KF improves  AAROM Heel slides, slideboard, strap, [id number]  Gastroc stretch with strap, [id number]    STAND:  Standing Heel Raises/Toe Raises, 30x    Elevate leg above heart at end of day, 5 minutes, occasional ankle pumps     Add NEXT:  TKR protocol      Home Exercises and Patient Education Provided    Education provided:   -  daily HEP  - ice with increased pain or swelling    Written Home Exercises Provided: yes.  Exercises were reviewed and Vlad was able to demonstrate them prior to the end of the session.  Vlad demonstrated good  understanding of the education provided.     See EMR under Patient Instructions for exercises provided 5/1/2024.    Assessment   Carlos is a 74 y.o. male referred to outpatient Physical Therapy with a medical diagnosis of s/p Left TKR. Pt presents with left knee pain and edema post TKR surgery, decreased left knee Range of Motion, decreased Left Lower Extremity strength, decreased Left Lower Extremity flexibility, balance and gait deficits and impaired function.    Pt prognosis is Good.   Pt will benefit from skilled outpatient Physical Therapy to address the deficits stated above and in the chart below, provide pt/family education, and to maximize pt's level of independence.     Plan of care discussed with patient: Yes  Pt's spiritual, cultural and educational needs considered and patient is agreeable to the plan of care and goals as stated below:     Anticipated Barriers for therapy: Co-morbidities      Medical Necessity is demonstrated by the following  History  Co-morbidities and personal factors that may impact the plan of care [] LOW: no personal factors / co-morbidities  [x] MODERATE: 1-2 personal factors / co-morbidities  [] HIGH: 3+ personal factors / co-morbidities    Moderate / High Support Documentation:   Co-morbidities affecting plan of care: Right TKR; CAD; DM; HTN    Personal Factors:   age  Mild cognitive impairment     Examination  Body Structures and Functions, activity limitations and participation restrictions that may impact the plan of care [] LOW: addressing 1-2 elements  [x] MODERATE: 3+ elements  [] HIGH: 4+ elements (please support below)    Moderate / High Support Documentation: Range of Motion, strength, gait     Clinical Presentation [x] LOW: stable  [] MODERATE: Evolving  []  HIGH: Unstable     Decision Making/ Complexity Score: low          Goals:  Short Term GOALS: 5 weeks  1. Patient is compliant with HEP.   2. Patient demonstrates inc KE ROM to 0 degrees in order to improve stance during gait.  3. Patient demonstrates inc KF ROM to 120 degrees in order to improve symmetrical motion during daily activities.  4. Patient demonstrates decreased girth by 2 cm in order to tolerate increased WB on LLE.    Long Term GOALS: 10 weeks.   1. Patient demonstrates increased KF ROM to 130 deg to improve symmetrical function while performing house chores.  2. Patient demonstrates increased LLE strength by 1/2 grade or greater to improve tolerance to functional activities.   3. Patient demonstrates independence with HEP.  4. Patient will improve FOTO function score to </= 62% to show improvement in condition and functional mobility.     Plan   Plan of care Certification: 5/1/2024 to 8/07/2024.    Outpatient Physical Therapy 2 times weekly for 10 weeks to include the following interventions: Gait Training, Manual Therapy, Moist Heat/ Ice, Neuromuscular Re-ed, Orthotic Management and Training, Patient Education, Self Care, Therapeutic Activities, and Therapeutic Exercise.     Olimpia Dalton, PT

## 2024-05-02 LAB — BACTERIA UR CULT: ABNORMAL

## 2024-05-02 RX ORDER — SULFAMETHOXAZOLE AND TRIMETHOPRIM 800; 160 MG/1; MG/1
1 TABLET ORAL 2 TIMES DAILY
Qty: 14 TABLET | Refills: 0 | Status: SHIPPED | OUTPATIENT
Start: 2024-05-02 | End: 2024-05-09

## 2024-05-06 ENCOUNTER — CLINICAL SUPPORT (OUTPATIENT)
Dept: REHABILITATION | Facility: HOSPITAL | Age: 74
End: 2024-05-06
Payer: COMMERCIAL

## 2024-05-06 DIAGNOSIS — M25.662 DECREASED RANGE OF MOTION OF LEFT KNEE: Primary | ICD-10-CM

## 2024-05-06 DIAGNOSIS — Z74.09 IMPAIRED FUNCTIONAL MOBILITY, BALANCE, GAIT, AND ENDURANCE: ICD-10-CM

## 2024-05-06 DIAGNOSIS — R29.898 WEAKNESS OF LEFT LOWER EXTREMITY: ICD-10-CM

## 2024-05-06 PROCEDURE — 97530 THERAPEUTIC ACTIVITIES: CPT | Mod: PO

## 2024-05-06 PROCEDURE — 97112 NEUROMUSCULAR REEDUCATION: CPT | Mod: PO

## 2024-05-06 PROCEDURE — 97140 MANUAL THERAPY 1/> REGIONS: CPT | Mod: PO

## 2024-05-06 NOTE — PROGRESS NOTES
[facility]   Physical Therapy Treatment Note      Name: Carlos Tenorio Jr.  Clinic Number: [id number]    Therapy Diagnosis:   Encounter Diagnoses   Name Primary?    Decreased range of motion of left knee Yes    Impaired functional mobility, balance, gait, and endurance     Weakness of left lower extremity      Physician: Order, Paper    Visit Date: 5/6/2024    Physician Orders: PT Eval and Treat   Medical Diagnosis from Referral: M25.562 (ICD-10-CM) - Left knee pain   Evaluation Date: 5/1/2024  Authorization Period Expiration: 12/31/2024  Plan of Care Expiration: 8/07/2024 or 20 visits post eval  Visit # (episodes) / Visits authorized: 2/ eval + 20 (POC 1 / 20)  2nd FOTO visit 5  3rd FOTO visit 10  Progress Note Due: 6/1/2024     Time In: 230  Time Out: 314  Total Billable Time: 44 minutes     Precautions: Right TKR; CAD; DM; HTN  Insurance: Payor: [employer] / Plan: [place] PPO / Product Type: PPO /     Subjective     Pt reports: he is doing good. Not really any pain.    He was compliant with home exercise program.  Response to previous treatment: positive  Functional change: none reported    Pain: 1/10  Location:  lateral to incision     Objective      Objective Measures updated at progress report unless specified.     Treatment     DOS: 4/01/2024 Left TKR;   NOTE: early onset Alzheimer's; Fall risk / Pt can keep count if counts out loud    Vlad received the treatments listed below:      Vlad received therapeutic exercises to develop strength, endurance, ROM, and flexibility for 10 minutes including:  NMRE utilized to activate appropriate mm to improve balance, proprioception, stability and gait skills: 20 minutes      Bike, Seat 8, Level 1, 5-10 minutes    TABLE:  Longsit HS stretch, 3x30s  Gastroc stretch with strap, [id number]  Longsit KE stretch with ankle pumps, 30x  Longsit quad sets, 30x5s  Longsit Assist AAROM Heel slides with strap, slideboard, 20x5s  Supine heel  slides, 20x, slideboard  BTB Bridge, 2x10  BTB Supine Clamshells, 20-30x  Short Arc Quad, 20-30x, (mastered, Straight Leg Raise or Long Arc Quad next)  STS, 10x      Manual tx: 10 minutes  Scar mobilization: circles, cross friction, spreading  Patella glides: Gr 2 inf/sup/lat/med - great mobility, d/c next visit      therapeutic activities to improve dynamic functional performance for 8  minutes, including:   STAND:  Standing Heel Raises/Toe Raises, 30x    NOT PERFORMED   Mini Squats  Hip abuction  Gait training: pt bringing FWW   WB on foam: 1. M-L weight shift / 2. pre-gait stagger, HC-RT-PO, 1min ea           Add NEXT:  FUTURE visits:  SLR, 20x  TKE  Single Leg Balance airex  FSU / FSD  LSU / Lateral Step Down  LSO  GTB lateral steps  Stair training     Patient Education and Home Exercises       Education provided:   - daily HEP    Written Home Exercises Provided: Patient instructed to cont prior HEP. Exercises were reviewed and Vlad was able to demonstrate them prior to the end of the session.  Vlad demonstrated good  understanding of the education provided. See EMR under Patient Instructions for exercises provided during therapy sessions    Assessment     Pt demonstrates low levels of pain, decreased Range of Motion, weakness, and balance and gait deficits. Pt able to perform all therex given with minimal pn provocation. Focus on end Range of Motion and Left Lower Extremity and hip strengthening. Continue to progress as tolerated.     Vlad Is progressing well towards his goals.   Pt prognosis is Good.     Pt will continue to benefit from skilled outpatient physical therapy to address the deficits listed in the problem list box on initial evaluation, provide pt/family education and to maximize pt's level of independence in the home and community environment.     Pt's spiritual, cultural and educational needs considered and pt agreeable to plan of care and goals.     Anticipated barriers to physical therapy:  Co-morbidities    Goals:   Short Term GOALS: 5 weeks  1. Patient is compliant with HEP.   2. Patient demonstrates inc KE ROM to 0 degrees in order to improve stance during gait.  3. Patient demonstrates inc KF ROM to 120 degrees in order to improve symmetrical motion during daily activities.  4. Patient demonstrates decreased girth by 2 cm in order to tolerate increased WB on LLE.     Long Term GOALS: 10 weeks.   1. Patient demonstrates increased KF ROM to 130 deg to improve symmetrical function while performing house chores.  2. Patient demonstrates increased LLE strength by 1/2 grade or greater to improve tolerance to functional activities.   3. Patient demonstrates independence with HEP.  4. Patient will improve FOTO function score to </= 62% to show improvement in condition and functional mobility.     Plan     Continue PT as deemed per POC: TKR protocol; gait and balance training    Olimpia Dalton, PT

## 2024-05-07 ENCOUNTER — OFFICE VISIT (OUTPATIENT)
Dept: DERMATOLOGY | Facility: CLINIC | Age: 74
End: 2024-05-07
Payer: COMMERCIAL

## 2024-05-07 DIAGNOSIS — L57.0 ACTINIC KERATOSIS: Primary | ICD-10-CM

## 2024-05-07 PROCEDURE — 3044F HG A1C LEVEL LT 7.0%: CPT | Mod: CPTII,S$GLB,, | Performed by: DERMATOLOGY

## 2024-05-07 PROCEDURE — 4010F ACE/ARB THERAPY RXD/TAKEN: CPT | Mod: CPTII,S$GLB,, | Performed by: DERMATOLOGY

## 2024-05-07 PROCEDURE — 99212 OFFICE O/P EST SF 10 MIN: CPT | Mod: S$GLB,,, | Performed by: DERMATOLOGY

## 2024-05-08 ENCOUNTER — CLINICAL SUPPORT (OUTPATIENT)
Dept: REHABILITATION | Facility: HOSPITAL | Age: 74
End: 2024-05-08
Payer: COMMERCIAL

## 2024-05-08 DIAGNOSIS — Z74.09 IMPAIRED FUNCTIONAL MOBILITY, BALANCE, GAIT, AND ENDURANCE: ICD-10-CM

## 2024-05-08 DIAGNOSIS — R29.898 WEAKNESS OF LEFT LOWER EXTREMITY: ICD-10-CM

## 2024-05-08 DIAGNOSIS — M25.662 DECREASED RANGE OF MOTION OF LEFT KNEE: Primary | ICD-10-CM

## 2024-05-08 PROCEDURE — 97110 THERAPEUTIC EXERCISES: CPT | Mod: KX,PO

## 2024-05-08 PROCEDURE — 97112 NEUROMUSCULAR REEDUCATION: CPT | Mod: KX,PO

## 2024-05-08 PROCEDURE — 97530 THERAPEUTIC ACTIVITIES: CPT | Mod: KX,PO

## 2024-05-08 NOTE — PROGRESS NOTES
[facility]   Physical Therapy Treatment Note      Name: Carlos Tenorio Jr.  Clinic Number: [id number]    Therapy Diagnosis:   Encounter Diagnoses   Name Primary?    Decreased range of motion of left knee Yes    Impaired functional mobility, balance, gait, and endurance     Weakness of left lower extremity      Physician: Order, Paper    Visit Date: 5/8/2024    Physician Orders: PT Eval and Treat   Medical Diagnosis from Referral: M25.562 (ICD-10-CM) - Left knee pain   Evaluation Date: 5/1/2024  Authorization Period Expiration: 12/31/2024  Plan of Care Expiration: 8/07/2024 or 20 visits post eval  Visit # (episodes) / Visits authorized: 3 / eval + 20 (POC 2 / 20)  2nd FOTO visit 5  3rd FOTO visit 10  Progress Note Due: 6/1/2024     Time In: 145  Time Out: 229  Total Billable Time: 44 minutes     Precautions: Right TKR; CAD; DM; HTN  Insurance: Payor: [employer] / Plan: [place] PPO / Product Type: PPO /     Subjective     Pt reports: he is doing good. Not really any pain.    He was compliant with home exercise program.  Response to previous treatment: positive  Functional change: none reported    Pain: 1/10  Location:  lateral to incision     Objective      Objective Measures updated at progress report unless specified.     Treatment     DOS: 4/01/2024 Left TKR;   NOTE: early onset Alzheimer's; Fall risk / Pt can keep count if counts out loud    Vlad received the treatments listed below:      Vlad received therapeutic exercises to develop strength, endurance, ROM, and flexibility for 10 minutes including:  NMRE utilized to activate appropriate mm to improve balance, proprioception, stability and gait skills: 20 minutes      Bike, Seat 8, Level 1, 5-10 minutes    TABLE:  +Long Arc Quad, 30x  Longsit HS stretch, 2x30s  Gastroc stretch with strap, [id number]  Longsit Assist AAROM Heel slides with strap, slideboard, 20x5s  +Straight Leg Raise, 10x, 2 sets  Supine heel slides,  20x, slideboard  BTB Bridge, 2x10  BTB Supine Clamshells, 30x  STS, 10x      Manual tx: 0 minutes  Scar mobilization: circles, cross friction, spreading  Patella glides: Gr 2 inf/sup/lat/med - great mobility, d/c next visit      therapeutic activities to improve dynamic functional performance for 14 minutes, including:   STAND:  Standing Heel Raises/Toe Raises, 30x  Hip abuction  Mini Squats  Gait training: pt bringing FWW   WB on foam: 1. M-L weight shift / 2. pre-gait stagger, HC-RT-PO, 1min ea           Add NEXT:  FUTURE visits:  TKE  Single Leg Balance airex  FSU / FSD  LSU / Lateral Step Down  LSO  Lime lateral steps  Stair training     D/c already:  Longsit KE stretch with ankle pumps, 30x  Short Arc Quad   Longsit quad sets, 30x5s    Patient Education and Home Exercises       Education provided:   - daily HEP    Written Home Exercises Provided: Patient instructed to cont prior HEP. Exercises were reviewed and Vlad was able to demonstrate them prior to the end of the session.  Vlad demonstrated good  understanding of the education provided. See EMR under Patient Instructions for exercises provided during therapy sessions    Assessment     Pt demonstrates no to low levels of pain, decreased left knee Range of Motion, weakness, and balance and gait deficits. Pt able to perform all therex given with minimal pn provocation. Added progressive exercises for HEP including hip abduction to improve gait quality. Requires mod to max cues for staying on task; having pt count aloud. Continue to progress as tolerated.     Vlad Is progressing well towards his goals.   Pt prognosis is Good.     Pt will continue to benefit from skilled outpatient physical therapy to address the deficits listed in the problem list box on initial evaluation, provide pt/family education and to maximize pt's level of independence in the home and community environment.     Pt's spiritual, cultural and educational needs considered and pt agreeable to  plan of care and goals.     Anticipated barriers to physical therapy: Co-morbidities    Goals:   Short Term GOALS: 5 weeks PROGRESS 5/8/2024  1. Patient is compliant with HEP.   2. Patient demonstrates inc KE ROM to 0 degrees in order to improve stance during gait.  3. Patient demonstrates inc KF ROM to 120 degrees in order to improve symmetrical motion during daily activities.  4. Patient demonstrates decreased girth by 2 cm in order to tolerate increased WB on LLE.     Long Term GOALS: 10 weeks.   1. Patient demonstrates increased KF ROM to 130 deg to improve symmetrical function while performing house chores.  2. Patient demonstrates increased LLE strength by 1/2 grade or greater to improve tolerance to functional activities.   3. Patient demonstrates independence with HEP.  4. Patient will improve FOTO function score to </= 62% to show improvement in condition and functional mobility.     Plan     Continue PT as deemed per POC: TKR protocol; gait and balance training    Olimpia Dalton, PT

## 2024-05-09 NOTE — PROGRESS NOTES
Dermatology Outpatient Clinic Progress Note    Patient Name: Carlos Tenorio Jr.  Patient : 1950  MRN: [id number]  Date of Service: 2024    CC/HPI: Carlos Tenorio Jr. is a 74 y.o. year old male with history of actinic keratosis who presents today for follow up of PDT scalp.  Patient reports good improvement in Actinic keratoses and skin texture, does not want to do a follow up treatment at this time.     ROS:   Dermatological ROS: positive for skin lesion changes    Physical Exam   Head   Head comments: Area of previous heavy Actinic keratoses much imrpoved          Diagram Legend     Erythematous scaling macule/papule c/w actinic keratosis       Vascular papule c/w angioma      Pigmented verrucoid papule/plaque c/w seborrheic keratosis      Yellow umbilicated papule c/w sebaceous hyperplasia      Irregularly shaped tan macule c/w lentigo     1-2 mm smooth white papules consistent with Milia      Movable subcutaneous cyst with punctum c/w epidermal inclusion cyst      Subcutaneous movable cyst c/w pilar cyst      Firm pink to brown papule c/w dermatofibroma      Pedunculated fleshy papule(s) c/w skin tag(s)      Evenly pigmented macule c/w junctional nevus     Mildly variegated pigmented, slightly irregular-bordered macule c/w mildly atypical nevus      Flesh colored to evenly pigmented papule c/w intradermal nevus       Pink pearly papule/plaque c/w basal cell carcinoma      Erythematous hyperkeratotic cursted plaque c/w SCC      Surgical scar with no sign of skin cancer recurrence      Open and closed comedones      Inflammatory papules and pustules      Verrucoid papule consistent consistent with wart     Erythematous eczematous patches and plaques     Dystrophic onycholytic nail with subungual debris c/w onychomycosis     Umbilicated papule    Erythematous-base heme-crusted tan verrucoid plaque consistent with inflamed seborrheic keratosis     Erythematous Silvery Scaling Plaque c/w Psoriasis      See annotation    Assessment/Plan:    Diagnoses and all orders for this visit:    Actinic keratosis      Follow up with Dr. Andrea, follow up with us KINSEY Salgado MD FAAD

## 2024-05-13 ENCOUNTER — OFFICE VISIT (OUTPATIENT)
Dept: DERMATOLOGY | Facility: CLINIC | Age: 74
End: 2024-05-13
Payer: COMMERCIAL

## 2024-05-13 DIAGNOSIS — L21.9 SEBORRHEIC DERMATITIS: Primary | ICD-10-CM

## 2024-05-13 DIAGNOSIS — L57.0 AK (ACTINIC KERATOSIS): ICD-10-CM

## 2024-05-13 DIAGNOSIS — L57.8 ACTINIC SKIN DAMAGE: ICD-10-CM

## 2024-05-13 PROCEDURE — 99213 OFFICE O/P EST LOW 20 MIN: CPT | Mod: 25,S$GLB,, | Performed by: STUDENT IN AN ORGANIZED HEALTH CARE EDUCATION/TRAINING PROGRAM

## 2024-05-13 PROCEDURE — 1160F RVW MEDS BY RX/DR IN RCRD: CPT | Mod: CPTII,S$GLB,, | Performed by: STUDENT IN AN ORGANIZED HEALTH CARE EDUCATION/TRAINING PROGRAM

## 2024-05-13 PROCEDURE — 17000 DESTRUCT PREMALG LESION: CPT | Mod: S$GLB,,, | Performed by: STUDENT IN AN ORGANIZED HEALTH CARE EDUCATION/TRAINING PROGRAM

## 2024-05-13 PROCEDURE — 1126F AMNT PAIN NOTED NONE PRSNT: CPT | Mod: CPTII,S$GLB,, | Performed by: STUDENT IN AN ORGANIZED HEALTH CARE EDUCATION/TRAINING PROGRAM

## 2024-05-13 PROCEDURE — 1159F MED LIST DOCD IN RCRD: CPT | Mod: CPTII,S$GLB,, | Performed by: STUDENT IN AN ORGANIZED HEALTH CARE EDUCATION/TRAINING PROGRAM

## 2024-05-13 PROCEDURE — 3288F FALL RISK ASSESSMENT DOCD: CPT | Mod: CPTII,S$GLB,, | Performed by: STUDENT IN AN ORGANIZED HEALTH CARE EDUCATION/TRAINING PROGRAM

## 2024-05-13 PROCEDURE — 1101F PT FALLS ASSESS-DOCD LE1/YR: CPT | Mod: CPTII,S$GLB,, | Performed by: STUDENT IN AN ORGANIZED HEALTH CARE EDUCATION/TRAINING PROGRAM

## 2024-05-13 PROCEDURE — 17003 DESTRUCT PREMALG LES 2-14: CPT | Mod: S$GLB,,, | Performed by: STUDENT IN AN ORGANIZED HEALTH CARE EDUCATION/TRAINING PROGRAM

## 2024-05-13 PROCEDURE — 4010F ACE/ARB THERAPY RXD/TAKEN: CPT | Mod: CPTII,S$GLB,, | Performed by: STUDENT IN AN ORGANIZED HEALTH CARE EDUCATION/TRAINING PROGRAM

## 2024-05-13 PROCEDURE — 3044F HG A1C LEVEL LT 7.0%: CPT | Mod: CPTII,S$GLB,, | Performed by: STUDENT IN AN ORGANIZED HEALTH CARE EDUCATION/TRAINING PROGRAM

## 2024-05-13 RX ORDER — KETOCONAZOLE 20 MG/G
CREAM TOPICAL 2 TIMES DAILY
Qty: 60 G | Refills: 1 | Status: SHIPPED | OUTPATIENT
Start: 2024-05-13

## 2024-05-13 NOTE — PROGRESS NOTES
Subjective:      Patient ID:  Carlos Tenorio Jr. is a 74 y.o. male who presents for   Chief Complaint   Patient presents with    Follow-up     pdt     LOV 2/27/24    Patient here today for skin check UBSE  Patient states he completed PDT on scalp - robust reaction.  Also complains of dry flaky skin.     DERM HX:  Denies PHX of NMSC  Denies FHX of MM          Review of Systems   Constitutional:  Negative for fever, chills and fatigue.   Respiratory:  Negative for cough and shortness of breath.    Gastrointestinal:  Negative for nausea and vomiting.   Skin:  Positive for dry skin, activity-related sunscreen use and wears hat. Negative for itching, rash and daily sunscreen use.   Hematologic/Lymphatic: Bruises/bleeds easily (asa, plavix).       Objective:   Physical Exam   Constitutional: He appears well-developed and well-nourished.   Neurological: He is alert and oriented to person, place, and time.   Psychiatric: He has a normal mood and affect.   Skin:   Areas Examined (abnormalities noted in diagram):   Scalp / Hair Palpated and Inspected  Head / Face Inspection Performed            Diagram Legend     Erythematous scaling macule/papule c/w actinic keratosis       Vascular papule c/w angioma      Pigmented verrucoid papule/plaque c/w seborrheic keratosis      Yellow umbilicated papule c/w sebaceous hyperplasia      Irregularly shaped tan macule c/w lentigo     1-2 mm smooth white papules consistent with Milia      Movable subcutaneous cyst with punctum c/w epidermal inclusion cyst      Subcutaneous movable cyst c/w pilar cyst      Firm pink to brown papule c/w dermatofibroma      Pedunculated fleshy papule(s) c/w skin tag(s)      Evenly pigmented macule c/w junctional nevus     Mildly variegated pigmented, slightly irregular-bordered macule c/w mildly atypical nevus      Flesh colored to evenly pigmented papule c/w intradermal nevus       Pink pearly papule/plaque c/w basal cell carcinoma      Erythematous  hyperkeratotic cursted plaque c/w SCC      Surgical scar with no sign of skin cancer recurrence      Open and closed comedones      Inflammatory papules and pustules      Verrucoid papule consistent consistent with wart     Erythematous eczematous patches and plaques     Dystrophic onycholytic nail with subungual debris c/w onychomycosis     Umbilicated papule    Erythematous-base heme-crusted tan verrucoid plaque consistent with inflamed seborrheic keratosis     Erythematous Silvery Scaling Plaque c/w Psoriasis     See annotation      Assessment / Plan:        Seborrheic dermatitis  -     ketoconazole (NIZORAL) 2 % cream; Apply topically 2 (two) times daily.  Dispense: 60 g; Refill: 1  Use continue ketoconazole cream on face at least once daily, can use twice   Vanicream zinc pyrithione shampoo as face wash daily    Actinic skin damage  -     Photodynamic Therapy; Future  Repeat scalp PDT in 3-4 months    AK (actinic keratosis)  Cryosurgery Procedure Note    Verbal consent from the patient is obtained and the patient is aware of the precancerous quality and need for treatment of these lesions. Liquid nitrogen cryosurgery is applied to the 8 actinic keratoses, as detailed in the physical exam, to produce a freeze injury. The patient is aware that blisters may form and is instructed on wound care with gentle cleansing and use of vaseline ointment to keep moist until healed. The patient is supplied a handout on cryosurgery and is instructed to call if lesions do not completely resolve.         6 months    No follow-ups on file.

## 2024-05-13 NOTE — PATIENT INSTRUCTIONS
Use continue ketoconazole cream on face at least once daily, can use twice   Vanicream zinc pyrithione shampoo as face wash daily    Ammonium lactate on arms and legs nightly

## 2024-05-14 ENCOUNTER — CLINICAL SUPPORT (OUTPATIENT)
Dept: UROLOGY | Facility: CLINIC | Age: 74
End: 2024-05-14
Payer: COMMERCIAL

## 2024-05-14 ENCOUNTER — LAB VISIT (OUTPATIENT)
Dept: LAB | Facility: HOSPITAL | Age: 74
End: 2024-05-14
Attending: UROLOGY
Payer: COMMERCIAL

## 2024-05-14 ENCOUNTER — TELEPHONE (OUTPATIENT)
Dept: REHABILITATION | Facility: HOSPITAL | Age: 74
End: 2024-05-14
Payer: COMMERCIAL

## 2024-05-14 ENCOUNTER — OFFICE VISIT (OUTPATIENT)
Dept: OTOLARYNGOLOGY | Facility: CLINIC | Age: 74
End: 2024-05-14
Payer: COMMERCIAL

## 2024-05-14 VITALS — WEIGHT: 191.81 LBS | BODY MASS INDEX: 29.07 KG/M2 | HEIGHT: 68 IN

## 2024-05-14 DIAGNOSIS — C61 PROSTATE CANCER: ICD-10-CM

## 2024-05-14 DIAGNOSIS — R82.998 CELLS AND CASTS IN URINE: Primary | ICD-10-CM

## 2024-05-14 DIAGNOSIS — Z85.528 HISTORY OF RENAL CELL CANCER: ICD-10-CM

## 2024-05-14 DIAGNOSIS — Z97.4 WEARS HEARING AID IN BOTH EARS: Primary | ICD-10-CM

## 2024-05-14 DIAGNOSIS — H61.23 BILATERAL IMPACTED CERUMEN: ICD-10-CM

## 2024-05-14 LAB
BACTERIA #/AREA URNS AUTO: ABNORMAL /HPF
COMPLEXED PSA SERPL-MCNC: 5.8 NG/ML (ref 0–4)
HYALINE CASTS UR QL AUTO: 3 /LPF
MICROSCOPIC COMMENT: ABNORMAL
POC RESIDUAL URINE VOLUME: 0 ML (ref 0–100)
RBC #/AREA URNS AUTO: 1 /HPF (ref 0–4)
SQUAMOUS #/AREA URNS AUTO: 4 /HPF
WBC #/AREA URNS AUTO: 2 /HPF (ref 0–5)

## 2024-05-14 PROCEDURE — 51798 US URINE CAPACITY MEASURE: CPT | Mod: S$GLB,,, | Performed by: UROLOGY

## 2024-05-14 PROCEDURE — 69210 REMOVE IMPACTED EAR WAX UNI: CPT | Mod: 50,S$GLB,, | Performed by: NURSE PRACTITIONER

## 2024-05-14 PROCEDURE — 87086 URINE CULTURE/COLONY COUNT: CPT | Performed by: UROLOGY

## 2024-05-14 PROCEDURE — 99999 PR PBB SHADOW E&M-EST. PATIENT-LVL IV: CPT | Mod: PBBFAC,,, | Performed by: NURSE PRACTITIONER

## 2024-05-14 PROCEDURE — 36415 COLL VENOUS BLD VENIPUNCTURE: CPT | Performed by: UROLOGY

## 2024-05-14 PROCEDURE — 99499 UNLISTED E&M SERVICE: CPT | Mod: S$GLB,,, | Performed by: NURSE PRACTITIONER

## 2024-05-14 PROCEDURE — 84153 ASSAY OF PSA TOTAL: CPT | Performed by: UROLOGY

## 2024-05-14 PROCEDURE — 81001 URINALYSIS AUTO W/SCOPE: CPT | Performed by: UROLOGY

## 2024-05-14 PROCEDURE — 99999 PR PBB SHADOW E&M-EST. PATIENT-LVL I: CPT | Mod: PBBFAC,,,

## 2024-05-14 PROCEDURE — 99499 UNLISTED E&M SERVICE: CPT | Mod: S$GLB,,, | Performed by: UROLOGY

## 2024-05-14 NOTE — PROGRESS NOTES
Subjective:       Patient ID: Carlos Tenorio Jr. is a 74 y.o. male.    Chief Complaint: Cerumen Impaction    HPI   [facility] and was reportedly told by his PCP to have ENT clean his ears.  Patient denies otalgia, otorrhea, or other ENT symptoms or concerns at present time.    Review of Systems   Constitutional: Negative.    HENT:  Positive for hearing loss.    Eyes: Negative.    Respiratory: Negative.     Gastrointestinal: Negative.    Endocrine: Negative.    Musculoskeletal: Negative.    Integumentary:  Negative.   Allergic/Immunologic: Negative.    Neurological: Negative.    Hematological:  Bruises/bleeds easily.   Psychiatric/Behavioral: Negative.           Objective:      Physical Exam  Vitals and nursing note reviewed.   Constitutional:       General: He is not in acute distress.     Appearance: He is well-developed. He is not ill-appearing or diaphoretic.   HENT:      Head: Normocephalic and atraumatic.      Right Ear: Hearing, tympanic membrane, ear canal and external ear normal. No middle ear effusion. Tympanic membrane is not erythematous.      Left Ear: Hearing, tympanic membrane, ear canal and external ear normal.  No middle ear effusion. Tympanic membrane is not erythematous.      Nose: Nose normal.   Eyes:      General: Lids are normal. No scleral icterus.        Right eye: No discharge.         Left eye: No discharge.   Neck:      Trachea: Trachea normal. No tracheal deviation.   Cardiovascular:      Rate and Rhythm: Normal rate.   Pulmonary:      Effort: Pulmonary effort is normal. No respiratory distress.      Breath sounds: No stridor. No wheezing.   Musculoskeletal:         General: Normal range of motion.      Cervical back: Normal range of motion and neck supple.   Skin:     General: Skin is warm and dry.      Coloration: Skin is not pale.   Neurological:      Mental Status: He is alert and oriented to person, place, and time.      Coordination: Coordination  normal.      Gait: Gait normal.   Psychiatric:         Speech: Speech normal.         Behavior: Behavior normal. Behavior is cooperative.         Thought Content: Thought content normal.         Judgment: Judgment normal.      SEPARATE PROCEDURE IN OFFICE:   Procedure: Removal of impacted cerumen, bilateral   Pre Procedure Diagnosis: Cerumen Impaction   Post Procedure Diagnosis: Cerumen Impaction   Verbal informed consent in regards to risk of trauma to ear canal, ear drum or hearing, discomfort during procedure and/or inability to remove cerumen impaction in one session or unforeseen events or complications.   No anesthesia.     Procedure in detail:   Ear canal visualized bilateral with appropriate size ear speculum utilizing Operating Head Binocular Otomicroscope   Utilizing the following:  Alligator forceps were used AU. The impacted cerumen of the ear canals was removed atraumatically. The TM and EAC were then inspected and found to be clear of wax. See description of TMs/EACs in PE above.   Complications: No   Condition: Improved/Good     Assessment:       Problem List Items Addressed This Visit    None  Visit Diagnoses       Wears hearing aid in both ears    -  Primary    Bilateral impacted cerumen                Plan:     Debrox prn    Patient encouraged to return to clinic if symptoms worsen/persist and as needed for further ENT symptoms or concerns.

## 2024-05-14 NOTE — PROGRESS NOTES
Patient presented to office for clean catch patient was instructed to CCMS  Urine collected and prepared for lab pickup.    AUASS:  Incomplete emptying- 0  Frequency- 2  Intermittency- 1  Urgency- 2  Weak Stream- 2  Straining- 1  Sleeping- 3  Total-  11  QOL- 4  PVR- 0 ml

## 2024-05-15 ENCOUNTER — CLINICAL SUPPORT (OUTPATIENT)
Dept: REHABILITATION | Facility: HOSPITAL | Age: 74
End: 2024-05-15
Payer: COMMERCIAL

## 2024-05-15 DIAGNOSIS — Z74.09 IMPAIRED FUNCTIONAL MOBILITY, BALANCE, GAIT, AND ENDURANCE: ICD-10-CM

## 2024-05-15 DIAGNOSIS — M25.662 DECREASED RANGE OF MOTION OF LEFT KNEE: Primary | ICD-10-CM

## 2024-05-15 DIAGNOSIS — R29.898 WEAKNESS OF LEFT LOWER EXTREMITY: ICD-10-CM

## 2024-05-15 LAB — BACTERIA UR CULT: NO GROWTH

## 2024-05-15 PROCEDURE — 97140 MANUAL THERAPY 1/> REGIONS: CPT | Mod: KX,PO

## 2024-05-15 PROCEDURE — 97110 THERAPEUTIC EXERCISES: CPT | Mod: KX,PO

## 2024-05-15 PROCEDURE — 97112 NEUROMUSCULAR REEDUCATION: CPT | Mod: KX,PO

## 2024-05-15 PROCEDURE — 97530 THERAPEUTIC ACTIVITIES: CPT | Mod: KX,PO

## 2024-05-15 NOTE — PROGRESS NOTES
[facility]   Physical Therapy Treatment Note      Name: Carlos Tenorio Jr.  Clinic Number: [id number]    Therapy Diagnosis:   No diagnosis found.    Physician: Order, Paper    Visit Date: 5/15/2024    Physician Orders: PT Eval and Treat   Medical Diagnosis from Referral: M25.562 (ICD-10-CM) - Left knee pain   Evaluation Date: 5/1/2024  Authorization Period Expiration: 12/31/2024  Plan of Care Expiration: 8/07/2024 or 20 visits post eval  Visit # (episodes) / Visits authorized: 4 / eval + 20 (POC 3 / 20)  2nd FOTO visit 5  3rd FOTO visit 10  Progress Note Due: 6/1/2024     Time In: 915  Time Out: 1010  Total Billable Time: 55 minutes     Precautions: Right TKR; CAD; DM; HTN  Insurance: Payor: [employer] / Plan: [place] PPO / Product Type: PPO /     Subjective     Pt reports: he is having a little low pain on and off.    He was compliant with home exercise program.  Response to previous treatment: positive  Functional change: none reported    Pain: 1/10  Location:  lateral to incision     Objective      Objective Measures updated at progress report unless specified.     Treatment     DOS: 4/01/2024 Left TKR;   NOTE: early onset Alzheimer's; High Fall risk (fell @ [address]) / Pt can keep count if counts out loud    Vlad received the treatments listed below:      Vlad received therapeutic exercises to develop strength, endurance, ROM, and flexibility for 10 minutes including:  NMRE utilized to activate appropriate mm to improve balance, proprioception, stability and gait skills: 20 minutes      Bike, Seat 8, Level 1, 5-10 minutes    TABLE:  Longsit HS stretch, 2x30s  Gastroc stretch with strap, [id number]  Longsit Assist AAROM Heel slides with strap, slideboard, 20x5s  Straight Leg Raise, 10x, 2 sets(hep)  Supine heel slides, 30x, slideboard (hep)  BTB Bridge, 2x10 (hep)  BTB Supine Clamshells, 30x  STS, 10x, 2 sets  Long Arc Quad, 30x    Manual tx: 10 minutes  Scar  mobilization: circles, cross friction, spreading  Patella glides: Gr 2 inf/sup/lat/med - great mobility, d/c next visit      therapeutic activities to improve dynamic functional performance for 15 minutes, including: HAVE Pt COUNT  STAND:  Standing Heel Raises/Toe Raises, 30x  Hip abduction, 3x10 alternate Bilateral   Mini Squats, 2x15  Pre-Gait training: WB on foam: 1. M-L weight shift / 2. Marching in place, 1 minute / 3. pre-gait stagger, HC-RT-PO, 1min ea           Add NEXT:  FUTURE visits:  TKE  Single Leg Balance airex  FSU / FSD Stair training   Lateral Step Over   Lime lateral steps / Monster walks    D/c already:  Longsit KE stretch with ankle pumps, 30x  Short Arc Quad   Longsit quad sets, 30x5s    Patient Education and Home Exercises       Education provided:   - daily HEP    Written Home Exercises Provided: Patient instructed to cont prior HEP. Exercises were reviewed and Vlad was able to demonstrate them prior to the end of the session.  Vlad demonstrated good  understanding of the education provided. See EMR under Patient Instructions for exercises provided during therapy sessions    Assessment     Pt demonstrates no to low levels of pain, decreased left knee Range of Motion, weakness, and balance and gait deficits. Pt able to perform all therex given with minimal pn provocation. Started pre-gait training program to improve mid stance and swing through phases. Having pt count aloud. Utilizing manual tx to smooth and break up scar tissue; instructed to ice after. Continue to progress as tolerated.     Vlad Is progressing well towards his goals.   Pt prognosis is Good.     Pt will continue to benefit from skilled outpatient physical therapy to address the deficits listed in the problem list box on initial evaluation, provide pt/family education and to maximize pt's level of independence in the home and community environment.     Pt's spiritual, cultural and educational needs considered and pt agreeable to  plan of care and goals.     Anticipated barriers to physical therapy: Co-morbidities    Goals:   Short Term GOALS: 5 weeks PROGRESS 5/15/2024  1. Patient is compliant with HEP.   2. Patient demonstrates inc KE ROM to 0 degrees in order to improve stance during gait.  3. Patient demonstrates inc KF ROM to 120 degrees in order to improve symmetrical motion during daily activities.  4. Patient demonstrates decreased girth by 2 cm in order to tolerate increased WB on LLE.     Long Term GOALS: 10 weeks.   1. Patient demonstrates increased KF ROM to 130 deg to improve symmetrical function while performing house chores.  2. Patient demonstrates increased LLE strength by 1/2 grade or greater to improve tolerance to functional activities.   3. Patient demonstrates independence with HEP.  4. Patient will improve FOTO function score to </= 62% to show improvement in condition and functional mobility.     Plan     Continue PT as deemed per POC: TKR protocol; gait and balance training    Olimpia Dalton, PT

## 2024-05-17 ENCOUNTER — CLINICAL SUPPORT (OUTPATIENT)
Dept: REHABILITATION | Facility: HOSPITAL | Age: 74
End: 2024-05-17
Payer: COMMERCIAL

## 2024-05-17 DIAGNOSIS — M25.662 DECREASED RANGE OF MOTION OF LEFT KNEE: Primary | ICD-10-CM

## 2024-05-17 DIAGNOSIS — M25.562 PAIN IN LEFT KNEE: Primary | ICD-10-CM

## 2024-05-17 DIAGNOSIS — Z74.09 IMPAIRED FUNCTIONAL MOBILITY, BALANCE, GAIT, AND ENDURANCE: ICD-10-CM

## 2024-05-17 DIAGNOSIS — R29.898 WEAKNESS OF LEFT LOWER EXTREMITY: ICD-10-CM

## 2024-05-17 PROCEDURE — 97530 THERAPEUTIC ACTIVITIES: CPT | Mod: PO

## 2024-05-17 PROCEDURE — 97112 NEUROMUSCULAR REEDUCATION: CPT | Mod: PO

## 2024-05-17 PROCEDURE — 97110 THERAPEUTIC EXERCISES: CPT | Mod: PO

## 2024-05-17 NOTE — PROGRESS NOTES
[facility]   Physical Therapy Treatment Note      Name: Carlos Tenorio Jr.  Clinic Number: [id number]    Therapy Diagnosis:   Encounter Diagnoses   Name Primary?    Decreased range of motion of left knee Yes    Impaired functional mobility, balance, gait, and endurance     Weakness of left lower extremity        Physician: Order, Paper    Visit Date: 5/17/2024    Physician Orders: PT Eval and Treat   Medical Diagnosis from Referral: M25.562 (ICD-10-CM) - Left knee pain   Evaluation Date: 5/1/2024  Authorization Period Expiration: 12/31/2024  Plan of Care Expiration: 8/07/2024 or 20 visits post eval  Visit # (episodes) / Visits authorized:  / eval + 20 (POC 4 / 20)  2nd FOTO visit 5  3rd FOTO visit 10  Progress Note Due: 6/1/2024     Time In: 1000  Time Out: 1044  Total Billable Time: 44 minutes     Precautions: Right TKR; CAD; DM; HTN  Insurance: Payor: [employer] / Plan: [place] PPO / Product Type: PPO /     Subjective     Pt reports: he is doing good; just a little pain.     He was compliant with home exercise program.  Response to previous treatment: positive  Functional change: none reported    Pain: 1/10  Location:  lateral to incision     Objective      Objective Measures updated at progress report unless specified.     Treatment     DOS: 4/01/2024 Left TKR;   NOTE: early onset Alzheimer's; High Fall risk (fell @ [address]) / Pt can keep count if counts out loud    Vlad received the treatments listed below:      Vlad received therapeutic exercises to develop strength, endurance, ROM, and flexibility for 10 minutes including:  NMRE utilized to activate appropriate mm to improve balance, proprioception, stability and gait skills: 20 minutes      Bike, Seat 8, Level 1, 5-10 minutes - UNAVAILABLE today    TABLE:  Longsit HS stretch, 2x30s  Gastroc stretch with strap, [id number]  Longsit Assist AAROM Heel slides with strap, slideboard, 20x5s  Straight Leg Raise, 10x, 2  sets(hep)  Supine heel slides, 30x, slideboard (hep)  BTB Bridge, 2x10 (hep)  BTB Supine Clamshells, 30x  STS, 10x, 2 sets, 2 hand support  Long Arc Quad, 30x - warmed up with Sports Art 0#      Manual tx: 0 minutes - NOT PERFORMED   Scar mobilization: circles, cross friction, spreading      therapeutic activities to improve dynamic functional performance for 14 minutes, including: HAVE Pt COUNT  STAND:  Standing Heel Raises/Toe Raises, 30x  Mini Squats, 2x15, cue hips back like sitting in a chair  Blue Theraband Terminal Knee Extension, [id number]  Hip abduction, 3x10 alternate Bilateral   Pre-Gait training: WB on foam: 1. M-L weight shift / 2. Marching in place, 1 minute / 3. pre-gait stagger, HC-RT-PO, 1min ea           Add NEXT:  FUTURE visits:  Single Leg Balance airex  FSU / FSD Stair training   Lateral Step Over   Lime lateral steps / Monster walks    D/c already:  Longsit KE stretch with ankle pumps, 30x  Short Arc Quad   Longsit quad sets, 30x5s    Patient Education and Home Exercises       Education provided:   - daily HEP    Written Home Exercises Provided: Patient instructed to cont prior HEP. Exercises were reviewed and Vlad was able to demonstrate them prior to the end of the session.  Vlad demonstrated good  understanding of the education provided. See EMR under Patient Instructions for exercises provided during therapy sessions    Assessment     Pt demonstrates no to low levels of pain, decreased left knee Range of Motion, weakness, and balance and gait deficits. Pt able to perform all therex given with minimal pn provocation. Having pt count aloud for cognitive stimulation to stay on task. Adding to standing exercises today. Continue to progress as tolerated.     Vlad Is progressing well towards his goals.   Pt prognosis is Good.     Pt will continue to benefit from skilled outpatient physical therapy to address the deficits listed in the problem list box on initial evaluation, provide pt/family education  and to maximize pt's level of independence in the home and community environment.     Pt's spiritual, cultural and educational needs considered and pt agreeable to plan of care and goals.     Anticipated barriers to physical therapy: Co-morbidities    Goals:   Short Term GOALS: 5 weeks PROGRESS 5/17/2024  1. Patient is compliant with HEP.   2. Patient demonstrates inc KE ROM to 0 degrees in order to improve stance during gait.  3. Patient demonstrates inc KF ROM to 120 degrees in order to improve symmetrical motion during daily activities.  4. Patient demonstrates decreased girth by 2 cm in order to tolerate increased WB on LLE.     Long Term GOALS: 10 weeks.   1. Patient demonstrates increased KF ROM to 130 deg to improve symmetrical function while performing house chores.  2. Patient demonstrates increased LLE strength by 1/2 grade or greater to improve tolerance to functional activities.   3. Patient demonstrates independence with HEP.  4. Patient will improve FOTO function score to </= 62% to show improvement in condition and functional mobility.     Plan     Continue PT as deemed per POC: TKR protocol; gait and balance training    Olimpia Dalton, PT

## 2024-05-23 ENCOUNTER — PATIENT MESSAGE (OUTPATIENT)
Dept: FAMILY MEDICINE | Facility: CLINIC | Age: 74
End: 2024-05-23
Payer: COMMERCIAL

## 2024-05-24 ENCOUNTER — CLINICAL SUPPORT (OUTPATIENT)
Dept: REHABILITATION | Facility: HOSPITAL | Age: 74
End: 2024-05-24
Payer: COMMERCIAL

## 2024-05-24 DIAGNOSIS — Z74.09 IMPAIRED FUNCTIONAL MOBILITY, BALANCE, GAIT, AND ENDURANCE: ICD-10-CM

## 2024-05-24 DIAGNOSIS — M25.662 DECREASED RANGE OF MOTION OF LEFT KNEE: Primary | ICD-10-CM

## 2024-05-24 DIAGNOSIS — R29.898 WEAKNESS OF LEFT LOWER EXTREMITY: ICD-10-CM

## 2024-05-24 PROCEDURE — 97110 THERAPEUTIC EXERCISES: CPT | Mod: PO

## 2024-05-24 PROCEDURE — 97530 THERAPEUTIC ACTIVITIES: CPT | Mod: PO

## 2024-05-24 NOTE — PROGRESS NOTES
[facility]   Physical Therapy Treatment Note      Name: Carlos Tenorio Jr.  Clinic Number: [id number]    Therapy Diagnosis:   Encounter Diagnoses   Name Primary?    Decreased range of motion of left knee Yes    Impaired functional mobility, balance, gait, and endurance     Weakness of left lower extremity        Physician: Order, Paper    Visit Date: 5/24/2024    Physician Orders: PT Eval and Treat   Medical Diagnosis from Referral: M25.562 (ICD-10-CM) - Left knee pain   Evaluation Date: 5/1/2024  Authorization Period Expiration: 12/31/2024  Plan of Care Expiration: 8/07/2024 or 20 visits post eval  Visit # (episodes) / Visits authorized:  / eval + 20 (POC 4 / 20)  2nd FOTO visit 5  3rd FOTO visit 10  Progress Note Due: 6/1/2024     Time In: 1335  Time Out: 1405  Total Billable Time: 40 minutes     Precautions: Right TKR; CAD; DM; HTN  Insurance: Payor: [employer] / Plan: [place] PPO / Product Type: PPO /     Subjective     Pt reports: he is doing well, no pain.     He was compliant with home exercise program.  Response to previous treatment: positive  Functional change: none reported    Pain: 0/10  Location:  lateral to incision     Objective      Objective Measures updated at progress report unless specified.     Treatment     DOS: 4/01/2024 Left TKR;   NOTE: early onset Alzheimer's; High Fall risk (fell @ [address]) / Pt can keep count if counts out loud    Vlad received the treatments listed below:      Vlad received therapeutic exercises to develop strength, endurance, ROM, and flexibility for 15 minutes including:  NMRE utilized to activate appropriate mm to improve balance, proprioception, stability and gait skills: 20 minutes      Bike, Seat 8, Level 1, 10 minutes     TABLE:  PROM 5' bilateral  Longsit HS stretch, 2x30s  Gastroc stretch with strap, [id number]  Longsit Assist AAROM Heel slides with strap, slideboard, 20x5s  Straight Leg Raise, 10x, 2 sets(hep)  Supine  heel slides, 30x, slideboard (hep)   Bridge, 3x10 (hep)  BTB Supine Clamshells, 30x  Hip ADD 30 w/ball  STS, 10x, 2 sets, 2 hand support  Long Arc Quad, 30x - warmed up with Sports Art 0#      Manual tx: 0 minutes - NOT PERFORMED   Scar mobilization: circles, cross friction, spreading      therapeutic activities to improve dynamic functional performance for 25 minutes, including: HAVE Pt COUNT  STAND:  Standing Heel Raises/Toe Raises, 30x airex  Mini Squats, 2x15, cue hips back like sitting in a chair airex  Blue Theraband Terminal Knee Extension, [id number]  Hip abduction, 3x10 alternate Bilateral NP  Pre-Gait training: WB on foam: 1. M-L weight shift NP / 2. Marching in place 30/30 L/R / 3. pre-gait stagger, HC-RT-PO, 1min ea NP           Add NEXT:  FUTURE visits:  Single Leg Balance airex  FSU / FSD Stair training   Lateral Step Over   Lime lateral steps / Monster walks    D/c already:  Longsit KE stretch with ankle pumps, 30x  Short Arc Quad   Longsit quad sets, 30x5s    Patient Education and Home Exercises       Education provided:   - daily HEP    Written Home Exercises Provided: Patient instructed to cont prior HEP. Exercises were reviewed and Vlda was able to demonstrate them prior to the end of the session.  Vlad demonstrated good  understanding of the education provided. See EMR under Patient Instructions for exercises provided during therapy sessions    Assessment     Pt demonstrates no pain, decreased left knee Range of Motion, weakness, and balance and gait deficits. Pt able to perform all therex given with minimal pn provocation. Having pt count aloud for cognitive stimulation to stay on task. Adding to standing exercises today. Continue to progress as tolerated.     Vlad Is progressing well towards his goals.   Pt prognosis is Good.     Pt will continue to benefit from skilled outpatient physical therapy to address the deficits listed in the problem list box on initial evaluation, provide pt/family  education and to maximize pt's level of independence in the home and community environment.     Pt's spiritual, cultural and educational needs considered and pt agreeable to plan of care and goals.     Anticipated barriers to physical therapy: Co-morbidities    Goals:   Short Term GOALS: 5 weeks PROGRESS 5/24/2024  1. Patient is compliant with HEP.   2. Patient demonstrates inc KE ROM to 0 degrees in order to improve stance during gait.  3. Patient demonstrates inc KF ROM to 120 degrees in order to improve symmetrical motion during daily activities.  4. Patient demonstrates decreased girth by 2 cm in order to tolerate increased WB on LLE.     Long Term GOALS: 10 weeks.   1. Patient demonstrates increased KF ROM to 130 deg to improve symmetrical function while performing house chores.  2. Patient demonstrates increased LLE strength by 1/2 grade or greater to improve tolerance to functional activities.   3. Patient demonstrates independence with HEP.  4. Patient will improve FOTO function score to </= 62% to show improvement in condition and functional mobility.     Plan     Continue PT as deemed per POC: TKR protocol; gait and balance training    Tee Bain, PT

## 2024-05-28 ENCOUNTER — PATIENT MESSAGE (OUTPATIENT)
Dept: UROLOGY | Facility: CLINIC | Age: 74
End: 2024-05-28
Payer: COMMERCIAL

## 2024-05-28 ENCOUNTER — TELEPHONE (OUTPATIENT)
Dept: UROLOGY | Facility: CLINIC | Age: 74
End: 2024-05-28
Payer: COMMERCIAL

## 2024-05-28 NOTE — TELEPHONE ENCOUNTER
----- Message from Carlos Baca sent at 5/28/2024  8:09 AM CDT -----  Type: Needs Medical Advice  Who Called:  pt wife, Jessica  Symptoms (please be specific):  said she need to speak to the nurse--said it's about a UTI infection-- and she need the office to call something in for him and she need to speak to the nurse--please call and advise--said she sent a msg on the portal in detail--  How long has patient had these symptoms:  3 day  Pharmacy name and phone #:    [id number] - [place] - [address] AT [facility] [address] [facility] [address]  [address] [address]  Phone: [phone] Fax: [phone]      Best Call Back Number: [phone] (home)     Additional Information: thank you

## 2024-05-28 NOTE — TELEPHONE ENCOUNTER
Spoke w pts wife who called with c/o pt having possible uti   She was offered appt with np willemm or can go to pcp or urgent care today   She accepted appt with np maricruz for pt @ 2 pm

## 2024-05-29 ENCOUNTER — OFFICE VISIT (OUTPATIENT)
Dept: UROLOGY | Facility: CLINIC | Age: 74
End: 2024-05-29
Payer: COMMERCIAL

## 2024-05-29 ENCOUNTER — PATIENT MESSAGE (OUTPATIENT)
Dept: ADMINISTRATIVE | Facility: OTHER | Age: 74
End: 2024-05-29
Payer: COMMERCIAL

## 2024-05-29 DIAGNOSIS — Z85.528 HISTORY OF RENAL CELL CANCER: Primary | ICD-10-CM

## 2024-05-29 DIAGNOSIS — N39.0 URINARY TRACT INFECTION WITH HEMATURIA, SITE UNSPECIFIED: ICD-10-CM

## 2024-05-29 DIAGNOSIS — R31.9 URINARY TRACT INFECTION WITH HEMATURIA, SITE UNSPECIFIED: ICD-10-CM

## 2024-05-29 DIAGNOSIS — N30.00 ACUTE CYSTITIS WITHOUT HEMATURIA: ICD-10-CM

## 2024-05-29 DIAGNOSIS — C61 PROSTATE CANCER: ICD-10-CM

## 2024-05-29 PROCEDURE — 99214 OFFICE O/P EST MOD 30 MIN: CPT | Mod: S$GLB,,, | Performed by: NURSE PRACTITIONER

## 2024-05-29 PROCEDURE — 99999 PR PBB SHADOW E&M-EST. PATIENT-LVL III: CPT | Mod: PBBFAC,,, | Performed by: NURSE PRACTITIONER

## 2024-05-29 PROCEDURE — 1160F RVW MEDS BY RX/DR IN RCRD: CPT | Mod: CPTII,S$GLB,, | Performed by: NURSE PRACTITIONER

## 2024-05-29 PROCEDURE — 4010F ACE/ARB THERAPY RXD/TAKEN: CPT | Mod: CPTII,S$GLB,, | Performed by: NURSE PRACTITIONER

## 2024-05-29 PROCEDURE — 3044F HG A1C LEVEL LT 7.0%: CPT | Mod: CPTII,S$GLB,, | Performed by: NURSE PRACTITIONER

## 2024-05-29 PROCEDURE — 1101F PT FALLS ASSESS-DOCD LE1/YR: CPT | Mod: CPTII,S$GLB,, | Performed by: NURSE PRACTITIONER

## 2024-05-29 PROCEDURE — 87088 URINE BACTERIA CULTURE: CPT | Performed by: NURSE PRACTITIONER

## 2024-05-29 PROCEDURE — 1159F MED LIST DOCD IN RCRD: CPT | Mod: CPTII,S$GLB,, | Performed by: NURSE PRACTITIONER

## 2024-05-29 PROCEDURE — 87186 SC STD MICRODIL/AGAR DIL: CPT | Performed by: NURSE PRACTITIONER

## 2024-05-29 PROCEDURE — 87077 CULTURE AEROBIC IDENTIFY: CPT | Performed by: NURSE PRACTITIONER

## 2024-05-29 PROCEDURE — 87086 URINE CULTURE/COLONY COUNT: CPT | Performed by: NURSE PRACTITIONER

## 2024-05-29 PROCEDURE — 3288F FALL RISK ASSESSMENT DOCD: CPT | Mod: CPTII,S$GLB,, | Performed by: NURSE PRACTITIONER

## 2024-05-29 RX ORDER — SULFAMETHOXAZOLE AND TRIMETHOPRIM 800; 160 MG/1; MG/1
1 TABLET ORAL 2 TIMES DAILY
Qty: 28 TABLET | Refills: 0 | Status: SHIPPED | OUTPATIENT
Start: 2024-05-29 | End: 2024-06-12

## 2024-05-29 NOTE — PATIENT INSTRUCTIONS
""START TIMED VOIDING/BLADDER TRAINING" ASAP!!:  WHETHER YOU FEEL AN URGE TO URINATE OR NOT, YOU SHOULD BE FREQUENTING THE TOILET AND ATTEMPT TO URINATE EVERY 3 HOURS!!  NEVER POSTPONE URINATING OR HOLD YOUR URINE.    MAKE SURE YOU ARE HAVING REGULAR/NORMAL BOWEL MOVEMENTS AS THIS ALSO WILL HAVE AN EFFECT ON HOW YOUR BLADDER FUNCTIONS.    NOTHING TO EAT OR DRINK BY MOUTH (THIS INCLUDES WATER) AT LEAST 1-2 HOURS PRIOR TO YOUR BEDTIME ROUTINE.   "

## 2024-05-29 NOTE — PROGRESS NOTES
[facility] Note  Staff: URSULA Mccall-C    PCP: DEBI Blevins.  Urologist:  DEBI Heart.    Chief Complaint: UTI Symptoms    Subjective:        HPI: Carlos Tenorio Jr. is a 74 y.o. male presents today for evaluation of new onset increased urinary urgency, frequency, dysuria symptoms.  Pt is accompanied by wife during ov today.    Pt was last evaluated by Dr. Heart on 4/29/24 for f/u prostate ca (on AS), kidney stones, spermatocele, hx RCC     left robotic partial nephrectomy, with concurrent left renal cyst decortication on 8/2/17 for an upper pole 2-3cm exophytic enhancing renal mass, adjacent to larger simple renal cyst.   Pathology: [id number] clear cell RCC. 2.5cm. Negative margins. Grade 2. No sarcomatoid or rhabdoid features. Benign renal cyst  - has completed 3 yrs p/o surveillance  Hx calcium oxalate stones and uric acid stones in the past and has had prior bilateral ESWL 10 years prior.  By report, 24-hr urine 10/2012 had high oxalate and low urine pH. Takes allopurinol.  He did also have large spermatocele on the right, noted to be 5.1 cm on 4/20/17 ultrasound, and previously decreased in size to almost nothing after resuming TRT, which he did at 200mg Q2 wks 1 month after his partial nephrectomy. Has since enlarged. *off TRT since prostate ca dx     For interim doubling of his PSA from 2.2 to 4.4 after first 3 months of TRT with interval rise to 4.9 and recommended prostate biopsy.  TRUS bx 2/27/18. Vol 56.6. Path 14 cores benign. Elected to try Viagra again for his ED, as had not been using it properly/on empty stomach previously. Resumed TRT  6/19/18: PSA 3.9, good energy and libido with TRT and great erections without viagra. 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 No worsening LUTS  PSA then lisa on TRT again to 4.5 in Dec 2018, so send confirmMDx of 2/18 biopsy which was +LTZ indicating 28% chance finding prostate ca (19% low grade, 10% G7+)   3T MRI at [facility] 12/26/18: 58mL  prostate with 3mm intravesical extension   PIRAD-3 index lesion: 8x6mm posterior lateral peripheral zone of mid gland on right. 14mm from midline and 2mm from capsule. No evidence of EPE.  - heterogenous on T2 with indistinct margins with no masslike lesion but focal moderately hypointense on ADC, mildly hyperintense on DWI, and evaluation for masslike hyperemia --> Repeat   prostate biopsy 3/19/19: volume 66.6g. PATHOLOGY: 3+3=6 in 2 out of 17 cores: 5% LM medial, 10% LA lateral     After biopsy, noted AUA SS 1/0. Elected AS for his very low risk CaP. 100mg viagra Rxed to [facility]. Held TRT. Planned annual CaP and RCC surveillance with interim psa  1 yr active surveillance confirmatory prostate biopsy on 10/20/20 Previously positive at LM med, LA lat. Confirm MdX positive at LTZ. MRI with pirad3 lesion RM lateral.  TAHIRA 30-35 g smooth nonnodular with prominence of right apex and mild asymmetry  VOLUME:  59.4cm3. PATH: 2/18 cores + (Left mid lateral: Diomedes score 3+4= 7; 80% (Pattern 4: 10%), 1/2 cores; Left mid medial: Ambrose score 3+3= 6; 20%, 1/2 cores)  - Still elected AS given low volume 4 component and age and comorbidities     5/2022:   In interim has started medical antibody infusion for early-onset Alzheimer's [facility], per Dr Garcia, managed by Dr Gregorio as part of trial which he was accepted into as this is only offered at [facility].  Therefore heme Onc is managing. Also had another interim cardiac stent on 5/10/22, and it was discussed that he needs 2 more.  Dr. Fierro in [place].  AUA symptom score:  5/2, mostly satisfied (2:  Frequency, sleeping; 1:  Urgency). His PSA remains stable at 5.8 on 4/29/22, be 5.0 6 months prior, and 6.0 one year ago  He did receive clearance after his last cardiac stent to have MRI of the prostate about 8 weeks later, and will continue to follow his cardiac status and upcoming interventions.  He does also have MRI of his head pending for further workup and  neurologically. No interim kidney stones or flank pain.  X-ray from December 2021 reviewed noting stable lower pole left stones.  Still working ([facility] admission status). Short-term memory has improved with recent treatment.  Has also been given p.r.n. s by PCP for anxiety related to his memory      5/19/23: In interim had concerns for enlarging right spermatocele  Scrotal US on 1/31/23:   A 7.7 cm cyst or loculated hydrocele is noted along the lateral testicle.  This has increased in size from the prior study when it measured 6.4 cm.  Memory decline has slowed with aduhelm.  There was concern about his renal function by being on this medication, and reassurance provided there was minimal decline, which has normalized as per review of his renal function below with last cr 1.2/eGFR >60. He is still working instruciting at [employer]  PSA stable at 5.2 (prev 5.7 and 5.8 six and 12 mos prior)  KUB 5/12/23 without distinct visualization of stones previously visualized on prior KUB - on personal review, no distinct radiopacity seen, where previously had very small lower pole stone  AUA symptom score:  3/2 (2: Sleeping; 1: Frequency)  O/E: significant spermatocele/epididymal cyst displacing testes centrally, noted superolateral to the testis, nontender to palpation, larger than the testis itself.      OV with Pinsky on 4/29/24:  10/13/23 Cr 1.1, eGFR >60, PSA stable at 5.5, MRI prostate 40g PIRAD2  11/7/23 screening psa included with pcp labs of 6.0, UA micro with 1 rbc/7 wbc  3/7/24 pcp labs Cr 1.0, eGFR >60, HbA1c 5.1  4/24/24 PSA 6.9  AUA SS: 17/4 ( 5: Urgency; 3: Frequency, weak stream, sleeping; 2: Intermittency; 1: Straining)   In the interim since our last visit he is had 2 knee replacements, right on 12/13/23, and the last 1 was left on 4/1/24.  Since that time he has experienced low-grade fevers.  Ninety-nine today.    Second surgery was done with nerve block only ( questions spinal) as  needed a Petty catheter and after his 1st procedure for 48 hours, as was unable to void, and after the 2nd procedure a Petty catheter was put in upfront and he was given Flomax attempts one-week course.  The 2nd Petty was in for 1.5 days   Has had bothersome urinary urgency lately.  Urinalysis dipstick is nitrite positive today.    Also has concerns that  the weight of this spermatocele is such that he has spraying of urination requiring cleanup   As well his Alzheimer's have worsened in the interim.   Aduhelm infusion has stopped as he had a fall with subdural hematoma in December of 2023,  which prompted the need for knee replacements.  He has been on a different drug for his Alzheimer's since February because of the subdural hematoma   [facility] appointment pending to file for agent orange exposure for prostate cancer  Udip nit+     OV 5/29/24:  Pt in office today w/ c/o UTI symptoms of fatigue, weakness, urinary frequency and urgency at this time.  He is with wife today.  UA in office showed-*ABNORMAL-would not even read via machine in office.  PVR by bladder scan is 20 mL  Pt is able to tolerate Bactrim DS on last culture with no problems noted.    Past Urine Cultures:  5/14/24:  No growth  4/29/24:  E. Coli UTI; tx with one week of Bactrim    REVIEW OF SYSTEMS:  A comprehensive 10 system review was performed and is negative except as noted above in HPI    PMHx:  Past Medical History:   Diagnosis Date    Arthritis     knees, back    Bilateral renal cysts 7/30/2012    CAD (coronary artery disease) 1995    no chest pain since CABG, sees Dr Larry Black    Diabetes mellitus     borderline    Hypertension     Kidney stones     uric acid stones    VASYL (obstructive sleep apnea) 2007    is compliant with CPAP    Primary gonadal failure        PSHx:  Past Surgical History:   Procedure Laterality Date    BACK SURGERY  2010    L5-6 fusion, with pins,bone graft    cardiac stents      2 stents 12/2021 and 1/2022    CARDIAC  SURGERY  1995, 2007    total 7 vessel bypass    CAROTID ARTERY ANGIOPLASTY  01/22/2023    1 stent posterior descending    COLONOSCOPY  2008    repeat 2013    COLONOSCOPY N/A 04/04/2017    Procedure: COLONOSCOPY;  Surgeon: Dmitry Sampson MD;  Location: [facility];  Service: Endoscopy;  Laterality: N/A;    CORONARY ARTERY BYPASS GRAFT  1995, 2007    cabgx3, cabgx5    CORONARY STENT PLACEMENT  10/2022    CORONARY STENT PLACEMENT  11/2022    EXTRACORPOREAL SHOCK WAVE LITHOTRIPSY      EYE SURGERY      cataracts bilateral    LITHOTRIPSY      LUMBAR LAMINECTOMY      Partial Nephrectomy Laproscopic      TRANSRECTAL BIOPSY OF PROSTATE WITH ULTRASOUND GUIDANCE N/A 03/19/2019    Procedure: BIOPSY, PROSTATE, RECTAL APPROACH, WITH US GUIDANCE;  Surgeon: Yovany Heart MD;  Location: [facility] OR;  Service: Urology;  Laterality: N/A;    TRANSRECTAL BIOPSY OF PROSTATE WITH ULTRASOUND GUIDANCE N/A 10/20/2020    Procedure: BIOPSY, PROSTATE, RECTAL APPROACH, WITH US GUIDANCE;  Surgeon: Yovany Heart MD;  Location: [facility] OR;  Service: Urology;  Laterality: N/A;       Allergies:  Sulfa (sulfonamide antibiotics) and Adhesive    Medications: reviewed     Objective:   There were no vitals filed for this visit.    General:WDWN in NAD  Eyes: PERRLA, normal conjunctiva  Respiratory: no increased work on breathing, clear to auscultation  Cardiovascular: regular rate and rhythm. No obvious extremity edema.  GI: palpation of masses. No tenderness. No hepatosplenomegaly to palpation.  Musculoskeletal: normal range of motion of bilateral upper extremities. Normal muscle strength and tone.  Skin: no obvious rashes or lesions. No tightening of skin noted.  Neurologic: CN grossly normal. Normal sensation.   Psychiatric: awake, alert and oriented x 3. Mood and affect normal. Cooperative.    Assessment:       1. History of renal cell cancer    2. Prostate cancer    3. Acute cystitis without hematuria          Plan:   UTI symptoms:    Urine Culture  to be processed today.  In the meantime, we will prescribe Bactrim DS BID x 14 days for this pt for possible UTI at this time.  Benefits, risks and side affects were thoroughly explained to pt today in office with all questions answered.  Continue Flomax 0.4 mg daily as previously prescribed.      F/u We will contact pt's wife after we receive all urine results by next week.  Pt and family were advised, if symptoms worsen, go to nearest ER for further evaluation.  Pt and family verbalized understanding.      Martha Xiao, CHRISTINEC

## 2024-05-30 ENCOUNTER — CLINICAL SUPPORT (OUTPATIENT)
Dept: REHABILITATION | Facility: HOSPITAL | Age: 74
End: 2024-05-30
Attending: ORTHOPAEDIC SURGERY
Payer: COMMERCIAL

## 2024-05-30 DIAGNOSIS — R29.898 WEAKNESS OF LEFT LOWER EXTREMITY: ICD-10-CM

## 2024-05-30 DIAGNOSIS — M25.662 DECREASED RANGE OF MOTION OF LEFT KNEE: Primary | ICD-10-CM

## 2024-05-30 DIAGNOSIS — Z74.09 IMPAIRED FUNCTIONAL MOBILITY, BALANCE, GAIT, AND ENDURANCE: ICD-10-CM

## 2024-05-30 PROCEDURE — 97530 THERAPEUTIC ACTIVITIES: CPT | Mod: PO

## 2024-05-30 PROCEDURE — 97110 THERAPEUTIC EXERCISES: CPT | Mod: PO

## 2024-05-30 NOTE — PROGRESS NOTES
[facility]   Physical Therapy Treatment Note      Name: Carlos Tenorio Jr.  Clinic Number: [id number]    Therapy Diagnosis:   Encounter Diagnoses   Name Primary?    Decreased range of motion of left knee Yes    Impaired functional mobility, balance, gait, and endurance     Weakness of left lower extremity          Physician: Martin Lehman,*    Visit Date: 5/30/2024    Physician Orders: PT Eval and Treat   Medical Diagnosis from Referral: M25.562 (ICD-10-CM) - Left knee pain   Evaluation Date: 5/1/2024  Authorization Period Expiration: 12/31/2024  Plan of Care Expiration: 8/07/2024 or 20 visits post eval  Visit # (episodes) / Visits authorized:  6 / eval + 20 (POC 6 / 20)  2nd FOTO visit 5 - 5/30/2024  3rd FOTO visit 10  Progress Note Due: 6/1/2024     Time In: 145  Time Out: 229  Total Billable Time: 44 minutes     Precautions: Right TKR; CAD; DM; HTN  Insurance: Payor: [employer] / Plan: [place] PPO / Product Type: PPO /     Subjective     Pt reports: he is doing good. Bladder feeling better.     He was compliant with home exercise program.  Response to previous treatment: positive  Functional change: none reported    Pain: 1/10  Location:  lateral to incision     Objective      Objective Measures updated at progress report unless specified.     Treatment     DOS: 4/01/2024 Left TKR;   NOTE: early onset Alzheimer's; High Fall risk (fell @ [address]) / Pt can keep count if counts out loud    Vlad received the treatments listed below:      Vlad received therapeutic exercises to develop strength, endurance, ROM, and flexibility for 10 minutes including:  NMRE utilized to activate appropriate mm to improve balance, proprioception, stability and gait skills: 0 minutes      Bike, Seat 8, Level 1, 5-10 minutes     TABLE: NOT PERFORMED   Longsit HS stretch, 2x30s  Gastroc stretch with strap, [id number]  Longsit Assist AAROM Heel slides with strap, slideboard,  20x5s  Straight Leg Raise, 10x, 2 sets(hep)  Supine heel slides, 30x, slideboard (hep)  BTB Bridge, 2x10 (hep)  BTB Supine Clamshells, 30x  STS, 10x, 2 sets, 2 hand support  Long Arc Quad, 30x - warmed up with Sports Art 0#      Manual tx: 0 minutes - NOT PERFORMED   Scar mobilization: circles, cross friction, spreading      therapeutic activities to improve dynamic functional performance for 30 minutes, including: HAVE Pt COUNT  STAND:  Standing Heel Raises/Toe Raises, 30x  Mini Squats, 2x15, cue hips back like sitting in a chair (hep)  Blue Theraband Terminal Knee Extension, [id number]  Hip abduction, 3x10 alternate Bilateral (hep)  Single Leg Balance airex, [id number]  Forward Step Up, 2x10 Bilateral     Pre-Gait training: WB on foam: 2. Marching in place, 1 minute / 3. pre-gait stagger, HC-RT-PO, 1min ea      Manual tx: 4 minutes  Incision: spreading to inferior region of scar        Add NEXT:  FUTURE visits:  Lime lateral steps / Monster walks  FSD Stair training   Lateral Step Over     D/c already:  Longsit KE stretch with ankle pumps, 30x  Short Arc Quad   Longsit quad sets, 30x5s    Patient Education and Home Exercises       Education provided:   - daily HEP    Written Home Exercises Provided: Patient instructed to cont prior HEP. Exercises were reviewed and Vlad was able to demonstrate them prior to the end of the session.  Vlad demonstrated good  understanding of the education provided. See EMR under Patient Instructions for exercises provided during therapy sessions    Assessment     Pt demonstrates no to low levels of pain, decreased left knee Range of Motion, weakness, and balance and gait deficits. Pt able to perform all therex given with minimal pn provocation. Having pt count aloud for cognitive stimulation to stay on task. Able to perform Single Leg Balance and Forward Step Up with appropriate challenge. FOTO report has improved. Continue to progress as tolerated.     Vlad Is progressing well towards his  goals.   Pt prognosis is Good.     Pt will continue to benefit from skilled outpatient physical therapy to address the deficits listed in the problem list box on initial evaluation, provide pt/family education and to maximize pt's level of independence in the home and community environment.     Pt's spiritual, cultural and educational needs considered and pt agreeable to plan of care and goals.     Anticipated barriers to physical therapy: Co-morbidities    Goals:   Short Term GOALS: 5 weeks PROGRESS 5/30/2024  1. Patient is compliant with HEP.   2. Patient demonstrates inc KE ROM to 0 degrees in order to improve stance during gait.  3. Patient demonstrates inc KF ROM to 120 degrees in order to improve symmetrical motion during daily activities.  4. Patient demonstrates decreased girth by 2 cm in order to tolerate increased WB on LLE.     Long Term GOALS: 10 weeks.   1. Patient demonstrates increased KF ROM to 130 deg to improve symmetrical function while performing house chores.  2. Patient demonstrates increased LLE strength by 1/2 grade or greater to improve tolerance to functional activities.   3. Patient demonstrates independence with HEP.  4. Patient will improve FOTO function score to </= 62% to show improvement in condition and functional mobility.     Plan     Continue PT as deemed per POC: TKR protocol; gait and balance training    Olimpia Dalton, PT

## 2024-05-31 NOTE — TELEPHONE ENCOUNTER
Reviewed np visit  Udip not possible 2/2 abnromal urine machine couldn't read  Ucx pending  Abx given in interim  Will fu with NP when culture final for plan and next steps

## 2024-06-01 LAB — BACTERIA UR CULT: ABNORMAL

## 2024-06-03 ENCOUNTER — CLINICAL SUPPORT (OUTPATIENT)
Dept: REHABILITATION | Facility: HOSPITAL | Age: 74
End: 2024-06-03
Payer: COMMERCIAL

## 2024-06-03 DIAGNOSIS — R29.898 WEAKNESS OF LEFT LOWER EXTREMITY: ICD-10-CM

## 2024-06-03 DIAGNOSIS — M25.662 DECREASED RANGE OF MOTION OF LEFT KNEE: Primary | ICD-10-CM

## 2024-06-03 DIAGNOSIS — Z74.09 IMPAIRED FUNCTIONAL MOBILITY, BALANCE, GAIT, AND ENDURANCE: ICD-10-CM

## 2024-06-03 PROCEDURE — 97112 NEUROMUSCULAR REEDUCATION: CPT | Mod: KX,PO

## 2024-06-03 PROCEDURE — 97110 THERAPEUTIC EXERCISES: CPT | Mod: KX,PO

## 2024-06-03 PROCEDURE — 97530 THERAPEUTIC ACTIVITIES: CPT | Mod: KX,PO

## 2024-06-03 NOTE — PROGRESS NOTES
[facility]   Physical Therapy Treatment Note      Name: Carlos Tenorio Jr.  Clinic Number: [id number]    Therapy Diagnosis:   Encounter Diagnoses   Name Primary?    Decreased range of motion of left knee Yes    Impaired functional mobility, balance, gait, and endurance     Weakness of left lower extremity          Physician: Order, Paper    Visit Date: 6/3/2024    Physician Orders: PT Eval and Treat   Medical Diagnosis from Referral: M25.562 (ICD-10-CM) - Left knee pain   Evaluation Date: 5/1/2024  Authorization Period Expiration: 12/31/2024  Plan of Care Expiration: 8/07/2024 or 20 visits post eval  Visit # (episodes) / Visits authorized:  8 / eval + 20 (POC 7 / 20)  2nd FOTO visit 5 - 5/30/2024  3rd FOTO visit 10  Progress Note Due: 6/1/2024     Time In: 1215  Time Out: 1259  Total Billable Time: 44 minutes     Precautions: Right TKR; CAD; DM; HTN  Insurance: Payor: [employer] / Plan: [place] PPO / Product Type: PPO /     Subjective     Pt reports: he is doing fine. Walks around the house without his cane.    He was compliant with home exercise program.  Response to previous treatment: positive  Functional change: none reported    Pain: 1/10  Location:  lateral to incision     Objective      Objective Measures updated at progress report unless specified.     Treatment     DOS: 4/01/2024 Left TKR;   NOTE: early onset Alzheimer's; High Fall risk (fell @ [address]) / Pt can keep count if counts out loud    Vlad received the treatments listed below:      Vlad received therapeutic exercises to develop strength, endurance, ROM, and flexibility for 10 minutes including:  NMRE utilized to activate appropriate mm to improve balance, proprioception, stability and gait skills: 14 minutes      Bike, Seat 8, Level 1, 5-10 minutes - NOT PERFORMED     Add SportsArt - instead of heel slides and LAQ    TABLE:    Longsit HS stretch, 2x30s - np  Gastroc stretch with strap, [id number] -  "np  Longsit Assist AAROM Heel slides with strap, slideboard, 20x5s - np    Supine heel slides, 30x, slideboard (hep)  Straight Leg Raise, 2x10, Bilateral  BTB Bridge, 30x (hep)  BTB Supine Clamshells, 30x  STS, 10x, 2 sets, 2 hand support  Long Arc Quad, 30x     Manual tx: 2 minutes    Scar mobilization: circles, cross friction, spreading to inferior incision      therapeutic activities to improve dynamic functional performance for 20 minutes, including: HAVE Pt COUNT  STAND:  Gait without cane; 72' - SBA, difficulty with Right foot clearance, not Left  Standing Heel Raises/Toe Raises, 30x  Mini Squats, 2x15, cue hips back like sitting in a chair (hep)  Hip abduction, 3x10 alternate Bilateral (hep)  Forward Step Up, 6", 2x15 Right    Single Leg Balance airex, [id number]  +Lateral Step Over, Airex, 10 sets    NOT PERFORMED   Blue Theraband Terminal Knee Extension, [id number]  Pre-Gait training: WB on foam: 2. Marching in place, 1 minute / 3. pre-gait stagger, HC-RT-PO, 1min ea    Add Stairs next      Manual tx: 4 minutes  Incision: spreading to inferior region of scar        Add NEXT:  FUTURE visits:  Lime lateral steps / Monster walks  FSD Stair training       D/c already:  Longsit KE stretch with ankle pumps, 30x  Short Arc Quad   Longsit quad sets, 30x5s    Patient Education and Home Exercises       Education provided:   - daily HEP    Written Home Exercises Provided: Patient instructed to cont prior HEP. Exercises were reviewed and Vlad was able to demonstrate them prior to the end of the session.  Vlad demonstrated good  understanding of the education provided. See EMR under Patient Instructions for exercises provided during therapy sessions    Assessment     Pt demonstrates no to low levels of pain, decreased left knee Range of Motion, weakness, and balance and gait deficits. Pt able to perform all therex given with minimal pn provocation. Having pt count aloud for cognitive stimulation to stay on task. Had pt walk " without cane today; still required some SBA due to lack of foot clearance on Right side, not Left. Continue to progress as tolerated.     Vlad Is progressing well towards his goals.   Pt prognosis is Good.     Pt will continue to benefit from skilled outpatient physical therapy to address the deficits listed in the problem list box on initial evaluation, provide pt/family education and to maximize pt's level of independence in the home and community environment.     Pt's spiritual, cultural and educational needs considered and pt agreeable to plan of care and goals.     Anticipated barriers to physical therapy: Co-morbidities    Goals:   Short Term GOALS: 5 weeks PROGRESS 6/3/2024  1. Patient is compliant with HEP.   2. Patient demonstrates inc KE ROM to 0 degrees in order to improve stance during gait.  3. Patient demonstrates inc KF ROM to 120 degrees in order to improve symmetrical motion during daily activities.  4. Patient demonstrates decreased girth by 2 cm in order to tolerate increased WB on LLE.     Long Term GOALS: 10 weeks.   1. Patient demonstrates increased KF ROM to 130 deg to improve symmetrical function while performing house chores.  2. Patient demonstrates increased LLE strength by 1/2 grade or greater to improve tolerance to functional activities.   3. Patient demonstrates independence with HEP.  4. Patient will improve FOTO function score to </= 62% to show improvement in condition and functional mobility.     Plan     Continue PT as deemed per POC: TKR protocol; gait and balance training    Olimpia Dalton PT

## 2024-06-05 ENCOUNTER — CLINICAL SUPPORT (OUTPATIENT)
Dept: REHABILITATION | Facility: HOSPITAL | Age: 74
End: 2024-06-05
Payer: COMMERCIAL

## 2024-06-05 DIAGNOSIS — Z74.09 IMPAIRED FUNCTIONAL MOBILITY, BALANCE, GAIT, AND ENDURANCE: ICD-10-CM

## 2024-06-05 DIAGNOSIS — M25.662 DECREASED RANGE OF MOTION OF LEFT KNEE: Primary | ICD-10-CM

## 2024-06-05 DIAGNOSIS — R29.898 WEAKNESS OF LEFT LOWER EXTREMITY: ICD-10-CM

## 2024-06-05 PROCEDURE — 97530 THERAPEUTIC ACTIVITIES: CPT | Mod: KX,PO

## 2024-06-05 PROCEDURE — 97112 NEUROMUSCULAR REEDUCATION: CPT | Mod: KX,PO

## 2024-06-05 PROCEDURE — 97110 THERAPEUTIC EXERCISES: CPT | Mod: KX,PO

## 2024-06-05 NOTE — PROGRESS NOTES
[facility]   Physical Therapy Treatment Note      Name: Carlos Tenorio Jr.  Clinic Number: [id number]    Therapy Diagnosis:   Encounter Diagnoses   Name Primary?    Decreased range of motion of left knee Yes    Impaired functional mobility, balance, gait, and endurance     Weakness of left lower extremity          Physician: Order, Paper    Visit Date: 6/5/2024    Physician Orders: PT Eval and Treat   Medical Diagnosis from Referral: M25.562 (ICD-10-CM) - Left knee pain   Evaluation Date: 5/1/2024  Authorization Period Expiration: 12/31/2024  Plan of Care Expiration: 8/07/2024 or 20 visits post eval  Visit # (episodes) / Visits authorized:  9 / eval + 20 (POC 8 / 20)  2nd FOTO visit 5 - 5/30/2024  3rd FOTO visit 10  Progress Note Due: 6/1/2024     Time In: 230  Time Out: 314  Total Billable Time: 44 minutes     Precautions: Right TKR; CAD; DM; HTN  Insurance: Payor: "[employer] / Plan: [place] PPO / Product Type: PPO /     Subjective     Pt reports: arrived without cane; discussed using in community if feeling unsteady.    He was compliant with home exercise program.  Response to previous treatment: positive  Functional change: none reported    Pain: 1/10  Location:  lateral to incision     Objective      Objective Measures updated at progress report unless specified.     Treatment     DOS: 4/01/2024 Left TKR;   NOTE: early onset Alzheimer's; High Fall risk (fell @ [address]) / Pt can keep count if counts out loud    lVad received the treatments listed below:      Vlad received therapeutic exercises to develop strength, endurance, ROM, and flexibility for 10 minutes including:  NMRE utilized to activate appropriate mm to improve balance, proprioception, stability and gait skills: 14 minutes      Bike, Seat 8, Level 1, 5-10 minutes    +SportsArt: Long Arc Quad / Hamstring curls, 11#, 3x10    TABLE:    Longsit HS stretch, 2x30s - np  Gastroc stretch with strap, [id number] -  "np  Longsit Assist AAROM Heel slides with strap, slideboard, 20x5s - np    BTB Bridge, 30x (hep)  BTB Supine Clamshells, 30x  Straight Leg Raise, 3x10, Bilateral  STS, 10x, 2 sets, 2 hand support      Manual tx: 2 minutes    Scar mobilization: circles, cross friction, spreading to inferior incision      therapeutic activities to improve dynamic functional performance for 20 minutes, including: HAVE Pt COUNT  STAND:  Add Stairs next  Posture Bilateral External Rotation and T/s extension stretch  Gait without cane; 72' - SBA, difficulty with Right foot clearance, not Left  Standing Heel Raises/Toe Raises, 30x  Mini Squats, 2x15, cue hips back like sitting in a chair (hep)    Hip abduction, 2x15 alternate Bilateral (hep) - ADD # next  +Hip extension  Forward Step Up, 6", 2x15 Right      Single Leg Balance airex, [id number]  Lateral Step Over, Airex, 10 sets - 4" next    NOT PERFORMED   Blue Theraband Terminal Knee Extension, [id number]  Pre-Gait training: WB on foam: 2. Marching in place, 1 minute / 3. pre-gait stagger, HC-RT-PO, 1min ea          Manual tx: 4 minutes  Incision: spreading to inferior region of scar        Add NEXT:  FUTURE visits:  Lime lateral steps / Monster walks  FSD Stair training       D/c already:  Longsit KE stretch with ankle pumps, 30x  Short Arc Quad   Longsit quad sets, 30x5s    Patient Education and Home Exercises       Education provided:   - daily HEP    Written Home Exercises Provided: Patient instructed to cont prior HEP. Exercises were reviewed and Vlad was able to demonstrate them prior to the end of the session.  Vlad demonstrated good  understanding of the education provided. See EMR under Patient Instructions for exercises provided during therapy sessions    Assessment     Pt demonstrates no to low levels of pain, decreased left knee Range of Motion, weakness, and balance and gait deficits. Pt able to perform all therex given with minimal pn provocation. Having pt count aloud for cognitive " stimulation to stay on task. Pt arrived without cane; instructed him to still use in community. Able to discharge Range of Motion exercises. Focus on strengthening and gait training. Continue to progress as tolerated.     Vlad Is progressing well towards his goals.   Pt prognosis is Good.     Pt will continue to benefit from skilled outpatient physical therapy to address the deficits listed in the problem list box on initial evaluation, provide pt/family education and to maximize pt's level of independence in the home and community environment.     Pt's spiritual, cultural and educational needs considered and pt agreeable to plan of care and goals.     Anticipated barriers to physical therapy: Co-morbidities    Goals:   Short Term GOALS: 5 weeks PROGRESS 6/5/2024  1. Patient is compliant with HEP.   2. Patient demonstrates inc KE ROM to 0 degrees in order to improve stance during gait.  3. Patient demonstrates inc KF ROM to 120 degrees in order to improve symmetrical motion during daily activities.  4. Patient demonstrates decreased girth by 2 cm in order to tolerate increased WB on LLE.     Long Term GOALS: 10 weeks.   1. Patient demonstrates increased KF ROM to 130 deg to improve symmetrical function while performing house chores.  2. Patient demonstrates increased LLE strength by 1/2 grade or greater to improve tolerance to functional activities.   3. Patient demonstrates independence with HEP.  4. Patient will improve FOTO function score to </= 62% to show improvement in condition and functional mobility.     Plan     Continue PT as deemed per POC: TKR protocol; gait and balance training    Olimpia Dalton, PT

## 2024-06-14 ENCOUNTER — CLINICAL SUPPORT (OUTPATIENT)
Dept: REHABILITATION | Facility: HOSPITAL | Age: 74
End: 2024-06-14
Payer: COMMERCIAL

## 2024-06-14 DIAGNOSIS — M25.662 DECREASED RANGE OF MOTION OF LEFT KNEE: Primary | ICD-10-CM

## 2024-06-14 DIAGNOSIS — Z74.09 IMPAIRED FUNCTIONAL MOBILITY, BALANCE, GAIT, AND ENDURANCE: ICD-10-CM

## 2024-06-14 DIAGNOSIS — R29.898 WEAKNESS OF LEFT LOWER EXTREMITY: ICD-10-CM

## 2024-06-14 PROCEDURE — 97530 THERAPEUTIC ACTIVITIES: CPT | Mod: KX,PO

## 2024-06-14 PROCEDURE — 97112 NEUROMUSCULAR REEDUCATION: CPT | Mod: KX,PO

## 2024-06-14 PROCEDURE — 97110 THERAPEUTIC EXERCISES: CPT | Mod: KX,PO

## 2024-06-14 NOTE — PROGRESS NOTES
[facility]   Physical Therapy Treatment Note      Name: Carlos Tenorio Jr.  Clinic Number: [id number]    Therapy Diagnosis:   Encounter Diagnoses   Name Primary?    Decreased range of motion of left knee Yes    Impaired functional mobility, balance, gait, and endurance     Weakness of left lower extremity            Physician: Order, Paper    Visit Date: 6/14/2024    Physician Orders: PT Eval and Treat   Medical Diagnosis from Referral: M25.562 (ICD-10-CM) - Left knee pain   Evaluation Date: 5/1/2024  Authorization Period Expiration: 12/31/2024  Plan of Care Expiration: 8/07/2024 or 20 visits post eval  Visit # (episodes) / Visits authorized:  10 / eval + 20 (POC 9 / 20)  2nd FOTO visit 5 - 5/30/2024  3rd FOTO visit 10  Progress Note Due: 6/1/2024     Time In: 100  Time Out: 144  Total Billable Time: 44 minutes     Precautions: Right TKR; CAD; DM; HTN  Insurance: Payor: [employer] / Plan: [place] PPO / Product Type: PPO /     Subjective     Pt reports: he is trying to do as much as he can to keep up his 6 acres and house.    He was compliant with home exercise program.  Response to previous treatment: positive  Functional change: none reported    Pain: 1.5/10  Location: left quadricep    Objective      Objective Measures updated at progress report unless specified.     Treatment     DOS: 4/01/2024 Left TKR;   NOTE: early onset Alzheimer's; High Fall risk (fell @ [address]) / Pt can keep count if counts out loud    Vlad received the treatments listed below:      Vlad received therapeutic exercises to develop strength, endurance, ROM, and flexibility for 10 minutes including:  NMRE utilized to activate appropriate mm to improve balance, proprioception, stability and gait skills: 14 minutes      Bike, Seat 8, Level 1, 5-10 minutes    SportsArt: Long Arc Quad / Hamstring curls, 11#, 3x10    TABLE:    Longsit HS stretch, 2x30s - np  Gastroc stretch with strap, [id number] -  "np  Longsit Assist AAROM Heel slides with strap, slideboard, 20x5s - np    BTB Bridge, 30x (hep)  BTB Supine Clamshells, 30x  Straight Leg Raise, 3x10, Bilateral  STS, 10x, 2 sets, 2 hand support      Manual tx: 2 minutes    Scar mobilization: circles, cross friction, spreading to inferior incision      therapeutic activities to improve dynamic functional performance for 20 minutes, including: HAVE Pt COUNT  STAND:  Upright Posture against wall, 5x10 / 5 with W arms  +Stair Trial, 3 sets  Standing Heel Raises/Toe Raises, 30x  Hip abduction, 30x pendulum alternate Bilateral (hep) - 2#   Hip extension, 30x pendulum alternate Bilateral - 2#  Forward Step Up, 6", 2x15 Bilateral    Pre-Gait training: WB on foam: 3. pre-gait stagger, HC-RT-PO, 1min ea  Single Leg Balance airex, [id number]  Lateral Step Over, 4", 10 sets     NOT PERFORMED   Blue Theraband Terminal Knee Extension, [id number]  Gait without cane; 72' - SBA, difficulty with Right foot clearance, not Left  Mini Squats, 2x15, cue hips back like sitting in a chair (hep)        Manual tx: 0 minutes  Incision: spreading to inferior region of scar        Add NEXT:  FUTURE visits:  Lime lateral steps / Monster walks         D/c already:  Longsit KE stretch with ankle pumps, 30x  Short Arc Quad   Longsit quad sets, 30x5s    Patient Education and Home Exercises       Education provided:   - daily HEP    Written Home Exercises Provided: Patient instructed to cont prior HEP. Exercises were reviewed and Vlad was able to demonstrate them prior to the end of the session.  Vlad demonstrated good  understanding of the education provided. See EMR under Patient Instructions for exercises provided during therapy sessions    Assessment     Pt demonstrates no to low levels of pain, decreased left knee Range of Motion, weakness, and balance and gait deficits. Pt able to perform all therex given with minimal pn provocation. Having pt count aloud for cognitive stimulation to stay on task. " Able to add weight to single leg tasks and height to lateral step over.  Able to clear ambulating on stairs with 2 rails. Continue to progress as tolerated.     Vlad Is progressing well towards his goals.   Pt prognosis is Good.     Pt will continue to benefit from skilled outpatient physical therapy to address the deficits listed in the problem list box on initial evaluation, provide pt/family education and to maximize pt's level of independence in the home and community environment.     Pt's spiritual, cultural and educational needs considered and pt agreeable to plan of care and goals.     Anticipated barriers to physical therapy: Co-morbidities    Goals:   Short Term GOALS: 5 weeks PROGRESS 6/14/2024  1. Patient is compliant with HEP.   2. Patient demonstrates inc KE ROM to 0 degrees in order to improve stance during gait.  3. Patient demonstrates inc KF ROM to 120 degrees in order to improve symmetrical motion during daily activities.  4. Patient demonstrates decreased girth by 2 cm in order to tolerate increased WB on LLE.     Long Term GOALS: 10 weeks.   1. Patient demonstrates increased KF ROM to 130 deg to improve symmetrical function while performing house chores.  2. Patient demonstrates increased LLE strength by 1/2 grade or greater to improve tolerance to functional activities.   3. Patient demonstrates independence with HEP.  4. Patient will improve FOTO function score to </= 62% to show improvement in condition and functional mobility.     Plan     Continue PT as deemed per POC: TKR protocol; gait and balance training    Olimpia Dalton, PT

## 2024-06-18 ENCOUNTER — CLINICAL SUPPORT (OUTPATIENT)
Dept: REHABILITATION | Facility: HOSPITAL | Age: 74
End: 2024-06-18
Payer: COMMERCIAL

## 2024-06-18 DIAGNOSIS — R29.898 WEAKNESS OF LEFT LOWER EXTREMITY: ICD-10-CM

## 2024-06-18 DIAGNOSIS — Z74.09 IMPAIRED FUNCTIONAL MOBILITY, BALANCE, GAIT, AND ENDURANCE: ICD-10-CM

## 2024-06-18 DIAGNOSIS — M25.662 DECREASED RANGE OF MOTION OF LEFT KNEE: Primary | ICD-10-CM

## 2024-06-18 PROCEDURE — 97530 THERAPEUTIC ACTIVITIES: CPT | Mod: KX,PO

## 2024-06-18 PROCEDURE — 97110 THERAPEUTIC EXERCISES: CPT | Mod: KX,PO

## 2024-06-18 NOTE — PROGRESS NOTES
[facility]   Physical Therapy Treatment Note      Name: Carlos Tenorio Jr.  Clinic Number: [id number]    Therapy Diagnosis:   Encounter Diagnoses   Name Primary?    Decreased range of motion of left knee Yes    Impaired functional mobility, balance, gait, and endurance     Weakness of left lower extremity            Physician: Order, Paper    Visit Date: 6/18/2024    Physician Orders: PT Eval and Treat   Medical Diagnosis from Referral: M25.562 (ICD-10-CM) - Left knee pain   Evaluation Date: 5/1/2024  Authorization Period Expiration: 12/31/2024  Plan of Care Expiration: 8/07/2024 or 20 visits post eval  Visit # (episodes) / Visits authorized:  11 / eval + 20 (POC 10 / 20)  2nd FOTO visit 5 - 5/30/2024  3rd FOTO visit 10  Progress Note Due: 6/1/2024     Time In: 1130  Time Out: 1214  Total Billable Time: 44 minutes     Precautions: Right TKR; CAD; DM; HTN  Insurance: Payor: [employer] / Plan: [place] PPO / Product Type: PPO /     Subjective     Pt reports: he is doing good; just a slight pain.    He was compliant with home exercise program.  Response to previous treatment: positive  Functional change: none reported    Pain: .5/10  Location: left quadricep    Objective      Objective Measures updated at progress report unless specified.     Treatment     DOS: 4/01/2024 Left TKR;   NOTE: early onset Alzheimer's; High Fall risk (fell @ [address]) / Pt can keep count if counts out loud    Vlad received the treatments listed below:      Vlad received therapeutic exercises to develop strength, endurance, ROM, and flexibility for 14 minutes including:  NMRE utilized to activate appropriate mm to improve balance, proprioception, stability and gait skills: 0 minutes      Bike, Seat 8, Level 1, 5-10 minutes    SportsArt: Long Arc Quad / Hamstring curls, 11#, 3x10    TABLE:  NOT PERFORMED   Longsit HS stretch, 2x30s - np  Gastroc stretch with strap, [id number] - np  Longsit Assist CARMEN  "Heel slides with strap, slideboard, 20x5s - np    NOT PERFORMED   BTB Bridge, 30x (hep)  BTB Supine Clamshells, 30x  Straight Leg Raise, 3x10, Bilateral  STS, 10x, 2 sets, 2 hand support      Manual tx: 0 minutes    Scar mobilization: circles, cross friction, spreading to inferior incision      therapeutic activities to improve dynamic functional performance for 30 minutes, including: HAVE Pt COUNT  STAND:  Upright Posture against wall, 10x W arms  Standing Heel Raises/Toe Raises, 30x  Forward Step Up, 6", 2x15 Bilateral   Hip abduction, 30x pendulum alternate Bilateral (hep) - 2#   Hip extension with toe tap, 30x pendulum alternate Bilateral - 2#  Lateral Step Over, 4", 10 sets   Pre-Gait training: WB on foam: 3. pre-gait stagger, HC-RT-PO, 1min ea  Single Leg Balance airex, 6o05iod  +Lime Lateral steps / Monster walks - For/Back, 2 sets  Stair Trial, 3 sets - np    NOT PERFORMED   Blue Theraband Terminal Knee Extension, [id number]  Gait without cane; 72' - SBA, difficulty with Right foot clearance, not Left  Mini Squats, 2x15, cue hips back like sitting in a chair (hep)        Manual tx: 0 minutes  Incision: spreading to inferior region of scar        Add NEXT:  FUTURE visits:  Lime lateral steps / Monster walks         D/c already:  Longsit KE stretch with ankle pumps, 30x  Short Arc Quad   Longsit quad sets, 30x5s    Patient Education and Home Exercises       Education provided:   - daily HEP    Written Home Exercises Provided: Patient instructed to cont prior HEP. Exercises were reviewed and Vlad was able to demonstrate them prior to the end of the session.  Vlad demonstrated good  understanding of the education provided. See EMR under Patient Instructions for exercises provided during therapy sessions    Assessment     Pt demonstrates no to low levels of pain, decreased left knee Range of Motion, weakness, and balance and gait deficits. Pt able to perform all therex given with minimal pn provocation. Having pt " count aloud for cognitive stimulation to stay on task. Added resistance to stepping lateral, for, back; 2 hand support. Continue to progress as tolerated.     Vlad Is progressing well towards his goals.   Pt prognosis is Good.     Pt will continue to benefit from skilled outpatient physical therapy to address the deficits listed in the problem list box on initial evaluation, provide pt/family education and to maximize pt's level of independence in the home and community environment.     Pt's spiritual, cultural and educational needs considered and pt agreeable to plan of care and goals.     Anticipated barriers to physical therapy: Co-morbidities    Goals:   Short Term GOALS: 5 weeks PROGRESS 6/18/2024  1. Patient is compliant with HEP.   2. Patient demonstrates inc KE ROM to 0 degrees in order to improve stance during gait.  3. Patient demonstrates inc KF ROM to 120 degrees in order to improve symmetrical motion during daily activities.  4. Patient demonstrates decreased girth by 2 cm in order to tolerate increased WB on LLE.     Long Term GOALS: 10 weeks.   1. Patient demonstrates increased KF ROM to 130 deg to improve symmetrical function while performing house chores.  2. Patient demonstrates increased LLE strength by 1/2 grade or greater to improve tolerance to functional activities.   3. Patient demonstrates independence with HEP.  4. Patient will improve FOTO function score to </= 62% to show improvement in condition and functional mobility.     Plan     Continue PT as deemed per POC: TKR protocol; gait and balance training    Olimpia Dalton, PT

## 2024-06-21 ENCOUNTER — CLINICAL SUPPORT (OUTPATIENT)
Dept: REHABILITATION | Facility: HOSPITAL | Age: 74
End: 2024-06-21
Payer: COMMERCIAL

## 2024-06-21 DIAGNOSIS — Z74.09 IMPAIRED FUNCTIONAL MOBILITY, BALANCE, GAIT, AND ENDURANCE: ICD-10-CM

## 2024-06-21 DIAGNOSIS — R29.898 WEAKNESS OF LEFT LOWER EXTREMITY: ICD-10-CM

## 2024-06-21 DIAGNOSIS — M25.662 DECREASED RANGE OF MOTION OF LEFT KNEE: Primary | ICD-10-CM

## 2024-06-21 PROCEDURE — 97110 THERAPEUTIC EXERCISES: CPT | Mod: KX,PO

## 2024-06-21 PROCEDURE — 97530 THERAPEUTIC ACTIVITIES: CPT | Mod: KX,PO

## 2024-06-21 NOTE — PROGRESS NOTES
[facility]   Physical Therapy Treatment Note      Name: Carlos Tenorio Jr.  Clinic Number: [id number]    Therapy Diagnosis:   Encounter Diagnoses   Name Primary?    Decreased range of motion of left knee Yes    Impaired functional mobility, balance, gait, and endurance     Weakness of left lower extremity            Physician: Order, Paper    Visit Date: 6/21/2024    Physician Orders: PT Eval and Treat   Medical Diagnosis from Referral: M25.562 (ICD-10-CM) - Left knee pain   Evaluation Date: 5/1/2024  Authorization Period Expiration: 12/31/2024  Plan of Care Expiration: 8/07/2024 or 20 visits post eval  Visit # (episodes) / Visits authorized:  12 / eval + 20 (POC 11 / 20)  2nd FOTO visit 5 - 5/30/2024  3rd FOTO visit 10  Progress Note Due: 6/1/2024     Time In: 145  Time Out: 229  Total Billable Time: 44 minutes     Precautions: Right TKR; CAD; DM; HTN  Insurance: Payor: [employer] / Plan: [place] PPO / Product Type: PPO /     Subjective     Pt reports: he is doing fine; both knees are doing fine. Was notified after session was over by his wife. that this will be pt last day [facility]. I am in agreement b/c he is doing well; even though I would have liked to re-assess him today had I known this was his last day.    He was compliant with home exercise program.  Response to previous treatment: positive  Functional change: none reported    Pain: .5/10  Location: left quadricep    Objective      Objective Measures updated at progress report unless specified.     Treatment     DOS: 4/01/2024 Left TKR;   NOTE: early onset Alzheimer's; High Fall risk (fell @ [address]) / Pt can keep count if counts out loud    Vlad received the treatments listed below:      Vlad received therapeutic exercises to develop strength, endurance, ROM, and flexibility for 14 minutes including:  NMRE utilized to activate appropriate mm to improve balance,  "proprioception, stability and gait skills: 0 minutes      Bike, Seat 8, Level 1, 5-10 minutes    SportsArt: Long Arc Quad / Hamstring curls, 11#, 3x10    TABLE:  NOT PERFORMED   Longsit HS stretch, 2x30s - np  Gastroc stretch with strap, [id number] - np  Longsit Assist AAROM Heel slides with strap, slideboard, 20x5s - np    NOT PERFORMED   BTB Bridge, 30x (hep)  BTB Supine Clamshells, 30x  Straight Leg Raise, 3x10, Bilateral  STS, 10x, 2 sets, 2 hand support      Manual tx: 0 minutes    Scar mobilization: circles, cross friction, spreading to inferior incision      therapeutic activities to improve dynamic functional performance for 30 minutes, including: HAVE Pt COUNT  STAND:  Upright Posture against wall, 10x W arms  Standing Heel Raises/Toe Raises, 30x  Forward Step Up, 6", 2x15 Bilateral   Hip abduction, 30x pendulum alternate Bilateral (hep) - 2#   Hip extension with toe tap, 30x pendulum alternate Bilateral - 2#  Lateral Step Over, 4", 10 sets   Pre-Gait training: WB on foam: 3. pre-gait stagger, HC-RT-PO, 1min ea - np  Single Leg Balance airex, [id number]  +Lime Lateral steps / Monster walks - For/Back, 2 sets  Stair Trial, 3 sets - np    NOT PERFORMED   Blue Theraband Terminal Knee Extension, [id number]  Gait without cane; 72' - SBA, difficulty with Right foot clearance, not Left  Mini Squats, 2x15, cue hips back like sitting in a chair (hep)        Manual tx: 0 minutes  Incision: spreading to inferior region of scar        Add NEXT:  FUTURE visits:         D/c already:  Longsit KE stretch with ankle pumps, 30x  Short Arc Quad   Longsit quad sets, 30x5s    Patient Education and Home Exercises       Education provided:   - daily HEP    Written Home Exercises Provided: Patient instructed to cont prior HEP. Exercises were reviewed and Vlad was able to demonstrate them prior to the end of the session.  Vlad demonstrated good  understanding of the education provided. See EMR under Patient Instructions for exercises provided " during therapy sessions    Assessment     Pt demonstrates no to low levels of pain, close to normal knee Range of Motion, and balance and gait deficits. Pt able to perform all therex given with minimal pn provocation. Having pt count aloud for cognitive stimulation to stay on task. Reviewed resistance to stepping lateral, for, back; 0 hand support. Pt and wife mentioned they wanted today to be his last day, I am in agreement due to tremendous progress but will leave POC open if needed.    Vlad Is progressing well towards his goals.   Pt prognosis is Good.     Pt will continue to benefit from skilled outpatient physical therapy to address the deficits listed in the problem list box on initial evaluation, provide pt/family education and to maximize pt's level of independence in the home and community environment.     Pt's spiritual, cultural and educational needs considered and pt agreeable to plan of care and goals.     Anticipated barriers to physical therapy: Co-morbidities    Goals:   Short Term GOALS: 5 weeks PROGRESS 6/21/2024  1. Patient is compliant with HEP.   2. Patient demonstrates inc KE ROM to 0 degrees in order to improve stance during gait.  3. Patient demonstrates inc KF ROM to 120 degrees in order to improve symmetrical motion during daily activities.  4. Patient demonstrates decreased girth by 2 cm in order to tolerate increased WB on LLE.     Long Term GOALS: 10 weeks. PROGRESS 6/21/2024  1. Patient demonstrates increased KF ROM to 130 deg to improve symmetrical function while performing house chores.  2. Patient demonstrates increased LLE strength by 1/2 grade or greater to improve tolerance to functional activities.   3. Patient demonstrates independence with HEP.  4. Patient will improve FOTO function score to </= 62% to show improvement in condition and functional mobility.     Plan     Pt and wife requested d/c    Olimpia Dalton, PT

## 2024-07-09 ENCOUNTER — OFFICE VISIT (OUTPATIENT)
Dept: FAMILY MEDICINE | Facility: CLINIC | Age: 74
End: 2024-07-09
Payer: COMMERCIAL

## 2024-07-09 VITALS
HEIGHT: 68 IN | HEART RATE: 70 BPM | WEIGHT: 178.56 LBS | BODY MASS INDEX: 27.06 KG/M2 | SYSTOLIC BLOOD PRESSURE: 122 MMHG | DIASTOLIC BLOOD PRESSURE: 72 MMHG | OXYGEN SATURATION: 99 %

## 2024-07-09 DIAGNOSIS — G62.9 PERIPHERAL POLYNEUROPATHY: ICD-10-CM

## 2024-07-09 DIAGNOSIS — I10 ESSENTIAL HYPERTENSION: ICD-10-CM

## 2024-07-09 DIAGNOSIS — R63.4 WEIGHT LOSS: ICD-10-CM

## 2024-07-09 DIAGNOSIS — B35.1 ONYCHOMYCOSIS: Primary | ICD-10-CM

## 2024-07-09 DIAGNOSIS — R29.6 FREQUENT FALLS: ICD-10-CM

## 2024-07-09 PROCEDURE — 3008F BODY MASS INDEX DOCD: CPT | Mod: CPTII,S$GLB,, | Performed by: FAMILY MEDICINE

## 2024-07-09 PROCEDURE — 3288F FALL RISK ASSESSMENT DOCD: CPT | Mod: CPTII,S$GLB,, | Performed by: FAMILY MEDICINE

## 2024-07-09 PROCEDURE — 99214 OFFICE O/P EST MOD 30 MIN: CPT | Mod: S$GLB,,, | Performed by: FAMILY MEDICINE

## 2024-07-09 PROCEDURE — 4010F ACE/ARB THERAPY RXD/TAKEN: CPT | Mod: CPTII,S$GLB,, | Performed by: FAMILY MEDICINE

## 2024-07-09 PROCEDURE — 3044F HG A1C LEVEL LT 7.0%: CPT | Mod: CPTII,S$GLB,, | Performed by: FAMILY MEDICINE

## 2024-07-09 PROCEDURE — 99999 PR PBB SHADOW E&M-EST. PATIENT-LVL IV: CPT | Mod: PBBFAC,,, | Performed by: FAMILY MEDICINE

## 2024-07-09 PROCEDURE — 1126F AMNT PAIN NOTED NONE PRSNT: CPT | Mod: CPTII,S$GLB,, | Performed by: FAMILY MEDICINE

## 2024-07-09 PROCEDURE — 3074F SYST BP LT 130 MM HG: CPT | Mod: CPTII,S$GLB,, | Performed by: FAMILY MEDICINE

## 2024-07-09 PROCEDURE — 3078F DIAST BP <80 MM HG: CPT | Mod: CPTII,S$GLB,, | Performed by: FAMILY MEDICINE

## 2024-07-09 PROCEDURE — 1100F PTFALLS ASSESS-DOCD GE2>/YR: CPT | Mod: CPTII,S$GLB,, | Performed by: FAMILY MEDICINE

## 2024-07-09 PROCEDURE — 1159F MED LIST DOCD IN RCRD: CPT | Mod: CPTII,S$GLB,, | Performed by: FAMILY MEDICINE

## 2024-07-09 RX ORDER — GABAPENTIN 100 MG/1
100 CAPSULE ORAL NIGHTLY
Qty: 30 CAPSULE | Refills: 11 | Status: SHIPPED | OUTPATIENT
Start: 2024-07-09 | End: 2025-07-09

## 2024-07-09 RX ORDER — CICLOPIROX 80 MG/ML
SOLUTION TOPICAL NIGHTLY
Qty: 6.6 ML | Refills: 11 | Status: SHIPPED | OUTPATIENT
Start: 2024-07-09

## 2024-07-09 RX ORDER — DONEPEZIL HYDROCHLORIDE 5 MG/1
5 TABLET, FILM COATED ORAL
COMMUNITY
Start: 2024-05-23

## 2024-07-09 NOTE — PROGRESS NOTES
THIS DOCUMENT WAS MADE IN PART WITH VOICE RECOGNITION SOFTWARE.  OCCASIONALLY THIS SOFTWARE WILL MISINTERPRET WORDS OR PHRASES.    Assessment and Plan:    1. Onychomycosis  ciclopirox (PENLAC) 8 % Soln      2. Peripheral polyneuropathy  gabapentin (NEURONTIN) 100 MG capsule      3. Frequent falls        4. Essential hypertension        5. Weight loss              PLAN      New medication Penlac for treatment of onychomycosis    New medication gabapentin q.h.s. for neuropathy type symptoms that are somewhat intrusive on sleep quality    Will discontinue metformin to see if his weight/appetite improves a little bit  Previous A1c 5.1%     No recent falls, low threshold to restart physical therapy     Counseled on COVID vaccine     Suspect blood pressure cuff incorrect, will have patient come in 1-2 weeks for nursing blood pressure check, will bring cuff to assess accuracy    Visit today included increased complexity associated with the care of the episodic problem noted above addressed and managing the longitudinal care of the patient due to the serious and/or complex managed problem(s) .    ______________________________________________________________________  Subjective:    Chief Complaint:  Chief Complaint   Patient presents with    Follow-up        HPI:  Carlos is a 74 y.o. year old       Follow-up frequent falls   Working with physical therapy as of last visit   No recent falls    Follow-up neuropathy suspected to be secondary to dementia medications   Prescribed medicine through compounding pharmacy at prior visit  No significant improvement  Interested in alternative, worse at nighttime    Onychomycosis  Right great toe, left 4th toe  Chronic   Causes some discomfort with shoes    Weight loss/decreased appetite  Wife reports sometimes appetite poor, has lost some weight.  Still protein levels remain normal and he remains in acceptable weight   Currently on metformin    Hypertension  Reports elevated blood  pressure readings at home   As high as 225 systolic  Normotensive in clinic with myself and the cardiologist on manual readings           History of gout   Rx-allopurinol 100 mg  Prev flare  years ago      Essential hypertension  Rx-amlodipine 2.5 mg, losartan 100 mg  Normotensive in clinic   Home : in normal range     Coronary artery disease   Rx-atorvastatin 40 mg, Zetia 10 mg, Plavix 75 mg, Ranexa, NTG  Cardiology - MD Vadim   TUYET : x 7   CABG : 7  Denies exertional chest symptoms      Diastolic heart failure     Obstructive sleep apnea  Prev used CPAP --> GRADUATED      Memory impairment  Prev Rx : Aduhelm (bleeding)  ---> secondary neuropathy   Neurology : Curtis --- > MD Jg Contreras MD manages infusions      History diabetes / Prediab  Rx- metformin   Previous A1c- 5.1      Elevated PSA --> Prostate Cancer   Urology : MD Sophie   Tx : Active Monitoring   Previous PSA 6.0%     Renal mass --> Renal Cell Cancer (Left Side)   Urology : MD Sophie   Sx : partial nephrectomy with robot (2017)  No RTX / Chemo     History of Nephroliths  Several  Prev episode > 10 years ago      Large Spermatocele Cyst  Monitored by urology      Cystic Acne   Location : back      Hearing Aid Status        Past Medical History:  Past Medical History:   Diagnosis Date    Arthritis     knees, back    Bilateral renal cysts 7/30/2012    CAD (coronary artery disease) 1995    no chest pain since CABG, sees Dr Larry Black    Diabetes mellitus     borderline    Hypertension     Kidney stones     uric acid stones    VASYL (obstructive sleep apnea) 2007    is compliant with CPAP    Primary gonadal failure        Past Surgical History:  Past Surgical History:   Procedure Laterality Date    BACK SURGERY  2010    L5-6 fusion, with pins,bone graft    cardiac stents      2 stents 12/2021 and 1/2022    CARDIAC SURGERY  1995, 2007    total 7 vessel bypass    CAROTID ARTERY ANGIOPLASTY  01/22/2023    1 stent posterior descending    COLONOSCOPY       repeat     COLONOSCOPY N/A 2017    Procedure: COLONOSCOPY;  Surgeon: Dmitry Sampson MD;  Location: [facility];  Service: Endoscopy;  Laterality: N/A;    CORONARY ARTERY BYPASS GRAFT  ,     cabgx3, cabgx5    CORONARY STENT PLACEMENT  10/2022    CORONARY STENT PLACEMENT  2022    EXTRACORPOREAL SHOCK WAVE LITHOTRIPSY      EYE SURGERY      cataracts bilateral    LITHOTRIPSY      LUMBAR LAMINECTOMY      Partial Nephrectomy Laproscopic      TRANSRECTAL BIOPSY OF PROSTATE WITH ULTRASOUND GUIDANCE N/A 2019    Procedure: BIOPSY, PROSTATE, RECTAL APPROACH, WITH US GUIDANCE;  Surgeon: Yovany Heart MD;  Location: [facility];  Service: Urology;  Laterality: N/A;    TRANSRECTAL BIOPSY OF PROSTATE WITH ULTRASOUND GUIDANCE N/A 10/20/2020    Procedure: BIOPSY, PROSTATE, RECTAL APPROACH, WITH US GUIDANCE;  Surgeon: Yovany Heart MD;  Location: [facility];  Service: Urology;  Laterality: N/A;       Family History:  Family History   Problem Relation Name Age of Onset    Heart disease Mother      Hypertension Mother      Diabetes Mother      Alzheimer's disease Mother      Alzheimer's disease Father      Heart disease Father          premature    Hypertension Father      Diabetes Sister      Urolithiasis Sister      Alzheimer's disease Sister      Heart disease Paternal Uncle      Cancer Neg Hx      Prostate cancer Neg Hx      Kidney cancer Neg Hx      Melanoma Neg Hx      Lupus Neg Hx      Eczema Neg Hx      Psoriasis Neg Hx         Social History:  Social History     Socioeconomic History    Marital status:    Tobacco Use    Smoking status: Former     Current packs/day: 0.00     Types: Cigarettes     Quit date: 1976     Years since quittin.4    Smokeless tobacco: Never   Substance and Sexual Activity    Alcohol use: Yes     Comment: Socially    Drug use: No    Sexual activity: Yes     Social Determinants of Health     Financial Resource Strain: Low Risk  (2024)    Overall  Financial Resource Strain (CARDIA)     Difficulty of Paying Living Expenses: Not hard at all   Food Insecurity: No Food Insecurity (2/26/2024)    Hunger Vital Sign     Worried About Running Out of Food in the Last Year: Never true     Ran Out of Food in the Last Year: Never true   Transportation Needs: No Transportation Needs (6/24/2024)    Received from [facility]     Lack of Transportation (Medical): No     Lack of Transportation (Non-Medical): No   Physical Activity: Inactive (2/26/2024)    Exercise Vital Sign     Days of Exercise per Week: 0 days     Minutes of Exercise per Session: 0 min   Stress: No Stress Concern Present (2/26/2024)    [employer] - Occupational Stress Questionnaire     Feeling of Stress : Not at all   Housing Stability: Low Risk  (2/26/2024)    Housing Stability Vital Sign     Unable to Pay for Housing in the Last Year: No     Number of Places Lived in the Last Year: 1     Unstable Housing in the Last Year: No       Medications:  Current Outpatient Medications on File Prior to Visit   Medication Sig Dispense Refill    allopurinoL (ZYLOPRIM) 100 MG tablet TAKE 1 TABLET(100 MG) BY MOUTH EVERY DAY 90 tablet 3    ammonium lactate (LAC-HYDRIN) 12 % lotion Apply topically every evening. 225 g 1    atorvastatin (LIPITOR) 40 MG tablet TAKE 1 TABLET(40 MG) BY MOUTH EVERY DAY 90 tablet 2    CALCIUM 500 + D 500 mg-5 mcg (200 unit) per tablet Take 1 tablet by mouth Daily.      clopidogreL (PLAVIX) 75 mg tablet Take medication by mouth 3 times weekly      cyanocobalamin, vitamin B-12, (VITAMIN B-12 ORAL) Take by mouth.      cyanocobalamin/folic acid (VITAMIN B12-FOLIC ACID) 500-400 mcg Tab Take by mouth.      donepeziL (ARICEPT) 5 MG tablet Take 5 mg by mouth.      ezetimibe (ZETIA) 10 mg tablet Take 10 mg by mouth once daily.      ferrous sulfate (FEOSOL) 325 mg (65 mg iron) Tab tablet Take 325 mg by mouth.      folic acid  (FOLVITE) 1 MG tablet TAKE 1 TABLET(1 MG) BY MOUTH EVERY DAY 90 tablet 3    ipratropium (ATROVENT) 21 mcg (0.03 %) nasal spray 2 sprays by Each Nostril route 3 (three) times daily as needed for Rhinitis. 30 mL 3    ketoconazole (NIZORAL) 2 % cream Apply topically 2 (two) times daily. 60 g 1    losartan (COZAAR) 100 MG tablet TAKE 1 TABLET(100 MG) BY MOUTH EVERY DAY 90 tablet 2    multivit-min-FA-lycopen-lutein 0.4 mg-300 mcg- 250 mcg Tab Take by mouth.      nitroGLYCERIN (NITROSTAT) 0.4 MG SL tablet SMARTSI Tablet(s) Sublingual PRN      ondansetron (ZOFRAN) 8 MG tablet Take 8 mg by mouth 2 (two) times daily.      ranolazine (RANEXA) 500 MG Tb12 Take 500 mg by mouth 2 (two) times daily.      sertraline (ZOLOFT) 50 MG tablet Take 50 mg by mouth once daily.      tamsulosin (FLOMAX) 0.4 mg Cap Take 1 capsule (0.4 mg total) by mouth every evening. 90 capsule 0    vitamin D (VITAMIN D3) 1000 units Tab Take by mouth.      [DISCONTINUED] metFORMIN (GLUCOPHAGE-XR) 500 MG ER 24hr tablet Take 1 tablet (500 mg total) by mouth 2 (two) times daily with meals. 180 tablet 1    amLODIPine (NORVASC) 2.5 MG tablet Take 1 tablet (2.5 mg total) by mouth every evening. 30 tablet 11    [DISCONTINUED] furosemide (LASIX) 40 MG tablet Take 40 mg by mouth 3 (three) times a week. (Patient not taking: Reported on 2024)      [DISCONTINUED] hydroCHLOROthiazide (HYDRODIURIL) 25 MG tablet Take by mouth. (Patient not taking: Reported on 2024)      [DISCONTINUED] ketoconazole (NIZORAL) 2 % cream 1 application  2 (two) times daily. Apply to affected area (Patient not taking: Reported on 2024)      [DISCONTINUED] sulfamethoxazole-trimethoprim 800-160mg (BACTRIM DS) 800-160 mg Tab Take 1 tablet by mouth 2 (two) times daily. for 14 days 28 tablet 0     Current Facility-Administered Medications on File Prior to Visit   Medication Dose Route Frequency Provider Last Rate Last Admin    triamcinolone acetonide injection 40 mg  40 mg  "Intra-articular  Mason Macedo MD   40 mg at 03/13/15 0929    triamcinolone acetonide injection 40 mg  40 mg Intra-articular  Mason Macedo MD   40 mg at 03/13/15 0929       Allergies:  Sulfa (sulfonamide antibiotics) and Adhesive    Immunizations:  Immunization History   Administered Date(s) Administered    COVID-19, MRNA, LN-S, PF (MODERNA FULL 0.5 ML DOSE) 08/19/2023    COVID-19, MRNA, LN-S, PF (Pfizer) (Purple Cap) 01/09/2021, 01/30/2021    Hepatitis A, Adult 09/02/2009, 08/04/2014    Hepatitis B, Adult 09/02/2009, 09/29/2009    Influenza 10/26/2007, 09/19/2009, 09/07/2011, 01/15/2014, 09/23/2020    Influenza (FLUAD) - Quadrivalent - Adjuvanted - PF *Preferred* (65+) 11/18/2021    Influenza - High Dose - PF (65 years and older) 09/29/2015, 02/09/2017, 01/26/2018, 02/20/2019, 11/27/2019, 09/23/2020    Influenza - Intradermal - Quadrivalent - PF 11/19/2012    Influenza - Intradermal - Trivalent - PF 11/19/2012    Influenza - Quadrivalent 11/13/2014    Influenza - Trivalent - PF (ADULT) 11/01/2014, 11/01/2014, 02/09/2017, 02/09/2017, 01/01/2018, 01/01/2018, 02/10/2019    Influenza Split 10/26/2007, 09/19/2009, 09/07/2011, 01/15/2014, 01/15/2014    Pneumococcal Conjugate - 13 Valent 01/13/2016, 06/01/2018    Pneumococcal Polysaccharide - 23 Valent 11/28/2012, 02/09/2017    Tdap 04/23/2012, 08/01/2014    Typhoid - ViCPs 08/04/2014, 03/02/2020    Zoster 11/19/2012    Zoster Recombinant 04/30/2019, 04/30/2019, 07/15/2019       Review of Systems:  Review of Systems   All other systems reviewed and are negative.      Objective:    Vitals:  Vitals:    07/09/24 1506   BP: 122/72   Pulse: 70   SpO2: 99%   Weight: 81 kg (178 lb 9.2 oz)   Height: 5' 8" (1.727 m)   PainSc: 0-No pain         Physical Exam  Vitals reviewed.   Constitutional:       General: He is not in acute distress.  HENT:      Head: Normocephalic and atraumatic.   Eyes:      Pupils: Pupils are equal, round, and reactive to light.   Cardiovascular: "      Rate and Rhythm: Normal rate and regular rhythm.      Heart sounds: No murmur heard.     No friction rub.   Pulmonary:      Effort: Pulmonary effort is normal.      Breath sounds: Normal breath sounds.   Abdominal:      General: Bowel sounds are normal. There is no distension.      Palpations: Abdomen is soft.      Tenderness: There is no abdominal tenderness.   Musculoskeletal:      Cervical back: Neck supple.   Skin:     General: Skin is warm and dry.      Findings: No rash.      Comments: Right great toe, left 4th toe dystrophic, discolored thickened and brittle nail consistent with onychomycosis   Psychiatric:         Behavior: Behavior normal.             Morteza Blevins MD  Family Medicine

## 2024-07-17 ENCOUNTER — PATIENT MESSAGE (OUTPATIENT)
Dept: FAMILY MEDICINE | Facility: CLINIC | Age: 74
End: 2024-07-17
Payer: COMMERCIAL

## 2024-07-19 ENCOUNTER — PATIENT MESSAGE (OUTPATIENT)
Dept: DERMATOLOGY | Facility: CLINIC | Age: 74
End: 2024-07-19
Payer: COMMERCIAL

## 2024-07-23 ENCOUNTER — CLINICAL SUPPORT (OUTPATIENT)
Dept: FAMILY MEDICINE | Facility: CLINIC | Age: 74
End: 2024-07-23
Payer: COMMERCIAL

## 2024-07-23 VITALS — DIASTOLIC BLOOD PRESSURE: 82 MMHG | HEART RATE: 66 BPM | SYSTOLIC BLOOD PRESSURE: 136 MMHG

## 2024-07-23 DIAGNOSIS — I25.118 ATHEROSCLEROTIC HEART DISEASE OF NATIVE CORONARY ARTERY WITH OTHER FORMS OF ANGINA PECTORIS: ICD-10-CM

## 2024-07-23 DIAGNOSIS — Z01.30 BLOOD PRESSURE CHECK: Primary | ICD-10-CM

## 2024-07-23 PROCEDURE — 99999 PR PBB SHADOW E&M-EST. PATIENT-LVL I: CPT | Mod: PBBFAC,,,

## 2024-07-23 NOTE — TELEPHONE ENCOUNTER
Refill Routing Note   Medication(s) are not appropriate for processing by [facility] for the following reason(s):        Outside of protocol    [facility] action(s):  Route               Appointments  past 12m or future 3m with PCP    Date Provider   Last Visit   4/24/2023 Roney Bradshaw MD   Next Visit   Visit date not found Roney Bradshaw MD   ED visits in past 90 days: 0        Note composed:3:59 PM 07/23/2024

## 2024-07-23 NOTE — PROGRESS NOTES
Carlos Fontenot Jona . 74 y.o. male is here today for Blood Pressure check.   History of HTN yes.    Review of patient's allergies indicates:   Allergen Reactions    Sulfa (sulfonamide antibiotics) Hives    Adhesive Blisters and Rash     Creatinine   Date Value Ref Range Status   03/07/2024 1.0 0.5 - 1.4 mg/dL Final     Sodium   Date Value Ref Range Status   03/07/2024 140 136 - 145 mmol/L Final     Potassium   Date Value Ref Range Status   03/07/2024 4.1 3.5 - 5.1 mmol/L Final   ]  Patient verifies taking blood pressure medications on a regular basis at the same time of the day.     Current Outpatient Medications:     allopurinoL (ZYLOPRIM) 100 MG tablet, TAKE 1 TABLET(100 MG) BY MOUTH EVERY DAY, Disp: 90 tablet, Rfl: 3    amLODIPine (NORVASC) 2.5 MG tablet, Take 1 tablet (2.5 mg total) by mouth every evening., Disp: 30 tablet, Rfl: 11    ammonium lactate (LAC-HYDRIN) 12 % lotion, Apply topically every evening., Disp: 225 g, Rfl: 1    atorvastatin (LIPITOR) 40 MG tablet, TAKE 1 TABLET(40 MG) BY MOUTH EVERY DAY, Disp: 90 tablet, Rfl: 2    CALCIUM 500 + D 500 mg-5 mcg (200 unit) per tablet, Take 1 tablet by mouth Daily., Disp: , Rfl:     ciclopirox (PENLAC) 8 % Soln, Apply topically nightly., Disp: 6.6 mL, Rfl: 11    clopidogreL (PLAVIX) 75 mg tablet, Take medication by mouth 3 times weekly, Disp: , Rfl:     cyanocobalamin, vitamin B-12, (VITAMIN B-12 ORAL), Take by mouth., Disp: , Rfl:     cyanocobalamin/folic acid (VITAMIN B12-FOLIC ACID) 500-400 mcg Tab, Take by mouth., Disp: , Rfl:     donepeziL (ARICEPT) 5 MG tablet, Take 5 mg by mouth., Disp: , Rfl:     ezetimibe (ZETIA) 10 mg tablet, Take 10 mg by mouth once daily., Disp: , Rfl:     ferrous sulfate (FEOSOL) 325 mg (65 mg iron) Tab tablet, Take 325 mg by mouth., Disp: , Rfl:     folic acid (FOLVITE) 1 MG tablet, TAKE 1 TABLET(1 MG) BY MOUTH EVERY DAY, Disp: 90 tablet, Rfl: 3    gabapentin (NEURONTIN) 100 MG capsule, Take 1 capsule (100 mg total) by mouth every  evening., Disp: 30 capsule, Rfl: 11    ipratropium (ATROVENT) 21 mcg (0.03 %) nasal spray, 2 sprays by Each Nostril route 3 (three) times daily as needed for Rhinitis., Disp: 30 mL, Rfl: 3    ketoconazole (NIZORAL) 2 % cream, Apply topically 2 (two) times daily., Disp: 60 g, Rfl: 1    losartan (COZAAR) 100 MG tablet, TAKE 1 TABLET(100 MG) BY MOUTH EVERY DAY, Disp: 90 tablet, Rfl: 2    multivit-min-FA-lycopen-lutein 0.4 mg-300 mcg- 250 mcg Tab, Take by mouth., Disp: , Rfl:     nitroGLYCERIN (NITROSTAT) 0.4 MG SL tablet, SMARTSI Tablet(s) Sublingual PRN, Disp: , Rfl:     ondansetron (ZOFRAN) 8 MG tablet, Take 8 mg by mouth 2 (two) times daily., Disp: , Rfl:     ranolazine (RANEXA) 500 MG Tb12, Take 500 mg by mouth 2 (two) times daily., Disp: , Rfl:     sertraline (ZOLOFT) 50 MG tablet, Take 50 mg by mouth once daily., Disp: , Rfl:     tamsulosin (FLOMAX) 0.4 mg Cap, Take 1 capsule (0.4 mg total) by mouth every evening., Disp: 90 capsule, Rfl: 0    vitamin D (VITAMIN D3) 1000 units Tab, Take by mouth., Disp: , Rfl:   No current facility-administered medications for this visit.    Facility-Administered Medications Ordered in Other Visits:     triamcinolone acetonide injection 40 mg, 40 mg, Intra-articular, , Mason Macedo MD, 40 mg at 03/13/15 0929    triamcinolone acetonide injection 40 mg, 40 mg, Intra-articular, , Mason Macedo MD, 40 mg at 03/13/15 0929  Does patient have record of home blood pressure readings yes. Readings have been averaging 130/76.   Last dose of blood pressure medication was taken at 7am on 24.  Patient is asymptomatic.   Complains of n/a.      ,   .    Blood pressure reading after 15 minutes was 136/82, Pulse 66.  Dr. Blevins notified.

## 2024-07-24 ENCOUNTER — PATIENT MESSAGE (OUTPATIENT)
Dept: FAMILY MEDICINE | Facility: CLINIC | Age: 74
End: 2024-07-24
Payer: COMMERCIAL

## 2024-07-24 DIAGNOSIS — I25.118 ATHEROSCLEROTIC HEART DISEASE OF NATIVE CORONARY ARTERY WITH OTHER FORMS OF ANGINA PECTORIS: Primary | ICD-10-CM

## 2024-07-24 RX ORDER — FOLIC ACID 1 MG/1
1 TABLET ORAL DAILY
Qty: 90 TABLET | Refills: 3 | Status: SHIPPED | OUTPATIENT
Start: 2024-07-24 | End: 2024-07-26 | Stop reason: SDUPTHER

## 2024-07-26 RX ORDER — FOLIC ACID 1 MG/1
1 TABLET ORAL DAILY
Qty: 90 TABLET | Refills: 3 | Status: SHIPPED | OUTPATIENT
Start: 2024-07-26

## 2024-07-26 RX ORDER — EZETIMIBE 10 MG/1
10 TABLET ORAL DAILY
Qty: 90 TABLET | Refills: 3 | Status: SHIPPED | OUTPATIENT
Start: 2024-07-26

## 2024-07-29 RX ORDER — TAMSULOSIN HYDROCHLORIDE 0.4 MG/1
0.4 CAPSULE ORAL
Qty: 90 CAPSULE | Refills: 0 | Status: SHIPPED | OUTPATIENT
Start: 2024-07-29

## 2024-08-04 ENCOUNTER — PATIENT MESSAGE (OUTPATIENT)
Dept: UROLOGY | Facility: CLINIC | Age: 74
End: 2024-08-04
Payer: COMMERCIAL

## 2024-08-04 DIAGNOSIS — R82.998 CELLS AND CASTS IN URINE: Primary | ICD-10-CM

## 2024-08-11 RX ORDER — TAMSULOSIN HYDROCHLORIDE 0.4 MG/1
0.8 CAPSULE ORAL NIGHTLY
Qty: 180 CAPSULE | Refills: 11 | Status: SHIPPED | OUTPATIENT
Start: 2024-08-11

## 2024-08-12 ENCOUNTER — LAB VISIT (OUTPATIENT)
Dept: LAB | Facility: HOSPITAL | Age: 74
End: 2024-08-12
Attending: UROLOGY
Payer: COMMERCIAL

## 2024-08-12 DIAGNOSIS — R82.998 CELLS AND CASTS IN URINE: ICD-10-CM

## 2024-08-12 LAB
BACTERIA #/AREA URNS HPF: NORMAL /HPF
BILIRUB UR QL STRIP: NEGATIVE
CLARITY UR: CLEAR
COLOR UR: YELLOW
GLUCOSE UR QL STRIP: NEGATIVE
HGB UR QL STRIP: NEGATIVE
KETONES UR QL STRIP: NEGATIVE
LEUKOCYTE ESTERASE UR QL STRIP: NEGATIVE
MICROSCOPIC COMMENT: NORMAL
NITRITE UR QL STRIP: NEGATIVE
PH UR STRIP: 7 [PH] (ref 5–8)
PROT UR QL STRIP: NEGATIVE
SP GR UR STRIP: 1.01 (ref 1–1.03)
SQUAMOUS #/AREA URNS HPF: 1 /HPF
URN SPEC COLLECT METH UR: NORMAL
UROBILINOGEN UR STRIP-ACNC: NEGATIVE EU/DL
WBC #/AREA URNS HPF: 2 /HPF (ref 0–5)

## 2024-08-12 PROCEDURE — 87086 URINE CULTURE/COLONY COUNT: CPT | Performed by: UROLOGY

## 2024-08-12 PROCEDURE — 81000 URINALYSIS NONAUTO W/SCOPE: CPT | Performed by: UROLOGY

## 2024-08-15 LAB — BACTERIA UR CULT: NO GROWTH

## 2024-08-16 ENCOUNTER — TELEPHONE (OUTPATIENT)
Dept: PHARMACY | Facility: CLINIC | Age: 74
End: 2024-08-16
Payer: COMMERCIAL

## 2024-08-16 NOTE — TELEPHONE ENCOUNTER
[facility]/Population Health Chart Review & Patient Outreach Details For Medication Adherence Project    Reason for Outreach Encounter: 3rd Party payor non-compliance report ([employer], [employer], C, etc)    2.  Patient Outreach Method: Reviewed patient chart     3.   Medication in question:   Hyperlipidemia Medications               atorvastatin (LIPITOR) 40 MG tablet TAKE 1 TABLET(40 MG) BY MOUTH EVERY DAY    ezetimibe (ZETIA) 10 mg tablet Take 1 tablet (10 mg total) by mouth once daily.                LAST FILLED: 8/11/24 for 90 day supply    4.  Reviewed and or Updates Made To: Patient Chart    5. Outreach Outcomes and/or actions taken: Patient filled medication and is on track to be adherent    Additional Notes:

## 2024-09-25 DIAGNOSIS — Z00.00 ENCOUNTER FOR MEDICARE ANNUAL WELLNESS EXAM: ICD-10-CM

## 2024-10-07 ENCOUNTER — TELEPHONE (OUTPATIENT)
Dept: FAMILY MEDICINE | Facility: CLINIC | Age: 74
End: 2024-10-07
Payer: COMMERCIAL

## 2024-10-07 NOTE — TELEPHONE ENCOUNTER
Tried to reach pt to reschedule his appt due to PCP being out of office. No answer. Left message for pt to call back.

## 2024-10-08 ENCOUNTER — TELEPHONE (OUTPATIENT)
Dept: FAMILY MEDICINE | Facility: CLINIC | Age: 74
End: 2024-10-08
Payer: COMMERCIAL

## 2024-10-08 NOTE — TELEPHONE ENCOUNTER
Tried to reach pt to get him rescheduled. No answer. Left message for pt to call back.sent pt a [employer] Message.

## 2024-10-21 ENCOUNTER — OFFICE VISIT (OUTPATIENT)
Dept: FAMILY MEDICINE | Facility: CLINIC | Age: 74
End: 2024-10-21
Payer: MEDICARE

## 2024-10-21 VITALS
SYSTOLIC BLOOD PRESSURE: 134 MMHG | WEIGHT: 194.25 LBS | BODY MASS INDEX: 29.44 KG/M2 | DIASTOLIC BLOOD PRESSURE: 70 MMHG | HEART RATE: 55 BPM | OXYGEN SATURATION: 97 % | HEIGHT: 68 IN

## 2024-10-21 DIAGNOSIS — Z23 IMMUNIZATION DUE: Primary | ICD-10-CM

## 2024-10-21 DIAGNOSIS — G62.9 PERIPHERAL POLYNEUROPATHY: ICD-10-CM

## 2024-10-21 DIAGNOSIS — R79.89 ELEVATED SERUM CREATININE: ICD-10-CM

## 2024-10-21 DIAGNOSIS — I15.2 HYPERTENSION ASSOCIATED WITH DIABETES: ICD-10-CM

## 2024-10-21 DIAGNOSIS — E11.59 HYPERTENSION ASSOCIATED WITH DIABETES: ICD-10-CM

## 2024-10-21 DIAGNOSIS — D64.9 NORMOCYTIC ANEMIA: ICD-10-CM

## 2024-10-21 DIAGNOSIS — C61 PROSTATE CANCER: ICD-10-CM

## 2024-10-21 DIAGNOSIS — E11.3213 TYPE 2 DIABETES MELLITUS WITH BOTH EYES AFFECTED BY MILD NONPROLIFERATIVE RETINOPATHY AND MACULAR EDEMA, WITHOUT LONG-TERM CURRENT USE OF INSULIN: ICD-10-CM

## 2024-10-21 DIAGNOSIS — Z79.899 DRUG THERAPY: ICD-10-CM

## 2024-10-21 DIAGNOSIS — G30.0 ALZHEIMER'S DISEASE WITH EARLY ONSET (CODE): ICD-10-CM

## 2024-10-21 PROCEDURE — 4010F ACE/ARB THERAPY RXD/TAKEN: CPT | Mod: CPTII,S$GLB,, | Performed by: FAMILY MEDICINE

## 2024-10-21 PROCEDURE — 99214 OFFICE O/P EST MOD 30 MIN: CPT | Mod: S$GLB,,, | Performed by: FAMILY MEDICINE

## 2024-10-21 PROCEDURE — 1100F PTFALLS ASSESS-DOCD GE2>/YR: CPT | Mod: CPTII,S$GLB,, | Performed by: FAMILY MEDICINE

## 2024-10-21 PROCEDURE — 3288F FALL RISK ASSESSMENT DOCD: CPT | Mod: CPTII,S$GLB,, | Performed by: FAMILY MEDICINE

## 2024-10-21 PROCEDURE — 3008F BODY MASS INDEX DOCD: CPT | Mod: CPTII,S$GLB,, | Performed by: FAMILY MEDICINE

## 2024-10-21 PROCEDURE — 99999 PR PBB SHADOW E&M-EST. PATIENT-LVL IV: CPT | Mod: PBBFAC,,, | Performed by: FAMILY MEDICINE

## 2024-10-21 PROCEDURE — 3075F SYST BP GE 130 - 139MM HG: CPT | Mod: CPTII,S$GLB,, | Performed by: FAMILY MEDICINE

## 2024-10-21 PROCEDURE — 1159F MED LIST DOCD IN RCRD: CPT | Mod: CPTII,S$GLB,, | Performed by: FAMILY MEDICINE

## 2024-10-21 PROCEDURE — G2211 COMPLEX E/M VISIT ADD ON: HCPCS | Mod: S$GLB,,, | Performed by: FAMILY MEDICINE

## 2024-10-21 PROCEDURE — 1125F AMNT PAIN NOTED PAIN PRSNT: CPT | Mod: CPTII,S$GLB,, | Performed by: FAMILY MEDICINE

## 2024-10-21 PROCEDURE — 3078F DIAST BP <80 MM HG: CPT | Mod: CPTII,S$GLB,, | Performed by: FAMILY MEDICINE

## 2024-10-21 PROCEDURE — 3044F HG A1C LEVEL LT 7.0%: CPT | Mod: CPTII,S$GLB,, | Performed by: FAMILY MEDICINE

## 2024-10-21 RX ORDER — GABAPENTIN 300 MG/1
300 CAPSULE ORAL NIGHTLY
Qty: 30 CAPSULE | Refills: 11 | Status: SHIPPED | OUTPATIENT
Start: 2024-10-21 | End: 2025-10-21

## 2024-10-21 NOTE — PROGRESS NOTES
THIS DOCUMENT WAS MADE IN PART WITH VOICE RECOGNITION SOFTWARE.  OCCASIONALLY THIS SOFTWARE WILL MISINTERPRET WORDS OR PHRASES.    Assessment and Plan:    1. Immunization due        2. Type 2 diabetes mellitus with both eyes affected by mild nonproliferative retinopathy and macular edema, without long-term current use of insulin        3. Hypertension associated with diabetes        4. Prostate cancer        5. Alzheimer's disease with early onset (CODE)        6. Peripheral polyneuropathy  gabapentin (NEURONTIN) 300 MG capsule      7. Elevated serum creatinine  Comprehensive Metabolic Panel    Vitamin B12    Methylmalonic Acid, Serum    Ferritin    Iron and TIBC    CANCELED: Renal Function Panel      8. Normocytic anemia  Comprehensive Metabolic Panel    Vitamin B12    Methylmalonic Acid, Serum    Ferritin    Iron and TIBC      9. Drug therapy  Comprehensive Metabolic Panel    Vitamin B12    Methylmalonic Acid, Serum    Ferritin    Iron and TIBC              PLAN    Assessment & Plan    TOENAIL CONDITION:   Explained that new nail growth will be healthier and eventually replace the unhealthy nail.   Continued topical medication for toenail condition.    HYPERTENSION:   Discussed potential causes of blood pressure fluctuations, including pain, stress, and dietary factors.   Carlos to keep blood pressure monitor charged for accurate readings.   Continued amlodipine 2.5mg and losartan 100mg for blood pressure management.    KIDNEY FUNCTION:   Educated on the importance of proper hydration for accurate kidney function tests.   Carlos to ensure proper hydration, especially before labs.   Ordered non-fasting labs for kidney function (renal function panel).    PROSTATE HEALTH:   Provided information on the variability of PSA levels and potential causes of prostate inflammation.    WEIGHT MANAGEMENT:   Carlos to maintain current weight.    NEUROPATHY:   Increased gabapentin from 100mg to 300mg at night for neuropathy  symptoms.    LABS:   Ordered vitamin B12 and iron level tests to be done with kidney function test.   Follow up for non-fasting labs on a day when well-hydrated.    FOLLOW UP:   Contact the office to arrange the lab test before leaving.           Visit today included increased complexity associated with the care of the episodic problem noted above addressed and managing the longitudinal care of the patient due to the serious and/or complex managed problem(s) .    ______________________________________________________________________  Subjective:    Chief Complaint:  Chief Complaint   Patient presents with    Follow-up        HPI:  Carlos is a 74 y.o. year old       .epsub  History of Present Illness    CHIEF COMPLAINT:  Carlos presents today for follow up.    MEDICATIONS:  He is currently taking Amlodipine 2.5 mg and Losartan 100 mg for blood pressure management. He reports monitoring blood pressure at home and transmitting readings to the healthcare team. He recently started gabapentin 100 mg at night for neuropathy symptoms, which has improved his sleep quality. He is using a prescribed topical medication for his toenail condition, though admits to not being as diligent with application as recommended.    BLOOD PRESSURE:  The digital medicine nurse has noted irregular blood pressure readings. Recent measurements have been mostly normal, with some elevated days. On the 8th, readings ranged from 144 to 156. However, subsequent readings on the 13th, 17th, 19th, and 20th were within normal range. He denies experiencing symptoms of low blood pressure such as lightheadedness, dizziness, or fatigue.    WEIGHT AND APPETITE:  He reports weight gain after discontinuing metformin, noting an improved appetite. His wife has started cooking better meals, which may have contributed to the improved appetite and weight gain.    NEUROPATHY:  He continues to complain about neuropathy in his feet, though gabapentin has helped  alleviate some of the nighttime discomfort.    TOENAIL CONDITION:  He reports improvement in toenail condition with the prescribed topical medication. He understands that the process of nail improvement is gradual, with new, healthier nail growth replacing the affected areas over time.    RECENT LAB RESULTS:  [facility] showed elevated BUN of 29 and creatinine of 1.5, potentially indicating dehydration at the time of testing. PSA levels were 4.64, above normal but showing a decreasing trend from previous values of 6.9 and 5.8. Anemia was noted with a hemoglobin of 11.1 and MCV of 97, though this is higher than previous measurements.    VACCINATIONS:  He has received the flu vaccine and the new COVID vaccine.    MEMORY:  His memory issues have become slightly worse, acknowledging that this is likely a progressive condition.    MEDICAL HISTORY:  He has a history of partial nephrectomy for renal cancer and a history of prostate cancer. He previously had diabetes, which has now resolved and no longer requires medication.      ROS:  ROS as indicated in HPI.             History of gout   Rx-allopurinol 100 mg  Prev flare  years ago      Essential hypertension  Rx-amlodipine 2.5 mg, losartan 100 mg  Normotensive in clinic   Home : in normal range     Coronary artery disease   Rx-atorvastatin 40 mg, Zetia 10 mg, Plavix 75 mg, Ranexa, NTG  Cardiology - MD Vadim   TUYET : x 7   CABG : 7  Denies exertional chest symptoms      Diastolic heart failure     Obstructive sleep apnea  Prev used CPAP --> GRADUATED      Memory impairment  Prev Rx : Aduhelm (bleeding)  ---> secondary neuropathy   Neurology : Curtis --- > MD Jg Contreras MD manages infusions      History diabetes / Prediab  Rx- metformin   Previous A1c- 5.1      Elevated PSA --> Prostate Cancer   Urology : MD Sophie   Tx : Active Monitoring   Previous PSA 6.0% ---> 4.6     Renal mass --> Renal Cell Cancer (Left Side)   Urology : MD Sophie   Sx  : partial nephrectomy with robot (2017)  No RTX / Chemo     History of Nephroliths  Several  Prev episode > 10 years ago      Large Spermatocele Cyst  Monitored by urology      Cystic Acne   Location : back      Hearing Aid Status        Past Medical History:  Past Medical History:   Diagnosis Date    Arthritis     knees, back    Bilateral renal cysts 7/30/2012    CAD (coronary artery disease) 1995    no chest pain since CABG, sees Dr Larry Black    Diabetes mellitus     borderline    Hypertension     Kidney stones     uric acid stones    VASYL (obstructive sleep apnea) 2007    is compliant with CPAP    Primary gonadal failure        Past Surgical History:  Past Surgical History:   Procedure Laterality Date    BACK SURGERY  2010    L5-6 fusion, with pins,bone graft    cardiac stents      2 stents 12/2021 and 1/2022    CARDIAC SURGERY  1995, 2007    total 7 vessel bypass    CAROTID ARTERY ANGIOPLASTY  01/22/2023    1 stent posterior descending    COLONOSCOPY  2008    repeat 2013    COLONOSCOPY N/A 04/04/2017    Procedure: COLONOSCOPY;  Surgeon: Dmitry Sampson MD;  Location: [facility] ENDO;  Service: Endoscopy;  Laterality: N/A;    CORONARY ARTERY BYPASS GRAFT  1995, 2007    cabgx3, cabgx5    CORONARY STENT PLACEMENT  10/2022    CORONARY STENT PLACEMENT  11/2022    EXTRACORPOREAL SHOCK WAVE LITHOTRIPSY      EYE SURGERY      cataracts bilateral    LITHOTRIPSY      LUMBAR LAMINECTOMY      Partial Nephrectomy Laproscopic      TRANSRECTAL BIOPSY OF PROSTATE WITH ULTRASOUND GUIDANCE N/A 03/19/2019    Procedure: BIOPSY, PROSTATE, RECTAL APPROACH, WITH US GUIDANCE;  Surgeon: Yovany Heart MD;  Location: [facility] OR;  Service: Urology;  Laterality: N/A;    TRANSRECTAL BIOPSY OF PROSTATE WITH ULTRASOUND GUIDANCE N/A 10/20/2020    Procedure: BIOPSY, PROSTATE, RECTAL APPROACH, WITH US GUIDANCE;  Surgeon: Yovany Heart MD;  Location: [facility] OR;  Service: Urology;  Laterality: N/A;       Family History:  Family History   Problem  Relation Name Age of Onset    Heart disease Mother      Hypertension Mother      Diabetes Mother      Alzheimer's disease Mother      Alzheimer's disease Father      Heart disease Father          premature    Hypertension Father      Diabetes Sister      Urolithiasis Sister      Alzheimer's disease Sister      Heart disease Paternal Uncle      Cancer Neg Hx      Prostate cancer Neg Hx      Kidney cancer Neg Hx      Melanoma Neg Hx      Lupus Neg Hx      Eczema Neg Hx      Psoriasis Neg Hx         Social History:  Social History     Socioeconomic History    Marital status:    Tobacco Use    Smoking status: Former     Current packs/day: 0.00     Types: Cigarettes     Quit date: 1976     Years since quittin.7    Smokeless tobacco: Never   Substance and Sexual Activity    Alcohol use: Yes     Comment: Socially    Drug use: No    Sexual activity: Yes     Social Drivers of Health     Financial Resource Strain: Low Risk  (2024)    Overall Financial Resource Strain (CARDIA)     Difficulty of Paying Living Expenses: Not hard at all   Food Insecurity: No Food Insecurity (2024)    Hunger Vital Sign     Worried About Running Out of Food in the Last Year: Never true     Ran Out of Food in the Last Year: Never true   Transportation Needs: No Transportation Needs (2024)    Received from [facility]     Lack of Transportation (Medical): No     Lack of Transportation (Non-Medical): No   Physical Activity: Inactive (2024)    Exercise Vital Sign     Days of Exercise per Week: 0 days     Minutes of Exercise per Session: 0 min   Stress: No Stress Concern Present (2024)    [employer] - Occupational Stress Questionnaire     Feeling of Stress : Not at all   Housing Stability: Low Risk  (2024)    Housing Stability Vital Sign     Unable to Pay for Housing in the Last Year: No     Number of Places Lived in the Last  Year: 1     Unstable Housing in the Last Year: No       Medications:  Current Outpatient Medications on File Prior to Visit   Medication Sig Dispense Refill    allopurinoL (ZYLOPRIM) 100 MG tablet TAKE 1 TABLET(100 MG) BY MOUTH EVERY DAY 90 tablet 3    amLODIPine (NORVASC) 2.5 MG tablet Take 1 tablet (2.5 mg total) by mouth every evening. 30 tablet 11    atorvastatin (LIPITOR) 40 MG tablet TAKE 1 TABLET(40 MG) BY MOUTH EVERY DAY 90 tablet 2    CALCIUM 500 + D 500 mg-5 mcg (200 unit) per tablet Take 1 tablet by mouth Daily.      clopidogreL (PLAVIX) 75 mg tablet Take medication by mouth 3 times weekly      cyanocobalamin/folic acid (VITAMIN B12-FOLIC ACID) 500-400 mcg Tab Take by mouth.      donepeziL (ARICEPT) 5 MG tablet Take 5 mg by mouth.      ezetimibe (ZETIA) 10 mg tablet Take 1 tablet (10 mg total) by mouth once daily. 90 tablet 3    ferrous sulfate (FEOSOL) 325 mg (65 mg iron) Tab tablet Take 325 mg by mouth.      folic acid (FOLVITE) 1 MG tablet Take 1 tablet (1 mg total) by mouth once daily. 90 tablet 3    ketoconazole (NIZORAL) 2 % cream Apply topically 2 (two) times daily. 60 g 1    losartan (COZAAR) 100 MG tablet TAKE 1 TABLET(100 MG) BY MOUTH EVERY DAY 90 tablet 2    multivit-min-FA-lycopen-lutein 0.4 mg-300 mcg- 250 mcg Tab Take by mouth.      nitroGLYCERIN (NITROSTAT) 0.4 MG SL tablet SMARTSI Tablet(s) Sublingual PRN      ranolazine (RANEXA) 500 MG Tb12 Take 500 mg by mouth 2 (two) times daily.      sertraline (ZOLOFT) 50 MG tablet Take 50 mg by mouth once daily.      tamsulosin (FLOMAX) 0.4 mg Cap Take 2 capsules (0.8 mg total) by mouth every evening. 180 capsule 11    vitamin D (VITAMIN D3) 1000 units Tab Take by mouth.      [DISCONTINUED] cyanocobalamin, vitamin B-12, (VITAMIN B-12 ORAL) Take by mouth.      [DISCONTINUED] gabapentin (NEURONTIN) 100 MG capsule Take 1 capsule (100 mg total) by mouth every evening. 30 capsule 11    ciclopirox (PENLAC) 8 % Soln Apply topically nightly. 6.6 mL 11     ipratropium (ATROVENT) 21 mcg (0.03 %) nasal spray 2 sprays by Each Nostril route 3 (three) times daily as needed for Rhinitis. 30 mL 3    [DISCONTINUED] ammonium lactate (LAC-HYDRIN) 12 % lotion Apply topically every evening. (Patient not taking: Reported on 10/21/2024) 225 g 1    [DISCONTINUED] ondansetron (ZOFRAN) 8 MG tablet Take 8 mg by mouth 2 (two) times daily. (Patient not taking: Reported on 10/21/2024)       Current Facility-Administered Medications on File Prior to Visit   Medication Dose Route Frequency Provider Last Rate Last Admin    [DISCONTINUED] triamcinolone acetonide injection 40 mg  40 mg Intra-articular  Mason Macedo MD   40 mg at 03/13/15 0929    [DISCONTINUED] triamcinolone acetonide injection 40 mg  40 mg Intra-articular  Mason Macedo MD   40 mg at 03/13/15 0929       Allergies:  Sulfa (sulfonamide antibiotics) and Adhesive    Immunizations:  Immunization History   Administered Date(s) Administered    COVID-19, MRNA, LN-S, PF (MODERNA FULL 0.5 ML DOSE) 08/19/2023    COVID-19, MRNA, LN-S, PF (Pfizer) (Purple Cap) 01/09/2021, 01/30/2021    Hepatitis A, Adult 09/02/2009, 08/04/2014    Hepatitis B, Adult 09/02/2009, 09/29/2009    Influenza 10/26/2007, 09/19/2009, 09/07/2011, 01/15/2014, 09/23/2020    Influenza (FLUAD) - Quadrivalent - Adjuvanted - PF *Preferred* (65+) 11/18/2021    Influenza - Intradermal - Quadrivalent - PF 11/19/2012    Influenza - Intradermal - Trivalent - PF 11/19/2012    Influenza - Quadrivalent 11/13/2014    Influenza - Trivalent - Fluarix, Flulaval, Fluzone, Afluria - PF 11/01/2014, 11/01/2014, 02/09/2017, 02/09/2017, 01/01/2018, 01/01/2018, 02/10/2019    Influenza - Trivalent - Fluzone High Dose - PF (65 years and older) 09/29/2015, 02/09/2017, 01/26/2018, 02/20/2019, 11/27/2019, 09/23/2020    Influenza Split 10/26/2007, 09/19/2009, 09/07/2011, 01/15/2014, 01/15/2014    Pneumococcal Conjugate - 13 Valent 01/13/2016, 06/01/2018    Pneumococcal Polysaccharide  "- 23 Valent 11/28/2012, 02/09/2017    Tdap 04/23/2012, 08/01/2014    Typhoid - ViCPs 08/04/2014, 03/02/2020    Zoster 11/19/2012    Zoster Recombinant 04/30/2019, 04/30/2019, 07/15/2019       Review of Systems:  Review of Systems   All other systems reviewed and are negative.      Objective:    Vitals:  Vitals:    10/21/24 1004   BP: 134/70   Pulse: (!) 55   SpO2: 97%   Weight: 88.1 kg (194 lb 3.6 oz)   Height: 5' 8" (1.727 m)   PainSc:   1   PainLoc: Knee         Physical Exam  Constitutional:       General: He is not in acute distress.  HENT:      Head: Normocephalic and atraumatic.   Eyes:      Pupils: Pupils are equal, round, and reactive to light.   Cardiovascular:      Rate and Rhythm: Normal rate and regular rhythm.      Heart sounds: No murmur heard.     No friction rub.   Pulmonary:      Effort: Pulmonary effort is normal.      Breath sounds: Normal breath sounds.   Abdominal:      General: Bowel sounds are normal. There is no distension.      Palpations: Abdomen is soft.      Tenderness: There is no abdominal tenderness.   Musculoskeletal:      Cervical back: Neck supple.   Skin:     General: Skin is warm and dry.      Findings: No rash.   Psychiatric:         Behavior: Behavior normal.             Morteza Blevins MD  Family Medicine          "

## 2024-10-22 ENCOUNTER — PATIENT MESSAGE (OUTPATIENT)
Dept: ADMINISTRATIVE | Facility: HOSPITAL | Age: 74
End: 2024-10-22
Payer: MEDICARE

## 2024-10-29 ENCOUNTER — PATIENT OUTREACH (OUTPATIENT)
Dept: ADMINISTRATIVE | Facility: HOSPITAL | Age: 74
End: 2024-10-29
Payer: MEDICARE

## 2024-10-29 DIAGNOSIS — E11.9 DIABETES MELLITUS WITHOUT COMPLICATION: Primary | ICD-10-CM

## 2024-11-09 DIAGNOSIS — I15.2 HYPERTENSION ASSOCIATED WITH DIABETES: ICD-10-CM

## 2024-11-09 DIAGNOSIS — E11.59 HYPERTENSION ASSOCIATED WITH DIABETES: ICD-10-CM

## 2024-11-11 DIAGNOSIS — E11.59 HYPERTENSION ASSOCIATED WITH DIABETES: ICD-10-CM

## 2024-11-11 DIAGNOSIS — I15.2 HYPERTENSION ASSOCIATED WITH DIABETES: ICD-10-CM

## 2024-11-11 RX ORDER — LOSARTAN POTASSIUM 100 MG/1
100 TABLET ORAL DAILY
Qty: 90 TABLET | Refills: 2 | OUTPATIENT
Start: 2024-11-11

## 2024-11-11 RX ORDER — LOSARTAN POTASSIUM 100 MG/1
100 TABLET ORAL DAILY
Qty: 90 TABLET | Refills: 3 | Status: SHIPPED | OUTPATIENT
Start: 2024-11-11 | End: 2024-11-11 | Stop reason: CLARIF

## 2024-11-11 RX ORDER — LOSARTAN POTASSIUM 100 MG/1
100 TABLET ORAL DAILY
Qty: 90 TABLET | Refills: 3 | Status: SHIPPED | OUTPATIENT
Start: 2024-11-11

## 2024-11-11 NOTE — TELEPHONE ENCOUNTER
No care due was identified.  [facility] Embedded Care Due Messages. Reference number: [id number].   11/11/2024 10:24:27 AM CST

## 2024-11-11 NOTE — TELEPHONE ENCOUNTER
Quick DC. Duplicate Request-  med pended in previous encounter, awaiting assessment    Refill Authorization Note   Carlos Tenorio  is requesting a refill authorization.  Brief Assessment and Rationale for Refill:  Quick Discontinue  Medication Therapy Plan:       Medication Reconciliation Completed:  No      Comments:     Note composed:10:19 AM 11/11/2024

## 2024-11-11 NOTE — TELEPHONE ENCOUNTER
Refill Routing Note   Medication(s) are not appropriate for processing by [facility] for the following reason(s):        No active prescription written by provider    [facility] action(s):  Defer             Appointments  past 12m or future 3m with PCP    Date Provider   Last Visit   10/21/2024 Morteza Blevins MD   Next Visit   11/11/2024 Morteza Blevins MD   ED visits in past 90 days: 0        Note composed:10:51 AM 11/11/2024

## 2024-11-12 ENCOUNTER — LAB VISIT (OUTPATIENT)
Dept: LAB | Facility: HOSPITAL | Age: 74
End: 2024-11-12
Attending: UROLOGY
Payer: MEDICARE

## 2024-11-12 DIAGNOSIS — C61 PROSTATE CANCER: ICD-10-CM

## 2024-11-12 LAB — COMPLEXED PSA SERPL-MCNC: 4.1 NG/ML (ref 0–4)

## 2024-11-12 PROCEDURE — 84153 ASSAY OF PSA TOTAL: CPT | Performed by: UROLOGY

## 2024-11-12 PROCEDURE — 36415 COLL VENOUS BLD VENIPUNCTURE: CPT | Performed by: UROLOGY

## 2024-11-14 ENCOUNTER — OFFICE VISIT (OUTPATIENT)
Dept: DERMATOLOGY | Facility: CLINIC | Age: 74
End: 2024-11-14
Payer: MEDICARE

## 2024-11-14 DIAGNOSIS — T14.8XXA EXCORIATION: ICD-10-CM

## 2024-11-14 DIAGNOSIS — L82.1 SEBORRHEIC KERATOSIS: Primary | ICD-10-CM

## 2024-11-14 DIAGNOSIS — L57.0 AK (ACTINIC KERATOSIS): ICD-10-CM

## 2024-11-14 DIAGNOSIS — L21.9 SEBORRHEIC DERMATITIS: ICD-10-CM

## 2024-11-14 DIAGNOSIS — L57.8 ACTINIC SKIN DAMAGE: ICD-10-CM

## 2024-11-14 PROCEDURE — 4010F ACE/ARB THERAPY RXD/TAKEN: CPT | Mod: CPTII,S$GLB,, | Performed by: STUDENT IN AN ORGANIZED HEALTH CARE EDUCATION/TRAINING PROGRAM

## 2024-11-14 PROCEDURE — 1101F PT FALLS ASSESS-DOCD LE1/YR: CPT | Mod: CPTII,S$GLB,, | Performed by: STUDENT IN AN ORGANIZED HEALTH CARE EDUCATION/TRAINING PROGRAM

## 2024-11-14 PROCEDURE — 99213 OFFICE O/P EST LOW 20 MIN: CPT | Mod: 25,S$GLB,, | Performed by: STUDENT IN AN ORGANIZED HEALTH CARE EDUCATION/TRAINING PROGRAM

## 2024-11-14 PROCEDURE — 3061F NEG MICROALBUMINURIA REV: CPT | Mod: CPTII,S$GLB,, | Performed by: STUDENT IN AN ORGANIZED HEALTH CARE EDUCATION/TRAINING PROGRAM

## 2024-11-14 PROCEDURE — 1126F AMNT PAIN NOTED NONE PRSNT: CPT | Mod: CPTII,S$GLB,, | Performed by: STUDENT IN AN ORGANIZED HEALTH CARE EDUCATION/TRAINING PROGRAM

## 2024-11-14 PROCEDURE — 17003 DESTRUCT PREMALG LES 2-14: CPT | Mod: S$GLB,,, | Performed by: STUDENT IN AN ORGANIZED HEALTH CARE EDUCATION/TRAINING PROGRAM

## 2024-11-14 PROCEDURE — 3066F NEPHROPATHY DOC TX: CPT | Mod: CPTII,S$GLB,, | Performed by: STUDENT IN AN ORGANIZED HEALTH CARE EDUCATION/TRAINING PROGRAM

## 2024-11-14 PROCEDURE — 1160F RVW MEDS BY RX/DR IN RCRD: CPT | Mod: CPTII,S$GLB,, | Performed by: STUDENT IN AN ORGANIZED HEALTH CARE EDUCATION/TRAINING PROGRAM

## 2024-11-14 PROCEDURE — 1159F MED LIST DOCD IN RCRD: CPT | Mod: CPTII,S$GLB,, | Performed by: STUDENT IN AN ORGANIZED HEALTH CARE EDUCATION/TRAINING PROGRAM

## 2024-11-14 PROCEDURE — 3288F FALL RISK ASSESSMENT DOCD: CPT | Mod: CPTII,S$GLB,, | Performed by: STUDENT IN AN ORGANIZED HEALTH CARE EDUCATION/TRAINING PROGRAM

## 2024-11-14 PROCEDURE — 3044F HG A1C LEVEL LT 7.0%: CPT | Mod: CPTII,S$GLB,, | Performed by: STUDENT IN AN ORGANIZED HEALTH CARE EDUCATION/TRAINING PROGRAM

## 2024-11-14 PROCEDURE — 17000 DESTRUCT PREMALG LESION: CPT | Mod: S$GLB,,, | Performed by: STUDENT IN AN ORGANIZED HEALTH CARE EDUCATION/TRAINING PROGRAM

## 2024-11-14 NOTE — PATIENT INSTRUCTIONS

## 2024-11-14 NOTE — Clinical Note
Hi- I would like to get him scheduled for his second scalp PDT. He would like to be scheduled sometime in early January. Thanks!

## 2024-11-14 NOTE — PROGRESS NOTES
Subjective:      Patient ID:  Carlos Tenorio Jr. is a 74 y.o. male who presents for   Chief Complaint   Patient presents with    Spot     Left calf     LOV 5/13/24    Patient here today for face/scalp check  Complains of spot on right arm. Scratched it off.   Complains of spot on left calf x 1 month. Not bothersome.     Using ketoconazole cream for seb derm on face. States it helps.       PDT on scalp in 4/2024.    DERM HX:  Denies PHX of NMSC  Denies FHX of MM          Review of Systems   Constitutional:  Negative for fever, chills and fatigue.   Respiratory:  Negative for cough and shortness of breath.    Gastrointestinal:  Negative for nausea and vomiting.   Skin:  Positive for dry skin, activity-related sunscreen use and wears hat. Negative for itching, rash and daily sunscreen use.   Hematologic/Lymphatic: Bruises/bleeds easily (asa, plavix).       Objective:   Physical Exam   Constitutional: He appears well-developed and well-nourished.   Neurological: He is alert and oriented to person, place, and time.   Psychiatric: He has a normal mood and affect.   Skin:   Areas Examined (abnormalities noted in diagram):   Scalp / Hair Palpated and Inspected  Head / Face Inspection Performed  RUE Inspected  LLE Inspection Performed                     Diagram Legend     Erythematous scaling macule/papule c/w actinic keratosis       Vascular papule c/w angioma      Pigmented verrucoid papule/plaque c/w seborrheic keratosis      Yellow umbilicated papule c/w sebaceous hyperplasia      Irregularly shaped tan macule c/w lentigo     1-2 mm smooth white papules consistent with Milia      Movable subcutaneous cyst with punctum c/w epidermal inclusion cyst      Subcutaneous movable cyst c/w pilar cyst      Firm pink to brown papule c/w dermatofibroma      Pedunculated fleshy papule(s) c/w skin tag(s)      Evenly pigmented macule c/w junctional nevus     Mildly variegated pigmented, slightly irregular-bordered macule c/w  mildly atypical nevus      Flesh colored to evenly pigmented papule c/w intradermal nevus       Pink pearly papule/plaque c/w basal cell carcinoma      Erythematous hyperkeratotic cursted plaque c/w SCC      Surgical scar with no sign of skin cancer recurrence      Open and closed comedones      Inflammatory papules and pustules      Verrucoid papule consistent consistent with wart     Erythematous eczematous patches and plaques     Dystrophic onycholytic nail with subungual debris c/w onychomycosis     Umbilicated papule    Erythematous-base heme-crusted tan verrucoid plaque consistent with inflamed seborrheic keratosis     Erythematous Silvery Scaling Plaque c/w Psoriasis     See annotation      Assessment / Plan:        Seborrheic keratosis  These are benign inherited growths without a malignant potential. Reassurance given to patient. No treatment is necessary.     Seborrheic dermatitis  Continue keto cream BID     Actinic skin damage  - had PDT On scalp in may 2024, recommend repeat treatment- will send message to Dr. BIENVENIDO DIXON (actinic keratosis)  Cryosurgery Procedure Note    Verbal consent from the patient is obtained and the patient is aware of the precancerous quality and need for treatment of these lesions. Liquid nitrogen cryosurgery is applied to the 6 actinic keratoses, as detailed in the physical exam, to produce a freeze injury. The patient is aware that blisters may form and is instructed on wound care with gentle cleansing and use of vaseline ointment to keep moist until healed. The patient is supplied a handout on cryosurgery and is instructed to call if lesions do not completely resolve.    Excoriation  Left preauricular cheek  Recheck at f/u         Skin check at f/u (UBSE)  No follow-ups on file.

## 2024-11-18 ENCOUNTER — OFFICE VISIT (OUTPATIENT)
Dept: UROLOGY | Facility: CLINIC | Age: 74
End: 2024-11-18
Payer: MEDICARE

## 2024-11-18 VITALS — WEIGHT: 194.25 LBS | HEIGHT: 68 IN | BODY MASS INDEX: 29.44 KG/M2 | RESPIRATION RATE: 18 BRPM

## 2024-11-18 DIAGNOSIS — N40.1 BPH WITH OBSTRUCTION/LOWER URINARY TRACT SYMPTOMS: ICD-10-CM

## 2024-11-18 DIAGNOSIS — N13.8 BPH WITH OBSTRUCTION/LOWER URINARY TRACT SYMPTOMS: Primary | ICD-10-CM

## 2024-11-18 DIAGNOSIS — N39.0 RECURRENT UTI: ICD-10-CM

## 2024-11-18 DIAGNOSIS — C61 PROSTATE CANCER: Primary | ICD-10-CM

## 2024-11-18 DIAGNOSIS — N43.40 SPERMATOCELE: ICD-10-CM

## 2024-11-18 DIAGNOSIS — N13.8 BPH WITH OBSTRUCTION/LOWER URINARY TRACT SYMPTOMS: ICD-10-CM

## 2024-11-18 DIAGNOSIS — N20.0 KIDNEY STONES: ICD-10-CM

## 2024-11-18 DIAGNOSIS — N40.1 BPH WITH OBSTRUCTION/LOWER URINARY TRACT SYMPTOMS: Primary | ICD-10-CM

## 2024-11-18 PROCEDURE — 4010F ACE/ARB THERAPY RXD/TAKEN: CPT | Mod: CPTII,S$GLB,, | Performed by: UROLOGY

## 2024-11-18 PROCEDURE — 3288F FALL RISK ASSESSMENT DOCD: CPT | Mod: CPTII,S$GLB,, | Performed by: UROLOGY

## 2024-11-18 PROCEDURE — 3044F HG A1C LEVEL LT 7.0%: CPT | Mod: CPTII,S$GLB,, | Performed by: UROLOGY

## 2024-11-18 PROCEDURE — 3061F NEG MICROALBUMINURIA REV: CPT | Mod: CPTII,S$GLB,, | Performed by: UROLOGY

## 2024-11-18 PROCEDURE — 99215 OFFICE O/P EST HI 40 MIN: CPT | Mod: S$GLB,,, | Performed by: UROLOGY

## 2024-11-18 PROCEDURE — 1126F AMNT PAIN NOTED NONE PRSNT: CPT | Mod: CPTII,S$GLB,, | Performed by: UROLOGY

## 2024-11-18 PROCEDURE — 1159F MED LIST DOCD IN RCRD: CPT | Mod: CPTII,S$GLB,, | Performed by: UROLOGY

## 2024-11-18 PROCEDURE — 3066F NEPHROPATHY DOC TX: CPT | Mod: CPTII,S$GLB,, | Performed by: UROLOGY

## 2024-11-18 PROCEDURE — 3008F BODY MASS INDEX DOCD: CPT | Mod: CPTII,S$GLB,, | Performed by: UROLOGY

## 2024-11-18 PROCEDURE — G2211 COMPLEX E/M VISIT ADD ON: HCPCS | Mod: S$GLB,,, | Performed by: UROLOGY

## 2024-11-18 PROCEDURE — 99999 PR PBB SHADOW E&M-EST. PATIENT-LVL IV: CPT | Mod: PBBFAC,,, | Performed by: UROLOGY

## 2024-11-18 PROCEDURE — 99417 PROLNG OP E/M EACH 15 MIN: CPT | Mod: S$GLB,,, | Performed by: UROLOGY

## 2024-11-18 PROCEDURE — 1100F PTFALLS ASSESS-DOCD GE2>/YR: CPT | Mod: CPTII,S$GLB,, | Performed by: UROLOGY

## 2024-11-18 RX ORDER — TAMSULOSIN HYDROCHLORIDE 0.4 MG/1
0.8 CAPSULE ORAL NIGHTLY
Qty: 180 CAPSULE | Refills: 11 | Status: SHIPPED | OUTPATIENT
Start: 2024-11-18

## 2024-11-18 NOTE — PROGRESS NOTES
[facility]rology Progress Note     Carlos Tenorio Jr. is a 74 y.o. male who presents for f/u prostate ca (on AS), kidney stones, spermatocele, hx RCC, and UTIs     left robotic partial nephrectomy, with concurrent left renal cyst decortication on 8/2/17 for an upper pole 2-3cm exophytic enhancing renal mass, adjacent to larger simple renal cyst.   Pathology: [id number] clear cell RCC. 2.5cm. Negative margins. Grade 2. No sarcomatoid or rhabdoid features. Benign renal cyst  - has completed 3 yrs p/o surveillance  Hx calcium oxalate stones and uric acid stones in the past and has had prior bilateral ESWL 10 years prior.  By report, 24-hr urine 10/2012 had high oxalate and low urine pH. Takes allopurinol.  He did also have large spermatocele on the right, noted to be 5.1 cm on 4/20/17 ultrasound, and previously decreased in size to almost nothing after resuming TRT, which he did at 200mg Q2 wks 1 month after his partial nephrectomy. Has since enlarged. *off TRT since prostate ca dx     For interim doubling of his PSA from 2.2 to 4.4 after first 3 months of TRT with interval rise to 4.9 and recommended prostate biopsy.  TRUS bx 2/27/18. Vol 56.6. Path 14 cores benign. Elected to try Viagra again for his ED, as had not been using it properly/on empty stomach previously. Resumed TRT  6/19/18: PSA 3.9, good energy and libido with TRT and great erections without viagra. 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 No worsening LUTS  PSA then lisa on TRT again to 4.5 in Dec 2018, so send confirmMDx of 2/18 biopsy which was +LTZ indicating 28% chance finding prostate ca (19% low grade, 10% G7+)   3T MRI at [facility] 12/26/18: 58mL prostate with 3mm intravesical extension   PIRAD-3 index lesion: 8x6mm posterior lateral peripheral zone of mid gland on right. 14mm from midline and 2mm from capsule. No evidence of EPE.  - heterogenous on T2 with indistinct margins with no masslike lesion but focal moderately hypointense on ADC, mildly  hyperintense on DWI, and evaluation for masslike hyperemia --> Repeat   prostate biopsy 3/19/19: volume 66.6g. PATHOLOGY: 3+3=6 in 2 out of 17 cores: 5% LM medial, 10% LA lateral     After biopsy, noted AUA SS 1/0. Elected AS for his very low risk CaP. 100mg viagra Rxed to [facility]. Held TRT. Planned annual CaP and RCC surveillance with interim psa  1 yr active surveillance confirmatory prostate biopsy on 10/20/20 Previously positive at LM med, LA lat. Confirm MdX positive at LTZ. MRI with pirad3 lesion RM lateral.  TAHIRA 30-35 g smooth nonnodular with prominence of right apex and mild asymmetry  10/2020: VOLUME:  59.4cm3. PATH: 2/18 cores + (Left mid lateral: [place] score 3+4= 7; 80% (Pattern 4: 10%), 1/2 cores; Left mid medial: Diomedes score 3+3= 6; 20%, 1/2 cores)  - Still elected AS given low volume 4 component and age and comorbidities     5/2022:   In interim has started medical antibody infusion for early-onset Alzheimer's Adel, per Dr Garcia, managed by Dr Gregorio as part of trial which he was accepted into as this is only offered at [facility].  Therefore heme Onc is managing. Also had another interim cardiac stent on 5/10/22, and it was discussed that he needs 2 more.  Dr. Fierro in [place].  AUA symptom score:  5/2, mostly satisfied (2:  Frequency, sleeping; 1:  Urgency). His PSA remains stable at 5.8 on 4/29/22, be 5.0 6 months prior, and 6.0 one year ago  He did receive clearance after his last cardiac stent to have MRI of the prostate about 8 weeks later, and will continue to follow his cardiac status and upcoming interventions.  He does also have MRI of his head pending for further workup and neurologically. No interim kidney stones or flank pain.  X-ray from December 2021 reviewed noting stable lower pole left stones.  Still working ([employer]  admission status). Short-term memory has improved with recent treatment.  Has also been given p.r.n. s by PCP for anxiety related to his  memory      5/19/23: In interim had concerns for enlarging right spermatocele  Scrotal US on 1/31/23:   A 7.7 cm cyst or loculated hydrocele is noted along the lateral testicle.  This has increased in size from the prior study when it measured 6.4 cm.  Memory decline has slowed with aduhelm.  There was concern about his renal function by being on this medication, and reassurance provided there was minimal decline, which has normalized as per review of his renal function below with last cr 1.2/eGFR >60. He is still working instruciting at [employer]  PSA stable at 5.2 (prev 5.7 and 5.8 six and 12 mos prior)  KUB 5/12/23 without distinct visualization of stones previously visualized on prior KUB - on personal review, no distinct radiopacity seen, where previously had very small lower pole stone  AUA symptom score:  3/2 (2: Sleeping; 1: Frequency)  O/E: significant spermatocele/epididymal cyst displacing testes centrally, noted superolateral to the testis, nontender to palpation, larger than the testis itself.      4/29/24:  10/13/23 Cr 1.1, eGFR >60, PSA stable at 5.5, MRI prostate 40g PIRAD2  11/7/23 screening psa included with pcp labs of 6.0, UA micro with 1 rbc/7 wbc  3/7/24 pcp labs Cr 1.0, eGFR >60, HbA1c 5.1  4/24/24 PSA 6.9  AUA SS: 17/4 ( 5: Urgency; 3: Frequency, weak stream, sleeping; 2: Intermittency; 1: Straining)   In the interim since our last visit he is had 2 knee replacements, right on 12/13/23, and the last 1 was left on 4/1/24.  Since that time he has experienced low-grade fevers.  Ninety-nine today.    Second surgery was done with nerve block only ( questions spinal) as needed a Petty catheter and after his 1st procedure for 48 hours, as was unable to void, and after the 2nd procedure a Petty catheter was put in upfront and he was given Flomax attempts one-week course.  The 2nd Petty was in for 1.5 days   Has had bothersome urinary urgency lately.  Urinalysis dipstick is nitrite positive  today.    Also has concerns that  the weight of this spermatocele is such that he has spraying of urination requiring cleanup   As well his Alzheimer's have worsened in the interim.   Aduhelm infusion has stopped as he had a fall with subdural hematoma in December of 2023,  which prompted the need for knee replacements.  He has been on a different drug for his Alzheimer's since February because of the subdural hematoma   [facility] appointment pending to file for agent orange exposure for prostate cancer  Udip nit+  - Prior to his interim new surgeries with Petty catheter used x2, he has not had any voiding complaints, His urine does appear grossly infected today with nitrite positive urine correlating with his increases urinary urgency and I will go ahead and empirically start antibiotics, Cipro, and follow up final culture for sensitivities and adjust if needed.  Would plan 10 day course of antibiotics given the concern for possible smoldering UTI and perhaps prostatitis element given low-grade fever and PSA change. Should also discuss r/o DVT  - 90d flomax - 3 mos on again, then see 3 mos back off in 6 mos    In interim:  Ucx ESBL Ecoli so changed cipro (r) to bactrim of which his hive allergy was 30 yrs old to sulfa and willing to try and notify if rxn  FU Ucx 5/14/24 negative  Returned to see NP O'Za for uti concern 5/29/24: c/o UTI symptoms of fatigue, weakness, urinary frequency and urgency at this time.  He is with wife today.  UA in office showed-*ABNORMAL-would not even read via machine in office. PVR by bladder scan is 20 mL. Pt is able to tolerate Bactrim DS on last culture with no problems  - Bactrim DS BID x 14 days , continue floma  Ucx again ESBL EColi   8/4/24 message: I am having trouble making it to the bathroom in time and experiencing night time incontinence for the last few weeks. Can I increase the dosage on the flomax?  - advised ok to inc to 0.8mg=2 capsules daily, and check Ucx to r/o recurrent  "infection as source of leakage  8/12/24 UA/Ucx negative    He returns today noting  11/12/24 PSA is 4.1  10/23/24 HbA1c is 5.4, Cr 1.27, eGFR 59  Has increase Flomax since last visit to 0.8 mg nightly.  Still having bothersome nocturia.  Daytime symptoms improve.  AUA symptom score 8/3 (5: Sleeping; 1: Frequency, urgency, weak stream) medication helps 7/10  No snore.  Has cut back on fluids 7pm on, drinks water and coke. 1 can with dinner.  Plavix 3x/week, decreased bc of sig easy bleeding ie if cutting himself.   Spermatocele seems to be enlarging.  Only discomfort if missteps and compresses it     Reference Range  05/12/23 07:14 10/13/23 07:23 11/07/23 11:40 04/24/24 12:45 05/14/24 09:12 11/12/24 09:20   Prostate Specific Antigen 0.00 - 4.00    6.0 (H)      PSA Diagnostic 0.00 - 4.00  5.2 (H) 5.5 (H)  6.9 (H) 5.8 (H) 4.1 (H)     Focused Physical Exam:    Vitals:    11/18/24 1057   Resp: 18     Body mass index is 29.53 kg/m². Weight: 88.1 kg (194 lb 3.6 oz) Height: 5' 8" (172.7 cm)     Abdomen: Soft, non-tender, nondistended, no CVA tenderness    Recent Results (from the past 2 weeks)   Microalbumin/Creatinine Ratio, Urine    Collection Time: 11/12/24  9:13 AM   Result Value Ref Range    Microalbumin, Urine 25.5 0.0 - 4999.9 ug/mL    Creatinine, Urine 173.2 23.0 - 375.0 mg/dL    Microalb/Creat Ratio 14.7 0.0 - 30.0 ug/mg   Prostate Specific Antigen, Diagnostic    Collection Time: 11/12/24  9:20 AM   Result Value Ref Range    PSA Diagnostic 4.1 (H) 0.00 - 4.00 ng/mL       ASSESSMENT   1. Prostate cancer        2. BPH with obstruction/lower urinary tract symptoms  POCT Bladder Scan    Procedure Order to Urology      3. Recurrent UTI  Urinalysis    Urinalysis Microscopic    Urine culture    Procedure Order to Urology      4. Kidney stones  X-Ray Abdomen AP 1 View      5. Spermatocele            Plan    Regards to his prostate cancer, he has been stable on active surveillance.  Despite mild upgrading to [place] 7 with " minimal Diomedes 4 component, with his age and comorbidities elected to remain on active surveillance and PSA has remained stable. We did again review typical active surveillance recommendations of PSA every 6 months and alternating evaluations with MRI and biopsy and spacing these evaluations out every 2-3 years if PSA is stable, intervening sooner in the case of PSA rise.  PSA is actually slightly improved rather than continuing to rise, and with last pathology updated in 2020, and last MRI 1 year ago in 2023 with 40 g gland of which his PSA is now normal PSA density and MRI had no concerning lesions, can continue to follow PSA as a modified active surveillance versus watchful waiting.  Minimal concern on the standpoint of his low to favorable intermediate risk prostate cancer.  Will continue to follow PSA every 6 months    He has been urinary tract infection free since May where he had 2 bouts of ESBL E coli urinary tract infection, and increased dose of alpha blockers has facilitated good bladder emptying and no further UTI concerns.  Symptoms have improved daytime, but nocturia is still a significant concern.  We did review conservative recommendations for urgency and frequency and nocturia.  He has no snoring and no overt sleep apnea concerns.  Certainly if 1st step would be eliminating soda in the afternoon and evening hours, and conservative recommendations for urgency frequency and nocturia provided, he can also focus on eliminating all fluid intake 2 hours before bed and voiding just prior to bedtime.    We did review that urinary tract infection with prostate obstruction is an indication to relieve prostate obstruction, however he has been infection-free since increasing dose of alpha blockers.  However the need for increasing dose of alpha blockers to manage symptoms is also an indication, and certainly the long-term risk of higher doses of Flomax with dementia risk in his overall mental status and  concerns is a long-term concern, and he was interested in minimally invasive intervention to obviate the need for medical therapy.       As well, we reviewed he is out of the window of routine surveillance for his history of renal cell carcinoma T1a resected with negative margins, and at last evaluation KUB had stable lower pole stone, and has had no interim stone complaints or concerns so will continue to monitor this at further visits    After extensive discussion, would like to proceed with lower tract evaluation to help determine a reasonable approach for BPH intervention.  Cystoscopy and prostate ultrasound scheduled at the [facility] on 1/28/25 per pt request  One-week prior, with his history, will set lab visit for urinalysis and urine culture and also get a KUB at that time to check the stability of his kidney stone  Did advise would not do any spermatocele procedures concurrently with this evaluation, however at time of lower tract evaluation will re-evaluate this     Total time spent in/on encounter today, including face to face time with patient, counseling, medical record review, interpretation of tests/results, , and treatment plan coordination: 90 minutes    *Visit today included increased complexity associated with the current or anticipated ongoing medical longitudinal care and management related to this patient's serious and/or complex managed problem(s) as described above.     Knowledge and Beliefs

## 2024-11-18 NOTE — PATIENT INSTRUCTIONS
Discussed conservative measures to control urgency and frequency including   1. Avoiding/minimizing bladder irritants (see below), especially in afternoon and evening hours    Discussed bladder irritants include coffe (even decaf), tea, alcohol, soda, spicy foods, acidic juices (orange, tomato), vinegar, and artificial sweeteners/sugary beverages.    2. timed voiding - empty on a schedule (approx ~2-3 hours) in spite of need to urinate, to get ahead of urge    3. dont postponing voiding - dont hold it on purpose     4. bowel regimen as distended bowel has extrinsic compressive effect on bladder.   - any or all of the following in any combination, titrate to soft daily bowel movement without pushing or straining  - colace/stool softener capsule - once to twice daily  - miralax - 1 capful daily to start, can increase to 2x daily (or decrease to 1/2 cap daily)  - increase dietary fibery  - fiber supplements, such as metamucil  - prunes, prune juice    5. INCREASE water intake    6. Stop fluids 2 hours before bed, and urinate just before bed    7. Cystoscopy (camera in prostate and bladder through urethra/penis with the use of local anesthesia or numbing jelly) and prostate ultrasound transrectal, at the [facility] on 1/28/25.  Will get call Friday prior with your Tuesday afternoon arrival time.  No preparation or fasting is needed

## 2024-11-18 NOTE — PROGRESS NOTES
Procedure Order to Urology [[id number]]    Electronically signed by: Yovany Heart MD on 11/18/24 1133 Status: Active   Ordering user: Yovany Heart MD 11/18/24 1133 Authorized by: Yovany Heart MD   Ordering mode: Standard   Frequency:  11/18/24 -     Diagnoses  BPH with obstruction/lower urinary tract symptoms [N40.1, N13.8]  Recurrent UTI [N39.0]   Questionnaire    Question Answer   Procedure Cystoscopy Comment - 1/28  TRUS/Trans Rectal Ultrasound   Facility Name: [facility], Please order Urine POCT without micro . Local sedation (no urine Dr Riojas or Dr nelson) /trus

## 2025-01-08 ENCOUNTER — CLINICAL SUPPORT (OUTPATIENT)
Dept: DERMATOLOGY | Facility: CLINIC | Age: 75
End: 2025-01-08
Payer: MEDICARE

## 2025-01-08 DIAGNOSIS — L57.8 ACTINIC SKIN DAMAGE: ICD-10-CM

## 2025-01-08 PROCEDURE — 96574 DBRDMT PRMLG LES W/PDT: CPT | Mod: S$GLB,,, | Performed by: DERMATOLOGY

## 2025-01-16 NOTE — PROGRESS NOTES
PHOTODYNAMIC THERAPY PROCEDURE NOTE  Patient: Carlos eTnorio Jr.  YOB: 1950  ATTENDING PHYSICIAN: Zoe Salgado MD     PROCEDURE: photodynamic therapy for the treatment of refractory actinic keratosis    SKIN PREP: Acetone was applied to the skin vigorously with roughly woven gauze to degrease the epidermis and allow penetration of the aminolevulinic acid solution. This gauze was used to debride the skin surface until actinic keratoses were de-roofed with subsequent bleeding of heavily sun damaged areas.    LOCATION: scalp    PDT solution: 1 Levulan Kerastick(s) was(were) applied to the area, NDC: 67982-519-57     INCUBATION TIME: 180 minutes allowed for PDT solution to penetrate into actinic keratoses    PROCEDURE:     After discussion of risks, benefits, and limitations of photodynamic therapy, patient placed in seated position with Sean-U light approximately 4 inches from the treatment area. Protective goggles/glasses were placed on the patient to protect the eyes from the intense light of the device. The timer was set to 17:30 and the physician activated the device. The patient was given a fan to assist with burning during the procedure. Any intensely burning areas were dabbed with cool saline gauze. They were also given a call button to summon assistance from the nursing station if any issues were experienced during treatment.     After treatment, they were provided with a broad brimmed hat if they did not bring one. The patient tolerated the procedure well, and will follow up in 2 weeks for a post PDT wound check, and assessment of weather a second PDT treatment would be beneficial for the area. Patient instructed not to get any additional sun exposure for 2 days post-op. Patient instructed to keep aloe or vaseline in the fridge and apply liberally as needed for discomfort.    Patient provided with written discharge instructions and instructed to call clinic for any concerns.        Zoe Salgado MD

## 2025-01-23 ENCOUNTER — HOSPITAL ENCOUNTER (OUTPATIENT)
Dept: RADIOLOGY | Facility: HOSPITAL | Age: 75
Discharge: HOME | End: 2025-01-23
Attending: UROLOGY
Payer: MEDICARE

## 2025-01-23 DIAGNOSIS — N20.0 KIDNEY STONES: ICD-10-CM

## 2025-01-23 PROCEDURE — 74018 RADEX ABDOMEN 1 VIEW: CPT | Mod: 26,,, | Performed by: RADIOLOGY

## 2025-01-23 PROCEDURE — 74018 RADEX ABDOMEN 1 VIEW: CPT | Mod: TC

## 2025-01-24 NOTE — DISCHARGE INSTRUCTIONS
After the procedure    Drink plenty of fluids.  You may have burning or light bleeding when you urinate--this is normal.  Medications may be prescribed to ease any discomfort or prevent infection. Take these as directed.  Call your doctor if you have heavy bleeding or blood clots, burning that lasts more than a day, a fever over 100°F  (38° C), or trouble urinating.    After Surgery:  Always be aware that any surgery can cause these symptoms:    Pain- Medication can be prescribed for pain to decrease your pain but may not completely take your pain away.  Over the Counter pain medicine my be enough and you can always use Ice and rest to help ease pain.    Bleeding- a little bleeding after a surgery is usually within normal.  If there is a lot of blood you need to notify your MD.  Emergency treatments of bleeding are cold application, elevation of the bleeding site and compression.    Infection- Infection after surgery is NOT a normal occurrence.  Signs of infection are fever, swelling, hot to touch the incision.  If this occurs notify your MD immediately.    Nausea- this can be common after a surgery especially if you have had anesthesia medicine or are taking pain medicine.  Staying on clear liquids, bland foods, gingerale, or over the counter anti nausea medicines can help.  If you vomit more than once, notify your MD.  Anti Nausea medicines can be prescribed.        Transrectal Ultrasound   A transrectal ultrasound is an imaging test. It uses sound waves to create pictures of a mans prostate gland to determine its size.Your prostate gland is in front of your rectum and if too large may become inflamed and impede the flow of urine. For this test, a special probe (transducer) is placed directly into your rectum.     Before leaving, you may need to wait for a short time while the images are reviewed. In most cases, you can go back to your normal routine after the test.

## 2025-01-27 ENCOUNTER — HOSPITAL ENCOUNTER (OUTPATIENT)
Dept: RADIOLOGY | Facility: HOSPITAL | Age: 75
Discharge: HOME | End: 2025-01-27
Attending: UROLOGY
Payer: MEDICARE

## 2025-01-27 ENCOUNTER — TELEPHONE (OUTPATIENT)
Dept: UROLOGY | Facility: CLINIC | Age: 75
End: 2025-01-27
Payer: MEDICARE

## 2025-01-27 DIAGNOSIS — N22 CALCULUS OF URINARY TRACT IN DISEASES CLASSIFIED ELSEWHERE: Primary | ICD-10-CM

## 2025-01-27 DIAGNOSIS — N22 CALCULUS OF URINARY TRACT IN DISEASES CLASSIFIED ELSEWHERE: ICD-10-CM

## 2025-01-27 PROCEDURE — 74176 CT ABD & PELVIS W/O CONTRAST: CPT | Mod: TC

## 2025-01-27 PROCEDURE — 74176 CT ABD & PELVIS W/O CONTRAST: CPT | Mod: 26,,, | Performed by: RADIOLOGY

## 2025-01-27 NOTE — TELEPHONE ENCOUNTER
Pt for cysto/trus tomorrow  Ucx neg    BUT kub has concern for ureteral stone, and ua has 22 white cells without infection    Procedure postponed to Friday afternoon IF ct is negative  Needs CT stone protocol by Wednesday (ordered stat in case no slots) to assess if this is a ureteral stone  If stone in ureter, would cancel cysto/trus pending further stone management recs

## 2025-01-28 NOTE — TELEPHONE ENCOUNTER
Can let patient know that despite concerns on x-ray and his urine, CT is negative for ureteral stones or active stone episode.  Can proceed with procedure on Friday.  Will get call with the arrival time, but will be early morning.

## 2025-01-29 ENCOUNTER — TELEPHONE (OUTPATIENT)
Dept: SURGERY | Facility: HOSPITAL | Age: 75
End: 2025-01-29

## 2025-01-29 NOTE — OR NURSING
"Patients wife took his instructions for Friday with Dr Heart.  She states that yesterday he has some nausea, vomiting diarrhea.  No fever.  He is today tolerating fluids and no symptoms.  She also stated "yesterday he slid out of the bed and landed on his bottom.  So a little fall".  She states he had no injuries, got up with assistance but has been using a cane for steadiness since.  Said they did not go to the ER.   "

## 2025-01-29 NOTE — TELEPHONE ENCOUNTER
"Just an FYI      Patients wife took his instructions for Friday with Dr Heart.  She states that yesterday he has some nausea, vomiting diarrhea.  No fever.  He is today tolerating fluids and no symptoms.  She also stated "yesterday he slid out of the bed and landed on his bottom.  So a little fall".  She states he had no injuries, got up with assistance but has been using a cane for steadiness since.  Said they did not go to the ER.  "

## 2025-01-29 NOTE — TELEPHONE ENCOUNTER
With active nausea vomiting diarrhea, as well as a fall, would not proceed with the elective procedure in 2 days.    Please reschedule to one-week out, Friday morning 2/7/25, and advise him to seek medical attention if his above symptoms do not resolve in the interim.

## 2025-01-30 NOTE — TELEPHONE ENCOUNTER
Vianey Riojas, RN  You7 hours ago (6:22 AM)     TJ  I called the patient and let them know they were moved and to the date.

## 2025-02-04 ENCOUNTER — OFFICE VISIT (OUTPATIENT)
Dept: DERMATOLOGY | Facility: CLINIC | Age: 75
End: 2025-02-04
Payer: MEDICARE

## 2025-02-04 DIAGNOSIS — L21.9 SEBORRHEIC DERMATITIS: Primary | ICD-10-CM

## 2025-02-04 DIAGNOSIS — L57.0 ACTINIC KERATOSIS: ICD-10-CM

## 2025-02-04 PROCEDURE — 4010F ACE/ARB THERAPY RXD/TAKEN: CPT | Mod: CPTII,S$GLB,, | Performed by: DERMATOLOGY

## 2025-02-04 PROCEDURE — 99213 OFFICE O/P EST LOW 20 MIN: CPT | Mod: S$GLB,,, | Performed by: DERMATOLOGY

## 2025-02-04 NOTE — PROGRESS NOTES
Dermatology Outpatient Clinic Progress Note    Patient Name: Carlos Tenorio Jr.  Patient : 1950  MRN: [id number]  Date of Service: 2025    CC/HPI: Carlos Tenorio Jr. is a 75 y.o. year old male with history of actinic keratosis who presents today for 2 week PDT scalp.  Patient reports no issues    ROS:   Dermatological ROS: positive for skin lesion changes    Physical Exam   Head   Head comments: Scalp - Actinic keratoses much improved and smooth on exam today - no further treatment indicated    Face - seborrheic deramtitis flaring despite ketoconaozle use           Diagram Legend     Erythematous scaling macule/papule c/w actinic keratosis       Vascular papule c/w angioma      Pigmented verrucoid papule/plaque c/w seborrheic keratosis      Yellow umbilicated papule c/w sebaceous hyperplasia      Irregularly shaped tan macule c/w lentigo     1-2 mm smooth white papules consistent with Milia      Movable subcutaneous cyst with punctum c/w epidermal inclusion cyst      Subcutaneous movable cyst c/w pilar cyst      Firm pink to brown papule c/w dermatofibroma      Pedunculated fleshy papule(s) c/w skin tag(s)      Evenly pigmented macule c/w junctional nevus     Mildly variegated pigmented, slightly irregular-bordered macule c/w mildly atypical nevus      Flesh colored to evenly pigmented papule c/w intradermal nevus       Pink pearly papule/plaque c/w basal cell carcinoma      Erythematous hyperkeratotic cursted plaque c/w SCC      Surgical scar with no sign of skin cancer recurrence      Open and closed comedones      Inflammatory papules and pustules      Verrucoid papule consistent consistent with wart     Erythematous eczematous patches and plaques     Dystrophic onycholytic nail with subungual debris c/w onychomycosis     Umbilicated papule    Erythematous-base heme-crusted tan verrucoid plaque consistent with inflamed seborrheic keratosis     Erythematous Silvery Scaling Plaque c/w Psoriasis      See annotation    Assessment/Plan:    Diagnoses and all orders for this visit:    Seborrheic dermatitis  - flaring despite ketoconazole use  - add hydrocortisone 1% cream BID to AA and follow up with primary derm provider if not imrpoving    Actinic keratosis  - much improved s/p PDT x2 follow up with primary derm for further eval        Zoe Salgado MD FAAD

## 2025-02-07 ENCOUNTER — HOSPITAL ENCOUNTER (OUTPATIENT)
Facility: HOSPITAL | Age: 75
Discharge: HOME | End: 2025-02-07
Attending: UROLOGY | Admitting: UROLOGY
Payer: MEDICARE

## 2025-02-07 DIAGNOSIS — N40.1 BPH WITH OBSTRUCTION/LOWER URINARY TRACT SYMPTOMS: ICD-10-CM

## 2025-02-07 DIAGNOSIS — N13.8 BPH WITH OBSTRUCTION/LOWER URINARY TRACT SYMPTOMS: ICD-10-CM

## 2025-02-07 DIAGNOSIS — N40.1 BPH WITH OBSTRUCTION/LOWER URINARY TRACT SYMPTOMS: Primary | ICD-10-CM

## 2025-02-07 DIAGNOSIS — N13.8 BPH WITH OBSTRUCTION/LOWER URINARY TRACT SYMPTOMS: Primary | ICD-10-CM

## 2025-02-07 LAB
BILIRUBIN, POC UA: NEGATIVE
BLOOD, POC UA: NEGATIVE
CLARITY, UA: CLEAR
COLOR, UA: NORMAL
GLUCOSE, POC UA: NEGATIVE
KETONES, POC UA: NEGATIVE
LEUKOCYTE EST, POC UA: NEGATIVE
NITRITE, POC UA: NEGATIVE
PH UR STRIP: 6 [PH] (ref 5–8)
PROTEIN, POC UA: NEGATIVE
SPECIFIC GRAVITY, POC UA: 1.02 (ref 1–1.03)
UROBILINOGEN, POC UA: 0.2 E.U./DL

## 2025-02-07 PROCEDURE — 25000003 PHARM REV CODE 250: Performed by: UROLOGY

## 2025-02-07 PROCEDURE — 52000 CYSTOURETHROSCOPY: CPT | Performed by: UROLOGY

## 2025-02-07 PROCEDURE — 76872 US TRANSRECTAL: CPT | Mod: 26,,, | Performed by: UROLOGY

## 2025-02-07 PROCEDURE — 52000 CYSTOURETHROSCOPY: CPT | Mod: ,,, | Performed by: UROLOGY

## 2025-02-07 PROCEDURE — 76872 US TRANSRECTAL: CPT | Performed by: UROLOGY

## 2025-02-07 RX ORDER — SULFAMETHOXAZOLE AND TRIMETHOPRIM 800; 160 MG/1; MG/1
1 TABLET ORAL 2 TIMES DAILY
Qty: 4 TABLET | Refills: 0 | Status: SHIPPED | OUTPATIENT
Start: 2025-02-07 | End: 2025-02-09

## 2025-02-07 RX ORDER — LIDOCAINE HYDROCHLORIDE 20 MG/ML
JELLY TOPICAL
Status: DISCONTINUED | OUTPATIENT
Start: 2025-02-07 | End: 2025-02-07 | Stop reason: HOSPADM

## 2025-02-07 RX ORDER — CEFAZOLIN SODIUM 2 G/50ML
2 SOLUTION INTRAVENOUS
OUTPATIENT
Start: 2025-02-07

## 2025-02-07 NOTE — H&P
[facility] Urology Progress Note     Carlos Tenorio Jr. is a 74 y.o. male who presents for f/u prostate ca (on AS), kidney stones, spermatocele, hx RCC, and UTIs     left robotic partial nephrectomy, with concurrent left renal cyst decortication on 8/2/17 for an upper pole 2-3cm exophytic enhancing renal mass, adjacent to larger simple renal cyst.   Pathology: [id number] clear cell RCC. 2.5cm. Negative margins. Grade 2. No sarcomatoid or rhabdoid features. Benign renal cyst  - has completed 3 yrs p/o surveillance  Hx calcium oxalate stones and uric acid stones in the past and has had prior bilateral ESWL 10 years prior.  By report, 24-hr urine 10/2012 had high oxalate and low urine pH. Takes allopurinol.  He did also have large spermatocele on the right, noted to be 5.1 cm on 4/20/17 ultrasound, and previously decreased in size to almost nothing after resuming TRT, which he did at 200mg Q2 wks 1 month after his partial nephrectomy. Has since enlarged. *off TRT since prostate ca dx     For interim doubling of his PSA from 2.2 to 4.4 after first 3 months of TRT with interval rise to 4.9 and recommended prostate biopsy.  TRUS bx 2/27/18. Vol 56.6. Path 14 cores benign. Elected to try Viagra again for his ED, as had not been using it properly/on empty stomach previously. Resumed TRT  6/19/18: PSA 3.9, good energy and libido with TRT and great erections without viagra. 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 No worsening LUTS  PSA then lisa on TRT again to 4.5 in Dec 2018, so send confirmMDx of 2/18 biopsy which was +LTZ indicating 28% chance finding prostate ca (19% low grade, 10% G7+)   3T MRI at [facility] 12/26/18: 58mL prostate with 3mm intravesical extension   PIRAD-3 index lesion: 8x6mm posterior lateral peripheral zone of mid gland on right. 14mm from midline and 2mm from capsule. No evidence of EPE.  - heterogenous on T2 with indistinct margins with no masslike lesion but focal moderately hypointense on ADC, mildly  hyperintense on DWI, and evaluation for masslike hyperemia --> Repeat   prostate biopsy 3/19/19: volume 66.6g. PATHOLOGY: 3+3=6 in 2 out of 17 cores: 5% LM medial, 10% LA lateral     After biopsy, noted AUA SS 1/0. Elected AS for his very low risk CaP. 100mg viagra Rxed to [facility]. Held TRT. Planned annual CaP and RCC surveillance with interim psa  1 yr active surveillance confirmatory prostate biopsy on 10/20/20 Previously positive at LM med, LA lat. Confirm MdX positive at LTZ. MRI with pirad3 lesion RM lateral.  TAHIRA 30-35 g smooth nonnodular with prominence of right apex and mild asymmetry  10/2020: VOLUME:  59.4cm3. PATH: 2/18 cores + (Left mid lateral: [place] score 3+4= 7; 80% (Pattern 4: 10%), 1/2 cores; Left mid medial: [place] score 3+3= 6; 20%, 1/2 cores)  - Still elected AS given low volume 4 component and age and comorbidities     5/2022:   In interim has started medical antibody infusion for early-onset Alzheimer's Adel, per Dr Garcia, managed by Dr Gregorio as part of trial which he was accepted into as this is only offered at [facility].  Therefore heme Onc is managing. Also had another interim cardiac stent on 5/10/22, and it was discussed that he needs 2 more.  Dr. Fierro in [place].  AUA symptom score:  5/2, mostly satisfied (2:  Frequency, sleeping; 1:  Urgency). His PSA remains stable at 5.8 on 4/29/22, be 5.0 6 months prior, and 6.0 one year ago  He did receive clearance after his last cardiac stent to have MRI of the prostate about 8 weeks later, and will continue to follow his cardiac status and upcoming interventions.  He does also have MRI of his head pending for further workup and neurologically. No interim kidney stones or flank pain.  X-ray from December 2021 reviewed noting stable lower pole left stones.  Still working ([facility]  admission status). Short-term memory has improved with recent treatment.  Has also been given p.r.n. s by PCP for anxiety related to his  memory      5/19/23: In interim had concerns for enlarging right spermatocele  Scrotal US on 1/31/23:   A 7.7 cm cyst or loculated hydrocele is noted along the lateral testicle.  This has increased in size from the prior study when it measured 6.4 cm.  Memory decline has slowed with aduhelm.  There was concern about his renal function by being on this medication, and reassurance provided there was minimal decline, which has normalized as per review of his renal function below with last cr 1.2/eGFR >60. He is still working instruciting at [employer]  PSA stable at 5.2 (prev 5.7 and 5.8 six and 12 mos prior)  KUB 5/12/23 without distinct visualization of stones previously visualized on prior KUB - on personal review, no distinct radiopacity seen, where previously had very small lower pole stone  AUA symptom score:  3/2 (2: Sleeping; 1: Frequency)  O/E: significant spermatocele/epididymal cyst displacing testes centrally, noted superolateral to the testis, nontender to palpation, larger than the testis itself.      4/29/24:  10/13/23 Cr 1.1, eGFR >60, PSA stable at 5.5, MRI prostate 40g PIRAD2  11/7/23 screening psa included with pcp labs of 6.0, UA micro with 1 rbc/7 wbc  3/7/24 pcp labs Cr 1.0, eGFR >60, HbA1c 5.1  4/24/24 PSA 6.9  AUA SS: 17/4 ( 5: Urgency; 3: Frequency, weak stream, sleeping; 2: Intermittency; 1: Straining)   In the interim since our last visit he is had 2 knee replacements, right on 12/13/23, and the last 1 was left on 4/1/24.  Since that time he has experienced low-grade fevers.  Ninety-nine today.    Second surgery was done with nerve block only ( questions spinal) as needed a Petty catheter and after his 1st procedure for 48 hours, as was unable to void, and after the 2nd procedure a Petty catheter was put in upfront and he was given Flomax attempts one-week course.  The 2nd Petty was in for 1.5 days   Has had bothersome urinary urgency lately.  Urinalysis dipstick is nitrite positive  today.    Also has concerns that  the weight of this spermatocele is such that he has spraying of urination requiring cleanup   As well his Alzheimer's have worsened in the interim.   Aduhelm infusion has stopped as he had a fall with subdural hematoma in December of 2023,  which prompted the need for knee replacements.  He has been on a different drug for his Alzheimer's since February because of the subdural hematoma   [facility] appointment pending to [facility] for agent orange exposure for prostate cancer  Udip nit+  - Prior to his interim new surgeries with Petty catheter used x2, he has not had any voiding complaints, His urine does appear grossly infected today with nitrite positive urine correlating with his increases urinary urgency and I will go ahead and empirically start antibiotics, Cipro, and follow up final culture for sensitivities and adjust if needed.  Would plan 10 day course of antibiotics given the concern for possible smoldering UTI and perhaps prostatitis element given low-grade fever and PSA change. Should also discuss r/o DVT  - 90d flomax - 3 mos on again, then see 3 mos back off in 6 mos     In interim:  Ucx ESBL Ecoli so changed cipro (r) to bactrim of which his hive allergy was 30 yrs old to sulfa and willing to try and notify if rxn  FU Ucx 5/14/24 negative  Returned to see NP O'Za for uti concern 5/29/24: c/o UTI symptoms of fatigue, weakness, urinary frequency and urgency at this time.  He is with wife today.  UA in office showed-*ABNORMAL-would not even read via machine in office. PVR by bladder scan is 20 mL. Pt is able to tolerate Bactrim DS on last culture with no problems  - Bactrim DS BID x 14 days , continue floma  Ucx again ESBL EColi   8/4/24 message: I am having trouble making it to the bathroom in time and experiencing night time incontinence for the last few weeks. Can I increase the dosage on the flomax?  - advised ok to inc to 0.8mg=2 capsules daily, and check Ucx to r/o recurrent  "infection as source of leakage  8/12/24 UA/Ucx negative     He returns today noting  11/12/24 PSA is 4.1  10/23/24 HbA1c is 5.4, Cr 1.27, eGFR 59  Has increase Flomax since last visit to 0.8 mg nightly.  Still having bothersome nocturia.  Daytime symptoms improve.  AUA symptom score 8/3 (5: Sleeping; 1: Frequency, urgency, weak stream) medication helps 7/10  No snore.  Has cut back on fluids 7pm on, drinks water and coke. 1 can with dinner.  Plavix 3x/week, decreased bc of sig easy bleeding ie if cutting himself.   Spermatocele seems to be enlarging.  Only discomfort if missteps and compresses it       Reference Range  05/12/23 07:14 10/13/23 07:23 11/07/23 11:40 04/24/24 12:45 05/14/24 09:12 11/12/24 09:20   Prostate Specific Antigen 0.00 - 4.00      6.0 (H)         PSA Diagnostic 0.00 - 4.00  5.2 (H) 5.5 (H)   6.9 (H) 5.8 (H) 4.1 (H)      Focused Physical Exam:         Vitals:     11/18/24 1057   Resp: 18      Body mass index is 29.53 kg/m². Weight: 88.1 kg (194 lb 3.6 oz) Height: 5' 8" (172.7 cm)      Abdomen: Soft, non-tender, nondistended, no CVA tenderness     Recent Results         Recent Results (from the past 2 weeks)   Microalbumin/Creatinine Ratio, Urine     Collection Time: 11/12/24  9:13 AM   Result Value Ref Range     Microalbumin, Urine 25.5 0.0 - 4999.9 ug/mL     Creatinine, Urine 173.2 23.0 - 375.0 mg/dL     Microalb/Creat Ratio 14.7 0.0 - 30.0 ug/mg   Prostate Specific Antigen, Diagnostic     Collection Time: 11/12/24  9:20 AM   Result Value Ref Range     PSA Diagnostic 4.1 (H) 0.00 - 4.00 ng/mL            ASSESSMENT   1. Prostate cancer          2. BPH with obstruction/lower urinary tract symptoms  POCT Bladder Scan     Procedure Order to Urology       3. Recurrent UTI  Urinalysis     Urinalysis Microscopic     Urine culture     Procedure Order to Urology       4. Kidney stones  X-Ray Abdomen AP 1 View       5. Spermatocele                   Plan  Regards to his prostate cancer, he has been stable " on active surveillance.  Despite mild upgrading to [place] 7 with minimal [place] 4 component, with his age and comorbidities elected to remain on active surveillance and PSA has remained stable. We did again review typical active surveillance recommendations of PSA every 6 months and alternating evaluations with MRI and biopsy and spacing these evaluations out every 2-3 years if PSA is stable, intervening sooner in the case of PSA rise.  PSA is actually slightly improved rather than continuing to rise, and with last pathology updated in 2020, and last MRI 1 year ago in 2023 with 40 g gland of which his PSA is now normal PSA density and MRI had no concerning lesions, can continue to follow PSA as a modified active surveillance versus watchful waiting.  Minimal concern on the standpoint of his low to favorable intermediate risk prostate cancer.  Will continue to follow PSA every 6 months     He has been urinary tract infection free since May where he had 2 bouts of ESBL E coli urinary tract infection, and increased dose of alpha blockers has facilitated good bladder emptying and no further UTI concerns.  Symptoms have improved daytime, but nocturia is still a significant concern.  We did review conservative recommendations for urgency and frequency and nocturia.  He has no snoring and no overt sleep apnea concerns.  Certainly if 1st step would be eliminating soda in the afternoon and evening hours, and conservative recommendations for urgency frequency and nocturia provided, he can also focus on eliminating all fluid intake 2 hours before bed and voiding just prior to bedtime.     We did review that urinary tract infection with prostate obstruction is an indication to relieve prostate obstruction, however he has been infection-free since increasing dose of alpha blockers.  However the need for increasing dose of alpha blockers to manage symptoms is also an indication, and certainly the long-term risk of higher doses  "of Flomax with dementia risk in his overall mental status and concerns is a long-term concern, and he was interested in minimally invasive intervention to obviate the need for medical therapy.       As well, we reviewed he is out of the window of routine surveillance for his history of renal cell carcinoma T1a resected with negative margins, and at last evaluation KUB had stable lower pole stone, and has had no interim stone complaints or concerns so will continue to monitor this at further visits     After extensive discussion, would like to proceed with lower tract evaluation to help determine a reasonable approach for BPH intervention.  Cystoscopy and prostate ultrasound scheduled at the [facility] on 1/28/25 per pt request  One-week prior, with his history, will set lab visit for urinalysis and urine culture and also get a KUB at that time to check the stability of his kidney stone  Did advise would not do any spermatocele procedures concurrently with this evaluation, however at time of lower tract evaluation will re-evaluate this          Esbl ecoili uti 5/2924 sens to bactrim nf ert gent merrm    Neg ucx 8/12/24 and 1/23/24  Kub 1/23/25 6 mm calcification identified in the right mid abdomen may represent ingested material or a right ureteral stone   FU CT 1/27/25  Multiple small nonobstructing bilateral renal stones with no hydronephrosis, opaque ureteral stone or ureteral obstruction. Left renal scarring/postoperative change.     Moved procedure to 1/31  [facility] 1/29 noted  She states that yesterday he has some nausea, vomiting diarrhea. No fever. He is today tolerating fluids and no symptoms. She also stated "yesterday he slid out of the bed and landed on his bottom. So a little fall"     With active nausea vomiting diarrhea, as well as a fall, would not proceed with the elective procedure in 2 days.  Please reschedule to one-week out, Friday morning 2/7/25, and advise him to seek medical attention if his above " symptoms do not resolve in the interim.

## 2025-02-07 NOTE — PLAN OF CARE
Stable, states ready to go home, shellie po fluids, denies pain, voided,  talked to pt and wife, ambulated to car to self care

## 2025-02-07 NOTE — PROGRESS NOTES
Procedure Order to Urology [[id number]]    Electronically signed by: Yovany Heart MD on 02/07/25 0823 Status: Active   Ordering user: Yovany Heart MD 02/07/25 0823 Ordering provider: Yovany Heart MD   Authorized by: Yovany Heart MD Ordering mode: Standard   Frequency:  02/07/25 -     Acknowledged: Zenobia Antonio RN 02/07/25 0824 for Placing Order   Diagnoses  BPH with obstruction/lower urinary tract symptoms [N40.1, N13.8]   Questionnaire    Question Answer   Procedure Urolift Comment - 3/20   Facility Name: [facility] please order, CBC, BMP, PT/ INR ,U/A and culture ,EKG if over 40  IV start, NPO, general anesthesia, Ancef 2 gram ( alternative for pcn allergy is Cipro 400mg IV ) [id number] and [id number]

## 2025-02-08 NOTE — OP NOTE
[facility] Urology Op Note/Brief DC Summary     Date: 2/7/25     Staff Surgeon: Yovany Heart MD     Pre-Op Diagnosis:   BPH with LUTS   Prostate cancer on AS     Post-Op Diagnosis:   same     Procedure(s) Performed:   Cystoscopy, flexible  Transrectal ultrasound guided volumetric measurement of prostate     Specimen(s): none     Anesthesia: local, 2% xylocaine jelly urojet     Findings:   Volume 49.1 cm3 (W 52.4 mm, H 34.64 mm, L 51.73 mm), no median lobe or ELVI           Cysto: significant Lateral lobe obstruction both proximally and distally than proximally, moderate diffuse trabeculations throughout bladder with scattered small cellules posteriorly and at bladder base, mild impression of prostate at bladder base with no sig ELVI and no median lobe     Estimated Blood Loss: none     Drains: none     Complications: none     Indications for procedure:  76 yo M with complex urologic as well as complex medical history, including previous RCC with partial nephrectomy, history of kidney stones, prostate cancer on active surveillance with stable PSA, as well as BPH/LUTS which he has been on alpha blockers, requiring increasing doses of Flomax, get in the last year has had some recurrent ESBL E coli urinary tract infections, for which he has been free of after increasing dose of Flomax and completing treatment.  Updated cross-sectional imaging given KUB concerns finds no ureteral or obstructing stones, and he presents today for lower tract evaluation to help guide further recommendations for BPH interventions given UTI on Flomax 0.4 mg dose, and improvement on Flomax 0.8 mg dose yet history of infection with obstruction, and also with baseline dementia being managed, would like to avoid future UTIs and minimize need for alpha blockers which can contribute from long-term use    Procedure in detail:  After informed consent, the patient was placed in left lateral decubitus position. Transrectal ultrasound probe was passed  into the rectum and the prostate was visualized on the screen.  Three-dimensional measurements taken as above with reported prostate volume as documented.  Pertinent ultrasound findings noted above, with absence of median lobe, and No ultrasonic abnormalities of prostate were visualized. Transverse and saggital image captured.  The ultrasound probe was removed     He was then placed in supine position and prepped and drapped in standard cystoscopic fashion and 2% xylocaine jelly was instilled into the urethra.  A flexible cystoscope was passed into the bladder via the urethra.      Anterior urethra with lichen sclerosis changes as above. The prostatic urethra demonstrated significant obstruction as described above in findings, with lateral lobe obstruction present with the absence of median lobe, as confirmed on retroflexion      Bladder otherwise systematically inspected and no mucosal lesions or tumors seen.  Bladder was also assessed for signs of chronic obstruction such as trabeculations, cellules, diverticulum, of which pertinent findings are noted above with extensive signs of chronic obstruction.  Bilateral ureteral orifices seen in orthotopic position on trigone bilaterally with clear efflux.       Patient tolerated the procedure well. No complications     Disposition:  Reviewed findings of significant prostate obstruction.  Some relief with increased dose of alpha blockers but has had UTI despite alpha-blocker, and is being managed for dementia/Alzheimer's, which long-term Flomax use can contribute to.  Would benefit from relief of obstruction.  As well and borderline gland size, would not have significant benefit of further medical therapy by adding finasteride and is on active surveillance for prostate cancer so would prefer to eliminate meds.    Discussed options for intervention such as urolift, rezum, turp. He is interested in minimally invasive intervention with minimal destruction of prostate tissue  to minimize side effects and other complications and more formal surgical procedures such as stricture or bladder neck contracture, or incontinence.  As well, preference to avoid tissue destruction of prostate in the setting of further monitoring and active surveillance for prostate cancer.  Discuss UroLift utility in the setting as implants are MRI safe, and does not destroy prostate tissue.  He has all lateral lobe obstruction in his a good candidate.       After discussion of all procedures, he elected to proceed with Urolift after discussion of all risks, benefits, and alternatives.   No allergy to nickel. No uti/prostatitis history.  With more classic lateral lobe obstruction and tissue contact with flow of irrigation off, likely to yield relief of obstruction     Procedure in detail discussed. Discussed risks including but not limited to hematuria, postop retention, pain, infection, injury to bladder or urethra, and worsening urgency, latent pelvic pain, no guarantee of symptomatic relief, prostate regrowth, need for future procedures. We did discuss the non-incidence of ejaculatory dysfunction, as well as the 13% recurrence rate at 5 years. Also discussed about 20% of people may need a catheter after (due to retention or hematuria) but not required if voiding postop, though tend to leave leo prophylactically overnight, which he is agreeable to.       In regards to recurrence risk of minimally invasive interventions without tissue destruction, especially noted the direct contribution of testosterone replacement on growth of prostate and worsening of obstructive lower urinary tract symptoms, however he is 75 with a prostate gland of moderate size only 49 g without significant growth over time, and therefore quite reasonable to do minimally invasive BPH intervention and does understand the options on the table in the future should he have recurrence.     Discussed that symptomatic relief may take longer to be  fully appreciated, in the setting of longer standing obstruction, and period of symptomatic adjustment postop. Also discussed transient increases in urgency frequency which may yield transient UUI in postop period.      All questions patient had answered and appropriate informed consent obtained.  Urolift in OR 3/20/25 as patient will need cardiac clearance prior.    He did have recent significant viral illness with nausea vomiting diarrhea as well as a few falls.  Noticed some swelling of the lower extremities and feet.  His wife was in contact with the cardiologist, Dr. Fierro, who gave a 3 day course of Lasix which completed today.  As well in the recent past has updated his anticoagulation and Plavix so just 3 times per week.  He has cardiology follow-up with Dr. Fierro on 2/13/25, so will cc provider for preoperative cardiac clearance and any workup and clearance at his discretion as well as ability to hold anticoagulation perioperatively        Discharge home today status post uncomplicated procedure as above  Diet - resume home diet  Follow up: Urolift 3/20/25  - cards clear Dr Fierro  Instructions:  Drink plenty of water, may see blood in urine, complete prophylactic antibiotics.   Hold asa/fish oil/Araceli 1 week prior  Meds:     Medication List        START taking these medications      sulfamethoxazole-trimethoprim 800-160mg 800-160 mg Tab  Commonly known as: BACTRIM DS  Take 1 tablet by mouth 2 (two) times daily. for 2 days            CONTINUE taking these medications      allopurinoL 100 MG tablet  Commonly known as: ZYLOPRIM  TAKE 1 TABLET(100 MG) BY MOUTH EVERY DAY     amLODIPine 2.5 MG tablet  Commonly known as: NORVASC  Take 1 tablet (2.5 mg total) by mouth every evening.     atorvastatin 40 MG tablet  Commonly known as: LIPITOR  TAKE 1 TABLET(40 MG) BY MOUTH EVERY DAY     CALCIUM 500 + D 500 mg-5 mcg (200 unit) per tablet  Generic drug: calcium-vitamin D3     ciclopirox 8 % Soln  Commonly known as:  PENLAC  Apply topically nightly.     clopidogreL 75 mg tablet  Commonly known as: PLAVIX  Take medication by mouth 3 times weekly     donepeziL 5 MG tablet  Commonly known as: ARICEPT     ezetimibe 10 mg tablet  Commonly known as: ZETIA  Take 1 tablet (10 mg total) by mouth once daily.     ferrous sulfate 325 mg (65 mg iron) Tab tablet  Commonly known as: FEOSOL     folic acid 1 MG tablet  Commonly known as: FOLVITE  Take 1 tablet (1 mg total) by mouth once daily.     gabapentin 300 MG capsule  Commonly known as: NEURONTIN  Take 1 capsule (300 mg total) by mouth every evening.     ipratropium 21 mcg (0.03 %) nasal spray  Commonly known as: ATROVENT  2 sprays by Each Nostril route 3 (three) times daily as needed for Rhinitis.     ketoconazole 2 % cream  Commonly known as: NIZORAL  Apply topically 2 (two) times daily.     multivit-min-FA-lycopen-lutein 0.4 mg-300 mcg- 250 mcg Tab     nitroGLYCERIN 0.4 MG SL tablet  Commonly known as: NITROSTAT     olmesartan 40 MG tablet  Commonly known as: BENICAR  Take 1 tablet (40 mg total) by mouth once daily. REPLACES LOSARTAN.     ranolazine 500 MG Tb12  Commonly known as: RANEXA     sertraline 50 MG tablet  Commonly known as: ZOLOFT     tamsulosin 0.4 mg Cap  Commonly known as: FLOMAX  Take 2 capsules (0.8 mg total) by mouth every evening.     vitamin B12-folic acid 500-400 mcg Tab     vitamin D 1000 units Tab  Commonly known as: VITAMIN D3               Where to Get Your Medications        These medications were sent to [id number] - [place] - [address] AT [facility] [address]  [address] [address]      Phone: [phone]   sulfamethoxazole-trimethoprim 800-160mg 800-160 mg Tab

## 2025-02-10 VITALS
DIASTOLIC BLOOD PRESSURE: 75 MMHG | HEART RATE: 60 BPM | HEIGHT: 68 IN | OXYGEN SATURATION: 98 % | TEMPERATURE: 98 F | SYSTOLIC BLOOD PRESSURE: 150 MMHG | WEIGHT: 194.25 LBS | RESPIRATION RATE: 20 BRPM | BODY MASS INDEX: 29.44 KG/M2

## 2025-02-22 DIAGNOSIS — I15.2 HYPERTENSION ASSOCIATED WITH DIABETES: ICD-10-CM

## 2025-02-22 DIAGNOSIS — E11.59 HYPERTENSION ASSOCIATED WITH DIABETES: ICD-10-CM

## 2025-02-23 RX ORDER — OLMESARTAN MEDOXOMIL 40 MG/1
TABLET ORAL
Qty: 90 TABLET | Refills: 3 | Status: SHIPPED | OUTPATIENT
Start: 2025-02-23

## 2025-02-23 NOTE — TELEPHONE ENCOUNTER
No care due was identified.  [facility] Embedded Care Due Messages. Reference number: [id number].   2/23/2025 11:43:24 AM CST

## 2025-02-23 NOTE — TELEPHONE ENCOUNTER
Refill Routing Note   Medication(s) are not appropriate for processing by [facility] for the following reason(s):      Required vitals abnormal    [facility] action(s):  Defer Care Due:  None identified            Appointments  past 12m or future 3m with PCP    Date Provider   Last Visit   10/21/2024 Morteza Blevins MD   Next Visit   4/3/2025 Morteza Blevins MD   ED visits in past 90 days: 0        Note composed:11:44 AM 02/23/2025

## 2025-02-28 DIAGNOSIS — N20.0 RECURRENT KIDNEY STONES: ICD-10-CM

## 2025-03-03 RX ORDER — ALLOPURINOL 100 MG/1
100 TABLET ORAL DAILY
Qty: 90 TABLET | Refills: 3 | Status: SHIPPED | OUTPATIENT
Start: 2025-03-03

## 2025-03-03 NOTE — TELEPHONE ENCOUNTER
Please approve for allopurinoL (ZYLOPRIM) 100 MG tablet     Last OV 10/21/24  Next appt 04/21/25    Patient comment: Dr. Blevins,  Should Vlad be taking this?  Has been on it for years but now it has no refills.

## 2025-03-10 ENCOUNTER — HOSPITAL ENCOUNTER (OUTPATIENT)
Dept: PREADMISSION TESTING | Facility: HOSPITAL | Age: 75
Discharge: HOME | End: 2025-03-10
Attending: UROLOGY
Payer: MEDICARE

## 2025-03-10 ENCOUNTER — TELEPHONE (OUTPATIENT)
Dept: UROLOGY | Facility: CLINIC | Age: 75
End: 2025-03-10
Payer: MEDICARE

## 2025-03-10 DIAGNOSIS — Z01.818 PREOP TESTING: Primary | ICD-10-CM

## 2025-03-10 LAB
APTT PPP: 24.4 SEC (ref 21–32)
INR PPP: 1 (ref 0.8–1.2)
PROTHROMBIN TIME: 11.1 SEC (ref 9–12.5)

## 2025-03-10 PROCEDURE — 36415 COLL VENOUS BLD VENIPUNCTURE: CPT | Performed by: ANESTHESIOLOGY

## 2025-03-10 PROCEDURE — 85610 PROTHROMBIN TIME: CPT | Performed by: ANESTHESIOLOGY

## 2025-03-10 PROCEDURE — 85730 THROMBOPLASTIN TIME PARTIAL: CPT | Performed by: ANESTHESIOLOGY

## 2025-03-10 RX ORDER — FUROSEMIDE 20 MG/1
20 TABLET ORAL DAILY PRN
COMMUNITY
Start: 2025-02-18

## 2025-03-10 NOTE — TELEPHONE ENCOUNTER
Cardio cl    Per Dr Fierro   Patient has been cleared for surgery from cardiac standpoint   If needed can hold plavix 5 days     Scanned to media

## 2025-03-10 NOTE — DISCHARGE INSTRUCTIONS
To confirm, Your doctor has instructed you that surgery is scheduled for: 3/20/25 WITH DR. FARMER    Please report to [facility], Registration the morning of surgery. You must check-in and receive a wristband before going to your procedure.  [address] [place] [address] [place] [address]    Pre-Op will call the afternoon prior to surgery between 1:00 and 6:00 PM with the final arrival time.  Phone number: [phone]    PLEASE NOTE:  The surgery schedule has many variables which may affect the time of your surgery case.  Family members should be available if your surgery time changes.  Plan to be here the day of your procedure between 4-6 hours.    MEDICATIONS:  TAKE ONLY THESE MEDICATIONS WITH A SMALL SIP OF WATER THE MORNING OF YOUR PROCEDURE:  SEE MED LIST      DO NOT TAKE THESE MEDICATIONS 5-7 DAYS PRIOR to your procedure or per your surgeon's request:   ASPIRIN, ALEVE, ADVIL, IBUPROFEN, FISH OIL VITAMIN E, HERBALS  (May take Tylenol)    ONLY if you are prescribed any types of blood thinners such as:  Aspirin, Coumadin, Plavix, Pradaxa, Xarelto, Aggrenox, Effient, Eliquis, Savasya, Brilinta, or any other, ask your surgeon whether you should stop taking them and how long before surgery you should stop.  You may also need to verify with the prescribing physician if it is ok to stop your medication.      INSTRUCTIONS IMPORTANT!!  Do not eat or drink anything between midnight and the time of your procedure- this includes gum, mints, and candy.  EXCEPT: you may have clear liquids such as:  WATER, BLACK COFFEE, UNSWEET TEA, OR GATORADE (NO RED OR PURPLE) UP TO 2 HOURS PRIOR TO YOUR ARRIVAL TIME.  Do not smoke or drink alcoholic beverages 24 hours prior to your procedure.  Shower the night before AND the morning of your procedure with a Chlorhexidine wash such as Hibiclens or Dial antibacterial soap from the neck down.  Do not get it on your face or in your eyes.  You may use your own shampoo and face  wash. This helps your skin to be as bacteria free as possible.    If you wear contact lenses, dentures, hearing aids or glasses, bring a container to put them in during surgery and give to a family member for safe keeping.  Please leave all jewelry, piercing's and valuables at home. You must remove your false eyelashes prior to surgery.    DO NOT remove hair from the surgery site.  Do not shave the incision site unless you are given specific instructions to do so.    ONLY if you have been diagnosed with sleep apnea please bring your C-PAP machine.  ONLY if you wear home oxygen please bring your portable oxygen tank the day of your procedure.  ONLY if you have a history of OPEN HEART SURGERY you will need a clearance from your Cardiologist per Anesthesia.      ONLY for patients requiring bowel prep, written instructions will be given by your doctor's office.  ONLY if you have a neuro stimulator, please bring the controller with you the morning of surgery  ONLY if a type and screen test is needed before surgery, please return:  If your doctor has scheduled you for an overnight stay, bring a small overnight bag with any personal items you need.  Make arrangements in advance for transportation home by a responsible adult. You can not go home in an uber or a cab per hospital policy.  It is not safe to drive a vehicle during the 24 hours after anesthesia.          All  facilities and properties are tobacco free.  Smoking is NOT allowed.   If you have any questions about these instructions, call Pre-Op Admit  Nursing at [phone] or the Pre-Op Day Surgery Unit at [phone].

## 2025-03-19 ENCOUNTER — PATIENT MESSAGE (OUTPATIENT)
Dept: UROLOGY | Facility: CLINIC | Age: 75
End: 2025-03-19
Payer: MEDICARE

## 2025-03-24 DIAGNOSIS — Z00.00 ENCOUNTER FOR MEDICARE ANNUAL WELLNESS EXAM: ICD-10-CM

## 2025-04-03 ENCOUNTER — LAB VISIT (OUTPATIENT)
Dept: LAB | Facility: HOSPITAL | Age: 75
End: 2025-04-03
Attending: FAMILY MEDICINE
Payer: MEDICARE

## 2025-04-03 ENCOUNTER — OFFICE VISIT (OUTPATIENT)
Dept: FAMILY MEDICINE | Facility: CLINIC | Age: 75
End: 2025-04-03
Payer: MEDICARE

## 2025-04-03 VITALS
BODY MASS INDEX: 30.56 KG/M2 | DIASTOLIC BLOOD PRESSURE: 68 MMHG | WEIGHT: 201.63 LBS | RESPIRATION RATE: 16 BRPM | SYSTOLIC BLOOD PRESSURE: 153 MMHG | HEIGHT: 68 IN | TEMPERATURE: 99 F | OXYGEN SATURATION: 99 % | HEART RATE: 62 BPM

## 2025-04-03 DIAGNOSIS — N40.0 BENIGN PROSTATIC HYPERPLASIA, UNSPECIFIED WHETHER LOWER URINARY TRACT SYMPTOMS PRESENT: ICD-10-CM

## 2025-04-03 DIAGNOSIS — I70.422 ATHEROSCLEROSIS OF AUTOLOGOUS VEIN BYPASS GRAFT(S) OF THE EXTREMITIES WITH REST PAIN, LEFT LEG: Primary | ICD-10-CM

## 2025-04-03 DIAGNOSIS — I20.9 ANGINA PECTORIS: ICD-10-CM

## 2025-04-03 DIAGNOSIS — N18.31 CHRONIC KIDNEY DISEASE, STAGE 3A: ICD-10-CM

## 2025-04-03 DIAGNOSIS — R10.9 RIGHT FLANK PAIN: ICD-10-CM

## 2025-04-03 DIAGNOSIS — N22 CALCULUS OF URINARY TRACT IN DISEASES CLASSIFIED ELSEWHERE: ICD-10-CM

## 2025-04-03 DIAGNOSIS — E78.2 MIXED HYPERLIPIDEMIA: ICD-10-CM

## 2025-04-03 DIAGNOSIS — I15.2 HYPERTENSION ASSOCIATED WITH DIABETES: ICD-10-CM

## 2025-04-03 DIAGNOSIS — G30.0 ALZHEIMER'S DISEASE WITH EARLY ONSET (CODE): ICD-10-CM

## 2025-04-03 DIAGNOSIS — C61 PROSTATE CANCER: ICD-10-CM

## 2025-04-03 DIAGNOSIS — F32.A DEPRESSION, UNSPECIFIED DEPRESSION TYPE: ICD-10-CM

## 2025-04-03 DIAGNOSIS — E11.3213 TYPE 2 DIABETES MELLITUS WITH BOTH EYES AFFECTED BY MILD NONPROLIFERATIVE RETINOPATHY AND MACULAR EDEMA, WITHOUT LONG-TERM CURRENT USE OF INSULIN: ICD-10-CM

## 2025-04-03 DIAGNOSIS — E11.59 HYPERTENSION ASSOCIATED WITH DIABETES: ICD-10-CM

## 2025-04-03 PROCEDURE — 1159F MED LIST DOCD IN RCRD: CPT | Mod: CPTII,S$GLB,, | Performed by: FAMILY MEDICINE

## 2025-04-03 PROCEDURE — 3288F FALL RISK ASSESSMENT DOCD: CPT | Mod: CPTII,S$GLB,, | Performed by: FAMILY MEDICINE

## 2025-04-03 PROCEDURE — G2211 COMPLEX E/M VISIT ADD ON: HCPCS | Mod: S$GLB,,, | Performed by: FAMILY MEDICINE

## 2025-04-03 PROCEDURE — 99999 PR PBB SHADOW E&M-EST. PATIENT-LVL V: CPT | Mod: PBBFAC,,, | Performed by: FAMILY MEDICINE

## 2025-04-03 PROCEDURE — 3078F DIAST BP <80 MM HG: CPT | Mod: CPTII,S$GLB,, | Performed by: FAMILY MEDICINE

## 2025-04-03 PROCEDURE — 3077F SYST BP >= 140 MM HG: CPT | Mod: CPTII,S$GLB,, | Performed by: FAMILY MEDICINE

## 2025-04-03 PROCEDURE — 1100F PTFALLS ASSESS-DOCD GE2>/YR: CPT | Mod: CPTII,S$GLB,, | Performed by: FAMILY MEDICINE

## 2025-04-03 PROCEDURE — 1160F RVW MEDS BY RX/DR IN RCRD: CPT | Mod: CPTII,S$GLB,, | Performed by: FAMILY MEDICINE

## 2025-04-03 PROCEDURE — 99214 OFFICE O/P EST MOD 30 MIN: CPT | Mod: S$GLB,,, | Performed by: FAMILY MEDICINE

## 2025-04-03 PROCEDURE — 4010F ACE/ARB THERAPY RXD/TAKEN: CPT | Mod: CPTII,S$GLB,, | Performed by: FAMILY MEDICINE

## 2025-04-03 PROCEDURE — 1126F AMNT PAIN NOTED NONE PRSNT: CPT | Mod: CPTII,S$GLB,, | Performed by: FAMILY MEDICINE

## 2025-04-03 PROCEDURE — 81001 URINALYSIS AUTO W/SCOPE: CPT

## 2025-04-03 RX ORDER — ALFUZOSIN HYDROCHLORIDE 10 MG/1
10 TABLET, EXTENDED RELEASE ORAL
Qty: 30 TABLET | Refills: 11 | Status: SHIPPED | OUTPATIENT
Start: 2025-04-03 | End: 2026-04-03

## 2025-04-03 RX ORDER — AMLODIPINE BESYLATE 5 MG/1
5 TABLET ORAL NIGHTLY
Qty: 90 TABLET | Refills: 3 | Status: SHIPPED | OUTPATIENT
Start: 2025-04-03 | End: 2026-04-03

## 2025-04-03 RX ORDER — RANOLAZINE 500 MG/1
500 TABLET, EXTENDED RELEASE ORAL 2 TIMES DAILY
Qty: 180 TABLET | Refills: 3 | Status: SHIPPED | OUTPATIENT
Start: 2025-04-03

## 2025-04-03 RX ORDER — FLUOXETINE 10 MG/1
10 CAPSULE ORAL DAILY
Qty: 30 CAPSULE | Refills: 11 | Status: SHIPPED | OUTPATIENT
Start: 2025-04-03 | End: 2026-04-03

## 2025-04-03 NOTE — PROGRESS NOTES
THIS DOCUMENT WAS MADE IN PART WITH VOICE RECOGNITION SOFTWARE.  OCCASIONALLY THIS SOFTWARE WILL MISINTERPRET WORDS OR PHRASES.    Assessment and Plan:    1. 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 Hypertension associated with diabetes  amLODIPine (NORVASC) 5 MG tablet      3. Prostate cancer  Ambulatory referral/consult to Urology      4. Alzheimer's disease with early onset (CODE)        5. Type 2 diabetes mellitus with both eyes affected by mild nonproliferative retinopathy and macular edema, without long-term current use of insulin        6. Chronic kidney disease, stage 3a        7. Mixed hyperlipidemia  Hemoglobin A1c    Comprehensive metabolic panel      8. Right flank pain        9. Benign prostatic hyperplasia, unspecified whether lower urinary tract symptoms present  Ambulatory referral/consult to Urology    alfuzosin (UROXATRAL) 10 mg Tb24      10. Angina pectoris  ranolazine (RANEXA) 500 MG Tb12      11. Calculus of urinary tract in diseases classified elsewhere  CT Renal Stone Study ABD Pelvis WO    Urinalysis, Reflex to Urine Culture    Urinalysis Microscopic      12. Depression, unspecified depression type  FLUoxetine 10 MG capsule              PLAN    Assessment & Plan    PLAN SUMMARY:   Ordered urine test and non-fasting lab panel   Ordered CT Kidneys to evaluate for obstructing stones   Increased Amlodipine dose from 2.5 mg to 5 mg daily, to be taken at night   Refilled Ranexa for 90 days with 3 refills   Prescribed topical lidocaine (Aspercreme with lidocaine) for neuropathy symptoms   Prescribed Fluoxetine (Prozac) 10 mg daily for depression management   Prescribed Alfuzosin (Uroxatral) to help facilitate stone passage and manage BPH   Discontinued Sertraline (Zoloft) and Tamsulosin (Flomax)   Continued atorvastatin, ezetimibe, clopidogrel, Ranexa, nitroglycerin tablets, Gabapentin, and Olmesartan   Referred to Dr. Walters  (urologist) for ongoing urological care and kidney stone follow-up   Carlos to get tetanus vaccine at local pharmacy   Follow-up in 2-3 weeks with Eden to assess BP control and medication efficacy    HYPERTENSION:   Evaluated the patient's blood pressure control as suboptimal, with home readings fluctuating between 125 and 173.   Increased Amlodipine dose from 2.5 mg to 5 mg daily, to be taken at night.   Continued Olmesartan at the current dose.   Instructed the patient to take medications at different times of the day.   Educated the patient on the importance of consistent BP control for overall health.   Set target blood pressure consistently in the 120s over 70s.   Scheduled follow up in 2-3 weeks with Eden to assess BP control and medication efficacy.   Advised the patient to contact the office for BP readings if concerns arise before scheduled follow-up.    ANGINA PECTORIS:   Confirmed the patient denies current issues with chest discomfort.   Discussed the mechanism of preventing chest pain from heart disease.   Continued Ranexa (ranolazine) for angina prevention.   Refilled Ranexa for 90 days with 3 refills.    MAJOR DEPRESSIVE DISORDER:   Noted worsening of depression while on Sertraline (Zoloft).   Discontinued Sertraline (Zoloft) due to ineffectiveness.   Prescribed Fluoxetine (Prozac) 10 mg daily for depression management.    POLYNEUROPATHY:   Noted improvement in neuropathy symptoms with previous treatment, but recent recurrence of foot complaints.   Continued Gabapentin for neuropathy symptoms.   Prescribed topical lidocaine (Aspercreme with lidocaine) in addition to current Gabapentin for neuropathy symptoms.    KIDNEY STONES:   Noted right-sided flank pain for about 2 months, with no hematuria or dysuria.   Observed pain on palpation of the right flank during physical exam.   Reviewed previous renal stone study from January showing multiple non-obstructing stones, small renal scarring,  left renal cyst, and renal mass.   Considered possibility of a kidney stone dropping into the ureter, causing symptoms.   Ordered CT Kidneys to evaluate for obstructing stones.   Ordered urine test.   Prescribed Alfuzosin (Uroxatral) to help facilitate stone passage.   Referred to Dr. Walters (urologist) for follow-up on kidney stones.    BENIGN   PROSTATIC HYPERPLASIA:   Noted frequent urination, likely due to enlarged prostate.   Switched from Tamsulosin (Flomax) to Alfuzosin (Uroxatral) for BPH management due to concerns about memory impairment associated with Flomax.   Explained potential benefits of Alfuzosin, particularly regarding reduced risk of memory impairment.   Referred to Dr. Walters (urologist) for ongoing urological care and follow-up on prostate issues.    PREDIABETES:   Noted excellent recent A1C level, with the last one at 5.4.   Assessed that prediabetes is unlikely to progress to diabetes based on current A1C levels.   Ordered labs (non-fasting panel).   Planned to continue monitoring A1C levels.    OBSTRUCTIVE SLEEP APNEA:   Noted history of sleep apnea.   Observed that the patient is no longer using CPAP.    HISTORY OF RENAL CELL CANCER:   Noted history of renal cell cancer on the left side, removed in 2017.   Planned to refer the patient to a new urologist, Dr. Walters, for ongoing monitoring and care.    HISTORY OF PROSTATE CANCER:   Noted history of prostate cancer.   Planned to refer the patient to a new urologist, Dr. Walters, for ongoing monitoring and care.    CORONARY ARTERY DISEASE:   Noted history of 7 stents and 7 bypasses.   Continued atorvastatin, ezetimibe (Zetia), clopidogrel (Plavix), Ranexa, and nitroglycerin tablets for heart condition management.   Refilled Ranexa for 90 days with 3 refills.    PREVENTIVE CARE:   Carlos to get tetanus vaccine at local pharmacy (covered by Medicare Part D).   Carlos to consider getting new COVID vaccine based on personal risk assessment.            Visit today included increased complexity associated with the care of the episodic problem noted above addressed and managing the longitudinal care of the patient due to the serious and/or complex managed problem(s) .    ______________________________________________________________________  Subjective:    Chief Complaint:  Chief Complaint   Patient presents with    Follow-up        HPI:  Carlos is a 75 y.o. year old       .epsub  History of Present Illness    CHIEF COMPLAINT:  Carlos presents today for follow up with concerns about worsening memory    NEUROLOGICAL:  He reports worsening neuropathy symptoms over the past couple months after having previous improvement with medication.    MENTAL HEALTH:  He reports worsening depressive symptoms without improvement despite being on a medium dose of Sertraline (Zoloft).    UROLOGICAL:  He reports right-sided flank pain for about 2 months without blood in urine or burning with urination. January renal stone study showed multiple non-obstructing stones, small renal scarring, left renal cyst, and renal mass. He has history of kidney stones with last occurrence over 10 years ago, renal cell cancer on left side removed in 2017, prostate cancer, large prostate, and CO6 spermatocele cyst.    CARDIOVASCULAR:  He has history of seven stents and seven bypasses. He denies current chest discomfort. Home blood pressure readings have been variable, ranging from 125 to 173 systolic.    OTHER MEDICAL HISTORY:  He has pre-diabetes with recent excellent A1C level. He has history of sleep apnea but is no longer using CPAP. He denies recent gout flares.      ROS:  ROS as indicated in HPI.             History of gout   Rx-allopurinol 100 mg  Prev flare  years ago      Essential hypertension  Rx-amlodipine 2.5 mg, losartan 100 mg  Normotensive in clinic   Home : in normal range     Coronary artery disease   Rx-atorvastatin 40 mg, Zetia 10 mg, Plavix 75 mg, Ranexa, NTG  Cardiology -  MD Vadim   TUYET : x 7   CABG : 7  Denies exertional chest symptoms      Diastolic heart failure     Obstructive sleep apnea  Prev used CPAP --> GRADUATED      Memory impairment  Rx : Aricept   Prev Rx : Aduhelm (bleeding)  ---> secondary neuropathy (treated with gabapentin)   Neurology : Curtis --- > MD Diane      History diabetes / Prediab  Rx- metformin   Previous A1c- 5.4       Elevated PSA --> Prostate Cancer   Urology : MD Sly   Tx : Active Monitoring   Previous PSA 6.0% ---> 4.6     Renal mass --> Renal Cell Cancer (Left Side)   Urology : MD Sly   Sx : partial nephrectomy with robot (2017)  No RTX / Chemo     History of Nephroliths  Several  Prev episode > 10 years ago      Large Spermatocele Cyst  Monitored by urology      Cystic Acne   Location : back      Hearing Aid Status    Depression   Rx : Fluoxetine   Prev : Zoloft (not effective)         Past Medical History:  Past Medical History:   Diagnosis Date    Arthritis     knees, back    Bilateral renal cysts 07/30/2012    CAD (coronary artery disease) 1995    no chest pain since CABG, sees Dr Larry Black    Dementia     Diabetes mellitus     borderline    Hypertension     Kidney stones     uric acid stones    VASYL (obstructive sleep apnea) 2007    is compliant with CPAP    Primary gonadal failure        Past Surgical History:  Past Surgical History:   Procedure Laterality Date    BACK SURGERY  2010    L5-6 fusion, with pins,bone graft    cardiac stents      2 stents 12/2021 and 1/2022    CARDIAC SURGERY  1995, 2007    total 7 vessel bypass    CAROTID ARTERY ANGIOPLASTY  01/22/2023    1 stent posterior descending    COLONOSCOPY  2008    repeat 2013    COLONOSCOPY N/A 04/04/2017    Procedure: COLONOSCOPY;  Surgeon: Dmitry Sampson MD;  Location: [facility] ENDO;  Service: Endoscopy;  Laterality: N/A;    CORONARY ARTERY BYPASS GRAFT  1995, 2007    cabgx3, cabgx5    CORONARY STENT PLACEMENT  10/2022    CORONARY STENT PLACEMENT  11/2022    CYSTOSCOPY N/A  2025    Procedure: CYSTOSCOPY;  Surgeon: Yovany Heart MD;  Location: [facility] OR;  Service: Urology;  Laterality: N/A;    EXTRACORPOREAL SHOCK WAVE LITHOTRIPSY      EYE SURGERY      cataracts bilateral    LITHOTRIPSY      LUMBAR LAMINECTOMY      Partial Nephrectomy Laproscopic      TRANSRECTAL BIOPSY OF PROSTATE WITH ULTRASOUND GUIDANCE N/A 2019    Procedure: BIOPSY, PROSTATE, RECTAL APPROACH, WITH US GUIDANCE;  Surgeon: Yovany Heart MD;  Location: [facility] OR;  Service: Urology;  Laterality: N/A;    TRANSRECTAL BIOPSY OF PROSTATE WITH ULTRASOUND GUIDANCE N/A 10/20/2020    Procedure: BIOPSY, PROSTATE, RECTAL APPROACH, WITH US GUIDANCE;  Surgeon: Yovany Heart MD;  Location: [facility] OR;  Service: Urology;  Laterality: N/A;    TRANSRECTAL ULTRASOUND EXAMINATION N/A 2025    Procedure: ULTRASOUND, RECTAL APPROACH;  Surgeon: Yovany Heart MD;  Location: [facility] OR;  Service: Urology;  Laterality: N/A;       Family History:  Family History   Problem Relation Name Age of Onset    Heart disease Mother      Hypertension Mother      Diabetes Mother      Alzheimer's disease Mother      Alzheimer's disease Father      Heart disease Father          premature    Hypertension Father      Diabetes Sister      Urolithiasis Sister      Alzheimer's disease Sister      Heart disease Paternal Uncle      Cancer Neg Hx      Prostate cancer Neg Hx      Kidney cancer Neg Hx      Melanoma Neg Hx      Lupus Neg Hx      Eczema Neg Hx      Psoriasis Neg Hx         Social History:  Social History     Socioeconomic History    Marital status:    Tobacco Use    Smoking status: Former     Current packs/day: 0.00     Types: Cigarettes     Quit date: 1976     Years since quittin.1     Passive exposure: Past    Smokeless tobacco: Never   Substance and Sexual Activity    Alcohol use: Yes     Comment: Socially    Drug use: No    Sexual activity: Yes     Social Drivers of Health     Financial Resource  Strain: Low Risk  (2/26/2024)    Overall Financial Resource Strain (CARDIA)     Difficulty of Paying Living Expenses: Not hard at all   Food Insecurity: No Food Insecurity (2/26/2024)    Hunger Vital Sign     Worried About Running Out of Food in the Last Year: Never true     Ran Out of Food in the Last Year: Never true   Transportation Needs: No Transportation Needs (6/24/2024)    Received from [facility]     Lack of Transportation (Medical): No     Lack of Transportation (Non-Medical): No   Physical Activity: Inactive (2/26/2024)    Exercise Vital Sign     Days of Exercise per Week: 0 days     Minutes of Exercise per Session: 0 min   Stress: No Stress Concern Present (2/26/2024)    [employer] - Occupational Stress Questionnaire     Feeling of Stress : Not at all   Housing Stability: Low Risk  (2/26/2024)    Housing Stability Vital Sign     Unable to Pay for Housing in the Last Year: No     Number of Places Lived in the Last Year: 1     Unstable Housing in the Last Year: No       Medications:  Current Outpatient Medications on File Prior to Visit   Medication Sig Dispense Refill    allopurinoL (ZYLOPRIM) 100 MG tablet Take 1 tablet (100 mg total) by mouth once daily. 90 tablet 3    atorvastatin (LIPITOR) 40 MG tablet TAKE 1 TABLET(40 MG) BY MOUTH EVERY DAY (Patient taking differently: every evening.) 90 tablet 2    CALCIUM 500 + D 500 mg-5 mcg (200 unit) per tablet Take 1 tablet by mouth Daily.      ciclopirox (PENLAC) 8 % Soln Apply topically nightly. 6.6 mL 11    clopidogreL (PLAVIX) 75 mg tablet Take medication by mouth 3 times weekly      cyanocobalamin/folic acid (VITAMIN B12-FOLIC ACID) 500-400 mcg Tab Take by mouth.      donepeziL (ARICEPT) 5 MG tablet Take 5 mg by mouth.      ezetimibe (ZETIA) 10 mg tablet Take 1 tablet (10 mg total) by mouth once daily. (Patient taking differently: Take 10 mg by mouth every evening.) 90  tablet 3    ferrous sulfate (FEOSOL) 325 mg (65 mg iron) Tab tablet Take 325 mg by mouth.      folic acid (FOLVITE) 1 MG tablet Take 1 tablet (1 mg total) by mouth once daily. 90 tablet 3    furosemide (LASIX) 20 MG tablet Take 20 mg by mouth daily as needed.      gabapentin (NEURONTIN) 300 MG capsule Take 1 capsule (300 mg total) by mouth every evening. 30 capsule 11    ipratropium (ATROVENT) 21 mcg (0.03 %) nasal spray 2 sprays by Each Nostril route 3 (three) times daily as needed for Rhinitis. 30 mL 3    ketoconazole (NIZORAL) 2 % cream Apply topically 2 (two) times daily. 60 g 1    multivit-min-FA-lycopen-lutein 0.4 mg-300 mcg- 250 mcg Tab Take by mouth.      nitroGLYCERIN (NITROSTAT) 0.4 MG SL tablet SMARTSI Tablet(s) Sublingual PRN      olmesartan (BENICAR) 40 MG tablet TAKE 1 TABLET(40 MG) BY MOUTH DAILY. REPLACES LOSARTAN (Patient taking differently: every evening.) 90 tablet 3    vitamin D (VITAMIN D3) 1000 units Tab Take by mouth.      [DISCONTINUED] amLODIPine (NORVASC) 2.5 MG tablet Take 1 tablet (2.5 mg total) by mouth every evening. 90 tablet 1    [DISCONTINUED] ranolazine (RANEXA) 500 MG Tb12 Take 500 mg by mouth 2 (two) times daily.      [DISCONTINUED] sertraline (ZOLOFT) 50 MG tablet Take 50 mg by mouth once daily.      [DISCONTINUED] tamsulosin (FLOMAX) 0.4 mg Cap Take 2 capsules (0.8 mg total) by mouth every evening. 180 capsule 11     No current facility-administered medications on file prior to visit.       Allergies:  Adhesive    Immunizations:  Immunization History   Administered Date(s) Administered    COVID-19, MRNA, LN-S, PF (MODERNA FULL 0.5 ML DOSE) 2023    COVID-19, MRNA, LN-S, PF (Pfizer) (Purple Cap) 2021, 2021    Hepatitis A, Adult 2009, 2014    Hepatitis B, Adult 2009, 2009    Influenza 10/26/2007, 2009, 2011, 01/15/2014, 2020    Influenza (FLUAD) - Quadrivalent - Adjuvanted - PF *Preferred* (65+) 2021    Influenza  "- Intradermal - Quadrivalent - PF 11/19/2012    Influenza - Intradermal - Trivalent - PF 11/19/2012    Influenza - Quadrivalent 11/13/2014    Influenza - Trivalent - Fluarix, Flulaval, Fluzone, Afluria - PF 11/01/2014, 11/01/2014, 02/09/2017, 02/09/2017, 01/01/2018, 01/01/2018, 02/10/2019    Influenza - Trivalent - Fluzone High Dose - PF (65 years and older) 09/29/2015, 02/09/2017, 01/26/2018, 02/20/2019, 11/27/2019, 09/23/2020    Influenza Split 10/26/2007, 09/19/2009, 09/07/2011, 01/15/2014, 01/15/2014    Pneumococcal Conjugate - 13 Valent 01/13/2016, 06/01/2018    Pneumococcal Polysaccharide - 23 Valent 11/28/2012, 02/09/2017    Tdap 04/23/2012, 08/01/2014    Typhoid - ViCPs 08/04/2014, 03/02/2020    Zoster 11/19/2012    Zoster Recombinant 04/30/2019, 04/30/2019, 07/15/2019       Review of Systems:  Review of Systems   Constitutional:  Negative for activity change and unexpected weight change.   HENT:  Positive for hearing loss. Negative for rhinorrhea and trouble swallowing.    Eyes:  Negative for discharge and visual disturbance.   Respiratory:  Negative for chest tightness and wheezing.    Cardiovascular:  Negative for chest pain and palpitations.   Gastrointestinal:  Negative for blood in stool, constipation, diarrhea and vomiting.   Endocrine: Negative for polydipsia and polyuria.   Genitourinary:  Positive for urgency. Negative for difficulty urinating and hematuria.   Musculoskeletal:  Positive for neck pain. Negative for arthralgias and joint swelling.   Neurological:  Negative for weakness and headaches.   Psychiatric/Behavioral:  Positive for confusion and dysphoric mood.    All other systems reviewed and are negative.      Objective:    Vitals:  Vitals:    04/03/25 1444   BP: (!) 153/68   Pulse: 62   Resp: 16   Temp: 99.1 °F (37.3 °C)   TempSrc: Oral   SpO2: 99%   Weight: 91.4 kg (201 lb 9.8 oz)   Height: 5' 8" (1.727 m)   PainSc: 0-No pain         Physical Exam  Constitutional:       General: He is " not in acute distress.  HENT:      Head: Normocephalic and atraumatic.   Eyes:      Pupils: Pupils are equal, round, and reactive to light.   Cardiovascular:      Rate and Rhythm: Normal rate and regular rhythm.      Heart sounds: No murmur heard.     No friction rub.   Pulmonary:      Effort: Pulmonary effort is normal.      Breath sounds: Normal breath sounds.   Abdominal:      General: Bowel sounds are normal. There is no distension.      Palpations: Abdomen is soft.      Tenderness: There is no abdominal tenderness.   Musculoskeletal:      Cervical back: Neck supple.   Skin:     General: Skin is warm and dry.      Findings: No rash.   Psychiatric:         Behavior: Behavior normal.             Morteza Blevins MD  Family Medicine

## 2025-04-04 ENCOUNTER — PATIENT MESSAGE (OUTPATIENT)
Dept: FAMILY MEDICINE | Facility: CLINIC | Age: 75
End: 2025-04-04
Payer: MEDICARE

## 2025-04-04 ENCOUNTER — RESULTS FOLLOW-UP (OUTPATIENT)
Dept: FAMILY MEDICINE | Facility: CLINIC | Age: 75
End: 2025-04-04

## 2025-04-04 LAB
BACTERIA #/AREA URNS AUTO: ABNORMAL /HPF
BILIRUB UR QL STRIP.AUTO: NEGATIVE
CLARITY UR: CLEAR
COLOR UR AUTO: YELLOW
GLUCOSE UR QL STRIP: NEGATIVE
HGB UR QL STRIP: NEGATIVE
HYALINE CASTS UR QL AUTO: 1 /LPF (ref 0–1)
KETONES UR QL STRIP: NEGATIVE
LEUKOCYTE ESTERASE UR QL STRIP: ABNORMAL
MICROSCOPIC COMMENT: ABNORMAL
NITRITE UR QL STRIP: NEGATIVE
PH UR STRIP: 6 [PH]
PROT UR QL STRIP: ABNORMAL
RBC #/AREA URNS AUTO: 5 /HPF (ref 0–4)
SP GR UR STRIP: 1.02
UROBILINOGEN UR STRIP-ACNC: NEGATIVE EU/DL
WBC #/AREA URNS AUTO: 57 /HPF (ref 0–5)
YEAST UR QL AUTO: ABNORMAL /HPF

## 2025-04-04 PROCEDURE — 87086 URINE CULTURE/COLONY COUNT: CPT | Performed by: FAMILY MEDICINE

## 2025-04-06 LAB — BACTERIA UR CULT: NO GROWTH

## 2025-04-10 ENCOUNTER — HOSPITAL ENCOUNTER (OUTPATIENT)
Dept: RADIOLOGY | Facility: HOSPITAL | Age: 75
Discharge: HOME | End: 2025-04-10
Attending: FAMILY MEDICINE
Payer: MEDICARE

## 2025-04-10 DIAGNOSIS — N22 CALCULUS OF URINARY TRACT IN DISEASES CLASSIFIED ELSEWHERE: ICD-10-CM

## 2025-04-10 PROCEDURE — 74176 CT ABD & PELVIS W/O CONTRAST: CPT | Mod: TC,PO

## 2025-04-10 PROCEDURE — 74176 CT ABD & PELVIS W/O CONTRAST: CPT | Mod: 26,,, | Performed by: RADIOLOGY

## 2025-04-16 ENCOUNTER — LAB VISIT (OUTPATIENT)
Dept: LAB | Facility: HOSPITAL | Age: 75
End: 2025-04-16
Payer: MEDICARE

## 2025-04-16 ENCOUNTER — OFFICE VISIT (OUTPATIENT)
Dept: FAMILY MEDICINE | Facility: CLINIC | Age: 75
End: 2025-04-16
Payer: MEDICARE

## 2025-04-16 VITALS
WEIGHT: 208.88 LBS | HEIGHT: 68 IN | BODY MASS INDEX: 31.66 KG/M2 | SYSTOLIC BLOOD PRESSURE: 132 MMHG | OXYGEN SATURATION: 99 % | DIASTOLIC BLOOD PRESSURE: 74 MMHG | HEART RATE: 51 BPM

## 2025-04-16 DIAGNOSIS — K80.20 CALCULUS OF GALLBLADDER WITHOUT CHOLECYSTITIS WITHOUT OBSTRUCTION: Primary | ICD-10-CM

## 2025-04-16 DIAGNOSIS — C61 PROSTATE CANCER: ICD-10-CM

## 2025-04-16 DIAGNOSIS — K42.9 UMBILICAL HERNIA WITHOUT OBSTRUCTION AND WITHOUT GANGRENE: ICD-10-CM

## 2025-04-16 DIAGNOSIS — Z12.5 ENCOUNTER FOR SCREENING FOR MALIGNANT NEOPLASM OF PROSTATE: ICD-10-CM

## 2025-04-16 LAB — PSA SERPL-MCNC: 6 NG/ML

## 2025-04-16 PROCEDURE — 1159F MED LIST DOCD IN RCRD: CPT | Mod: CPTII,S$GLB,,

## 2025-04-16 PROCEDURE — 84153 ASSAY OF PSA TOTAL: CPT

## 2025-04-16 PROCEDURE — 99214 OFFICE O/P EST MOD 30 MIN: CPT | Mod: S$GLB,,,

## 2025-04-16 PROCEDURE — 99999 PR PBB SHADOW E&M-EST. PATIENT-LVL V: CPT | Mod: PBBFAC,,,

## 2025-04-16 PROCEDURE — 1101F PT FALLS ASSESS-DOCD LE1/YR: CPT | Mod: CPTII,S$GLB,,

## 2025-04-16 PROCEDURE — 3075F SYST BP GE 130 - 139MM HG: CPT | Mod: CPTII,S$GLB,,

## 2025-04-16 PROCEDURE — 3078F DIAST BP <80 MM HG: CPT | Mod: CPTII,S$GLB,,

## 2025-04-16 PROCEDURE — 4010F ACE/ARB THERAPY RXD/TAKEN: CPT | Mod: CPTII,S$GLB,,

## 2025-04-16 PROCEDURE — 3044F HG A1C LEVEL LT 7.0%: CPT | Mod: CPTII,S$GLB,,

## 2025-04-16 PROCEDURE — 1126F AMNT PAIN NOTED NONE PRSNT: CPT | Mod: CPTII,S$GLB,,

## 2025-04-16 PROCEDURE — 3288F FALL RISK ASSESSMENT DOCD: CPT | Mod: CPTII,S$GLB,,

## 2025-04-16 PROCEDURE — 36415 COLL VENOUS BLD VENIPUNCTURE: CPT | Mod: PN

## 2025-04-16 RX ORDER — LOSARTAN POTASSIUM 100 MG/1
100 TABLET ORAL
COMMUNITY
Start: 2025-02-17

## 2025-04-16 NOTE — PROGRESS NOTES
"[facility]  [address] [place] [address]    Subjective    Chief Complaint:   Chief Complaint   Patient presents with    Follow-up     Blood Pressure check       History of Present Illness:     Carlos Tenorio Jr. is a(n) 75 y.o. male with past medical history as noted below who presents to the clinic today for follow up.    Blood pressure average at home is 114/70. He does have a few very high and very low outlier readings. Yesterday BP was 180/100 at 12:23 pm, followed by 89/49 at 12:25 pm when he rechecked. Other than this BP seems to be in range. He denies dizziness or fatigue. Normotensive in clinic.     Hasn't started fluoxetine yet, rx just filled and planning to start today. Mood is "ok" per patient, "grumpy" per wife. No SI/HI/AVH.     Flank pain has resolved. Recent CT abdomen/pelvis w/o contrast did not show obstructing nephrolithiasis.     CT scan did show gallstones. He reports intermittent sharp RUQ pain. No pain today. CT also with fat-containing umbilical hernia. No pain in this region    History of gout   Rx-allopurinol 100 mg  Prev flare  years ago      Essential hypertension  Rx-amlodipine 5 mg, losartan 100 mg  Normotensive in clinic   Home average 114/70     Coronary artery disease   Rx-atorvastatin 40 mg, Zetia 10 mg, Plavix 75 mg, Ranexa, NTG  Cardiology - MD Vadim   TUYET : x 7   CABG : 7  Denies exertional chest symptoms      Diastolic heart failure     Obstructive sleep apnea  Prev used CPAP --> GRADUATED      Memory impairment  Rx : Aricept   Prev Rx : Aduhelm (bleeding)  ---> secondary neuropathy (treated with gabapentin)   Neurology : Curtis --- > MD Diane      History diabetes / Prediab  Rx- metformin   Previous A1c- 5.4       Elevated PSA --> Prostate Cancer   Urology : MD Sly   Tx : Active Monitoring   Previous PSA 5.8 --> 4.1     Renal mass --> Renal Cell Cancer (Left Side)   Urology : MD Sly   Sx : partial " nephrectomy with robot (2017)  No RTX / Chemo     History of Nephroliths  Several  Prev episode > 10 years ago      Large Spermatocele Cyst  Monitored by urology      Cystic Acne   Location : back      Hearing Aid Status     Depression   Rx : Fluoxetine   Prev : Zoloft (not effective)             Problem List: Problem List[1]    Current Outpatient Medications:   Current Outpatient Medications   Medication Instructions    alfuzosin (UROXATRAL) 10 mg, Oral, With breakfast    allopurinoL (ZYLOPRIM) 100 mg, Oral, Daily    amLODIPine (NORVASC) 5 mg, Oral, Nightly    atorvastatin (LIPITOR) 40 MG tablet TAKE 1 TABLET(40 MG) BY MOUTH EVERY DAY    CALCIUM 500 + D 500 mg-5 mcg (200 unit) per tablet 1 tablet, Daily    ciclopirox (PENLAC) 8 % Soln Topical (Top), Nightly    clopidogreL (PLAVIX) 75 mg tablet Take medication by mouth 3 times weekly    cyanocobalamin/folic acid (VITAMIN B12-FOLIC ACID) 500-400 mcg Tab Take by mouth.    donepeziL (ARICEPT) 5 mg    ezetimibe (ZETIA) 10 mg, Oral, Daily    ferrous sulfate (FEOSOL) 325 mg    FLUoxetine 10 mg, Oral, Daily    folic acid (FOLVITE) 1 mg, Oral, Daily    furosemide (LASIX) 20 mg, Daily PRN    gabapentin (NEURONTIN) 300 mg, Oral, Nightly    ipratropium (ATROVENT) 21 mcg (0.03 %) nasal spray 2 sprays, Each Nostril, 3 times daily PRN    ketoconazole (NIZORAL) 2 % cream Topical (Top), 2 times daily    multivit-min-FA-lycopen-lutein 0.4 mg-300 mcg- 250 mcg Tab Take by mouth.    nitroGLYCERIN (NITROSTAT) 0.4 MG SL tablet SMARTSI Tablet(s) Sublingual PRN    olmesartan (BENICAR) 40 MG tablet TAKE 1 TABLET(40 MG) BY MOUTH DAILY. REPLACES LOSARTAN    ranolazine (RANEXA) 500 mg, Oral, 2 times daily    vitamin D (VITAMIN D3) 1000 units Tab Take by mouth.       Surgical History:   Past Surgical History:   Procedure Laterality Date    BACK SURGERY      L5-6 fusion, with pins,bone graft    cardiac stents      2 stents 2021 and 2022    CARDIAC SURGERY  ,     total 7  vessel bypass    CAROTID ARTERY ANGIOPLASTY  01/22/2023    1 stent posterior descending    COLONOSCOPY  2008    repeat 2013    COLONOSCOPY N/A 04/04/2017    Procedure: COLONOSCOPY;  Surgeon: Dmitry Sampson MD;  Location: [facility];  Service: Endoscopy;  Laterality: N/A;    CORONARY ARTERY BYPASS GRAFT  1995, 2007    cabgx3, cabgx5    CORONARY STENT PLACEMENT  10/2022    CORONARY STENT PLACEMENT  11/2022    CYSTOSCOPY N/A 2/7/2025    Procedure: CYSTOSCOPY;  Surgeon: Yovany Heart MD;  Location: [facility];  Service: Urology;  Laterality: N/A;    EXTRACORPOREAL SHOCK WAVE LITHOTRIPSY      EYE SURGERY      cataracts bilateral    LITHOTRIPSY      LUMBAR LAMINECTOMY      Partial Nephrectomy Laproscopic      TRANSRECTAL BIOPSY OF PROSTATE WITH ULTRASOUND GUIDANCE N/A 03/19/2019    Procedure: BIOPSY, PROSTATE, RECTAL APPROACH, WITH US GUIDANCE;  Surgeon: Yovany Heart MD;  Location: [facility];  Service: Urology;  Laterality: N/A;    TRANSRECTAL BIOPSY OF PROSTATE WITH ULTRASOUND GUIDANCE N/A 10/20/2020    Procedure: BIOPSY, PROSTATE, RECTAL APPROACH, WITH US GUIDANCE;  Surgeon: Yovany Heart MD;  Location: [facility];  Service: Urology;  Laterality: N/A;    TRANSRECTAL ULTRASOUND EXAMINATION N/A 2/7/2025    Procedure: ULTRASOUND, RECTAL APPROACH;  Surgeon: Yovany Heart MD;  Location: [facility];  Service: Urology;  Laterality: N/A;       Family History:   Family History   Problem Relation Name Age of Onset    Heart disease Mother      Hypertension Mother      Diabetes Mother      Alzheimer's disease Mother      Alzheimer's disease Father      Heart disease Father          premature    Hypertension Father      Diabetes Sister      Urolithiasis Sister      Alzheimer's disease Sister      Heart disease Paternal Uncle      Cancer Neg Hx      Prostate cancer Neg Hx      Kidney cancer Neg Hx      Melanoma Neg Hx      Lupus Neg Hx      Eczema Neg Hx      Psoriasis Neg Hx         Allergies:   Review of  patient's allergies indicates:   Allergen Reactions    Adhesive Blisters and Rash       Tobacco Status:   Tobacco Use: Medium Risk (4/16/2025)    Patient History     Smoking Tobacco Use: Former     Smokeless Tobacco Use: Never     Passive Exposure: Past       Sexual Activity:   Social History     Substance and Sexual Activity   Sexual Activity Yes       Alcohol Use:   Social History     Substance and Sexual Activity   Alcohol Use Yes    Comment: Socially         Objective       There were no vitals filed for this visit.    Review of Systems   Constitutional:  Negative for chills and fever.   Respiratory:  Negative for cough and shortness of breath.    Cardiovascular:  Negative for chest pain.   Gastrointestinal:  Positive for abdominal pain. Negative for blood in stool, constipation, diarrhea, heartburn, melena, nausea and vomiting.   Genitourinary:  Negative for dysuria, flank pain and urgency.   Neurological:  Negative for dizziness and loss of consciousness.   Psychiatric/Behavioral:  Negative for depression and suicidal ideas. The patient is nervous/anxious (irritability).        Physical Exam  Constitutional:       General: He is not in acute distress.     Appearance: Normal appearance.   HENT:      Head: Normocephalic and atraumatic.   Cardiovascular:      Rate and Rhythm: Normal rate and regular rhythm.      Heart sounds: Normal heart sounds. No murmur heard.  Pulmonary:      Effort: Pulmonary effort is normal. No respiratory distress.      Breath sounds: Normal breath sounds. No wheezing.   Abdominal:      General: Abdomen is flat. Bowel sounds are normal. There is no distension.      Tenderness: There is no abdominal tenderness. There is no right CVA tenderness, left CVA tenderness, guarding or rebound.   Skin:     General: Skin is warm.   Neurological:      Mental Status: He is alert and oriented to person, place, and time.   Psychiatric:         Behavior: Behavior normal.           Assessment and  Plan:    1. Calculus of gallbladder without cholecystitis without obstruction  -     Ambulatory referral/consult to General Surgery; Future; Expected date: 04/23/2025    2. Prostate cancer  -     PSA, SCREENING; Future; Expected date: 04/16/2025    3. Encounter for screening for malignant neoplasm of prostate  -     PSA, SCREENING; Future; Expected date: 04/16/2025    4. Umbilical hernia without obstruction and without gangrene  -     Ambulatory referral/consult to General Surgery; Future; Expected date: 04/23/2025        Visit summary:    Carlos Po Tenorio Jr. presented today for follow up.    Normotensive in clinic, a few outlier readings have been very high and very low, otherwise appears to be in range. Keep an eye on BP readings, notify if persistently out of range. Continue current regimen    Correlate PSA with enlarged prostate on CT, known hx prostate cancer.     Gallstones noted on CT, no tenderness today however refer to general surgery to discuss option for elective cholecystectomy as well as evaluation of hernia repair    Start fluoxetine as prescribed, f/u in 6 weeks    Follow up: 6 weeks      Eden Paul PA-C    This note was created partially with voice dictation software and is prone to errors. This note has been reviewed by me but some errors are inevitable.          [1]   Patient Active Problem List  Diagnosis    Atherosclerotic heart disease of native coronary artery with other forms of angina pectoris    Hypertension associated with diabetes    VSAYL (obstructive sleep apnea)    Type 2 diabetes mellitus with both eyes affected by mild nonproliferative retinopathy and macular edema, without long-term current use of insulin    Male hypogonadism    OA (osteoarthritis) of knee    History of colon polyps    Left renal mass    Chronic kidney disease, stage 3a    Arthritis pain of hand    Elevated PSA    Gout    Hyperlipidemia associated with type 2 diabetes mellitus    Prostate cancer    S/P  coronary artery stent placement    Solar purpura    Chronic diastolic congestive heart failure    Alzheimer's disease with early onset (CODE)    Microalbuminuria    Decreased range of motion of left knee    Impaired functional mobility, balance, gait, and endurance    Weakness of left lower extremity    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

## 2025-04-21 ENCOUNTER — RESULTS FOLLOW-UP (OUTPATIENT)
Dept: FAMILY MEDICINE | Facility: CLINIC | Age: 75
End: 2025-04-21

## 2025-04-23 ENCOUNTER — PATIENT MESSAGE (OUTPATIENT)
Dept: FAMILY MEDICINE | Facility: CLINIC | Age: 75
End: 2025-04-23
Payer: MEDICARE

## 2025-05-05 ENCOUNTER — OFFICE VISIT (OUTPATIENT)
Dept: SURGERY | Facility: CLINIC | Age: 75
End: 2025-05-05
Payer: MEDICARE

## 2025-05-05 VITALS
HEIGHT: 68 IN | DIASTOLIC BLOOD PRESSURE: 73 MMHG | TEMPERATURE: 99 F | SYSTOLIC BLOOD PRESSURE: 157 MMHG | BODY MASS INDEX: 31.76 KG/M2 | HEART RATE: 57 BPM

## 2025-05-05 DIAGNOSIS — K42.9 UMBILICAL HERNIA WITHOUT OBSTRUCTION AND WITHOUT GANGRENE: ICD-10-CM

## 2025-05-05 DIAGNOSIS — K80.20 CALCULUS OF GALLBLADDER WITHOUT CHOLECYSTITIS WITHOUT OBSTRUCTION: ICD-10-CM

## 2025-05-05 PROCEDURE — 3078F DIAST BP <80 MM HG: CPT | Mod: CPTII,S$GLB,, | Performed by: STUDENT IN AN ORGANIZED HEALTH CARE EDUCATION/TRAINING PROGRAM

## 2025-05-05 PROCEDURE — 4010F ACE/ARB THERAPY RXD/TAKEN: CPT | Mod: CPTII,S$GLB,, | Performed by: STUDENT IN AN ORGANIZED HEALTH CARE EDUCATION/TRAINING PROGRAM

## 2025-05-05 PROCEDURE — 99203 OFFICE O/P NEW LOW 30 MIN: CPT | Mod: S$GLB,,, | Performed by: STUDENT IN AN ORGANIZED HEALTH CARE EDUCATION/TRAINING PROGRAM

## 2025-05-05 PROCEDURE — 1101F PT FALLS ASSESS-DOCD LE1/YR: CPT | Mod: CPTII,S$GLB,, | Performed by: STUDENT IN AN ORGANIZED HEALTH CARE EDUCATION/TRAINING PROGRAM

## 2025-05-05 PROCEDURE — 3044F HG A1C LEVEL LT 7.0%: CPT | Mod: CPTII,S$GLB,, | Performed by: STUDENT IN AN ORGANIZED HEALTH CARE EDUCATION/TRAINING PROGRAM

## 2025-05-05 PROCEDURE — 3288F FALL RISK ASSESSMENT DOCD: CPT | Mod: CPTII,S$GLB,, | Performed by: STUDENT IN AN ORGANIZED HEALTH CARE EDUCATION/TRAINING PROGRAM

## 2025-05-05 PROCEDURE — 99999 PR PBB SHADOW E&M-EST. PATIENT-LVL IV: CPT | Mod: PBBFAC,,, | Performed by: STUDENT IN AN ORGANIZED HEALTH CARE EDUCATION/TRAINING PROGRAM

## 2025-05-05 PROCEDURE — 1125F AMNT PAIN NOTED PAIN PRSNT: CPT | Mod: CPTII,S$GLB,, | Performed by: STUDENT IN AN ORGANIZED HEALTH CARE EDUCATION/TRAINING PROGRAM

## 2025-05-05 PROCEDURE — 3077F SYST BP >= 140 MM HG: CPT | Mod: CPTII,S$GLB,, | Performed by: STUDENT IN AN ORGANIZED HEALTH CARE EDUCATION/TRAINING PROGRAM

## 2025-05-05 PROCEDURE — 1159F MED LIST DOCD IN RCRD: CPT | Mod: CPTII,S$GLB,, | Performed by: STUDENT IN AN ORGANIZED HEALTH CARE EDUCATION/TRAINING PROGRAM

## 2025-05-05 RX ORDER — TAMSULOSIN HYDROCHLORIDE 0.4 MG/1
0.4 CAPSULE ORAL DAILY
COMMUNITY
Start: 2025-04-05

## 2025-05-05 NOTE — PROGRESS NOTES
History & Physical    Subjective     History of Present Illness:  Patient is a 75 y.o. male presents with a recent episode of right flank pain.  Patient has a history of kidney stones.  He reports that symptoms are similar to prior kidney stone episodes.  He underwent CT imaging which was notable for gallstones as well as nonobstructing kidney stones.  Patient did not prandial right upper quadrant abdominal pain.    Chief Complaint   Patient presents with    Consult     Gallbladder /UH       Review of patient's allergies indicates:   Allergen Reactions    Adhesive Blisters and Rash       Current Medications[1]    Past Medical History:   Diagnosis Date    Arthritis     knees, back    Bilateral renal cysts 07/30/2012    CAD (coronary artery disease) 1995    no chest pain since CABG, sees Dr Larry Black    Dementia     Diabetes mellitus     borderline    Hypertension     Kidney stones     uric acid stones    VASYL (obstructive sleep apnea) 2007    is compliant with CPAP    Primary gonadal failure      Past Surgical History:   Procedure Laterality Date    BACK SURGERY  2010    L5-6 fusion, with pins,bone graft    cardiac stents      2 stents 12/2021 and 1/2022    CARDIAC SURGERY  1995, 2007    total 7 vessel bypass    CAROTID ARTERY ANGIOPLASTY  01/22/2023    1 stent posterior descending    COLONOSCOPY  2008    repeat 2013    COLONOSCOPY N/A 04/04/2017    Procedure: COLONOSCOPY;  Surgeon: Dmitry Sampson MD;  Location: [facility];  Service: Endoscopy;  Laterality: N/A;    CORONARY ARTERY BYPASS GRAFT  1995, 2007    cabgx3, cabgx5    CORONARY STENT PLACEMENT  10/2022    CORONARY STENT PLACEMENT  11/2022    CYSTOSCOPY N/A 2/7/2025    Procedure: CYSTOSCOPY;  Surgeon: Yovany Heart MD;  Location: [facility] OR;  Service: Urology;  Laterality: N/A;    EXTRACORPOREAL SHOCK WAVE LITHOTRIPSY      EYE SURGERY      cataracts bilateral    LITHOTRIPSY      LUMBAR LAMINECTOMY      Partial Nephrectomy Laproscopic       "TRANSRECTAL BIOPSY OF PROSTATE WITH ULTRASOUND GUIDANCE N/A 03/19/2019    Procedure: BIOPSY, PROSTATE, RECTAL APPROACH, WITH US GUIDANCE;  Surgeon: Yovany Heart MD;  Location: [facility] OR;  Service: Urology;  Laterality: N/A;    TRANSRECTAL BIOPSY OF PROSTATE WITH ULTRASOUND GUIDANCE N/A 10/20/2020    Procedure: BIOPSY, PROSTATE, RECTAL APPROACH, WITH US GUIDANCE;  Surgeon: Yovany Heart MD;  Location: [facility] OR;  Service: Urology;  Laterality: N/A;    TRANSRECTAL ULTRASOUND EXAMINATION N/A 2/7/2025    Procedure: ULTRASOUND, RECTAL APPROACH;  Surgeon: Yovany Heart MD;  Location: [facility] OR;  Service: Urology;  Laterality: N/A;     Family History   Problem Relation Name Age of Onset    Heart disease Mother      Hypertension Mother      Diabetes Mother      Alzheimer's disease Mother      Alzheimer's disease Father      Heart disease Father          premature    Hypertension Father      Diabetes Sister      Urolithiasis Sister      Alzheimer's disease Sister      Heart disease Paternal Uncle      Cancer Neg Hx      Prostate cancer Neg Hx      Kidney cancer Neg Hx      Melanoma Neg Hx      Lupus Neg Hx      Eczema Neg Hx      Psoriasis Neg Hx       Social History[2]     Review of Systems:  Review of Systems   Constitutional: Negative.  Negative for fatigue and fever.   HENT: Negative.     Eyes: Negative.    Respiratory: Negative.  Negative for shortness of breath.    Cardiovascular: Negative.  Negative for chest pain.   Gastrointestinal: Negative.    Endocrine: Negative.    Genitourinary: Negative.    Musculoskeletal: Negative.    Skin: Negative.    Allergic/Immunologic: Negative.    Neurological: Negative.    Hematological: Negative.    Psychiatric/Behavioral: Negative.            Objective     Vital Signs (Most Recent)  Temp: 98.5 °F (36.9 °C) (05/05/25 1034)  Pulse: (!) 57 (05/05/25 1034)  BP: (!) 157/73 (05/05/25 1034)  5' 8" (1.727 m)        Physical Exam:  Physical Exam  Constitutional:       " General: He is not in acute distress.     Appearance: Normal appearance. He is not ill-appearing, toxic-appearing or diaphoretic.   HENT:      Head: Normocephalic.      Nose: Nose normal.   Eyes:      Conjunctiva/sclera: Conjunctivae normal.   Cardiovascular:      Rate and Rhythm: Normal rate and regular rhythm.   Pulmonary:      Effort: Pulmonary effort is normal.   Abdominal:      Palpations: Abdomen is soft.   Musculoskeletal:         General: Normal range of motion.      Cervical back: Normal range of motion.   Skin:     General: Skin is warm.   Neurological:      General: No focal deficit present.      Mental Status: He is alert.   Psychiatric:         Mood and Affect: Mood normal.         Laboratory  LFTs were normal    Diagnostic Results:  CT scan was reviewed.  There is a small amount of layering stones/sludge in the gallbladder       Assessment and Plan   75-year-old male with a history of kidney stones who initially presented with right flank pain similar to prior episodes of kidney stones.  He denies postprandial right upper quadrant abdominal pain but does have gallstones.    PLAN:  Risks versus benefits of cholecystectomy were discussed.  It does not sound like he has symptoms attributable to the gallbladder at this point in time.  Concerning signs or symptoms that would warrant more urgent evaluation were discussed.  Patient elected to observe for now.  We will call to set up follow up as needed.                [1]   Current Outpatient Medications   Medication Sig Dispense Refill    alfuzosin (UROXATRAL) 10 mg Tb24 Take 1 tablet (10 mg total) by mouth daily with breakfast. 30 tablet 11    allopurinoL (ZYLOPRIM) 100 MG tablet Take 1 tablet (100 mg total) by mouth once daily. 90 tablet 3    amLODIPine (NORVASC) 5 MG tablet Take 1 tablet (5 mg total) by mouth every evening. 90 tablet 3    atorvastatin (LIPITOR) 40 MG tablet TAKE 1 TABLET(40 MG) BY MOUTH EVERY DAY 90 tablet 2    CALCIUM 500 + D 500 mg-5  mcg (200 unit) per tablet Take 1 tablet by mouth Daily.      clopidogreL (PLAVIX) 75 mg tablet Take medication by mouth 3 times weekly      cyanocobalamin/folic acid (VITAMIN B12-FOLIC ACID) 500-400 mcg Tab Take by mouth.      donepeziL (ARICEPT) 5 MG tablet Take 5 mg by mouth.      ezetimibe (ZETIA) 10 mg tablet Take 1 tablet (10 mg total) by mouth once daily. 90 tablet 3    ferrous sulfate (FEOSOL) 325 mg (65 mg iron) Tab tablet Take 325 mg by mouth.      FLUoxetine 10 MG capsule Take 1 capsule (10 mg total) by mouth once daily. 30 capsule 11    folic acid (FOLVITE) 1 MG tablet Take 1 tablet (1 mg total) by mouth once daily. 90 tablet 3    furosemide (LASIX) 20 MG tablet Take 20 mg by mouth daily as needed.      gabapentin (NEURONTIN) 300 MG capsule Take 1 capsule (300 mg total) by mouth every evening. 30 capsule 11    ipratropium (ATROVENT) 21 mcg (0.03 %) nasal spray 2 sprays by Each Nostril route 3 (three) times daily as needed for Rhinitis. 30 mL 3    losartan (COZAAR) 100 MG tablet Take 100 mg by mouth.      multivit-min-FA-lycopen-lutein 0.4 mg-300 mcg- 250 mcg Tab Take by mouth.      olmesartan (BENICAR) 40 MG tablet TAKE 1 TABLET(40 MG) BY MOUTH DAILY. REPLACES LOSARTAN 90 tablet 3    ranolazine (RANEXA) 500 MG Tb12 Take 1 tablet (500 mg total) by mouth 2 (two) times daily. 180 tablet 3    tamsulosin (FLOMAX) 0.4 mg Cap Take 0.4 mg by mouth once daily.      vitamin D (VITAMIN D3) 1000 units Tab Take by mouth.      ciclopirox (PENLAC) 8 % Soln Apply topically nightly. (Patient not taking: Reported on 2025) 6.6 mL 11    ketoconazole (NIZORAL) 2 % cream Apply topically 2 (two) times daily. (Patient not taking: Reported on 2025) 60 g 1    nitroGLYCERIN (NITROSTAT) 0.4 MG SL tablet SMARTSI Tablet(s) Sublingual PRN (Patient not taking: Reported on 2025)       No current facility-administered medications for this visit.   [2]   Social History  Tobacco Use    Smoking status: Former     Current  packs/day: 0.00     Types: Cigarettes     Quit date: 1976     Years since quittin.2     Passive exposure: Past    Smokeless tobacco: Never   Substance Use Topics    Alcohol use: Yes     Comment: Socially    Drug use: No

## 2025-05-15 ENCOUNTER — OFFICE VISIT (OUTPATIENT)
Dept: UROLOGY | Facility: CLINIC | Age: 75
End: 2025-05-15
Payer: MEDICARE

## 2025-05-15 VITALS — BODY MASS INDEX: 29.84 KG/M2 | HEIGHT: 68 IN | WEIGHT: 196.88 LBS

## 2025-05-15 DIAGNOSIS — C61 PROSTATE CANCER: Primary | ICD-10-CM

## 2025-05-15 DIAGNOSIS — R35.1 BPH ASSOCIATED WITH NOCTURIA: ICD-10-CM

## 2025-05-15 DIAGNOSIS — N20.0 KIDNEY STONES: ICD-10-CM

## 2025-05-15 DIAGNOSIS — N40.1 BPH ASSOCIATED WITH NOCTURIA: ICD-10-CM

## 2025-05-15 DIAGNOSIS — N43.40 SPERMATOCELE: ICD-10-CM

## 2025-05-15 DIAGNOSIS — Z85.528 HISTORY OF KIDNEY CANCER: ICD-10-CM

## 2025-05-15 PROBLEM — N28.89 LEFT RENAL MASS: Status: RESOLVED | Noted: 2017-08-02 | Resolved: 2025-05-15

## 2025-05-15 LAB
BILIRUBIN, UA POC OHS: ABNORMAL
BLOOD, UA POC OHS: NEGATIVE
CLARITY, UA POC OHS: CLEAR
COLOR, UA POC OHS: YELLOW
GLUCOSE, UA POC OHS: NEGATIVE
KETONES, UA POC OHS: NEGATIVE
LEUKOCYTES, UA POC OHS: ABNORMAL
NITRITE, UA POC OHS: NEGATIVE
PH, UA POC OHS: 6
PROTEIN, UA POC OHS: 30
SPECIFIC GRAVITY, UA POC OHS: 1.02
UROBILINOGEN, UA POC OHS: 0.2

## 2025-05-15 PROCEDURE — 1160F RVW MEDS BY RX/DR IN RCRD: CPT | Mod: CPTII,S$GLB,, | Performed by: STUDENT IN AN ORGANIZED HEALTH CARE EDUCATION/TRAINING PROGRAM

## 2025-05-15 PROCEDURE — 1159F MED LIST DOCD IN RCRD: CPT | Mod: CPTII,S$GLB,, | Performed by: STUDENT IN AN ORGANIZED HEALTH CARE EDUCATION/TRAINING PROGRAM

## 2025-05-15 PROCEDURE — 1126F AMNT PAIN NOTED NONE PRSNT: CPT | Mod: CPTII,S$GLB,, | Performed by: STUDENT IN AN ORGANIZED HEALTH CARE EDUCATION/TRAINING PROGRAM

## 2025-05-15 PROCEDURE — 1100F PTFALLS ASSESS-DOCD GE2>/YR: CPT | Mod: CPTII,S$GLB,, | Performed by: STUDENT IN AN ORGANIZED HEALTH CARE EDUCATION/TRAINING PROGRAM

## 2025-05-15 PROCEDURE — 99215 OFFICE O/P EST HI 40 MIN: CPT | Mod: S$GLB,,, | Performed by: STUDENT IN AN ORGANIZED HEALTH CARE EDUCATION/TRAINING PROGRAM

## 2025-05-15 PROCEDURE — 4010F ACE/ARB THERAPY RXD/TAKEN: CPT | Mod: CPTII,S$GLB,, | Performed by: STUDENT IN AN ORGANIZED HEALTH CARE EDUCATION/TRAINING PROGRAM

## 2025-05-15 PROCEDURE — 3044F HG A1C LEVEL LT 7.0%: CPT | Mod: CPTII,S$GLB,, | Performed by: STUDENT IN AN ORGANIZED HEALTH CARE EDUCATION/TRAINING PROGRAM

## 2025-05-15 PROCEDURE — G2211 COMPLEX E/M VISIT ADD ON: HCPCS | Mod: S$GLB,,, | Performed by: STUDENT IN AN ORGANIZED HEALTH CARE EDUCATION/TRAINING PROGRAM

## 2025-05-15 PROCEDURE — 3288F FALL RISK ASSESSMENT DOCD: CPT | Mod: CPTII,S$GLB,, | Performed by: STUDENT IN AN ORGANIZED HEALTH CARE EDUCATION/TRAINING PROGRAM

## 2025-05-15 PROCEDURE — 99999 PR PBB SHADOW E&M-EST. PATIENT-LVL IV: CPT | Mod: PBBFAC,,, | Performed by: STUDENT IN AN ORGANIZED HEALTH CARE EDUCATION/TRAINING PROGRAM

## 2025-05-15 PROCEDURE — 81003 URINALYSIS AUTO W/O SCOPE: CPT | Mod: QW,S$GLB,, | Performed by: STUDENT IN AN ORGANIZED HEALTH CARE EDUCATION/TRAINING PROGRAM

## 2025-05-15 NOTE — PROGRESS NOTES
[facility] - [facility]rology   Clinic Note    Subjective:     Chief Complaint: Benign Prostatic Hypertrophy      History of Present Illness    CHIEF COMPLAINT:  Carlos presents today for follow-up after being unable to proceed with scheduled UroLift procedure. He was previously established with Dr. Heart and presents to establish care.    UROLOGIC SYMPTOMS:  He reports frequent nighttime urination, up to 11 episodes per night, with a strong urinary stream. He has a history of multiple urinary tract infections. He has a large spermatocele that causes occasional pinching sensations and physical discomfort.    IPSS 7/3, with a score of 5 for nocturia. He is on Uroxatral    KIDNEY STONES:  He has an extensive history of approximately 40 kidney stones throughout his lifetime. He reports current left-sided pain. CT showed few small non-obstructing stones on the right side and possibly a very small stone on the left side. CT from 4/2025 independently reviewed and interpreted     PROSTATE CANCER:  He has prostate cancer with a low volume of Diomedes score of 3+4=7, currently under active surveillance.  Most recent biopsy from 10/2020.  Most recent MRI from 10/23 showing no specific lesions.  Prostate volume 40 cc.    KIDNEY CANCER:  History of left robotic partial nephrectomy, with concurrent left renal cyst decortication on 8/2/17 for an upper pole 2-3cm exophytic enhancing renal mass, adjacent to larger simple renal cyst.   Pathology: [id number] clear cell RCC. 2.5cm. Negative margins. Grade 2. No sarcomatoid or rhabdoid features. Benign renal cyst    MEDICAL HISTORY:  History of nephrectomy for tumor and Alzheimer's disease.    MEDICATIONS:  Long history of Flomax use, subsequently switched to Uroxatral by Dr. Blevins. Takes Lasix as needed per cardiology recommendations.          IPSS Questionnaire (AUA-SS):  1) Incomplete Emptying 0 - Not at all   2) Frequency 2 - Less than half the time   3) Intermittency 0 - Not at all   4)  "Urgency 0 - Not at all   5) Weak Stream  0 - Not at all   6) Straining 0 - Not at all   7) Nocturia 5 - 5+ times   Total score:   7   Quality of Life:  3 - Mixed        Past medical, family, surgical and social history reviewed as documented in chart with pertinent positive medical, family, surgical and social history detailed in HPI.    A review systems was conducted with pertinent positive and negative findings documented in HPI.    Objective:     Estimated body mass index is 29.93 kg/m² as calculated from the following:    Height as of this encounter: 5' 8" (1.727 m).    Weight as of this encounter: 89.3 kg (196 lb 13.9 oz).    Vital Signs (Most Recent)       General: No acute distress, well developed.   Head: Normocephalic, atraumatic  CV: no cyanosis  Lungs: normal respiratory effort, no respiratory distress  : moderate right spermatocele    Labs reviewed below:  Lab Results   Component Value Date    BUN 17 04/03/2025    CREATININE 1.1 04/03/2025    WBC 8.60 03/10/2025    HGB 12.4 (L) 03/10/2025    HCT 39.3 (L) 03/10/2025     03/10/2025    AST 19 04/03/2025    ALT 19 04/03/2025    ALKPHOS 59 04/03/2025    ALBUMIN 3.9 04/03/2025    HGBA1C 5.5 04/03/2025        PSA trend reviewed below:  Lab Results   Component Value Date    PSA 6.00 (H) 04/16/2025    PSA 6.0 (H) 11/07/2023    PSA 4.5 (H) 12/14/2018    PSA 3.9 06/18/2018    PSA 3.3 04/20/2017    PSA 2.9 01/17/2014    PSA 2.85 07/16/2013    PSA 1.87 11/28/2012    PSA 1.49 04/04/2012    PSA 1.5 04/18/2011    PSADIAG 4.1 (H) 11/12/2024    PSADIAG 5.8 (H) 05/14/2024    PSADIAG 6.9 (H) 04/24/2024    PSADIAG 5.5 (H) 10/13/2023    PSADIAG 5.2 (H) 05/12/2023    PSADIAG 5.7 (H) 12/01/2022    PSADIAG 5.8 (H) 04/29/2022    PSADIAG 5.0 (H) 08/18/2021    PSADIAG 6.0 (H) 05/03/2021    PSADIAG 3.3 10/10/2020    PSAFREE 0.80 05/03/2021    PSAFREE 0.42 06/05/2017    PSAFREEPCT 14.81 05/03/2021    PSAFREEPCT 19.09 06/05/2017      Lab Results   Component Value Date    " BLOODUA Negative 05/15/2025    WBCUR Trace (A) 05/15/2025    NITRITE Negative 05/15/2025      Assessment:     1. Prostate cancer    2. BPH associated with nocturia    3. History of kidney cancer    4. Spermatocele    5. Kidney stones      Plan:     Assessment & Plan    - Assessed urinary symptoms, primarily nocturia; determined prostate procedure may not be necessary or effective for isolated nighttime frequency.  - Evaluated recent CT Abdomen showing small, non-obstructing kidney stones; determined no immediate intervention needed.  - Reviewed prostate cancer status (Diomedes 3+4=7); decided to continue active surveillance with PSA monitoring given age and comorbidities.  - Examined scrotal spermatocele; recommended monitoring unless it becomes significantly bothersome or painful.  - Considered history of recurrent kidney stones; advised ER visit if acute symptoms occur.  - Assessed renal cell carcinoma status post-nephrectomy; noted no evidence of recurrence on recent CT, typically only monitored for 5 years.    - Explained that isolated nocturia can be difficult to treat and may not be resolved by prostate procedures.  - Discussed potential causes of side pain, including small stone fragments, GI issues, and muscular pain.  - Informed about nocturnal polyuria and its potential relationship to sleep apnea.    - Follow up in 6 months for PSA check.  - Contact the office if any problems arise before the scheduled follow-up.  - Go to the nearest ER if experiencing significant kidney stone symptoms.          Portions of this note were generated by [employer] Direct dictation services    Ahsan Watson MD     Total time today for this encounter was 42 minutes. This includes preparing to see the patient (eg, review of tests), obtaining and/or reviewing separately obtained history, documenting clinical information in the electronic or other health record, independently interpreting results and communicating  results to the patient/family/caregiver, and discussing the plan with other providers when necessary.

## 2025-05-28 ENCOUNTER — OFFICE VISIT (OUTPATIENT)
Dept: FAMILY MEDICINE | Facility: CLINIC | Age: 75
End: 2025-05-28
Payer: MEDICARE

## 2025-05-28 VITALS
BODY MASS INDEX: 31.74 KG/M2 | HEART RATE: 66 BPM | SYSTOLIC BLOOD PRESSURE: 111 MMHG | TEMPERATURE: 98 F | RESPIRATION RATE: 16 BRPM | WEIGHT: 202.25 LBS | HEIGHT: 67 IN | DIASTOLIC BLOOD PRESSURE: 69 MMHG | OXYGEN SATURATION: 97 %

## 2025-05-28 DIAGNOSIS — R26.81 GAIT INSTABILITY: ICD-10-CM

## 2025-05-28 DIAGNOSIS — F32.A DEPRESSION, UNSPECIFIED DEPRESSION TYPE: ICD-10-CM

## 2025-05-28 DIAGNOSIS — F03.90 DEMENTIA, UNSPECIFIED DEMENTIA SEVERITY, UNSPECIFIED DEMENTIA TYPE, UNSPECIFIED WHETHER BEHAVIORAL, PSYCHOTIC, OR MOOD DISTURBANCE OR ANXIETY: Primary | ICD-10-CM

## 2025-05-28 DIAGNOSIS — I10 ESSENTIAL HYPERTENSION: ICD-10-CM

## 2025-05-28 PROCEDURE — 4010F ACE/ARB THERAPY RXD/TAKEN: CPT | Mod: CPTII,S$GLB,, | Performed by: FAMILY MEDICINE

## 2025-05-28 PROCEDURE — 99999 PR PBB SHADOW E&M-EST. PATIENT-LVL III: CPT | Mod: PBBFAC,,, | Performed by: FAMILY MEDICINE

## 2025-05-28 PROCEDURE — 3288F FALL RISK ASSESSMENT DOCD: CPT | Mod: CPTII,S$GLB,, | Performed by: FAMILY MEDICINE

## 2025-05-28 PROCEDURE — 3074F SYST BP LT 130 MM HG: CPT | Mod: CPTII,S$GLB,, | Performed by: FAMILY MEDICINE

## 2025-05-28 PROCEDURE — 99214 OFFICE O/P EST MOD 30 MIN: CPT | Mod: S$GLB,,, | Performed by: FAMILY MEDICINE

## 2025-05-28 PROCEDURE — 1126F AMNT PAIN NOTED NONE PRSNT: CPT | Mod: CPTII,S$GLB,, | Performed by: FAMILY MEDICINE

## 2025-05-28 PROCEDURE — G2211 COMPLEX E/M VISIT ADD ON: HCPCS | Mod: S$GLB,,, | Performed by: FAMILY MEDICINE

## 2025-05-28 PROCEDURE — 3078F DIAST BP <80 MM HG: CPT | Mod: CPTII,S$GLB,, | Performed by: FAMILY MEDICINE

## 2025-05-28 PROCEDURE — 1101F PT FALLS ASSESS-DOCD LE1/YR: CPT | Mod: CPTII,S$GLB,, | Performed by: FAMILY MEDICINE

## 2025-05-28 PROCEDURE — 3044F HG A1C LEVEL LT 7.0%: CPT | Mod: CPTII,S$GLB,, | Performed by: FAMILY MEDICINE

## 2025-05-28 RX ORDER — FLUOXETINE 20 MG/1
20 CAPSULE ORAL DAILY
Qty: 30 CAPSULE | Refills: 11 | Status: SHIPPED | OUTPATIENT
Start: 2025-05-28 | End: 2026-05-28

## 2025-05-28 NOTE — PROGRESS NOTES
THIS DOCUMENT WAS MADE IN PART WITH VOICE RECOGNITION SOFTWARE.  OCCASIONALLY THIS SOFTWARE WILL MISINTERPRET WORDS OR PHRASES.    Assessment and Plan:    1. Dementia, unspecified dementia severity, unspecified dementia type, unspecified whether behavioral, psychotic, or mood disturbance or anxiety  Ambulatory referral/consult to Palliative Care    Ambulatory referral/consult to Home Health      2. Essential hypertension        3. Gait instability  Ambulatory referral/consult to Home Health      4. Depression, unspecified depression type  FLUoxetine 20 MG capsule                PLAN    Assessment & Plan    PLAN SUMMARY:  > Referred to palliative care clinic for additional resources and chronic condition management  > Ordered home health physical and occupational therapy evaluation and treatment  > Discontinued gabapentin due to improved peripheral neuropathy symptoms  > Increased fluoxetine dose from 10 mg to 20 mg daily for depression  > Office staff to arrange palliative care appointment  > Follow-up in 3 months to reassess depression, mobility safety, and cognitive status    PLAN NOTE:  > Discussed potential causes of shuffling gait, including Parkinson's-like symptoms and certain types of dementia such as Lewy body dementia.  > Explained importance of fall prevention in patients with dementia to avoid complications like brain bleeds, hospitalizations, and fractures.  > Ordered home health physical and occupational therapy evaluation and treatment.  > Explained palliative care services focus on chronic disease management and providing resources for complications of conditions like dementia, not just end-of-life care.  > Referred to palliative care clinic for additional resources and management of chronic conditions. Zenobia from the office will call to arrange palliative care appointment and follow-up.  > Carlos to continue monitoring BP at home once a week.  > Evaluated effectiveness of fluoxetine for  "depression symptoms and increased dose from 10 mg to 20 mg daily for potential further improvement.  > Reviewed gabapentin use and discontinued as peripheral neuropathy symptoms have improved.  > Follow up in 3 months to reassess depression symptoms, evaluate safety with mobility, and check for any cognitive decline.           Visit today included increased complexity associated with the care of the episodic problem noted above addressed and managing the longitudinal care of the patient due to the serious and/or complex managed problem(s) .    ______________________________________________________________________  Subjective:    Chief Complaint:  Chief Complaint   Patient presents with    Follow-up        HPI:  Carlos is a 75 y.o. year old       .epsub  History of Present Illness    CHIEF COMPLAINT:  Carlos presents today for follow up    NEUROLOGICAL:  He reports considerable worsening of memory, requiring assistance with phone use including addition of a dedicated button for his caregiver. He continues donepezil and follows up with Dr. Contreras every 6 months. He demonstrates slow walking and shuffling gait described as "Parkinson's-avtar" with stooped posture. He refuses to step up even small curbs, preferring to use ramps, and recently nearly stumbled on a slight incline. He denies pain associated with step avoidance and reports no falls since last visit.    ACTIVITIES OF DAILY LIVING:  He remains independent in dressing and showering.    DEPRESSION:  He reports improvement in depression symptoms with fluoxetine. He has discontinued sertraline.    CARDIOVASCULAR:  He has history of triple bypass surgery and seven stent placements.    RECENT MEDICAL HISTORY:  CT was ordered on April 3rd due to concerns for stones. He was started on alfuzosin for stone passage and prostate management.    FAMILY HISTORY:  His father-in-law has history of kidney stones and prostate cancer. His father had prostate concerns but was not " diagnosed with prostate cancer.    PERIPHERAL NEUROPATHY:  He has discontinued gabapentin as his peripheral neuropathy symptoms have improved.    GOUT:  He denies any recent gout flares.      ROS:  ROS as indicated in HPI.             History of gout   Rx-allopurinol 100 mg  Prev flare  years ago      Essential hypertension  Rx-amlodipine 5 mg, losartan 100 mg  Normotensive in clinic   Home : in normal range     Coronary artery disease   Rx-atorvastatin 40 mg, Zetia 10 mg, Plavix 75 mg, Ranexa, NTG  Cardiology - MD Vadim   TUYET : x 7   CABG : 7  Denies exertional chest symptoms      Diastolic heart failure     Obstructive sleep apnea  Prev used CPAP --> GRADUATED      Memory impairment  Rx : Aricept   Prev Rx : Aduhelm (bleeding)  ---> secondary neuropathy (treated with gabapentin)   Neurology : [facility] --- > MD Diane      History diabetes / Prediab  Rx- metformin   Previous A1c- 5.5       Elevated PSA --> Prostate Cancer   Urology : MD Sly ---> MD Walter  Tx : Active Monitoring   Previous PSA 6.0% ---> 4.6 ---> 6.0     Renal mass --> Renal Cell Cancer (Left Side)   Urology : MD Sly ---> MD Walter  Sx : partial nephrectomy with robot (2017)  No RTX / Chemo     History of Nephroliths  Several  Prev episode > 10 years ago      Large Spermatocele Cyst  Monitored by urology      Cystic Acne   Location : back      Hearing Aid Status    Depression   Rx : Fluoxetine   Prev : Zoloft (not effective)         Past Medical History:  Past Medical History:   Diagnosis Date    Arthritis     knees, back    Bilateral renal cysts 07/30/2012    CAD (coronary artery disease) 1995    no chest pain since CABG, sees Dr Larry Black    Dementia     Diabetes mellitus     borderline    Hypertension     Kidney stones     uric acid stones    Left renal mass 08/02/2017    VASYL (obstructive sleep apnea) 2007    is compliant with CPAP    Primary gonadal failure        Past Surgical History:  Past Surgical History:   Procedure  Laterality Date    BACK SURGERY  2010    L5-6 fusion, with pins,bone graft    cardiac stents      2 stents 12/2021 and 1/2022    CARDIAC SURGERY  1995, 2007    total 7 vessel bypass    CAROTID ARTERY ANGIOPLASTY  01/22/2023    1 stent posterior descending    COLONOSCOPY  2008    repeat 2013    COLONOSCOPY N/A 04/04/2017    Procedure: COLONOSCOPY;  Surgeon: Dmitry Sampson MD;  Location: [facility] ENDO;  Service: Endoscopy;  Laterality: N/A;    CORONARY ARTERY BYPASS GRAFT  1995, 2007    cabgx3, cabgx5    CORONARY STENT PLACEMENT  10/2022    CORONARY STENT PLACEMENT  11/2022    CYSTOSCOPY N/A 2/7/2025    Procedure: CYSTOSCOPY;  Surgeon: Yovany Heart MD;  Location: [facility] OR;  Service: Urology;  Laterality: N/A;    EXTRACORPOREAL SHOCK WAVE LITHOTRIPSY      EYE SURGERY      cataracts bilateral    LITHOTRIPSY      LUMBAR LAMINECTOMY      Partial Nephrectomy Laproscopic      TRANSRECTAL BIOPSY OF PROSTATE WITH ULTRASOUND GUIDANCE N/A 03/19/2019    Procedure: BIOPSY, PROSTATE, RECTAL APPROACH, WITH US GUIDANCE;  Surgeon: Yovany Heart MD;  Location: [facility] OR;  Service: Urology;  Laterality: N/A;    TRANSRECTAL BIOPSY OF PROSTATE WITH ULTRASOUND GUIDANCE N/A 10/20/2020    Procedure: BIOPSY, PROSTATE, RECTAL APPROACH, WITH US GUIDANCE;  Surgeon: Yovany Heart MD;  Location: [facility] OR;  Service: Urology;  Laterality: N/A;    TRANSRECTAL ULTRASOUND EXAMINATION N/A 2/7/2025    Procedure: ULTRASOUND, RECTAL APPROACH;  Surgeon: Yovany Heart MD;  Location: [facility] OR;  Service: Urology;  Laterality: N/A;       Family History:  Family History   Problem Relation Name Age of Onset    Heart disease Mother      Hypertension Mother      Diabetes Mother      Alzheimer's disease Mother      Alzheimer's disease Father      Heart disease Father          premature    Hypertension Father      Diabetes Sister      Urolithiasis Sister      Alzheimer's disease Sister      Heart disease Paternal Uncle      Cancer Neg Hx       Prostate cancer Neg Hx      Kidney cancer Neg Hx      Melanoma Neg Hx      Lupus Neg Hx      Eczema Neg Hx      Psoriasis Neg Hx         Social History:  Social History     Socioeconomic History    Marital status:    Tobacco Use    Smoking status: Former     Current packs/day: 0.00     Types: Cigarettes     Quit date: 1976     Years since quittin.3     Passive exposure: Past    Smokeless tobacco: Never   Substance and Sexual Activity    Alcohol use: Yes     Comment: Socially    Drug use: No    Sexual activity: Yes     Social Drivers of Health     Financial Resource Strain: Low Risk  (2025)    Overall Financial Resource Strain (CARDIA)     Difficulty of Paying Living Expenses: Not hard at all   Food Insecurity: No Food Insecurity (2025)    Hunger Vital Sign     Worried About Running Out of Food in the Last Year: Never true     Ran Out of Food in the Last Year: Never true   Transportation Needs: No Transportation Needs (2025)    PRAPARE - Transportation     Lack of Transportation (Medical): No     Lack of Transportation (Non-Medical): No   Physical Activity: Insufficiently Active (2025)    Exercise Vital Sign     Days of Exercise per Week: 1 day     Minutes of Exercise per Session: 20 min   Stress: No Stress Concern Present (2025)    [employer] - Occupational Stress Questionnaire     Feeling of Stress : Only a little   Housing Stability: Low Risk  (2025)    Housing Stability Vital Sign     Unable to Pay for Housing in the Last Year: No     Number of Times Moved in the Last Year: 0     Homeless in the Last Year: No       Medications:  Current Outpatient Medications on File Prior to Visit   Medication Sig Dispense Refill    alfuzosin (UROXATRAL) 10 mg Tb24 Take 1 tablet (10 mg total) by mouth daily with breakfast. 30 tablet 11    allopurinoL (ZYLOPRIM) 100 MG tablet Take 1 tablet (100 mg total) by mouth once daily. 90 tablet 3    amLODIPine  (NORVASC) 5 MG tablet Take 1 tablet (5 mg total) by mouth every evening. 90 tablet 3    atorvastatin (LIPITOR) 40 MG tablet TAKE 1 TABLET(40 MG) BY MOUTH EVERY DAY 90 tablet 2    CALCIUM 500 + D 500 mg-5 mcg (200 unit) per tablet Take 1 tablet by mouth Daily.      ciclopirox (PENLAC) 8 % Soln Apply topically nightly. 6.6 mL 11    clopidogreL (PLAVIX) 75 mg tablet Take medication by mouth 3 times weekly      cyanocobalamin/folic acid (VITAMIN B12-FOLIC ACID) 500-400 mcg Tab Take by mouth.      donepeziL (ARICEPT) 5 MG tablet Take 5 mg by mouth.      ezetimibe (ZETIA) 10 mg tablet Take 1 tablet (10 mg total) by mouth once daily. 90 tablet 3    ferrous sulfate (FEOSOL) 325 mg (65 mg iron) Tab tablet Take 325 mg by mouth.      folic acid (FOLVITE) 1 MG tablet Take 1 tablet (1 mg total) by mouth once daily. 90 tablet 3    furosemide (LASIX) 20 MG tablet Take 20 mg by mouth daily as needed.      ipratropium (ATROVENT) 21 mcg (0.03 %) nasal spray 2 sprays by Each Nostril route 3 (three) times daily as needed for Rhinitis. 30 mL 3    ketoconazole (NIZORAL) 2 % cream Apply topically 2 (two) times daily. 60 g 1    losartan (COZAAR) 100 MG tablet Take 100 mg by mouth.      multivit-min-FA-lycopen-lutein 0.4 mg-300 mcg- 250 mcg Tab Take by mouth.      nitroGLYCERIN (NITROSTAT) 0.4 MG SL tablet       olmesartan (BENICAR) 40 MG tablet TAKE 1 TABLET(40 MG) BY MOUTH DAILY. REPLACES LOSARTAN 90 tablet 3    ranolazine (RANEXA) 500 MG Tb12 Take 1 tablet (500 mg total) by mouth 2 (two) times daily. 180 tablet 3    vitamin D (VITAMIN D3) 1000 units Tab Take by mouth.       No current facility-administered medications on file prior to visit.       Allergies:  Adhesive    Immunizations:  Immunization History   Administered Date(s) Administered    COVID-19, MRNA, LN-S, PF (MODERNA FULL 0.5 ML DOSE) 08/19/2023    COVID-19, MRNA, LN-S, PF (Pfizer) (Purple Cap) 01/09/2021, 01/30/2021, 10/15/2021    COVID-19, mRNA, LNP-S, bivalent booster, PF  (Moderna Omicron)12 + YEARS 08/19/2023    COVID-19, mRNA, LNP-S, bivalent booster, PF (PFIZER OMICRON) 10/14/2022    Hepatitis A, Adult 09/02/2009, 08/04/2014    Hepatitis B, Adult 09/02/2009, 09/29/2009    Influenza 10/26/2007, 09/19/2009, 09/07/2011, 01/15/2014, 09/23/2020, 10/01/2024    Influenza (FLUAD) - Quadrivalent - Adjuvanted - PF *Preferred* (65+) 11/18/2021, 10/14/2022    Influenza - Intradermal - Quadrivalent - PF 11/19/2012    Influenza - Intradermal - Trivalent - PF 11/19/2012    Influenza - Quadrivalent 11/13/2014    Influenza - Quadrivalent - High Dose - PF (65 years and older) 09/23/2020, 08/19/2023    Influenza - Trivalent - Afluria, Fluzone MDV 10/26/2007, 09/19/2009, 09/07/2011    Influenza - Trivalent - Fluarix, Flulaval, Fluzone, Afluria - PF 11/01/2014, 11/01/2014, 02/09/2017, 02/09/2017, 01/01/2018, 01/01/2018, 02/10/2019    Influenza - Trivalent - Fluzone High Dose - PF (65 years and older) 09/29/2015, 02/09/2017, 01/26/2018, 02/20/2019, 11/27/2019, 09/23/2020    Influenza Split 10/26/2007, 09/19/2009, 09/07/2011, 01/15/2014, 01/15/2014    Pneumococcal Conjugate - 13 Valent 01/13/2016, 06/01/2018    Pneumococcal Polysaccharide - 23 Valent 11/19/2012, 11/28/2012, 02/09/2017    Rsv, Bivalent, Rsvpref (Abrysvo) 11/09/2023    Tdap 04/23/2012, 08/01/2014    Typhoid - ViCPs 08/04/2014, 03/02/2020    Zoster 11/19/2012    Zoster Recombinant 04/30/2019, 04/30/2019, 07/15/2019, 10/13/2023       Review of Systems:  Review of Systems   Constitutional:  Negative for activity change and unexpected weight change.   HENT:  Positive for hearing loss. Negative for rhinorrhea and trouble swallowing.    Eyes:  Negative for discharge and visual disturbance.   Respiratory:  Negative for chest tightness and wheezing.    Cardiovascular:  Negative for chest pain and palpitations.   Gastrointestinal:  Negative for blood in stool, constipation, diarrhea and vomiting.   Endocrine: Negative for polydipsia and polyuria.  "  Genitourinary:  Positive for urgency. Negative for difficulty urinating and hematuria.   Musculoskeletal:  Positive for neck pain. Negative for arthralgias and joint swelling.   Neurological:  Negative for weakness and headaches.   Psychiatric/Behavioral:  Positive for confusion and dysphoric mood.    All other systems reviewed and are negative.      Objective:    Vitals:  Vitals:    05/28/25 1146   BP: 111/69   Pulse: 66   Resp: 16   Temp: 98.3 °F (36.8 °C)   TempSrc: Oral   SpO2: 97%   Weight: 91.7 kg (202 lb 4.4 oz)   Height: 5' 7" (1.702 m)   PainSc: 0-No pain         Physical Exam  Constitutional:       General: He is not in acute distress.  HENT:      Head: Normocephalic and atraumatic.   Eyes:      Pupils: Pupils are equal, round, and reactive to light.   Cardiovascular:      Rate and Rhythm: Normal rate and regular rhythm.      Heart sounds: No murmur heard.     No friction rub.   Pulmonary:      Effort: Pulmonary effort is normal.      Breath sounds: Normal breath sounds.   Abdominal:      General: Bowel sounds are normal. There is no distension.      Palpations: Abdomen is soft.      Tenderness: There is no abdominal tenderness.   Musculoskeletal:      Cervical back: Neck supple.   Skin:     General: Skin is warm and dry.      Findings: No rash.   Psychiatric:         Behavior: Behavior normal.             Morteza Blevins MD  Family Medicine              "

## 2025-05-30 ENCOUNTER — PATIENT MESSAGE (OUTPATIENT)
Dept: ADMINISTRATIVE | Facility: OTHER | Age: 75
End: 2025-05-30
Payer: MEDICARE

## 2025-06-13 ENCOUNTER — EXTERNAL HOME HEALTH (OUTPATIENT)
Dept: HOME HEALTH SERVICES | Facility: HOSPITAL | Age: 75
End: 2025-06-13
Payer: MEDICARE

## 2025-06-17 ENCOUNTER — DOCUMENT SCAN (OUTPATIENT)
Dept: HOME HEALTH SERVICES | Facility: HOSPITAL | Age: 75
End: 2025-06-17
Payer: MEDICARE

## 2025-07-02 ENCOUNTER — PATIENT MESSAGE (OUTPATIENT)
Dept: FAMILY MEDICINE | Facility: CLINIC | Age: 75
End: 2025-07-02
Payer: MEDICARE

## 2025-07-03 ENCOUNTER — OFFICE VISIT (OUTPATIENT)
Dept: PALLIATIVE MEDICINE | Facility: CLINIC | Age: 75
End: 2025-07-03
Payer: MEDICARE

## 2025-07-03 DIAGNOSIS — F01.B3 MODERATE VASCULAR DEMENTIA WITH MOOD DISTURBANCE: Primary | ICD-10-CM

## 2025-07-03 DIAGNOSIS — C61 PROSTATE CANCER: ICD-10-CM

## 2025-07-03 DIAGNOSIS — I25.118 ATHEROSCLEROTIC HEART DISEASE OF NATIVE CORONARY ARTERY WITH OTHER FORMS OF ANGINA PECTORIS: ICD-10-CM

## 2025-07-03 DIAGNOSIS — F03.90 DEMENTIA, UNSPECIFIED DEMENTIA SEVERITY, UNSPECIFIED DEMENTIA TYPE, UNSPECIFIED WHETHER BEHAVIORAL, PSYCHOTIC, OR MOOD DISTURBANCE OR ANXIETY: ICD-10-CM

## 2025-07-03 DIAGNOSIS — I50.32 CHRONIC DIASTOLIC CONGESTIVE HEART FAILURE: ICD-10-CM

## 2025-07-03 DIAGNOSIS — G30.0 ALZHEIMER'S DISEASE WITH EARLY ONSET (CODE): ICD-10-CM

## 2025-07-03 DIAGNOSIS — Z74.09 IMPAIRED FUNCTIONAL MOBILITY, BALANCE, GAIT, AND ENDURANCE: ICD-10-CM

## 2025-07-03 RX ORDER — DONEPEZIL HYDROCHLORIDE 10 MG/1
10 TABLET, FILM COATED ORAL NIGHTLY
Qty: 30 TABLET | Refills: 11 | Status: SHIPPED | OUTPATIENT
Start: 2025-07-03 | End: 2026-07-03

## 2025-07-03 NOTE — PATIENT INSTRUCTIONS
FAST Scale Overview  The FAST scale includes 7 major stages, with some stages further divided into sub-stages (e.g., 6a-6e, 7a-7f). Here's a simplified breakdown:  Stage 1 - Normal adult (no functional decline)  Stage 2 - Subjective forgetfulness (possible mild cognitive impairment)  Stage 3 - Early functional deficits (difficulty with complex tasks)  Stage 4 - Mild dementia (trouble with finances, travel, etc.)  Stage 5 - Moderate dementia (needs help choosing clothing)  Stage 6 - Moderately severe dementia:  6a: Needs help dressing  6b: Needs help bathing  6c: Needs help toileting  6d: Urinary incontinence  6e: Fecal incontinence  Stage 7 - Severe dementia:  7a: Limited speech (5-6 words/day)  7b: Only one word  7c: Cannot walk  7d: Cannot sit up  7e: Cannot smile  7f: Cannot hold head up

## 2025-07-03 NOTE — PROGRESS NOTES
"The patient location is: [facility]  The chief complaint leading to consultation is: Dementia    Visit type: audiovisual    Face to Face time with patient: 55 minutes  90 minutes of total time spent on the encounter, which includes face to face time and non-face to face time preparing to see the patient (eg, review of tests), Obtaining and/or reviewing separately obtained history, Documenting clinical information in the electronic or other health record, Independently interpreting results (not separately reported) and communicating results to the patient/family/caregiver, or Care coordination (not separately reported).     Each patient to whom he or she provides medical services by telemedicine is:  (1) informed of the relationship between the physician and patient and the respective role of any other health care provider with respect to management of the patient; and (2) notified that he or she may decline to receive medical services by telemedicine and may withdraw from such care at any time.    Consult Note  Palliative Care      Consult Requested By: Dr. Morteza Blevins  Reason for Consult: help with managing behaviors and disease progression      ASSESSMENT/PLAN:     Plan/Recommendations:  Carlos West" was seen today for dementia.    Diagnoses and all orders for this visit:    Moderate vascular dementia with mood disturbance  -     donepeziL (ARICEPT) 10 MG tablet; Take 1 tablet (10 mg total) by mouth every evening.  Has been seeing Dr. Contreras.  Was initially tried on aducanamab but had a fall with subdural hematoma and it was discontinued.  It does have a risk of intracranial bleeding.  Since that time he has been on donepezil 5 mg daily with no increase in dose and no discussion per the wife about the use of memantine.  At this time he is resistant to having a sitter and he does not understand why she needs to stay for 4 hours.  The wife does need some downtime and it is entirely appropriate.  Long discussion " with his wife about the disease and expectations.  Encouraged her to get a copy of the 36 hour day as well as a copy of how to care for aging parents by Sandra Brandt.  Both of these I have found to be very helpful with managing things from day-to-day  He is currently on fluoxetine 20 mg daily it has improved his mood according to the wife.  Dementia, unspecified dementia severity, unspecified dementia type, unspecified whether behavioral, psychotic, or mood disturbance or anxiety  -     Ambulatory referral/consult to Palliative Care  Has been seeing Dr. Contreras.  Was initially tried on aducanamab but had a fall with subdural hematoma and it was discontinued.  It does have a risk of intracranial bleeding.  Since that time he has been on donepezil 5 mg daily with no increase in dose and no discussion per the wife about the use of memantine.  At this time he is resistant to having a sitter and he does not understand why she needs to stay for 4 hours.  The wife does need some downtime and it is entirely appropriate.  Long discussion with his wife about the disease and expectations.  Encouraged her to get a copy of the 36 hour day as well as a copy of how to care for aging parents by Sandra Brandt.  Both of these I have found to be very helpful with managing things from day-to-day  Atherosclerotic heart disease of native coronary artery with other forms of angina pectoris  Followed by Cardiology.  Medications have been reviewed.  He is taking them.  Chronic diastolic congestive heart failure  Managed by Cardiology.  Takes Lasix less than once a week.  Prostate cancer  Currently watchfully waiting.  No treatment.  He does have a history of renal cell cancer with nephrectomy.  Currently with no evidence of deep these related to the renal cell cancer.  Alzheimer's disease with early onset (CODE)  Has been seeing Dr. Contreras.  Was initially tried on aducanamab but had a fall with subdural hematoma and it was discontinued.   It does have a risk of intracranial bleeding.  Since that time he has been on donepezil 5 mg daily with no increase in dose and no discussion per the wife about the use of memantine.  At this time he is resistant to having a sitter and he does not understand why she needs to stay for 4 hours.  The wife does need some downtime and it is entirely appropriate.  Long discussion with his wife about the disease and expectations.  Encouraged her to get a copy of the 36 hour day as well as a copy of how to care for aging parents by Sandra Brandt.  Both of these I have found to be very helpful with managing things from day-to-day.  Impaired functional mobility, balance, gait, and endurance  Patient is at high risk for falls.  He did have physical therapy which encouraged mobility.  I have encouraged them to seek out a silver sneakers program for improvement in functional status.  Wife can participate as can her mother who lives with them.      Understanding of illness/Prognosis:  Needs improved understanding of the illness.  Did review with her the stages of memory impairment and we will provide some additional information.    Goals of care:  To be as good as possible.    Follow up:  One month to monitor the increased dose of donepezil.    Patient's encounter and above plan of care discussed with patient's wife    SUBJECTIVE:     History of Present Illness:  Patient is a 75 y.o. year old male presenting with worsening dementia.  Patient currently resides at home with his wife and her 86-year-old mother.  Both of them in pretty good health.  Her mom goes to a senior center several days a week and that is helpful.  The patient is reluctant to allow care into the home.  The wife has a sitter 2 days a week 4 hours each time.  He gets angry about that and wants her to leave.  He does not understand why it takes her so long to do the housekeeping.  Wife does not want to leave him alone.  He is fast stage 4-5 based on his wife's  history.  He is not having trouble sleeping.    He does not have any chest pain or shortness of breath.  He takes Lasix about once a week.  They are watchfully waiting his prostate cancer.  He has a previous history of a renal cell carcinoma being removed.  His mood is better since he started fluoxetine.        Past Medical History:   Diagnosis Date    Arthritis     knees, back    Bilateral renal cysts 07/30/2012    CAD (coronary artery disease) 1995    no chest pain since CABG, sees Dr Larry Black    Dementia     Diabetes mellitus     borderline    Hypertension     Kidney stones     uric acid stones    Left renal mass 08/02/2017    VASYL (obstructive sleep apnea) 2007    is compliant with CPAP    Primary gonadal failure      Past Surgical History:   Procedure Laterality Date    BACK SURGERY  2010    L5-6 fusion, with pins,bone graft    cardiac stents      2 stents 12/2021 and 1/2022    CARDIAC SURGERY  1995, 2007    total 7 vessel bypass    CAROTID ARTERY ANGIOPLASTY  01/22/2023    1 stent posterior descending    COLONOSCOPY  2008    repeat 2013    COLONOSCOPY N/A 04/04/2017    Procedure: COLONOSCOPY;  Surgeon: Dmitry Sampson MD;  Location: [facility];  Service: Endoscopy;  Laterality: N/A;    CORONARY ARTERY BYPASS GRAFT  1995, 2007    cabgx3, cabgx5    CORONARY STENT PLACEMENT  10/2022    CORONARY STENT PLACEMENT  11/2022    CYSTOSCOPY N/A 2/7/2025    Procedure: CYSTOSCOPY;  Surgeon: Yovany Heart MD;  Location: [facility] OR;  Service: Urology;  Laterality: N/A;    EXTRACORPOREAL SHOCK WAVE LITHOTRIPSY      EYE SURGERY      cataracts bilateral    LITHOTRIPSY      LUMBAR LAMINECTOMY      Partial Nephrectomy Laproscopic      TRANSRECTAL BIOPSY OF PROSTATE WITH ULTRASOUND GUIDANCE N/A 03/19/2019    Procedure: BIOPSY, PROSTATE, RECTAL APPROACH, WITH US GUIDANCE;  Surgeon: Yovany Heart MD;  Location: [facility] OR;  Service: Urology;  Laterality: N/A;    TRANSRECTAL BIOPSY OF PROSTATE WITH ULTRASOUND GUIDANCE  N/A 10/20/2020    Procedure: BIOPSY, PROSTATE, RECTAL APPROACH, WITH US GUIDANCE;  Surgeon: Yovany Heart MD;  Location: [facility] OR;  Service: Urology;  Laterality: N/A;    TRANSRECTAL ULTRASOUND EXAMINATION N/A 2/7/2025    Procedure: ULTRASOUND, RECTAL APPROACH;  Surgeon: Yovany Heart MD;  Location[facility] OR;  Service: Urology;  Laterality: N/A;     Family History   Problem Relation Name Age of Onset    Heart disease Mother      Hypertension Mother      Diabetes Mother      Alzheimer's disease Mother      Alzheimer's disease Father      Heart disease Father          premature    Hypertension Father      Diabetes Sister      Urolithiasis Sister      Alzheimer's disease Sister      Heart disease Paternal Uncle      Cancer Neg Hx      Prostate cancer Neg Hx      Kidney cancer Neg Hx      Melanoma Neg Hx      Lupus Neg Hx      Eczema Neg Hx      Psoriasis Neg Hx       Review of patient's allergies indicates:   Allergen Reactions    Adhesive Blisters and Rash       Medications:  Current Medications[1]    OBJECTIVE:       ROS:  Review of Systems   Respiratory:  Negative for chest tightness and shortness of breath.    Cardiovascular:  Negative for chest pain.   Neurological:  Positive for weakness.   Psychiatric/Behavioral:  Positive for confusion.        Review of Symptoms      Symptom Assessment (ESAS 0-10 Scale)  Pain:  0  Dyspnea:  0  Anxiety:  0  Nausea:  0  Depression:  0  Anorexia:  0  Fatigue:  0  Insomnia:  0  Restlessness:  0  Agitation:  0     CAM / Delirium:  Negative  Constipation:  Negative  Diarrhea:  Negative      Bowel Management Plan (BMP):  Yes      Pain Assessment:  OME in 24 hours:  0  Location(s):      Performance Status:  80    Living Arrangements:  Lives with family and Lives >50 miles from facility    Psychosocial/Cultural:   See Palliative Psychosocial Note: No  , 2 children.  Son lives nearby daughter lives in [place].  **Primary  to Follow**  Palliative Care   Consult: No    Spiritual:  F - Geraldine and Belief:  Unknown  I - Importance:  Unknown  C - Community:  Unknown  A - Address in Care:  Unknown      Advance Care Planning   Advance Directives:   Living Will: No    [place]POST: No    Do Not Resuscitate Status: No    Medical Power of : Yes (Wife will upload)    Agent's Name:  Esthela Tenorio (Spouse) [phone]   Agent's Contact Number:  2nd person on his healthcare power of  is the daughter.    Decision Making:  Patient answered questions and Family answered questions  Goals of Care: The patient and family endorses that what is most important right now is to focus on remaining as independent as possible and quality of life, even if it means sacrificing a little time    Accordingly, we have decided that the best plan to meet the patient's goals includes continuing with treatment              Physical Exam:  Vitals:    Physical Exam  Pulmonary:      Effort: Pulmonary effort is normal.   Neurological:      Mental Status: He is alert. Mental status is at baseline.   Psychiatric:         Mood and Affect: Mood normal.         Behavior: Behavior normal.         Labs:  CBC:   WBC   Date Value Ref Range Status   03/10/2025 8.60 3.90 - 12.70 K/uL Final     Hemoglobin   Date Value Ref Range Status   03/10/2025 12.4 (L) 14.0 - 18.0 g/dL Final     Hematocrit   Date Value Ref Range Status   03/10/2025 39.3 (L) 40.0 - 54.0 % Final     MCV   Date Value Ref Range Status   03/10/2025 97 82 - 98 fL Final     Platelets   Date Value Ref Range Status   03/10/2025 263 150 - 450 K/uL Final       LFT:   Lab Results   Component Value Date    AST 19 04/03/2025    ALKPHOS 59 04/03/2025    BILITOT 0.6 04/03/2025       Albumin:   Albumin   Date Value Ref Range Status   04/03/2025 3.9 3.5 - 5.2 g/dL Final   10/23/2024 3.9 3.5 - 5.2 g/dL Final   10/23/2024 3.8 3.5 - 5.2 g/dL Final   05/09/2017 4.2 3.6 - 5.1 g/dL Final     Comment:     @ Test Performed By:  [employer]  [facility]  Gary García M.D., FCAP.,   [address] [place] [address]  CLIA  [id number]       Protein:   Protein Total   Date Value Ref Range Status   04/03/2025 6.8 6.0 - 8.4 gm/dL Final     Total Protein   Date Value Ref Range Status   10/23/2024 6.0 6.0 - 8.4 g/dL Final   10/23/2024 6.1 6.0 - 8.4 g/dL Final       Radiology:None      I spent a total of 90 minutes on the day of the visit.This includes face to face time in discussion of goals of care, symptom assessment, coordination of care and emotional support.  This also includes non-face to face time preparing to see the patient (eg, review of tests/imaging), obtaining and/or reviewing separately obtained history, documenting clinical information in the electronic or other health record, independently interpreting results and communicating results to the patient/family/caregiver, or care coordinator.       >16 minutes spent in discussing ACP; he has a healthcare power of  that they will upload.  Wife will complete 5 wishes after July 10th when the my directives application goes live.  We talked about the progression of dementia and that the inability to eat and swallow as part of the end-stage disease process and that feeding tubes are not recommended at this time for that reason.    Signature: Vi Smith MD         [1]   Current Outpatient Medications:     alfuzosin (UROXATRAL) 10 mg Tb24, Take 1 tablet (10 mg total) by mouth daily with breakfast., Disp: 30 tablet, Rfl: 11    allopurinoL (ZYLOPRIM) 100 MG tablet, Take 1 tablet (100 mg total) by mouth once daily., Disp: 90 tablet, Rfl: 3    amLODIPine (NORVASC) 5 MG tablet, Take 1 tablet (5 mg total) by mouth every evening., Disp: 90 tablet, Rfl: 3    atorvastatin (LIPITOR) 40 MG tablet, TAKE 1 TABLET(40 MG) BY MOUTH EVERY DAY, Disp: 90 tablet, Rfl: 2    CALCIUM 500 + D 500 mg-5 mcg (200 unit) per tablet, Take 1 tablet by mouth Daily., Disp: , Rfl:      ciclopirox (PENLAC) 8 % Soln, Apply topically nightly., Disp: 6.6 mL, Rfl: 11    clopidogreL (PLAVIX) 75 mg tablet, Take medication by mouth 3 times weekly, Disp: , Rfl:     cyanocobalamin/folic acid (VITAMIN B12-FOLIC ACID) 500-400 mcg Tab, Take by mouth., Disp: , Rfl:     donepeziL (ARICEPT) 10 MG tablet, Take 1 tablet (10 mg total) by mouth every evening., Disp: 30 tablet, Rfl: 11    ezetimibe (ZETIA) 10 mg tablet, Take 1 tablet (10 mg total) by mouth once daily., Disp: 90 tablet, Rfl: 3    ferrous sulfate (FEOSOL) 325 mg (65 mg iron) Tab tablet, Take 325 mg by mouth., Disp: , Rfl:     FLUoxetine 20 MG capsule, Take 1 capsule (20 mg total) by mouth once daily., Disp: 30 capsule, Rfl: 11    folic acid (FOLVITE) 1 MG tablet, Take 1 tablet (1 mg total) by mouth once daily., Disp: 90 tablet, Rfl: 3    furosemide (LASIX) 20 MG tablet, Take 20 mg by mouth daily as needed., Disp: , Rfl:     ipratropium (ATROVENT) 21 mcg (0.03 %) nasal spray, 2 sprays by Each Nostril route 3 (three) times daily as needed for Rhinitis., Disp: 30 mL, Rfl: 3    ketoconazole (NIZORAL) 2 % cream, Apply topically 2 (two) times daily., Disp: 60 g, Rfl: 1    multivit-min-FA-lycopen-lutein 0.4 mg-300 mcg- 250 mcg Tab, Take by mouth., Disp: , Rfl:     nitroGLYCERIN (NITROSTAT) 0.4 MG SL tablet, , Disp: , Rfl:     olmesartan (BENICAR) 40 MG tablet, TAKE 1 TABLET(40 MG) BY MOUTH DAILY. REPLACES LOSARTAN, Disp: 90 tablet, Rfl: 3    ranolazine (RANEXA) 500 MG Tb12, Take 1 tablet (500 mg total) by mouth 2 (two) times daily., Disp: 180 tablet, Rfl: 3    vitamin D (VITAMIN D3) 1000 units Tab, Take by mouth., Disp: , Rfl:

## 2025-07-19 ENCOUNTER — PATIENT MESSAGE (OUTPATIENT)
Dept: PALLIATIVE MEDICINE | Facility: CLINIC | Age: 75
End: 2025-07-19
Payer: MEDICARE

## 2025-08-03 ENCOUNTER — PATIENT MESSAGE (OUTPATIENT)
Dept: PALLIATIVE MEDICINE | Facility: CLINIC | Age: 75
End: 2025-08-03
Payer: MEDICARE

## 2025-08-11 ENCOUNTER — TELEPHONE (OUTPATIENT)
Dept: PALLIATIVE MEDICINE | Facility: CLINIC | Age: 75
End: 2025-08-11
Payer: MEDICARE

## 2025-08-14 ENCOUNTER — OFFICE VISIT (OUTPATIENT)
Dept: PALLIATIVE MEDICINE | Facility: CLINIC | Age: 75
End: 2025-08-14
Payer: MEDICARE

## 2025-08-14 DIAGNOSIS — F32.1 CURRENT MODERATE EPISODE OF MAJOR DEPRESSIVE DISORDER, UNSPECIFIED WHETHER RECURRENT: Primary | ICD-10-CM

## 2025-08-14 DIAGNOSIS — G30.9 ALZHEIMER'S DEMENTIA WITH BEHAVIORAL DISTURBANCE: ICD-10-CM

## 2025-08-14 DIAGNOSIS — F03.90 DEMENTIA, UNSPECIFIED DEMENTIA SEVERITY, UNSPECIFIED DEMENTIA TYPE, UNSPECIFIED WHETHER BEHAVIORAL, PSYCHOTIC, OR MOOD DISTURBANCE OR ANXIETY: ICD-10-CM

## 2025-08-14 DIAGNOSIS — F02.818 ALZHEIMER'S DEMENTIA WITH BEHAVIORAL DISTURBANCE: ICD-10-CM

## 2025-08-14 RX ORDER — QUETIAPINE FUMARATE 25 MG/1
25 TABLET, FILM COATED ORAL DAILY
Qty: 30 TABLET | Refills: 3 | Status: SHIPPED | OUTPATIENT
Start: 2025-08-14 | End: 2025-12-12

## 2025-08-15 ENCOUNTER — TELEPHONE (OUTPATIENT)
Dept: DERMATOLOGY | Facility: CLINIC | Age: 75
End: 2025-08-15
Payer: MEDICARE

## 2025-08-25 ENCOUNTER — PATIENT OUTREACH (OUTPATIENT)
Dept: NEUROLOGY | Facility: CLINIC | Age: 75
End: 2025-08-25
Payer: MEDICARE

## 2025-08-25 DIAGNOSIS — I25.118 ATHEROSCLEROTIC HEART DISEASE OF NATIVE CORONARY ARTERY WITH OTHER FORMS OF ANGINA PECTORIS: ICD-10-CM

## 2025-08-26 ENCOUNTER — PATIENT OUTREACH (OUTPATIENT)
Dept: NEUROLOGY | Facility: CLINIC | Age: 75
End: 2025-08-26
Payer: MEDICARE

## 2025-08-26 RX ORDER — EZETIMIBE 10 MG/1
10 TABLET ORAL DAILY
Qty: 90 TABLET | Refills: 0 | Status: SHIPPED | OUTPATIENT
Start: 2025-08-26

## (undated) DEVICE — SEE MEDLINE ITEM 152651

## (undated) DEVICE — SEE MEDLINE ITEM 157116

## (undated) DEVICE — GUIDE BIOPSY BIPLANAR 18G

## (undated) DEVICE — DRESSING ABSRBNT ISLAND 3.6X8

## (undated) DEVICE — BAG TISS RETRV MONARCH 10MM

## (undated) DEVICE — KIT ROBOTIC 3 ARM DA VINCI SI

## (undated) DEVICE — DRAIN SIL FLT 7MM FULL PERF ST

## (undated) DEVICE — SUT CTD VICRYL VIL BR SH 27

## (undated) DEVICE — DRAPE ABDOMINAL TIBURON 14X11

## (undated) DEVICE — SPONGE IV DRAIN 4X4 STERILE

## (undated) DEVICE — SEE MEDLINE ITEM 157117

## (undated) DEVICE — TROCAR 5X100MM BLADED Z THREAD

## (undated) DEVICE — SEE MEDLINE ITEM 146292

## (undated) DEVICE — TAPE SILK 3IN

## (undated) DEVICE — STRAP OR TABLE 5IN X 72IN

## (undated) DEVICE — DRAIN CHANNEL ROUND 10FR

## (undated) DEVICE — SUT 2-0 ETHILON 18 FS

## (undated) DEVICE — UNDERGLOVES BIOGEL PI SZ 7 LF

## (undated) DEVICE — GLOVE SURGEONS ULTRA TOUCH 6.5

## (undated) DEVICE — CLIPPER BLADE MOD 4406 (CAREF)

## (undated) DEVICE — LUBRICANT SURGILUBE 2 OZ

## (undated) DEVICE — SEE MEDLINE ITEM 146313

## (undated) DEVICE — SPONGE COTTON TRAY 4X4IN

## (undated) DEVICE — LINER SUCTION 3000CC

## (undated) DEVICE — NDL SPINAL 22GX7 SPINOCAN

## (undated) DEVICE — SOL CLEARIFY VISUALIZATION LAP

## (undated) DEVICE — APPLICATOR CHLORAPREP ORN 26ML

## (undated) DEVICE — NDL PNEUMOPERITONEUM 150MM

## (undated) DEVICE — CORD BIPOLAR 12 FOOT

## (undated) DEVICE — MATRIX HEMSTAT FLOSEAL 5ML

## (undated) DEVICE — Device

## (undated) DEVICE — GLOVE SENSICARE PI ALOE 7

## (undated) DEVICE — DRAPE CAMERA HEAD SI

## (undated) DEVICE — GLOVE SURG ULTRA TOUCH 7

## (undated) DEVICE — NDL HYPO A BEVEL 22X1 1/2

## (undated) DEVICE — SYR 30CC LUER LOCK

## (undated) DEVICE — ELECTRODE REM PLYHSV RETURN 9

## (undated) DEVICE — SPONGE LAP 4X18 PREWASHED

## (undated) DEVICE — COVER TRANSDUCER LATEX N/STERI

## (undated) DEVICE — HEMOSTAT SURGICEL 4X8IN

## (undated) DEVICE — SUT MONOCRYL 4-0 PS-2

## (undated) DEVICE — NDL HYPODERMIC BLUNT 18G 1.5IN

## (undated) DEVICE — CLIP HEMO-LOK MLX LARGE LF

## (undated) DEVICE — SLEEVE SCD EXPRESS CALF MEDIUM

## (undated) DEVICE — SOL 9P NACL IRR PIC IL

## (undated) DEVICE — UNDERGLOVE BIOGEL PI SZ 6.5 LF

## (undated) DEVICE — PACK CUSTOM UNIV BASIN SLI

## (undated) DEVICE — URINAL MALE WITH LID DISP

## (undated) DEVICE — SUT PDS II 0 CT-1 VIL MONO

## (undated) DEVICE — CLIP HEMO-LOK ML

## (undated) DEVICE — SYR 10CC LUER LOCK

## (undated) DEVICE — DRESSING TRANS 6X8 TEGADERM

## (undated) DEVICE — SUT 0 VICRYL / CT-1

## (undated) DEVICE — TROCAR ENDO KII FIOS 12X150MM

## (undated) DEVICE — DRAPE UINDERBUT GRAD PCH

## (undated) DEVICE — GUN BIOPSY 18GA MONOPLY

## (undated) DEVICE — ADHESIVE DERMABOND ADVANCED

## (undated) DEVICE — SET CYSTO IRR DRP CHMBR 84IN

## (undated) DEVICE — SUT MCRYL PLUS 4-0 PS2 27IN

## (undated) DEVICE — IRRIGATOR SUCTION W/TIP

## (undated) DEVICE — POWDER ARISTA AH 3G

## (undated) DEVICE — KIT ENDOKIT EGD/COLON/ERCP

## (undated) DEVICE — TROCAR ENDO Z THRD KII 12X150

## (undated) DEVICE — DERMABOND SKIN ADHESIVE PROPEN

## (undated) DEVICE — TROCAR KII BLLN 12MM 10CM

## (undated) DEVICE — SUT ABS CLIP LAPRA-TY CTD

## (undated) DEVICE — SUT PROLENE 5/0 RB-1 36 IN

## (undated) DEVICE — COVER TIP CURVED SCISSORS XI

## (undated) DEVICE — OBTURATOR 8MM BLADELESS